# Patient Record
Sex: MALE | Race: ASIAN | NOT HISPANIC OR LATINO | Employment: OTHER | URBAN - METROPOLITAN AREA
[De-identification: names, ages, dates, MRNs, and addresses within clinical notes are randomized per-mention and may not be internally consistent; named-entity substitution may affect disease eponyms.]

---

## 2017-04-10 ENCOUNTER — OFFICE VISIT - HEALTHEAST (OUTPATIENT)
Dept: FAMILY MEDICINE | Facility: CLINIC | Age: 67
End: 2017-04-10

## 2017-04-10 ENCOUNTER — COMMUNICATION - HEALTHEAST (OUTPATIENT)
Dept: FAMILY MEDICINE | Facility: CLINIC | Age: 67
End: 2017-04-10

## 2017-04-10 DIAGNOSIS — R80.9 MICROALBUMINURIA: ICD-10-CM

## 2017-04-10 DIAGNOSIS — E11.9 TYPE 2 DIABETES MELLITUS WITHOUT COMPLICATION, WITHOUT LONG-TERM CURRENT USE OF INSULIN (H): ICD-10-CM

## 2017-04-10 LAB — HBA1C MFR BLD: 7.3 % (ref 3.5–6)

## 2017-04-10 ASSESSMENT — MIFFLIN-ST. JEOR: SCORE: 1620.61

## 2017-04-19 ENCOUNTER — COMMUNICATION - HEALTHEAST (OUTPATIENT)
Dept: FAMILY MEDICINE | Facility: CLINIC | Age: 67
End: 2017-04-19

## 2017-04-19 DIAGNOSIS — E78.00 HYPERCHOLESTEREMIA: ICD-10-CM

## 2017-08-17 ENCOUNTER — COMMUNICATION - HEALTHEAST (OUTPATIENT)
Dept: FAMILY MEDICINE | Facility: CLINIC | Age: 67
End: 2017-08-17

## 2017-08-17 DIAGNOSIS — E11.9 DIABETES MELLITUS (H): ICD-10-CM

## 2017-10-04 ENCOUNTER — COMMUNICATION - HEALTHEAST (OUTPATIENT)
Dept: FAMILY MEDICINE | Facility: CLINIC | Age: 67
End: 2017-10-04

## 2017-10-04 DIAGNOSIS — I10 HTN (HYPERTENSION): ICD-10-CM

## 2017-10-09 ENCOUNTER — AMBULATORY - HEALTHEAST (OUTPATIENT)
Dept: FAMILY MEDICINE | Facility: CLINIC | Age: 67
End: 2017-10-09

## 2017-10-09 ENCOUNTER — COMMUNICATION - HEALTHEAST (OUTPATIENT)
Dept: FAMILY MEDICINE | Facility: CLINIC | Age: 67
End: 2017-10-09

## 2017-11-10 ENCOUNTER — COMMUNICATION - HEALTHEAST (OUTPATIENT)
Dept: FAMILY MEDICINE | Facility: CLINIC | Age: 67
End: 2017-11-10

## 2017-11-10 DIAGNOSIS — E11.9 TYPE 2 DIABETES MELLITUS WITHOUT COMPLICATION (H): ICD-10-CM

## 2017-11-22 ENCOUNTER — AMBULATORY - HEALTHEAST (OUTPATIENT)
Dept: FAMILY MEDICINE | Facility: CLINIC | Age: 67
End: 2017-11-22

## 2017-12-07 ENCOUNTER — COMMUNICATION - HEALTHEAST (OUTPATIENT)
Dept: FAMILY MEDICINE | Facility: CLINIC | Age: 67
End: 2017-12-07

## 2017-12-07 DIAGNOSIS — E78.00 HYPERCHOLESTEREMIA: ICD-10-CM

## 2018-04-13 ENCOUNTER — COMMUNICATION - HEALTHEAST (OUTPATIENT)
Dept: FAMILY MEDICINE | Facility: CLINIC | Age: 68
End: 2018-04-13

## 2018-04-13 DIAGNOSIS — E11.9 TYPE 2 DIABETES MELLITUS WITHOUT COMPLICATION, WITHOUT LONG-TERM CURRENT USE OF INSULIN (H): ICD-10-CM

## 2018-04-13 DIAGNOSIS — R80.9 MICROALBUMINURIA: ICD-10-CM

## 2018-05-25 ENCOUNTER — COMMUNICATION - HEALTHEAST (OUTPATIENT)
Dept: FAMILY MEDICINE | Facility: CLINIC | Age: 68
End: 2018-05-25

## 2018-05-25 DIAGNOSIS — E11.9 DIABETES MELLITUS (H): ICD-10-CM

## 2018-06-19 ENCOUNTER — OFFICE VISIT - HEALTHEAST (OUTPATIENT)
Dept: FAMILY MEDICINE | Facility: CLINIC | Age: 68
End: 2018-06-19

## 2018-06-19 DIAGNOSIS — R07.9 CHEST PAIN: ICD-10-CM

## 2018-06-19 DIAGNOSIS — E78.00 HYPERCHOLESTEREMIA: ICD-10-CM

## 2018-06-19 DIAGNOSIS — E11.9 TYPE 2 DIABETES MELLITUS WITHOUT COMPLICATION, WITHOUT LONG-TERM CURRENT USE OF INSULIN (H): ICD-10-CM

## 2018-06-19 LAB
ANION GAP SERPL CALCULATED.3IONS-SCNC: 11 MMOL/L (ref 5–18)
BUN SERPL-MCNC: 11 MG/DL (ref 8–22)
CALCIUM SERPL-MCNC: 9.4 MG/DL (ref 8.5–10.5)
CHLORIDE BLD-SCNC: 109 MMOL/L (ref 98–107)
CO2 SERPL-SCNC: 25 MMOL/L (ref 22–31)
CREAT SERPL-MCNC: 1.2 MG/DL (ref 0.7–1.3)
ERYTHROCYTE [DISTWIDTH] IN BLOOD BY AUTOMATED COUNT: 13.3 % (ref 11–14.5)
GFR SERPL CREATININE-BSD FRML MDRD: >60 ML/MIN/1.73M2
GLUCOSE BLD-MCNC: 195 MG/DL (ref 70–125)
HBA1C MFR BLD: 9.2 % (ref 3.5–6)
HCT VFR BLD AUTO: 44.9 % (ref 40–54)
HGB BLD-MCNC: 14.9 G/DL (ref 14–18)
LDLC SERPL CALC-MCNC: 99 MG/DL
MCH RBC QN AUTO: 29.2 PG (ref 27–34)
MCHC RBC AUTO-ENTMCNC: 33.2 G/DL (ref 32–36)
MCV RBC AUTO: 88 FL (ref 80–100)
PLATELET # BLD AUTO: 135 THOU/UL (ref 140–440)
PMV BLD AUTO: 7.6 FL (ref 7–10)
POTASSIUM BLD-SCNC: 4.2 MMOL/L (ref 3.5–5)
RBC # BLD AUTO: 5.1 MILL/UL (ref 4.4–6.2)
SODIUM SERPL-SCNC: 145 MMOL/L (ref 136–145)
WBC: 7.4 THOU/UL (ref 4–11)

## 2018-06-19 ASSESSMENT — MIFFLIN-ST. JEOR: SCORE: 1661.43

## 2018-08-24 ENCOUNTER — COMMUNICATION - HEALTHEAST (OUTPATIENT)
Dept: FAMILY MEDICINE | Facility: CLINIC | Age: 68
End: 2018-08-24

## 2018-08-24 DIAGNOSIS — E11.9 DIABETES MELLITUS (H): ICD-10-CM

## 2018-09-24 ENCOUNTER — RECORDS - HEALTHEAST (OUTPATIENT)
Dept: ADMINISTRATIVE | Facility: OTHER | Age: 68
End: 2018-09-24

## 2018-11-24 ENCOUNTER — COMMUNICATION - HEALTHEAST (OUTPATIENT)
Dept: FAMILY MEDICINE | Facility: CLINIC | Age: 68
End: 2018-11-24

## 2018-12-15 ENCOUNTER — COMMUNICATION - HEALTHEAST (OUTPATIENT)
Dept: FAMILY MEDICINE | Facility: CLINIC | Age: 68
End: 2018-12-15

## 2018-12-15 DIAGNOSIS — I10 ESSENTIAL HYPERTENSION: ICD-10-CM

## 2019-01-29 ENCOUNTER — COMMUNICATION - HEALTHEAST (OUTPATIENT)
Dept: FAMILY MEDICINE | Facility: CLINIC | Age: 69
End: 2019-01-29

## 2019-01-31 ENCOUNTER — AMBULATORY - HEALTHEAST (OUTPATIENT)
Dept: LAB | Facility: CLINIC | Age: 69
End: 2019-01-31

## 2019-01-31 ENCOUNTER — AMBULATORY - HEALTHEAST (OUTPATIENT)
Dept: FAMILY MEDICINE | Facility: CLINIC | Age: 69
End: 2019-01-31

## 2019-01-31 DIAGNOSIS — E11.9 TYPE 2 DIABETES MELLITUS WITHOUT COMPLICATION, WITHOUT LONG-TERM CURRENT USE OF INSULIN (H): ICD-10-CM

## 2019-01-31 LAB — HBA1C MFR BLD: 8.6 % (ref 3.5–6)

## 2019-04-03 ENCOUNTER — COMMUNICATION - HEALTHEAST (OUTPATIENT)
Dept: FAMILY MEDICINE | Facility: CLINIC | Age: 69
End: 2019-04-03

## 2019-04-03 DIAGNOSIS — R80.9 MICROALBUMINURIA: ICD-10-CM

## 2019-04-03 DIAGNOSIS — E11.9 TYPE 2 DIABETES MELLITUS WITHOUT COMPLICATION, WITHOUT LONG-TERM CURRENT USE OF INSULIN (H): ICD-10-CM

## 2019-04-08 ENCOUNTER — COMMUNICATION - HEALTHEAST (OUTPATIENT)
Dept: FAMILY MEDICINE | Facility: CLINIC | Age: 69
End: 2019-04-08

## 2019-04-16 ENCOUNTER — COMMUNICATION - HEALTHEAST (OUTPATIENT)
Dept: FAMILY MEDICINE | Facility: CLINIC | Age: 69
End: 2019-04-16

## 2019-05-30 ENCOUNTER — OFFICE VISIT - HEALTHEAST (OUTPATIENT)
Dept: FAMILY MEDICINE | Facility: CLINIC | Age: 69
End: 2019-05-30

## 2019-05-30 DIAGNOSIS — E78.00 PURE HYPERCHOLESTEROLEMIA: ICD-10-CM

## 2019-05-30 DIAGNOSIS — E11.9 TYPE 2 DIABETES MELLITUS WITHOUT COMPLICATION, WITHOUT LONG-TERM CURRENT USE OF INSULIN (H): ICD-10-CM

## 2019-05-30 LAB
ALBUMIN SERPL-MCNC: 3.5 G/DL (ref 3.5–5)
ALP SERPL-CCNC: 75 U/L (ref 45–120)
ALT SERPL W P-5'-P-CCNC: 33 U/L (ref 0–45)
ANION GAP SERPL CALCULATED.3IONS-SCNC: 10 MMOL/L (ref 5–18)
AST SERPL W P-5'-P-CCNC: 42 U/L (ref 0–40)
BILIRUB SERPL-MCNC: 0.6 MG/DL (ref 0–1)
BUN SERPL-MCNC: 15 MG/DL (ref 8–22)
CALCIUM SERPL-MCNC: 9.4 MG/DL (ref 8.5–10.5)
CHLORIDE BLD-SCNC: 107 MMOL/L (ref 98–107)
CO2 SERPL-SCNC: 25 MMOL/L (ref 22–31)
CREAT SERPL-MCNC: 1.31 MG/DL (ref 0.7–1.3)
CREAT UR-MCNC: 86.6 MG/DL
GFR SERPL CREATININE-BSD FRML MDRD: 55 ML/MIN/1.73M2
GLUCOSE BLD-MCNC: 183 MG/DL (ref 70–125)
HBA1C MFR BLD: 7.6 % (ref 3.5–6)
LDLC SERPL CALC-MCNC: 85 MG/DL
MICROALBUMIN UR-MCNC: 6.08 MG/DL (ref 0–1.99)
MICROALBUMIN/CREAT UR: 70.2 MG/G
POTASSIUM BLD-SCNC: 4.4 MMOL/L (ref 3.5–5)
PROT SERPL-MCNC: 7.2 G/DL (ref 6–8)
SODIUM SERPL-SCNC: 142 MMOL/L (ref 136–145)

## 2019-05-30 ASSESSMENT — MIFFLIN-ST. JEOR: SCORE: 1693.19

## 2019-06-03 ENCOUNTER — COMMUNICATION - HEALTHEAST (OUTPATIENT)
Dept: FAMILY MEDICINE | Facility: CLINIC | Age: 69
End: 2019-06-03

## 2019-06-06 ENCOUNTER — COMMUNICATION - HEALTHEAST (OUTPATIENT)
Dept: FAMILY MEDICINE | Facility: CLINIC | Age: 69
End: 2019-06-06

## 2019-06-17 ENCOUNTER — COMMUNICATION - HEALTHEAST (OUTPATIENT)
Dept: ADMINISTRATIVE | Facility: CLINIC | Age: 69
End: 2019-06-17

## 2019-07-08 ENCOUNTER — COMMUNICATION - HEALTHEAST (OUTPATIENT)
Dept: FAMILY MEDICINE | Facility: CLINIC | Age: 69
End: 2019-07-08

## 2019-07-08 DIAGNOSIS — E78.00 HYPERCHOLESTEREMIA: ICD-10-CM

## 2019-07-09 ENCOUNTER — COMMUNICATION - HEALTHEAST (OUTPATIENT)
Dept: FAMILY MEDICINE | Facility: CLINIC | Age: 69
End: 2019-07-09

## 2019-07-09 DIAGNOSIS — E11.9 TYPE 2 DIABETES MELLITUS WITHOUT COMPLICATION, WITHOUT LONG-TERM CURRENT USE OF INSULIN (H): ICD-10-CM

## 2019-07-09 DIAGNOSIS — R80.9 MICROALBUMINURIA: ICD-10-CM

## 2019-08-16 ENCOUNTER — COMMUNICATION - HEALTHEAST (OUTPATIENT)
Dept: FAMILY MEDICINE | Facility: CLINIC | Age: 69
End: 2019-08-16

## 2019-08-16 DIAGNOSIS — E11.9 DIABETES MELLITUS (H): ICD-10-CM

## 2019-09-12 ENCOUNTER — COMMUNICATION - HEALTHEAST (OUTPATIENT)
Dept: FAMILY MEDICINE | Facility: CLINIC | Age: 69
End: 2019-09-12

## 2019-09-18 ENCOUNTER — AMBULATORY - HEALTHEAST (OUTPATIENT)
Dept: FAMILY MEDICINE | Facility: CLINIC | Age: 69
End: 2019-09-18

## 2019-09-18 ENCOUNTER — OFFICE VISIT - HEALTHEAST (OUTPATIENT)
Dept: FAMILY MEDICINE | Facility: CLINIC | Age: 69
End: 2019-09-18

## 2019-09-18 DIAGNOSIS — Z00.00 ROUTINE HEALTH MAINTENANCE: ICD-10-CM

## 2019-09-18 DIAGNOSIS — Z12.11 SCREEN FOR COLON CANCER: ICD-10-CM

## 2019-09-18 DIAGNOSIS — Z23 NEED FOR IMMUNIZATION AGAINST INFLUENZA: ICD-10-CM

## 2019-09-18 DIAGNOSIS — E11.9 TYPE 2 DIABETES MELLITUS WITHOUT COMPLICATION, WITHOUT LONG-TERM CURRENT USE OF INSULIN (H): ICD-10-CM

## 2019-09-18 DIAGNOSIS — G51.0 BELL'S PALSY: ICD-10-CM

## 2019-09-18 LAB — HBA1C MFR BLD: 7.3 % (ref 3.5–6)

## 2019-09-18 ASSESSMENT — MIFFLIN-ST. JEOR: SCORE: 1675.13

## 2019-09-19 ENCOUNTER — COMMUNICATION - HEALTHEAST (OUTPATIENT)
Dept: FAMILY MEDICINE | Facility: CLINIC | Age: 69
End: 2019-09-19

## 2019-09-19 LAB — HCV AB SERPL QL IA: NEGATIVE

## 2019-10-01 ENCOUNTER — RECORDS - HEALTHEAST (OUTPATIENT)
Dept: ADMINISTRATIVE | Facility: OTHER | Age: 69
End: 2019-10-01

## 2019-10-23 ENCOUNTER — COMMUNICATION - HEALTHEAST (OUTPATIENT)
Dept: FAMILY MEDICINE | Facility: CLINIC | Age: 69
End: 2019-10-23

## 2019-11-07 ENCOUNTER — COMMUNICATION - HEALTHEAST (OUTPATIENT)
Dept: FAMILY MEDICINE | Facility: CLINIC | Age: 69
End: 2019-11-07

## 2019-11-13 ENCOUNTER — OFFICE VISIT - HEALTHEAST (OUTPATIENT)
Dept: PHARMACY | Facility: CLINIC | Age: 69
End: 2019-11-13

## 2019-11-13 DIAGNOSIS — E11.9 TYPE 2 DIABETES MELLITUS WITHOUT COMPLICATION, WITHOUT LONG-TERM CURRENT USE OF INSULIN (H): ICD-10-CM

## 2019-11-26 ENCOUNTER — COMMUNICATION - HEALTHEAST (OUTPATIENT)
Dept: FAMILY MEDICINE | Facility: CLINIC | Age: 69
End: 2019-11-26

## 2019-11-26 DIAGNOSIS — E11.9 TYPE 2 DIABETES MELLITUS WITHOUT COMPLICATION, WITHOUT LONG-TERM CURRENT USE OF INSULIN (H): ICD-10-CM

## 2019-12-02 ENCOUNTER — RECORDS - HEALTHEAST (OUTPATIENT)
Dept: ADMINISTRATIVE | Facility: OTHER | Age: 69
End: 2019-12-02

## 2019-12-23 ENCOUNTER — OFFICE VISIT - HEALTHEAST (OUTPATIENT)
Dept: FAMILY MEDICINE | Facility: CLINIC | Age: 69
End: 2019-12-23

## 2019-12-23 DIAGNOSIS — E11.9 DIABETES MELLITUS, TYPE 2 (H): ICD-10-CM

## 2019-12-23 LAB
ANION GAP SERPL CALCULATED.3IONS-SCNC: 10 MMOL/L (ref 5–18)
BUN SERPL-MCNC: 16 MG/DL (ref 8–22)
CALCIUM SERPL-MCNC: 9 MG/DL (ref 8.5–10.5)
CHLORIDE BLD-SCNC: 110 MMOL/L (ref 98–107)
CO2 SERPL-SCNC: 25 MMOL/L (ref 22–31)
CREAT SERPL-MCNC: 1.34 MG/DL (ref 0.7–1.3)
GFR SERPL CREATININE-BSD FRML MDRD: 53 ML/MIN/1.73M2
GLUCOSE BLD-MCNC: 98 MG/DL (ref 70–125)
HBA1C MFR BLD: 6.3 % (ref 3.5–6)
POTASSIUM BLD-SCNC: 3.7 MMOL/L (ref 3.5–5)
SODIUM SERPL-SCNC: 145 MMOL/L (ref 136–145)

## 2019-12-23 ASSESSMENT — MIFFLIN-ST. JEOR: SCORE: 1643.29

## 2019-12-24 ENCOUNTER — COMMUNICATION - HEALTHEAST (OUTPATIENT)
Dept: FAMILY MEDICINE | Facility: CLINIC | Age: 69
End: 2019-12-24

## 2020-01-02 ENCOUNTER — COMMUNICATION - HEALTHEAST (OUTPATIENT)
Dept: FAMILY MEDICINE | Facility: CLINIC | Age: 70
End: 2020-01-02

## 2020-01-10 ENCOUNTER — TRANSFERRED RECORDS (OUTPATIENT)
Dept: MULTI SPECIALTY CLINIC | Facility: CLINIC | Age: 70
End: 2020-01-10
Payer: COMMERCIAL

## 2020-01-10 LAB — COLOGUARD-ABSTRACT: NEGATIVE

## 2020-01-14 ENCOUNTER — RECORDS - HEALTHEAST (OUTPATIENT)
Dept: ADMINISTRATIVE | Facility: OTHER | Age: 70
End: 2020-01-14

## 2020-01-22 ENCOUNTER — COMMUNICATION - HEALTHEAST (OUTPATIENT)
Dept: FAMILY MEDICINE | Facility: CLINIC | Age: 70
End: 2020-01-22

## 2020-01-22 DIAGNOSIS — I10 ESSENTIAL HYPERTENSION: ICD-10-CM

## 2020-02-03 ENCOUNTER — AMBULATORY - HEALTHEAST (OUTPATIENT)
Dept: FAMILY MEDICINE | Facility: CLINIC | Age: 70
End: 2020-02-03

## 2020-02-03 DIAGNOSIS — E11.9 TYPE 2 DIABETES MELLITUS WITHOUT COMPLICATION, WITHOUT LONG-TERM CURRENT USE OF INSULIN (H): ICD-10-CM

## 2020-02-04 ENCOUNTER — COMMUNICATION - HEALTHEAST (OUTPATIENT)
Dept: FAMILY MEDICINE | Facility: CLINIC | Age: 70
End: 2020-02-04

## 2020-02-04 ENCOUNTER — AMBULATORY - HEALTHEAST (OUTPATIENT)
Dept: FAMILY MEDICINE | Facility: CLINIC | Age: 70
End: 2020-02-04

## 2020-02-04 ENCOUNTER — COMMUNICATION - HEALTHEAST (OUTPATIENT)
Dept: SCHEDULING | Facility: CLINIC | Age: 70
End: 2020-02-04

## 2020-02-04 DIAGNOSIS — E11.9 TYPE 2 DIABETES MELLITUS WITHOUT COMPLICATION, WITHOUT LONG-TERM CURRENT USE OF INSULIN (H): ICD-10-CM

## 2020-05-01 ENCOUNTER — COMMUNICATION - HEALTHEAST (OUTPATIENT)
Dept: FAMILY MEDICINE | Facility: CLINIC | Age: 70
End: 2020-05-01

## 2020-05-01 DIAGNOSIS — E11.9 DIABETES MELLITUS (H): ICD-10-CM

## 2020-06-25 ENCOUNTER — COMMUNICATION - HEALTHEAST (OUTPATIENT)
Dept: FAMILY MEDICINE | Facility: CLINIC | Age: 70
End: 2020-06-25

## 2020-06-25 DIAGNOSIS — R80.9 MICROALBUMINURIA: ICD-10-CM

## 2020-06-25 DIAGNOSIS — E11.9 TYPE 2 DIABETES MELLITUS WITHOUT COMPLICATION, WITHOUT LONG-TERM CURRENT USE OF INSULIN (H): ICD-10-CM

## 2020-08-24 ENCOUNTER — COMMUNICATION - HEALTHEAST (OUTPATIENT)
Dept: FAMILY MEDICINE | Facility: CLINIC | Age: 70
End: 2020-08-24

## 2020-08-24 DIAGNOSIS — E78.00 HYPERCHOLESTEREMIA: ICD-10-CM

## 2020-08-24 DIAGNOSIS — E11.9 DIABETES MELLITUS (H): ICD-10-CM

## 2020-08-31 ENCOUNTER — AMBULATORY - HEALTHEAST (OUTPATIENT)
Dept: FAMILY MEDICINE | Facility: CLINIC | Age: 70
End: 2020-08-31

## 2020-08-31 DIAGNOSIS — I10 ESSENTIAL HYPERTENSION: ICD-10-CM

## 2020-08-31 DIAGNOSIS — E11.9 DIABETES MELLITUS (H): ICD-10-CM

## 2020-08-31 DIAGNOSIS — R80.9 MICROALBUMINURIA: ICD-10-CM

## 2020-08-31 DIAGNOSIS — E11.9 TYPE 2 DIABETES MELLITUS WITHOUT COMPLICATION, WITHOUT LONG-TERM CURRENT USE OF INSULIN (H): ICD-10-CM

## 2020-08-31 DIAGNOSIS — E78.00 HYPERCHOLESTEREMIA: ICD-10-CM

## 2020-08-31 RX ORDER — SIMVASTATIN 20 MG
20 TABLET ORAL AT BEDTIME
Qty: 90 TABLET | Refills: 3 | Status: SHIPPED | OUTPATIENT
Start: 2020-08-31 | End: 2021-10-04

## 2020-08-31 RX ORDER — GLIPIZIDE 10 MG/1
10 TABLET, FILM COATED, EXTENDED RELEASE ORAL DAILY
Qty: 90 TABLET | Refills: 3 | Status: SHIPPED | OUTPATIENT
Start: 2020-08-31 | End: 2021-11-02

## 2020-08-31 RX ORDER — PIOGLITAZONEHYDROCHLORIDE 30 MG/1
30 TABLET ORAL DAILY
Qty: 90 TABLET | Refills: 3 | Status: SHIPPED | OUTPATIENT
Start: 2020-08-31 | End: 2021-08-02

## 2020-10-21 ENCOUNTER — OFFICE VISIT - HEALTHEAST (OUTPATIENT)
Dept: FAMILY MEDICINE | Facility: CLINIC | Age: 70
End: 2020-10-21

## 2020-10-21 DIAGNOSIS — I10 ESSENTIAL HYPERTENSION: ICD-10-CM

## 2020-10-21 DIAGNOSIS — E78.00 PURE HYPERCHOLESTEROLEMIA: ICD-10-CM

## 2020-10-21 DIAGNOSIS — E11.9 TYPE 2 DIABETES MELLITUS WITHOUT COMPLICATION, WITHOUT LONG-TERM CURRENT USE OF INSULIN (H): ICD-10-CM

## 2020-10-21 DIAGNOSIS — Z23 NEED FOR IMMUNIZATION AGAINST INFLUENZA: ICD-10-CM

## 2020-10-21 LAB
ALBUMIN SERPL-MCNC: 3.5 G/DL (ref 3.5–5)
ALP SERPL-CCNC: 86 U/L (ref 45–120)
ALT SERPL W P-5'-P-CCNC: 28 U/L (ref 0–45)
ANION GAP SERPL CALCULATED.3IONS-SCNC: 13 MMOL/L (ref 5–18)
AST SERPL W P-5'-P-CCNC: 32 U/L (ref 0–40)
BILIRUB SERPL-MCNC: 1.1 MG/DL (ref 0–1)
BUN SERPL-MCNC: 16 MG/DL (ref 8–22)
CALCIUM SERPL-MCNC: 9.1 MG/DL (ref 8.5–10.5)
CHLORIDE BLD-SCNC: 107 MMOL/L (ref 98–107)
CO2 SERPL-SCNC: 23 MMOL/L (ref 22–31)
CREAT SERPL-MCNC: 1.3 MG/DL (ref 0.7–1.3)
GFR SERPL CREATININE-BSD FRML MDRD: 55 ML/MIN/1.73M2
GLUCOSE BLD-MCNC: 161 MG/DL (ref 70–125)
HBA1C MFR BLD: 7.4 %
LDLC SERPL CALC-MCNC: 88 MG/DL
POTASSIUM BLD-SCNC: 4.5 MMOL/L (ref 3.5–5)
PROT SERPL-MCNC: 6.9 G/DL (ref 6–8)
SODIUM SERPL-SCNC: 143 MMOL/L (ref 136–145)

## 2020-10-21 ASSESSMENT — MIFFLIN-ST. JEOR: SCORE: 1773.71

## 2020-10-22 ENCOUNTER — COMMUNICATION - HEALTHEAST (OUTPATIENT)
Dept: FAMILY MEDICINE | Facility: CLINIC | Age: 70
End: 2020-10-22

## 2020-11-15 ASSESSMENT — MIFFLIN-ST. JEOR
SCORE: 1664.84
SCORE: 1664.84

## 2020-11-16 ENCOUNTER — COMMUNICATION - HEALTHEAST (OUTPATIENT)
Dept: SCHEDULING | Facility: CLINIC | Age: 70
End: 2020-11-16

## 2020-11-16 ENCOUNTER — SURGERY - HEALTHEAST (OUTPATIENT)
Dept: GASTROENTEROLOGY | Facility: HOSPITAL | Age: 70
End: 2020-11-16

## 2020-11-16 ASSESSMENT — MIFFLIN-ST. JEOR
SCORE: 1755.84
SCORE: 1755.84

## 2020-11-18 ENCOUNTER — SURGERY - HEALTHEAST (OUTPATIENT)
Dept: GASTROENTEROLOGY | Facility: HOSPITAL | Age: 70
End: 2020-11-18

## 2020-11-19 ENCOUNTER — COMMUNICATION - HEALTHEAST (OUTPATIENT)
Dept: FAMILY MEDICINE | Facility: CLINIC | Age: 70
End: 2020-11-19

## 2020-11-25 ENCOUNTER — OFFICE VISIT - HEALTHEAST (OUTPATIENT)
Dept: FAMILY MEDICINE | Facility: CLINIC | Age: 70
End: 2020-11-25

## 2020-11-25 DIAGNOSIS — K74.60 CIRRHOSIS OF LIVER WITHOUT ASCITES, UNSPECIFIED HEPATIC CIRRHOSIS TYPE (H): ICD-10-CM

## 2020-11-25 DIAGNOSIS — K29.01 GASTROINTESTINAL HEMORRHAGE ASSOCIATED WITH ACUTE GASTRITIS: ICD-10-CM

## 2020-11-25 DIAGNOSIS — E11.29 TYPE 2 DIABETES MELLITUS WITH MICROALBUMINURIA, WITHOUT LONG-TERM CURRENT USE OF INSULIN (H): ICD-10-CM

## 2020-11-25 DIAGNOSIS — R80.9 TYPE 2 DIABETES MELLITUS WITH MICROALBUMINURIA, WITHOUT LONG-TERM CURRENT USE OF INSULIN (H): ICD-10-CM

## 2020-11-25 DIAGNOSIS — I82.461 DEEP VEIN THROMBOSIS (DVT) OF CALF MUSCLE VEIN OF RIGHT LOWER EXTREMITY, UNSPECIFIED CHRONICITY (H): ICD-10-CM

## 2020-11-25 DIAGNOSIS — K92.0 GASTROINTESTINAL HEMORRHAGE WITH HEMATEMESIS: ICD-10-CM

## 2020-11-25 DIAGNOSIS — I10 ESSENTIAL HYPERTENSION: ICD-10-CM

## 2020-11-25 RX ORDER — LOSARTAN POTASSIUM 25 MG/1
25 TABLET ORAL DAILY
Qty: 90 TABLET | Refills: 3 | Status: SHIPPED | OUTPATIENT
Start: 2020-11-25 | End: 2021-11-02

## 2020-11-25 RX ORDER — METOPROLOL SUCCINATE 100 MG/1
50 TABLET, EXTENDED RELEASE ORAL DAILY
Qty: 90 TABLET | Refills: 3 | Status: SHIPPED
Start: 2020-11-25 | End: 2021-12-02

## 2020-11-25 ASSESSMENT — MIFFLIN-ST. JEOR: SCORE: 1755.56

## 2020-12-07 ENCOUNTER — HOSPITAL ENCOUNTER (OUTPATIENT)
Dept: ULTRASOUND IMAGING | Facility: HOSPITAL | Age: 70
Discharge: HOME | End: 2020-12-07
Attending: FAMILY MEDICINE

## 2020-12-07 DIAGNOSIS — K29.01 GASTROINTESTINAL HEMORRHAGE ASSOCIATED WITH ACUTE GASTRITIS: ICD-10-CM

## 2020-12-07 DIAGNOSIS — I82.4Z9 DVT, LOWER EXTREMITY, DISTAL (H): ICD-10-CM

## 2021-01-12 ENCOUNTER — RECORDS - HEALTHEAST (OUTPATIENT)
Dept: ADMINISTRATIVE | Facility: OTHER | Age: 71
End: 2021-01-12

## 2021-02-18 ENCOUNTER — RECORDS - HEALTHEAST (OUTPATIENT)
Dept: ADMINISTRATIVE | Facility: OTHER | Age: 71
End: 2021-02-18

## 2021-02-27 ENCOUNTER — AMBULATORY - HEALTHEAST (OUTPATIENT)
Dept: NURSING | Facility: CLINIC | Age: 71
End: 2021-02-27

## 2021-03-15 ENCOUNTER — RECORDS - HEALTHEAST (OUTPATIENT)
Dept: ADMINISTRATIVE | Facility: OTHER | Age: 71
End: 2021-03-15

## 2021-03-16 ENCOUNTER — RECORDS - HEALTHEAST (OUTPATIENT)
Dept: ADMINISTRATIVE | Facility: OTHER | Age: 71
End: 2021-03-16

## 2021-03-20 ENCOUNTER — AMBULATORY - HEALTHEAST (OUTPATIENT)
Dept: NURSING | Facility: CLINIC | Age: 71
End: 2021-03-20

## 2021-05-17 ENCOUNTER — HOSPITAL ENCOUNTER (OUTPATIENT)
Dept: ULTRASOUND IMAGING | Facility: HOSPITAL | Age: 71
Discharge: HOME | End: 2021-05-17
Attending: INTERNAL MEDICINE

## 2021-05-17 DIAGNOSIS — I85.00 ESOPHAGEAL VARICES DETERMINED BY ENDOSCOPY (H): ICD-10-CM

## 2021-05-17 DIAGNOSIS — K74.60 LIVER CIRRHOSIS (H): ICD-10-CM

## 2021-05-17 LAB
ALT SERPL W/O P-5'-P-CCNC: 31 U/L (ref 0–78)
AST SERPL-CCNC: 33 U/L (ref 0–37)
CREAT SERPL-MCNC: 1.44 MG/DL (ref 0.7–1.3)
GFR ESTIMATE EXT - HISTORICAL: 52 ML/MIN/1.73M2
GFR ESTIMATE, IF BLACK EXT - HISTORICAL: >60 ML/MIN/1.73M2

## 2021-05-27 NOTE — TELEPHONE ENCOUNTER
Called pt to schedule apt due this month for DM f/u.  Pt unable. Scheduled for 5/1/19 at 540 pm.      Pt needs the following completed:    DM FOOT EXAM  DM F/U  A1C,   URINE MICROALBUMIN  DM EYE EXAM REFERRAL  [facility] #1     Thanks .

## 2021-05-27 NOTE — TELEPHONE ENCOUNTER
Refill Approved    Rx renewed per Medication Renewal Policy. Medication was last renewed on 4/16/18.    Adriana Quezada, [facility] Triage/Med Refill 4/4/2019     Requested Prescriptions   Pending Prescriptions Disp Refills     lisinopril (PRINIVIL,ZESTRIL) 5 MG tablet [Pharmacy Med Name: LISINOPRIL 5MG      TAB] 90 tablet 3     Sig: TAKE 1 TABLET BY MOUTH ONCE DAILY    [employer] Refill Protocol Passed - 4/3/2019  8:37 AM       Passed - PCP or prescribing provider visit in past 12 months      Last office visit with prescriber/PCP: 6/19/2018 Amrik Mejia MD OR same dept: 6/19/2018 Amrik Mejia MD OR same specialty: 6/19/2018 Amrik Mejia MD  Last physical: Visit date not found Last MTM visit: Visit date not found   Next visit within 3 mo: Visit date not found  Next physical within 3 mo: Visit date not found  Prescriber OR PCP: Amrik Mejia MD  Last diagnosis associated with med order: 1. Type 2 diabetes mellitus without complication, without long-term current use of insulin (H)  - lisinopril (PRINIVIL,ZESTRIL) 5 MG tablet [Pharmacy Med Name: LISINOPRIL 5MG      TAB]; TAKE 1 TABLET BY MOUTH ONCE DAILY  Dispense: 90 tablet; Refill: 3    2. Microalbuminuria  - lisinopril (PRINIVIL,ZESTRIL) 5 MG tablet [Pharmacy Med Name: LISINOPRIL 5MG      TAB]; TAKE 1 TABLET BY MOUTH ONCE DAILY  Dispense: 90 tablet; Refill: 3    If protocol passes may refill for 12 months if within 3 months of last provider visit (or a total of 15 months).            Passed - Serum Potassium in past 12 months    Lab Results   Component Value Date    Potassium 4.2 06/19/2018            Passed - Blood pressure filed in past 12 months    BP Readings from Last 1 Encounters:   06/19/18 138/78            Passed - Serum Creatinine in past 12 months    Creatinine   Date Value Ref Range Status   06/19/2018 1.20 0.70 - 1.30 mg/dL Final

## 2021-05-29 NOTE — TELEPHONE ENCOUNTER
Called pt for quality colon screen and AWV set up. Pt just seen  Last month for DM.  Colonoscopy due 9/25/19 and asked if pt would like to set up next apt as AWV for 11/30/19 - the next time PCP wishes to see pt.  Pt will call us to schedule.  Thanks.

## 2021-05-29 NOTE — PROGRESS NOTES
ASSESMENT AND PLAN:  Diagnoses and all orders for this visit:    Type 2 diabetes mellitus without complication, without long-term current use of insulin (H)  -     Microalbumin, Random Urine  -     Ambulatory referral to Ophthalmology  -     Glycosylated Hemoglobin A1c done today shows good interval improvement.  Continue current treatment plan.  Recheck in 6 months, sooner if problems.    Hypercholesterolemia  -     Comprehensive Metabolic Panel  -     LDL Cholesterol, Direct          SUBJECTIVE: 68-year-old male comes in for follow-up on his high cholesterol and diabetes.  Since last visit, he has been compliant in his triple drug oral therapy for his diabetes.  He is able to afford the medications currently with his current pharmacy and plan.  Previously there have been issues around medication affordability and lack of insurance coverage especially for the Januvia.  Patient reports that overall he is been feeling well.  He is doing a exercise walking program regularly.  He does not get any exertional chest pain.    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     Current Outpatient Medications   Medication Sig Dispense Refill     aspirin 81 mg chewable tablet Chew 81 mg daily.       glipiZIDE (GLUCOTROL XL) 10 MG 24 hr tablet TAKE ONE TABLET BY MOUTH ONCE DAILY 90 tablet 3     lisinopril (PRINIVIL,ZESTRIL) 5 MG tablet TAKE 1 TABLET BY MOUTH ONCE DAILY 90 tablet 0     metFORMIN (GLUCOPHAGE) 1000 MG tablet TAKE 1 TABLET BY MOUTH TWICE DAILY OVERDUE  FOR  A  VISIT  AND  LABS 180 tablet 3     metoprolol succinate (TOPROL XL) 100 MG 24 hr  "tablet Take 0.5 tablets (50 mg total) by mouth daily. Take 1/2 tablet daily 45 tablet 3     multivitamin (ONE A DAY) per tablet Take 1 tablet by mouth daily.       simvastatin (ZOCOR) 20 MG tablet Take 1 tablet (20 mg total) by mouth at bedtime. 90 tablet 3     sitaGLIPtin (JANUVIA) 100 MG tablet Take 1 tablet (100 mg total) by mouth daily. With or without food 90 tablet 3     ascorbic acid (ASCORBIC ACID WITH NIR HIPS) 500 MG tablet Take 500 mg by mouth daily.       No current facility-administered medications for this visit.      Social History     Tobacco Use   Smoking Status Never Smoker   Smokeless Tobacco Never Used       OBJECTICE: /76   Pulse 70   Temp 97.4  F (36.3  C) (Oral)   Resp 16   Ht 5' 7.5\" (1.715 m)   Wt 212 lb (96.2 kg)   SpO2 96%   BMI 32.71 kg/m       Recent Results (from the past 24 hour(s))   Glycosylated Hemoglobin A1c    Collection Time: 05/30/19  8:32 AM   Result Value Ref Range    Hemoglobin A1c 7.6 (H) 3.5 - 6.0 %   Microalbumin, Random Urine    Collection Time: 05/30/19  9:28 AM   Result Value Ref Range    Microalbumin, Random Urine 6.08 (H) 0.00 - 1.99 mg/dL    Creatinine, Urine 86.6 mg/dL    Microalbumin/Creatinine Ratio Random Urine 70.2 (H) <=19.9 mg/g        GEN-alert, appropriate, in no apparent distress   CV-regular rate and rhythm   RESP-lungs clear to auscultation   Foot exam-normal.  Palpable pedal pulses bilaterally.  No ulcers.    Amrik Mejia"

## 2021-05-30 VITALS — BODY MASS INDEX: 29.7 KG/M2 | HEIGHT: 68 IN | WEIGHT: 196 LBS

## 2021-05-30 NOTE — TELEPHONE ENCOUNTER
Refill Approved    Rx renewed per Medication Renewal Policy. Medication was last renewed on 4/4/19.    Adriana Quezada, [facility] Triage/Med Refill 7/9/2019     Requested Prescriptions   Pending Prescriptions Disp Refills     lisinopril (PRINIVIL,ZESTRIL) 5 MG tablet [Pharmacy Med Name: LISINOPRIL 5MG      TAB] 90 tablet 0     Sig: TAKE 1 TABLET BY MOUTH ONCE DAILY       [employer] Inhibitors Refill Protocol Passed - 7/9/2019 10:14 AM        Passed - PCP or prescribing provider visit in past 12 months       Last office visit with prescriber/PCP: 5/30/2019 Amrik Mejia MD OR same dept: 5/30/2019 Amrik Mejia MD OR same specialty: 5/30/2019 Amrik Mejia MD  Last physical: Visit date not found Last MTM visit: Visit date not found   Next visit within 3 mo: Visit date not found  Next physical within 3 mo: Visit date not found  Prescriber OR PCP: Amrik Mejia MD  Last diagnosis associated with med order: 1. Type 2 diabetes mellitus without complication, without long-term current use of insulin (H)  - lisinopril (PRINIVIL,ZESTRIL) 5 MG tablet [Pharmacy Med Name: LISINOPRIL 5MG      TAB]; TAKE 1 TABLET BY MOUTH ONCE DAILY  Dispense: 90 tablet; Refill: 0    2. Microalbuminuria  - lisinopril (PRINIVIL,ZESTRIL) 5 MG tablet [Pharmacy Med Name: LISINOPRIL 5MG      TAB]; TAKE 1 TABLET BY MOUTH ONCE DAILY  Dispense: 90 tablet; Refill: 0    If protocol passes may refill for 12 months if within 3 months of last provider visit (or a total of 15 months).             Passed - Serum Potassium in past 12 months     Lab Results   Component Value Date    Potassium 4.4 05/30/2019             Passed - Blood pressure filed in past 12 months     BP Readings from Last 1 Encounters:   05/30/19 118/76             Passed - Serum Creatinine in past 12 months     Creatinine   Date Value Ref Range Status   05/30/2019 1.31 (H) 0.70 - 1.30 mg/dL Final

## 2021-05-31 NOTE — TELEPHONE ENCOUNTER
Refill Approved    Rx renewed per Medication Renewal Policy. Medication was last renewed on 8/27/18.    Adriana Quezada, [facility] Triage/Med Refill 8/16/2019     Requested Prescriptions   Pending Prescriptions Disp Refills     metFORMIN (GLUCOPHAGE) 1000 MG tablet [Pharmacy Med Name: METFORMIN 1000MG TAB] 180 tablet 3     Sig: TAKE 1 TABLET BY MOUTH TWICE DAILY OVER  DUE  FOR  A  VISIT  AND  LABS       Metformin Refill Protocol Passed - 8/16/2019  5:30 AM        Passed - Blood pressure in last 12 months     BP Readings from Last 1 Encounters:   05/30/19 118/76             Passed - LFT or AST or ALT in last 12 months     Albumin   Date Value Ref Range Status   05/30/2019 3.5 3.5 - 5.0 g/dL Final     Bilirubin, Total   Date Value Ref Range Status   05/30/2019 0.6 0.0 - 1.0 mg/dL Final     Bilirubin, Direct   Date Value Ref Range Status   10/07/2013 0.2 <0.6 mg/dL Final     Alkaline Phosphatase   Date Value Ref Range Status   05/30/2019 75 45 - 120 U/L Final     AST   Date Value Ref Range Status   05/30/2019 42 (H) 0 - 40 U/L Final     ALT   Date Value Ref Range Status   05/30/2019 33 0 - 45 U/L Final     Protein, Total   Date Value Ref Range Status   05/30/2019 7.2 6.0 - 8.0 g/dL Final                Passed - GFR or Serum Creatinine in last 6 months     GFR MDRD Non Af Amer   Date Value Ref Range Status   05/30/2019 55 (L) >60 mL/min/1.73m2 Final     GFR MDRD Af Amer   Date Value Ref Range Status   05/30/2019 >60 >60 mL/min/1.73m2 Final             Passed - Visit with PCP or prescribing provider visit in last 6 months or next 3 months     Last office visit with prescriber/PCP: 5/30/2019 OR same dept: 5/30/2019 Amrik Mejia MD OR same specialty: 5/30/2019 Amrik Mejia MD Last physical: Visit date not found Last MTM visit: Visit date not found         Next appt within 3 mo: Visit date not found  Next physical within 3 mo: Visit date not found  Prescriber OR PCP: Amrik Mejia MD  Last diagnosis associated  with med order: 1. Diabetes mellitus (H)  - metFORMIN (GLUCOPHAGE) 1000 MG tablet [Pharmacy Med Name: METFORMIN 1000MG TAB]; TAKE 1 TABLET BY MOUTH TWICE DAILY OVER  DUE  FOR  A  VISIT  AND  LABS  Dispense: 180 tablet; Refill: 3     If protocol passes may refill for 12 months if within 3 months of last provider visit (or a total of 15 months).           Passed - A1C in last 6 months     Hemoglobin A1c   Date Value Ref Range Status   05/30/2019 7.6 (H) 3.5 - 6.0 % Final               Passed - Microalbumin in last year      Microalbumin, Random Urine   Date Value Ref Range Status   05/30/2019 6.08 (H) 0.00 - 1.99 mg/dL Final

## 2021-06-01 VITALS — BODY MASS INDEX: 31.07 KG/M2 | HEIGHT: 68 IN | WEIGHT: 205 LBS

## 2021-06-01 NOTE — TELEPHONE ENCOUNTER
Per 6/6/19 telephone encounter, patient wishes to schedule AWV for 11/30/19.     No future appt with PCP noted and no diabetic eye exam reports in chart.  Referral placed 05/30/19.    Calling patient to offer assistance with both.    Patient declined to schedule appt now and has not had eye exam yet.

## 2021-06-01 NOTE — TELEPHONE ENCOUNTER
Patient notified per provider result note below. Letter sent to patient with reminder to see provider for DM follow up 12/23/19.

## 2021-06-01 NOTE — PROGRESS NOTES
ASSESMENT AND PLAN:  1. Bell's palsy  - 3-4 days of Right sided facial droop, eye droop, weak right sided eyebrow, inability to puff out cheeks. Denies UE or LE weakness, severe headaches, CP, rashes.  Sensation intact bilaterally.    -  Most consistent with Bell's Palsy. Pt reports he had these sxs 3-4 years ago, was given meds with complete resolution.   -  Discussed risks and benefits of steroids, especially bc Pt has DM.  Went over importance of keep eyes moist to prevent scaring and eye damage. Gauze and tape also given to take eye if having difficulty closing eyes at night.   -  Discussed indications for emergent f/u.   Orders:   - predniSONE (DELTASONE) 20 MG tablet; Take 60mg total for 5 days. Then take 40mg for 2 days and 20mgs for 3 days..  Dispense: 22 tablet; Refill: 0   - artificial tears with lanolin (AKWA TEARS) Oint; Instill 3-4 drops in affected eye 4-5 times a day as needed.  Dispense: 3.5 g; Refill: 3   - white petrolatum-mineral oil (TEARS AGAIN) 80-20 % ophthalmic ointment; Instill 3-4 drops into affected eye daily as needed.  Dispense: 5 g; Refill: 3   - acyclovir (ZOVIRAX) 200 MG capsule; Take 2 capsules (400 mg total) by mouth 5 x daily PRN.  Dispense: 100 capsule; Refill: 0    2. Screen for colon cancer  - Pt declines Colonoscopy and would prefer Cologuard.   Orders:   - Cologuard    3. Routine health maintenance  -  Pt agrees to have Hep C screening.   Orders:   - Hepatitis C Antibody (Anti-HCV)    4. Type 2 diabetes mellitus without complication, without long-term current use of insulin (H)  -  Pt reports he will get Eye exam done soon and will bring in results.   -  F/u in 1 month for DM f/u.    Orders:   - Glycosylated Hemoglobin A1c     Pt is accompanied by wife.   Reviewed Medical/Social history and Medications.  See new changes above.   Discussed indications for emergent medical attention and routine F/u.  Patient/Parent/Guardian engaged in decision making process and verbalized  "understanding of the options discussed and agreed with the final treatment plan.     SUBJECTIVE:  Vito Sy is a 68 y.o. male who presents  for evaluation of his Facial Droop x 3-4 days.     Pt also has problems closing right eye tightly, moving eyebrow up and down, puffing out cheeks.  He reports he had these sxs 2-3 years ago, \"They gave me some medicine and it went away.\"  He also reports he had MRI done and everything was normal.     Denies fever/chills,  CP, n/v, UE and/or LE weakness, severe headaches.     ROS:  Comprehensive Review of Systems Negative except stated in HPI.     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     Current Outpatient Medications   Medication Sig Dispense Refill     ascorbic acid (ASCORBIC ACID WITH NIR HIPS) 500 MG tablet Take 500 mg by mouth daily.       aspirin 81 mg chewable tablet Chew 81 mg daily.       glipiZIDE (GLUCOTROL XL) 10 MG 24 hr tablet TAKE ONE TABLET BY MOUTH ONCE DAILY 90 tablet 3     lisinopril (PRINIVIL,ZESTRIL) 5 MG tablet TAKE 1 TABLET BY MOUTH ONCE DAILY 90 tablet 3     metFORMIN (GLUCOPHAGE) 1000 MG tablet TAKE 1 TABLET BY MOUTH TWICE DAILY OVER  DUE  FOR  A  VISIT  AND  LABS 180 tablet 2     metoprolol succinate (TOPROL XL) 100 MG 24 hr tablet Take 0.5 tablets (50 mg total) by mouth daily. Take 1/2 tablet daily 45 tablet 3     multivitamin (ONE A DAY) per tablet Take 1 tablet by mouth daily.       simvastatin (ZOCOR) 20 MG tablet TAKE 1 TABLET BY MOUTH ONCE DAILY AT BEDTIME 90 tablet 3     sitaGLIPtin (JANUVIA) 100 MG tablet Take 1 tablet (100 mg total) by mouth daily. With " "or without food 90 tablet 3     acyclovir (ZOVIRAX) 200 MG capsule Take 2 capsules (400 mg total) by mouth 5 x daily PRN. 100 capsule 0     artificial tears with lanolin (AKWA TEARS) Oint Instill 3-4 drops in affected eye 4-5 times a day as needed. 3.5 g 3     predniSONE (DELTASONE) 20 MG tablet Take 60mg total for 5 days. Then take 40mg for 2 days and 20mgs for 3 days.. 22 tablet 0     white petrolatum-mineral oil (TEARS AGAIN) 80-20 % ophthalmic ointment Instill 3-4 drops into affected eye daily as needed. 5 g 3     No current facility-administered medications for this visit.      Social History     Tobacco Use   Smoking Status Never Smoker   Smokeless Tobacco Never Used     OBJECTIVE: /80   Pulse 68   Temp 98.4  F (36.9  C) (Oral)   Resp 14   Ht 5' 7.68\" (1.719 m)   Wt 207 lb 6.4 oz (94.1 kg)   SpO2 97%   BMI 31.84 kg/m     Recent Results (from the past 24 hour(s))   Glycosylated Hemoglobin A1c    Collection Time: 09/18/19  8:44 AM   Result Value Ref Range    Hemoglobin A1c 7.3 (H) 3.5 - 6.0 %       PHYSICAL:  General Alert, awake, not in acute distress.   HEENT:             -Head Atraumatic, normocephalic.            -Eyes PERRL, no erythema, discharge, conjunctiva clear.             -Ears TMs intact, no drainage, no erythema or edema.            -Nose    Nostrils patent,  no edema.            -Throat Oropharynx without edema, erythema.  Uvula midline, no deviation.             -Neck Neck FROM, no adenopathy.  Thyroid not visibly enlarged.   CV Normal S1 & S2. No murmurs.   RESP Non-labored, RRR, CTAB. No wheezes or crackles.    EXTREMITY No edema, erythema, deformity. FROM.  Pulses present. Normal capillary refill.    PSYCH Normal and appropriate for age.    SKIN No vesicals or rashes.    NEURO Right sided facial weakness. Sensation on face intact. Oriented x 3.  Gait normal.  Sensation and strength intact on UE and LE.        Hadley Ugarte PA-C         "

## 2021-06-02 NOTE — TELEPHONE ENCOUNTER
Medication Question or Clarification  Who is calling: Pharmacy: [facility]  What medication are you calling about? (include dose and sig)    Disp Refills Start End    sitaGLIPtin (JANUVIA) 100 MG tablet 90 tablet 3 1/31/2019     Sig - Route: Take 1 tablet (100 mg total) by mouth daily. With or without food - Oral    Sent to pharmacy as: sitaGLIPtin (JANUVIA) 100 MG tablet    E-Prescribing Status: Receipt confirmed by pharmacy (1/31/2019  5:13 PM CST)      Who prescribed the medication?: Amrik Mejia MD   What is your question/concern?: Pharmacy message: Patient wanted the pharmacy to ask Amrik Mejia MD if there are any alternatives to Januvia that the patient can try. Patient stated that he is in [facility] and his copay for the 3 months is coming to $362.  Pharmacy: [facility] #[id number]  Okay to leave a detailed message?: Yes, [phone]  Site CMT - Please call the pharmacy to obtain any additional needed information.

## 2021-06-02 NOTE — TELEPHONE ENCOUNTER
What is your question/concern?: Pharmacy message: Patient wanted the pharmacy to ask Amrik Mejia MD if there are any alternatives to Januvia that the patient can try. [facility] coming to $362.    Does MD have alternative in mind or prefer that CMT call pharmacy.

## 2021-06-02 NOTE — TELEPHONE ENCOUNTER
Ruben is out until Monday.  This would be a great opportunity for Pharm.D. to sit down with patient to discuss alternatives, as he may qualify depending on his income for patient assistance to obtain Januvia for free from the , otherwise there are several other medication classes which may be more cost effective for him.

## 2021-06-03 VITALS — BODY MASS INDEX: 32.13 KG/M2 | WEIGHT: 212 LBS | HEIGHT: 68 IN

## 2021-06-03 VITALS
DIASTOLIC BLOOD PRESSURE: 80 MMHG | RESPIRATION RATE: 14 BRPM | SYSTOLIC BLOOD PRESSURE: 122 MMHG | WEIGHT: 207.4 LBS | HEIGHT: 68 IN | BODY MASS INDEX: 31.43 KG/M2 | TEMPERATURE: 98.4 F | OXYGEN SATURATION: 97 % | HEART RATE: 68 BPM

## 2021-06-03 NOTE — TELEPHONE ENCOUNTER
Refill Approved    Rx renewed per Medication Renewal Policy. Medication was last renewed on 11/27/2018.    Jerrod Vaughn, [facility] Triage/Med Refill 11/28/2019     Requested Prescriptions   Pending Prescriptions Disp Refills     glipiZIDE (GLUCOTROL XL) 10 MG 24 hr tablet [Pharmacy Med Name: GLIPIZIDE ER 10MG   TAB] 90 tablet 3     Sig: TAKE 1 TABLET BY MOUTH ONCE DAILY       Oral Hypoglycemics Refill Protocol Passed - 11/26/2019  5:30 AM        Passed - Visit with PCP or prescribing provider visit in last 6 months       Last office visit with prescriber/PCP: 5/30/2019 OR same dept: 9/18/2019 Hadley Ugarte PA-C OR same specialty: 9/18/2019 Hadley Ugarte PA-C Last physical: Visit date not found Last MTM visit: Visit date not found         Next appt within 3 mo: Visit date not found  Next physical within 3 mo: Visit date not found  Prescriber OR PCP: Amrik Mejia MD  Last diagnosis associated with med order: There are no diagnoses linked to this encounter.   If protocol passes may refill for 12 months if within 3 months of last provider visit (or a total of 15 months).           Passed - A1C in last 6 months     Hemoglobin A1c   Date Value Ref Range Status   09/18/2019 7.3 (H) 3.5 - 6.0 % Final               Passed - Microalbumin in last year      Microalbumin, Random Urine   Date Value Ref Range Status   05/30/2019 6.08 (H) 0.00 - 1.99 mg/dL Final                  Passed - Blood pressure in last year     BP Readings from Last 1 Encounters:   09/18/19 122/80             Passed - Serum creatinine in last year     Creatinine   Date Value Ref Range Status   05/30/2019 1.31 (H) 0.70 - 1.30 mg/dL Final

## 2021-06-03 NOTE — PROGRESS NOTES
MTM Consult Encounter    Vito Sy is a 69 y.o. male referred for a clinical pharmacist consult from Patient for Medication Cost. Per patients insurance, unable to complete a full MTM visit today.    Discussion: Getting medication from [employer]. States that his januvia was almost $400 for 90 days. Has about 2 months of januvia left at this time. Patient does not qualify for any coupon cards due to Medicare coverage. Contacted [employer] who ran test claim for pioglitazone - copay was $5.66 for 90 day supply, patient seems enthusiastic about this copay.   Thinks his BG has been good, because he is not feeling bad at all. Patient has [employer] and Medicare.   Lab Results   Component Value Date    HGBA1C 7.3 (H) 09/18/2019    HGBA1C 7.6 (H) 05/30/2019    HGBA1C 8.6 (H) 01/31/2019     Lab Results   Component Value Date    MICROALBUR 6.08 (H) 05/30/2019    LDLCALC 90 04/05/2012    CREATININE 1.31 (H) 05/30/2019     Plan:  1. Patient to call insurance company to identify preferred medications in the DPP-4, GLP-1, and SGLT2 class  2. Consider alternative diabetes medication such as pioglitazone daily - affordable with patients current insurance per test claim at [facility]    Follow up:   12/23/19 with PCP    Shanika Pierce, PharmD  Medication Therapy Management Pharmacist  [facility]    Current Outpatient Medications   Medication Sig Dispense Refill     artificial tears with lanolin (AKWA TEARS) Oint Instill 3-4 drops in affected eye 4-5 times a day as needed. 3.5 g 3     ascorbic acid (ASCORBIC ACID WITH NIR HIPS) 500 MG tablet Take 500 mg by mouth daily.       aspirin 81 mg chewable tablet Chew 81 mg daily.       glipiZIDE (GLUCOTROL XL) 10 MG 24 hr tablet TAKE ONE TABLET BY MOUTH ONCE DAILY 90 tablet 3     lisinopril (PRINIVIL,ZESTRIL) 5 MG tablet TAKE 1 TABLET BY MOUTH ONCE DAILY 90 tablet 3     metFORMIN (GLUCOPHAGE) 1000 MG tablet TAKE 1 TABLET BY MOUTH TWICE DAILY OVER  DUE  FOR   A  VISIT  AND  LABS 180 tablet 2     metoprolol succinate (TOPROL XL) 100 MG 24 hr tablet Take 0.5 tablets (50 mg total) by mouth daily. Take 1/2 tablet daily 45 tablet 3     multivitamin (ONE A DAY) per tablet Take 1 tablet by mouth daily.       predniSONE (DELTASONE) 20 MG tablet Take 60mg total for 5 days. Then take 40mg for 2 days and 20mgs for 3 days.. 22 tablet 0     simvastatin (ZOCOR) 20 MG tablet TAKE 1 TABLET BY MOUTH ONCE DAILY AT BEDTIME 90 tablet 3     sitaGLIPtin (JANUVIA) 100 MG tablet Take 1 tablet (100 mg total) by mouth daily. With or without food 90 tablet 3     white petrolatum-mineral oil (TEARS AGAIN) 80-20 % ophthalmic ointment Instill 3-4 drops into affected eye daily as needed. 5 g 3     No current facility-administered medications for this visit.

## 2021-06-03 NOTE — TELEPHONE ENCOUNTER
Pt did call and schedule an apt with PCP for 12/23/19.  Added colon screening to apt notes. Thanks.

## 2021-06-04 VITALS
BODY MASS INDEX: 30.46 KG/M2 | SYSTOLIC BLOOD PRESSURE: 100 MMHG | OXYGEN SATURATION: 99 % | HEART RATE: 63 BPM | RESPIRATION RATE: 20 BRPM | WEIGHT: 201 LBS | TEMPERATURE: 98.9 F | DIASTOLIC BLOOD PRESSURE: 60 MMHG | HEIGHT: 68 IN

## 2021-06-04 NOTE — TELEPHONE ENCOUNTER
dennys not able to be processed.;  [facility] unable to reach pt.  They stated pt needs to call them to obtain a new kit.      Called pt with this info but pt unable to call [employer] back.  Pt has apt on 12/23 at 10 am with PCP, however, pt is requesting PCP to call him before apt as he would like to discuss something.  Pt would not disclose to CMT. Thanks.

## 2021-06-04 NOTE — PROGRESS NOTES
ASSESMENT AND PLAN:  Diagnoses and all orders for this visit:    Diabetes mellitus, type 2 (H)  -     Glycosylated Hemoglobin A1c done today shows excellent improvement to 6.3.  Discussed options with the patient, will not automatically substitute pioglitazone for the Januvia when it runs out as we had previously planned given the excellent A1c.  Instead, he is going to reduce his Januvia to half tablet per day and see if he can get refills of this at this level that would be affordable for him.  Continue glipizide and metformin and continue his current diet and exercise plan and recheck again in 6 months.  -     [facility]  -     Basic Metabolic Panel          SUBJECTIVE: 69-year-old male here for diabetic follow-up visit.  He has been eating healthy, exercising regularly, and taking all of his medications as prescribed.  He has not noticed any side effects or problems with any of the medications.  No chest pain, no shortness of breath.  Overall, he is been feeling well.  He does have a skin spot on the right face above the lip that has been bothering him because he cuts it over the raised area frequently when shaving.    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     Current Outpatient Medications   Medication Sig Dispense Refill     artificial tears with lanolin (AKWA TEARS) Oint Instill 3-4 drops in affected eye 4-5 times a day as needed. 3.5 g 3     ascorbic acid (ASCORBIC ACID WITH NIR HIPS) 500 MG tablet Take 500 mg by mouth daily.       aspirin 81 mg chewable tablet Chew 81 mg daily.       glipiZIDE (GLUCOTROL XL) 10 MG 24 hr tablet  "TAKE 1 TABLET BY MOUTH ONCE DAILY 90 tablet 1     lisinopril (PRINIVIL,ZESTRIL) 5 MG tablet TAKE 1 TABLET BY MOUTH ONCE DAILY 90 tablet 3     metFORMIN (GLUCOPHAGE) 1000 MG tablet TAKE 1 TABLET BY MOUTH TWICE DAILY OVER  DUE  FOR  A  VISIT  AND  LABS 180 tablet 2     metoprolol succinate (TOPROL XL) 100 MG 24 hr tablet Take 0.5 tablets (50 mg total) by mouth daily. Take 1/2 tablet daily 45 tablet 3     multivitamin (ONE A DAY) per tablet Take 1 tablet by mouth daily.       simvastatin (ZOCOR) 20 MG tablet TAKE 1 TABLET BY MOUTH ONCE DAILY AT BEDTIME 90 tablet 3     sitaGLIPtin (JANUVIA) 100 MG tablet Take 1 tablet (100 mg total) by mouth daily. With or without food 90 tablet 3     predniSONE (DELTASONE) 20 MG tablet Take 60mg total for 5 days. Then take 40mg for 2 days and 20mgs for 3 days.. 22 tablet 0     white petrolatum-mineral oil (TEARS AGAIN) 80-20 % ophthalmic ointment Instill 3-4 drops into affected eye daily as needed. 5 g 3     No current facility-administered medications for this visit.      Social History     Tobacco Use   Smoking Status Never Smoker   Smokeless Tobacco Never Used       OBJECTICE: /60 (Patient Site: Left Arm, Patient Position: Sitting)   Pulse 63   Temp 98.9  F (37.2  C) (Oral)   Resp 20   Ht 5' 7.5\" (1.715 m)   Wt 201 lb (91.2 kg)   SpO2 99%   BMI 31.02 kg/m       Recent Results (from the past 24 hour(s))   Glycosylated Hemoglobin A1c    Collection Time: 12/23/19  9:54 AM   Result Value Ref Range    Hemoglobin A1c 6.3 (H) 3.5 - 6.0 %        GEN-alert, appropriate, in no apparent distress   CV-regular rate and rhythm with no murmur   RESP-lungs clear to auscultation   Foot exam-normal.  Palpable pedal pulses bilaterally, no ulcers.   Skin- small 1 mm diameter right cheek raised verrucous skin lesion and small nodular raised skin lesion above the right upper lip.  Both treated today with cryotherapy without complication and we discussed indications for follow-up.    Amrik CARREON" [place]

## 2021-06-04 NOTE — TELEPHONE ENCOUNTER
Discussed with patient, he has been in contact with [facility] and they are sending him a new kit to resubmit sample.

## 2021-06-05 ENCOUNTER — RECORDS - HEALTHEAST (OUTPATIENT)
Dept: UROLOGY | Facility: CLINIC | Age: 71
End: 2021-06-05

## 2021-06-05 VITALS
BODY MASS INDEX: 33.95 KG/M2 | TEMPERATURE: 99.4 F | HEART RATE: 70 BPM | RESPIRATION RATE: 16 BRPM | SYSTOLIC BLOOD PRESSURE: 100 MMHG | OXYGEN SATURATION: 96 % | HEIGHT: 68 IN | DIASTOLIC BLOOD PRESSURE: 60 MMHG | WEIGHT: 224 LBS

## 2021-06-05 VITALS
BODY MASS INDEX: 33.87 KG/M2 | HEIGHT: 68 IN | WEIGHT: 223.5 LBS | WEIGHT: 223.5 LBS | HEIGHT: 68 IN | BODY MASS INDEX: 33.87 KG/M2

## 2021-06-05 VITALS
SYSTOLIC BLOOD PRESSURE: 102 MMHG | DIASTOLIC BLOOD PRESSURE: 62 MMHG | BODY MASS INDEX: 33.25 KG/M2 | WEIGHT: 224.5 LBS | HEART RATE: 65 BPM | TEMPERATURE: 98.7 F | RESPIRATION RATE: 20 BRPM | HEIGHT: 69 IN

## 2021-06-05 DIAGNOSIS — R31.9 HEMATURIA: ICD-10-CM

## 2021-06-05 DIAGNOSIS — Z87.442 PERSONAL HISTORY OF URINARY CALCULI: ICD-10-CM

## 2021-06-05 NOTE — TELEPHONE ENCOUNTER
Refill Approved    Rx renewed per Medication Renewal Policy. Medication was last renewed on 6/19/18.    Adriana Quezada, [facility] Triage/Med Refill 1/23/2020     Requested Prescriptions   Pending Prescriptions Disp Refills     metoprolol succinate (TOPROL-XL) 100 MG 24 hr tablet [Pharmacy Med Name: Metoprolol Succinate  MG Oral Tablet Extended Release 24 Hour] 60 tablet 0     Sig: TAKE 1 TABLET BY MOUTH ONCE DAILY       Beta-Blockers Refill Protocol Passed - 1/22/2020 10:10 AM        Passed - PCP or prescribing provider visit in past 12 months or next 3 months     Last office visit with prescriber/PCP: 12/23/2019 Amrik Mejia MD OR same dept: 12/23/2019 Amrik Mejia MD OR same specialty: 12/23/2019 Amrik Mejia MD  Last physical: Visit date not found Last MTM visit: Visit date not found   Next visit within 3 mo: Visit date not found  Next physical within 3 mo: Visit date not found  Prescriber OR PCP: Amrik Mejia MD  Last diagnosis associated with med order: There are no diagnoses linked to this encounter.  If protocol passes may refill for 12 months if within 3 months of last provider visit (or a total of 15 months).             Passed - Blood pressure filed in past 12 months     BP Readings from Last 1 Encounters:   12/23/19 100/60

## 2021-06-05 NOTE — PROGRESS NOTES
Received a message from the patient that he would like to start the medication we had talked about as a replacement for Januvia.  Januvia is no longer affordable.  We had previously discussed starting pioglitazone.  I sent a prescription for 30 mg once daily, he is going to start with just 1/2 tablet - 15 mg-once daily.  We reviewed the risks and benefits of the medication and indications for follow-up.

## 2021-06-05 NOTE — TELEPHONE ENCOUNTER
Please see separate encounter note, I discussed with the patient and sent the prescription for pioglitazone to the pharmacy.

## 2021-06-05 NOTE — TELEPHONE ENCOUNTER
RN Triage:    Sonal from [facility] in [facility] is calling today with medication problem.  Januvia is too expensive for [facility] (over $300 per refill).  [facility] is requesting a different medication that can take the place of Januvia.  He is also taking glipizide.    Request:  Please send RX for alternative to Januvia to [facility]. in [place].    Maribel Breen RN  [facility]

## 2021-06-07 NOTE — TELEPHONE ENCOUNTER
Refill Approved    Rx renewed per Medication Renewal Policy. Medication was last renewed on 8/16/19.    Adriana Quezada, [facility] Triage/Med Refill 5/4/2020     Requested Prescriptions   Pending Prescriptions Disp Refills     metFORMIN (GLUCOPHAGE) 1000 MG tablet [Pharmacy Med Name: metFORMIN HCl 1000 MG Oral Tablet] 180 tablet 0     Sig: TAKE 1 TABLET BY MOUTH TWICE DAILY OVER  DUE  FOR  A  VISIT  AND  LABS       Metformin Refill Protocol Passed - 5/1/2020  5:31 AM        Passed - Blood pressure in last 12 months     BP Readings from Last 1 Encounters:   12/23/19 100/60             Passed - LFT or AST or ALT in last 12 months     Albumin   Date Value Ref Range Status   05/30/2019 3.5 3.5 - 5.0 g/dL Final     Bilirubin, Total   Date Value Ref Range Status   05/30/2019 0.6 0.0 - 1.0 mg/dL Final     Bilirubin, Direct   Date Value Ref Range Status   10/07/2013 0.2 <0.6 mg/dL Final     Alkaline Phosphatase   Date Value Ref Range Status   05/30/2019 75 45 - 120 U/L Final     AST   Date Value Ref Range Status   05/30/2019 42 (H) 0 - 40 U/L Final     ALT   Date Value Ref Range Status   05/30/2019 33 0 - 45 U/L Final     Protein, Total   Date Value Ref Range Status   05/30/2019 7.2 6.0 - 8.0 g/dL Final                Passed - GFR or Serum Creatinine in last 6 months     GFR MDRD Non Af Amer   Date Value Ref Range Status   12/23/2019 53 (L) >60 mL/min/1.73m2 Final     GFR MDRD Af Amer   Date Value Ref Range Status   12/23/2019 >60 >60 mL/min/1.73m2 Final             Passed - Visit with PCP or prescribing provider visit in last 6 months or next 3 months     Last office visit with prescriber/PCP: 12/23/2019 OR same dept: 12/23/2019 Amrik Mejia MD OR same specialty: 12/23/2019 Amrik Mejia MD Last physical: Visit date not found Last MTM visit: Visit date not found         Next appt within 3 mo: Visit date not found  Next physical within 3 mo: Visit date not found  Prescriber OR PCP: Amrik Mejia MD  Last diagnosis  associated with med order: 1. Diabetes mellitus (H)  - metFORMIN (GLUCOPHAGE) 1000 MG tablet [Pharmacy Med Name: metFORMIN HCl 1000 MG Oral Tablet]; TAKE 1 TABLET BY MOUTH TWICE DAILY OVER  DUE  FOR  A  VISIT  AND  LABS  Dispense: 180 tablet; Refill: 0     If protocol passes may refill for 12 months if within 3 months of last provider visit (or a total of 15 months).           Passed - A1C in last 6 months     Hemoglobin A1c   Date Value Ref Range Status   12/23/2019 6.3 (H) 3.5 - 6.0 % Final               Passed - Microalbumin in last year      Microalbumin, Random Urine   Date Value Ref Range Status   05/30/2019 6.08 (H) 0.00 - 1.99 mg/dL Final

## 2021-06-09 ENCOUNTER — RECORDS - HEALTHEAST (OUTPATIENT)
Dept: HEALTH INFORMATION MANAGEMENT | Facility: CLINIC | Age: 71
End: 2021-06-09

## 2021-06-09 ENCOUNTER — RECORDS - HEALTHEAST (OUTPATIENT)
Dept: ADMINISTRATIVE | Facility: OTHER | Age: 71
End: 2021-06-09

## 2021-06-09 NOTE — TELEPHONE ENCOUNTER
Refill Approved    Rx renewed per Medication Renewal Policy. Medication was last renewed on 7/9/19.    Adriana Quezada, [facility] Triage/Med Refill 6/25/2020     Requested Prescriptions   Pending Prescriptions Disp Refills     lisinopriL (PRINIVIL,ZESTRIL) 5 MG tablet [Pharmacy Med Name: Lisinopril 5 MG Oral Tablet] 90 tablet 0     Sig: Take 1 tablet by mouth once daily       Ace Inhibitors Refill Protocol Passed - 6/25/2020 11:06 AM        Passed - PCP or prescribing provider visit in past 12 months       Last office visit with prescriber/PCP: 12/23/2019 Amrik Mejia MD OR same dept: 12/23/2019 Amrik Mejia MD OR same specialty: 12/23/2019 Amrik Mejia MD  Last physical: Visit date not found Last MTM visit: Visit date not found   Next visit within 3 mo: Visit date not found  Next physical within 3 mo: Visit date not found  Prescriber OR PCP: Amrik Mejia MD  Last diagnosis associated with med order: 1. Type 2 diabetes mellitus without complication, without long-term current use of insulin (H)  - lisinopriL (PRINIVIL,ZESTRIL) 5 MG tablet [Pharmacy Med Name: Lisinopril 5 MG Oral Tablet]; TAKE 1 TABLET BY MOUTH ONCE DAILY  Dispense: 90 tablet; Refill: 0    2. Microalbuminuria  - lisinopriL (PRINIVIL,ZESTRIL) 5 MG tablet [Pharmacy Med Name: Lisinopril 5 MG Oral Tablet]; TAKE 1 TABLET BY MOUTH ONCE DAILY  Dispense: 90 tablet; Refill: 0    If protocol passes may refill for 12 months if within 3 months of last provider visit (or a total of 15 months).             Passed - Serum Potassium in past 12 months     Lab Results   Component Value Date    Potassium 3.7 12/23/2019             Passed - Blood pressure filed in past 12 months     BP Readings from Last 1 Encounters:   12/23/19 100/60             Passed - Serum Creatinine in past 12 months     Creatinine   Date Value Ref Range Status   12/23/2019 1.34 (H) 0.70 - 1.30 mg/dL Final

## 2021-06-10 NOTE — PROGRESS NOTES
ASSESMENT AND PLAN:  Diagnoses and all orders for this visit:    Type 2 diabetes mellitus without complication, without long-term current use of insulin  -     lisinopril (PRINIVIL,ZESTRIL) 5 MG tablet; Take 1 tablet (5 mg total) by mouth daily.  Dispense: 30 tablet; Refill: 11  -     Glycosylated Hemoglobin A1c done today shows he now has excellent control.  Continue treatment plan recheck again in 4-6 months.  -     Comprehensive Metabolic Panel    Microalbuminuria  -     lisinopril (PRINIVIL,ZESTRIL) 5 MG tablet; Take 1 tablet (5 mg total) by mouth daily.  Dispense: 30 tablet; Refill: 11  Reviewed results from previous microalbumin urine test.  Discussed risks and benefits of lisinopril as prescribed above.          SUBJECTIVE: 66-year-old male here today for follow-up on diabetes.  He has been feeling well.  He does not have any concerns or problems.  He is taking his medications regularly.  He is wondering about switching from Januvia to something less expensive but after discussion of the options and the risks and benefits of the various options, he is going to stick with Januvia for now.  No exertional chest pain.  No foot ulcers.    Past Medical History:   Diagnosis Date     Diabetes mellitus      Gout      Hypertension      Kidney stone      Patient Active Problem List   Diagnosis     Anemia     Hematuria     Hypercholesterolemia     Hypertension     Type 2 diabetes mellitus without complication     Nephrolithiasis     Benign Adenomatous Polyp Of The Large Intestine     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     Current Outpatient Prescriptions   Medication Sig Dispense Refill     ascorbic acid (ASCORBIC ACID WITH NIR HIPS) 500 MG tablet Take 500 mg by mouth daily.       aspirin 81 mg chewable tablet Chew 81 mg daily.       metFORMIN (GLUCOPHAGE) 1000 MG tablet TAKE 1 TABLET TWICE DAILY 180 tablet 2     multivitamin (ONE A DAY) per tablet Take 1 tablet by mouth daily.    "    simvastatin (ZOCOR) 20 MG tablet TAKE ONE TABLET BY MOUTH AT BEDTIME (20 MG TOTAL) 90 tablet 3     sitaGLIPtin (JANUVIA) 100 MG tablet Take 1 tablet (100 mg total) by mouth daily. 90 tablet 3     atenolol (TENORMIN) 50 MG tablet TAKE ONE TABLET BY MOUTH ONCE DAILY (Patient taking differently: TAKE ONE HALF TABLET BY MOUTH ONCE DAILY) 90 tablet 3     lisinopril (PRINIVIL,ZESTRIL) 5 MG tablet Take 1 tablet (5 mg total) by mouth daily. 30 tablet 11     No current facility-administered medications for this visit.      History   Smoking Status     Never Smoker   Smokeless Tobacco     Never Used       OBJECTICE: /80 (Patient Site: Left Arm, Patient Position: Sitting, Cuff Size: Adult Large)  Pulse 76  Temp 98.6  F (37  C) (Oral)   Resp 20  Ht 5' 7.5\" (1.715 m)  Wt 196 lb (88.9 kg)  BMI 30.24 kg/m2     Recent Results (from the past 24 hour(s))   Glycosylated Hemoglobin A1c    Collection Time: 04/10/17  9:36 AM   Result Value Ref Range    Hemoglobin A1c 7.3 (H) 3.5 - 6.0 %   Comprehensive Metabolic Panel    Collection Time: 04/10/17  9:36 AM   Result Value Ref Range    Sodium 140 136 - 145 mmol/L    Potassium 4.0 3.5 - 5.0 mmol/L    Chloride 102 98 - 107 mmol/L    CO2 28 22 - 31 mmol/L    Anion Gap, Calculation 10 5 - 18 mmol/L    Glucose 156 (H) 70 - 125 mg/dL    BUN 13 8 - 22 mg/dL    Creatinine 1.22 0.70 - 1.30 mg/dL    GFR MDRD Af Amer >60 >60 mL/min/1.73m2    GFR MDRD Non Af Amer 60 (L) >60 mL/min/1.73m2    Bilirubin, Total 0.9 0.0 - 1.0 mg/dL    Calcium 9.4 8.5 - 10.5 mg/dL    Protein, Total 7.9 6.0 - 8.0 g/dL    Albumin 3.4 (L) 3.5 - 5.0 g/dL    Alkaline Phosphatase 73 45 - 120 U/L    AST 33 0 - 40 U/L    ALT 31 0 - 45 U/L        GEN-alert, appropriate, in no apparent distress   CV-regular rate and rhythm with no murmur   RESP-lungs clear to auscultation   Foot exam-no ulcers, no deformities, palpable pedal pulses bilaterally, normal foot exam.    Amrik Mejia          "

## 2021-06-10 NOTE — TELEPHONE ENCOUNTER
RN cannot approve Refill Request    RN can NOT refill this medication Protocol failed and NO refill given. Last office visit: 12/23/2019 Amrik Mejia MD Last Physical: Visit date not found Last MTM visit: Visit date not found Last visit same specialty: 12/23/2019 Amrik Mejia MD.  Next visit within 3 mo: Visit date not found  Next physical within 3 mo: Visit date not found      Adriana Quezada, [facility] Triage/Med Refill 8/26/2020    Requested Prescriptions   Pending Prescriptions Disp Refills     metFORMIN (GLUCOPHAGE) 1000 MG tablet [Pharmacy Med Name: metFORMIN HCl 1000 MG Oral Tablet] 180 tablet 0     Sig: TAKE 1 TABLET BY MOUTH TWICE DAILY OVER  DUE  FOR  A  VISIT  AND  LABS       Metformin Refill Protocol Failed - 8/24/2020  1:48 PM        Failed - LFT or AST or ALT in last 12 months     Albumin   Date Value Ref Range Status   05/30/2019 3.5 3.5 - 5.0 g/dL Final     Bilirubin, Total   Date Value Ref Range Status   05/30/2019 0.6 0.0 - 1.0 mg/dL Final     Bilirubin, Direct   Date Value Ref Range Status   10/07/2013 0.2 <0.6 mg/dL Final     Alkaline Phosphatase   Date Value Ref Range Status   05/30/2019 75 45 - 120 U/L Final     AST   Date Value Ref Range Status   05/30/2019 42 (H) 0 - 40 U/L Final     ALT   Date Value Ref Range Status   05/30/2019 33 0 - 45 U/L Final     Protein, Total   Date Value Ref Range Status   05/30/2019 7.2 6.0 - 8.0 g/dL Final                Failed - Visit with PCP or prescribing provider visit in last 6 months or next 3 months     Last office visit with prescriber/PCP: Visit date not found OR same dept: 12/23/2019 Amrik Mejia MD OR same specialty: 12/23/2019 Amrik Mejia MD Last physical: Visit date not found Last MTM visit: Visit date not found         Next appt within 3 mo: Visit date not found  Next physical within 3 mo: Visit date not found  Prescriber OR PCP: Amrik Mejia MD  Last diagnosis associated with med order: 1. Diabetes mellitus (H)  - metFORMIN  (GLUCOPHAGE) 1000 MG tablet [Pharmacy Med Name: metFORMIN HCl 1000 MG Oral Tablet]; TAKE 1 TABLET BY MOUTH TWICE DAILY OVER  DUE  FOR  A  VISIT  AND  LABS  Dispense: 180 tablet; Refill: 0    2. Hypercholesteremia  - simvastatin (ZOCOR) 20 MG tablet [Pharmacy Med Name: Simvastatin 20 MG Oral Tablet]; TAKE 1 TABLET BY MOUTH ONCE DAILY AT BEDTIME  Dispense: 90 tablet; Refill: 0     If protocol passes may refill for 12 months if within 3 months of last provider visit (or a total of 15 months).           Failed - A1C in last 6 months     Hemoglobin A1c   Date Value Ref Range Status   12/23/2019 6.3 (H) 3.5 - 6.0 % Final               Failed - Microalbumin in last year      Microalbumin, Random Urine   Date Value Ref Range Status   05/30/2019 6.08 (H) 0.00 - 1.99 mg/dL Final                  Passed - Blood pressure in last 12 months     BP Readings from Last 1 Encounters:   12/23/19 100/60             Passed - GFR or Serum Creatinine in last 6 months     GFR MDRD Non Af Amer   Date Value Ref Range Status   12/23/2019 53 (L) >60 mL/min/1.73m2 Final     GFR MDRD Af Amer   Date Value Ref Range Status   12/23/2019 >60 >60 mL/min/1.73m2 Final                simvastatin (ZOCOR) 20 MG tablet [Pharmacy Med Name: Simvastatin 20 MG Oral Tablet] 90 tablet 0     Sig: TAKE 1 TABLET BY MOUTH ONCE DAILY AT BEDTIME       Statins Refill Protocol (Hmg CoA Reductase Inhibitors) Passed - 8/24/2020  1:48 PM        Passed - PCP or prescribing provider visit in past 12 months      Last office visit with prescriber/PCP: 12/23/2019 Amrik Mejia MD OR same dept: 12/23/2019 Amrik Mejia MD OR same specialty: 12/23/2019 Amrik Mejia MD  Last physical: Visit date not found Last MTM visit: Visit date not found   Next visit within 3 mo: Visit date not found  Next physical within 3 mo: Visit date not found  Prescriber OR PCP: Amrik Mejia MD  Last diagnosis associated with med order: 1. Diabetes mellitus (H)  - metFORMIN (GLUCOPHAGE) 1000 MG  tablet [Pharmacy Med Name: metFORMIN HCl 1000 MG Oral Tablet]; TAKE 1 TABLET BY MOUTH TWICE DAILY OVER  DUE  FOR  A  VISIT  AND  LABS  Dispense: 180 tablet; Refill: 0    2. Hypercholesteremia  - simvastatin (ZOCOR) 20 MG tablet [Pharmacy Med Name: Simvastatin 20 MG Oral Tablet]; TAKE 1 TABLET BY MOUTH ONCE DAILY AT BEDTIME  Dispense: 90 tablet; Refill: 0    If protocol passes may refill for 12 months if within 3 months of last provider visit (or a total of 15 months).

## 2021-06-12 NOTE — PROGRESS NOTES
ASSESMENT AND PLAN:  Diagnoses and all orders for this visit:    Type 2 diabetes mellitus without complication, without long-term current use of insulin (H)  -     Glycosylated Hemoglobin A1c  -     Comprehensive Metabolic Panel    Hypercholesterolemia  -     LDL Cholesterol, Direct  -     Comprehensive Metabolic Panel    Need for immunization against influenza  -     Influenza,Quad,High Dose,PF 65 YR+        Reviewed the risks and benefits of the treatment plan with the patient and/or caregiver and we discussed indications for routine and emergent follow-up.        SUBJECTIVE: 61-year-old male here for follow-up on diabetes and high cholesterol.  He is currently taking half tablet of metoprolol and half tablet of pioglitazone.  Tolerating his medications well.  No new issues or concerns.  On review of systems he has not been having any chest pain or shortness of breath.  No leg pain.  He has noticed some color variation in the skin of his lower legs and feet.    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     Current Outpatient Medications   Medication Sig Dispense Refill     ascorbic acid (ASCORBIC ACID WITH NIR HIPS) 500 MG tablet Take 500 mg by mouth daily.       aspirin 81 mg chewable tablet Chew 81 mg daily.       glipiZIDE (GLUCOTROL XL) 10 MG 24 hr tablet Take 1 tablet (10 mg total) by mouth daily. 90 tablet 3     lisinopriL (PRINIVIL,ZESTRIL) 5 MG tablet Take 1 tablet (5 mg total) by mouth daily. 90 tablet 3     metFORMIN (GLUCOPHAGE) 1000 MG tablet TAKE 1 TABLET BY MOUTH TWICE DAILY OVER  DUE  FOR  A  VISIT  AND  LABS 180 tablet  "3     metoprolol succinate (TOPROL-XL) 100 MG 24 hr tablet Take 1 tablet (100 mg total) by mouth daily. 90 tablet 3     multivitamin (ONE A DAY) per tablet Take 1 tablet by mouth daily.       pioglitazone (ACTOS) 30 MG tablet Take 1 tablet (30 mg total) by mouth daily. 90 tablet 3     simvastatin (ZOCOR) 20 MG tablet Take 1 tablet (20 mg total) by mouth at bedtime. 90 tablet 3     No current facility-administered medications for this visit.      Social History     Tobacco Use   Smoking Status Never Smoker   Smokeless Tobacco Never Used       OBJECTICE: /62   Pulse 65   Temp 98.7  F (37.1  C) (Oral)   Resp 20   Ht 5' 9\" (1.753 m)   Wt (!) 224 lb 8 oz (101.8 kg)   BMI 33.15 kg/m       No results found for this or any previous visit (from the past 24 hour(s)).     CV-regular rate and rhythm with no murmur   RESP-lungs clear to auscultation   Foot exam-palpable pedal pulses bilaterally, no ulcers.  Monofilament testing sensation intact on both feet.   SKIN-some mild stasis changes of the lower extremities bilaterally.      Amrik Mejia          "

## 2021-06-13 NOTE — TELEPHONE ENCOUNTER
Called pt to relay apt is now scheduled for 11/25 at 1220 pm in clinic.  Pt understands.  INF phone visit on 11/30 canceled. Thanks.

## 2021-06-13 NOTE — TELEPHONE ENCOUNTER
New Appointment Needed  What is the reason for the visit:    Inpatient/ED Follow Up Appt Request  At what hospital or facility were you seen?: [facility]  What is the reason you were seen?: GI bleed  What date were you admitted?: date: 11/15/2020  What date were you discharged?: date: 11/19/2020  What was the recommended timeframe for your follow up appointment?: 1 week, patient currently scheduled for 11/30 but needs sooner  Provider Preference: PCP only  How soon do you need to be seen?: next week  Waitlist offered?: No  Okay to leave a detailed message:  Yes, please call patient

## 2021-06-13 NOTE — PROGRESS NOTES
ASSESMENT AND PLAN:  Diagnoses and all orders for this visit:    Gastrointestinal hemorrhage with hematemesis  Post Discharge Medication Reconciliation Status: discharge medications reconciled and changed, per note/orders   Reviewed hospital discharge summary and data with the patient in detail.  Medication reconciliation and review and counseling done.  Given the concern about cough, lisinopril is being discontinued and will be replaced with losartan.  He was counseled on indications for emergent follow-up and the importance of following up with GI for his repeat upper endoscopy.  -     omeprazole (PRILOSEC) 20 MG capsule; Take 1 capsule (20 mg total) by mouth 2 (two) times a day before meals.  Dispense: 180 capsule; Refill: 3    Cirrhosis of liver without ascites, unspecified hepatic cirrhosis type (H)  Unclear etiology.  He has had negative hepatitis B and hepatitis C testing in the past.  Possibly from remote history of hepatitis A infection versus fatty liver versus other.  He does not have any history of heavy alcohol use.  Patient will be following up with the hepatology clinic.    Type 2 diabetes mellitus with microalbuminuria, without long-term current use of insulin (H)  Discontinue lisinopril.  Replace with losartan.  Counseled the patient to reduce to half tablet if he gets lightheadedness.  -     losartan (COZAAR) 25 MG tablet; Take 1 tablet (25 mg total) by mouth daily.  Dispense: 90 tablet; Refill: 3      Hypertension  Stable, well controlled, he is currently taking 1/2 tablet of his metoprolol and will continue to take 1/2 tablet daily.  -     metoprolol succinate (TOPROL-XL) 100 MG 24 hr tablet; Take 0.5 tablets (50 mg total) by mouth daily.  Dispense: 90 tablet; Refill: 3    Deep vein thrombosis (DVT) of calf muscle vein of right lower extremity, unspecified chronicity (H)  Reviewed discharge summary and recommendations.  He has a follow-up right leg ultrasound ordered in the chart from the  discharging physician and I counseled the patient to await a call from scheduling.  If he has not heard anything  at the 2-week adrienne he will give us a call so we can help facilitate the follow-up ultrasound.  Per the discharge summary the plan would be to initiate anticoagulation if the ultrasound shows extension of the small DVT  And his GI bleed situation allows.    Reviewed the risks and benefits of the treatment plan with the patient and/or caregiver and we discussed indications for routine and emergent follow-up.        SUBJECTIVE: 70-year-old male here for follow-up on his recent hospitalization.  Since discharge he has had a normal bowel movement.  There was no blood or black tarry component.  Since discharge she has not had any vomiting.  He feels his strength is slowly improving but still feels fatigued.  He has been taking his medications as prescribed but has noticed that when he takes lisinopril he is getting a mild irritating cough.  It improved when he was not taking lisinopril.  He would like to change for the long-term.  His CT scan in the hospital showed liver findings consistent with cirrhosis.  He does not have any history of heavy alcohol use.  He does not have any known history of viral hepatitis and has had negative hep B and hep C testing in the past.  He did live in [facility] for many years and does not recall an episode of acute hepatitis consistent with hepatitis a but did have unsafe drinking water sources for several years.  During the hospitalization he was also diagnosed with a small nonocclusive right leg DVT.  He was not anticoagulated because of the GI bleed.  He has not noticed any calf pain or new swelling.    Past Medical History:   Diagnosis Date     Diabetes mellitus (H)      Gout      Hypertension      Kidney stone      Patient Active Problem List   Diagnosis     Anemia     Hematuria     Hypercholesterolemia     Hypertension     Type 2 diabetes mellitus with microalbuminuria,  without long-term current use of insulin (H)     Nephrolithiasis     Benign Adenomatous Polyp Of The Large Intestine     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     GI bleeding     Anemia due to blood loss, acute     Hypotension due to blood loss     Hyperkalemia     Gastrointestinal hemorrhage associated with acute gastritis     Deep vein thrombosis (DVT) of calf muscle vein of right lower extremity, unspecified chronicity (H)     Popliteal cyst, left     Popliteal cyst, right     Cirrhosis of liver without ascites, unspecified hepatic cirrhosis type (H)     Current Outpatient Medications   Medication Sig Dispense Refill     acetaminophen (TYLENOL) 500 MG tablet Take 1-2 tablets (500-1,000 mg total) by mouth every 4 (four) hours as needed.  0     ascorbic acid (ASCORBIC ACID WITH NIR HIPS) 500 MG tablet Take 500 mg by mouth daily.       glipiZIDE (GLUCOTROL XL) 10 MG 24 hr tablet Take 1 tablet (10 mg total) by mouth daily. 90 tablet 3     metFORMIN (GLUCOPHAGE) 1000 MG tablet TAKE 1 TABLET BY MOUTH TWICE DAILY OVER  DUE  FOR  A  VISIT  AND  LABS 180 tablet 3     metoprolol succinate (TOPROL-XL) 100 MG 24 hr tablet Take 0.5 tablets (50 mg total) by mouth daily. 90 tablet 3     multivitamin (ONE A DAY) per tablet Take 1 tablet by mouth daily.       omeprazole (PRILOSEC) 20 MG capsule Take 1 capsule (20 mg total) by mouth 2 (two) times a day before meals. 180 capsule 3     pioglitazone (ACTOS) 30 MG tablet Take 1 tablet (30 mg total) by mouth daily. 90 tablet 3     simvastatin (ZOCOR) 20 MG tablet Take 1 tablet (20 mg total) by mouth at bedtime. 90 tablet 3     losartan (COZAAR) 25 MG tablet Take 1 tablet (25 mg total) by mouth daily. 90 tablet 3     No current facility-administered medications for this visit.      Social History     Tobacco Use   Smoking Status Never Smoker   Smokeless Tobacco Never Used       OBJECTICE: /60   Pulse 70   Temp 99.4  F (37.4  C) (Oral)    "Resp 16   Ht 5' 8\" (1.727 m)   Wt (!) 224 lb (101.6 kg)   SpO2 96%   BMI 34.06 kg/m       No results found for this or any previous visit (from the past 24 hour(s)).     GEN-alert, appropriate, in no apparent distress    HEENT-mucous membranes are moist, sclera are without jaundice   CV-regular rate and rhythm with no murmur   RESP-lungs clear to auscultation   ABDOMINAL-soft, nontender, no palpable hepatomegaly or mass   EXTREM- no calf tenderness, trace bilateral pitting edema of the ankles   SKIN-chronic stasis changes of the skin of the lower extremities right side worse than left      Amrik Mejia          "

## 2021-06-16 PROBLEM — I95.89 HYPOTENSION DUE TO BLOOD LOSS: Status: ACTIVE | Noted: 2020-11-16

## 2021-06-16 PROBLEM — M71.22 POPLITEAL CYST, LEFT: Status: ACTIVE | Noted: 2020-11-17

## 2021-06-16 PROBLEM — K29.01 GASTROINTESTINAL HEMORRHAGE ASSOCIATED WITH ACUTE GASTRITIS: Status: ACTIVE | Noted: 2020-11-15

## 2021-06-16 PROBLEM — M71.21 POPLITEAL CYST, RIGHT: Status: ACTIVE | Noted: 2020-11-17

## 2021-06-16 PROBLEM — K74.60 CIRRHOSIS OF LIVER WITHOUT ASCITES, UNSPECIFIED HEPATIC CIRRHOSIS TYPE (H): Status: ACTIVE | Noted: 2020-11-25

## 2021-06-16 PROBLEM — K92.2 GI BLEEDING: Status: ACTIVE | Noted: 2020-11-16

## 2021-06-16 PROBLEM — I82.461: Status: ACTIVE | Noted: 2020-11-17

## 2021-06-16 PROBLEM — D62 ANEMIA DUE TO BLOOD LOSS, ACUTE: Status: ACTIVE | Noted: 2020-11-16

## 2021-06-16 PROBLEM — R58 HYPOTENSION DUE TO BLOOD LOSS: Status: ACTIVE | Noted: 2020-11-16

## 2021-06-16 PROBLEM — E87.5 HYPERKALEMIA: Status: ACTIVE | Noted: 2020-11-16

## 2021-06-18 NOTE — PROGRESS NOTES
ASSESMENT AND PLAN:  Diagnoses and all orders for this visit:    Type 2 diabetes mellitus without complication, without long-term current use of insulin (H)  -     Glycosylated Hemoglobin A1c  -     Basic Metabolic Panel    Hypercholesteremia  -     simvastatin (ZOCOR) 20 MG tablet; Take 1 tablet (20 mg total) by mouth at bedtime.  Dispense: 90 tablet; Refill: 3  -     LDL Cholesterol, Direct    Chest pain  Likely noncardiac.  Counseled patient on differential diagnosis and we discussed the possibility of doing a stress test.  At this time he elects for observation we discussed indications for routine and urgent follow-up.  -     HM2(CBC w/o Differential)        SUBJECTIVE: 67-year-old male here for follow-up on his diabetes and hypertension.  He has been taking his medications regularly and doing well.  He does mention that he occasionally gets a mild chest pain across the anterior chest which lasts 5-10 minutes.  It usually occurs at rest, often after eating certain foods.  He does exercise every other day and has not been getting any exertional chest pain.  With the chest pain he does not get any associated shortness of breath or near syncope or palpitations.    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     Current Outpatient Prescriptions   Medication Sig Dispense Refill     ascorbic acid (ASCORBIC ACID WITH NIR HIPS) 500 MG tablet Take 500 mg by mouth daily.       aspirin 81 mg chewable tablet Chew 81 mg daily.       glipiZIDE (GLUCOTROL XL) 10 MG 24 hr tablet Take 1 tablet (10 mg total) by mouth daily. 90 tablet 3     lisinopril  "(PRINIVIL,ZESTRIL) 5 MG tablet Take 1 tablet (5 mg total) by mouth daily. 90 tablet 3     metFORMIN (GLUCOPHAGE) 1000 MG tablet Take 1 tablet (1,000 mg total) by mouth 2 (two) times a day. You are overdue for a visit and labs; call 24/7 to schedule 180 tablet 0     metoprolol succinate (TOPROL XL) 100 MG 24 hr tablet Take 0.5 tablets (50 mg total) by mouth daily. Take 1/2 tablet daily 45 tablet 3     multivitamin (ONE A DAY) per tablet Take 1 tablet by mouth daily.       simvastatin (ZOCOR) 20 MG tablet Take 1 tablet (20 mg total) by mouth at bedtime. 90 tablet 3     No current facility-administered medications for this visit.      History   Smoking Status     Never Smoker   Smokeless Tobacco     Never Used       OBJECTICE: /78 (Patient Site: Left Arm, Patient Position: Sitting, Cuff Size: Adult Regular)  Pulse 65  Temp 98.9  F (37.2  C) (Oral)   Resp 16  Ht 5' 7.5\" (1.715 m)  Wt 205 lb (93 kg)  SpO2 98%  BMI 31.63 kg/m2      GEN-alert, appropriate, in no apparent distress   CV-regular rate and rhythm with no murmur   RESP-lungs clear to auscultation   Foot exam/SKIN-no foot ulcers, pedal pulses are strong bilaterally, he has decreased sensation to monofilament testing on one toe on each foot but intact at other points.      Amrik Mejia          "

## 2021-06-19 NOTE — LETTER
Letter by Amrik Mejia MD at      Author: Amrik Mejia MD Service: -- Author Type: --    Filed:  Encounter Date: 6/17/2019 Status: (Other)        [facility] PATIENT ACCESS  [address]  [place] [facility] [address]  [phone]         Vito Sy  [address] [place] [address]        06/17/19    Dear Vito Sy,     At [facility] we care about your health and well-being. Your primary care provider is committed to ensuring you receive high quality care and has chosen a network of specialists to assist in providing that care. Recently Dr. Mejia referred you to [facility] [place] for specialty care.        It is important to your overall health to follow through with the recommendation from your provider. Please call [phone] at your earliest convenience for assistance in scheduling an appointment.  If you have already scheduled this appointment, please disregard this notice.  Thank you for choosing [facility] for your healthcare needs.       Sincerely,       [facility] Specialty Scheduling

## 2021-06-19 NOTE — LETTER
Letter by Amrik Mejia MD at      Author: Amrik Mejia MD Service: -- Author Type: --    Filed:  Encounter Date: 6/3/2019 Status: (Other)               [address]  [place] [address]   PHONE [phone]  [phone]     Corine 3, 2019  Vito CABRERA Yossi  [address] [place] [address]    Dear Vito:    Below are the results from your recent visit.  Your results are show improvement in the urine microalbumin.  You should continue to take the lisinopril every day which helps protect the kidneys from damage from diabetes.  As we discussed in the clinic, the diabetes is under better control.  Your cholesterol is under excellent control.  The liver tests look good.  The kidney test is mildly elevated and you should take lisinopril as detailed above and we should plan on rechecking the kidney tests again in 6 months.    Resulted Orders   Microalbumin, Random Urine   Result Value Ref Range    Microalbumin, Random Urine 6.08 (H) 0.00 - 1.99 mg/dL    Creatinine, Urine 86.6 mg/dL    Microalbumin/Creatinine Ratio Random Urine 70.2 (H) <=19.9 mg/g    Narrative    Microalbumin, Random Urine  <2.0 mg/dL . . . . . . . . Normal  3.0-30.0 mg/dL . . . . . . Microalbuminuria  >30.0 mg/dL . . . . . .  . Clinical Proteinuria  Microalbumin/Creatinine Ratio, Random Urine  <20 mg/g . . . . .. . . . Normal   mg/g . . . . . . . Microalbuminuria  >300 mg/g . . . . . . . . Clinical Proteinuria   Glycosylated Hemoglobin A1c   Result Value Ref Range    Hemoglobin A1c 7.6 (H) 3.5 - 6.0 %   Comprehensive Metabolic Panel   Result Value Ref Range    Sodium 142 136 - 145 mmol/L    Potassium 4.4 3.5 - 5.0 mmol/L    Chloride 107 98 - 107 mmol/L    CO2 25 22 - 31 mmol/L    Anion Gap, Calculation 10 5 - 18 mmol/L    Glucose 183 (H) 70 - 125 mg/dL    BUN 15 8 - 22 mg/dL    Creatinine 1.31 (H) 0.70 - 1.30 mg/dL    GFR MDRD Af Amer >60 >60 mL/min/1.73m2    GFR MDRD Non Af Amer 55 (L) >60  mL/min/1.73m2    Bilirubin, Total 0.6 0.0 - 1.0 mg/dL    Calcium 9.4 8.5 - 10.5 mg/dL    Protein, Total 7.2 6.0 - 8.0 g/dL    Albumin 3.5 3.5 - 5.0 g/dL    Alkaline Phosphatase 75 45 - 120 U/L    AST 42 (H) 0 - 40 U/L    ALT 33 0 - 45 U/L    Narrative    Fasting Glucose reference range is 70-99 mg/dL per  American Diabetes Association (ADA) guidelines.   LDL Cholesterol, Direct   Result Value Ref Range    Direct LDL 85 <=129 mg/dl         If you have any questions or concerns, please do not hesitate to call.    Sincerely,      Amrik Mejia MD  Corine 3, 2019

## 2021-06-19 NOTE — LETTER
Letter by Amrik Mejia MD at      Author: Amrik Mejia MD Service: -- Author Type: --    Filed:  Encounter Date: 9/19/2019 Status: Signed         [address] [place] [address]             September 23, 2019         Dear Mr. Sy,    Below are the results from your recent visit:    Resulted Orders   Hepatitis C Antibody (Anti-HCV)   Result Value Ref Range    Hepatitis C Ab Negative Negative   Glycosylated Hemoglobin A1c   Result Value Ref Range    Hemoglobin A1c 7.3 (H) 3.5 - 6.0 %       Your Hepatitis C is negative/normal. Your hemoglobin A1c test is improving.     You are scheduled for a diabetic follow up appointment 12/23/19 at 09:45 AM.     Please call with questions or contact us using [email or web address].    Sincerely,        Electronically signed by Amrik Mejia MD

## 2021-06-20 NOTE — LETTER
Letter by Amrik Mejia MD at      Author: Amrik Mejia MD Service: -- Author Type: --    Filed:  Encounter Date: 12/24/2019 Status: Signed               [address]  [place] [address]   PHONE [phone]  [phone]     December 24, 2019  Vito CABRERA Yossi  [address] [place] [address]    Dear Vito:    Below are the results from your recent visit.  Your kidney test is stable, slightly elevated, should be rechecked again in 6 months.  As we discussed in the clinic, the diabetes is under excellent control.    Resulted Orders   Glycosylated Hemoglobin A1c   Result Value Ref Range    Hemoglobin A1c 6.3 (H) 3.5 - 6.0 %   Basic Metabolic Panel   Result Value Ref Range    Sodium 145 136 - 145 mmol/L    Potassium 3.7 3.5 - 5.0 mmol/L    Chloride 110 (H) 98 - 107 mmol/L    CO2 25 22 - 31 mmol/L    Anion Gap, Calculation 10 5 - 18 mmol/L    Glucose 98 70 - 125 mg/dL    Calcium 9.0 8.5 - 10.5 mg/dL    BUN 16 8 - 22 mg/dL    Creatinine 1.34 (H) 0.70 - 1.30 mg/dL    GFR MDRD Af Amer >60 >60 mL/min/1.73m2    GFR MDRD Non Af Amer 53 (L) >60 mL/min/1.73m2    Narrative    Fasting Glucose reference range is 70-99 mg/dL per  American Diabetes Association (ADA) guidelines.     If you have any questions or concerns, please do not hesitate to call.    Sincerely,    Amrik Mejia MD  December 24, 2019

## 2021-06-21 NOTE — LETTER
Letter by Amrik Mejia MD at      Author: Amrik Mejia MD Service: -- Author Type: --    Filed:  Encounter Date: 10/22/2020 Status: (Other)               [facility]  [address]  [place] [address]   PHONE [phone]  [phone]     October 22, 2020  Vito Butlerg  [address] [place] [address]    Dear Vito:    Below are the results from your recent visit.  Your results look good.  Diabetes control has worsened some compared to last time but is still in the range of good control.  The kidney test has improved compared to last time.  Cholesterol is under excellent control.  I would recommend continuing with the current medications and increasing exercise as we discussed in the clinic, and plan to recheck the blood tests again in 6 months.    Resulted Orders   Glycosylated Hemoglobin A1c   Result Value Ref Range    Hemoglobin A1c 7.4 (H) <=5.6 %      Comment:      Normal <5.7% Prediabete 5.7-6.4% Diabletes 6.5% or higher - adopted from ADA consensus guidelines   LDL Cholesterol, Direct   Result Value Ref Range    Direct LDL 88 <=129 mg/dl   Comprehensive Metabolic Panel   Result Value Ref Range    Sodium 143 136 - 145 mmol/L    Potassium 4.5 3.5 - 5.0 mmol/L    Chloride 107 98 - 107 mmol/L    CO2 23 22 - 31 mmol/L    Anion Gap, Calculation 13 5 - 18 mmol/L    Glucose 161 (H) 70 - 125 mg/dL    BUN 16 8 - 22 mg/dL    Creatinine 1.30 0.70 - 1.30 mg/dL    GFR MDRD Af Amer >60 >60 mL/min/1.73m2    GFR MDRD Non Af Amer 55 (L) >60 mL/min/1.73m2    Bilirubin, Total 1.1 (H) 0.0 - 1.0 mg/dL    Calcium 9.1 8.5 - 10.5 mg/dL    Protein, Total 6.9 6.0 - 8.0 g/dL    Albumin 3.5 3.5 - 5.0 g/dL    Alkaline Phosphatase 86 45 - 120 U/L    AST 32 0 - 40 U/L    ALT 28 0 - 45 U/L    Narrative    Fasting Glucose reference range is 70-99 mg/dL per  American Diabetes Association (ADA) guidelines.         If you have any questions or concerns, please do not hesitate to  call.    Sincerely,      Amrik Mejia MD  October 22, 2020

## 2021-06-22 ENCOUNTER — RECORDS - HEALTHEAST (OUTPATIENT)
Dept: FAMILY MEDICINE | Facility: CLINIC | Age: 71
End: 2021-06-22

## 2021-06-23 NOTE — TELEPHONE ENCOUNTER
Lm for pt to call back. If pt calls back, please help him set up an appt to f/u on DM with Dr. Mejia or OSKAR Randall.

## 2021-06-26 NOTE — TELEPHONE ENCOUNTER
Who is calling:  Vito  Reason for Call:  Patient called back to schedule. First available for AWV w/Dr Mejia is 8/23 & patient would like a sooner appointment.   Date of last appointment with primary care: 11/25/2020  Okay to leave a detailed message: Yes

## 2021-08-02 ENCOUNTER — OFFICE VISIT (OUTPATIENT)
Dept: FAMILY MEDICINE | Facility: CLINIC | Age: 71
End: 2021-08-02
Payer: COMMERCIAL

## 2021-08-02 VITALS
DIASTOLIC BLOOD PRESSURE: 60 MMHG | WEIGHT: 207 LBS | TEMPERATURE: 99.2 F | OXYGEN SATURATION: 99 % | BODY MASS INDEX: 31.37 KG/M2 | RESPIRATION RATE: 20 BRPM | HEIGHT: 68 IN | SYSTOLIC BLOOD PRESSURE: 112 MMHG | HEART RATE: 65 BPM

## 2021-08-02 DIAGNOSIS — E11.9 TYPE 2 DIABETES MELLITUS WITHOUT COMPLICATION, WITHOUT LONG-TERM CURRENT USE OF INSULIN (H): ICD-10-CM

## 2021-08-02 DIAGNOSIS — K92.2 GASTROINTESTINAL HEMORRHAGE, UNSPECIFIED GASTROINTESTINAL HEMORRHAGE TYPE: ICD-10-CM

## 2021-08-02 DIAGNOSIS — R80.9 TYPE 2 DIABETES MELLITUS WITH MICROALBUMINURIA, WITHOUT LONG-TERM CURRENT USE OF INSULIN (H): Primary | ICD-10-CM

## 2021-08-02 DIAGNOSIS — E11.29 TYPE 2 DIABETES MELLITUS WITH MICROALBUMINURIA, WITHOUT LONG-TERM CURRENT USE OF INSULIN (H): Primary | ICD-10-CM

## 2021-08-02 PROBLEM — I82.461: Status: RESOLVED | Noted: 2020-11-17 | Resolved: 2021-08-02

## 2021-08-02 LAB
HBA1C MFR BLD: 6 % (ref 0–5.6)
HOLD SPECIMEN: NORMAL

## 2021-08-02 PROCEDURE — 36415 COLL VENOUS BLD VENIPUNCTURE: CPT | Performed by: FAMILY MEDICINE

## 2021-08-02 PROCEDURE — 83036 HEMOGLOBIN GLYCOSYLATED A1C: CPT | Performed by: FAMILY MEDICINE

## 2021-08-02 PROCEDURE — 99213 OFFICE O/P EST LOW 20 MIN: CPT | Performed by: FAMILY MEDICINE

## 2021-08-02 RX ORDER — PIOGLITAZONEHYDROCHLORIDE 30 MG/1
15 TABLET ORAL DAILY
Qty: 90 TABLET | Refills: 3
Start: 2021-08-02 | End: 2022-01-26

## 2021-08-02 ASSESSMENT — MIFFLIN-ST. JEOR: SCORE: 1678.45

## 2021-08-04 NOTE — PROGRESS NOTES
ASSESMENT AND PLAN:  Diagnoses and all orders for this visit:  Type 2 diabetes mellitus with microalbuminuria, without long-term current use of insulin (H)  -     Comprehensive metabolic panel (BMP + Alb, Alk Phos, ALT, AST, Total. Bili, TP); Future  -     pioglitazone (ACTOS) 30 MG tablet; Take 0.5 tablets (15 mg) by mouth daily    History of gastrointestinal hemorrhage, unspecified gastrointestinal hemorrhage type  Reviewed the medical record and confirmed that he did have his follow-up GI evaluation and upper endoscopy.  Continue omeprazole.    Possible soft systolic murmur   Exam is ambiguous as detailed below.  We will plan to recheck this at his next visit and if the murmur is confirmed would plan for a cardiac echo.    other orders  -     Hemoglobin A1c; Future  -     Extra Tube; Future      Reviewed the risks and benefits of the treatment plan with the patient and/or caregiver and we discussed indications for routine and emergent follow-up.        SUBJECTIVE: 70-year-old male here for follow-up on his hypertension and diabetes.  He has been doing very well with his plan and does not have any other concerns or new concerns.  He has a past history of GI bleed but has not been having any recent problems with abdominal pain or blood in the stool or black tarry stools.    Past Medical History:   Diagnosis Date     Diabetes mellitus (H)      Gout      Hypertension      Kidney stone      Patient Active Problem List   Diagnosis     Anemia     Hematuria     Hypercholesterolemia     Hypertension     Type 2 diabetes mellitus with microalbuminuria, without long-term current use of insulin (H)     Nephrolithiasis     Benign Adenomatous Polyp Of The Large Intestine     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     GI bleeding     Anemia due to blood loss, acute     Hypotension due to blood loss     Hyperkalemia     Gastrointestinal hemorrhage associated with acute gastritis     Popliteal  "cyst, left     Popliteal cyst, right     Cirrhosis of liver without ascites, unspecified hepatic cirrhosis type (H)     Current Outpatient Medications   Medication Sig Dispense Refill     glipiZIDE (GLUCOTROL XL) 10 MG 24 hr tablet [GLIPIZIDE (GLUCOTROL XL) 10 MG 24 HR TABLET] Take 1 tablet (10 mg total) by mouth daily. 90 tablet 3     losartan (COZAAR) 25 MG tablet [LOSARTAN (COZAAR) 25 MG TABLET] Take 1 tablet (25 mg total) by mouth daily. 90 tablet 3     metFORMIN (GLUCOPHAGE) 1000 MG tablet [METFORMIN (GLUCOPHAGE) 1000 MG TABLET] TAKE 1 TABLET BY MOUTH TWICE DAILY OVER  DUE  FOR  A  VISIT  AND  LABS 180 tablet 3     metoprolol succinate (TOPROL-XL) 100 MG 24 hr tablet [METOPROLOL SUCCINATE (TOPROL-XL) 100 MG 24 HR TABLET] Take 0.5 tablets (50 mg total) by mouth daily. 90 tablet 3     multivitamin (ONE A DAY) per tablet [MULTIVITAMIN (ONE A DAY) PER TABLET] Take 1 tablet by mouth daily.       omeprazole (PRILOSEC) 20 MG DR capsule Take 1 capsule by mouth 2 times daily       pioglitazone (ACTOS) 30 MG tablet Take 0.5 tablets (15 mg) by mouth daily 90 tablet 3     simvastatin (ZOCOR) 20 MG tablet [SIMVASTATIN (ZOCOR) 20 MG TABLET] Take 1 tablet (20 mg total) by mouth at bedtime. 90 tablet 3     acetaminophen (TYLENOL) 500 MG tablet [ACETAMINOPHEN (TYLENOL) 500 MG TABLET] Take 1-2 tablets (500-1,000 mg total) by mouth every 4 (four) hours as needed.  0     ascorbic acid (ASCORBIC ACID WITH NIR HIPS) 500 MG tablet [ASCORBIC ACID (ASCORBIC ACID WITH NIR HIPS) 500 MG TABLET] Take 500 mg by mouth daily.       History   Smoking Status     Never Smoker   Smokeless Tobacco     Never Used       OBJECTICE: /60 (BP Location: Left arm, Patient Position: Sitting)   Pulse 65   Temp 99.2  F (37.3  C) (Oral)   Resp 20   Ht 1.727 m (5' 8\")   Wt 93.9 kg (207 lb)   SpO2 99%   BMI 31.47 kg/m       No results found for this or any previous visit (from the past 24 hour(s)).     CV-regular rate and rhythm, initially I " thought I heard a soft systolic murmur but on further listening I think the cardiac exam is normal.   RESP-lungs clear to auscultation   Foot exam-normal.  Palpable pedal pulses bilaterally, no ulcers.    Amrik Mejia MD

## 2021-08-10 ENCOUNTER — TELEPHONE (OUTPATIENT)
Dept: FAMILY MEDICINE | Facility: CLINIC | Age: 71
End: 2021-08-10

## 2021-08-10 NOTE — LETTER
[address]  [place] [address]  [phone]        August 13, 2021    Vito Sy  [address]  [place] [address]              Dear Vito Sy    This is to remind you that your Basic profile is due.    The sample that we collected from you during your last visit was not usable for the test required and this requires that we re-draw your blood.     You may call our office at [phone] to schedule an appointment.    Please disregard this notice if you have already had your labs drawn or made an appointment.        Sincerely,        Amrik Mejia MD

## 2021-08-10 NOTE — TELEPHONE ENCOUNTER
Reason for Call:  Other appointment    Detailed comments: [facility] lab sent message to CMT team that patient should return to clinic to have a metabolic panel re-drawn due to specimen that was taken not usable. Thanks.     Phone Number Patient can be reached at: Cell number on file:    Telephone Information:   Mobile [phone]       Best Time: ASAP     Can we leave a detailed message on this number? NO    Call taken on 8/10/2021 at 2:37 PM by Ralph Harding

## 2021-08-11 NOTE — TELEPHONE ENCOUNTER
CMT left message x 2. Please review message thread below and advise the patient as indicated. Please schedule if necessary or indicated in message thread.

## 2021-11-01 DIAGNOSIS — R80.9 TYPE 2 DIABETES MELLITUS WITH MICROALBUMINURIA, WITHOUT LONG-TERM CURRENT USE OF INSULIN (H): ICD-10-CM

## 2021-11-01 DIAGNOSIS — E11.9 TYPE 2 DIABETES MELLITUS WITHOUT COMPLICATION, WITHOUT LONG-TERM CURRENT USE OF INSULIN (H): ICD-10-CM

## 2021-11-01 DIAGNOSIS — E11.29 TYPE 2 DIABETES MELLITUS WITH MICROALBUMINURIA, WITHOUT LONG-TERM CURRENT USE OF INSULIN (H): ICD-10-CM

## 2021-11-02 RX ORDER — LOSARTAN POTASSIUM 25 MG/1
TABLET ORAL
Qty: 90 TABLET | Refills: 3 | Status: SHIPPED | OUTPATIENT
Start: 2021-11-02 | End: 2021-12-02

## 2021-11-02 RX ORDER — GLIPIZIDE 10 MG/1
TABLET, FILM COATED, EXTENDED RELEASE ORAL
Qty: 90 TABLET | Refills: 3 | Status: SHIPPED | OUTPATIENT
Start: 2021-11-02 | End: 2021-12-02

## 2021-11-16 ENCOUNTER — OFFICE VISIT (OUTPATIENT)
Dept: FAMILY MEDICINE | Facility: CLINIC | Age: 71
End: 2021-11-16
Payer: COMMERCIAL

## 2021-11-16 VITALS
DIASTOLIC BLOOD PRESSURE: 70 MMHG | OXYGEN SATURATION: 98 % | HEART RATE: 89 BPM | HEIGHT: 68 IN | TEMPERATURE: 98.1 F | RESPIRATION RATE: 16 BRPM | WEIGHT: 201 LBS | SYSTOLIC BLOOD PRESSURE: 110 MMHG | BODY MASS INDEX: 30.46 KG/M2

## 2021-11-16 DIAGNOSIS — E11.29 TYPE 2 DIABETES MELLITUS WITH MICROALBUMINURIA, WITHOUT LONG-TERM CURRENT USE OF INSULIN (H): ICD-10-CM

## 2021-11-16 DIAGNOSIS — N20.0 NEPHROLITHIASIS: Primary | ICD-10-CM

## 2021-11-16 DIAGNOSIS — R15.2 FECAL URGENCY: ICD-10-CM

## 2021-11-16 DIAGNOSIS — I10 ESSENTIAL HYPERTENSION: ICD-10-CM

## 2021-11-16 DIAGNOSIS — Z12.11 SCREENING FOR COLON CANCER: ICD-10-CM

## 2021-11-16 DIAGNOSIS — R80.9 TYPE 2 DIABETES MELLITUS WITH MICROALBUMINURIA, WITHOUT LONG-TERM CURRENT USE OF INSULIN (H): ICD-10-CM

## 2021-11-16 DIAGNOSIS — K74.60 CIRRHOSIS OF LIVER WITHOUT ASCITES, UNSPECIFIED HEPATIC CIRRHOSIS TYPE (H): ICD-10-CM

## 2021-11-16 PROCEDURE — 99214 OFFICE O/P EST MOD 30 MIN: CPT | Performed by: FAMILY MEDICINE

## 2021-11-16 ASSESSMENT — MIFFLIN-ST. JEOR: SCORE: 1646.23

## 2021-11-16 NOTE — PROGRESS NOTES
ASSESMENT AND PLAN:  Diagnoses and all orders for this visit:  Nephrolithiasis  Reviewed available hospital information with the patient in care everywhere.  [facility] to address the ureteral stones and kidney stones as well as his ureteral stent that is in place.  This appointment is already scheduled for November 18 and he was given a reminder about the appointment time and location.  Continue to follow-up with urology as directed.    Type 2 diabetes mellitus with microalbuminuria, without long-term current use of insulin (H)  Given the hypoglycemia that the patient experienced as detailed in the hospital records, we are not going to restart his diabetes medications until he can get on a program of glucose monitoring.  At that point, we will then likely gradually restart his oral medications.  Patient is interested in the glucose monitoring that can be done without needlesticks, I am not sure how well his insurance would cover this given his well-controlled diabetes and that he is not on insulin.  We will put in a referral for diabetes education to pursue this further and get him on the best glucose monitoring that is also covered by insurance/affordable.    Hypertension with episodes of hypotension during his recent hospitalization  Losartan was held at discharge and given his normal blood pressure today we are not restarting it yet.    Cirrhosis of liver without ascites, unspecified hepatic cirrhosis type (H)  Patient will continue to follow-up with his liver specialist, he was given printed patient information on cirrhosis today.    Fecal urgency  Possibly multifactorial.  Certainly there is a dietary component as detailed below and he was encouraged to avoid the foods that make the problem worse.  Patient is due for screening colonoscopy and will get that done as ordered below.  Given the antibiotics he received during his hospitalization, I recommended that he take  over-the-counter probiotics.    Screening for colon cancer  -     Adult Gastro Ref - Procedure Only; Future      Reviewed the risks and benefits of the treatment plan with the patient and/or caregiver and we discussed indications for routine and emergent follow-up.        SUBJECTIVE: 71-year-old male here for follow-up on his recent hospitalization at [facility].  We were able to get those records in care everywhere.  He was hospitalized with obstructive kidney stones on the right side with associated pyelonephritis.  On presentation and during his hospitalization he had issues with hypoglycemia and hypotension.  Therefore all of his oral diabetes medications were discontinued and his losartan was discontinued.  Currently the patient is just taking omeprazole and simvastatin.  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  Patient would like some printed information that give some additional background on cirrhosis.  Since leaving the hospital, the patient has been feeling much better.  His strength is improving, appetite is improving.  Since discharge, he has not had any fever or vomiting.  He is having loose stools and he has had some fecal urgency.  Patient reports that in retrospect he has found that he has fecal urgency that has slowly progressed over the past year but has been much more noticeable since he is been home from the hospital.  No blood in the stool.    Past Medical History:   Diagnosis Date     Diabetes mellitus (H)      Gout      Hypertension      Kidney stone      Patient Active Problem List   Diagnosis     Anemia     Hematuria     Hypercholesterolemia     Hypertension     Type 2 diabetes mellitus with microalbuminuria, without long-term current use of insulin (H)     Nephrolithiasis     Benign Adenomatous Polyp Of The Large Intestine     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     GI bleeding     Anemia due to blood loss,  acute     Hypotension due to blood loss     Hyperkalemia     Gastrointestinal hemorrhage associated with acute gastritis     Popliteal cyst, left     Popliteal cyst, right     Cirrhosis of liver without ascites, unspecified hepatic cirrhosis type (H)     Current Outpatient Medications   Medication Sig Dispense Refill     omeprazole (PRILOSEC) 20 MG DR capsule Take 1 capsule by mouth 2 times daily       simvastatin (ZOCOR) 20 MG tablet TAKE 1 TABLET BY MOUTH AT BEDTIME 90 tablet 3     acetaminophen (TYLENOL) 500 MG tablet [ACETAMINOPHEN (TYLENOL) 500 MG TABLET] Take 1-2 tablets (500-1,000 mg total) by mouth every 4 (four) hours as needed. (Patient not taking: Reported on 11/16/2021)  0     ascorbic acid (ASCORBIC ACID WITH NIR HIPS) 500 MG tablet [ASCORBIC ACID (ASCORBIC ACID WITH NIR HIPS) 500 MG TABLET] Take 500 mg by mouth daily. (Patient not taking: Reported on 11/16/2021)       glipiZIDE (GLUCOTROL XL) 10 MG 24 hr tablet Take 1 tablet by mouth once daily (Patient not taking: Reported on 11/16/2021) 90 tablet 3     losartan (COZAAR) 25 MG tablet Take 1 tablet by mouth once daily (Patient not taking: Reported on 11/16/2021) 90 tablet 3     metFORMIN (GLUCOPHAGE) 1000 MG tablet [METFORMIN (GLUCOPHAGE) 1000 MG TABLET] TAKE 1 TABLET BY MOUTH TWICE DAILY OVER  DUE  FOR  A  VISIT  AND  LABS (Patient not taking: Reported on 11/16/2021) 180 tablet 3     metoprolol succinate (TOPROL-XL) 100 MG 24 hr tablet [METOPROLOL SUCCINATE (TOPROL-XL) 100 MG 24 HR TABLET] Take 0.5 tablets (50 mg total) by mouth daily. (Patient not taking: Reported on 11/16/2021) 90 tablet 3     multivitamin (ONE A DAY) per tablet [MULTIVITAMIN (ONE A DAY) PER TABLET] Take 1 tablet by mouth daily. (Patient not taking: Reported on 11/16/2021)       pioglitazone (ACTOS) 30 MG tablet Take 0.5 tablets (15 mg) by mouth daily (Patient not taking: Reported on 11/16/2021) 90 tablet 3     History   Smoking Status     Never Smoker   Smokeless Tobacco      "Never Used       OBJECTICE: /70 (BP Location: Right arm, Patient Position: Sitting)   Pulse 89   Temp 98.1  F (36.7  C) (Temporal)   Resp 16   Ht 1.727 m (5' 8\")   Wt 91.2 kg (201 lb)   SpO2 98%   BMI 30.56 kg/m       No results found for this or any previous visit (from the past 24 hour(s)).     GEN-alert, appropriate, in no apparent distress   CV-regular rate and rhythm with no murmur   RESP-lungs clear to auscultation   ABDOMINAL-soft, no palpable mass, he does have some mild right flank pain with palpation.       Amrik Mejia MD   "

## 2021-12-02 ENCOUNTER — OFFICE VISIT (OUTPATIENT)
Dept: FAMILY MEDICINE | Facility: CLINIC | Age: 71
End: 2021-12-02
Payer: COMMERCIAL

## 2021-12-02 VITALS
HEART RATE: 85 BPM | OXYGEN SATURATION: 99 % | TEMPERATURE: 98 F | WEIGHT: 199.06 LBS | RESPIRATION RATE: 16 BRPM | DIASTOLIC BLOOD PRESSURE: 66 MMHG | SYSTOLIC BLOOD PRESSURE: 112 MMHG | HEIGHT: 68 IN | BODY MASS INDEX: 30.17 KG/M2

## 2021-12-02 DIAGNOSIS — K74.60 CIRRHOSIS OF LIVER WITHOUT ASCITES, UNSPECIFIED HEPATIC CIRRHOSIS TYPE (H): ICD-10-CM

## 2021-12-02 DIAGNOSIS — N20.0 CALCULUS OF KIDNEY: ICD-10-CM

## 2021-12-02 DIAGNOSIS — K29.01 GASTROINTESTINAL HEMORRHAGE ASSOCIATED WITH ACUTE GASTRITIS: ICD-10-CM

## 2021-12-02 DIAGNOSIS — R80.9 TYPE 2 DIABETES MELLITUS WITH MICROALBUMINURIA, WITHOUT LONG-TERM CURRENT USE OF INSULIN (H): ICD-10-CM

## 2021-12-02 DIAGNOSIS — Z01.818 PREOPERATIVE EXAMINATION: Primary | ICD-10-CM

## 2021-12-02 DIAGNOSIS — I10 ESSENTIAL HYPERTENSION: ICD-10-CM

## 2021-12-02 DIAGNOSIS — E11.29 TYPE 2 DIABETES MELLITUS WITH MICROALBUMINURIA, WITHOUT LONG-TERM CURRENT USE OF INSULIN (H): ICD-10-CM

## 2021-12-02 LAB
ALBUMIN SERPL-MCNC: 2.9 G/DL (ref 3.5–5)
ALP SERPL-CCNC: 122 U/L (ref 45–120)
ALT SERPL W P-5'-P-CCNC: 25 U/L (ref 0–45)
ANION GAP SERPL CALCULATED.3IONS-SCNC: 11 MMOL/L (ref 5–18)
AST SERPL W P-5'-P-CCNC: 49 U/L (ref 0–40)
BILIRUB SERPL-MCNC: 1.4 MG/DL (ref 0–1)
BUN SERPL-MCNC: 12 MG/DL (ref 8–28)
CALCIUM SERPL-MCNC: 8.8 MG/DL (ref 8.5–10.5)
CHLORIDE BLD-SCNC: 110 MMOL/L (ref 98–107)
CO2 SERPL-SCNC: 21 MMOL/L (ref 22–31)
CREAT SERPL-MCNC: 1.3 MG/DL (ref 0.7–1.3)
ERYTHROCYTE [DISTWIDTH] IN BLOOD BY AUTOMATED COUNT: 17.6 % (ref 10–15)
GFR SERPL CREATININE-BSD FRML MDRD: 55 ML/MIN/1.73M2
GLUCOSE BLD-MCNC: 157 MG/DL (ref 70–125)
HCT VFR BLD AUTO: 31.8 % (ref 40–53)
HGB BLD-MCNC: 10 G/DL (ref 13.3–17.7)
INR PPP: 1.2 (ref 0.85–1.15)
MCH RBC QN AUTO: 25.3 PG (ref 26.5–33)
MCHC RBC AUTO-ENTMCNC: 31.4 G/DL (ref 31.5–36.5)
MCV RBC AUTO: 81 FL (ref 78–100)
PLATELET # BLD AUTO: 156 10E3/UL (ref 150–450)
POTASSIUM BLD-SCNC: 3.6 MMOL/L (ref 3.5–5)
PROT SERPL-MCNC: 6.9 G/DL (ref 6–8)
RBC # BLD AUTO: 3.95 10E6/UL (ref 4.4–5.9)
SODIUM SERPL-SCNC: 142 MMOL/L (ref 136–145)
WBC # BLD AUTO: 6.1 10E3/UL (ref 4–11)

## 2021-12-02 PROCEDURE — 85610 PROTHROMBIN TIME: CPT | Performed by: FAMILY MEDICINE

## 2021-12-02 PROCEDURE — 99214 OFFICE O/P EST MOD 30 MIN: CPT | Performed by: FAMILY MEDICINE

## 2021-12-02 PROCEDURE — 87086 URINE CULTURE/COLONY COUNT: CPT | Performed by: FAMILY MEDICINE

## 2021-12-02 PROCEDURE — 80053 COMPREHEN METABOLIC PANEL: CPT | Performed by: FAMILY MEDICINE

## 2021-12-02 PROCEDURE — 36415 COLL VENOUS BLD VENIPUNCTURE: CPT | Performed by: FAMILY MEDICINE

## 2021-12-02 PROCEDURE — 85027 COMPLETE CBC AUTOMATED: CPT | Performed by: FAMILY MEDICINE

## 2021-12-02 ASSESSMENT — MIFFLIN-ST. JEOR: SCORE: 1637.44

## 2021-12-02 NOTE — PROGRESS NOTES
[address]  [place] [address]  Phone: [phone]  Fax: [phone]  Primary Provider: Amrik Mejia        PREOPERATIVE EVALUATION:  Today's date: 12/2/2021    Vito Sy is a 71 year old male who presents for a preoperative evaluation.    Surgical Information:  Surgery/Procedure: Kidney Stone Removal  Surgery Location: [facility]  Surgeon: Dr. Martinez  Surgery Date: 12/20/2021  Time of Surgery: N/A  Where patient plans to recover: At home with family  Fax number for surgical facility: [phone]    Type of Anesthesia Anticipated: Choice    Assessment & Plan       Preoperative examination  Cleared to proceed with surgery.  - Comprehensive metabolic panel (BMP + Alb, Alk Phos, ALT, AST, Total. Bili, TP)  - CBC with platelets  - INR  - Urine Culture Aerobic Bacterial - lab collect      Nephrolithiasis with right-sided obstructive nephrolithiasis  Plan for surgical intervention as detailed above.  - Comprehensive metabolic panel (BMP + Alb, Alk Phos, ALT, AST, Total. Bili, TP)  - CBC with platelets  - INR  - Urine Culture Aerobic Bacterial - lab collect    Type 2 diabetes mellitus with microalbuminuria, without long-term current use of insulin (H)  Stable, well controlled, last A1c done in November was 5.5.  He was taken off of multiple oral diabetes medications during his recent hospitalization because of hypoglycemia.  He is restarting 1/2 tablet of Actos per day but will continue to be off of the other medications until his next follow-up.    Hypertension  Well-controlled off medications.  Medications were eliminated during his recent hospitalization because of hypotension.  Given his normal blood pressure today, actually at the lower end of the normal range, we will not restart additional medication at this time.    Cirrhosis of liver without ascites, unspecified hepatic cirrhosis type (H)  - Comprehensive metabolic panel (BMP + Alb, Alk  Phos, ALT, AST, Total. Bili, TP)  - INR      History of gastrointestinal hemorrhage associated with acute gastritis  - CBC with platelets          Medication Instructions:  Patient is to take all scheduled medications on the day of surgery    RECOMMENDATION:  APPROVAL GIVEN to proceed with proposed procedure, without further diagnostic evaluation.    Subjective     HPI related to upcoming procedure: Patient was hospitalized with right sided obstructive kidney stones.  He had a stent placed and has upcoming urology surgery for definitive treatment.  He has been doing well.  He gets some mild to moderate achy pain in the right flank, especially with urination.  He gets some intermittent blood in the urine.  No fevers or chills.  No dysuria.  No vomiting.    Preop Questions 12/2/2021   1. Have you ever had a heart attack or stroke? No   2. Have you ever had surgery on your heart or blood vessels, such as a stent placement, a coronary artery bypass, or surgery on an artery in your head, neck, heart, or legs? No   3. Do you have chest pain with activity? No   4. Do you have a history of  heart failure? No   5. Do you currently have a cold, bronchitis or symptoms of other infection? No   6. Do you have a cough, shortness of breath, or wheezing? YES - mild occasional cough.   7. Do you or anyone in your family have previous history of blood clots? No   8. Do you or does anyone in your family have a serious bleeding problem such as prolonged bleeding following surgeries or cuts? No   9. Have you ever had problems with anemia or been told to take iron pills? No   10. Have you had any abnormal blood loss such as black, tarry or bloody stools? YES - blood in urine.   11. Have you ever had a blood transfusion? YES - 2020.   11a. Have you ever had a transfusion reaction? No   12. Are you willing to have a blood transfusion if it is medically needed before, during, or after your surgery? Yes   13. Have you or any of your  relatives ever had problems with anesthesia? No   14. Do you have sleep apnea, excessive snoring or daytime drowsiness? No   15. Do you have any artifical heart valves or other implanted medical devices like a pacemaker, defibrillator, or continuous glucose monitor? No   16. Do you have artificial joints? No   17. Are you allergic to latex? No       Health Care Directive:  Patient does not have a Health Care Directive or Living Will: Discussed advance care planning with patient; information given to patient to review.      Review of Systems  No exertional chest pain.  No out of the ordinary shortness of breath.  He does continue to get some intermittent blood in the urine.  No blood in the stool.  No fevers.  No dysuria.  He does get some achy pain in the right flank with urination.  Remainder of review of systems is as above or negative.    Patient Active Problem List    Diagnosis Date Noted     Cirrhosis of liver without ascites, unspecified hepatic cirrhosis type (H) 11/25/2020     Priority: Medium     Type 2 diabetes mellitus with microalbuminuria, without long-term current use of insulin (H)      Priority: Medium     Created by Conversion         Popliteal cyst, left 11/17/2020     Priority: Medium     Popliteal cyst, right 11/17/2020     Priority: Medium     GI bleeding 11/16/2020     Priority: Medium     Anemia due to blood loss, acute 11/16/2020     Priority: Medium     Hypotension due to blood loss 11/16/2020     Priority: Medium     Hyperkalemia 11/16/2020     Priority: Medium     Gastrointestinal hemorrhage associated with acute gastritis 11/15/2020     Priority: Medium     Added automatically from request for surgery [id number]         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 11/14/2016     Priority: Medium     Hypertension      Priority: Medium     Created by Conversion  Replacement Utility updated for latest IMO load         Hematuria      Priority: Medium     Created by  "Conversion         Nephrolithiasis      Priority: Medium     Created by Conversion         Anemia      Priority: Medium     Created by Conversion         Hypercholesterolemia      Priority: Medium     Created by Conversion         Benign Adenomatous Polyp Of The Large Intestine      Priority: Medium     Created by Conversion          Past Medical History:   Diagnosis Date     Diabetes mellitus (H)      Gout      Hypertension      Kidney stone      Past Surgical History:   Procedure Laterality Date     COLONOSCOPY       TX ESOPHAGOGASTRODUODENOSCOPY TRANSORAL DIAGNOSTIC N/A 11/16/2020    Procedure: ESOPHAGOGASTRODUODENOSCOPY (EGD);  Surgeon: Juan Garnett MD;  Location: [facility];  Service: Gastroenterology     TX ESOPHAGOGASTRODUODENOSCOPY TRANSORAL DIAGNOSTIC N/A 11/18/2020    Procedure: ESOPHAGOGASTRODUODENOSCOPY (EGD) WITH BANDING;  Surgeon: Juan Garnett MD;  Location: [facility];  Service: Gastroenterology     URETEROSCOPY           Current Outpatient Medications:      omeprazole (PRILOSEC) 20 MG DR capsule, Take 1 capsule by mouth 2 times daily, Disp: , Rfl:      simvastatin (ZOCOR) 20 MG tablet, TAKE 1 TABLET BY MOUTH AT BEDTIME, Disp: 90 tablet, Rfl: 3     UNABLE TO FIND, PROBIOTIC OTC only 1 a day, Disp: , Rfl:      pioglitazone (ACTOS) 30 MG tablet, Take 0.5 tablets (15 mg) by mouth daily (Patient not taking: Reported on 11/16/2021), Disp: 90 tablet, Rfl: 3       Allergies   Allergen Reactions     Penicillins Unknown     Annotation: discussed with patient, he reports he had \"itching\" with penicillin many years ago in [facility] but no hives or throat or respiratory symptoms.  he also reports having tolerated amoxicillin since coming to [place].          Social History     Tobacco Use     Smoking status: Never Smoker     Smokeless tobacco: Never Used   Substance Use Topics     Alcohol use: Yes     Comment: Alcoholic Drinks/day: once a week or every two weeks       History   Drug Use No         Objective " "  /66   Pulse 85   Temp 98  F (36.7  C) (Oral)   Resp 16   Ht 1.727 m (5' 8\")   Wt 90.3 kg (199 lb 1 oz)   SpO2 99%   BMI 30.27 kg/m      Physical Exam  Gen - alert, orientated, NAD  Eyes - fundascopic exam limited by the undialated pupil but looks symmetric  ENT - oropharynx clear, TMs clear  Neck - supple, no palpable mass or lymphadenopathy  CV - RRR, no murmur  Resp - lungs CTA  Ab - soft, nontender, no palpable mass or organomegaly  Extrem - warm, no edema  Neuro - CN II-XII intact, strength, sensation, reflexes intact and symmetric  Skin - no rash, no atypical appearing lesions seen.     Diagnostics:  Recent Results (from the past 240 hour(s))   Comprehensive metabolic panel (BMP + Alb, Alk Phos, ALT, AST, Total. Bili, TP)    Collection Time: 12/02/21  9:11 AM   Result Value Ref Range    Sodium 142 136 - 145 mmol/L    Potassium 3.6 3.5 - 5.0 mmol/L    Chloride 110 (H) 98 - 107 mmol/L    Carbon Dioxide (CO2) 21 (L) 22 - 31 mmol/L    Anion Gap 11 5 - 18 mmol/L    Urea Nitrogen 12 8 - 28 mg/dL    Creatinine 1.30 0.70 - 1.30 mg/dL    Calcium 8.8 8.5 - 10.5 mg/dL    Glucose 157 (H) 70 - 125 mg/dL    Alkaline Phosphatase 122 (H) 45 - 120 U/L    AST 49 (H) 0 - 40 U/L    ALT 25 0 - 45 U/L    Protein Total 6.9 6.0 - 8.0 g/dL    Albumin 2.9 (L) 3.5 - 5.0 g/dL    Bilirubin Total 1.4 (H) 0.0 - 1.0 mg/dL    GFR Estimate 55 (L) >60 mL/min/1.73m2   CBC with platelets    Collection Time: 12/02/21  9:11 AM   Result Value Ref Range    WBC Count 6.1 4.0 - 11.0 10e3/uL    RBC Count 3.95 (L) 4.40 - 5.90 10e6/uL    Hemoglobin 10.0 (L) 13.3 - 17.7 g/dL    Hematocrit 31.8 (L) 40.0 - 53.0 %    MCV 81 78 - 100 fL    MCH 25.3 (L) 26.5 - 33.0 pg    MCHC 31.4 (L) 31.5 - 36.5 g/dL    RDW 17.6 (H) 10.0 - 15.0 %    Platelet Count 156 150 - 450 10e3/uL   INR    Collection Time: 12/02/21  9:11 AM   Result Value Ref Range    INR 1.20 (H) 0.85 - 1.15   Urine Culture Aerobic Bacterial - lab collect    Collection Time: 12/02/21  9:19 " AM    Specimen: Urine, Midstream   Result Value Ref Range    Culture No Growth             ECG 12-Lead STAT (EKG) (11/05/2021 10:50 PM CDT)  Component Value Ref Range Performed At Pathologist Signature  Ventricular Rate 72 BPM [facility]    Atrial Rate 72 BPM [facility]    P-R Interval 142 ms [facility]    QRS Duration 92 ms [facility]     ms [facility]    QTc 470 ms [facility]    P [place] 23 degrees [facility]    R [place] 41 degrees [facility]    T [place] 35 degrees [facility]      ECG 12-Lead STAT (EKG) (11/05/2021 10:50 PM CDT)  Specimen       ECG 12-Lead STAT (EKG) (11/05/2021 10:50 PM CDT)  Narrative  [facility] - 11/07/2021 8:03 AM CST    Sinus rhythm  Nonspecific ST abnormality  Abnormal ECG  No previous ECGs available  Confirmed by MD VIC, YANELY_ARNOLDO ([id number]) on 11/7/2021 8:03:37 AM          Signed Electronically by: Amrik Mejia MD  Copy of this evaluation report is provided to requesting physician.

## 2021-12-03 LAB — BACTERIA UR CULT: NO GROWTH

## 2021-12-06 ENCOUNTER — TRANSFERRED RECORDS (OUTPATIENT)
Dept: HEALTH INFORMATION MANAGEMENT | Facility: CLINIC | Age: 71
End: 2021-12-06
Payer: COMMERCIAL

## 2021-12-06 LAB
ALT SERPL-CCNC: 27 IU/L (ref 0–44)
AST SERPL-CCNC: 47 IU/L (ref 0–40)
CREATININE (EXTERNAL): 1.37 MG/DL (ref 0.76–1.27)
GFR ESTIMATED (EXTERNAL): 52 ML/MIN/1.73M2
GFR ESTIMATED (IF AFRICAN AMERICAN) (EXTERNAL): 60 ML/MIN/1.73M2
GLUCOSE (EXTERNAL): 144 MG/DL (ref 65–99)
POTASSIUM (EXTERNAL): 3.6 MMOL/L (ref 3.5–5.2)

## 2021-12-07 ENCOUNTER — TELEPHONE (OUTPATIENT)
Dept: FAMILY MEDICINE | Facility: CLINIC | Age: 71
End: 2021-12-07
Payer: COMMERCIAL

## 2021-12-18 ENCOUNTER — LAB (OUTPATIENT)
Dept: URGENT CARE | Facility: URGENT CARE | Age: 71
End: 2021-12-18
Attending: FAMILY MEDICINE
Payer: COMMERCIAL

## 2021-12-18 DIAGNOSIS — Z11.52 ENCOUNTER FOR SCREENING FOR COVID-19: Primary | ICD-10-CM

## 2021-12-18 PROCEDURE — U0003 INFECTIOUS AGENT DETECTION BY NUCLEIC ACID (DNA OR RNA); SEVERE ACUTE RESPIRATORY SYNDROME CORONAVIRUS 2 (SARS-COV-2) (CORONAVIRUS DISEASE [COVID-19]), AMPLIFIED PROBE TECHNIQUE, MAKING USE OF HIGH THROUGHPUT TECHNOLOGIES AS DESCRIBED BY CMS-2020-01-R: HCPCS | Performed by: FAMILY MEDICINE

## 2021-12-18 PROCEDURE — U0005 INFEC AGEN DETEC AMPLI PROBE: HCPCS | Performed by: FAMILY MEDICINE

## 2021-12-19 LAB — SARS-COV-2 RNA RESP QL NAA+PROBE: NEGATIVE

## 2022-01-07 ENCOUNTER — OFFICE VISIT (OUTPATIENT)
Dept: FAMILY MEDICINE | Facility: CLINIC | Age: 72
End: 2022-01-07
Payer: COMMERCIAL

## 2022-01-07 VITALS
WEIGHT: 199.4 LBS | BODY MASS INDEX: 30.32 KG/M2 | RESPIRATION RATE: 16 BRPM | HEART RATE: 88 BPM | DIASTOLIC BLOOD PRESSURE: 74 MMHG | SYSTOLIC BLOOD PRESSURE: 146 MMHG | OXYGEN SATURATION: 100 % | TEMPERATURE: 98.2 F

## 2022-01-07 DIAGNOSIS — Z20.822 EXPOSURE TO 2019 NOVEL CORONAVIRUS: Primary | ICD-10-CM

## 2022-01-07 PROCEDURE — U0005 INFEC AGEN DETEC AMPLI PROBE: HCPCS | Performed by: FAMILY MEDICINE

## 2022-01-07 PROCEDURE — U0003 INFECTIOUS AGENT DETECTION BY NUCLEIC ACID (DNA OR RNA); SEVERE ACUTE RESPIRATORY SYNDROME CORONAVIRUS 2 (SARS-COV-2) (CORONAVIRUS DISEASE [COVID-19]), AMPLIFIED PROBE TECHNIQUE, MAKING USE OF HIGH THROUGHPUT TECHNOLOGIES AS DESCRIBED BY CMS-2020-01-R: HCPCS | Performed by: FAMILY MEDICINE

## 2022-01-07 PROCEDURE — 99213 OFFICE O/P EST LOW 20 MIN: CPT | Performed by: FAMILY MEDICINE

## 2022-01-07 NOTE — PROGRESS NOTES
OUTPATIENT VISIT NOTE                                                   Date of Visit: 1/7/2022     Chief Complaint   Patient presents with:  Covid 19 Testing: exposure to covid, slight cough.             History of Present Illness   Vito Sy is a 71 year old male  IN FOR covid test due to exposure.  Has had slight cough for several weeks unchanged.    Son, wife, granddaughters all tested positive    He has no symptoms         MEDICATIONS   Current Outpatient Medications   Medication     omeprazole (PRILOSEC) 20 MG DR capsule     simvastatin (ZOCOR) 20 MG tablet     UNABLE TO FIND     pioglitazone (ACTOS) 30 MG tablet     No current facility-administered medications for this visit.         SOCIAL HISTORY   Social History     Tobacco Use     Smoking status: Never Smoker     Smokeless tobacco: Never Used   Substance Use Topics     Alcohol use: Yes     Comment: Alcoholic Drinks/day: once a week or every two weeks           Physical Exam   Vitals:    01/07/22 1238   BP: (!) 146/74   BP Location: Right arm   Patient Position: Sitting   Cuff Size: Adult Regular   Pulse: 88   Resp: 16   Temp: 98.2  F (36.8  C)   TempSrc: Oral   SpO2: 100%   Weight: 90.4 kg (199 lb 6.4 oz)        GENERAL:   Alert. Oriented.  EYES: Clear  HENT:  Ears: R TM pearly gray. Normal landmarks. L TM pearly gray.  Normal landmarks  Nose: Clear.  Sinuses: Nontender.  Oropharynx:  No erythema. No exudate.  NECK: Supple. No adenopathy.  LUNGS: Clear to ascultation.  No crackles.  No wheezing  HEART: RRR  SKIN:  No rash.          Assessment and Plan     Exposure to 2019 novel coronavirus  Will test for covid.  Discussed isolation and quarantine  - Asymptomatic COVID-19 Virus (Coronavirus) by PCR                   Discussed signs / symptoms that warrant urgent / emergent medical attention.     Recheck if worsening or not improving.       Andre Sue MD          Pertinent History     The following portions of the patient's history were reviewed  and updated as appropriate: allergies, current medications, past family history, past medical history, past social history, past surgical history and problem list.

## 2022-01-08 LAB — SARS-COV-2 RNA RESP QL NAA+PROBE: NEGATIVE

## 2022-01-25 ENCOUNTER — TRANSFERRED RECORDS (OUTPATIENT)
Dept: HEALTH INFORMATION MANAGEMENT | Facility: CLINIC | Age: 72
End: 2022-01-25
Payer: COMMERCIAL

## 2022-01-26 ENCOUNTER — OFFICE VISIT (OUTPATIENT)
Dept: FAMILY MEDICINE | Facility: CLINIC | Age: 72
End: 2022-01-26
Payer: COMMERCIAL

## 2022-01-26 VITALS
HEART RATE: 95 BPM | SYSTOLIC BLOOD PRESSURE: 110 MMHG | OXYGEN SATURATION: 99 % | WEIGHT: 211 LBS | HEIGHT: 68 IN | BODY MASS INDEX: 31.98 KG/M2 | RESPIRATION RATE: 20 BRPM | TEMPERATURE: 98.8 F | DIASTOLIC BLOOD PRESSURE: 60 MMHG

## 2022-01-26 DIAGNOSIS — Z12.5 ENCOUNTER FOR SCREENING FOR MALIGNANT NEOPLASM OF PROSTATE: ICD-10-CM

## 2022-01-26 DIAGNOSIS — R15.2 INCONTINENCE OF FECES WITH FECAL URGENCY: ICD-10-CM

## 2022-01-26 DIAGNOSIS — R01.1 SYSTOLIC MURMUR: ICD-10-CM

## 2022-01-26 DIAGNOSIS — Z00.00 ROUTINE GENERAL MEDICAL EXAMINATION AT HEALTH CARE FACILITY: Primary | ICD-10-CM

## 2022-01-26 DIAGNOSIS — R15.9 INCONTINENCE OF FECES WITH FECAL URGENCY: ICD-10-CM

## 2022-01-26 DIAGNOSIS — E11.29 TYPE 2 DIABETES MELLITUS WITH MICROALBUMINURIA, WITHOUT LONG-TERM CURRENT USE OF INSULIN (H): ICD-10-CM

## 2022-01-26 DIAGNOSIS — Z00.00 ENCOUNTER FOR MEDICARE ANNUAL WELLNESS EXAM: ICD-10-CM

## 2022-01-26 DIAGNOSIS — Z76.0 ENCOUNTER FOR MEDICATION REFILL: Primary | ICD-10-CM

## 2022-01-26 DIAGNOSIS — R80.9 TYPE 2 DIABETES MELLITUS WITH MICROALBUMINURIA, WITHOUT LONG-TERM CURRENT USE OF INSULIN (H): ICD-10-CM

## 2022-01-26 DIAGNOSIS — K74.60 CIRRHOSIS OF LIVER WITHOUT ASCITES, UNSPECIFIED HEPATIC CIRRHOSIS TYPE (H): ICD-10-CM

## 2022-01-26 DIAGNOSIS — N20.0 CALCULUS OF KIDNEY: ICD-10-CM

## 2022-01-26 PROCEDURE — 99213 OFFICE O/P EST LOW 20 MIN: CPT | Mod: 25 | Performed by: FAMILY MEDICINE

## 2022-01-26 PROCEDURE — 99397 PER PM REEVAL EST PAT 65+ YR: CPT | Performed by: FAMILY MEDICINE

## 2022-01-26 RX ORDER — PIOGLITAZONEHYDROCHLORIDE 30 MG/1
TABLET ORAL
Qty: 90 TABLET | Refills: 3 | Status: ON HOLD | OUTPATIENT
Start: 2022-01-26 | End: 2022-12-22

## 2022-01-26 ASSESSMENT — MIFFLIN-ST. JEOR: SCORE: 1691.59

## 2022-01-26 ASSESSMENT — ACTIVITIES OF DAILY LIVING (ADL): CURRENT_FUNCTION: NO ASSISTANCE NEEDED

## 2022-01-26 NOTE — PROGRESS NOTES
SUBJECTIVE:   Vito Sy is a 71 year old male who presents for Preventive Visit.      Patient has been advised of split billing requirements and indicates understanding: No  Are you in the first 12 months of your Medicare coverage?  No    HPI  Do you feel safe in your environment? Yes    Have you ever done Advance Care Planning? (For example, a Health Directive, POLST, or a discussion with a medical provider or your loved ones about your wishes): No, advance care planning information given to patient to review.  Patient plans to discuss their wishes with loved ones or provider.       Since I last saw the patient he did complete the stone removal procedure and reports that he followed up with urology and had the ureteral stent removed.  There is remaining significant stone burden and a percutaneous stone removal procedure had been recommended.  However, the patient is wanting to switch his  kidney stone management over to his previous physician-Dr. Martinez at the [facility].    Patient recently saw gastroenterology and was found to have a significant recurrence of ascites.  Over the last few months he is also been noticing increased urgency or stool with times where he is unable to get to the bathroom in time.       Fall risk  Fallen 2 or more times in the past year?: No  Any fall with injury in the past year?: No  click delete button to remove this line now  Cognitive Screening   1) Repeat 3 items (Leader, Season, Table)    2) Clock draw: NORMAL  3) 3 item recall: Recalls 3 objects  Results: NORMAL clock, 1-2 items recalled: COGNITIVE IMPAIRMENT LESS LIKELY    Mini-CogTM Copyright MILAD Cruz. Licensed by the author for use in [facility]; reprinted with permission (anuja@.[facility]). All rights reserved.      Do you have sleep apnea, excessive snoring or daytime drowsiness?: yes    Reviewed and updated as needed this visit by clinical staff   Allergies  Meds             Reviewed and updated  "as needed this visit by Provider               Social History     Tobacco Use     Smoking status: Never Smoker     Smokeless tobacco: Never Used   Substance Use Topics     Alcohol use: Yes     Comment: Alcoholic Drinks/day: once a week or every two weeks     If you drink alcohol do you typically have >3 drinks per day or >7 drinks per week? No    Patient has not used any alcohol in over 1 year.    Alcohol Use 1/26/2022   Prescreen: >3 drinks/day or >7 drinks/week? No   No flowsheet data found.            Current providers sharing in care for this patient include:   Patient Care Team:  Amrik Mejia MD as PCP - General (Family Practice)  Shanika Tripp PharmD as Pharmacist (Pharmacist)  Amrik Mejia MD as Assigned PCP    The following health maintenance items are reviewed in Epic and correct as of today:  Health Maintenance Due   Topic Date Due     DIABETIC FOOT EXAM  Never done     ANNUAL REVIEW OF HM ORDERS  Never done     ADVANCE CARE PLANNING  Never done     EYE EXAM  Never done     LIPID  04/05/2013     MEDICARE ANNUAL WELLNESS VISIT  Never done     Pneumococcal Vaccine: 65+ Years (2 of 2 - PPSV23) 03/17/2019     MICROALBUMIN  05/30/2020     FALL RISK ASSESSMENT  10/21/2021     ZOSTER IMMUNIZATION (2 of 2) 10/18/2021     A1C  02/02/2022               Review of Systems      OBJECTIVE:   Ht 1.727 m (5' 8\")   Wt 95.7 kg (211 lb)   BMI 32.08 kg/m   Estimated body mass index is 32.08 kg/m  as calculated from the following:    Height as of this encounter: 1.727 m (5' 8\").    Weight as of this encounter: 95.7 kg (211 lb).  Physical Exam  Gen - alert, orientated, NAD  Eyes - fundascopic exam limited by the undialated pupil but looks symmetric  ENT -poor dentition, otherwise oropharynx clear, TMs clear  Neck - supple, no palpable mass or lymphadenopathy  CV - RRR, grade 2 out of 6 to 3 out of 6 systolic murmur  Resp - lungs CTA  Ab - soft, nontender, no palpable mass or organomegaly   - normal appearance to " the external genetalia, normal testicular exam bilaterally, no hernia  Extrem - warm, no edema  Neuro - CN II-XII intact, strength, sensation, reflexes intact and symmetric  Skin - no rash, no atypical appearing lesions seen.         ASSESSMENT / PLAN:   Vito was seen today for wellness visit.    Diagnoses and all orders for this visit:    Routine general medical examination at health care facility - Encounter for Medicare annual wellness exam  Age-appropriate counseling and anticipatory guidance done with the patient.  He is going to check with his pharmacist about getting dose 2 of the Shingrix vaccine.  Otherwise up-to-date on his vaccines.  He would like to add a PSA to his blood testing coming up at his diabetes follow-up here in the clinic in 3 months.  Recommended dental follow-up.    Because of the systolic murmur, I would like him to get a cardiac echo.  Echocardiogram ordered.    Incontinence of feces with fecal urgency  Counseled the patient that I am hopeful that he will get some improvement on this after paracentesis.  Follow-up with gastroenterology.  Written prescription given today for adult diapers BID.    Cirrhosis of liver without ascites, unspecified hepatic cirrhosis type (H)  Reviewed the most recent gastroenterology note, GI clinic is supposed to be calling the patient to schedule follow-up with his liver specialist.  Paracentesis also pending.    Nephrolithiasis  Reviewed the operative read note and follow-up clinic note from his [facility] urologist.  Patient prefers to switch over to Dr. Martinez for ongoing care.  percutaneous approach for removal of remaining stone burden had been recommended.  Please see the most recent [facility] urology consultation note for details      -adult Urology Referral; Future                COUNSELING:  Reviewed preventive health counseling, as reflected in patient instructions    Estimated body mass index is 32.08 kg/m  as calculated from the  "following:    Height as of this encounter: 1.727 m (5' 8\").    Weight as of this encounter: 95.7 kg (211 lb).    Weight management plan: Discussed healthy diet and exercise guidelines    He reports that he has never smoked. He has never used smokeless tobacco.      Appropriate preventive services were discussed with this patient, including applicable screening as appropriate for cardiovascular disease, diabetes, osteopenia/osteoporosis, and glaucoma.  As appropriate for age/gender, discussed screening for colorectal cancer, prostate cancer, breast cancer, and cervical cancer. Checklist reviewing preventive services available has been given to the patient.    Reviewed patients plan of care and provided an AVS. The Basic Care Plan (routine screening as documented in Health Maintenance) for Vito meets the Care Plan requirement. This Care Plan has been established and reviewed with the Patient.      Amrik Mejia MD  [facility]      "

## 2022-01-26 NOTE — PATIENT INSTRUCTIONS
Patient Education   Personalized Prevention Plan  You are due for the preventive services outlined below.  Your care team is available to assist you in scheduling these services.  If you have already completed any of these items, please share that information with your care team to update in your medical record.  Health Maintenance Due   Topic Date Due     Diabetic Foot Exam  Never done     ANNUAL REVIEW OF HM ORDERS  Never done     Eye Exam  Never done     Cholesterol Lab  04/05/2013     Pneumococcal Vaccine (2 of 2 - PPSV23) 03/17/2019     Kidney Microalbumin Urine Test  05/30/2020     FALL RISK ASSESSMENT  10/21/2021     Zoster (Shingles) Vaccine (2 of 2) 10/18/2021     A1C Lab  02/02/2022

## 2022-01-27 ENCOUNTER — TELEPHONE (OUTPATIENT)
Dept: UROLOGY | Facility: CLINIC | Age: 72
End: 2022-01-27
Payer: COMMERCIAL

## 2022-01-27 NOTE — CONFIDENTIAL NOTE
Message left for patient to call clinic to set up an appointment for kidney stone follow-up for today or tomorrow.  Karina Toledo RN

## 2022-01-28 ENCOUNTER — VIRTUAL VISIT (OUTPATIENT)
Dept: UROLOGY | Facility: CLINIC | Age: 72
End: 2022-01-28
Payer: COMMERCIAL

## 2022-01-28 ENCOUNTER — TELEPHONE (OUTPATIENT)
Dept: UROLOGY | Facility: CLINIC | Age: 72
End: 2022-01-28

## 2022-01-28 DIAGNOSIS — E21.2 OTHER HYPERPARATHYROIDISM (H): ICD-10-CM

## 2022-01-28 DIAGNOSIS — N20.0 CALCULUS OF KIDNEY: Primary | ICD-10-CM

## 2022-01-28 PROCEDURE — 99203 OFFICE O/P NEW LOW 30 MIN: CPT | Mod: 95 | Performed by: PHYSICIAN ASSISTANT

## 2022-01-28 ASSESSMENT — PAIN SCALES - GENERAL: PAINLEVEL: NO PAIN (0)

## 2022-01-28 NOTE — PROGRESS NOTES
Patient is roomed via telephone for a virtual visit.  Patient confirmed he is in the [facility] at the time of this appointment.  Patient understands that this virtual visit is billable and agree to proceed with appointment.

## 2022-01-28 NOTE — PROGRESS NOTES
Assessment/Plan:    Assessment & Plan   Vito was seen today for new patient.    Diagnoses and all orders for this visit:    Calculus of kidney  -     Parathyroid Hormone Intact; Future  -     Ionized Calcium; Future  -     Vitamin D  25-Hydroxy ([facility]); Future    Other hyperparathyroidism (H)   -     Vitamin D  25-Hydroxy ([facility]); Future    Stone Management Plan  Stone Management 1/28/2022   Urinary Tract Infection No suspicion of infection   Renal Colic Asymptomatic at this time   Renal Failure No suspicion of renal failure   Current CT date 11/7/2021   Right sided stones? Yes   R Number of ureteral stones 2   R GSD of ureteral stones 10   R Location of ureteral stone Proximal   R Number of kidney stones  > 10   R GSD of kidney stones 4 - 10   R Hydronephrosis Moderate   R Stone Event No current event   R Post-op status No imaging   R Current Plan Observe   Observe rationale Significant stone burden amenable to emergency management   Left sided stones? Yes   L Number of ureteral stones No ureteral stones   L GSD of kidney stones 10 - 15   L Hydronephrosis None   L Stone Event No current event   L Current Plan Observe   Observe rationale Significant stone burden amenable to emergency management           PLAN    70 yo diabetic M with history of EtOH cirrhosis, recurrent calcium based kidney stones, previous surgical stone interventions. Underwent cystolitholapaxy and right ureteroscopic clearance in December for bladder, right ureteral and renal stones through outside facility. Known, untreated, residual large volume stone burden.    Given he is asymptomatic and has no signs of recurrent infection, recommend holding off on surgery at this time and pursue comprehensive stone risk evaluation. Two 24 hour urine collections and dietary journal will be obtained at earliest covenience. Patient verbalized understanding. Patient agrees with plan as discussed. He will return to clinic when labs are available with  updated CT scan to assess stone burden. Can revisit surgical clearance with either second look ureteroscopy versus PCNL.      Video call duration: 14 minutes  21 minutes spent on the date of the encounter doing chart review, history and exam, documentation and further activities per the note    Lindy Chavis PA-C  [facility]    Subjective:     HPI  Mr. Vito Sy is a 71 year old  male who is being evaluated via a billable video visit by [facility] referred by PCP for     He is a recurrent calcium oxalate and calcium phosphate (apatite) stone former who has required stone clearance procedures. He has not previously participated in stone risk evaluation. He has no identified modifiable stone risk factors. He has no identified non-modifiable stone risk factors.    He was hospitalized in November for viral gastroenteritis, dehydration, acute encephalopathy and ascites. Imaging also noted obstructive right urolithiasis, dominant bladder stone and bilateral renal stones symptomatic urolithiasis, prompting placement of temporizing stent. He underwent right URS/laser and cystolitholapaxy in December through [facility]. There were remaining untreated renal stones noted. He had follow up with stent removal 1/5/22 and was recommended to pursue PCNL for clearance of residual stone burden.     He had follow up with his PCP and asked to re-establish care with our office as he had previously been under our care and had stone surgery with Dr. Martinez in 2012.     He is doing well at this time with no pain. He denies symptoms of fever, chills, flank pain, nausea, vomiting, urinary frequency and dysuria.     CT scan from 11/07/21 is personally reviewed and demonstrates a large bladder stone. Obstructing right UPJ stone (s) and several bilateral renal stones. No postoperative imaging on file.    ROS   A 12 point comprehensive review of systems is negative  "except for HPI    Past Medical History:   Diagnosis Date     Diabetes mellitus (H)      Gout      Hypertension      Kidney stone      Past Surgical History:   Procedure Laterality Date     COLONOSCOPY       MI ESOPHAGOGASTRODUODENOSCOPY TRANSORAL DIAGNOSTIC N/A 11/16/2020    Procedure: ESOPHAGOGASTRODUODENOSCOPY (EGD);  Surgeon: Juan Garnett MD;  Location: [facility];  Service: Gastroenterology     MI ESOPHAGOGASTRODUODENOSCOPY TRANSORAL DIAGNOSTIC N/A 11/18/2020    Procedure: ESOPHAGOGASTRODUODENOSCOPY (EGD) WITH BANDING;  Surgeon: Juan Garnett MD;  Location: [facility];  Service: Gastroenterology     URETEROSCOPY         Current Outpatient Medications   Medication Sig Dispense Refill     omeprazole (PRILOSEC) 20 MG DR capsule TAKE 1 CAPSULE BY MOUTH TWICE DAILY BEFORE MEALS 180 capsule 3     pioglitazone (ACTOS) 30 MG tablet Take 1 tablet by mouth once daily 90 tablet 3     simvastatin (ZOCOR) 20 MG tablet TAKE 1 TABLET BY MOUTH AT BEDTIME 90 tablet 3       Allergies   Allergen Reactions     Penicillins Unknown     Annotation: discussed with patient, he reports he had \"itching\" with penicillin many years ago in [facility] but no hives or throat or respiratory symptoms.  he also reports having tolerated amoxicillin since coming to [place].       [facility]        Social History     Socioeconomic History     Marital status:      Spouse name: Not on file     Number of children: Not on file     Years of education: Not on file     Highest education level: Not on file   Occupational History     Not on file   Tobacco Use     Smoking status: Never Smoker     Smokeless tobacco: Never Used   Substance and Sexual Activity     Alcohol use: Yes     Comment: Alcoholic Drinks/day: once a week or every two weeks     Drug use: No     Sexual activity: Not on file   Other Topics Concern     Not on file   Social History Narrative    Lives with wife - has worked as  in the past     Social Determinants of Health "     Financial Resource Strain: Not on file   Food Insecurity: Not on file   Transportation Needs: Not on file   Physical Activity: Not on file   Stress: Not on file   Social Connections: Not on file   Intimate Partner Violence: Not on file   Housing Stability: Not on file     Family History   Problem Relation Age of Onset     Heart Disease Brother      Hypertension Mother      Hypertension Father        Objective:     No vitals or physical exam obtained due to virtual visit

## 2022-01-28 NOTE — PATIENT INSTRUCTIONS
Steps for collecting a 24 hour urine specimen    Please follow the directions carefully. All urine voided for a 24-hour period needs to be collected into the jug.   Pick the most convenient day with your schedule, perhaps on a weekend or a day off.    Restrict dietary intake of sodium to 1800mg per day or less for 4 consecutive days.    Start the 24 hour urine collection on day 4 of the sodium restrictive diet.    Start your Diet Log the day before collection and continue on the day of urine collection.  You MUST bring Diet Log with you on follow up visit to discuss results.    One 24hr urine collection while on dietary sodium restriction challenge    STEP 1  Empty your bladder completely into the toilet. This will be your start time. Write your full legal name, start date and time on the jug label.  Collection start and stop times need to match exactly!  For example:  6 am to 6 am.    STEP 2  The next time you urinate, empty your bladder directly into the jug or collection hat and pour urine into the jug.  Screw the lid back onto the jug.  Do not spill!    STEP 3  Place the jug in the refrigerator or a cooler with ice during the collection period.  Failure to keep it cool could cause inaccurate test results. DO NOT Freeze.     STEP 4  Continue collecting all urine into the jug for the rest of the day, for the full 24 hours.  DO NOT stop early or go over 24 hours!    STEP 5  Exactly 24 hours from start of collection, write your full legal name, stop date and time on the jug label.   Collection start and stop times need to match exactly!  For example:  6 am to 6 am.  Failure to label correctly will result in recollection of urine specimen.    STEP 6  Return each jug within 24 hours after final urination.    STEP 7  Drop off jug locations:   Mail back as instructed  STEP 8  Please call [facility] after return of your final jug to schedule your follow-up visit. [phone]

## 2022-02-16 ENCOUNTER — HOSPITAL ENCOUNTER (OUTPATIENT)
Dept: ULTRASOUND IMAGING | Facility: HOSPITAL | Age: 72
Discharge: HOME | End: 2022-02-16
Attending: INTERNAL MEDICINE | Admitting: INTERNAL MEDICINE
Payer: COMMERCIAL

## 2022-02-16 DIAGNOSIS — I99.8 ANGIECTASIA: ICD-10-CM

## 2022-02-16 DIAGNOSIS — K70.31 ASCITES DUE TO ALCOHOLIC CIRRHOSIS (H): ICD-10-CM

## 2022-02-16 LAB
ALBUMIN FLD-MCNC: 0.9 G/DL
APPEARANCE FLD: ABNORMAL
COLOR FLD: YELLOW
LYMPHOCYTES NFR FLD MANUAL: 63 %
MONOS+MACROS NFR FLD MANUAL: 33 %
NEUTS BAND NFR FLD MANUAL: 4 %
RBC # FLD: 1000 /UL
WBC # FLD AUTO: 540 /UL

## 2022-02-16 PROCEDURE — 82042 OTHER SOURCE ALBUMIN QUAN EA: CPT | Performed by: INTERNAL MEDICINE

## 2022-02-16 PROCEDURE — 272N000706 US PARACENTESIS

## 2022-02-16 PROCEDURE — 87070 CULTURE OTHR SPECIMN AEROBIC: CPT | Performed by: INTERNAL MEDICINE

## 2022-02-16 PROCEDURE — 89051 BODY FLUID CELL COUNT: CPT | Performed by: INTERNAL MEDICINE

## 2022-02-16 PROCEDURE — 87205 SMEAR GRAM STAIN: CPT | Performed by: INTERNAL MEDICINE

## 2022-02-17 ENCOUNTER — TRANSFERRED RECORDS (OUTPATIENT)
Dept: HEALTH INFORMATION MANAGEMENT | Facility: CLINIC | Age: 72
End: 2022-02-17
Payer: COMMERCIAL

## 2022-02-17 LAB
ALT SERPL-CCNC: 16 LU/L (ref 0–44)
AST SERPL-CCNC: 34 LU/L (ref 0–40)
CREATININE (EXTERNAL): 1.52 MG/DL (ref 0.76–1.27)
GFR ESTIMATED (EXTERNAL): 46 ML/MIN/1.73
GFR ESTIMATED (IF AFRICAN AMERICAN) (EXTERNAL): 53 ML/MIN/1.73
GLUCOSE (EXTERNAL): 196 MG/DL (ref 65–99)
INR (EXTERNAL): 1.1 (ref 0.9–1.2)
POTASSIUM (EXTERNAL): 3.9 MMOL/L (ref 3.5–5.2)

## 2022-02-18 ENCOUNTER — TRANSFERRED RECORDS (OUTPATIENT)
Dept: HEALTH INFORMATION MANAGEMENT | Facility: CLINIC | Age: 72
End: 2022-02-18
Payer: COMMERCIAL

## 2022-02-21 ENCOUNTER — TRANSFERRED RECORDS (OUTPATIENT)
Dept: HEALTH INFORMATION MANAGEMENT | Facility: CLINIC | Age: 72
End: 2022-02-21
Payer: COMMERCIAL

## 2022-02-21 LAB
BACTERIA PRT CULT: NO GROWTH
GRAM STAIN RESULT: NORMAL
GRAM STAIN RESULT: NORMAL

## 2022-02-25 ENCOUNTER — TRANSFERRED RECORDS (OUTPATIENT)
Dept: HEALTH INFORMATION MANAGEMENT | Facility: CLINIC | Age: 72
End: 2022-02-25
Payer: COMMERCIAL

## 2022-02-25 LAB
CREATININE (EXTERNAL): 1.22 MG/DL (ref 0.76–1.27)
GFR ESTIMATED (EXTERNAL): 60 ML/MIN/1.73M2
GFR ESTIMATED (IF AFRICAN AMERICAN) (EXTERNAL): 69 ML/MIN/1.73M2
GLUCOSE (EXTERNAL): 245 MG/DL (ref 65–99)
POTASSIUM (EXTERNAL): 4.4 MMOL/L (ref 3.5–5.2)

## 2022-02-27 ENCOUNTER — HOSPITAL ENCOUNTER (OUTPATIENT)
Dept: ULTRASOUND IMAGING | Facility: CLINIC | Age: 72
Discharge: HOME | End: 2022-02-27
Attending: INTERNAL MEDICINE | Admitting: INTERNAL MEDICINE
Payer: COMMERCIAL

## 2022-02-27 DIAGNOSIS — K70.31 ALCOHOLIC CIRRHOSIS OF LIVER WITH ASCITES (H): ICD-10-CM

## 2022-02-27 DIAGNOSIS — Z71.3 DIETARY COUNSELING AND SURVEILLANCE: ICD-10-CM

## 2022-02-27 PROCEDURE — 76705 ECHO EXAM OF ABDOMEN: CPT

## 2022-03-15 ENCOUNTER — HOSPITAL ENCOUNTER (OUTPATIENT)
Dept: ULTRASOUND IMAGING | Facility: HOSPITAL | Age: 72
Discharge: HOME | End: 2022-03-15
Attending: INTERNAL MEDICINE | Admitting: RADIOLOGY
Payer: COMMERCIAL

## 2022-03-15 VITALS
TEMPERATURE: 98 F | OXYGEN SATURATION: 100 % | HEART RATE: 75 BPM | RESPIRATION RATE: 20 BRPM | SYSTOLIC BLOOD PRESSURE: 138 MMHG | DIASTOLIC BLOOD PRESSURE: 71 MMHG

## 2022-03-15 DIAGNOSIS — K70.31 ALCOHOLIC CIRRHOSIS OF LIVER WITH ASCITES (H): ICD-10-CM

## 2022-03-15 DIAGNOSIS — Z71.3 DIETARY COUNSELING AND SURVEILLANCE: ICD-10-CM

## 2022-03-15 PROCEDURE — 272N000021 US PARACENTESIS WITH ALBUMIN

## 2022-03-15 PROCEDURE — P9047 ALBUMIN (HUMAN), 25%, 50ML: HCPCS | Performed by: INTERNAL MEDICINE

## 2022-03-15 PROCEDURE — 250N000011 HC RX IP 250 OP 636: Performed by: INTERNAL MEDICINE

## 2022-03-15 RX ORDER — ALBUMIN (HUMAN) 12.5 G/50ML
50 SOLUTION INTRAVENOUS ONCE
Status: COMPLETED | OUTPATIENT
Start: 2022-03-15 | End: 2022-03-15

## 2022-03-15 RX ADMIN — ALBUMIN HUMAN 50 G: 0.25 SOLUTION INTRAVENOUS at 14:16

## 2022-03-17 ENCOUNTER — TRANSFERRED RECORDS (OUTPATIENT)
Dept: HEALTH INFORMATION MANAGEMENT | Facility: CLINIC | Age: 72
End: 2022-03-17
Payer: COMMERCIAL

## 2022-03-25 ENCOUNTER — TRANSFERRED RECORDS (OUTPATIENT)
Dept: HEALTH INFORMATION MANAGEMENT | Facility: CLINIC | Age: 72
End: 2022-03-25
Payer: COMMERCIAL

## 2022-03-30 ENCOUNTER — HOSPITAL ENCOUNTER (OUTPATIENT)
Dept: ULTRASOUND IMAGING | Facility: HOSPITAL | Age: 72
Discharge: HOME | End: 2022-03-30
Attending: INTERNAL MEDICINE | Admitting: RADIOLOGY
Payer: COMMERCIAL

## 2022-03-30 DIAGNOSIS — Z71.3 DIETARY COUNSELING AND SURVEILLANCE: ICD-10-CM

## 2022-03-30 DIAGNOSIS — K70.31 ALCOHOLIC CIRRHOSIS OF LIVER WITH ASCITES (H): ICD-10-CM

## 2022-03-30 PROCEDURE — P9047 ALBUMIN (HUMAN), 25%, 50ML: HCPCS | Performed by: INTERNAL MEDICINE

## 2022-03-30 PROCEDURE — 250N000011 HC RX IP 250 OP 636: Performed by: INTERNAL MEDICINE

## 2022-03-30 PROCEDURE — 272N000021 US PARACENTESIS WITH ALBUMIN

## 2022-03-30 RX ORDER — ALBUMIN (HUMAN) 12.5 G/50ML
50 SOLUTION INTRAVENOUS ONCE
Status: DISCONTINUED | OUTPATIENT
Start: 2022-03-30 | End: 2022-03-30

## 2022-03-30 RX ORDER — ALBUMIN (HUMAN) 12.5 G/50ML
50 SOLUTION INTRAVENOUS ONCE
Status: COMPLETED | OUTPATIENT
Start: 2022-03-30 | End: 2022-03-30

## 2022-03-30 RX ADMIN — ALBUMIN HUMAN 50 G: 0.25 SOLUTION INTRAVENOUS at 14:12

## 2022-04-04 ENCOUNTER — TRANSFERRED RECORDS (OUTPATIENT)
Dept: HEALTH INFORMATION MANAGEMENT | Facility: CLINIC | Age: 72
End: 2022-04-04
Payer: COMMERCIAL

## 2022-04-11 ENCOUNTER — TRANSFERRED RECORDS (OUTPATIENT)
Dept: HEALTH INFORMATION MANAGEMENT | Facility: CLINIC | Age: 72
End: 2022-04-11
Payer: COMMERCIAL

## 2022-04-11 LAB
ALT SERPL-CCNC: 18 IU/L (ref 0–44)
AST SERPL-CCNC: 39 IU/L (ref 0–40)
CREATININE (EXTERNAL): 1.13 MG/DL (ref 0.76–1.27)
GFR ESTIMATED (EXTERNAL): 70 ML/MIN/1.73
GLUCOSE (EXTERNAL): 213 MG/DL (ref 65–99)
INR (EXTERNAL): 1.1 (ref 0.9–1.2)
POTASSIUM (EXTERNAL): 4.4 MMOL/L (ref 3.5–5.2)

## 2022-04-27 ENCOUNTER — OFFICE VISIT (OUTPATIENT)
Dept: FAMILY MEDICINE | Facility: CLINIC | Age: 72
End: 2022-04-27
Payer: COMMERCIAL

## 2022-04-27 VITALS
WEIGHT: 213 LBS | RESPIRATION RATE: 20 BRPM | SYSTOLIC BLOOD PRESSURE: 122 MMHG | BODY MASS INDEX: 32.28 KG/M2 | HEIGHT: 68 IN | TEMPERATURE: 99 F | DIASTOLIC BLOOD PRESSURE: 64 MMHG | HEART RATE: 78 BPM | OXYGEN SATURATION: 99 %

## 2022-04-27 DIAGNOSIS — N20.0 CALCULUS OF KIDNEY: ICD-10-CM

## 2022-04-27 DIAGNOSIS — R80.9 TYPE 2 DIABETES MELLITUS WITH MICROALBUMINURIA, WITHOUT LONG-TERM CURRENT USE OF INSULIN (H): Primary | ICD-10-CM

## 2022-04-27 DIAGNOSIS — Z00.00 ROUTINE GENERAL MEDICAL EXAMINATION AT HEALTH CARE FACILITY: ICD-10-CM

## 2022-04-27 DIAGNOSIS — Z12.5 ENCOUNTER FOR SCREENING FOR MALIGNANT NEOPLASM OF PROSTATE: ICD-10-CM

## 2022-04-27 DIAGNOSIS — E11.29 TYPE 2 DIABETES MELLITUS WITH MICROALBUMINURIA, WITHOUT LONG-TERM CURRENT USE OF INSULIN (H): Primary | ICD-10-CM

## 2022-04-27 DIAGNOSIS — K74.69 OTHER CIRRHOSIS OF LIVER (H): ICD-10-CM

## 2022-04-27 PROBLEM — K74.60 CIRRHOSIS OF LIVER WITHOUT ASCITES, UNSPECIFIED HEPATIC CIRRHOSIS TYPE (H): Status: RESOLVED | Noted: 2020-11-25 | Resolved: 2022-04-27

## 2022-04-27 LAB
ALBUMIN SERPL-MCNC: 2.6 G/DL (ref 3.5–5)
ALP SERPL-CCNC: 109 U/L (ref 45–120)
ALT SERPL W P-5'-P-CCNC: 17 U/L (ref 0–45)
ANION GAP SERPL CALCULATED.3IONS-SCNC: 9 MMOL/L (ref 5–18)
AST SERPL W P-5'-P-CCNC: 34 U/L (ref 0–40)
BILIRUB SERPL-MCNC: 1.1 MG/DL (ref 0–1)
BUN SERPL-MCNC: 15 MG/DL (ref 8–28)
CALCIUM SERPL-MCNC: 8.2 MG/DL (ref 8.5–10.5)
CALCIUM, IONIZED MEASURED: 1.14 MMOL/L (ref 1.11–1.3)
CHLORIDE BLD-SCNC: 111 MMOL/L (ref 98–107)
CHOLEST SERPL-MCNC: 161 MG/DL
CO2 SERPL-SCNC: 22 MMOL/L (ref 22–31)
CREAT SERPL-MCNC: 1.32 MG/DL (ref 0.7–1.3)
CREAT UR-MCNC: 195 MG/DL
FASTING STATUS PATIENT QL REPORTED: YES
GFR SERPL CREATININE-BSD FRML MDRD: 58 ML/MIN/1.73M2
GLUCOSE BLD-MCNC: 111 MG/DL (ref 70–125)
HBA1C MFR BLD: 6.2 % (ref 0–5.6)
HDLC SERPL-MCNC: 47 MG/DL
ION CA PH 7.4: 1.1 MMOL/L (ref 1.11–1.3)
LDLC SERPL CALC-MCNC: 101 MG/DL
MICROALBUMIN UR-MCNC: 8.8 MG/DL (ref 0–1.99)
MICROALBUMIN/CREAT UR: 45.1 MG/G CR
PH: 7.34 (ref 7.35–7.45)
POTASSIUM BLD-SCNC: 3.9 MMOL/L (ref 3.5–5)
PROT SERPL-MCNC: 6.7 G/DL (ref 6–8)
PSA SERPL-MCNC: 0.37 UG/L (ref 0–6.5)
PTH-INTACT SERPL-MCNC: 43 PG/ML (ref 10–86)
SODIUM SERPL-SCNC: 142 MMOL/L (ref 136–145)
TRIGL SERPL-MCNC: 63 MG/DL

## 2022-04-27 PROCEDURE — 36415 COLL VENOUS BLD VENIPUNCTURE: CPT | Performed by: FAMILY MEDICINE

## 2022-04-27 PROCEDURE — 80061 LIPID PANEL: CPT | Performed by: FAMILY MEDICINE

## 2022-04-27 PROCEDURE — 80053 COMPREHEN METABOLIC PANEL: CPT | Performed by: FAMILY MEDICINE

## 2022-04-27 PROCEDURE — 82330 ASSAY OF CALCIUM: CPT | Performed by: FAMILY MEDICINE

## 2022-04-27 PROCEDURE — 83036 HEMOGLOBIN GLYCOSYLATED A1C: CPT | Performed by: FAMILY MEDICINE

## 2022-04-27 PROCEDURE — G0103 PSA SCREENING: HCPCS | Performed by: FAMILY MEDICINE

## 2022-04-27 PROCEDURE — 99213 OFFICE O/P EST LOW 20 MIN: CPT | Performed by: FAMILY MEDICINE

## 2022-04-27 PROCEDURE — 83970 ASSAY OF PARATHORMONE: CPT | Performed by: FAMILY MEDICINE

## 2022-04-27 PROCEDURE — 82043 UR ALBUMIN QUANTITATIVE: CPT | Performed by: FAMILY MEDICINE

## 2022-04-27 RX ORDER — ACETAMINOPHEN 500 MG
1 TABLET ORAL DAILY
Status: ON HOLD | COMMUNITY
End: 2022-12-22

## 2022-04-27 RX ORDER — SPIRONOLACTONE 25 MG/1
25 TABLET ORAL DAILY
Status: ON HOLD | COMMUNITY
Start: 2022-04-11 | End: 2022-12-22

## 2022-04-27 NOTE — PROGRESS NOTES
ASSESMENT AND PLAN:  Diagnoses and all orders for this visit:  Type 2 diabetes mellitus with microalbuminuria, without long-term current use of insulin (H)  -     Lipid panel reflex to direct LDL Fasting; Future  -     Albumin Random Urine Quantitative with Creat Ratio; Future  -     HEMOGLOBIN A1C, pending results we will decide whether he needs to reinitiate additional diabetes medication.  Systolic murmur   Please see previous note for details, echo was never completed, patient reports he was never contacted.  He is meeting with our specialty  today to get that set up  Along with his follow-up with the kidney stone clinic.  Other cirrhosis of liver (H) with ascites  I did  the patient today on his most recent consultation recommendations from gastroenterology.  I did use a diagram and images to explain the TIPS procedure to him.  He will continue with his current plan with diuretics, periodic paracentesis, and follow-up with gastroenterology as directed.    Reviewed the risks and benefits of the treatment plan with the patient and/or caregiver and we discussed indications for routine and emergent follow-up.        SUBJECTIVE: 71-year-old male has noticed improvement in his fecal urgency since he has been getting regular paracentesis.  He continues to follow-up with gastroenterology.  Since last visit with me he did not get his kidney stone clinic appointment or his cardiac echo scheduled.  He is not getting any exertional chest pain.    Past Medical History:   Diagnosis Date     Diabetes mellitus (H)      Gout      Hypertension      Kidney stone      Patient Active Problem List   Diagnosis     Anemia     Hematuria     Hypercholesterolemia     Hypertension     Type 2 diabetes mellitus with microalbuminuria, without long-term current use of insulin (H)     Nephrolithiasis     Benign Adenomatous Polyp Of The Large Intestine     Latent tuberculosis - completed treatment with 9 months isoniazid in  "2008.     GI bleeding     Anemia due to blood loss, acute     Hypotension due to blood loss     Hyperkalemia     Gastrointestinal hemorrhage associated with acute gastritis     Popliteal cyst, left     Popliteal cyst, right     Cirrhosis of liver without ascites, unspecified hepatic cirrhosis type (H)     Incontinence of feces with fecal urgency     Current Outpatient Medications   Medication Sig Dispense Refill     omeprazole (PRILOSEC) 20 MG DR capsule TAKE 1 CAPSULE BY MOUTH TWICE DAILY BEFORE MEALS 180 capsule 3     pioglitazone (ACTOS) 30 MG tablet Take 1 tablet by mouth once daily 90 tablet 3     Probiotic, Lactobacillus, CAPS Take 1 capsule by mouth daily       simvastatin (ZOCOR) 20 MG tablet TAKE 1 TABLET BY MOUTH AT BEDTIME 90 tablet 3     spironolactone (ALDACTONE) 25 MG tablet Take 25 mg by mouth daily       History   Smoking Status     Never Smoker   Smokeless Tobacco     Never Used       OBJECTICE: /64 (BP Location: Right arm, Patient Position: Sitting)   Pulse 78   Temp 99  F (37.2  C) (Oral)   Resp 20   Ht 1.727 m (5' 8\")   Wt 96.6 kg (213 lb)   SpO2 99%   BMI 32.39 kg/m       No results found for this or any previous visit (from the past 24 hour(s)).     CV-regular rate and rhythm with a grade 2 out of 6 systolic murmur   RESP-clear to auscultation   ABDOMINAL-some distention, ascites present       Amrik Mejia MD   "

## 2022-04-28 ENCOUNTER — HOSPITAL ENCOUNTER (OUTPATIENT)
Dept: ULTRASOUND IMAGING | Facility: HOSPITAL | Age: 72
Discharge: HOME | End: 2022-04-28
Attending: INTERNAL MEDICINE | Admitting: RADIOLOGY
Payer: COMMERCIAL

## 2022-04-28 VITALS
HEART RATE: 80 BPM | OXYGEN SATURATION: 100 % | SYSTOLIC BLOOD PRESSURE: 121 MMHG | DIASTOLIC BLOOD PRESSURE: 61 MMHG | RESPIRATION RATE: 20 BRPM

## 2022-04-28 DIAGNOSIS — Z71.3 DIETARY COUNSELING AND SURVEILLANCE: ICD-10-CM

## 2022-04-28 DIAGNOSIS — K70.31 ALCOHOLIC CIRRHOSIS OF LIVER WITH ASCITES (H): ICD-10-CM

## 2022-04-28 PROCEDURE — 250N000011 HC RX IP 250 OP 636: Performed by: INTERNAL MEDICINE

## 2022-04-28 PROCEDURE — 272N000706 US PARACENTESIS WITH ALBUMIN

## 2022-04-28 PROCEDURE — P9047 ALBUMIN (HUMAN), 25%, 50ML: HCPCS | Performed by: INTERNAL MEDICINE

## 2022-04-28 RX ORDER — ALBUMIN (HUMAN) 12.5 G/50ML
50 SOLUTION INTRAVENOUS ONCE
Status: COMPLETED | OUTPATIENT
Start: 2022-04-28 | End: 2022-04-28

## 2022-04-28 RX ADMIN — ALBUMIN HUMAN 50 G: 0.25 SOLUTION INTRAVENOUS at 14:03

## 2022-05-02 ENCOUNTER — TRANSFERRED RECORDS (OUTPATIENT)
Dept: HEALTH INFORMATION MANAGEMENT | Facility: CLINIC | Age: 72
End: 2022-05-02
Payer: COMMERCIAL

## 2022-05-20 ENCOUNTER — HOSPITAL ENCOUNTER (OUTPATIENT)
Dept: CARDIOLOGY | Facility: HOSPITAL | Age: 72
Discharge: HOME | End: 2022-05-20
Attending: FAMILY MEDICINE | Admitting: FAMILY MEDICINE
Payer: COMMERCIAL

## 2022-05-20 DIAGNOSIS — R01.1 SYSTOLIC MURMUR: ICD-10-CM

## 2022-05-20 LAB — LVEF ECHO: NORMAL

## 2022-05-20 PROCEDURE — 93306 TTE W/DOPPLER COMPLETE: CPT

## 2022-05-20 PROCEDURE — 93306 TTE W/DOPPLER COMPLETE: CPT | Mod: 26 | Performed by: INTERNAL MEDICINE

## 2022-05-25 ENCOUNTER — HOSPITAL ENCOUNTER (OUTPATIENT)
Dept: ULTRASOUND IMAGING | Facility: HOSPITAL | Age: 72
Discharge: HOME | End: 2022-05-25
Attending: INTERNAL MEDICINE | Admitting: INTERNAL MEDICINE
Payer: COMMERCIAL

## 2022-05-25 VITALS
OXYGEN SATURATION: 100 % | SYSTOLIC BLOOD PRESSURE: 144 MMHG | DIASTOLIC BLOOD PRESSURE: 70 MMHG | HEART RATE: 77 BPM | RESPIRATION RATE: 18 BRPM

## 2022-05-25 DIAGNOSIS — K70.31 ALCOHOLIC CIRRHOSIS OF LIVER WITH ASCITES (H): ICD-10-CM

## 2022-05-25 DIAGNOSIS — Z71.3 DIETARY COUNSELING AND SURVEILLANCE: ICD-10-CM

## 2022-05-25 PROCEDURE — P9047 ALBUMIN (HUMAN), 25%, 50ML: HCPCS | Performed by: RADIOLOGY

## 2022-05-25 PROCEDURE — 272N000706 US PARACENTESIS WITH ALBUMIN

## 2022-05-25 PROCEDURE — 250N000011 HC RX IP 250 OP 636: Performed by: RADIOLOGY

## 2022-05-25 RX ORDER — ALBUMIN (HUMAN) 12.5 G/50ML
50 SOLUTION INTRAVENOUS ONCE
Status: COMPLETED | OUTPATIENT
Start: 2022-05-25 | End: 2022-05-25

## 2022-05-25 RX ADMIN — ALBUMIN HUMAN 50 G: 0.25 SOLUTION INTRAVENOUS at 14:58

## 2022-06-07 ENCOUNTER — TRANSFERRED RECORDS (OUTPATIENT)
Dept: HEALTH INFORMATION MANAGEMENT | Facility: CLINIC | Age: 72
End: 2022-06-07
Payer: COMMERCIAL

## 2022-06-07 LAB
CREATININE (EXTERNAL): 1.35 MG/DL (ref 0.76–1.27)
GFR ESTIMATED (EXTERNAL): 56 ML/MIN/1.73M2
GLUCOSE (EXTERNAL): 237 MG/DL (ref 65–99)
POTASSIUM (EXTERNAL): 3.7 MMOL/L (ref 3.5–5.2)

## 2022-06-08 ENCOUNTER — HOSPITAL ENCOUNTER (OUTPATIENT)
Dept: ULTRASOUND IMAGING | Facility: CLINIC | Age: 72
Discharge: HOME | End: 2022-06-08
Attending: INTERNAL MEDICINE | Admitting: RADIOLOGY
Payer: COMMERCIAL

## 2022-06-08 DIAGNOSIS — K70.31 ALCOHOLIC CIRRHOSIS OF LIVER WITH ASCITES (H): ICD-10-CM

## 2022-06-08 DIAGNOSIS — Z71.3 DIETARY COUNSELING AND SURVEILLANCE: ICD-10-CM

## 2022-06-08 PROCEDURE — 272N000021 US PARACENTESIS WITH ALBUMIN

## 2022-06-08 RX ORDER — ALBUMIN (HUMAN) 12.5 G/50ML
25 SOLUTION INTRAVENOUS ONCE
Status: COMPLETED | OUTPATIENT
Start: 2022-06-08 | End: 2022-06-08

## 2022-06-08 RX ADMIN — ALBUMIN (HUMAN) 25 G: 12.5 SOLUTION INTRAVENOUS at 13:25

## 2022-06-08 NOTE — PROGRESS NOTES
Patient tolerated paracentesis with albumin with no concerns expressed or observed.    4 L removed with 50 grams albumin given per order.

## 2022-08-18 ENCOUNTER — HOSPITAL ENCOUNTER (OUTPATIENT)
Dept: ULTRASOUND IMAGING | Facility: HOSPITAL | Age: 72
Discharge: HOME | End: 2022-08-18
Attending: INTERNAL MEDICINE | Admitting: INTERNAL MEDICINE
Payer: COMMERCIAL

## 2022-08-18 VITALS
DIASTOLIC BLOOD PRESSURE: 67 MMHG | SYSTOLIC BLOOD PRESSURE: 137 MMHG | OXYGEN SATURATION: 100 % | TEMPERATURE: 97.9 F | RESPIRATION RATE: 22 BRPM | HEART RATE: 74 BPM

## 2022-08-18 DIAGNOSIS — R18.8 OTHER ASCITES: ICD-10-CM

## 2022-08-18 DIAGNOSIS — K74.60 CIRRHOSIS OF LIVER WITHOUT ASCITES, UNSPECIFIED HEPATIC CIRRHOSIS TYPE (H): ICD-10-CM

## 2022-08-18 DIAGNOSIS — K74.69 OTHER CIRRHOSIS OF LIVER (H): ICD-10-CM

## 2022-08-18 DIAGNOSIS — Z71.3 DIETARY COUNSELING AND SURVEILLANCE: ICD-10-CM

## 2022-08-18 PROCEDURE — 250N000011 HC RX IP 250 OP 636: Performed by: INTERNAL MEDICINE

## 2022-08-18 PROCEDURE — 272N000710 US PARACENTESIS WITH ALBUMIN

## 2022-08-18 PROCEDURE — P9047 ALBUMIN (HUMAN), 25%, 50ML: HCPCS | Performed by: INTERNAL MEDICINE

## 2022-08-18 RX ORDER — ALBUMIN (HUMAN) 12.5 G/50ML
50 SOLUTION INTRAVENOUS ONCE
Status: COMPLETED | OUTPATIENT
Start: 2022-08-18 | End: 2022-08-18

## 2022-08-18 RX ADMIN — ALBUMIN HUMAN 50 G: 0.25 SOLUTION INTRAVENOUS at 14:31

## 2022-10-06 ENCOUNTER — TRANSFERRED RECORDS (OUTPATIENT)
Dept: HEALTH INFORMATION MANAGEMENT | Facility: CLINIC | Age: 72
End: 2022-10-06

## 2022-10-06 LAB
ALT SERPL-CCNC: 15 IU/L (ref 0–44)
AST SERPL-CCNC: 32 IU/L (ref 0–40)
CREATININE (EXTERNAL): 1.38 MG/DL (ref 0.76–1.27)
GFR ESTIMATED (EXTERNAL): 55 ML/MIN/1.73
GLUCOSE (EXTERNAL): 214 MG/DL (ref 70–99)
POTASSIUM (EXTERNAL): 4.4 MMOL/L (ref 3.5–5.2)

## 2022-10-18 DIAGNOSIS — E78.00 HYPERCHOLESTEREMIA: ICD-10-CM

## 2022-10-19 RX ORDER — SIMVASTATIN 20 MG
20 TABLET ORAL AT BEDTIME
Qty: 90 TABLET | Refills: 2 | Status: SHIPPED | OUTPATIENT
Start: 2022-10-19 | End: 2023-07-24

## 2022-10-19 NOTE — TELEPHONE ENCOUNTER
"Last Written Prescription Date:  10/4/21  Last Fill Quantity: 90,  # refills: 3   Last office visit provider:  4/27/22     Requested Prescriptions   Pending Prescriptions Disp Refills     simvastatin (ZOCOR) 20 MG tablet [Pharmacy Med Name: Simvastatin 20 MG Oral Tablet] 90 tablet 0     Sig: TAKE 1 TABLET BY MOUTH AT BEDTIME       Statins Protocol Passed - 10/19/2022  7:22 AM        Passed - LDL on file in past 12 months     Recent Labs   Lab Test 04/27/22  1121                Passed - No abnormal creatine kinase in past 12 months     No lab results found.             Passed - Recent (12 mo) or future (30 days) visit within the authorizing provider's specialty     Patient has had an office visit with the authorizing provider or a provider within the authorizing providers department within the previous 12 mos or has a future within next 30 days. See \"Patient Info\" tab in inbasket, or \"Choose Columns\" in Meds & Orders section of the refill encounter.              Passed - Medication is active on med list        Passed - Patient is age 18 or older             Juan Hernandez RN 10/19/22 7:23 AM  "

## 2022-10-24 ENCOUNTER — HOSPITAL ENCOUNTER (OUTPATIENT)
Dept: ULTRASOUND IMAGING | Facility: CLINIC | Age: 72
Discharge: HOME | End: 2022-10-24
Attending: INTERNAL MEDICINE | Admitting: PHYSICIAN ASSISTANT
Payer: COMMERCIAL

## 2022-10-24 DIAGNOSIS — K70.31 ALCOHOLIC CIRRHOSIS OF LIVER WITH ASCITES (H): ICD-10-CM

## 2022-10-24 DIAGNOSIS — Z71.3 DIETARY COUNSELING AND SURVEILLANCE: ICD-10-CM

## 2022-10-24 PROCEDURE — 272N000710 US PARACENTESIS WITH ALBUMIN

## 2022-10-24 PROCEDURE — P9047 ALBUMIN (HUMAN), 25%, 50ML: HCPCS | Performed by: PHYSICIAN ASSISTANT

## 2022-10-24 PROCEDURE — 250N000011 HC RX IP 250 OP 636: Performed by: PHYSICIAN ASSISTANT

## 2022-10-24 RX ORDER — ALBUMIN (HUMAN) 12.5 G/50ML
25-75 SOLUTION INTRAVENOUS ONCE
Status: COMPLETED | OUTPATIENT
Start: 2022-10-24 | End: 2022-10-24

## 2022-10-24 RX ADMIN — ALBUMIN HUMAN 50 G: 0.25 SOLUTION INTRAVENOUS at 09:47

## 2022-10-24 NOTE — PROGRESS NOTES
Patient tolerated paracentesis with albumin with no concerns expressed or observed.    7.0 L removed with 50 grams albumin given per order.

## 2022-10-25 DIAGNOSIS — K70.31 ALCOHOLIC CIRRHOSIS OF LIVER WITH ASCITES (H): Primary | ICD-10-CM

## 2022-11-01 ENCOUNTER — TRANSFERRED RECORDS (OUTPATIENT)
Dept: HEALTH INFORMATION MANAGEMENT | Facility: CLINIC | Age: 72
End: 2022-11-01

## 2022-11-04 ENCOUNTER — TRANSFERRED RECORDS (OUTPATIENT)
Dept: HEALTH INFORMATION MANAGEMENT | Facility: CLINIC | Age: 72
End: 2022-11-04

## 2022-11-04 DIAGNOSIS — K74.60 CIRRHOSIS OF LIVER (H): Primary | ICD-10-CM

## 2022-11-08 ENCOUNTER — MYC MEDICAL ADVICE (OUTPATIENT)
Dept: INTERVENTIONAL RADIOLOGY/VASCULAR | Facility: CLINIC | Age: 72
End: 2022-11-08

## 2022-11-09 ENCOUNTER — OFFICE VISIT (OUTPATIENT)
Dept: FAMILY MEDICINE | Facility: CLINIC | Age: 72
End: 2022-11-09
Payer: COMMERCIAL

## 2022-11-09 VITALS
TEMPERATURE: 97.6 F | BODY MASS INDEX: 29.89 KG/M2 | RESPIRATION RATE: 24 BRPM | OXYGEN SATURATION: 97 % | HEIGHT: 66 IN | DIASTOLIC BLOOD PRESSURE: 67 MMHG | SYSTOLIC BLOOD PRESSURE: 111 MMHG | HEART RATE: 80 BPM | WEIGHT: 186 LBS

## 2022-11-09 DIAGNOSIS — N18.31 CHRONIC KIDNEY DISEASE, STAGE 3A (H): ICD-10-CM

## 2022-11-09 DIAGNOSIS — E11.29 TYPE 2 DIABETES MELLITUS WITH MICROALBUMINURIA, WITHOUT LONG-TERM CURRENT USE OF INSULIN (H): ICD-10-CM

## 2022-11-09 DIAGNOSIS — R80.9 TYPE 2 DIABETES MELLITUS WITH MICROALBUMINURIA, WITHOUT LONG-TERM CURRENT USE OF INSULIN (H): ICD-10-CM

## 2022-11-09 DIAGNOSIS — K74.69 OTHER CIRRHOSIS OF LIVER (H): ICD-10-CM

## 2022-11-09 DIAGNOSIS — Z01.818 PRE-OP EXAM: Primary | ICD-10-CM

## 2022-11-09 DIAGNOSIS — K76.6 PORTAL HYPERTENSION (H): ICD-10-CM

## 2022-11-09 LAB
ALBUMIN SERPL BCG-MCNC: 3.1 G/DL (ref 3.5–5.2)
ALP SERPL-CCNC: 101 U/L (ref 40–129)
ALT SERPL W P-5'-P-CCNC: 15 U/L (ref 10–50)
ANION GAP SERPL CALCULATED.3IONS-SCNC: 10 MMOL/L (ref 7–15)
AST SERPL W P-5'-P-CCNC: 40 U/L (ref 10–50)
ATRIAL RATE - MUSE: 73 BPM
BILIRUB SERPL-MCNC: 0.8 MG/DL
BUN SERPL-MCNC: 19.5 MG/DL (ref 8–23)
CALCIUM SERPL-MCNC: 8.5 MG/DL (ref 8.8–10.2)
CHLORIDE SERPL-SCNC: 108 MMOL/L (ref 98–107)
CREAT SERPL-MCNC: 1.36 MG/DL (ref 0.67–1.17)
DEPRECATED HCO3 PLAS-SCNC: 20 MMOL/L (ref 22–29)
DIASTOLIC BLOOD PRESSURE - MUSE: NORMAL MMHG
GFR SERPL CREATININE-BSD FRML MDRD: 56 ML/MIN/1.73M2
GLUCOSE SERPL-MCNC: 124 MG/DL (ref 70–99)
HBA1C MFR BLD: 6.2 % (ref 0–5.6)
HGB BLD-MCNC: 9.1 G/DL (ref 13.3–17.7)
INTERPRETATION ECG - MUSE: NORMAL
P AXIS - MUSE: 9 DEGREES
POTASSIUM SERPL-SCNC: 3.9 MMOL/L (ref 3.4–5.3)
PR INTERVAL - MUSE: 138 MS
PROT SERPL-MCNC: 6.7 G/DL (ref 6.4–8.3)
QRS DURATION - MUSE: 88 MS
QT - MUSE: 434 MS
QTC - MUSE: 478 MS
R AXIS - MUSE: 28 DEGREES
SODIUM SERPL-SCNC: 138 MMOL/L (ref 136–145)
SYSTOLIC BLOOD PRESSURE - MUSE: NORMAL MMHG
T AXIS - MUSE: 38 DEGREES
VENTRICULAR RATE- MUSE: 73 BPM

## 2022-11-09 PROCEDURE — 36415 COLL VENOUS BLD VENIPUNCTURE: CPT | Performed by: FAMILY MEDICINE

## 2022-11-09 PROCEDURE — 93005 ELECTROCARDIOGRAM TRACING: CPT | Performed by: FAMILY MEDICINE

## 2022-11-09 PROCEDURE — 80053 COMPREHEN METABOLIC PANEL: CPT | Performed by: FAMILY MEDICINE

## 2022-11-09 PROCEDURE — 85018 HEMOGLOBIN: CPT | Performed by: FAMILY MEDICINE

## 2022-11-09 PROCEDURE — 83036 HEMOGLOBIN GLYCOSYLATED A1C: CPT | Performed by: FAMILY MEDICINE

## 2022-11-09 PROCEDURE — 93010 ELECTROCARDIOGRAM REPORT: CPT | Performed by: GENERAL ACUTE CARE HOSPITAL

## 2022-11-09 PROCEDURE — 99214 OFFICE O/P EST MOD 30 MIN: CPT | Performed by: FAMILY MEDICINE

## 2022-11-09 RX ORDER — SIMVASTATIN 20 MG
1 TABLET ORAL DAILY
COMMUNITY
Start: 2021-10-04 | End: 2022-11-09

## 2022-11-09 RX ORDER — SPIRONOLACTONE 50 MG/1
50 TABLET, FILM COATED ORAL DAILY
COMMUNITY
Start: 2022-01-25 | End: 2022-11-09

## 2022-11-09 NOTE — PROGRESS NOTES
[facility] [address] [place] [address]  Phone: [phone]  Fax: [phone]  Primary Provider: Amrik Mejia  Pre-op Performing Provider: PATIENCE DOYLE      PREOPERATIVE EVALUATION:  Today's date: 11/9/2022    Vito Sy is a 72 year old male who presents for a preoperative evaluation.    Surgical Information:  Surgery/Procedure: Stent in Liver  Surgery Location: [facility]  Surgeon: Dr. Cruzito Verma  Surgery Date: 11/16/2022  Time of Surgery: 1:00PM  Where patient plans to recover: At home with family  Fax number for surgical facility: Note does not need to be faxed, will be available electronically in Epic.    Type of Anesthesia Anticipated: General    Assessment & Plan     The proposed surgical procedure is considered INTERMEDIATE risk.    Problem List Items Addressed This Visit        Digestive    Other cirrhosis of liver (H) with ascites    Relevant Orders    Comprehensive metabolic panel (BMP + Alb, Alk Phos, ALT, AST, Total. Bili, TP) (Completed)       Endocrine    Type 2 diabetes mellitus with microalbuminuria, without long-term current use of insulin (H)    Relevant Orders    HEMOGLOBIN A1C (Completed)       Circulatory    Portal hypertension (H)       Urinary    Chronic kidney disease, stage 3a (H)    Relevant Orders    Hemoglobin (Completed)   Other Visit Diagnoses     Pre-op exam    -  Primary    Relevant Orders    EKG 12-lead, tracing only (Completed)      this is a 73 yo male with chronic liver disease - scheduled for a hepatic stent - labs, EKG ordered and reviewed. OK for procedure.     Has COVID testing scheduled for next week (11/14) at [facility].  Will be staying overnight in hospital.         Risks and Recommendations:  The patient has the following additional risks and recommendations for perioperative complications:   - No identified additional risk factors other than previously addressed    Medication Instructions:  OK to hold  "medications on morning of surgery.     RECOMMENDATION:  APPROVAL GIVEN to proceed with proposed procedure, without further diagnostic evaluation.            Subjective     HPI related to upcoming procedure: has \"scar\" in liver - has ascites - has paracentesis every 2 weeks - now will have stent in liver - hoping to reduce the fluid buildup -       Preop Questions 11/9/2022   1. Have you ever had a heart attack or stroke? No   2. Have you ever had surgery on your heart or blood vessels, such as a stent placement, a coronary artery bypass, or surgery on an artery in your head, neck, heart, or legs? No   3. Do you have chest pain with activity? No   4. Do you have a history of  heart failure? No   5. Do you currently have a cold, bronchitis or symptoms of other infection? No   6. Do you have a cough, shortness of breath, or wheezing? No   7. Do you or anyone in your family have previous history of blood clots? No   8. Do you or does anyone in your family have a serious bleeding problem such as prolonged bleeding following surgeries or cuts? No   9. Have you ever had problems with anemia or been told to take iron pills? No   10. Have you had any abnormal blood loss such as black, tarry or bloody stools? No   11. Have you ever had a blood transfusion? YES - due to GI bleed - 1-2 years ago   11a. Have you ever had a transfusion reaction? No   12. Are you willing to have a blood transfusion if it is medically needed before, during, or after your surgery? Yes   13. Have you or any of your relatives ever had problems with anesthesia? No   14. Do you have sleep apnea, excessive snoring or daytime drowsiness? No   15. Do you have any artifical heart valves or other implanted medical devices like a pacemaker, defibrillator, or continuous glucose monitor? No   16. Do you have artificial joints? No   17. Are you allergic to latex? No       Health Care Directive:  Patient does not have a Health Care Directive or Living Will: " Discussed advance care planning with patient; however, patient declined at this time.    Preoperative Review of :   reviewed - no record of controlled substances prescribed.  (last in December 2021)      Status of Chronic Conditions:  See problem list for active medical problems.  Problems all longstanding and stable, except as noted/documented.  See ROS for pertinent symptoms related to these conditions.  Patient denies blood clot although DVT is on his problem list - reviewed with patient.  Denies any anesthesia problems     Has had 5 COVID vaccines - has had seasonal flu vaccine       Review of Systems   Constitutional: Negative for chills and fever.   HENT: Negative.    Eyes: Negative for visual disturbance.   Respiratory: Negative.  Negative for cough and shortness of breath.    Gastrointestinal: Positive for abdominal distention.        Ascites     Endocrine: Negative for polydipsia and polyuria.   Genitourinary: Negative.    Musculoskeletal: Positive for arthralgias.   Skin: Negative for color change.   Allergic/Immunologic: Negative.    Neurological: Negative.    Hematological: Does not bruise/bleed easily.   Psychiatric/Behavioral: Negative.    All other systems reviewed and are negative.        Patient Active Problem List    Diagnosis Date Noted     Chronic kidney disease, stage 3a (H) 11/09/2022     Priority: Medium     Portal hypertension (H) 11/09/2022     Priority: Medium     Other cirrhosis of liver (H) with ascites 04/27/2022     Priority: Medium     Incontinence of feces with fecal urgency 01/26/2022     Priority: Medium     Type 2 diabetes mellitus with microalbuminuria, without long-term current use of insulin (H)      Priority: Medium     Created by Conversion         Popliteal cyst, left 11/17/2020     Priority: Medium     Popliteal cyst, right 11/17/2020     Priority: Medium     GI bleeding 11/16/2020     Priority: Medium     Anemia due to blood loss, acute 11/16/2020     Priority: Medium      Hypotension due to blood loss 11/16/2020     Priority: Medium     Hyperkalemia 11/16/2020     Priority: Medium     Gastrointestinal hemorrhage associated with acute gastritis 11/15/2020     Priority: Medium     Added automatically from request for surgery [id number]         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 11/14/2016     Priority: Medium     Hypertension      Priority: Medium     Created by Conversion  Replacement Utility updated for latest IMO load         Hematuria      Priority: Medium     Created by Conversion         Nephrolithiasis      Priority: Medium     Created by Conversion         Anemia      Priority: Medium     Created by Conversion         Hypercholesterolemia      Priority: Medium     Created by Conversion         Benign Adenomatous Polyp Of The Large Intestine      Priority: Medium     Created by Conversion          Past Medical History:   Diagnosis Date     Diabetes mellitus (H)      Gout      Hypertension      Kidney stone      Past Surgical History:   Procedure Laterality Date     COLONOSCOPY       ND ESOPHAGOGASTRODUODENOSCOPY TRANSORAL DIAGNOSTIC N/A 11/16/2020    Procedure: ESOPHAGOGASTRODUODENOSCOPY (EGD);  Surgeon: Juan Garnett MD;  Location: [facility];  Service: Gastroenterology     ND ESOPHAGOGASTRODUODENOSCOPY TRANSORAL DIAGNOSTIC N/A 11/18/2020    Procedure: ESOPHAGOGASTRODUODENOSCOPY (EGD) WITH BANDING;  Surgeon: Juan Garnett MD;  Location: [facility];  Service: Gastroenterology     URETEROSCOPY       Current Outpatient Medications   Medication Sig Dispense Refill     omeprazole (PRILOSEC) 20 MG DR capsule TAKE 1 CAPSULE BY MOUTH TWICE DAILY BEFORE MEALS 180 capsule 3     pioglitazone (ACTOS) 30 MG tablet Take 1 tablet by mouth once daily 90 tablet 3     Probiotic, Lactobacillus, CAPS Take 1 capsule by mouth daily       simvastatin (ZOCOR) 20 MG tablet Take 1 tablet (20 mg) by mouth At Bedtime 90 tablet 2     spironolactone  "(ALDACTONE) 25 MG tablet Take 25 mg by mouth daily         Allergies   Allergen Reactions     Penicillins Unknown     Annotation: discussed with patient, he reports he had \"itching\" with penicillin many years ago in [facility] but no hives or throat or respiratory symptoms.  he also reports having tolerated amoxicillin since coming to [place].       Sulfa Drugs         Social History     Tobacco Use     Smoking status: Never     Smokeless tobacco: Never   Substance Use Topics     Alcohol use: Yes     Comment: Alcoholic Drinks/day: once a week or every two weeks     Family History   Problem Relation Age of Onset     Heart Disease Brother      Hypertension Mother      Hypertension Father      History   Drug Use No         Objective     /67 (BP Location: Left arm, Patient Position: Sitting, Cuff Size: Adult Large)   Pulse 80   Temp 97.6  F (36.4  C) (Temporal)   Resp 24   Ht 1.676 m (5' 6\")   Wt 84.4 kg (186 lb)   SpO2 97%   BMI 30.02 kg/m      Physical Exam  Vitals reviewed.   Constitutional:       General: He is not in acute distress.     Appearance: Normal appearance.   HENT:      Head: Normocephalic.      Right Ear: Tympanic membrane, ear canal and external ear normal.      Left Ear: Tympanic membrane, ear canal and external ear normal.      Nose: Nose normal.      Mouth/Throat:      Mouth: Mucous membranes are moist.      Pharynx: No posterior oropharyngeal erythema.   Eyes:      Extraocular Movements: Extraocular movements intact.      Conjunctiva/sclera: Conjunctivae normal.      Pupils: Pupils are equal, round, and reactive to light.   Cardiovascular:      Rate and Rhythm: Normal rate and regular rhythm.      Pulses: Normal pulses.      Heart sounds: Normal heart sounds. No murmur heard.  Pulmonary:      Effort: Pulmonary effort is normal.      Breath sounds: Normal breath sounds.   Abdominal:      General: There is distension.      Palpations: Abdomen is soft. There is no mass.      Tenderness: There is no " abdominal tenderness. There is no guarding or rebound.   Musculoskeletal:         General: No deformity. Normal range of motion.      Cervical back: Normal range of motion and neck supple.   Lymphadenopathy:      Cervical: No cervical adenopathy.   Skin:     General: Skin is warm and dry.   Neurological:      General: No focal deficit present.      Mental Status: He is alert and oriented to person, place, and time.   Psychiatric:         Mood and Affect: Mood normal.           Recent Labs   Lab Test 04/27/22  1121 04/11/22  1125 02/17/22  1138 12/02/21  0911 08/02/21  1011 05/17/21  0000 11/19/20  0607   HGB  --   --   --  10.0*  --   --  9.2*   PLT  --   --   --  156  --   --  102*   INR  --  1.1 1.1 1.20*  --   --  1.23*     --   --  142  --   --  140   POTASSIUM 3.9  --   --  3.6  --   --  3.9   CR 1.32*  --   --  1.30  --    < > 1.18   A1C 6.2*  --   --   --  6.0*  --   --     < > = values in this interval not displayed.        Diagnostics:  Recent Results (from the past 168 hour(s))   EKG 12-lead, tracing only    Collection Time: 11/09/22  8:44 AM   Result Value Ref Range    Systolic Blood Pressure  mmHg    Diastolic Blood Pressure  mmHg    Ventricular Rate 73 BPM    Atrial Rate 73 BPM    NM Interval 138 ms    QRS Duration 88 ms     ms    QTc 478 ms    P Axis 9 degrees    R AXIS 28 degrees    T Axis 38 degrees    Interpretation ECG       Sinus rhythm  Normal ECG  When compared with ECG of 07-OCT-2013 21:36,  Premature ventricular complexes are no longer Present  Confirmed by NATALIA FAY MD LOC:JN ([id number]) on 11/9/2022 12:06:55 PM     HEMOGLOBIN A1C    Collection Time: 11/09/22  9:28 AM   Result Value Ref Range    Hemoglobin A1C 6.2 (H) 0.0 - 5.6 %   Comprehensive metabolic panel (BMP + Alb, Alk Phos, ALT, AST, Total. Bili, TP)    Collection Time: 11/09/22  9:28 AM   Result Value Ref Range    Sodium 138 136 - 145 mmol/L    Potassium 3.9 3.4 - 5.3 mmol/L    Chloride 108 (H) 98 - 107 mmol/L    Carbon  Dioxide (CO2) 20 (L) 22 - 29 mmol/L    Anion Gap 10 7 - 15 mmol/L    Urea Nitrogen 19.5 8.0 - 23.0 mg/dL    Creatinine 1.36 (H) 0.67 - 1.17 mg/dL    Calcium 8.5 (L) 8.8 - 10.2 mg/dL    Glucose 124 (H) 70 - 99 mg/dL    Alkaline Phosphatase 101 40 - 129 U/L    AST 40 10 - 50 U/L    ALT 15 10 - 50 U/L    Protein Total 6.7 6.4 - 8.3 g/dL    Albumin 3.1 (L) 3.5 - 5.2 g/dL    Bilirubin Total 0.8 <=1.2 mg/dL    GFR Estimate 56 (L) >60 mL/min/1.73m2   Hemoglobin    Collection Time: 11/09/22  9:28 AM   Result Value Ref Range    Hemoglobin 9.1 (L) 13.3 - 17.7 g/dL          Revised Cardiac Risk Index (RCRI):  The patient has the following serious cardiovascular risks for perioperative complications:   - No serious cardiac risks = 0 points     RCRI Interpretation: 1 point: Class II (low risk - 0.9% complication rate)           Signed Electronically by: PATIENCE DOYLE MD  Copy of this evaluation report is provided to requesting physician.

## 2022-11-13 ASSESSMENT — ENCOUNTER SYMPTOMS
RESPIRATORY NEGATIVE: 1
PSYCHIATRIC NEGATIVE: 1
BRUISES/BLEEDS EASILY: 0
COLOR CHANGE: 0
SHORTNESS OF BREATH: 0
FEVER: 0
ROS GI COMMENTS: ASCITES
ARTHRALGIAS: 1
COUGH: 0
ABDOMINAL DISTENTION: 1
ALLERGIC/IMMUNOLOGIC NEGATIVE: 1
NEUROLOGICAL NEGATIVE: 1
POLYDIPSIA: 0
CHILLS: 0

## 2022-11-14 ENCOUNTER — LAB (OUTPATIENT)
Dept: LAB | Facility: CLINIC | Age: 72
End: 2022-11-14
Payer: COMMERCIAL

## 2022-11-14 DIAGNOSIS — Z20.822 ENCOUNTER FOR LABORATORY TESTING FOR COVID-19 VIRUS: ICD-10-CM

## 2022-11-14 LAB — SARS-COV-2 RNA RESP QL NAA+PROBE: NEGATIVE

## 2022-11-14 PROCEDURE — U0005 INFEC AGEN DETEC AMPLI PROBE: HCPCS

## 2022-11-14 PROCEDURE — U0003 INFECTIOUS AGENT DETECTION BY NUCLEIC ACID (DNA OR RNA); SEVERE ACUTE RESPIRATORY SYNDROME CORONAVIRUS 2 (SARS-COV-2) (CORONAVIRUS DISEASE [COVID-19]), AMPLIFIED PROBE TECHNIQUE, MAKING USE OF HIGH THROUGHPUT TECHNOLOGIES AS DESCRIBED BY CMS-2020-01-R: HCPCS

## 2022-11-16 ENCOUNTER — HOSPITAL ENCOUNTER (OUTPATIENT)
Facility: HOSPITAL | Age: 72
Setting detail: OBSERVATION
Discharge: HOME | End: 2022-11-17
Attending: INTERNAL MEDICINE | Admitting: INTERNAL MEDICINE
Payer: COMMERCIAL

## 2022-11-16 ENCOUNTER — ANESTHESIA EVENT (OUTPATIENT)
Dept: INTERVENTIONAL RADIOLOGY/VASCULAR | Facility: HOSPITAL | Age: 72
End: 2022-11-16
Payer: COMMERCIAL

## 2022-11-16 ENCOUNTER — HOSPITAL ENCOUNTER (OUTPATIENT)
Facility: HOSPITAL | Age: 72
End: 2022-11-16
Attending: INTERNAL MEDICINE | Admitting: INTERNAL MEDICINE
Payer: COMMERCIAL

## 2022-11-16 ENCOUNTER — HOSPITAL ENCOUNTER (OUTPATIENT)
Dept: INTERVENTIONAL RADIOLOGY/VASCULAR | Facility: HOSPITAL | Age: 72
Discharge: HOME | End: 2022-11-16
Attending: INTERNAL MEDICINE | Admitting: INTERNAL MEDICINE
Payer: COMMERCIAL

## 2022-11-16 VITALS
HEART RATE: 80 BPM | OXYGEN SATURATION: 100 % | TEMPERATURE: 97.2 F | RESPIRATION RATE: 16 BRPM | DIASTOLIC BLOOD PRESSURE: 64 MMHG | WEIGHT: 186.7 LBS | SYSTOLIC BLOOD PRESSURE: 118 MMHG | BODY MASS INDEX: 30.13 KG/M2

## 2022-11-16 DIAGNOSIS — K70.31 ALCOHOLIC CIRRHOSIS OF LIVER WITH ASCITES (H): ICD-10-CM

## 2022-11-16 DIAGNOSIS — K74.60 CIRRHOSIS OF LIVER (H): ICD-10-CM

## 2022-11-16 LAB
ABO/RH(D): NORMAL
ALBUMIN SERPL BCG-MCNC: 3.1 G/DL (ref 3.5–5.2)
ALP SERPL-CCNC: 106 U/L (ref 40–129)
ALT SERPL W P-5'-P-CCNC: 20 U/L (ref 10–50)
ANION GAP SERPL CALCULATED.3IONS-SCNC: 8 MMOL/L (ref 7–15)
ANTIBODY SCREEN: NEGATIVE
AST SERPL W P-5'-P-CCNC: 33 U/L (ref 10–50)
BILIRUB DIRECT SERPL-MCNC: 0.37 MG/DL (ref 0–0.3)
BILIRUB SERPL-MCNC: 1 MG/DL
BUN SERPL-MCNC: 17 MG/DL (ref 8–23)
CALCIUM SERPL-MCNC: 8.4 MG/DL (ref 8.8–10.2)
CHLORIDE SERPL-SCNC: 105 MMOL/L (ref 98–107)
CREAT SERPL-MCNC: 1.37 MG/DL (ref 0.67–1.17)
DEPRECATED HCO3 PLAS-SCNC: 23 MMOL/L (ref 22–29)
ERYTHROCYTE [DISTWIDTH] IN BLOOD BY AUTOMATED COUNT: 16.3 % (ref 10–15)
GFR SERPL CREATININE-BSD FRML MDRD: 55 ML/MIN/1.73M2
GLUCOSE BLDC GLUCOMTR-MCNC: 100 MG/DL (ref 70–99)
GLUCOSE BLDC GLUCOMTR-MCNC: 117 MG/DL (ref 70–99)
GLUCOSE BLDC GLUCOMTR-MCNC: 161 MG/DL (ref 70–99)
GLUCOSE BLDC GLUCOMTR-MCNC: 239 MG/DL (ref 70–99)
GLUCOSE SERPL-MCNC: 116 MG/DL (ref 70–99)
HCT VFR BLD AUTO: 29.9 % (ref 40–53)
HGB BLD-MCNC: 9.2 G/DL (ref 13.3–17.7)
HGB BLD-MCNC: 9.4 G/DL (ref 13.3–17.7)
INR PPP: 1.33 (ref 0.85–1.15)
MCH RBC QN AUTO: 25.5 PG (ref 26.5–33)
MCHC RBC AUTO-ENTMCNC: 30.8 G/DL (ref 31.5–36.5)
MCV RBC AUTO: 83 FL (ref 78–100)
PLATELET # BLD AUTO: 130 10E3/UL (ref 150–450)
POTASSIUM SERPL-SCNC: 4.5 MMOL/L (ref 3.4–5.3)
PROT SERPL-MCNC: 6.5 G/DL (ref 6.4–8.3)
RBC # BLD AUTO: 3.61 10E6/UL (ref 4.4–5.9)
SODIUM SERPL-SCNC: 136 MMOL/L (ref 136–145)
SPECIMEN EXPIRATION DATE: NORMAL
WBC # BLD AUTO: 4.5 10E3/UL (ref 4–11)

## 2022-11-16 PROCEDURE — 36415 COLL VENOUS BLD VENIPUNCTURE: CPT | Performed by: RADIOLOGY

## 2022-11-16 PROCEDURE — 250N000013 HC RX MED GY IP 250 OP 250 PS 637: Performed by: INTERNAL MEDICINE

## 2022-11-16 PROCEDURE — P9045 ALBUMIN (HUMAN), 5%, 250 ML: HCPCS

## 2022-11-16 PROCEDURE — 36011 PLACE CATHETER IN VEIN: CPT

## 2022-11-16 PROCEDURE — 250N000009 HC RX 250

## 2022-11-16 PROCEDURE — 85610 PROTHROMBIN TIME: CPT | Performed by: RADIOLOGY

## 2022-11-16 PROCEDURE — 86901 BLOOD TYPING SEROLOGIC RH(D): CPT

## 2022-11-16 PROCEDURE — 82248 BILIRUBIN DIRECT: CPT | Performed by: RADIOLOGY

## 2022-11-16 PROCEDURE — 99220 PR INITIAL OBSERVATION CARE,LEVEL III: CPT | Performed by: INTERNAL MEDICINE

## 2022-11-16 PROCEDURE — C1769 GUIDE WIRE: HCPCS

## 2022-11-16 PROCEDURE — 272N000199 HC ACCESSORY CR8

## 2022-11-16 PROCEDURE — 37182 INSERT HEPATIC SHUNT (TIPS): CPT

## 2022-11-16 PROCEDURE — G0378 HOSPITAL OBSERVATION PER HR: HCPCS

## 2022-11-16 PROCEDURE — 255N000002 HC RX 255 OP 636: Performed by: RADIOLOGY

## 2022-11-16 PROCEDURE — 85018 HEMOGLOBIN: CPT | Performed by: INTERNAL MEDICINE

## 2022-11-16 PROCEDURE — 76937 US GUIDE VASCULAR ACCESS: CPT

## 2022-11-16 PROCEDURE — 272N000566 HC SHEATH CR3

## 2022-11-16 PROCEDURE — 36415 COLL VENOUS BLD VENIPUNCTURE: CPT | Performed by: INTERNAL MEDICINE

## 2022-11-16 PROCEDURE — 250N000011 HC RX IP 250 OP 636: Performed by: NURSE ANESTHETIST, CERTIFIED REGISTERED

## 2022-11-16 PROCEDURE — 272N000500 HC NEEDLE CR2

## 2022-11-16 PROCEDURE — 82962 GLUCOSE BLOOD TEST: CPT

## 2022-11-16 PROCEDURE — 250N000011 HC RX IP 250 OP 636

## 2022-11-16 PROCEDURE — 85027 COMPLETE CBC AUTOMATED: CPT | Performed by: RADIOLOGY

## 2022-11-16 PROCEDURE — 82962 GLUCOSE BLOOD TEST: CPT | Mod: 91

## 2022-11-16 PROCEDURE — 49083 ABD PARACENTESIS W/IMAGING: CPT

## 2022-11-16 PROCEDURE — 258N000003 HC RX IP 258 OP 636

## 2022-11-16 PROCEDURE — 250N000009 HC RX 250: Performed by: NURSE ANESTHETIST, CERTIFIED REGISTERED

## 2022-11-16 PROCEDURE — 86850 RBC ANTIBODY SCREEN: CPT

## 2022-11-16 RX ORDER — MULTIVITAMIN,THERAPEUTIC
1 TABLET ORAL DAILY
Status: ON HOLD | COMMUNITY
End: 2022-12-22

## 2022-11-16 RX ORDER — PROPOFOL 10 MG/ML
INJECTION, EMULSION INTRAVENOUS PRN
Status: DISCONTINUED | OUTPATIENT
Start: 2022-11-16 | End: 2022-11-16

## 2022-11-16 RX ORDER — ONDANSETRON 2 MG/ML
INJECTION INTRAMUSCULAR; INTRAVENOUS PRN
Status: DISCONTINUED | OUTPATIENT
Start: 2022-11-16 | End: 2022-11-16

## 2022-11-16 RX ORDER — CALCIUM CHLORIDE 100 MG/ML
INJECTION INTRAVENOUS; INTRAVENTRICULAR PRN
Status: DISCONTINUED | OUTPATIENT
Start: 2022-11-16 | End: 2022-11-16

## 2022-11-16 RX ORDER — DEXTROSE MONOHYDRATE 25 G/50ML
25-50 INJECTION, SOLUTION INTRAVENOUS
Status: DISCONTINUED | OUTPATIENT
Start: 2022-11-16 | End: 2022-11-17 | Stop reason: HOSPADM

## 2022-11-16 RX ORDER — AMOXICILLIN 250 MG
2 CAPSULE ORAL 2 TIMES DAILY PRN
Status: DISCONTINUED | OUTPATIENT
Start: 2022-11-16 | End: 2022-11-17 | Stop reason: HOSPADM

## 2022-11-16 RX ORDER — ACETAMINOPHEN 325 MG/1
650 TABLET ORAL EVERY 6 HOURS PRN
Status: DISCONTINUED | OUTPATIENT
Start: 2022-11-16 | End: 2022-11-17 | Stop reason: HOSPADM

## 2022-11-16 RX ORDER — EPHEDRINE SULFATE 50 MG/ML
INJECTION, SOLUTION INTRAMUSCULAR; INTRAVENOUS; SUBCUTANEOUS PRN
Status: DISCONTINUED | OUTPATIENT
Start: 2022-11-16 | End: 2022-11-16

## 2022-11-16 RX ORDER — ONDANSETRON 4 MG/1
4 TABLET, ORALLY DISINTEGRATING ORAL EVERY 6 HOURS PRN
Status: DISCONTINUED | OUTPATIENT
Start: 2022-11-16 | End: 2022-11-17 | Stop reason: HOSPADM

## 2022-11-16 RX ORDER — ONDANSETRON 2 MG/ML
4 INJECTION INTRAMUSCULAR; INTRAVENOUS EVERY 6 HOURS PRN
Status: DISCONTINUED | OUTPATIENT
Start: 2022-11-16 | End: 2022-11-16

## 2022-11-16 RX ORDER — NALOXONE HYDROCHLORIDE 0.4 MG/ML
0.4 INJECTION, SOLUTION INTRAMUSCULAR; INTRAVENOUS; SUBCUTANEOUS
Status: DISCONTINUED | OUTPATIENT
Start: 2022-11-16 | End: 2022-11-17 | Stop reason: HOSPADM

## 2022-11-16 RX ORDER — PIOGLITAZONEHYDROCHLORIDE 15 MG/1
15 TABLET ORAL DAILY
Status: DISCONTINUED | OUTPATIENT
Start: 2022-11-17 | End: 2022-11-17 | Stop reason: HOSPADM

## 2022-11-16 RX ORDER — AMOXICILLIN 250 MG
1 CAPSULE ORAL 2 TIMES DAILY PRN
Status: DISCONTINUED | OUTPATIENT
Start: 2022-11-16 | End: 2022-11-17 | Stop reason: HOSPADM

## 2022-11-16 RX ORDER — LIDOCAINE 40 MG/G
CREAM TOPICAL
Status: DISCONTINUED | OUTPATIENT
Start: 2022-11-16 | End: 2022-11-17 | Stop reason: HOSPADM

## 2022-11-16 RX ORDER — CLINDAMYCIN PHOSPHATE 900 MG/50ML
900 INJECTION, SOLUTION INTRAVENOUS
Status: COMPLETED | OUTPATIENT
Start: 2022-11-16 | End: 2022-11-16

## 2022-11-16 RX ORDER — DEXAMETHASONE SODIUM PHOSPHATE 10 MG/ML
INJECTION, SOLUTION INTRAMUSCULAR; INTRAVENOUS PRN
Status: DISCONTINUED | OUTPATIENT
Start: 2022-11-16 | End: 2022-11-16

## 2022-11-16 RX ORDER — PIOGLITAZONEHYDROCHLORIDE 15 MG/1
30 TABLET ORAL DAILY
Status: DISCONTINUED | OUTPATIENT
Start: 2022-11-16 | End: 2022-11-16

## 2022-11-16 RX ORDER — PANTOPRAZOLE SODIUM 40 MG/1
40 TABLET, DELAYED RELEASE ORAL
Status: DISCONTINUED | OUTPATIENT
Start: 2022-11-17 | End: 2022-11-17 | Stop reason: HOSPADM

## 2022-11-16 RX ORDER — NALOXONE HYDROCHLORIDE 0.4 MG/ML
0.2 INJECTION, SOLUTION INTRAMUSCULAR; INTRAVENOUS; SUBCUTANEOUS
Status: DISCONTINUED | OUTPATIENT
Start: 2022-11-16 | End: 2022-11-17 | Stop reason: HOSPADM

## 2022-11-16 RX ORDER — ONDANSETRON 4 MG/1
4 TABLET, ORALLY DISINTEGRATING ORAL EVERY 6 HOURS PRN
Status: DISCONTINUED | OUTPATIENT
Start: 2022-11-16 | End: 2022-11-16

## 2022-11-16 RX ORDER — SIMVASTATIN 10 MG
20 TABLET ORAL AT BEDTIME
Status: DISCONTINUED | OUTPATIENT
Start: 2022-11-16 | End: 2022-11-17 | Stop reason: HOSPADM

## 2022-11-16 RX ORDER — ACETAMINOPHEN 650 MG/1
650 SUPPOSITORY RECTAL EVERY 6 HOURS PRN
Status: DISCONTINUED | OUTPATIENT
Start: 2022-11-16 | End: 2022-11-17 | Stop reason: HOSPADM

## 2022-11-16 RX ORDER — LIDOCAINE HYDROCHLORIDE 10 MG/ML
INJECTION, SOLUTION INFILTRATION; PERINEURAL PRN
Status: DISCONTINUED | OUTPATIENT
Start: 2022-11-16 | End: 2022-11-16

## 2022-11-16 RX ORDER — NICOTINE POLACRILEX 4 MG
15-30 LOZENGE BUCCAL
Status: DISCONTINUED | OUTPATIENT
Start: 2022-11-16 | End: 2022-11-17 | Stop reason: HOSPADM

## 2022-11-16 RX ORDER — FENTANYL CITRATE 50 UG/ML
INJECTION, SOLUTION INTRAMUSCULAR; INTRAVENOUS PRN
Status: DISCONTINUED | OUTPATIENT
Start: 2022-11-16 | End: 2022-11-16

## 2022-11-16 RX ORDER — SPIRONOLACTONE 25 MG/1
25 TABLET ORAL DAILY
Status: DISCONTINUED | OUTPATIENT
Start: 2022-11-17 | End: 2022-11-17 | Stop reason: HOSPADM

## 2022-11-16 RX ORDER — ONDANSETRON 2 MG/ML
4 INJECTION INTRAMUSCULAR; INTRAVENOUS EVERY 6 HOURS PRN
Status: DISCONTINUED | OUTPATIENT
Start: 2022-11-16 | End: 2022-11-17 | Stop reason: HOSPADM

## 2022-11-16 RX ADMIN — FENTANYL CITRATE 100 MCG: 50 INJECTION, SOLUTION INTRAMUSCULAR; INTRAVENOUS at 13:23

## 2022-11-16 RX ADMIN — PHENYLEPHRINE HYDROCHLORIDE 100 MCG: 10 INJECTION INTRAVENOUS at 14:13

## 2022-11-16 RX ADMIN — PHENYLEPHRINE HYDROCHLORIDE 200 MCG: 10 INJECTION INTRAVENOUS at 13:26

## 2022-11-16 RX ADMIN — PHENYLEPHRINE HYDROCHLORIDE 200 MCG: 10 INJECTION INTRAVENOUS at 13:45

## 2022-11-16 RX ADMIN — ONDANSETRON 4 MG: 2 INJECTION INTRAMUSCULAR; INTRAVENOUS at 15:00

## 2022-11-16 RX ADMIN — PHENYLEPHRINE HYDROCHLORIDE 200 MCG: 10 INJECTION INTRAVENOUS at 13:31

## 2022-11-16 RX ADMIN — PROPOFOL 150 MG: 10 INJECTION, EMULSION INTRAVENOUS at 13:23

## 2022-11-16 RX ADMIN — Medication 5 MG: at 14:25

## 2022-11-16 RX ADMIN — PHENYLEPHRINE HYDROCHLORIDE 200 MCG: 10 INJECTION INTRAVENOUS at 13:49

## 2022-11-16 RX ADMIN — PHENYLEPHRINE HYDROCHLORIDE 100 MCG: 10 INJECTION INTRAVENOUS at 14:25

## 2022-11-16 RX ADMIN — CALCIUM CHLORIDE 300 MG: 100 INJECTION, SOLUTION INTRAVENOUS at 13:55

## 2022-11-16 RX ADMIN — Medication 5 MG: at 14:39

## 2022-11-16 RX ADMIN — PHENYLEPHRINE HYDROCHLORIDE 100 MCG: 10 INJECTION INTRAVENOUS at 14:31

## 2022-11-16 RX ADMIN — LIDOCAINE HYDROCHLORIDE 5 ML: 10 INJECTION, SOLUTION INFILTRATION; PERINEURAL at 13:23

## 2022-11-16 RX ADMIN — ROCURONIUM BROMIDE 40 MG: 50 INJECTION, SOLUTION INTRAVENOUS at 13:24

## 2022-11-16 RX ADMIN — IOHEXOL 50 ML: 350 INJECTION, SOLUTION INTRAVENOUS at 15:11

## 2022-11-16 RX ADMIN — CLINDAMYCIN PHOSPHATE 900 MG: 900 INJECTION, SOLUTION INTRAVENOUS at 13:17

## 2022-11-16 RX ADMIN — PHENYLEPHRINE HYDROCHLORIDE 200 MCG: 10 INJECTION INTRAVENOUS at 13:38

## 2022-11-16 RX ADMIN — Medication 5 MG: at 14:07

## 2022-11-16 RX ADMIN — PHENYLEPHRINE HYDROCHLORIDE 200 MCG: 10 INJECTION INTRAVENOUS at 13:55

## 2022-11-16 RX ADMIN — ALBUMIN HUMAN: 0.05 INJECTION, SOLUTION INTRAVENOUS at 14:00

## 2022-11-16 RX ADMIN — ALBUMIN HUMAN: 0.05 INJECTION, SOLUTION INTRAVENOUS at 13:47

## 2022-11-16 RX ADMIN — PHENYLEPHRINE HYDROCHLORIDE 200 MCG: 10 INJECTION INTRAVENOUS at 14:06

## 2022-11-16 RX ADMIN — CALCIUM CHLORIDE 300 MG: 100 INJECTION, SOLUTION INTRAVENOUS at 14:34

## 2022-11-16 RX ADMIN — PHENYLEPHRINE HYDROCHLORIDE 100 MCG: 10 INJECTION INTRAVENOUS at 14:39

## 2022-11-16 RX ADMIN — DEXAMETHASONE SODIUM PHOSPHATE 5 MG: 10 INJECTION, SOLUTION INTRAMUSCULAR; INTRAVENOUS at 14:06

## 2022-11-16 RX ADMIN — SUGAMMADEX 200 MG: 100 INJECTION, SOLUTION INTRAVENOUS at 15:12

## 2022-11-16 RX ADMIN — PHENYLEPHRINE HYDROCHLORIDE 0.4 MCG/KG/MIN: 10 INJECTION INTRAVENOUS at 14:50

## 2022-11-16 RX ADMIN — CALCIUM CHLORIDE 200 MG: 100 INJECTION, SOLUTION INTRAVENOUS at 13:52

## 2022-11-16 RX ADMIN — Medication 5 MG: at 14:13

## 2022-11-16 RX ADMIN — ROCURONIUM BROMIDE 20 MG: 50 INJECTION, SOLUTION INTRAVENOUS at 14:32

## 2022-11-16 RX ADMIN — Medication 5 MG: at 14:31

## 2022-11-16 RX ADMIN — CALCIUM CHLORIDE 200 MG: 100 INJECTION, SOLUTION INTRAVENOUS at 14:20

## 2022-11-16 RX ADMIN — SIMVASTATIN 20 MG: 10 TABLET, FILM COATED ORAL at 20:46

## 2022-11-16 ASSESSMENT — ACTIVITIES OF DAILY LIVING (ADL)
ADLS_ACUITY_SCORE: 31

## 2022-11-16 ASSESSMENT — COLUMBIA-SUICIDE SEVERITY RATING SCALE - C-SSRS
1. IN THE PAST MONTH, HAVE YOU WISHED YOU WERE DEAD OR WISHED YOU COULD GO TO SLEEP AND NOT WAKE UP?: NO
4. HAVE YOU HAD THESE THOUGHTS AND HAD SOME INTENTION OF ACTING ON THEM?: NO
2. HAVE YOU ACTUALLY HAD ANY THOUGHTS OF KILLING YOURSELF IN THE PAST MONTH?: NO
5. HAVE YOU STARTED TO WORK OUT OR WORKED OUT THE DETAILS OF HOW TO KILL YOURSELF? DO YOU INTEND TO CARRY OUT THIS PLAN?: NO
6. HAVE YOU EVER DONE ANYTHING, STARTED TO DO ANYTHING, OR PREPARED TO DO ANYTHING TO END YOUR LIFE?: NO
3. HAVE YOU BEEN THINKING ABOUT HOW YOU MIGHT KILL YOURSELF?: NO

## 2022-11-16 NOTE — ANESTHESIA PREPROCEDURE EVALUATION
"Anesthesia Pre-Procedure Evaluation    Patient: Vito Sy   MRN: [id number] : 1950        Procedure : * No procedures listed *  IR TRANSVEN INTRAHEPATIC PORTOSYST SHUNT       Past Medical History:   Diagnosis Date     Diabetes mellitus (H)      Gout      Hypertension      Kidney stone       Past Surgical History:   Procedure Laterality Date     COLONOSCOPY       HI ESOPHAGOGASTRODUODENOSCOPY TRANSORAL DIAGNOSTIC N/A 2020    Procedure: ESOPHAGOGASTRODUODENOSCOPY (EGD);  Surgeon: Juan Garnett MD;  Location: [facility];  Service: Gastroenterology     HI ESOPHAGOGASTRODUODENOSCOPY TRANSORAL DIAGNOSTIC N/A 2020    Procedure: ESOPHAGOGASTRODUODENOSCOPY (EGD) WITH BANDING;  Surgeon: Juan Garnett MD;  Location: [facility];  Service: Gastroenterology     URETEROSCOPY        Allergies   Allergen Reactions     Penicillins Unknown     Annotation: discussed with patient, he reports he had \"itching\" with penicillin many years ago in [facility] but no hives or throat or respiratory symptoms.  he also reports having tolerated amoxicillin since coming to [place].       Sulfa Drugs       Social History     Tobacco Use     Smoking status: Never     Smokeless tobacco: Never   Substance Use Topics     Alcohol use: Yes     Comment: Alcoholic Drinks/day: once a week or every two weeks      Wt Readings from Last 1 Encounters:   22 84.7 kg (186 lb 11.2 oz)        Anesthesia Evaluation   Pt has had prior anesthetic.         ROS/MED HX  ENT/Pulmonary:  - neg pulmonary ROS     Neurologic:  - neg neurologic ROS     Cardiovascular:     (+) Dyslipidemia hypertension-----    METS/Exercise Tolerance:     Hematologic:  - neg hematologic  ROS     Musculoskeletal:  - neg musculoskeletal ROS     GI/Hepatic:     (+) liver disease,     Renal/Genitourinary:     (+) renal disease, type: CRI,     Endo:     (+) type II DM,     Psychiatric/Substance Use:  - neg psychiatric ROS     Infectious Disease:  - neg infectious disease " ROS     Malignancy:  - neg malignancy ROS     Other:            Physical Exam    Airway  airway exam normal      Mallampati: I   TM distance: > 3 FB   Neck ROM: full   Mouth opening: > 3 cm    Respiratory Devices and Support         Dental  no notable dental history         Cardiovascular   cardiovascular exam normal       Rhythm and rate: regular and normal     Pulmonary   pulmonary exam normal        breath sounds clear to auscultation           OUTSIDE LABS:  CBC:   Lab Results   Component Value Date    WBC 4.5 11/16/2022    WBC 6.1 12/02/2021    HGB 9.2 (L) 11/16/2022    HGB 9.1 (L) 11/09/2022    HCT 29.9 (L) 11/16/2022    HCT 31.8 (L) 12/02/2021     (L) 11/16/2022     12/02/2021     BMP:   Lab Results   Component Value Date     11/16/2022     11/09/2022    POTASSIUM 4.5 11/16/2022    POTASSIUM 3.9 11/09/2022    CHLORIDE 105 11/16/2022    CHLORIDE 108 (H) 11/09/2022    CO2 23 11/16/2022    CO2 20 (L) 11/09/2022    BUN 17.0 11/16/2022    BUN 19.5 11/09/2022    CR 1.37 (H) 11/16/2022    CR 1.36 (H) 11/09/2022     (H) 11/16/2022     (H) 11/16/2022     COAGS:   Lab Results   Component Value Date    INR 1.33 (H) 11/16/2022     POC: No results found for: BGM, HCG, HCGS  HEPATIC:   Lab Results   Component Value Date    ALBUMIN 3.1 (L) 11/16/2022    PROTTOTAL 6.5 11/16/2022    ALT 20 11/16/2022    AST 33 11/16/2022    ALKPHOS 106 11/16/2022    BILITOTAL 1.0 11/16/2022     OTHER:   Lab Results   Component Value Date    PH 7.34 (L) 04/27/2022    A1C 6.2 (H) 11/09/2022    SILVIA 8.4 (L) 11/16/2022    MAG 1.8 11/19/2020       Anesthesia Plan    ASA Status:  3   NPO Status:  NPO Appropriate    Anesthesia Type: General.     - Airway: ETT   Induction: Intravenous.   Maintenance: Inhalation.        Consents    Anesthesia Plan(s) and associated risks, benefits, and realistic alternatives discussed. Questions answered and patient/representative(s) expressed understanding.    - Discussed:     -  Discussed with:  Patient      - Extended Intubation/Ventilatory Support Discussed: No.      - Patient is DNR/DNI Status: No    Use of blood products discussed: No .     Postoperative Care       PONV prophylaxis: Ondansetron (or other 5HT-3), Dexamethasone or Solumedrol     Comments:                Juan Weiss MD

## 2022-11-16 NOTE — CONSULTS
[facility] - INTERVENT[facility]AL [facility]OLOG[facility]IN[facility]  NEW CONSULTATION:  30 MINUTES  10/25/2022 8:42 AM    REASON FOR CONSULTATION: Consideration of TIPS in the setting of decompensated anechoic cirrhosis.    REFERRING CLINICIAN: Barber Montenegro MD    HISTORY OF PRESENT ILLNESS:  Mr. Sy is a very pleasant 71-year-old gentleman being seen today in consultation for potential TIPS.    The patient has a long-standing history of progressive liver disease secondary to alcohol dependence in the past as well as probable hepatic steatosis. He recently underwent upper endoscopy demonstrating small esophageal varices that did not require intervention. He has no significant history of bleeding esophageal varices. He does have significant intra-abdominal ascites which causes him much distress. He is undergoing large volume paracentesis every 3-8 weeks. These have been becoming more frequent. His most recent paracentesis was on 10/24/2022 with removal of 7 L of fluid. In addition to the abdominal discomfort as the fluid accumulates he has loss of appetite as well as significant lower extremity swelling. No history of hepatic encephalopathy.    PERTINENT PAST MEDICAL HISTORY:  Diabetes mellitus  Gout  Hypertension  Nephrolithiasis  Latent tuberculosis-completed treatment with 9 months of Isoniazid in 2008      PERTINENT SOCIAL HISTORY:  The patient no longer drinks alcohol.    PERTINENT MEDICATIONS:  Prilosec 20 mg daily  Zocor 20 mg daily  Aldactone 25 mg daily      All current medications, including prescriptions, over-the-counters, herbals and nutritional supplements were reviewed/verified with the patient.  Current medications pertinent to the reason for consultation as above.    PERTINENT LABORATORY VALUES:  None recent.    PERTINENT MEDICAL IMAGING:  The contrast-enhanced CT from 11/18/2020 is personally reviewed.    This demonstrates atrophic and nodular contour of the liver in keeping with cirrhosis. The portal vein is patent  as are the hepatic veins. There is a fairly acute angulation involving the expected transhepatic pathway between the right hepatic and right portal vein. Mild varicosities are noted.      PHYSICAL EXAMINATION:  General: No acute distress.  Alert and oriented.  Vital Signs: Pulse 71 bpm, /56 mmHg, respirations 12, oxygen saturation 100%.     ASSESSMENT/PLAN:  71-year-old gentleman with decompensated cirrhosis.    I had an excellent discussion with  today. His intra-abdominal ascites has become more bothersome recently and he is requiring more frequent paracenteses. This is now approximately every 2 weeks with removal of 5-7 L of fluid. As the fluid accumulates it becomes quite bothersome and severely limits the patient's lifestyle. He notes that in addition to the abdominal discomfort he developed bilateral lower extremity edema. No history of hepatic encephalopathy.    In reviewing the patient's imaging it does appear that a TIPS would be possible. This likely would significantly improve his symptoms.    The TIPS procedure including risks, benefits and alternatives were discussed in detail. Potential complications were discussed including the most likely complications hepatic encephalopathy. I believe that this to be unlikely to develop in his particular case.    At this time the patient would like to proceed with TIPS placement. He would like this performed at [facility] if possible. We would expect to perform the procedure with general anesthesia assistance. A paracentesis will also be performed at the time of the procedure to minimize potential risk of extracapsular hepatic bleeding. On the day of the procedure I will also obtain new laboratory values to establish a MELD score. These will include creatinine, total bilirubin, INR and sodium. The patient is not on dialysis. Based on remote labs his MELD score should be favorable. We would anticipate a standard post TIPS overnight hospital  observation admission.    Total time spent on the date of encounter is 30 minutes, including time spent counseling the patient, performing a medically appropriate evaluation, reviewing prior medical history, ordering medications and tests, documenting clinical information in the medical record, and communication of results.    Thank you for allowing us to participate in the care of your patient.  Please do not hesitate to contact us with any additional questions.        Cruzito Verma MD  Vascular and Interventional Radiology Attending  [facility]  [phone]

## 2022-11-16 NOTE — ANESTHESIA POSTPROCEDURE EVALUATION
Patient: Vito Sy    Procedure: * No procedures listed *  IR TRANSVEN INTRAHEPATIC PORTOSYST SHUNT    Anesthesia Type:  General    Note:  Disposition: Inpatient   Postop Pain Control: Uneventful            Sign Out: Well controlled pain   PONV: No   Neuro/Psych: Uneventful            Sign Out: Acceptable/Baseline neuro status   Airway/Respiratory: Uneventful            Sign Out: Acceptable/Baseline resp. status   CV/Hemodynamics: Uneventful            Sign Out: Acceptable CV status; No obvious hypovolemia; No obvious fluid overload   Other NRE: NONE   DID A NON-ROUTINE EVENT OCCUR? No           Last vitals:  Vitals:    11/16/22 1545 11/16/22 1600 11/16/22 1615   BP: 112/61 111/58 118/64   Pulse: 75 75 80   Resp: 16 16 16   Temp:      SpO2: 99% 98% 100%       Electronically Signed By: Juan Weiss MD  November 16, 2022  4:43 PM

## 2022-11-16 NOTE — ANESTHESIA CARE TRANSFER NOTE
Patient: Vito Sy    Procedure: * No procedures listed *  IR TRANSVEN INTRAHEPATIC PORTOSYST SHUNT    Diagnosis: * No pre-op diagnosis entered *  Diagnosis Additional Information: No value filed.    Anesthesia Type:   General     Note:    Oropharynx: oropharynx clear of all foreign objects and spontaneously breathing  Level of Consciousness: awake  Oxygen Supplementation: face mask  Level of Supplemental Oxygen (L/min / FiO2): 8  Independent Airway: airway patency satisfactory and stable  Dentition: dentition unchanged  Vital Signs Stable: post-procedure vital signs reviewed and stable  Report to RN Given: handoff report given  Patient transferred to: PACU    Handoff Report: Identifed the Patient, Identified the Reponsible Provider, Reviewed the pertinent medical history, Discussed the surgical course, Reviewed Intra-OP anesthesia mangement and issues during anesthesia, Set expectations for post-procedure period and Allowed opportunity for questions and acknowledgement of understanding      Vitals:  Vitals Value Taken Time   /64 11/16/22 1533   Temp 36.2  C (97.2  F) 11/16/22 1533   Pulse 75 11/16/22 1541   Resp 17 11/16/22 1541   SpO2 99 % 11/16/22 1541   Vitals shown include unvalidated device data.    Electronically Signed By: PEDRO León CRNA  November 16, 2022  3:43 PM

## 2022-11-16 NOTE — PROGRESS NOTES
Interventional Radiology - Pre-Procedure Note:  11/15/2022    Procedure Requested: TIPS Procedure  Requested by: Cruzito Verma MD    History and Physical Reviewed: H&P documented within 30 days (by Regi Ambrose MD on 11/09/2022). I have personally reviewed the patient's medical history and have updated the medical record as necessary.    Brief HPI: Vito Sy is a 72 year old male with a PMH of DM II, gout, HTN, kidney stone, CKD III, ETOH, decompensated anechoic cirrhosis, and portal HTN who presents for a TIPS procedure. He recently underwent upper endoscopy which demonstrated small esophageal varices that did not require intervention. Has no significant history of bleeding esophageal varices. He does have significant ascites, and is undergoing large volume paracentesis every 3-8 weeks. He has been following with Dr. Montenegro with [facility] and has been taking diuretics in addition to his paracenteses for ascites, but this has not been sufficient to manage his fluid status. Most recent paracentesis 10/24/2022, see imaging below. He has also had a loss of appetite and significant LE swelling. He no longer drinks alcohol. Referred to IR for TIPS procedure.     IMAGING:  US Abdomen Limited 02/27/2022  EXAM: ULTRASOUND ABDOMEN LIMITED  LOCATION: [facility]  DATE/TIME: 02/27/2022, 12:05 PM     INDICATION: Alcoholic cirrhosis.  COMPARISON: 11/07/2021.  TECHNIQUE: Limited abdominal ultrasound.     FINDINGS:     GALLBLADDER: Multiple gallstones in the gallbladder.     BILE DUCTS: No biliary dilatation. The common duct measures 7 mm.     LIVER: Coarsened echotexture, suggesting underlying fibrosis. Nodular contour. No focal mass.     RIGHT KIDNEY: No hydronephrosis.     PANCREAS: The visualized portions are normal.     Moderate ascites.                                                                      IMPRESSION:  1.  Cirrhotic appearance of the liver.  2.  Moderate ascites.  3.  " No hepatic mass.     US LI-RADS Category for high risk liver surveillance: US-1 Negative or definitely benign observation.    NPO: Midnight  ANTICOAGULANTS: None  ANTIBIOTICS: Clindamycin 900 mg    MELD-Na Score: 15, <2% 90 day mortality estimated    ALLERGIES  Allergies   Allergen Reactions     Penicillins Unknown     Annotation: discussed with patient, he reports he had \"itching\" with penicillin many years ago in [facility] but no hives or throat or respiratory symptoms.  he also reports having tolerated amoxicillin since coming to [place].       Sulfa Drugs      LABS:  INR   Date Value Ref Range Status   11/16/2022 1.33 (H) 0.85 - 1.15 Final     INR (External)   Date Value Ref Range Status   04/11/2022 1.1 0.9 - 1.2 Final      Hemoglobin   Date Value Ref Range Status   11/16/2022 9.2 (L) 13.3 - 17.7 g/dL Final     Platelet Count   Date Value Ref Range Status   11/16/2022 130 (L) 150 - 450 10e3/uL Final     Last Comprehensive Metabolic Panel:  Sodium   Date Value Ref Range Status   11/16/2022 136 136 - 145 mmol/L Final     Potassium   Date Value Ref Range Status   11/16/2022 4.5 3.4 - 5.3 mmol/L Final   04/27/2022 3.9 3.5 - 5.0 mmol/L Final     Chloride   Date Value Ref Range Status   11/16/2022 105 98 - 107 mmol/L Final   04/27/2022 111 (H) 98 - 107 mmol/L Final     Carbon Dioxide (CO2)   Date Value Ref Range Status   11/16/2022 23 22 - 29 mmol/L Final   04/27/2022 22 22 - 31 mmol/L Final     Anion Gap   Date Value Ref Range Status   11/16/2022 8 7 - 15 mmol/L Final   04/27/2022 9 5 - 18 mmol/L Final     Glucose   Date Value Ref Range Status   11/16/2022 116 (H) 70 - 99 mg/dL Final   04/27/2022 111 70 - 125 mg/dL Final     GLUCOSE BY METER POCT   Date Value Ref Range Status   11/16/2022 100 (H) 70 - 99 mg/dL Final     Urea Nitrogen   Date Value Ref Range Status   11/16/2022 17.0 8.0 - 23.0 mg/dL Final   04/27/2022 15 8 - 28 mg/dL Final     Creatinine   Date Value Ref Range Status   11/16/2022 1.37 (H) 0.67 - 1.17 mg/dL " Final     GFR Estimate   Date Value Ref Range Status   11/16/2022 55 (L) >60 mL/min/1.73m2 Final     Comment:     Effective December 21, 2021 eGFRcr in adults is calculated using the 2021 CKD-EPI creatinine equation which includes age and gender (Rodrigo et al., NEJ, DOI: 10.1056/HWRNjh4671499)   05/17/2021 52 >60 ml/min/1.73m2 Final   11/19/2020 >60 >60 mL/min/1.73m2 Final     Calcium   Date Value Ref Range Status   11/16/2022 8.4 (L) 8.8 - 10.2 mg/dL Final     Liver Function Studies - Recent Labs   Lab Test 11/16/22  1154   PROTTOTAL 6.5   ALBUMIN 3.1*   BILITOTAL 1.0   ALKPHOS 106   AST 33   ALT 20     EXAM:  /57 (BP Location: Left arm)   Pulse 76   Temp 98  F (36.7  C) (Oral)   Resp 16   Wt 84.7 kg (186 lb 11.2 oz)   SpO2 100%   BMI 30.13 kg/m    General:  Stable.  In no acute distress.    Neuro:  A&O x 3. Moves all extremities equally.  Resp:  Lungs clear to auscultation bilaterally.  Cardio:  S1S2 and reg, without clicks or rubs, murmur noted.  Skin:  Without excoriations, ecchymosis, erythema, lesions or open sores.    Code Status: FULL CODE    ASSESSMENT/PLAN:   TIPS procedure with general anesthesia. Paracentesis also to be performed at this time. Patient to be admitted overnight with hospitalist consult following procedure.    Procedure, risks/benefits, details, alternatives, and sedation reviewed with patient/family and both verbalized understanding. All questions answered. OK to proceed with above radiology procedure.     YOSELIN MCKEON APRN CNP  Interventional Radiology

## 2022-11-16 NOTE — ANESTHESIA PROCEDURE NOTES
Airway       Patient location: IR.       Procedure Start/Stop Times: 11/16/2022 1:27 PM  Staff -        CRNA: Raul Bello APRN CRNA       Performed By: CRNA  Consent for Airway        Urgency: elective  Indications and Patient Condition       Indications for airway management: mervat-procedural       Induction type:intravenous       Mask difficulty assessment: 1 - vent by mask    Final Airway Details       Final airway type: endotracheal airway       Successful airway: ETT - single and Oral  Endotracheal Airway Details        ETT size (mm): 8.0       Cuffed: yes       Successful intubation technique: video laryngoscopy       VL Blade Size: Glidescope 4       Grade View of Cords: 1       Adjucts: stylet       Position: Left       Measured from: lips       Secured at (cm): 23       Bite block used: None    Post intubation assessment        Placement verified by: capnometry, equal breath sounds and chest rise        Number of attempts at approach: 1       Number of other approaches attempted: 0       Secured with: silk tape       Ease of procedure: easy       Dentition: Intact and Unchanged    Medication(s) Administered   Medication Administration Time: 11/16/2022 1:27 PM

## 2022-11-17 ENCOUNTER — APPOINTMENT (OUTPATIENT)
Dept: ULTRASOUND IMAGING | Facility: HOSPITAL | Age: 72
End: 2022-11-17
Attending: INTERNAL MEDICINE
Payer: COMMERCIAL

## 2022-11-17 VITALS
SYSTOLIC BLOOD PRESSURE: 99 MMHG | DIASTOLIC BLOOD PRESSURE: 57 MMHG | RESPIRATION RATE: 18 BRPM | OXYGEN SATURATION: 100 % | TEMPERATURE: 97.8 F | HEART RATE: 65 BPM

## 2022-11-17 DIAGNOSIS — K74.60 CIRRHOSIS OF LIVER (H): Primary | ICD-10-CM

## 2022-11-17 LAB
ANION GAP SERPL CALCULATED.3IONS-SCNC: 9 MMOL/L (ref 7–15)
BUN SERPL-MCNC: 17.6 MG/DL (ref 8–23)
CALCIUM SERPL-MCNC: 8.8 MG/DL (ref 8.8–10.2)
CHLORIDE SERPL-SCNC: 106 MMOL/L (ref 98–107)
CREAT SERPL-MCNC: 1.35 MG/DL (ref 0.67–1.17)
DEPRECATED HCO3 PLAS-SCNC: 21 MMOL/L (ref 22–29)
ERYTHROCYTE [DISTWIDTH] IN BLOOD BY AUTOMATED COUNT: 16 % (ref 10–15)
GFR SERPL CREATININE-BSD FRML MDRD: 56 ML/MIN/1.73M2
GLUCOSE BLDC GLUCOMTR-MCNC: 132 MG/DL (ref 70–99)
GLUCOSE BLDC GLUCOMTR-MCNC: 186 MG/DL (ref 70–99)
GLUCOSE SERPL-MCNC: 151 MG/DL (ref 70–99)
HCT VFR BLD AUTO: 27.4 % (ref 40–53)
HGB BLD-MCNC: 8.6 G/DL (ref 13.3–17.7)
HGB BLD-MCNC: 8.8 G/DL (ref 13.3–17.7)
MCH RBC QN AUTO: 25.9 PG (ref 26.5–33)
MCHC RBC AUTO-ENTMCNC: 31.4 G/DL (ref 31.5–36.5)
MCV RBC AUTO: 83 FL (ref 78–100)
PLATELET # BLD AUTO: 119 10E3/UL (ref 150–450)
POTASSIUM SERPL-SCNC: 4.9 MMOL/L (ref 3.4–5.3)
RBC # BLD AUTO: 3.32 10E6/UL (ref 4.4–5.9)
SODIUM SERPL-SCNC: 136 MMOL/L (ref 136–145)
WBC # BLD AUTO: 5.1 10E3/UL (ref 4–11)

## 2022-11-17 PROCEDURE — 80048 BASIC METABOLIC PNL TOTAL CA: CPT | Performed by: INTERNAL MEDICINE

## 2022-11-17 PROCEDURE — 82962 GLUCOSE BLOOD TEST: CPT

## 2022-11-17 PROCEDURE — 85018 HEMOGLOBIN: CPT | Performed by: INTERNAL MEDICINE

## 2022-11-17 PROCEDURE — 36415 COLL VENOUS BLD VENIPUNCTURE: CPT | Performed by: INTERNAL MEDICINE

## 2022-11-17 PROCEDURE — 250N000013 HC RX MED GY IP 250 OP 250 PS 637: Performed by: INTERNAL MEDICINE

## 2022-11-17 PROCEDURE — G0378 HOSPITAL OBSERVATION PER HR: HCPCS

## 2022-11-17 PROCEDURE — 76705 ECHO EXAM OF ABDOMEN: CPT

## 2022-11-17 RX ADMIN — PANTOPRAZOLE SODIUM 40 MG: 40 TABLET, DELAYED RELEASE ORAL at 06:37

## 2022-11-17 RX ADMIN — PIOGLITAZONE HYDROCHLORIDE 15 MG: 15 TABLET ORAL at 09:15

## 2022-11-17 RX ADMIN — SPIRONOLACTONE 25 MG: 25 TABLET, FILM COATED ORAL at 09:15

## 2022-11-17 ASSESSMENT — ACTIVITIES OF DAILY LIVING (ADL)
ADLS_ACUITY_SCORE: 31
ADLS_ACUITY_SCORE: 33
ADLS_ACUITY_SCORE: 31
DEPENDENT_IADLS:: INDEPENDENT
ADLS_ACUITY_SCORE: 33

## 2022-11-17 NOTE — PLAN OF CARE
"PRIMARY DIAGNOSIS: \"GENERIC\" NURSING  OUTPATIENT/OBSERVATION GOALS TO BE MET BEFORE DISCHARGE:  ADLs back to baseline: Yes    Activity and level of assistance: Ambulating independently.    Pain status: Pain free.    Return to near baseline physical activity: Yes     Discharge Planner Nurse   Safe discharge environment identified: Yes  Barriers to discharge: No       Entered by: Torri Hill RN 11/17/2022 12:19 PM     Please review provider order for any additional goals.   Nurse to notify provider when observation goals have been met and patient is ready for discharge.Goal Outcome Evaluation:                        "

## 2022-11-17 NOTE — PLAN OF CARE
Goal Outcome Evaluation:              PRIMARY DIAGNOSIS: ALCOHOLIC CIRRHOSIS WITH ASCITES POST FAILED TIPS PROCEDURE.      OUTPATIENT/OBSERVATION GOALS TO BE MET BEFORE DISCHARGE  1. Pain Status: Pain free.     2. Tolerating adequate PO diet: No . PT NPO      3. Surgical Intervention planned: no, pt NPO for IR to see.    4. Cleared by consultants (if involved): No     5. Return to near baseline physical activity: Yes     Discharge Planner Nurse   Safe discharge environment identified: Yes  Barriers to discharge: Yes         Abdominal US complete. hgb now 8.6 will monitor.

## 2022-11-17 NOTE — SIGNIFICANT EVENT
"Significant Event Note    Time of event: 6:48 PM November 16, 2022    Description of event:  I received message through [employer] by nurseShraddha stating \"can you please put in orders for patient's home medications, blood sugar checks, etc\".   I  messaged her stating are you sure I have 425, as I knew I didn't have any patient on that room.  Meantime I logged into computer just to find who is primary hospitalist but I didn't see any patient on room 425.    I received message again from nurse stating \" he supposed to discharge after a procedure today but was admitted this evening. I only have your name attached as an attending MD\".     I then called nurseShraddha and told her there is no patient in room 425, she reported patient just came from procedure. Nurse was not sure what kind of procedure and who did the procedure.    I requested nurse to call [facility] hospitalist and I also personally discussed with Dr. Dubose for patient's safety and to provide proper care even though, I didn't do procedure and never involved in his care before.    I also removed my name as Admitting and Attending MD from patient as someone inadvertently put my name.   I will talk to my Medical Director tomorrow on 11/17 about the event.    Glenn LORENZO MD    "

## 2022-11-17 NOTE — PHARMACY-ADMISSION MEDICATION HISTORY
Pharmacy Note - Admission Medication History    Pertinent Provider Information:      ______________________________________________________________________    Prior To Admission (PTA) med list completed and updated in EMR.       PTA Med List   Medication Sig Last Dose     multivitamin, therapeutic (THERA-VIT) TABS tablet Take 1 tablet by mouth daily Unknown     omeprazole (PRILOSEC) 20 MG DR capsule TAKE 1 CAPSULE BY MOUTH TWICE DAILY BEFORE MEALS 11/16/2022 at x1     pioglitazone (ACTOS) 30 MG tablet Take 1 tablet by mouth once daily (Patient taking differently: Take 15 mg by mouth daily) 11/16/2022     Probiotic, Lactobacillus, CAPS Take 1 capsule by mouth daily 11/16/2022     simvastatin (ZOCOR) 20 MG tablet Take 1 tablet (20 mg) by mouth At Bedtime 11/15/2022     spironolactone (ALDACTONE) 25 MG tablet Take 25 mg by mouth daily 11/16/2022       Information source(s): [facility] CareEverywhere[facility]  Method of interview communication: phone    Summary of Changes to PTA Med List  New: multivitamin  Discontinued: n/a  Changed: pioglitazone    Patient was asked about OTC/herbal products specifically.  PTA med list reflects this.    Allergies were reviewed, assessed, and updated with the patient.      Patient does not use any multi-dose medications prior to admission.    The information provided in this note is only as accurate as the sources available at the time of the update(s).    Thank you for the opportunity to participate in the care of this patient.    Kirstie Nath [facility]  11/16/2022 8:19 PM

## 2022-11-17 NOTE — PLAN OF CARE
Problem: Hyperglycemia  Goal: Blood Glucose Level Within Targeted Range  Outcome: Progressing     Problem: Liver Failure  Goal: Optimal Pain Control  Outcome: Progressing     Problem: Liver Failure  Goal: Optimal Gastrointestinal Function  Outcome: Progressing     Goal Outcome Evaluation:       Pt presented to hospital today for TIPS procedure in IR. Procedure was unsuccessful and patient was admitted to hospital for monitoring. Bedrest x6 hours with bathroom privileges following procedure. Serial hgb levels q 6 hours ordered 2/2 multiple unsuccessful passes being made into the liver. Pt will be NPO after midnight for potential procedure in AM. Liver US has been ordered. Dx of alcoholic cirrhosis. Hgb is currently 9.4. Pt is tolerating a clear liquid diet. Appetite is adequate. Blood sugar was 161 before meal and 239 at hs. On s/s insulin coverage. Abdomen is distended. Denies pain/discomfort.  Dressing intact to right abdomen and band-aid over right jugular. He is A&O x4. SBA with ambulation in the room. Maintaining O2 sats >92% on RA.

## 2022-11-17 NOTE — DISCHARGE SUMMARY
Interventional Radiology - Hospital Discharge Summary:    Patient Name: Vito Sy  YOB: 1950  Age: 72 year old  Medical Record Number: [id number]  Primary Physician: Amrik Mejia  Phone: [phone]  Admission Date: 11/16/2022  Discharge Date: 11/17/2022    Vito Sy will be discharged from [facility] to home.    PRINCIPAL DISCHARGE DIAGNOSIS:  Cirrhosis with ascites    HISTORY OF PRESENTING ILLNESS:  Vito Sy is a 72 year old male with a PMH of DM II, gout, HTN, kidney stone, CKD III, ETOH, decompensated anechoic cirrhosis, and portal HTN who presents for a TIPS procedure. He recently underwent upper endoscopy which demonstrated small esophageal varices that did not require intervention. Has no significant history of bleeding esophageal varices. He does have significant ascites, and is undergoing large volume paracentesis every 3-8 weeks. He has been following with Dr. Montenegro with [facility] and has been taking diuretics in addition to his paracenteses for ascites, but this has not been sufficient to manage his fluid status. Most recent paracentesis 10/24/2022, see imaging below. He has also had a loss of appetite and significant LE swelling. He no longer drinks alcohol. Referred to IR for TIPS procedure..    BRIEF HOSPITAL COURSE: Patient admitted for observation following attempted TIPS procedure, unsuccessful.    PROCEDURES PERFORMED DURING HOSPITALIZATION: Attempted TIPS, unsuccessful.    COMPLICATIONS: without complications.    PERTINENT FINDINGS/RESULTS AT DISCHARGE:     LABS:  INR   Date Value Ref Range Status   11/16/2022 1.33 (H) 0.85 - 1.15 Final     INR (External)   Date Value Ref Range Status   04/11/2022 1.1 0.9 - 1.2 Final      Hemoglobin   Date Value Ref Range Status   11/17/2022 8.6 (L) 13.3 - 17.7 g/dL Final     Platelet Count   Date Value Ref Range Status   11/17/2022 119 (L) 150 - 450 10e3/uL Final     Creatinine   Date Value Ref Range Status   11/17/2022  1.35 (H) 0.67 - 1.17 mg/dL Final     Potassium   Date Value Ref Range Status   11/17/2022 4.9 3.4 - 5.3 mmol/L Final   04/27/2022 3.9 3.5 - 5.0 mmol/L Final       EXAM:  BP 99/57 (BP Location: Right arm)   Pulse 65   Temp 97.8  F (36.6  C) (Oral)   Resp 18   SpO2 100%   General:  Stable.  In no acute distress.    Neuro:  A&O x 3. Moves all extremities equally.  Resp:  Lungs clear to auscultation bilaterally.  Cardio:  S1S2 and reg, without clicks or rubs, murmur noted.  Abdomen:  Distended, moderate amount of ascites noted.  Skin:  Without excoriations, ecchymosis, erythema, lesions or open sores. Right neck and abdominal puncture sites CDI.    CONDITION AT DISCHARGE: Stable    DISCHARGE INSTRUCTIONS/ORDERS:  [facility] to contact patient's son to schedule second attempt at TIPS with Dr. Desai.    HOMEGOING MEDICATIONS:  Current Discharge Medication List      CONTINUE these medications which have NOT CHANGED    Details   multivitamin, therapeutic (THERA-VIT) TABS tablet Take 1 tablet by mouth daily      omeprazole (PRILOSEC) 20 MG DR capsule TAKE 1 CAPSULE BY MOUTH TWICE DAILY BEFORE MEALS  Qty: 180 capsule, Refills: 3    Associated Diagnoses: Encounter for medication refill      pioglitazone (ACTOS) 30 MG tablet Take 1 tablet by mouth once daily  Qty: 90 tablet, Refills: 3    Associated Diagnoses: Type 2 diabetes mellitus with microalbuminuria, without long-term current use of insulin (H); Encounter for medication refill      Probiotic, Lactobacillus, CAPS Take 1 capsule by mouth daily      simvastatin (ZOCOR) 20 MG tablet Take 1 tablet (20 mg) by mouth At Bedtime  Qty: 90 tablet, Refills: 2    Associated Diagnoses: Hypercholesteremia      spironolactone (ALDACTONE) 25 MG tablet Take 25 mg by mouth daily           Total time spent on the date of the encounter is 35 minutes, including time spent counseling the patient, performing a medically appropriate evaluation, reviewing prior medical history,  ordering medications and tests, documenting clinical information in the medical record, and communication of results.      YOSELIN MCKEON APRN [facility]  Interventional Radiology  [phone]      E/M codes for reference only:  57472

## 2022-11-17 NOTE — PROGRESS NOTES
Interventional Radiology - Progress Note  Inpatient - [facility]: Interventional Radiology   [phone]  11/17/2022     S:  POD 1 attempted TIPS procedure, unsuccessful. US this AM demonstrated no evidence of hemorrhage, see imaging below. VSS, room air. Hgb 8.6, down from 9.4. Plt stable at 119. Creatinine stable.     O:  BP 99/57 (BP Location: Right arm)   Pulse 65   Temp 97.8  F (36.6  C) (Oral)   Resp 18   SpO2 100%   General:  Stable.  In no acute distress.    Neuro:  A&O x 3. Moves all extremities equally.  Resp:  Lungs clear to auscultation bilaterally.  Cardio:  S1S2 and reg, without clicks or rubs, murmur noted.  Abdomen:  Distended, moderate amount of ascites noted.  Skin:  Without excoriations, ecchymosis, erythema, lesions or open sores. Right neck and abdominal puncture sites CDI.    IMAGING:  US Abdomen Limited 11/17/2022  EXAM: US ABDOMEN LIMITED  LOCATION: [facility]  DATE/TIME: 11/17/2022 6:35 AM     INDICATION: s p TIPS procedure 11 16, multiple passes with needle, unsucessful due to fibrotic tissue, evaluate for hepatic bleeding  COMPARISON: None.  TECHNIQUE: Limited abdominal ultrasound.     FINDINGS:     GALLBLADDER: Layering sludge and stones seen within the gallbladder. There is thickening of the gallbladder wall measuring 6 mm. Perihepatic ascites is present. No sonographic Mcdaniel's sign was appreciated.     BILE DUCTS: No biliary dilatation. The common duct measures 6 mm.     LIVER: The liver is nodular and cirrhotic in appearance and shrunken. No intrahepatic evidence of hemorrhage is identified. No focal mass.     RIGHT KIDNEY: No hydronephrosis.     PANCREAS: The pancreas is largely obscured by overlying gas.     Moderate volume ascites.                                                                      IMPRESSION:  1.  Nodular cirrhotic liver, there is no evidence of intrahepatic mass or hemorrhage.  2.  Layering sludge and stones seen  within the gallbladder with thickening of the gallbladder wall measuring 6 mm, without a sonographic Mcdaniel's sign present, reflect changes secondary to patient's underlying cirrhosis, early changes of acute   cholecystitis cannot be excluded in the appropriate clinical setting.    IR Transven Intrahepatic Portosyst Shunt 11/16/2022  [facility]  LOCATION: [facility]  DATE: 11/16/2022     PROCEDURE: UNSUCCESSFUL TRANSJUGULAR INTRAHEPATIC PORTOSYSTEMIC SHUNT (TIPS) PLACEMENT     INTERVENTIONAL RADIOLOGIST: Cruzito Verma MD.     INDICATION: 72-year-old male with alcoholic cirrhosis and recurrent symptomatic ascites.     CONSENT: The risks, benefits and alternatives of the stated procedure were discussed with the patient  in detail. All questions were answered. Informed consent was given to proceed with the procedure.     MODERATE SEDATION: General endotracheal anesthesia.     CONTRAST: 50 mL Omnipaque 350.  ANTIBIOTICS: None.  ADDITIONAL MEDICATIONS: None.     FLUOROSCOPIC TIME: 19.8 minutes.  RADIATION DOSE: Air Kerma: 760 mGy.     COMPLICATIONS: No immediate complications.     STERILE BARRIER TECHNIQUE: Maximum sterile barrier technique was used. Cutaneous antisepsis was performed at the operative site with application of 2% chlorhexidine and large sterile drape. Prior to the procedure, the  and assistant performed   hand hygiene and wore hat, mask, sterile gown, and sterile gloves during the entire procedure.     PROCEDURE:    Using real-time ultrasound guidance, a 5 [place] catheter was advanced into the right abdominal ascitic fluid. A paracentesis was performed with the Vacutainer bottle left connected during the remainder of the case to evaluate for extrahepatic   hemorrhage.     Using real-time ultrasound guidance, access was achieved into the right internal jugular vein and conversion was made for an 8 Maldivian vascular sheath. A multipurpose catheter was manipulated  into the right hepatic vein and digital subtraction right   hepatic venography was obtained. Exchange was made for a balloon occlusion catheter which was positioned in the right hepatic vein. Digital subtraction CO2 portal venography was obtained.     Over wire exchange was made for a [facility] TIPS set with the sheath advanced into the mid right hepatic vein. The TIPS cannula was directed anteriorly and approximately 10 transhepatic passes attempted with 2 separate needles. Given the overall   fibrosis/stiffness of the liver only 2 of the needle passes successfully advanced through the hepatic parenchyma. The additional needle passes buckled the system. Ultimately after 10 passes, both needles had been bent and dulled to the point that they   were no longer usable.     Exchange for made for a [place] TIPS set. An additional 8-10 attempts were made at securing access into the right portal vein. These too were unsuccessful.     It was elected to terminate the procedure given increase in risk of hepatic injury and hemorrhage.     The catheters and sheath were removed with manual pressure applied until hemostasis.     FINDINGS:  Ultrasound demonstrates a moderate amount of intra-abdominal ascites. A permanent image was stored. Following the paracentesis 2000 mL of clear yellow fluid had been aspirated.     Ultrasound demonstrates a patent and fully compressible right internal jugular vein. A permanent image was stored.     The digital subtraction right hepatic venography shows a patent right hepatic vein where the liver is markedly cirrhotic.     The digital subtraction CO2 portal venography shows that the main, right and left portal veins are patent.      Images obtained during the attempted TIPS show inability to secure access into the portal vein.                                                                      IMPRESSION:    1.  Unsuccessful TIPS procedure due to inability to secure transhepatic access  into the portal vein.  2.  Paracentesis with aspiration of 2000 mL of clear yellow fluid.    LABS:  Recent Labs   Lab 11/17/22  0522 11/17/22  0013 11/16/22 2024 11/16/22  1154   WBC 5.1  --   --  4.5   HGB 8.6* 8.8* 9.4* 9.2*   *  --   --  130*   INR  --   --   --  1.33*   CR 1.35*  --   --  1.37*     A:  72 year old yo male with a PMH of DM II, gout, HTN, kidney stone, CKD III, ETOH, decompensated anechoic cirrhosis, and portal HTN who was admitted 11/16/2022 post unsuccessful TIPS procedure. Hgb slightly decreased with no evidence of bleeding on US this AM. VSS.    P:    Ok from IR standpoint to discharge patient today. We will contact his son, Ramírez, after discharge to schedule a re-attempt at a TIPS procedure with Dr. Desai at [facility] with anesthesia as soon as the schedule accomodates. Ramírez's phone number is [phone]. Spoke with Dr. Oropeza, hospitalist, who is in agreement with plan.    Total time spent on the date of the encounter is 35 minutes, including time spent counseling the patient, performing a medically appropriate evaluation, reviewing prior medical history, ordering medications and tests, documenting clinical information in the medical record, and communication of results.    PEDRO DELGADO CNP  Interventional Radiology  [phone]    E/M codes for reference only:  04667

## 2022-11-17 NOTE — PLAN OF CARE
Problem: Plan of Care - These are the overarching goals to be used throughout the patient stay.    Goal: Readiness for Transition of Care  Outcome: Adequate for Care Transition   Goal Outcome Evaluation:  Patient discharging home. No services needed. Paperwork reviewed with patient and son. Questions encouraged and answered.

## 2022-11-17 NOTE — PLAN OF CARE
PRIMARY DIAGNOSIS: ALCOHOLIC CIRRHOSIS WITH ASCITES POST FAILED TIPS PROCEDURE.     OUTPATIENT/OBSERVATION GOALS TO BE MET BEFORE DISCHARGE  1. Pain Status: Pain free.    2. Tolerating adequate PO diet: No . PT NPO     3. Surgical Intervention planned: Yes, ABDOMINAL US ORDERED AND POSSIBLE FURTHER PROCEDURE.     4. Cleared by consultants (if involved): No    5. Return to near baseline physical activity: Yes    Discharge Planner Nurse   Safe discharge environment identified: Yes  Barriers to discharge: Yes       Entered by: Cecy Saez RN 11/17/2022 4:10 AM     Please review provider order for any additional goals.   Nurse to notify provider when observation goals have been met and patient is ready for discharge.Goal Outcome Evaluation:       HGB MONITORING EVERY 6 HOURS, LAST 8.8

## 2022-11-17 NOTE — CONSULTS
Care Management Initial Consult    General Information  Assessment completed with: Patient, Zaire  Type of CM/SW Visit: Initial Assessment    Primary Care Provider verified and updated as needed:     Readmission within the last 30 days:           Advance Care Planning:            Communication Assessment  Patient's communication style: spoken language (English or Bilingual)    Hearing Difficulty or Deaf: no        Cognitive  Cognitive/Neuro/Behavioral: WDL                      Living Environment:   People in home: spouse, child(manav), dependent     Current living Arrangements: house      Able to return to prior arrangements: yes       Family/Social Support:  Care provided by: self  Provides care for: spouse, child(manav)  Marital Status:   Wife, Other (specify) (Family)          Description of Support System: Supportive, Involved         Current Resources:   Patient receiving home care services:       Community Resources:    Equipment currently used at home:    Supplies currently used at home:      Employment/Financial:  Employment Status: retired        Financial Concerns:             Lifestyle & Psychosocial Needs:  Social Determinants of Health     Tobacco Use: Low Risk      Smoking Tobacco Use: Never     Smokeless Tobacco Use: Never     Passive Exposure: Not on file   Alcohol Use: Not on file   Financial Resource Strain: Not on file   Food Insecurity: Not on file   Transportation Needs: Not on file   Physical Activity: Not on file   Stress: Not on file   Social Connections: Not on file   Intimate Partner Violence: Not on file   Depression: Not at risk     PHQ-2 Score: 0   Housing Stability: Not on file       Functional Status:  Prior to admission patient needed assistance:   Dependent ADLs:: Independent, Ambulation-no assistive device  Dependent IADLs:: Independent       Mental Health Status:          Chemical Dependency Status:                Values/Beliefs:  Spiritual, Cultural Beliefs, Sabianism Practices,  Values that affect care:                 Additional Information:  RNCM met with patient at bedside.     Discussed and reviewed MELENDREZ. Patient signed 11/17/22 @0953. Master placed in paper chart and copy provided to patient.     Initial assessment completed, Patient lives at home with wife and small child, 4. Independent with all needs. No concerns identified. Plan to discharge home family to transport.     Yessenia Clemons RN

## 2022-11-17 NOTE — H&P
[facility]    History and Physical - Hospitalist Service       Date of Admission:  11/16/2022    Assessment & Plan   Chief Complaint   Recurrent ascites    History is obtained from the patient, electronic health record and emergency department physician    History of Present Illness   72 year old male with hypertension, dyslipidemia, non-insulin-dependent diabetes type 2, CKD stage IIIa, history of latent tuberculosis, long-standing history of progressive liver disease diagnosed as alcoholic cirrhosis as well as probable hepatic steatosis.  Patient has been having increasingly frequent need for large-volume paracentesis of his cirrhotic ascites.  [facility] referred him to interventional radiology for assessment of TIPS procedure and he was admitted today for this procedure.    Unfortunately the interventionalists found the patient's liver to be exceptionally stiff and was unable to guide the needle through the renal liver parenchyma to the opposite side despite multiple tries.  Patient is admitted to monitor his vitals and to exclude evidence of bleeding after procedure.    We will keep the patient on clear liquid diet till midnight and then n.p.o. for liver ultrasound in the a.m. to assess for any significant liver hemorrhage.  Monitor hemoglobin frequently overnight as well as vital signs.  Okay for patient to eat after ultrasound in the a.m.    Anticipate interventional radiology will come by and see the patient in the morning to review the outcome of the procedure, other possible treatment options for recurrent ascites, and assess the patient for stability and discharge.    Principal Problem:    Alcoholic cirrhosis of liver with ascites (H)  Active Problems:    Hypercholesterolemia    Hypertension    Type 2 diabetes mellitus with microalbuminuria, without long-term current use of insulin (H)    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    GI bleeding     Chronic kidney disease, stage 3a (H)    Portal hypertension (H)    Review of Systems    The 10 point Review of Systems is negative other than noted in the HPI or here.    Code Status:  Full Code      Diet: Orders Placed This Encounter      Clear Liquid Diet      NPO for Medical/Clinical Reasons Except for: Meds, Ice Chips      DVT prophylaxis:    Medical:  early ambulation    Mechanical:  PCD's    Cronin Catheter: Not present  Central Lines/Port-a-cath: Not present  Drains: Not present    Disposition Plan   Expected discharge:    Expected Discharge Date: 11/17/2022           recommended to Home once adequate pain management/ tolerating PO medications and hemoglobin stable.    The patient's care was discussed with the Bedside Nurse, Patient and Patient's Family.    Jakub Dubose MD  [facility]  Securely message with the Vocera Web Console (learn more here)  Text page via Briefcase Paging/Directory         Clinically Significant Risk Factors Present on Admission        _____________________________________________________________________    Medical History  I have reviewed this patient's medical history and updated it with pertinent information if needed.  Past Medical History:   Diagnosis Date     Diabetes mellitus (H)      Gout      Hypertension      Kidney stone        Surgical History   I have reviewed this patient's surgical history and updated it with pertinent information if needed.  Past Surgical History:   Procedure Laterality Date     COLONOSCOPY       IR TRANSVEN INTRAHEPATIC PORTOSYST SHUNT  11/16/2022     SC ESOPHAGOGASTRODUODENOSCOPY TRANSORAL DIAGNOSTIC N/A 11/16/2020    Procedure: ESOPHAGOGASTRODUODENOSCOPY (EGD);  Surgeon: Juan Garnett MD;  Location: [facility];  Service: Gastroenterology     SC ESOPHAGOGASTRODUODENOSCOPY TRANSORAL DIAGNOSTIC N/A 11/18/2020    Procedure: ESOPHAGOGASTRODUODENOSCOPY (EGD) WITH BANDING;  Surgeon: Juan Garnett MD;  Location: [facility];   Service: Gastroenterology     URETEROSCOPY         Social History   I have reviewed this patient's social history and updated it with pertinent information if needed.  Social History     Tobacco Use     Smoking status: Never     Smokeless tobacco: Never   Substance Use Topics     Alcohol use: Yes     Comment: Alcoholic Drinks/day: once a week or every two weeks     Drug use: No       Family History   I have reviewed this patient's family history and updated it with pertinent information if needed.  Family History   Problem Relation Age of Onset     Heart Disease Brother      Hypertension Mother      Hypertension Father        Prior to Admission Medications   acetaminophen (TYLENOL) tablet 650 mg   Or  acetaminophen (TYLENOL) Suppository 650 mg  [COMPLETED] clindamycin (CLEOCIN) infusion 900 mg  [COMPLETED] iohexol (OMNIPAQUE) 350 MG/ML injectable solution 100 mL  lidocaine (LMX4) cream  lidocaine (LMX4) cream  lidocaine 1 % 0.1-1 mL  lidocaine 1 % 0.1-1 mL  naloxone (NARCAN) injection 0.2 mg   Or  naloxone (NARCAN) injection 0.4 mg   Or  naloxone (NARCAN) injection 0.2 mg   Or  naloxone (NARCAN) injection 0.4 mg  oxyCODONE IR (ROXICODONE) half-tab 2.5 mg  senna-docusate (SENOKOT-S/PERICOLACE) 8.6-50 MG per tablet 1 tablet   Or  senna-docusate (SENOKOT-S/PERICOLACE) 8.6-50 MG per tablet 2 tablet  sodium chloride (PF) 0.9% PF flush 3 mL  sodium chloride (PF) 0.9% PF flush 3 mL  sodium chloride (PF) 0.9% PF flush 3 mL  sodium chloride (PF) 0.9% PF flush 3 mL  sodium chloride 0.9% 1000 mL TABLE SOLN    multivitamin, therapeutic (THERA-VIT) TABS tablet, Take 1 tablet by mouth daily  omeprazole (PRILOSEC) 20 MG DR capsule, TAKE 1 CAPSULE BY MOUTH TWICE DAILY BEFORE MEALS  pioglitazone (ACTOS) 30 MG tablet, Take 1 tablet by mouth once daily (Patient taking differently: Take 15 mg by mouth daily)  Probiotic, Lactobacillus, CAPS, Take 1 capsule by mouth daily  simvastatin (ZOCOR) 20 MG tablet, Take 1 tablet (20 mg) by mouth  "At Bedtime  spironolactone (ALDACTONE) 25 MG tablet, Take 25 mg by mouth daily        Allergies      Allergies   Allergen Reactions     Penicillins Unknown     Annotation: discussed with patient, he reports he had \"itching\" with penicillin many years ago in [facility] but no hives or throat or respiratory symptoms.  he also reports having tolerated amoxicillin since coming to [place].       Sulfa Drugs        Physical Exam   Vital signs:  Temp: 97.6  F (36.4  C) Temp src: Oral BP: 130/65 Pulse: 78   Resp: 20 SpO2: 100 % O2 Device: None (Room air)        Estimated body mass index is 30.13 kg/m  as calculated from the following:    Height as of 11/9/22: 1.676 m (5' 6\").    Weight as of an earlier encounter on 11/16/22: 84.7 kg (186 lb 11.2 oz).    General: in no apparent distress, well developed and well nourished and non-toxic male lying in hospital bed oriented x3  HEENT: Head normocephalic atraumatic, oral mucosa moist. Sclerae anicteric  CV: Regular rhythm, normal rate, no murmurs  Resp: No wheezes, no rales or rhonchi, no focal consolidations  GI: Belly soft, nondistended, nontender, bowel sounds present  Skin: No rashes or lesions  Extremities: 1+ pitting edema bilateral ankles  Psych: Normal affect, mood euthymic  Neuro: Grossly normal    Data   Data reviewed today: I reviewed all medications, new labs and imaging results over the last 24 hours.  Recent Labs   Lab 11/16/22 2024 11/16/22 2023 11/16/22  1750 11/16/22  1544 11/16/22  1213 11/16/22  1154   WBC  --   --   --   --   --  4.5   HGB 9.4*  --   --   --   --  9.2*   MCV  --   --   --   --   --  83   PLT  --   --   --   --   --  130*   INR  --   --   --   --   --  1.33*   NA  --   --   --   --   --  136   POTASSIUM  --   --   --   --   --  4.5   CHLORIDE  --   --   --   --   --  105   CO2  --   --   --   --   --  23   BUN  --   --   --   --   --  17.0   CR  --   --   --   --   --  1.37*   ANIONGAP  --   --   --   --   --  8   SILVIA  --   --   --   --   --  8.4* "   GLC  --  239* 161* 117*   < > 116*   ALBUMIN  --   --   --   --   --  3.1*   PROTTOTAL  --   --   --   --   --  6.5   BILITOTAL  --   --   --   --   --  1.0   ALKPHOS  --   --   --   --   --  106   ALT  --   --   --   --   --  20   AST  --   --   --   --   --  33    < > = values in this interval not displayed.     Recent Results (from the past 24 hour(s))   IR Transven Intrahepatic Portosyst Shunt    [facility] RADIOLOGY  LOCATION: [facility]  DATE: 11/16/2022    PROCEDURE: UNSUCCESSFUL TRANSJUGULAR INTRAHEPATIC PORTOSYSTEMIC SHUNT (TIPS) PLACEMENT    INTERVENTIONAL RADIOLOGIST: Cruzito Verma MD.    INDICATION: 72-year-old male with alcoholic cirrhosis and recurrent symptomatic ascites.    CONSENT: The risks, benefits and alternatives of the stated procedure were discussed with the patient  in detail. All questions were answered. Informed consent was given to proceed with the procedure.    MODERATE SEDATION: General endotracheal anesthesia.    CONTRAST: 50 mL Omnipaque 350.  ANTIBIOTICS: None.  ADDITIONAL MEDICATIONS: None.    FLUOROSCOPIC TIME: 19.8 minutes.  RADIATION DOSE: Air Kerma: 760 mGy.    COMPLICATIONS: No immediate complications.    STERILE BARRIER TECHNIQUE: Maximum sterile barrier technique was used. Cutaneous antisepsis was performed at the operative site with application of 2% chlorhexidine and large sterile drape. Prior to the procedure, the  and assistant performed   hand hygiene and wore hat, mask, sterile gown, and sterile gloves during the entire procedure.    PROCEDURE:    Using real-time ultrasound guidance, a 5 [place] catheter was advanced into the right abdominal ascitic fluid. A paracentesis was performed with the Vacutainer bottle left connected during the remainder of the case to evaluate for extrahepatic   hemorrhage.    Using real-time ultrasound guidance, access was achieved into the right internal jugular vein and conversion was made for  an 8 Welsh vascular sheath. A multipurpose catheter was manipulated into the right hepatic vein and digital subtraction right   hepatic venography was obtained. Exchange was made for a balloon occlusion catheter which was positioned in the right hepatic vein. Digital subtraction CO2 portal venography was obtained.    Over wire exchange was made for a Rosch-Uchida TIPS set with the sheath advanced into the mid right hepatic vein. The TIPS cannula was directed anteriorly and approximately 10 transhepatic passes attempted with 2 separate needles. Given the overall   fibrosis/stiffness of the liver only 2 of the needle passes successfully advanced through the hepatic parenchyma. The additional needle passes buckled the system. Ultimately after 10 passes, both needles had been bent and dulled to the point that they   were no longer usable.    Exchange for made for a [place] TIPS set. An additional 8-10 attempts were made at securing access into the right portal vein. These too were unsuccessful.    It was elected to terminate the procedure given increase in risk of hepatic injury and hemorrhage.    The catheters and sheath were removed with manual pressure applied until hemostasis.    FINDINGS:  Ultrasound demonstrates a moderate amount of intra-abdominal ascites. A permanent image was stored. Following the paracentesis 2000 mL of clear yellow fluid had been aspirated.    Ultrasound demonstrates a patent and fully compressible right internal jugular vein. A permanent image was stored.    The digital subtraction right hepatic venography shows a patent right hepatic vein where the liver is markedly cirrhotic.    The digital subtraction CO2 portal venography shows that the main, right and left portal veins are patent.     Images obtained during the attempted TIPS show inability to secure access into the portal vein.      Impression    IMPRESSION:    1.  Unsuccessful TIPS procedure due to inability to secure transhepatic  access into the portal vein.  2.  Paracentesis with aspiration of 2000 mL of clear yellow fluid.

## 2022-11-17 NOTE — PROGRESS NOTES
Care Management Discharge Note    Discharge Date: 11/17/2022       Discharge Disposition: Home    Discharge Services:      Discharge DME:      Discharge Transportation:      Private pay costs discussed: Not applicable    PAS Confirmation Code:    Patient/family educated on Medicare website which has current facility and service quality ratings:      Education Provided on the Discharge Plan:    Persons Notified of Discharge Plans: Patient  Patient/Family in Agreement with the Plan: yes    Handoff Referral Completed: Yes    Additional Information:  Patient to discharge home, no CM needs identified, family to transport.     Yessenia Clemons RN

## 2022-12-06 ENCOUNTER — TELEPHONE (OUTPATIENT)
Dept: INTERVENTIONAL RADIOLOGY/VASCULAR | Facility: HOSPITAL | Age: 72
End: 2022-12-06

## 2022-12-08 RX ORDER — CEFTRIAXONE 1 G/1
1 INJECTION, POWDER, FOR SOLUTION INTRAMUSCULAR; INTRAVENOUS
Status: CANCELLED | OUTPATIENT
Start: 2022-12-08

## 2022-12-08 NOTE — H&P
Interventional Radiology - History and Physical  12/9/2022    Procedure Requested: TIPS  Requesting Provider: PEDRO DELGADO CNP    History and Physical Reviewed: H&P documented within 30 days (by Regi Ambrose MD on 11/09/2022). I have personally reviewed the patient's medical history and have updated the medical record as necessary.    HPI: Vito Sy is a 72 year old male with a PMH of DM II, gout, HTN, kidney stone, CKD III, ETOH, decompensated anechoic cirrhosis, and portal HTN who presents for a TIPS procedure. He recently underwent upper endoscopy which demonstrated small esophageal varices that did not require intervention. Has no significant history of bleeding esophageal varices. He does have significant ascites, and is undergoing large volume paracentesis every 3-8 weeks. He has been following with Dr. Montenegro with [facility] and has been taking diuretics in addition to his paracenteses for ascites, but this has not been sufficient to manage his fluid status. Most recent paracentesis 11/16/2022 with 2L of fluid aspirated. He has also had a loss of appetite and significant LE swelling. He no longer drinks alcohol. Referred to IR for TIPS procedure. TIPS initially attempted 11/17/2022, unsuccessful, see imaging below. Presents today for TIPS with planned admission post procedure.    IMAGING:  IR Transven Intrahepatic Portosyst Shunt 11/16/2022  [facility]  LOCATION: [facility]  DATE: 11/16/2022     PROCEDURE: UNSUCCESSFUL TRANSJUGULAR INTRAHEPATIC PORTOSYSTEMIC SHUNT (TIPS) PLACEMENT     INTERVENTIONAL RADIOLOGIST: Cruzito Verma MD.     INDICATION: 72-year-old male with alcoholic cirrhosis and recurrent symptomatic ascites.     CONSENT: The risks, benefits and alternatives of the stated procedure were discussed with the patient  in detail. All questions were answered. Informed consent was given to proceed with the procedure.     MODERATE SEDATION: General  endotracheal anesthesia.     CONTRAST: 50 mL Omnipaque 350.  ANTIBIOTICS: None.  ADDITIONAL MEDICATIONS: None.     FLUOROSCOPIC TIME: 19.8 minutes.  RADIATION DOSE: Air Kerma: 760 mGy.     COMPLICATIONS: No immediate complications.     STERILE BARRIER TECHNIQUE: Maximum sterile barrier technique was used. Cutaneous antisepsis was performed at the operative site with application of 2% chlorhexidine and large sterile drape. Prior to the procedure, the  and assistant performed   hand hygiene and wore hat, mask, sterile gown, and sterile gloves during the entire procedure.     PROCEDURE:    Using real-time ultrasound guidance, a 5 [place]ian [employer] catheter was advanced into the right abdominal ascitic fluid. A paracentesis was performed with the Vacutainer bottle left connected during the remainder of the case to evaluate for extrahepatic   hemorrhage.     Using real-time ultrasound guidance, access was achieved into the right internal jugular vein and conversion was made for an 8 [place] vascular sheath. A multipurpose catheter was manipulated into the right hepatic vein and digital subtraction right   hepatic venography was obtained. Exchange was made for a balloon occlusion catheter which was positioned in the right hepatic vein. Digital subtraction CO2 portal venography was obtained.     Over wire exchange was made for a Rosch-Uchida TIPS set with the sheath advanced into the mid right hepatic vein. The TIPS cannula was directed anteriorly and approximately 10 transhepatic passes attempted with 2 separate needles. Given the overall   fibrosis/stiffness of the liver only 2 of the needle passes successfully advanced through the hepatic parenchyma. The additional needle passes buckled the system. Ultimately after 10 passes, both needles had been bent and dulled to the point that they   were no longer usable.     Exchange for made for a [place] TIPS set. An additional 8-10 attempts were made at securing access into  the right portal vein. These too were unsuccessful.     It was elected to terminate the procedure given increase in risk of hepatic injury and hemorrhage.     The catheters and sheath were removed with manual pressure applied until hemostasis.     FINDINGS:  Ultrasound demonstrates a moderate amount of intra-abdominal ascites. A permanent image was stored. Following the paracentesis 2000 mL of clear yellow fluid had been aspirated.     Ultrasound demonstrates a patent and fully compressible right internal jugular vein. A permanent image was stored.     The digital subtraction right hepatic venography shows a patent right hepatic vein where the liver is markedly cirrhotic.     The digital subtraction CO2 portal venography shows that the main, right and left portal veins are patent.      Images obtained during the attempted TIPS show inability to secure access into the portal vein.                                                                      IMPRESSION:    1.  Unsuccessful TIPS procedure due to inability to secure transhepatic access into the portal vein.  2.  Paracentesis with aspiration of 2000 mL of clear yellow fluid.    NPO Status: Midnight  Anticoagulation/Antiplatelets/Bleeding tendencies:  - No anticoagulants  - Cirrhosis, history of elevated INR and thrombocytopenia   Antibiotics: Rocephin 1 g    Review of Systems: A comprehensive 10-point review of systems was performed. All systems were reviewed and negative with exception to those reported in the HPI.    PMH:  Past Medical History:   Diagnosis Date     Diabetes mellitus (H)      Gout      Hypertension      Kidney stone        PSH:  Past Surgical History:   Procedure Laterality Date     COLONOSCOPY       IR TRANSVEN INTRAHEPATIC PORTOSYST SHUNT  11/16/2022     [place] ESOPHAGOGASTRODUODENOSCOPY TRANSORAL DIAGNOSTIC N/A 11/16/2020    Procedure: ESOPHAGOGASTRODUODENOSCOPY (EGD);  Surgeon: Juan Garnett MD;  Location: [facility];  Service:  "Gastroenterology     [place] ESOPHAGOGASTRODUODENOSCOPY TRANSORAL DIAGNOSTIC N/A 11/18/2020    Procedure: ESOPHAGOGASTRODUODENOSCOPY (EGD) WITH BANDING;  Surgeon: Juan Garnett MD;  Location: [facility];  Service: Gastroenterology     URETEROSCOPY         ALLERGIES:  Allergies   Allergen Reactions     Penicillins Unknown     Annotation: discussed with patient, he reports he had \"itching\" with penicillin many years ago in [facility] but no hives or throat or respiratory symptoms.  he also reports having tolerated amoxicillin since coming to [place].       Sulfa Drugs        MEDICATIONS:  Current Outpatient Medications   Medication     multivitamin, therapeutic (THERA-VIT) TABS tablet     omeprazole (PRILOSEC) 20 MG DR capsule     pioglitazone (ACTOS) 30 MG tablet     Probiotic, Lactobacillus, CAPS     simvastatin (ZOCOR) 20 MG tablet     spironolactone (ALDACTONE) 25 MG tablet     Current Facility-Administered Medications   Medication     cefTRIAXone (ROCEPHIN) 1 g vial to attach to  mL bag for ADULTS or NS 50 mL bag for PEDS     lactated ringers infusion     lactated ringers infusion     lidocaine (LMX4) cream     lidocaine (LMX4) cream     lidocaine 1 % 0.1-1 mL     lidocaine 1 % 0.1-1 mL     meperidine (DEMEROL) injection 12.5 mg     ondansetron (ZOFRAN ODT) ODT tab 4 mg    Or     ondansetron (ZOFRAN) injection 4 mg     prochlorperazine (COMPAZINE) injection 5 mg     sodium chloride (PF) 0.9% PF flush 3 mL     sodium chloride (PF) 0.9% PF flush 3 mL     sodium chloride (PF) 0.9% PF flush 3 mL     sodium chloride (PF) 0.9% PF flush 3 mL     sodium chloride 0.9% 1000 mL TABLE SOLN       LABS:  INR   Date Value Ref Range Status   12/09/2022 1.37 (H) 0.85 - 1.15 Final     INR (External)   Date Value Ref Range Status   04/11/2022 1.1 0.9 - 1.2 Final      Hemoglobin   Date Value Ref Range Status   12/09/2022 8.2 (L) 13.3 - 17.7 g/dL Final     Platelet Count   Date Value Ref Range Status   12/09/2022 116 (L) 150 - 450 " "10e3/uL Final     Creatinine   Date Value Ref Range Status   12/09/2022 1.27 (H) 0.67 - 1.17 mg/dL Final     Potassium   Date Value Ref Range Status   11/17/2022 4.9 3.4 - 5.3 mmol/L Final   04/27/2022 3.9 3.5 - 5.0 mmol/L Final     Sodium   Date Value Ref Range Status   12/09/2022 145 136 - 145 mmol/L Final     Liver Function Studies - Recent Labs   Lab Test 12/09/22  1254   PROTTOTAL 5.9*   ALBUMIN 3.1*   BILITOTAL 0.9   ALKPHOS 111   AST 34   ALT 23     MELD Na: 12: <2% estimated 90-day mortality    EXAM:  /59 (BP Location: Right arm)   Pulse 79   Temp 98.7  F (37.1  C) (Oral)   Resp 16   Ht 1.676 m (5' 6\")   Wt 87.5 kg (193 lb)   SpO2 100%   BMI 31.15 kg/m    General:  Stable.  In no acute distress.    Neuro:  A&O x 3. Moves all extremities equally.  Resp:  Lungs clear to auscultation bilaterally.  Cardio:  S1S2 and reg, without clicks or rubs. Murmur noted.  Skin:  Without excoriations, ecchymosis, erythema, lesions or open sores.    Code Status: FULL CODE      ASSESSMENT:  71 yo male    - Cirrhosis, ascites, varices  - Previously attempted TIPS 11/16/2022  - Labs reviewed, acceptable to proceed with IR procedure  - MELD-Na: 12    Presents for TIPS procedure    PLAN:    TIPS procedure with general anesthesia. Paracentesis may also be performed at this time. Patient to be admitted overnight to hospitalist service following procedure. Spoke with Dr. Ridley, hospitalist, who accepted the patient. Post-procedure orders to be placed by Dr. Desai. Anticipate follow up with Dr. Desai in 1 month with a post TIPS ultrasound.    Procedure, risks/benefits, details, alternatives, and sedation reviewed with patient/family and both verbalized understanding. All questions answered. OK to proceed with above radiology procedure.     PE and consent: YOSELIN MCKEON, APRN CNP  Note compiled: ROBERT MO NP  Interventional Radiology  [phone]    "

## 2022-12-09 ENCOUNTER — ANESTHESIA (OUTPATIENT)
Dept: INTERVENTIONAL RADIOLOGY/VASCULAR | Facility: HOSPITAL | Age: 72
DRG: 406 | End: 2022-12-09
Payer: COMMERCIAL

## 2022-12-09 ENCOUNTER — HOSPITAL ENCOUNTER (INPATIENT)
Dept: INTERVENTIONAL RADIOLOGY/VASCULAR | Facility: HOSPITAL | Age: 72
LOS: 1 days | Discharge: HOME | DRG: 406 | End: 2022-12-10
Admitting: RADIOLOGY
Payer: COMMERCIAL

## 2022-12-09 DIAGNOSIS — K70.31 ALCOHOLIC CIRRHOSIS OF LIVER WITH ASCITES (H): Primary | ICD-10-CM

## 2022-12-09 DIAGNOSIS — K74.60 CIRRHOSIS OF LIVER (H): ICD-10-CM

## 2022-12-09 LAB
ABO/RH(D): NORMAL
ALBUMIN SERPL BCG-MCNC: 3.1 G/DL (ref 3.5–5.2)
ALP SERPL-CCNC: 111 U/L (ref 40–129)
ALT SERPL W P-5'-P-CCNC: 23 U/L (ref 10–50)
ANION GAP SERPL CALCULATED.3IONS-SCNC: 8 MMOL/L (ref 7–15)
ANTIBODY SCREEN: NEGATIVE
AST SERPL W P-5'-P-CCNC: 34 U/L (ref 10–50)
BILIRUB DIRECT SERPL-MCNC: 0.32 MG/DL (ref 0–0.3)
BILIRUB SERPL-MCNC: 0.9 MG/DL
BUN SERPL-MCNC: 16.2 MG/DL (ref 8–23)
CALCIUM SERPL-MCNC: 7.9 MG/DL (ref 8.8–10.2)
CHLORIDE SERPL-SCNC: 114 MMOL/L (ref 98–107)
CREAT SERPL-MCNC: 1.27 MG/DL (ref 0.67–1.17)
CREAT SERPL-MCNC: 1.28 MG/DL (ref 0.67–1.17)
DEPRECATED HCO3 PLAS-SCNC: 21 MMOL/L (ref 22–29)
GFR SERPL CREATININE-BSD FRML MDRD: 60 ML/MIN/1.73M2
GFR SERPL CREATININE-BSD FRML MDRD: 60 ML/MIN/1.73M2
GLUCOSE BLDC GLUCOMTR-MCNC: 117 MG/DL (ref 70–99)
GLUCOSE BLDC GLUCOMTR-MCNC: 133 MG/DL (ref 70–99)
GLUCOSE BLDC GLUCOMTR-MCNC: 133 MG/DL (ref 70–99)
GLUCOSE BLDC GLUCOMTR-MCNC: 142 MG/DL (ref 70–99)
GLUCOSE SERPL-MCNC: 114 MG/DL (ref 70–99)
HGB BLD-MCNC: 8.2 G/DL (ref 13.3–17.7)
INR PPP: 1.37 (ref 0.85–1.15)
LACTATE SERPL-SCNC: 1.6 MMOL/L (ref 0.7–2)
PLATELET # BLD AUTO: 116 10E3/UL (ref 150–450)
POTASSIUM SERPL-SCNC: 3.4 MMOL/L (ref 3.4–5.3)
PROT SERPL-MCNC: 5.9 G/DL (ref 6.4–8.3)
SODIUM SERPL-SCNC: 143 MMOL/L (ref 136–145)
SODIUM SERPL-SCNC: 145 MMOL/L (ref 136–145)
SPECIMEN EXPIRATION DATE: NORMAL

## 2022-12-09 PROCEDURE — 82962 GLUCOSE BLOOD TEST: CPT

## 2022-12-09 PROCEDURE — 210N000001 HC R&B IMCU HEART CARE

## 2022-12-09 PROCEDURE — 99220 PR INITIAL OBSERVATION CARE,LEVEL III: CPT | Performed by: INTERNAL MEDICINE

## 2022-12-09 PROCEDURE — 250N000011 HC RX IP 250 OP 636

## 2022-12-09 PROCEDURE — C1769 GUIDE WIRE: HCPCS

## 2022-12-09 PROCEDURE — 86901 BLOOD TYPING SEROLOGIC RH(D): CPT

## 2022-12-09 PROCEDURE — 250N000009 HC RX 250: Performed by: NURSE ANESTHETIST, CERTIFIED REGISTERED

## 2022-12-09 PROCEDURE — 36415 COLL VENOUS BLD VENIPUNCTURE: CPT

## 2022-12-09 PROCEDURE — 272N000302 HC DEVICE INFLATION CR5

## 2022-12-09 PROCEDURE — 258N000003 HC RX IP 258 OP 636: Performed by: ANESTHESIOLOGY

## 2022-12-09 PROCEDURE — 255N000002 HC RX 255 OP 636: Performed by: ANESTHESIOLOGY

## 2022-12-09 PROCEDURE — 258N000003 HC RX IP 258 OP 636: Performed by: NURSE ANESTHETIST, CERTIFIED REGISTERED

## 2022-12-09 PROCEDURE — 272N000121 HC CATH CR6

## 2022-12-09 PROCEDURE — C1874 STENT, COATED/COV W/DEL SYS: HCPCS

## 2022-12-09 PROCEDURE — 250N000011 HC RX IP 250 OP 636: Performed by: NURSE ANESTHETIST, CERTIFIED REGISTERED

## 2022-12-09 PROCEDURE — 85018 HEMOGLOBIN: CPT

## 2022-12-09 PROCEDURE — 250N000009 HC RX 250

## 2022-12-09 PROCEDURE — 83605 ASSAY OF LACTIC ACID: CPT | Performed by: INTERNAL MEDICINE

## 2022-12-09 PROCEDURE — 36415 COLL VENOUS BLD VENIPUNCTURE: CPT | Performed by: INTERNAL MEDICINE

## 2022-12-09 PROCEDURE — C1887 CATHETER, GUIDING: HCPCS

## 2022-12-09 PROCEDURE — 272N000497 HC NEEDLE CR16

## 2022-12-09 PROCEDURE — 272N000500 HC NEEDLE CR2

## 2022-12-09 PROCEDURE — B51T1ZZ FLUOROSCOPY OF PORTAL AND SPLANCHNIC VEINS USING LOW OSMOLAR CONTRAST: ICD-10-PCS | Performed by: RADIOLOGY

## 2022-12-09 PROCEDURE — C1725 CATH, TRANSLUMIN NON-LASER: HCPCS

## 2022-12-09 PROCEDURE — 84295 ASSAY OF SERUM SODIUM: CPT | Performed by: ANESTHESIOLOGY

## 2022-12-09 PROCEDURE — P9047 ALBUMIN (HUMAN), 25%, 50ML: HCPCS | Performed by: NURSE ANESTHETIST, CERTIFIED REGISTERED

## 2022-12-09 PROCEDURE — 85049 AUTOMATED PLATELET COUNT: CPT

## 2022-12-09 PROCEDURE — 4A143B2 MONITORING OF VENOUS PRESSURE, PORTAL, PERCUTANEOUS APPROACH: ICD-10-PCS | Performed by: RADIOLOGY

## 2022-12-09 PROCEDURE — 82248 BILIRUBIN DIRECT: CPT

## 2022-12-09 PROCEDURE — 82565 ASSAY OF CREATININE: CPT

## 2022-12-09 PROCEDURE — 86850 RBC ANTIBODY SCREEN: CPT

## 2022-12-09 PROCEDURE — 82962 GLUCOSE BLOOD TEST: CPT | Mod: 91

## 2022-12-09 PROCEDURE — 85610 PROTHROMBIN TIME: CPT

## 2022-12-09 PROCEDURE — 06783DZ DILATION OF PORTAL VEIN WITH INTRALUMINAL DEVICE, PERCUTANEOUS APPROACH: ICD-10-PCS | Performed by: RADIOLOGY

## 2022-12-09 PROCEDURE — 272N000199 HC ACCESSORY CR8

## 2022-12-09 PROCEDURE — 272N000123 HC CATH CR9

## 2022-12-09 PROCEDURE — 37182 INSERT HEPATIC SHUNT (TIPS): CPT

## 2022-12-09 PROCEDURE — 84295 ASSAY OF SERUM SODIUM: CPT

## 2022-12-09 PROCEDURE — 4A043B2 MEASUREMENT OF VENOUS PRESSURE, PORTAL, PERCUTANEOUS APPROACH: ICD-10-PCS | Performed by: RADIOLOGY

## 2022-12-09 PROCEDURE — 06183J4 BYPASS PORTAL VEIN TO HEPATIC VEIN WITH SYNTHETIC SUBSTITUTE, PERCUTANEOUS APPROACH: ICD-10-PCS | Performed by: RADIOLOGY

## 2022-12-09 RX ORDER — LIDOCAINE 40 MG/G
CREAM TOPICAL
Status: DISCONTINUED | OUTPATIENT
Start: 2022-12-09 | End: 2022-12-10

## 2022-12-09 RX ORDER — CEFTRIAXONE 1 G/1
1 INJECTION, POWDER, FOR SOLUTION INTRAMUSCULAR; INTRAVENOUS ONCE
Status: COMPLETED | OUTPATIENT
Start: 2022-12-09 | End: 2022-12-09

## 2022-12-09 RX ORDER — ACETAMINOPHEN 325 MG/1
650 TABLET ORAL EVERY 6 HOURS PRN
Status: DISCONTINUED | OUTPATIENT
Start: 2022-12-09 | End: 2022-12-10 | Stop reason: HOSPADM

## 2022-12-09 RX ORDER — HYDROMORPHONE HYDROCHLORIDE 1 MG/ML
0.2 INJECTION, SOLUTION INTRAMUSCULAR; INTRAVENOUS; SUBCUTANEOUS EVERY 5 MIN PRN
Status: DISCONTINUED | OUTPATIENT
Start: 2022-12-09 | End: 2022-12-10 | Stop reason: HOSPADM

## 2022-12-09 RX ORDER — FENTANYL CITRATE 50 UG/ML
25 INJECTION, SOLUTION INTRAMUSCULAR; INTRAVENOUS EVERY 5 MIN PRN
Status: DISCONTINUED | OUTPATIENT
Start: 2022-12-09 | End: 2022-12-10

## 2022-12-09 RX ORDER — ONDANSETRON 2 MG/ML
4 INJECTION INTRAMUSCULAR; INTRAVENOUS EVERY 6 HOURS PRN
Status: DISCONTINUED | OUTPATIENT
Start: 2022-12-09 | End: 2022-12-10 | Stop reason: HOSPADM

## 2022-12-09 RX ORDER — NICOTINE POLACRILEX 4 MG
15-30 LOZENGE BUCCAL
Status: DISCONTINUED | OUTPATIENT
Start: 2022-12-09 | End: 2022-12-10 | Stop reason: HOSPADM

## 2022-12-09 RX ORDER — ACETAMINOPHEN 650 MG/1
650 SUPPOSITORY RECTAL EVERY 6 HOURS PRN
Status: DISCONTINUED | OUTPATIENT
Start: 2022-12-09 | End: 2022-12-10 | Stop reason: HOSPADM

## 2022-12-09 RX ORDER — ONDANSETRON 4 MG/1
4 TABLET, ORALLY DISINTEGRATING ORAL EVERY 6 HOURS PRN
Status: DISCONTINUED | OUTPATIENT
Start: 2022-12-09 | End: 2022-12-10 | Stop reason: HOSPADM

## 2022-12-09 RX ORDER — ONDANSETRON 2 MG/ML
INJECTION INTRAMUSCULAR; INTRAVENOUS PRN
Status: DISCONTINUED | OUTPATIENT
Start: 2022-12-09 | End: 2022-12-09

## 2022-12-09 RX ORDER — VASOPRESSIN IN 0.9 % NACL 2 UNIT/2ML
SYRINGE (ML) INTRAVENOUS PRN
Status: DISCONTINUED | OUTPATIENT
Start: 2022-12-09 | End: 2022-12-09

## 2022-12-09 RX ORDER — CALCIUM CHLORIDE 100 MG/ML
INJECTION INTRAVENOUS; INTRAVENTRICULAR PRN
Status: DISCONTINUED | OUTPATIENT
Start: 2022-12-09 | End: 2022-12-09

## 2022-12-09 RX ORDER — ONDANSETRON 4 MG/1
4 TABLET, ORALLY DISINTEGRATING ORAL EVERY 30 MIN PRN
Status: DISCONTINUED | OUTPATIENT
Start: 2022-12-09 | End: 2022-12-10

## 2022-12-09 RX ORDER — DEXAMETHASONE SODIUM PHOSPHATE 10 MG/ML
INJECTION, SOLUTION INTRAMUSCULAR; INTRAVENOUS PRN
Status: DISCONTINUED | OUTPATIENT
Start: 2022-12-09 | End: 2022-12-09

## 2022-12-09 RX ORDER — ONDANSETRON 2 MG/ML
4 INJECTION INTRAMUSCULAR; INTRAVENOUS EVERY 30 MIN PRN
Status: DISCONTINUED | OUTPATIENT
Start: 2022-12-09 | End: 2022-12-10

## 2022-12-09 RX ORDER — CLINDAMYCIN PHOSPHATE 900 MG/50ML
900 INJECTION, SOLUTION INTRAVENOUS ONCE
Status: DISCONTINUED | OUTPATIENT
Start: 2022-12-09 | End: 2022-12-09

## 2022-12-09 RX ORDER — ALBUMIN (HUMAN) 12.5 G/50ML
SOLUTION INTRAVENOUS CONTINUOUS PRN
Status: DISCONTINUED | OUTPATIENT
Start: 2022-12-09 | End: 2022-12-09

## 2022-12-09 RX ORDER — MEPERIDINE HYDROCHLORIDE 25 MG/ML
12.5 INJECTION INTRAMUSCULAR; INTRAVENOUS; SUBCUTANEOUS
Status: DISCONTINUED | OUTPATIENT
Start: 2022-12-09 | End: 2022-12-10

## 2022-12-09 RX ORDER — FENTANYL CITRATE 50 UG/ML
50 INJECTION, SOLUTION INTRAMUSCULAR; INTRAVENOUS EVERY 5 MIN PRN
Status: DISCONTINUED | OUTPATIENT
Start: 2022-12-09 | End: 2022-12-10

## 2022-12-09 RX ORDER — SODIUM CHLORIDE, SODIUM LACTATE, POTASSIUM CHLORIDE, CALCIUM CHLORIDE 600; 310; 30; 20 MG/100ML; MG/100ML; MG/100ML; MG/100ML
INJECTION, SOLUTION INTRAVENOUS CONTINUOUS
Status: DISCONTINUED | OUTPATIENT
Start: 2022-12-09 | End: 2022-12-10

## 2022-12-09 RX ORDER — KETAMINE HYDROCHLORIDE 10 MG/ML
INJECTION INTRAMUSCULAR; INTRAVENOUS PRN
Status: DISCONTINUED | OUTPATIENT
Start: 2022-12-09 | End: 2022-12-09

## 2022-12-09 RX ORDER — FENTANYL CITRATE 50 UG/ML
25 INJECTION, SOLUTION INTRAMUSCULAR; INTRAVENOUS
Status: DISCONTINUED | OUTPATIENT
Start: 2022-12-09 | End: 2022-12-10

## 2022-12-09 RX ORDER — HYDROMORPHONE HYDROCHLORIDE 1 MG/ML
0.4 INJECTION, SOLUTION INTRAMUSCULAR; INTRAVENOUS; SUBCUTANEOUS EVERY 5 MIN PRN
Status: DISCONTINUED | OUTPATIENT
Start: 2022-12-09 | End: 2022-12-10 | Stop reason: HOSPADM

## 2022-12-09 RX ORDER — DEXTROSE MONOHYDRATE 25 G/50ML
25-50 INJECTION, SOLUTION INTRAVENOUS
Status: DISCONTINUED | OUTPATIENT
Start: 2022-12-09 | End: 2022-12-10 | Stop reason: HOSPADM

## 2022-12-09 RX ORDER — LIDOCAINE 40 MG/G
CREAM TOPICAL
Status: DISCONTINUED | OUTPATIENT
Start: 2022-12-09 | End: 2022-12-10 | Stop reason: HOSPADM

## 2022-12-09 RX ORDER — PROPOFOL 10 MG/ML
INJECTION, EMULSION INTRAVENOUS PRN
Status: DISCONTINUED | OUTPATIENT
Start: 2022-12-09 | End: 2022-12-09

## 2022-12-09 RX ADMIN — KETAMINE HYDROCHLORIDE 50 MG: 10 INJECTION, SOLUTION INTRAMUSCULAR; INTRAVENOUS at 14:13

## 2022-12-09 RX ADMIN — DEXAMETHASONE SODIUM PHOSPHATE 5 MG: 10 INJECTION, SOLUTION INTRAMUSCULAR; INTRAVENOUS at 14:13

## 2022-12-09 RX ADMIN — SUGAMMADEX 200 MG: 100 INJECTION, SOLUTION INTRAVENOUS at 16:27

## 2022-12-09 RX ADMIN — PHENYLEPHRINE HYDROCHLORIDE 100 MCG: 10 INJECTION INTRAVENOUS at 14:30

## 2022-12-09 RX ADMIN — ROCURONIUM BROMIDE 20 MG: 50 INJECTION, SOLUTION INTRAVENOUS at 15:35

## 2022-12-09 RX ADMIN — ROCURONIUM BROMIDE 50 MG: 50 INJECTION, SOLUTION INTRAVENOUS at 14:13

## 2022-12-09 RX ADMIN — ALBUMIN HUMAN: 0.25 SOLUTION INTRAVENOUS at 14:35

## 2022-12-09 RX ADMIN — IOHEXOL 170 ML: 350 INJECTION, SOLUTION INTRAVENOUS at 16:32

## 2022-12-09 RX ADMIN — CEFTRIAXONE 1 G: 1 INJECTION, POWDER, FOR SOLUTION INTRAMUSCULAR; INTRAVENOUS at 14:21

## 2022-12-09 RX ADMIN — Medication 1 UNITS: at 14:58

## 2022-12-09 RX ADMIN — SODIUM CHLORIDE, POTASSIUM CHLORIDE, SODIUM LACTATE AND CALCIUM CHLORIDE: 600; 310; 30; 20 INJECTION, SOLUTION INTRAVENOUS at 14:47

## 2022-12-09 RX ADMIN — CALCIUM CHLORIDE 400 MG: 100 INJECTION, SOLUTION INTRAVENOUS at 15:45

## 2022-12-09 RX ADMIN — SODIUM CHLORIDE, POTASSIUM CHLORIDE, SODIUM LACTATE AND CALCIUM CHLORIDE: 600; 310; 30; 20 INJECTION, SOLUTION INTRAVENOUS at 13:51

## 2022-12-09 RX ADMIN — ALBUMIN HUMAN: 0.25 SOLUTION INTRAVENOUS at 15:07

## 2022-12-09 RX ADMIN — MIDAZOLAM 2 MG: 1 INJECTION INTRAMUSCULAR; INTRAVENOUS at 14:40

## 2022-12-09 RX ADMIN — CALCIUM CHLORIDE 300 MG: 100 INJECTION, SOLUTION INTRAVENOUS at 15:30

## 2022-12-09 RX ADMIN — CALCIUM CHLORIDE 300 MG: 100 INJECTION, SOLUTION INTRAVENOUS at 15:07

## 2022-12-09 RX ADMIN — PHENYLEPHRINE HYDROCHLORIDE 0.3 MCG/KG/MIN: 10 INJECTION INTRAVENOUS at 14:29

## 2022-12-09 RX ADMIN — LIDOCAINE HYDROCHLORIDE 3 ML: 10 INJECTION, SOLUTION EPIDURAL; INFILTRATION; INTRACAUDAL; PERINEURAL at 14:13

## 2022-12-09 RX ADMIN — PROPOFOL 100 MG: 10 INJECTION, EMULSION INTRAVENOUS at 14:13

## 2022-12-09 RX ADMIN — ONDANSETRON 4 MG: 2 INJECTION INTRAMUSCULAR; INTRAVENOUS at 16:40

## 2022-12-09 RX ADMIN — ROCURONIUM BROMIDE 30 MG: 50 INJECTION, SOLUTION INTRAVENOUS at 14:50

## 2022-12-09 RX ADMIN — SODIUM BICARBONATE 25 MEQ: 84 INJECTION, SOLUTION INTRAVENOUS at 14:13

## 2022-12-09 ASSESSMENT — ACTIVITIES OF DAILY LIVING (ADL): ADLS_ACUITY_SCORE: 37

## 2022-12-09 NOTE — ANESTHESIA PREPROCEDURE EVALUATION
"Anesthesia Pre-Procedure Evaluation    Patient: Vito Sy   MRN: [id number] : 1950        Procedure : * No procedures listed *  IR TRANSVEN INTRAHEPATIC PORTOSYST SHUNT       Past Medical History:   Diagnosis Date     Diabetes mellitus (H)      Gout      Hypertension      Kidney stone       Past Surgical History:   Procedure Laterality Date     COLONOSCOPY       IR TRANSVEN INTRAHEPATIC PORTOSYST SHUNT  2022     [place] ESOPHAGOGASTRODUODENOSCOPY TRANSORAL DIAGNOSTIC N/A 2020    Procedure: ESOPHAGOGASTRODUODENOSCOPY (EGD);  Surgeon: Juan Garnett MD;  Location: [facility];  Service: Gastroenterology     [place] ESOPHAGOGASTRODUODENOSCOPY TRANSORAL DIAGNOSTIC N/A 2020    Procedure: ESOPHAGOGASTRODUODENOSCOPY (EGD) WITH BANDING;  Surgeon: Juan Garnett MD;  Location: [facility];  Service: Gastroenterology     URETEROSCOPY        Allergies   Allergen Reactions     Penicillins Unknown     Annotation: discussed with patient, he reports he had \"itching\" with penicillin many years ago in [facility] but no hives or throat or respiratory symptoms.  he also reports having tolerated amoxicillin since coming to [place].       Sulfa Drugs       Social History     Tobacco Use     Smoking status: Never     Smokeless tobacco: Never   Substance Use Topics     Alcohol use: Yes     Comment: none for the past 2 years      Wt Readings from Last 1 Encounters:   22 87.5 kg (193 lb)        Anesthesia Evaluation   Pt has had prior anesthetic.     No history of anesthetic complications       ROS/MED HX  ENT/Pulmonary:       Neurologic:       Cardiovascular:     (+) hypertension-----    METS/Exercise Tolerance:     Hematologic:       Musculoskeletal:       GI/Hepatic:     (+) liver disease,     Renal/Genitourinary:     (+) renal disease,     Endo:     (+) type I DM,     Psychiatric/Substance Use:       Infectious Disease:       Malignancy:       Other:            Physical Exam    Airway        Mallampati: II  "   Neck ROM: full     Respiratory Devices and Support         Dental       (+) chipped, missing and loose      Cardiovascular   cardiovascular exam normal          Pulmonary   pulmonary exam normal                OUTSIDE LABS:  CBC:   Lab Results   Component Value Date    WBC 5.1 11/17/2022    WBC 4.5 11/16/2022    HGB 8.6 (L) 11/17/2022    HGB 8.8 (L) 11/17/2022    HCT 27.4 (L) 11/17/2022    HCT 29.9 (L) 11/16/2022     (L) 11/17/2022     (L) 11/16/2022     BMP:   Lab Results   Component Value Date     11/17/2022     11/16/2022    POTASSIUM 4.9 11/17/2022    POTASSIUM 4.5 11/16/2022    CHLORIDE 106 11/17/2022    CHLORIDE 105 11/16/2022    CO2 21 (L) 11/17/2022    CO2 23 11/16/2022    BUN 17.6 11/17/2022    BUN 17.0 11/16/2022    CR 1.35 (H) 11/17/2022    CR 1.37 (H) 11/16/2022     (H) 11/17/2022     (H) 11/17/2022     COAGS:   Lab Results   Component Value Date    INR 1.33 (H) 11/16/2022     POC: No results found for: BGM, HCG, HCGS  HEPATIC:   Lab Results   Component Value Date    ALBUMIN 3.1 (L) 11/16/2022    PROTTOTAL 6.5 11/16/2022    ALT 20 11/16/2022    AST 33 11/16/2022    ALKPHOS 106 11/16/2022    BILITOTAL 1.0 11/16/2022     OTHER:   Lab Results   Component Value Date    PH 7.34 (L) 04/27/2022    A1C 6.2 (H) 11/09/2022    SILVIA 8.8 11/17/2022    MAG 1.8 11/19/2020       Anesthesia Plan    ASA Status:  3      Anesthesia Type: General.     - Airway: ETT              Consents    Anesthesia Plan(s) and associated risks, benefits, and realistic alternatives discussed. Questions answered and patient/representative(s) expressed understanding.     - Discussed: Risks, Benefits and Alternatives for the PROCEDURE were discussed     - Discussed with:  Patient      - Extended Intubation/Ventilatory Support Discussed: No.      - Patient is DNR/DNI Status: No    Use of blood products discussed: No .     Postoperative Care    Pain management: Multi-modal analgesia.   PONV prophylaxis:  Ondansetron (or other 5HT-3), Dexamethasone or Solumedrol     Comments:                Melania Joel MD

## 2022-12-09 NOTE — ANESTHESIA CARE TRANSFER NOTE
Patient: Vito Sy    Procedure: * No procedures listed *  IR TRANSVEN INTRAHEPATIC PORTOSYST SHUNT    Diagnosis: * No pre-op diagnosis entered *  Diagnosis Additional Information: No value filed.    Anesthesia Type:   General     Note:    Oropharynx: spontaneously breathing  Level of Consciousness: drowsy  Oxygen Supplementation: face mask  Level of Supplemental Oxygen (L/min / FiO2): 8  Independent Airway: airway patency satisfactory and stable  Dentition: dentition unchanged  Vital Signs Stable: post-procedure vital signs reviewed and stable  Report to RN Given: handoff report given  Patient transferred to: PACU  Comments: Pt. [place] and breathing spontaneously.  Vitals stable.  Report given to oncoming nurse.  Transfer of care occurred.   Handoff Report: Identifed the Patient, Identified the Reponsible Provider, Reviewed the pertinent medical history, Discussed the surgical course, Reviewed Intra-OP anesthesia mangement and issues during anesthesia, Set expectations for post-procedure period and Allowed opportunity for questions and acknowledgement of understanding      Vitals:  Vitals Value Taken Time   /61 12/09/22 1706   Temp 35.9  C (96.7  F) 12/09/22 1706   Pulse 67 12/09/22 1706   Resp 16 12/09/22 1706   SpO2 100 % 12/09/22 1706       Electronically Signed By: PEDRO Garner CRNA  December 9, 2022  5:17 PM

## 2022-12-09 NOTE — PROCEDURES
Interventional Radiology Post-Procedure Note    Procedure: Transjugular intrahepatic portosystemic shunt.    Attending: Austin Desai MD    Complications: No immediate complications.    Estimated Blood Loss:  Minimal.    Findings: Successful TIPS creation with normalization of portosystemic gradient. No significant varices identified.    Plan: Bedrest x 6 hours; US in 2 weeks to serve as baseline.

## 2022-12-09 NOTE — ANESTHESIA PROCEDURE NOTES
Airway       Patient location: IR.  Staff -        Performed By: CRNAIndications and Patient Condition       Indications for airway management: mervat-procedural       Induction type:intravenous       Mask difficulty assessment: 1 - vent by mask    Final Airway Details       Final airway type: endotracheal airway       Successful airway: ETT - single  Endotracheal Airway Details        ETT size (mm): 7.0       Cuffed: yes       Successful intubation technique: video laryngoscopy       VL Blade Size: Glidescope 3       Grade View of Cords: 1       Adjucts: stylet       Position: Center       Measured from: lips       Secured at (cm): 22       Bite block used: None    Post intubation assessment        Placement verified by: capnometry        Number of attempts at approach: 1       Number of other approaches attempted: 0       Secured with: silk tape       Ease of procedure: easy       Dentition: Intact       Dental guard used and removed.

## 2022-12-10 VITALS
HEIGHT: 66 IN | WEIGHT: 199.1 LBS | BODY MASS INDEX: 32 KG/M2 | RESPIRATION RATE: 20 BRPM | SYSTOLIC BLOOD PRESSURE: 122 MMHG | HEART RATE: 85 BPM | DIASTOLIC BLOOD PRESSURE: 59 MMHG | TEMPERATURE: 97.8 F | OXYGEN SATURATION: 100 %

## 2022-12-10 LAB
ALBUMIN SERPL BCG-MCNC: 2.8 G/DL (ref 3.5–5.2)
ALP SERPL-CCNC: 99 U/L (ref 40–129)
ALT SERPL W P-5'-P-CCNC: 28 U/L (ref 10–50)
ANION GAP SERPL CALCULATED.3IONS-SCNC: 11 MMOL/L (ref 7–15)
ANION GAP SERPL CALCULATED.3IONS-SCNC: 11 MMOL/L (ref 7–15)
AST SERPL W P-5'-P-CCNC: 42 U/L (ref 10–50)
BASOPHILS # BLD AUTO: 0 10E3/UL (ref 0–0.2)
BASOPHILS NFR BLD AUTO: 0 %
BILIRUB SERPL-MCNC: 1.1 MG/DL
BUN SERPL-MCNC: 19.1 MG/DL (ref 8–23)
CALCIUM SERPL-MCNC: 8.5 MG/DL (ref 8.8–10.2)
CHLORIDE SERPL-SCNC: 110 MMOL/L (ref 98–107)
CHLORIDE SERPL-SCNC: 110 MMOL/L (ref 98–107)
CREAT SERPL-MCNC: 1.39 MG/DL (ref 0.67–1.17)
DEPRECATED HCO3 PLAS-SCNC: 20 MMOL/L (ref 22–29)
DEPRECATED HCO3 PLAS-SCNC: 20 MMOL/L (ref 22–29)
EOSINOPHIL # BLD AUTO: 0 10E3/UL (ref 0–0.7)
EOSINOPHIL NFR BLD AUTO: 0 %
ERYTHROCYTE [DISTWIDTH] IN BLOOD BY AUTOMATED COUNT: 17.9 % (ref 10–15)
GFR SERPL CREATININE-BSD FRML MDRD: 54 ML/MIN/1.73M2
GLUCOSE BLDC GLUCOMTR-MCNC: 125 MG/DL (ref 70–99)
GLUCOSE BLDC GLUCOMTR-MCNC: 142 MG/DL (ref 70–99)
GLUCOSE BLDC GLUCOMTR-MCNC: 157 MG/DL (ref 70–99)
GLUCOSE SERPL-MCNC: 159 MG/DL (ref 70–99)
HCT VFR BLD AUTO: 28.9 % (ref 40–53)
HGB BLD-MCNC: 8.9 G/DL (ref 13.3–17.7)
IMM GRANULOCYTES # BLD: 0 10E3/UL
IMM GRANULOCYTES NFR BLD: 1 %
LYMPHOCYTES # BLD AUTO: 0.5 10E3/UL (ref 0.8–5.3)
LYMPHOCYTES NFR BLD AUTO: 8 %
MCH RBC QN AUTO: 25.9 PG (ref 26.5–33)
MCHC RBC AUTO-ENTMCNC: 30.8 G/DL (ref 31.5–36.5)
MCV RBC AUTO: 84 FL (ref 78–100)
MONOCYTES # BLD AUTO: 0.6 10E3/UL (ref 0–1.3)
MONOCYTES NFR BLD AUTO: 8 %
NEUTROPHILS # BLD AUTO: 5.9 10E3/UL (ref 1.6–8.3)
NEUTROPHILS NFR BLD AUTO: 83 %
NRBC # BLD AUTO: 0 10E3/UL
NRBC BLD AUTO-RTO: 0 /100
PLATELET # BLD AUTO: 127 10E3/UL (ref 150–450)
POTASSIUM SERPL-SCNC: 4.2 MMOL/L (ref 3.4–5.3)
POTASSIUM SERPL-SCNC: 4.2 MMOL/L (ref 3.4–5.3)
PROT SERPL-MCNC: 5.9 G/DL (ref 6.4–8.3)
RBC # BLD AUTO: 3.43 10E6/UL (ref 4.4–5.9)
SODIUM SERPL-SCNC: 141 MMOL/L (ref 136–145)
SODIUM SERPL-SCNC: 141 MMOL/L (ref 136–145)
WBC # BLD AUTO: 7 10E3/UL (ref 4–11)

## 2022-12-10 PROCEDURE — 85025 COMPLETE CBC W/AUTO DIFF WBC: CPT | Performed by: INTERNAL MEDICINE

## 2022-12-10 PROCEDURE — 82962 GLUCOSE BLOOD TEST: CPT | Mod: 91

## 2022-12-10 PROCEDURE — 84295 ASSAY OF SERUM SODIUM: CPT | Performed by: INTERNAL MEDICINE

## 2022-12-10 PROCEDURE — 82962 GLUCOSE BLOOD TEST: CPT

## 2022-12-10 PROCEDURE — 250N000012 HC RX MED GY IP 250 OP 636 PS 637: Performed by: INTERNAL MEDICINE

## 2022-12-10 PROCEDURE — 80053 COMPREHEN METABOLIC PANEL: CPT | Performed by: INTERNAL MEDICINE

## 2022-12-10 PROCEDURE — 250N000013 HC RX MED GY IP 250 OP 250 PS 637: Performed by: INTERNAL MEDICINE

## 2022-12-10 PROCEDURE — 258N000003 HC RX IP 258 OP 636: Performed by: ANESTHESIOLOGY

## 2022-12-10 PROCEDURE — 36415 COLL VENOUS BLD VENIPUNCTURE: CPT | Performed by: INTERNAL MEDICINE

## 2022-12-10 PROCEDURE — 96372 THER/PROPH/DIAG INJ SC/IM: CPT | Performed by: INTERNAL MEDICINE

## 2022-12-10 PROCEDURE — 250N000011 HC RX IP 250 OP 636: Performed by: HOSPITALIST

## 2022-12-10 PROCEDURE — 82374 ASSAY BLOOD CARBON DIOXIDE: CPT | Performed by: INTERNAL MEDICINE

## 2022-12-10 RX ORDER — BISMUTH SUBSALICYLATE 262 MG/1
1 TABLET, CHEWABLE ORAL EVERY 6 HOURS PRN
COMMUNITY
End: 2024-01-01

## 2022-12-10 RX ORDER — NALOXONE HYDROCHLORIDE 0.4 MG/ML
0.4 INJECTION, SOLUTION INTRAMUSCULAR; INTRAVENOUS; SUBCUTANEOUS
Status: DISCONTINUED | OUTPATIENT
Start: 2022-12-10 | End: 2022-12-10 | Stop reason: HOSPADM

## 2022-12-10 RX ORDER — GUAIFENESIN, DEXTROMETHORPHAN HBR 600; 30 MG/1; MG/1
1 TABLET ORAL PRN
COMMUNITY
End: 2023-01-01

## 2022-12-10 RX ORDER — CALCIUM CARBONATE 500 MG/1
1-2 TABLET, CHEWABLE ORAL 3 TIMES DAILY PRN
Status: ON HOLD | COMMUNITY
End: 2023-01-01

## 2022-12-10 RX ORDER — FUROSEMIDE 10 MG/ML
40 INJECTION INTRAMUSCULAR; INTRAVENOUS ONCE
Status: COMPLETED | OUTPATIENT
Start: 2022-12-10 | End: 2022-12-10

## 2022-12-10 RX ORDER — NALOXONE HYDROCHLORIDE 0.4 MG/ML
0.2 INJECTION, SOLUTION INTRAMUSCULAR; INTRAVENOUS; SUBCUTANEOUS
Status: DISCONTINUED | OUTPATIENT
Start: 2022-12-10 | End: 2022-12-10 | Stop reason: HOSPADM

## 2022-12-10 RX ORDER — ACETAMINOPHEN 500 MG
500-1000 TABLET ORAL EVERY 6 HOURS PRN
COMMUNITY
End: 2023-01-01

## 2022-12-10 RX ADMIN — ACETAMINOPHEN 650 MG: 325 TABLET ORAL at 09:32

## 2022-12-10 RX ADMIN — SODIUM CHLORIDE, POTASSIUM CHLORIDE, SODIUM LACTATE AND CALCIUM CHLORIDE: 600; 310; 30; 20 INJECTION, SOLUTION INTRAVENOUS at 02:28

## 2022-12-10 RX ADMIN — FUROSEMIDE 40 MG: 10 INJECTION, SOLUTION INTRAMUSCULAR; INTRAVENOUS at 15:18

## 2022-12-10 RX ADMIN — INSULIN ASPART 1 UNITS: 100 INJECTION, SOLUTION INTRAVENOUS; SUBCUTANEOUS at 12:10

## 2022-12-10 RX ADMIN — INSULIN ASPART 1 UNITS: 100 INJECTION, SOLUTION INTRAVENOUS; SUBCUTANEOUS at 05:25

## 2022-12-10 ASSESSMENT — ACTIVITIES OF DAILY LIVING (ADL)
ADLS_ACUITY_SCORE: 37

## 2022-12-10 NOTE — PROGRESS NOTES
Patient is alert,oriented and independent with ADLs. He lives wife wife and extended family. He expects to discharge to home. Son will transport. Patient plans on doing OP follow up.

## 2022-12-10 NOTE — DISCHARGE SUMMARY
[facility] MEDICINE  DISCHARGE SUMMARY     Primary Care Physician: Amrik Mejia  Admission Date: 12/9/2022   Discharge Provider: Juma Holguin MD Discharge Date: 12/10/2022   Diet:   Active Diet and Nourishment Order   Procedures     2 Gram Sodium Diet     Diet       Code Status: Full Code   Activity: DCACTIVITY: Activity as tolerated        Condition at Discharge: Stable       PRINCIPAL & ACTIVE DISCHARGE DIAGNOSES     Active Problems:    * No active hospital problems. *      PENDING LABS     Unresulted Labs Ordered in the Past 30 Days of this Admission     No orders found for last 31 day(s).            PROCEDURES ( this hospitalization only)      * No procedures listed *    RECOMMENDATIONS TO OUTPATIENT PROVIDER FOR F/U VISIT     Follow-up Appointments     Follow-up and recommended labs and tests       Follow up with primary care provider, Amrik Mejia, within 7 days to   evaluate medication change, to evaluate treatment change, to evaluate   after surgery, for hospital follow- up, and GI follow-up.  No follow up   labs or test are needed.             Follow-up with primary care physician within 7 days to evaluate for hospital follow-up s/p TIPS.    DISPOSITION     Home    SUMMARY OF HOSPITAL COURSE:      Initial presentation (Copied from [facility])       Vito Sy is a 72 year old male admitted on 12/9/2022. H/o decompensated alcoholic cirrhosis and portal HTN. He has been admitted and underwent elective TIPS procedure.   He recently underwent upper endoscopy which demonstrated small esophageal varices that did not require intervention. Has no significant history of bleeding esophageal varices.   He does have significant ascites, and is undergoing large volume paracentesis every 3-8 weeks. He has been following with Dr. Montenegro with [facility] and has been taking diuretics in addition to his paracenteses for ascites, but this has not been sufficient to manage his fluid status. Most  recent paracentesis 11/16/2022 with 2L of fluid aspirated. He has also had a loss of appetite and significant LE swelling. He no longer drinks alcohol.        Problems addressed during hospital stay      Vito Sy is a 72-year-old male with history of liver cirrhosis secondary to alcohol/JENNINGS with refractory ascites and portal hypertension.  He underwent successful TIPS procedure on December 9 without complications.  He is presently stable.  He has been seen and followed by interventional radiology and by gastroenterology and has been cleared for discharge.  Recommendations include a low-sodium diet, continue spironolactone 25 mg daily follow-up at the liver clinic which will be coordinated by GI.    Discharge Medications with Med changes:     Current Discharge Medication List      CONTINUE these medications which have NOT CHANGED    Details   acetaminophen (TYLENOL) 500 MG tablet Take 500-1,000 mg by mouth every 6 hours as needed for mild pain      Aromatic Inhalants (RA MENTHOL NASAL INHALER) INHA Spray 1 Inhalation in nostril as needed      bismuth subsalicylate (PEPTO BISMOL) 262 MG chewable tablet Take 1 tablet by mouth every 6 hours as needed for diarrhea      calcium carbonate (TUMS) 500 MG chewable tablet Take 1-2 chew tab by mouth 3 times daily as needed for heartburn      hypromellose (ARTIFICIAL TEARS) 0.5 % SOLN ophthalmic solution Place 1 drop into both eyes 4 times daily as needed for dry eyes      multivitamin, therapeutic (THERA-VIT) TABS tablet Take 1 tablet by mouth daily      omeprazole (PRILOSEC) 20 MG DR capsule TAKE 1 CAPSULE BY MOUTH TWICE DAILY BEFORE MEALS  Qty: 180 capsule, Refills: 3    Associated Diagnoses: Encounter for medication refill      pioglitazone (ACTOS) 30 MG tablet Take 1 tablet by mouth once daily  Qty: 90 tablet, Refills: 3    Associated Diagnoses: Type 2 diabetes mellitus with microalbuminuria, without long-term current use of insulin (H); Encounter for medication  refill      Probiotic, Lactobacillus, CAPS Take 1 capsule by mouth daily      simvastatin (ZOCOR) 20 MG tablet Take 1 tablet (20 mg) by mouth At Bedtime  Qty: 90 tablet, Refills: 2    Associated Diagnoses: Hypercholesteremia      spironolactone (ALDACTONE) 25 MG tablet Take 25 mg by mouth daily                   Rationale for medication changes:      No medication changes during this hospital stay for        Consults     Interventional radiology  HOSPITALIST IP CONSULT  GASTROENTEROLOGY IP CONSULT    Immunizations given this encounter     Most Recent Immunizations   Administered Date(s) Administered     COVID-19 Vaccine 12+ (Pfizer 2022) 04/03/2022     COVID-19 Vaccine 12+ (Pfizer) 10/24/2021     COVID-19 Vaccine Bivalent Booster 12+ (Pfizer) 10/27/2022     Flu 65+ Years 09/07/2022     Flu, Unspecified 09/24/2018     Influenza (High Dose) 3 valent vaccine 09/18/2019     Influenza (IIV3) PF 10/18/2014     Influenza Vaccine 65+ (Fluzone HD) 10/21/2020     Influenza Vaccine >6 months (Alfuria,Fluzone) 09/25/2015     Influenza Vaccine, 6+MO IM (QUADRIVALENT W/PRESERVATIVES) 10/18/2014     Pneumo Conj 13-V (2010&after) 11/14/2016     Pneumococcal 23 valent 03/17/2014     Td (Adult), Adsorbed 1950     Tdap (Adacel,Boostrix) 03/17/2014     Typhoid IM 11/14/2016     Varicella Immunity: Titer/MD Dx 03/30/2007     Zoster vaccine recombinant adjuvanted (SHINGRIX) 07/06/2022           Anticoagulation Information      Recent INR results:   Recent Labs   Lab 12/09/22  1254   INR 1.37*           SIGNIFICANT IMAGING FINDINGS     Results for orders placed or performed during the hospital encounter of 12/09/22   IR Transven Intrahepatic Portosyst Shunt    Impression    IMPRESSION:    1.  Successful placement of a TIPS extending from the right hepatic vein to the right portal vein, as detailed above.   2. Portosystemic gradient at the conclusion of the procedure measured 4 mmHg.         SIGNIFICANT LABORATORY FINDINGS     Most  Recent 3 CBC's:Recent Labs   Lab Test 12/10/22  0542 12/09/22  1254 11/17/22  0522 11/16/22  2024 11/16/22  1154   WBC 7.0  --  5.1  --  4.5   HGB 8.9* 8.2* 8.6*   < > 9.2*   MCV 84  --  83  --  83   * 116* 119*  --  130*    < > = values in this interval not displayed.           Discharge Orders        Reason for your hospital stay    History of liver cirrhosis and portal hypertension, for TIPS procedure     Follow-up and recommended labs and tests     Follow up with primary care provider, Amrik Mejia, within 7 days to evaluate medication change, to evaluate treatment change, to evaluate after surgery, for hospital follow- up, and GI follow-up.  No follow up labs or test are needed.     Activity    Your activity upon discharge: activity as tolerated     Diet    Follow this diet upon discharge: Low salt       Examination   Physical Exam   Temp:  [96.7  F (35.9  C)-98.5  F (36.9  C)] 98.5  F (36.9  C)  Pulse:  [67-81] 73  Resp:  [14-22] 20  BP: (101-135)/(53-71) 117/53  SpO2:  [95 %-100 %] 100 %  Wt Readings from Last 1 Encounters:   12/10/22 90.3 kg (199 lb 1.6 oz)         Physical Exam  HENT:      Head: Normocephalic.      Nose: Nose normal.      Mouth/Throat:      Mouth: Mucous membranes are moist.   Cardiovascular:      Rate and Rhythm: Normal rate.   Abdominal:      General: There is distension.      Palpations: There is fluid wave.   Neurological:      General: No focal deficit present.      Mental Status: He is alert and oriented to person, place, and time.             Please see EMR for more detailed significant labs, imaging, consultant notes etc.    I, Juma Holguin MD, personally saw the patient today and spent less than or equal to 30 minutes discharging this patient.    Juma Holguin MD  [facility]    CC:Amrik Mejia

## 2022-12-10 NOTE — PLAN OF CARE
Goal Outcome Evaluation:    Pt aox4. Tele NSR, 1 deg AVB. LE edema +2, abdomen distended. Prn tylenol given for abdominal pain. Pt advanced to low Na diet. Pt ready to go home, GI still has to see pt.     Problem: Plan of Care - These are the overarching goals to be used throughout the patient stay.    Goal: Optimal Comfort and Wellbeing  Outcome: Progressing  Intervention: Monitor Pain and Promote Comfort  Recent Flowsheet Documentation  Taken 12/10/2022 0932 by Clari Rodriguez RN  Pain Management Interventions: medication (see MAR)     Problem: Plan of Care - These are the overarching goals to be used throughout the patient stay.    Goal: Readiness for Transition of Care  Outcome: Progressing     Problem: Plan of Care - These are the overarching goals to be used throughout the patient stay.    Goal: Absence of Hospital-Acquired Illness or Injury  Outcome: Progressing  Intervention: Identify and Manage Fall Risk  Recent Flowsheet Documentation  Taken 12/10/2022 1300 by Clari Rodriguez RN  Safety Promotion/Fall Prevention:   bed alarm on   fall prevention program maintained   lighting adjusted   nonskid shoes/slippers when out of bed   room door open  Taken 12/10/2022 0900 by Clari Rodriguez RN  Safety Promotion/Fall Prevention:   bed alarm on   fall prevention program maintained   lighting adjusted   nonskid shoes/slippers when out of bed   room door open  Intervention: Prevent Skin Injury  Recent Flowsheet Documentation  Taken 12/10/2022 0932 by Clari Rodriguez, RN  Body Position: supine, head elevated

## 2022-12-10 NOTE — ANESTHESIA POSTPROCEDURE EVALUATION
Patient: Vito yS    Procedure: * No procedures listed *  IR TRANSVEN INTRAHEPATIC PORTOSYST SHUNT    Anesthesia Type:  General    Note:  Disposition: Inpatient   Postop Pain Control: Uneventful            Sign Out: Well controlled pain   PONV: No   Neuro/Psych: Uneventful            Sign Out: Acceptable/Baseline neuro status   Airway/Respiratory: Uneventful            Sign Out: Acceptable/Baseline resp. status   CV/Hemodynamics: Uneventful            Sign Out: Acceptable CV status; No obvious hypovolemia; No obvious fluid overload   Other NRE: NONE   DID A NON-ROUTINE EVENT OCCUR? No           Last vitals:  Vitals:    12/09/22 2015 12/09/22 2030 12/09/22 2045   BP: 113/60 108/58 113/64   Pulse: 79 80 80   Resp: 20 21 22   Temp: 36.8  C (98.3  F)     SpO2: 96% 97% 97%       Electronically Signed By: Joya Field MD  December 9, 2022  8:58 PM

## 2022-12-10 NOTE — CONSULTS
[facility] DIGESTIVE HEALTH CONSULTATION    Vito CABRERA Yossi   [address] [place] [address]  72 year old male    Admission Date/Time: 12/9/2022  9:17 PM    Primary Care Provider:  Amrik Mejia    Requesting Physician: Juma Holguin MD      CHIEF COMPLAINT:   Ascites    REASON FOR THE CONSULT:  Ascites, s/p TIPS    HPI:   72 year old M with hx of EtOH cirrhosis, abstinent for ~2 years, presents for TIPS 2/2 refractory ascites. Admitted for observation following the procedure.  Pt denies abdominal pain, nausea, emesis. No fever. Tolerating diet. Urinating well. Denies SOB. Denies confusion, disorientation. Anticipating discharge.      REVIEW OF SYSTEMS:   10 point ROS neg other than the symptoms noted above in the HPI.    MEDICATIONS:  Current Outpatient Medications   Medication Instructions     acetaminophen (TYLENOL) 500-1,000 mg, Oral, EVERY 6 HOURS PRN     Aromatic Inhalants (RA MENTHOL NASAL INHALER) INHA 1 Inhalation, Nasal, PRN     bismuth subsalicylate (PEPTO BISMOL) 262 MG chewable tablet 1 tablet, Oral, EVERY 6 HOURS PRN     calcium carbonate (TUMS) 500 MG chewable tablet 1-2 chew tab, Oral, 3 TIMES DAILY PRN     hypromellose (ARTIFICIAL TEARS) 0.5 % SOLN ophthalmic solution 1 drop, Both Eyes, 4 TIMES DAILY PRN     multivitamin, therapeutic (THERA-VIT) TABS tablet 1 tablet, Oral, DAILY     omeprazole (PRILOSEC) 20 MG DR capsule TAKE 1 CAPSULE BY MOUTH TWICE DAILY BEFORE MEALS     pioglitazone (ACTOS) 30 MG tablet Take 1 tablet by mouth once daily     Probiotic, Lactobacillus, CAPS 1 capsule, Oral, DAILY     simvastatin (ZOCOR) 20 mg, Oral, AT BEDTIME     spironolactone (ALDACTONE) 25 mg, Oral, DAILY       PAST MEDICAL HISTORY:  Past Medical History:   Diagnosis Date     Diabetes mellitus (H)      Gout      Hypertension      Kidney stone        PAST SURGICAL HISTORY:  Past Surgical History:   Procedure Laterality Date     COLONOSCOPY       IR TRANSVEN INTRAHEPATIC PORTOSYST SHUNT  11/16/2022     IR  "TRANSVEN INTRAHEPATIC PORTOSYST SHUNT  12/9/2022     IN ESOPHAGOGASTRODUODENOSCOPY TRANSORAL DIAGNOSTIC N/A 11/16/2020    Procedure: ESOPHAGOGASTRODUODENOSCOPY (EGD);  Surgeon: Juan Garnett MD;  Location: [facility];  Service: Gastroenterology     IN ESOPHAGOGASTRODUODENOSCOPY TRANSORAL DIAGNOSTIC N/A 11/18/2020    Procedure: ESOPHAGOGASTRODUODENOSCOPY (EGD) WITH BANDING;  Surgeon: Juan Garnett MD;  Location: [facility];  Service: Gastroenterology     URETEROSCOPY         FAMILY HISTORY:  Family History   Problem Relation Age of Onset     Heart Disease Brother      Hypertension Mother      Hypertension Father        SOCIAL HISTORY:  Social History     Tobacco Use     Smoking status: Never     Smokeless tobacco: Never   Substance Use Topics     Alcohol use: Yes     Comment: none for the past 2 years       ALLERGIES/SENSITIVITIES:  Sulfa drugs and Penicillins      PHYSICAL EXAM:  /53 (BP Location: Left arm)   Pulse 73   Temp 98.5  F (36.9  C) (Oral)   Resp 20   Ht 1.676 m (5' 6\")   Wt 90.3 kg (199 lb 1.6 oz)   SpO2 100%   BMI 32.14 kg/m    Body mass index is 32.14 kg/m .  General: A&Ox3, NAD, non-toxic appearing  Eyes: No icterus or conjunctivitis  ENT: MMM, OP clear without ulcerations  Neck/Thyroid: Supple, no masses  Pulmonary: CTA B  Cardiovascular: RR, S1, S2  Gastrointestinal: Soft, obese., NTTP, NABS, no r/g, +ascites  Skin: No jaundice  Lymph: No cervical or supraclavicular lymphadenopathy  Extrem: PPI, +peripheral edema      LABORATORY DATA:  CBC:  Recent Labs   Lab Test 12/10/22  0542   WBC 7.0   RBC 3.43*   HGB 8.9*   HCT 28.9*   MCV 84   MCH 25.9*   MCHC 30.8*   RDW 17.9*   *        BMP:  Recent Labs   Lab 12/10/22  0819 12/10/22  0542 12/10/22  0510 12/09/22  1341 12/09/22  1254   NA  --  141  141  --   --  143  145   POTASSIUM  --  4.2  4.2  --   --  3.4   CHLORIDE  --  110*  110*  --   --  114*   CO2  --  20*  20*  --   --  21*   * 159* 157*   < > 114*   CR  " --  1.39*  --   --  1.28*  1.27*   BUN  --  19.1  --   --  16.2    < > = values in this interval not displayed.       INR:  Recent Labs   Lab Test 12/09/22  1254   INR 1.37*       Liver and Pancreas panel:  Recent Labs   Lab 12/10/22  0542 12/09/22  1254   AST 42 34   ALT 28 23   ALKPHOS 99 111   BILITOTAL 1.1 0.9         IMAGING:    IR Transven Intrahepatic Portosyst Shunt    Result Date: 12/9/2022  [facility] DATE: 12/9/2022 PROCEDURE: TRANSJUGULAR INTRAHEPATIC PORTOSYSTEMIC SHUNT (TIPS) PLACEMENT. 1.  Ultrasound-guided access of the right internal jugular vein. A permanent image was stored. 2.  Digital subtraction right hepatic venography. 3.  Digital subtraction wedge CO2 portal venography. 4.  Transjugular intrahepatic portosystemic needle passage. 5.  Digital subtraction portal venography. 6.  Portosystemic pre-TIPS intravenous pressure monitoring. 7.  Placement of a TIPS (8-10 mm x 8 cm x 2 cm Viatorr, controlled expansion). 8.  Portosystemic post TIPS intravenous pressure monitoring. INTERVENTIONAL RADIOLOGIST: Austin Desai M.D. INDICATION: 72-year-old male with history of cirrhosis and the sequela portal hypertension including recurrent abdominal ascites. TIPS was attempted on 11/6/2022 although was unsuccessful. Patient presents again for transjugular intrahepatic portosystemic shunt. MODERATE SEDATION: General endotracheal anesthesia. FLUOROSCOPIC TIME: 31.7 minutes. AIR KERMA: 1763 CONTRAST: 80 mL Omnipaque. COMPLICATIONS: No immediate complications. PROCEDURE/TECHNIQUE:  The patient was brought to the Interventional Radiology suite, placed in the supine position, and a timeout was performed. The right side of the patient's neck was then sterilely prepped and draped. After giving local anesthesia, using ultrasound guidance, access was achieved into the internal jugular vein using a 21-gauge needle. A permanent image was stored for the record.  A 0.018 inch wire was advanced through the  needle. The needle was then exchanged for a 4 Algerian coaxial dilator. The inner 3 Algerian dilator and 0.018 inch wire were then exchanged for a 0.035 inch Amplatz wire. The Amplatz wire was then navigated into the IVC. The 4 Algerian dilator was removed and dilatation was performed over the Amplatz wire with a 10 Algerian dilator followed by placement of a 10 Algerian sheath.The tip of which was advanced into the right atrium. The sheath was then attached to a saline drip. Through the sheath, a 5 Algerian MPA catheter was used to obtain a pressure in the right atrium. The catheter was then advanced into the renal IVC followed by the hepatic IVC where pressure measurements were obtained. The catheter was then manipulated into a rightward hepatic vein with location confirmed fluoroscopically and by brief venogram. Free and wedged hepatic vein pressures were obtained, with wedging confirmed by brief gentle contrast injection. The pressures, in mean mmHg were: right atrium 20, renal IVC 23, hepatic IVC 24, free hepatic vein 25, wedged hepatic vein 27. Findings are likely consistent with compensated portal hypertension. Following this, with the catheter wedged, CO2 portogram was then performed in the AP projection. Portal venous anatomy was mapped out using these images. The Amplatz wire was advanced through the MPA catheter in the hepatic vein, and the catheter was removed over the wire. The dilator to the sheath was advanced over the wire and the sheath was advanced into the hepatic vein. Next the dilator was exchanged over the Amplatz wire for the [place] needle. Several passes were made into the liver with the  needle prepped anteriorly when a right portal vein branch was entered successfully. Position of the needle within a portal vein branch was confirmed by contrast injection through the needle. A 0.035 inch angled Glidewire was advanced through the needle and directed into the main portal vein. The needle and introducer  were then exchanged over the Glidewire for a 4 Cymraes Glidecath which was advanced over the wire into the main portal vein. The direct portal pressure was measured at this point to be 26 mmHg, again suggestive of compensated portal hypertension. Digital portal venography was then performed which demonstrated hepatopedal blood flow within the main portal vein. Subsequently, the Glidecath was removed over 8 0.035 inch Nitrex wire. A small amount of contrast was administered through the 10 Cymraes sheath to identify the point at which the parenchymal tract began. Next a 7 mm x 4 cm angioplasty balloon was advanced over the wire and used to dilate the hepatic parenchymal tract. The balloon was then exchanged over the wire for a 5 Cymraes marked pigtail catheter was then advanced into the main portal vein. Digital angiography was then performed, the the right anterior oblique projection, through the sheath (to define the hepatic vein/parenchymal tract interface) as well as through the pigtail catheter ( to define the parenchymal tract/portal vein interface). A measurement of the length of the parenchymal tract was also taken at this time. Next, the pigtail catheter was exchanged over the wire for the dilator for the 10 Cymraes sheath and the sheath was able to be advanced into the main portal vein. An 8-10 mm x 8 cm controlled expansion Viatorr partially covered stent (8 cm covered and 2 cm uncovered) was selected for placement, the stent was placed through the sheath and deployed from the portal vein, across the parenchymal track, into the hepatic vein, with the covered portion extending across the hepatic vein and parenchymal tract, and slightly into the portal vein. Angioplasty was performed throughout the TIPS using a and 8 mm x 8 cm balloon. The multipurpose catheter was advanced through the TIPS into the main portal vein. Pressure measurements were obtained. The pressures, in mean mmHg were: main portal vein 28,  proximal TIPS 28, mid TIPS 27, distal TIPS 25, and right atrium 24. A completion portogram was performed. The decision was made to conclude the procedure. All wires and catheters were removed. The indwelling 10 [place] sheath was removed and hemostasis was achieved with manual compression. FINDINGS: The digital subtraction right hepatic venography shows the catheter to lie within the periphery of the hepatic vein. The CO2 venography shows a normal caliber of portal vein with antegrade flow. Following angioplasty and placement of the TIPS the stent is shown to be widely patent.     IMPRESSION:  1.  Successful placement of a TIPS extending from the right hepatic vein to the right portal vein, as detailed above. 2. Portosystemic gradient at the conclusion of the procedure measured 4 mmHg.         ASSESSMENT:   1. Cirrhosis - 2/2 EtOH/JENNINGS. Refractory ascites. S/p TIPS. Clinically stable. No encephalopathy.  2. DM    PLAN:  -Low sodium diet.  -Continue spironolactone 25 mg once daily.  -OK to discharge.  -Outpt liver clinic f/u. Will ensure coordination.    Approximately 20 minutes of total time was spent providing patient care including patient evaluation, reviewing documentation/test results, and .             Jakub Horta MD  Thank you for the opportunity to participate in the care of this patient.   Please feel free to call me with any questions or concerns.  Phone number [phone].            CC: [facility], Amrik Mejia

## 2022-12-10 NOTE — PLAN OF CARE
Problem: Plan of Care - These are the overarching goals to be used throughout the patient stay.    Goal: Optimal Comfort and Wellbeing  Intervention: Monitor Pain and Promote Comfort  Recent Flowsheet Documentation  Taken 12/9/2022 2130 by Janine Desir RN  Pain Management Interventions: declines     Problem: Hyperglycemia  Goal: Blood Glucose Level Within Targeted Range  Outcome: Progressing     Goal Outcome Evaluation:  Pt slept for majority of shift. A/O but drowsy. Denies pain other than some discomfort in his abdomen at times. NSR with HR in 70s-80s. IVF running at 100/hr. Using the urinal overnight. Tolerating clear liquids. BG in 130s-150s overnight. Given insulin as scheduled. Checking BG every 4 hours. VSS. Will continue to monitor and notify Md of any changes.

## 2022-12-10 NOTE — H&P
"[facility]    History and Physical - Hospitalist Service       Date of Admission:  12/9/2022    Assessment & Plan           Vito Sy is a 72 year old male admitted on 12/9/2022. H/o decompensated alcoholic cirrhosis and portal HTN. He has been admitted and underwent elective TIPS procedure.   He recently underwent upper endoscopy which demonstrated small esophageal varices that did not require intervention. Has no significant history of bleeding esophageal varices.   He does have significant ascites, and is undergoing large volume paracentesis every 3-8 weeks. He has been following with Dr. Montenegro with [facility] and has been taking diuretics in addition to his paracenteses for ascites, but this has not been sufficient to manage his fluid status. Most recent paracentesis 11/16/2022 with 2L of fluid aspirated. He has also had a loss of appetite and significant LE swelling. He no longer drinks alcohol.       A/p :       S/p TIPS procedure by IR 12/9 : continue post procedure care, consult GI      Alcoholic cirrhosis and portal HTN : on spironolactone      DM2 : on actos - at home, sliding scale insulin.      CKD stage 3a : stable.      HLD : on statin      GERD : on PPI             Diet: Advance Diet as Tolerated: Clear Liquid Diet    DVT Prophylaxis: Pneumatic Compression Devices  Cronin Catheter: Not present  Central Lines: None  Cardiac Monitoring: None  Code Status:   full     Clinically Significant Risk Factors Present on Admission              # Hypoalbuminemia: Lowest albumin = 3.1 g/dL at 12/9/2022 12:54 PM, will monitor as appropriate  # Coagulation Defect: INR = 1.37 (Ref range: 0.85 - 1.15) and/or PTT = N/A, will monitor for bleeding  # Thrombocytopenia: Lowest platelets = 116 in last 2 days, will monitor for bleeding        # Obesity: Estimated body mass index is 31.15 kg/m  as calculated from the following:    Height as of this encounter: 1.676 m (5' 6\").    Weight as of this " encounter: 87.5 kg (193 lb).           Disposition Plan      Expected Discharge Date: 12/10/2022                The patient's care was discussed with the Bedside Nurse and Patient.    Qasim Oropeza MD  Hospitalist Service  [facility]  Securely message with the Vocera Web Console (learn more here)  Text page via [facility] Paging/Directory         ______________________________________________________________________    Chief Complaint   Elective admission for TIPS    History is obtained from the patient    History of Present Illness     Vito Sy is a 72 year old male admitted on 12/9/2022. H/o decompensated alcoholic cirrhosis and portal HTN. He has been admitted and underwent elective TIPS procedure.   He recently underwent upper endoscopy which demonstrated small esophageal varices that did not require intervention. Has no significant history of bleeding esophageal varices.   He does have significant ascites, and is undergoing large volume paracentesis every 3-8 weeks. He has been following with Dr. Montenegro with [facility] and has been taking diuretics in addition to his paracenteses for ascites, but this has not been sufficient to manage his fluid status. Most recent paracentesis 11/16/2022 with 2L of fluid aspirated. He has also had a loss of appetite and significant LE swelling. He no longer drinks alcohol.       Review of Systems      No fevers  No cp  No urinary symptoms    Past Medical History    I have reviewed this patient's medical history and updated it with pertinent information if needed.   Past Medical History:   Diagnosis Date     Diabetes mellitus (H)      Gout      Hypertension      Kidney stone        Past Surgical History   I have reviewed this patient's surgical history and updated it with pertinent information if needed.  Past Surgical History:   Procedure Laterality Date     COLONOSCOPY       IR TRANSVEN INTRAHEPATIC PORTOSYST SHUNT  11/16/2022     IR TRANSVEN INTRAHEPATIC  "PORTOSYST SHUNT  12/9/2022     OR ESOPHAGOGASTRODUODENOSCOPY TRANSORAL DIAGNOSTIC N/A 11/16/2020    Procedure: ESOPHAGOGASTRODUODENOSCOPY (EGD);  Surgeon: Juan Garnett MD;  Location: [facility];  Service: Gastroenterology     OR ESOPHAGOGASTRODUODENOSCOPY TRANSORAL DIAGNOSTIC N/A 11/18/2020    Procedure: ESOPHAGOGASTRODUODENOSCOPY (EGD) WITH BANDING;  Surgeon: Juan Garnett MD;  Location: [facility];  Service: Gastroenterology     URETEROSCOPY         Social History   I have reviewed this patient's social history and updated it with pertinent information if needed.  Social History     Tobacco Use     Smoking status: Never     Smokeless tobacco: Never   Substance Use Topics     Alcohol use: Yes     Comment: none for the past 2 years     Drug use: No       Family History   I have reviewed this patient's family history and updated it with pertinent information if needed.  Family History   Problem Relation Age of Onset     Heart Disease Brother      Hypertension Mother      Hypertension Father        Prior to Admission Medications   Cannot display prior to admission medications because the patient has not been admitted in this contact.     Allergies   Allergies   Allergen Reactions     Penicillins Unknown     Annotation: discussed with patient, he reports he had \"itching\" with penicillin many years ago in [facility] but no hives or throat or respiratory symptoms.  he also reports having tolerated amoxicillin since coming to [place].       Sulfa Drugs        Physical Exam   Vital Signs: Temp: (!) 96.7  F (35.9  C) Temp src: Temporal BP: 104/55 Pulse: 77   Resp: 14 SpO2: 96 % O2 Device: None (Room air) Oxygen Delivery: 6 LPM  Weight: 193 lbs 0 oz       GENERAL: The patient is not in any acute distressed. Awake and alert.  HEENT: Nonicteric sclerae, PERRLA, EOMI. Oropharynx clear. Moist mucous membranes. Conjunctivae appear well perfused.  HEART: Regular rate and rhythm without murmurs.  LUNGS: Clear to auscultation " bilaterally. No wheezing or crackles.  ABDOMEN: Soft, positive bowel sounds, nontender.  SKIN: No rash, no excessive bruising, petechiae, or purpura.  EXTREMITIES : no rashes, no swelling in legs.  NEUROLOGIC: conscious and oriented, follows commands, no obvious focal deficits.  ROS: All other systems negative       Data   Data reviewed today: I reviewed all medications, new labs and imaging results over the last 24 hours. I personally reviewed no images or EKG's today.    Recent Labs   Lab 12/10/22  0542 12/10/22  0510 12/09/22  2323 12/09/22  1341 12/09/22  1254   WBC 7.0  --   --   --   --    HGB 8.9*  --   --   --  8.2*   MCV 84  --   --   --   --    *  --   --   --  116*   INR  --   --   --   --  1.37*     141  --   --   --  143  145   POTASSIUM 4.2  4.2  --   --   --  3.4   CHLORIDE 110*  110*  --   --   --  114*   CO2 20*  20*  --   --   --  21*   BUN 19.1  --   --   --  16.2   CR 1.39*  --   --   --  1.28*  1.27*   ANIONGAP 11  11  --   --   --  8   SILVIA 8.5*  --   --   --  7.9*   * 157* 133*   < > 114*   ALBUMIN 2.8*  --   --   --  3.1*   PROTTOTAL 5.9*  --   --   --  5.9*   BILITOTAL 1.1  --   --   --  0.9   ALKPHOS 99  --   --   --  111   ALT 28  --   --   --  23   AST 42  --   --   --  34    < > = values in this interval not displayed.

## 2022-12-10 NOTE — PROGRESS NOTES
"  Interventional Radiology - Progress Note  Inpatient - [facility]: Interventional Radiology   [phone]  12/10/2022     S:  Pt s/p successful TIPS on 12/09. Pt is cognitively intact. Denies n/v, CP or SOB. Hospital medicine is following. Hgb is stable.     O:  /56 (BP Location: Left arm)   Pulse 73   Temp 98.1  F (36.7  C) (Oral)   Resp 20   Ht 1.676 m (5' 6\")   Wt 90.3 kg (199 lb 1.6 oz)   SpO2 99%   BMI 32.14 kg/m    General:  Stable.  In no acute distress.    Neuro:  A&O x 3. Moves all extremities equally.  Resp:  Lungs clear to auscultation bilaterally.  Cardio:  S1S2 and reg, without murmur, clicks or rubs  Skin:  Venotomy R internal jugular site is clean, non tender, w/o any bleeding.     IMAGING:  IR TIPS 12/09/22  IMPRESSION:    1.  Successful placement of a TIPS extending from the right hepatic vein to the right portal vein, as detailed above.   2. Portosystemic gradient at the conclusion of the procedure measured 4 mmHg.    LABS:  Recent Labs   Lab 12/10/22  0542 12/09/22  1254   WBC 7.0  --    HGB 8.9* 8.2*   * 116*   INR  --  1.37*   CR 1.39* 1.28*  1.27*     Lab Results   Component Value Date    AST 42 12/10/2022     Lab Results   Component Value Date    ALT 28 12/10/2022     No results found for: BILICONJ   Lab Results   Component Value Date    BILITOTAL 1.1 12/10/2022     Lab Results   Component Value Date    ALBUMIN 2.8 12/10/2022    ALBUMIN 2.6 04/27/2022     Lab Results   Component Value Date    PROTTOTAL 5.9 12/10/2022      Lab Results   Component Value Date    ALKPHOS 99 12/10/2022         A:  72 year old yo male with cirrhotic liver and recurrent ascites who underwent TIPS procedure on 12/9. Pt remain cognitively intact. He denies any episodes of hematemesis. Hospital medicine is following.     P:    MELD-Na score: 14 at 12/10/2022  5:42 AM  MELD score: 14 at 12/10/2022  5:42 AM  Calculated from:  Serum Creatinine: 1.39 mg/dL at 12/10/2022  5:42 AM  Serum " Sodium: 141 mmol/L (Using max of 137 mmol/L) at 12/10/2022  5:42 AM  Total Bilirubin: 1.1 mg/dL at 12/10/2022  5:42 AM  INR(ratio): 1.37 at 12/9/2022 12:54 PM  Age: 72 years    - Hgb stable. Continue to follow Hgb and MELD labs.   - Lactose per GI. Monitor mentation.  - Post TIPS cares discussed with patient. Questions answered.  - TIPS discharge care instructions entered into D/C navigator.  - Patient may require 1-2 paracentesis following TIPS procedure as body adjusts to new pathway.   - Recommend follow up in 1 month in IR clinic with post TIPS ultrasound in two weeks. [facility] will contact patient to arrange for this follow up. Patient expressed understanding for follow up plan.  - Pt to follow up with Dr. Montenegro at [facility] as previously planned.      Total time spent on the date of the encounter is 25 minutes, including time spent counseling the patient, performing a medically appropriate evaluation, reviewing prior medical history, ordering medications and tests, documenting clinical information in the medical record, and communication of results.    Mani Hansen PA-C  Interventional Radiology  [phone]    E/M codes for reference only:  04278

## 2022-12-10 NOTE — DISCHARGE INSTRUCTIONS
Transjugular Intrahepatic Portosystemic Shunt (TIPS) Placement:    You had Transjugular Intrahepatic Portosystemic Shunt (TIPS) procedure during your hospitalization. This is a procedure that helps reduce portal vein pressure for treatment of symptoms associated with portal hypertension and/or liver disease.    Care Instructions:    - Do not lift greater than 10 pounds for 2 days.  - May remove bandage/dressing and shower beginning the day after your procedure.    - Do not submerge neck access site underneath water in a tub, Jacuzzi or pool for 3 days or until the site is well healed.    - Do not apply any creams, ointments or lotions over the neck access site.   - Observe site for infection (redness, purulent drainage, increasing pain, fever).    Follow up:    - Our [facility] office will call you to schedule a post TIPS ultrasound and outpatient clinic follow up.This usually occurs within 1 month following the procedure. Please call sooner if any TIPS related questions or concerns ([phone]).    Please seek medical evaluation for:   - Fever (greater than 101 F (38.3C)).  - Purulent (yellow/green/ foul smelling) drainage from neck access site.  - Increasing pain/redness/swelling/drainage at neck access site.  - If you develop worsening confusion or increasing abdominal distention.  - If you have uncontrolled bleeding from the neck access site.  - If you begin coughing up or vomiting blood or if you have blood in your stool.

## 2022-12-10 NOTE — PHARMACY-ADMISSION MEDICATION HISTORY
Pharmacy Note - Admission Medication History   ______________________________________________________________________    Prior To Admission (PTA) med list completed and updated in EMR.       PTA Med List   Medication Sig Last Dose     acetaminophen (TYLENOL) 500 MG tablet Take 500-1,000 mg by mouth every 6 hours as needed for mild pain Unknown at prn     Aromatic Inhalants (RA MENTHOL NASAL INHALER) INHA Spray 1 Inhalation in nostril as needed Past Week     bismuth subsalicylate (PEPTO BISMOL) 262 MG chewable tablet Take 1 tablet by mouth every 6 hours as needed for diarrhea Past Week at 2-3 days ago     calcium carbonate (TUMS) 500 MG chewable tablet Take 1-2 chew tab by mouth 3 times daily as needed for heartburn Past Week at 12/8     hypromellose (ARTIFICIAL TEARS) 0.5 % SOLN ophthalmic solution Place 1 drop into both eyes 4 times daily as needed for dry eyes Unknown at prn     multivitamin, therapeutic (THERA-VIT) TABS tablet Take 1 tablet by mouth daily 12/8/2022     omeprazole (PRILOSEC) 20 MG DR capsule TAKE 1 CAPSULE BY MOUTH TWICE DAILY BEFORE MEALS 12/8/2022 at 1900     pioglitazone (ACTOS) 30 MG tablet Take 1 tablet by mouth once daily (Patient taking differently: Take 15 mg by mouth daily) 12/8/2022 at 0900     Probiotic, Lactobacillus, CAPS Take 1 capsule by mouth daily 12/8/2022 at 0900     simvastatin (ZOCOR) 20 MG tablet Take 1 tablet (20 mg) by mouth At Bedtime 12/8/2022 at 2200     spironolactone (ALDACTONE) 25 MG tablet Take 25 mg by mouth daily 12/8/2022 at 0900       Information source(s): Patient and CareEverywhere/SureScripts  Method of interview communication: in-person    Summary of Changes to PTA Med List  New: APAP, Tums, Pepto, eye drop, menthol inhaler  Discontinued: none  Changed: none    Patient was asked about OTC/herbal products specifically.  PTA med list reflects this.    In the past week, patient estimated taking medication this percent of the time:  greater than 90%.    Allergies  were reviewed, assessed, and updated with the patient.      Medications available for use during hospital stay: menthol inhaler.      The information provided in this note is only as accurate as the sources available at the time of the update(s).    Thank you,  Ayesha Herrera, [facility]  12/10/2022 10:00 AM

## 2022-12-18 ENCOUNTER — HOSPITAL ENCOUNTER (INPATIENT)
Facility: HOSPITAL | Age: 72
LOS: 4 days | Discharge: HOME | DRG: 689 | End: 2022-12-22
Attending: FAMILY MEDICINE | Admitting: FAMILY MEDICINE
Payer: COMMERCIAL

## 2022-12-18 ENCOUNTER — APPOINTMENT (OUTPATIENT)
Dept: RADIOLOGY | Facility: HOSPITAL | Age: 72
DRG: 689 | End: 2022-12-18
Attending: FAMILY MEDICINE
Payer: COMMERCIAL

## 2022-12-18 ENCOUNTER — APPOINTMENT (OUTPATIENT)
Dept: CT IMAGING | Facility: HOSPITAL | Age: 72
DRG: 689 | End: 2022-12-18
Attending: FAMILY MEDICINE
Payer: COMMERCIAL

## 2022-12-18 DIAGNOSIS — E11.29 TYPE 2 DIABETES MELLITUS WITH MICROALBUMINURIA, WITHOUT LONG-TERM CURRENT USE OF INSULIN (H): ICD-10-CM

## 2022-12-18 DIAGNOSIS — K76.82 HEPATIC ENCEPHALOPATHY (H): ICD-10-CM

## 2022-12-18 DIAGNOSIS — I85.00 ESOPHAGEAL VARICES WITHOUT BLEEDING, UNSPECIFIED ESOPHAGEAL VARICES TYPE (H): ICD-10-CM

## 2022-12-18 DIAGNOSIS — N18.31 CHRONIC KIDNEY DISEASE, STAGE 3A (H): ICD-10-CM

## 2022-12-18 DIAGNOSIS — R80.9 TYPE 2 DIABETES MELLITUS WITH MICROALBUMINURIA, WITHOUT LONG-TERM CURRENT USE OF INSULIN (H): ICD-10-CM

## 2022-12-18 DIAGNOSIS — M71.22 POPLITEAL CYST, LEFT: ICD-10-CM

## 2022-12-18 DIAGNOSIS — I10 ESSENTIAL HYPERTENSION: ICD-10-CM

## 2022-12-18 DIAGNOSIS — K70.31 ALCOHOLIC CIRRHOSIS OF LIVER WITH ASCITES (H): ICD-10-CM

## 2022-12-18 DIAGNOSIS — Z76.0 ENCOUNTER FOR MEDICATION REFILL: ICD-10-CM

## 2022-12-18 DIAGNOSIS — K76.6 PORTAL HYPERTENSION (H): ICD-10-CM

## 2022-12-18 DIAGNOSIS — R15.2 INCONTINENCE OF FECES WITH FECAL URGENCY: ICD-10-CM

## 2022-12-18 DIAGNOSIS — J90 PLEURAL EFFUSION: ICD-10-CM

## 2022-12-18 DIAGNOSIS — K70.30 ALCOHOLIC CIRRHOSIS, UNSPECIFIED WHETHER ASCITES PRESENT (H): Primary | ICD-10-CM

## 2022-12-18 DIAGNOSIS — R15.9 INCONTINENCE OF FECES WITH FECAL URGENCY: ICD-10-CM

## 2022-12-18 DIAGNOSIS — D64.9 ANEMIA, UNSPECIFIED TYPE: ICD-10-CM

## 2022-12-18 LAB
ALBUMIN SERPL BCG-MCNC: 3.5 G/DL (ref 3.5–5.2)
ALBUMIN UR-MCNC: 20 MG/DL
ALP SERPL-CCNC: 130 U/L (ref 40–129)
ALT SERPL W P-5'-P-CCNC: 30 U/L (ref 10–50)
AMMONIA PLAS-SCNC: 119 UMOL/L (ref 16–60)
AMPHETAMINES UR QL SCN: NORMAL
ANION GAP SERPL CALCULATED.3IONS-SCNC: 14 MMOL/L (ref 7–15)
APPEARANCE UR: CLEAR
AST SERPL W P-5'-P-CCNC: 42 U/L (ref 10–50)
BACTERIA #/AREA URNS HPF: ABNORMAL /HPF
BARBITURATES UR QL SCN: NORMAL
BASE EXCESS BLDV CALC-SCNC: -0.6 MMOL/L
BASOPHILS # BLD AUTO: 0 10E3/UL (ref 0–0.2)
BASOPHILS NFR BLD AUTO: 1 %
BENZODIAZ UR QL SCN: NORMAL
BILIRUB DIRECT SERPL-MCNC: 0.55 MG/DL (ref 0–0.3)
BILIRUB SERPL-MCNC: 1.7 MG/DL
BILIRUB UR QL STRIP: NEGATIVE
BUN SERPL-MCNC: 11.6 MG/DL (ref 8–23)
BZE UR QL SCN: NORMAL
CALCIUM SERPL-MCNC: 8.7 MG/DL (ref 8.8–10.2)
CANNABINOIDS UR QL SCN: NORMAL
CHLORIDE SERPL-SCNC: 107 MMOL/L (ref 98–107)
COLOR UR AUTO: ABNORMAL
CREAT SERPL-MCNC: 1.32 MG/DL (ref 0.67–1.17)
DEPRECATED HCO3 PLAS-SCNC: 20 MMOL/L (ref 22–29)
EOSINOPHIL # BLD AUTO: 0.2 10E3/UL (ref 0–0.7)
EOSINOPHIL NFR BLD AUTO: 3 %
ERYTHROCYTE [DISTWIDTH] IN BLOOD BY AUTOMATED COUNT: 19 % (ref 10–15)
ETHANOL SERPL-MCNC: <0.01 G/DL
GFR SERPL CREATININE-BSD FRML MDRD: 58 ML/MIN/1.73M2
GLUCOSE BLDC GLUCOMTR-MCNC: 166 MG/DL (ref 70–99)
GLUCOSE SERPL-MCNC: 146 MG/DL (ref 70–99)
GLUCOSE UR STRIP-MCNC: NEGATIVE MG/DL
HCO3 BLDV-SCNC: 23 MMOL/L (ref 24–30)
HCT VFR BLD AUTO: 29.8 % (ref 40–53)
HGB BLD-MCNC: 9.5 G/DL (ref 13.3–17.7)
HGB UR QL STRIP: ABNORMAL
HOLD SPECIMEN: NORMAL
IMM GRANULOCYTES # BLD: 0 10E3/UL
IMM GRANULOCYTES NFR BLD: 0 %
INR PPP: 1.38 (ref 0.85–1.15)
KETONES UR STRIP-MCNC: NEGATIVE MG/DL
LACTATE SERPL-SCNC: 2.1 MMOL/L (ref 0.7–2)
LACTATE SERPL-SCNC: 2.7 MMOL/L (ref 0.7–2)
LEUKOCYTE ESTERASE UR QL STRIP: ABNORMAL
LIPASE SERPL-CCNC: 195 U/L (ref 13–60)
LYMPHOCYTES # BLD AUTO: 0.9 10E3/UL (ref 0.8–5.3)
LYMPHOCYTES NFR BLD AUTO: 19 %
MAGNESIUM SERPL-MCNC: 1.7 MG/DL (ref 1.7–2.3)
MCH RBC QN AUTO: 25.9 PG (ref 26.5–33)
MCHC RBC AUTO-ENTMCNC: 31.9 G/DL (ref 31.5–36.5)
MCV RBC AUTO: 81 FL (ref 78–100)
MONOCYTES # BLD AUTO: 0.8 10E3/UL (ref 0–1.3)
MONOCYTES NFR BLD AUTO: 16 %
MUCOUS THREADS #/AREA URNS LPF: PRESENT /LPF
NEUTROPHILS # BLD AUTO: 2.9 10E3/UL (ref 1.6–8.3)
NEUTROPHILS NFR BLD AUTO: 61 %
NITRATE UR QL: NEGATIVE
NRBC # BLD AUTO: 0 10E3/UL
NRBC BLD AUTO-RTO: 0 /100
OPIATES UR QL SCN: NORMAL
OXYHGB MFR BLDV: 79.9 % (ref 70–75)
PCO2 BLDV: 29 MM HG (ref 35–50)
PCP QUAL URINE (ROCHE): NORMAL
PH BLDV: 7.5 [PH] (ref 7.35–7.45)
PH UR STRIP: 7.5 [PH] (ref 5–7)
PLATELET # BLD AUTO: 151 10E3/UL (ref 150–450)
PO2 BLDV: 43 MM HG (ref 25–47)
POTASSIUM SERPL-SCNC: 3.7 MMOL/L (ref 3.4–5.3)
PROT SERPL-MCNC: 6.7 G/DL (ref 6.4–8.3)
RBC # BLD AUTO: 3.67 10E6/UL (ref 4.4–5.9)
RBC URINE: 171 /HPF
SAO2 % BLDV: 81.2 % (ref 70–75)
SODIUM SERPL-SCNC: 141 MMOL/L (ref 136–145)
SP GR UR STRIP: 1.01 (ref 1–1.03)
UROBILINOGEN UR STRIP-MCNC: 4 MG/DL
WBC # BLD AUTO: 4.7 10E3/UL (ref 4–11)
WBC URINE: 7 /HPF

## 2022-12-18 PROCEDURE — 120N000001 HC R&B MED SURG/OB

## 2022-12-18 PROCEDURE — 82140 ASSAY OF AMMONIA: CPT | Performed by: STUDENT IN AN ORGANIZED HEALTH CARE EDUCATION/TRAINING PROGRAM

## 2022-12-18 PROCEDURE — 70450 CT HEAD/BRAIN W/O DYE: CPT

## 2022-12-18 PROCEDURE — 83690 ASSAY OF LIPASE: CPT | Performed by: FAMILY MEDICINE

## 2022-12-18 PROCEDURE — 85610 PROTHROMBIN TIME: CPT | Performed by: FAMILY MEDICINE

## 2022-12-18 PROCEDURE — 250N000011 HC RX IP 250 OP 636: Performed by: FAMILY MEDICINE

## 2022-12-18 PROCEDURE — 82077 ASSAY SPEC XCP UR&BREATH IA: CPT | Performed by: FAMILY MEDICINE

## 2022-12-18 PROCEDURE — 82805 BLOOD GASES W/O2 SATURATION: CPT | Performed by: FAMILY MEDICINE

## 2022-12-18 PROCEDURE — 82310 ASSAY OF CALCIUM: CPT | Performed by: FAMILY MEDICINE

## 2022-12-18 PROCEDURE — 83605 ASSAY OF LACTIC ACID: CPT | Performed by: FAMILY MEDICINE

## 2022-12-18 PROCEDURE — 258N000003 HC RX IP 258 OP 636: Performed by: FAMILY MEDICINE

## 2022-12-18 PROCEDURE — 80307 DRUG TEST PRSMV CHEM ANLYZR: CPT | Performed by: FAMILY MEDICINE

## 2022-12-18 PROCEDURE — 96361 HYDRATE IV INFUSION ADD-ON: CPT

## 2022-12-18 PROCEDURE — 99285 EMERGENCY DEPT VISIT HI MDM: CPT | Mod: 25

## 2022-12-18 PROCEDURE — 83735 ASSAY OF MAGNESIUM: CPT | Performed by: FAMILY MEDICINE

## 2022-12-18 PROCEDURE — 93005 ELECTROCARDIOGRAM TRACING: CPT | Performed by: STUDENT IN AN ORGANIZED HEALTH CARE EDUCATION/TRAINING PROGRAM

## 2022-12-18 PROCEDURE — 74177 CT ABD & PELVIS W/CONTRAST: CPT

## 2022-12-18 PROCEDURE — 81001 URINALYSIS AUTO W/SCOPE: CPT | Performed by: FAMILY MEDICINE

## 2022-12-18 PROCEDURE — 36415 COLL VENOUS BLD VENIPUNCTURE: CPT | Performed by: STUDENT IN AN ORGANIZED HEALTH CARE EDUCATION/TRAINING PROGRAM

## 2022-12-18 PROCEDURE — 85025 COMPLETE CBC W/AUTO DIFF WBC: CPT | Performed by: FAMILY MEDICINE

## 2022-12-18 PROCEDURE — 87077 CULTURE AEROBIC IDENTIFY: CPT | Performed by: FAMILY MEDICINE

## 2022-12-18 PROCEDURE — 96360 HYDRATION IV INFUSION INIT: CPT | Mod: 59

## 2022-12-18 PROCEDURE — 82248 BILIRUBIN DIRECT: CPT | Performed by: FAMILY MEDICINE

## 2022-12-18 PROCEDURE — 71045 X-RAY EXAM CHEST 1 VIEW: CPT

## 2022-12-18 PROCEDURE — C9113 INJ PANTOPRAZOLE SODIUM, VIA: HCPCS | Performed by: FAMILY MEDICINE

## 2022-12-18 PROCEDURE — 99223 1ST HOSP IP/OBS HIGH 75: CPT | Performed by: FAMILY MEDICINE

## 2022-12-18 PROCEDURE — 36415 COLL VENOUS BLD VENIPUNCTURE: CPT | Performed by: FAMILY MEDICINE

## 2022-12-18 PROCEDURE — 87149 DNA/RNA DIRECT PROBE: CPT | Performed by: FAMILY MEDICINE

## 2022-12-18 RX ORDER — LIDOCAINE 40 MG/G
CREAM TOPICAL
Status: DISCONTINUED | OUTPATIENT
Start: 2022-12-18 | End: 2022-12-22 | Stop reason: HOSPADM

## 2022-12-18 RX ORDER — IOPAMIDOL 755 MG/ML
80 INJECTION, SOLUTION INTRAVASCULAR ONCE
Status: COMPLETED | OUTPATIENT
Start: 2022-12-18 | End: 2022-12-18

## 2022-12-18 RX ORDER — NICOTINE POLACRILEX 4 MG
15-30 LOZENGE BUCCAL
Status: DISCONTINUED | OUTPATIENT
Start: 2022-12-18 | End: 2022-12-22 | Stop reason: HOSPADM

## 2022-12-18 RX ORDER — FUROSEMIDE 10 MG/ML
40 INJECTION INTRAMUSCULAR; INTRAVENOUS ONCE
Status: COMPLETED | OUTPATIENT
Start: 2022-12-18 | End: 2022-12-19

## 2022-12-18 RX ORDER — CEFTRIAXONE 1 G/1
1 INJECTION, POWDER, FOR SOLUTION INTRAMUSCULAR; INTRAVENOUS EVERY 24 HOURS
Status: DISCONTINUED | OUTPATIENT
Start: 2022-12-18 | End: 2022-12-22 | Stop reason: HOSPADM

## 2022-12-18 RX ORDER — DEXTROSE MONOHYDRATE 25 G/50ML
25-50 INJECTION, SOLUTION INTRAVENOUS
Status: DISCONTINUED | OUTPATIENT
Start: 2022-12-18 | End: 2022-12-22 | Stop reason: HOSPADM

## 2022-12-18 RX ADMIN — SODIUM CHLORIDE 500 ML: 9 INJECTION, SOLUTION INTRAVENOUS at 20:05

## 2022-12-18 RX ADMIN — IOPAMIDOL 80 ML: 755 INJECTION, SOLUTION INTRAVENOUS at 21:14

## 2022-12-18 RX ADMIN — PANTOPRAZOLE SODIUM 40 MG: 40 INJECTION, POWDER, FOR SOLUTION INTRAVENOUS at 22:35

## 2022-12-18 ASSESSMENT — ACTIVITIES OF DAILY LIVING (ADL)
ADLS_ACUITY_SCORE: 35

## 2022-12-18 ASSESSMENT — ENCOUNTER SYMPTOMS: ABDOMINAL PAIN: 1

## 2022-12-19 ENCOUNTER — APPOINTMENT (OUTPATIENT)
Dept: ULTRASOUND IMAGING | Facility: HOSPITAL | Age: 72
DRG: 689 | End: 2022-12-19
Attending: INTERNAL MEDICINE
Payer: COMMERCIAL

## 2022-12-19 LAB
% LINING CELLS, BODY FLUID: 2 %
ABSOLUTE NEUTROPHILS, BODY FLUID: NORMAL
ALBUMIN SERPL BCG-MCNC: 3.4 G/DL (ref 3.5–5.2)
ALP SERPL-CCNC: 121 U/L (ref 40–129)
ALT SERPL W P-5'-P-CCNC: 27 U/L (ref 10–50)
AMMONIA PLAS-SCNC: 67 UMOL/L (ref 16–60)
ANION GAP SERPL CALCULATED.3IONS-SCNC: 12 MMOL/L (ref 7–15)
APPEARANCE FLD: ABNORMAL
AST SERPL W P-5'-P-CCNC: 40 U/L (ref 10–50)
BASOPHILS # BLD AUTO: 0 10E3/UL (ref 0–0.2)
BASOPHILS NFR BLD AUTO: 1 %
BILIRUB SERPL-MCNC: 1.9 MG/DL
BUN SERPL-MCNC: 11.4 MG/DL (ref 8–23)
CALCIUM SERPL-MCNC: 8.7 MG/DL (ref 8.8–10.2)
CELL COUNT BODY FLUID SOURCE: ABNORMAL
CHLORIDE SERPL-SCNC: 108 MMOL/L (ref 98–107)
COLOR FLD: ABNORMAL
CREAT SERPL-MCNC: 1.3 MG/DL (ref 0.67–1.17)
DEPRECATED HCO3 PLAS-SCNC: 21 MMOL/L (ref 22–29)
ENTEROCOCCUS FAECALIS: NOT DETECTED
ENTEROCOCCUS FAECIUM: NOT DETECTED
EOSINOPHIL # BLD AUTO: 0.2 10E3/UL (ref 0–0.7)
EOSINOPHIL NFR BLD AUTO: 3 %
ERYTHROCYTE [DISTWIDTH] IN BLOOD BY AUTOMATED COUNT: 19.1 % (ref 10–15)
GFR SERPL CREATININE-BSD FRML MDRD: 59 ML/MIN/1.73M2
GLUCOSE BLDC GLUCOMTR-MCNC: 128 MG/DL (ref 70–99)
GLUCOSE BLDC GLUCOMTR-MCNC: 144 MG/DL (ref 70–99)
GLUCOSE BLDC GLUCOMTR-MCNC: 145 MG/DL (ref 70–99)
GLUCOSE BLDC GLUCOMTR-MCNC: 146 MG/DL (ref 70–99)
GLUCOSE SERPL-MCNC: 139 MG/DL (ref 70–99)
HCT VFR BLD AUTO: 30.7 % (ref 40–53)
HGB BLD-MCNC: 9.6 G/DL (ref 13.3–17.7)
IMM GRANULOCYTES # BLD: 0 10E3/UL
IMM GRANULOCYTES NFR BLD: 0 %
LACTATE SERPL-SCNC: 2.5 MMOL/L (ref 0.7–2)
LACTATE SERPL-SCNC: 2.6 MMOL/L (ref 0.7–2)
LISTERIA SPECIES (DETECTED/NOT DETECTED): NOT DETECTED
LYMPHOCYTES # BLD AUTO: 0.9 10E3/UL (ref 0.8–5.3)
LYMPHOCYTES NFR BLD AUTO: 15 %
LYMPHOCYTES NFR FLD MANUAL: 7 %
MCH RBC QN AUTO: 25.4 PG (ref 26.5–33)
MCHC RBC AUTO-ENTMCNC: 31.3 G/DL (ref 31.5–36.5)
MCV RBC AUTO: 81 FL (ref 78–100)
MONOCYTES # BLD AUTO: 0.8 10E3/UL (ref 0–1.3)
MONOCYTES NFR BLD AUTO: 15 %
MONOS+MACROS NFR FLD MANUAL: 91 %
NEUTROPHILS # BLD AUTO: 3.8 10E3/UL (ref 1.6–8.3)
NEUTROPHILS NFR BLD AUTO: 66 %
NEUTS BAND NFR FLD MANUAL: NORMAL %
NRBC # BLD AUTO: 0 10E3/UL
NRBC BLD AUTO-RTO: 0 /100
PLATELET # BLD AUTO: 166 10E3/UL (ref 150–450)
POTASSIUM SERPL-SCNC: 3.8 MMOL/L (ref 3.4–5.3)
PROT SERPL-MCNC: 6.6 G/DL (ref 6.4–8.3)
RBC # BLD AUTO: 3.78 10E6/UL (ref 4.4–5.9)
RBC # FLD: 4000 /UL
SODIUM SERPL-SCNC: 141 MMOL/L (ref 136–145)
STAPHYLOCOCCUS AUREUS: NOT DETECTED
STAPHYLOCOCCUS EPIDERMIDIS: NOT DETECTED
STAPHYLOCOCCUS LUGDUNENSIS: NOT DETECTED
STAPHYLOCOCCUS SPECIES: DETECTED
STREPTOCOCCUS AGALACTIAE: NOT DETECTED
STREPTOCOCCUS ANGINOSUS GROUP: NOT DETECTED
STREPTOCOCCUS PNEUMONIAE: NOT DETECTED
STREPTOCOCCUS PYOGENES: NOT DETECTED
STREPTOCOCCUS SPECIES: NOT DETECTED
WBC # BLD AUTO: 5.7 10E3/UL (ref 4–11)
WBC # FLD AUTO: 599 /UL

## 2022-12-19 PROCEDURE — 36415 COLL VENOUS BLD VENIPUNCTURE: CPT | Performed by: INTERNAL MEDICINE

## 2022-12-19 PROCEDURE — 87040 BLOOD CULTURE FOR BACTERIA: CPT | Performed by: INTERNAL MEDICINE

## 2022-12-19 PROCEDURE — 272N000710 US PARACENTESIS WITHOUT ALBUMIN

## 2022-12-19 PROCEDURE — 36415 COLL VENOUS BLD VENIPUNCTURE: CPT | Performed by: FAMILY MEDICINE

## 2022-12-19 PROCEDURE — 250N000011 HC RX IP 250 OP 636: Performed by: FAMILY MEDICINE

## 2022-12-19 PROCEDURE — 89051 BODY FLUID CELL COUNT: CPT | Performed by: INTERNAL MEDICINE

## 2022-12-19 PROCEDURE — 120N000001 HC R&B MED SURG/OB

## 2022-12-19 PROCEDURE — 85025 COMPLETE CBC W/AUTO DIFF WBC: CPT | Performed by: FAMILY MEDICINE

## 2022-12-19 PROCEDURE — 250N000013 HC RX MED GY IP 250 OP 250 PS 637: Performed by: INTERNAL MEDICINE

## 2022-12-19 PROCEDURE — 36415 COLL VENOUS BLD VENIPUNCTURE: CPT | Performed by: STUDENT IN AN ORGANIZED HEALTH CARE EDUCATION/TRAINING PROGRAM

## 2022-12-19 PROCEDURE — 258N000003 HC RX IP 258 OP 636: Performed by: INTERNAL MEDICINE

## 2022-12-19 PROCEDURE — 80053 COMPREHEN METABOLIC PANEL: CPT | Performed by: FAMILY MEDICINE

## 2022-12-19 PROCEDURE — 250N000012 HC RX MED GY IP 250 OP 636 PS 637: Performed by: FAMILY MEDICINE

## 2022-12-19 PROCEDURE — 250N000009 HC RX 250: Performed by: FAMILY MEDICINE

## 2022-12-19 PROCEDURE — 83605 ASSAY OF LACTIC ACID: CPT | Performed by: FAMILY MEDICINE

## 2022-12-19 PROCEDURE — 250N000011 HC RX IP 250 OP 636: Performed by: INTERNAL MEDICINE

## 2022-12-19 PROCEDURE — 82140 ASSAY OF AMMONIA: CPT | Performed by: STUDENT IN AN ORGANIZED HEALTH CARE EDUCATION/TRAINING PROGRAM

## 2022-12-19 PROCEDURE — 250N000013 HC RX MED GY IP 250 OP 250 PS 637: Performed by: FAMILY MEDICINE

## 2022-12-19 PROCEDURE — 250N000011 HC RX IP 250 OP 636: Performed by: STUDENT IN AN ORGANIZED HEALTH CARE EDUCATION/TRAINING PROGRAM

## 2022-12-19 PROCEDURE — 250N000013 HC RX MED GY IP 250 OP 250 PS 637: Performed by: STUDENT IN AN ORGANIZED HEALTH CARE EDUCATION/TRAINING PROGRAM

## 2022-12-19 PROCEDURE — 99232 SBSQ HOSP IP/OBS MODERATE 35: CPT | Performed by: STUDENT IN AN ORGANIZED HEALTH CARE EDUCATION/TRAINING PROGRAM

## 2022-12-19 RX ORDER — SPIRONOLACTONE 25 MG/1
25 TABLET ORAL DAILY
Status: DISCONTINUED | OUTPATIENT
Start: 2022-12-19 | End: 2022-12-22 | Stop reason: HOSPADM

## 2022-12-19 RX ORDER — FUROSEMIDE 10 MG/ML
20 INJECTION INTRAMUSCULAR; INTRAVENOUS EVERY 12 HOURS
Status: DISCONTINUED | OUTPATIENT
Start: 2022-12-19 | End: 2022-12-20

## 2022-12-19 RX ORDER — LACTULOSE 10 G/15ML
20 SOLUTION ORAL 4 TIMES DAILY
Status: DISCONTINUED | OUTPATIENT
Start: 2022-12-19 | End: 2022-12-20

## 2022-12-19 RX ORDER — CEFAZOLIN SODIUM 1 G/50ML
1250 SOLUTION INTRAVENOUS ONCE
Status: COMPLETED | OUTPATIENT
Start: 2022-12-19 | End: 2022-12-20

## 2022-12-19 RX ADMIN — FUROSEMIDE 20 MG: 10 INJECTION, SOLUTION INTRAMUSCULAR; INTRAVENOUS at 17:42

## 2022-12-19 RX ADMIN — CEFTRIAXONE SODIUM 1 G: 1 INJECTION, POWDER, FOR SOLUTION INTRAMUSCULAR; INTRAVENOUS at 00:15

## 2022-12-19 RX ADMIN — INSULIN ASPART 1 UNITS: 100 INJECTION, SOLUTION INTRAVENOUS; SUBCUTANEOUS at 12:21

## 2022-12-19 RX ADMIN — CEFTRIAXONE SODIUM 1 G: 1 INJECTION, POWDER, FOR SOLUTION INTRAMUSCULAR; INTRAVENOUS at 22:39

## 2022-12-19 RX ADMIN — LACTULOSE 20 G: 10 SOLUTION ORAL at 17:42

## 2022-12-19 RX ADMIN — LACTULOSE 20 G: 10 SOLUTION ORAL at 21:09

## 2022-12-19 RX ADMIN — RIFAXIMIN 550 MG: 550 TABLET ORAL at 21:09

## 2022-12-19 RX ADMIN — OXYCODONE HYDROCHLORIDE 5 MG: 5 TABLET ORAL at 21:09

## 2022-12-19 RX ADMIN — VANCOMYCIN HYDROCHLORIDE 1250 MG: 5 INJECTION, POWDER, LYOPHILIZED, FOR SOLUTION INTRAVENOUS at 23:34

## 2022-12-19 RX ADMIN — RIFAXIMIN 550 MG: 550 TABLET ORAL at 11:36

## 2022-12-19 RX ADMIN — FUROSEMIDE 40 MG: 10 INJECTION, SOLUTION INTRAMUSCULAR; INTRAVENOUS at 00:11

## 2022-12-19 RX ADMIN — SPIRONOLACTONE 25 MG: 25 TABLET, FILM COATED ORAL at 17:42

## 2022-12-19 RX ADMIN — LACTULOSE 200 G: 10 SOLUTION ORAL; RECTAL at 06:19

## 2022-12-19 RX ADMIN — INSULIN ASPART 1 UNITS: 100 INJECTION, SOLUTION INTRAVENOUS; SUBCUTANEOUS at 18:52

## 2022-12-19 RX ADMIN — LACTULOSE 200 G: 10 SOLUTION ORAL; RECTAL at 03:49

## 2022-12-19 RX ADMIN — LACTULOSE 20 G: 10 SOLUTION ORAL at 09:34

## 2022-12-19 RX ADMIN — LACTULOSE 20 G: 10 SOLUTION ORAL at 12:20

## 2022-12-19 ASSESSMENT — ACTIVITIES OF DAILY LIVING (ADL)
ADLS_ACUITY_SCORE: 39
ADLS_ACUITY_SCORE: 39
ADLS_ACUITY_SCORE: 35
ADLS_ACUITY_SCORE: 39
ADLS_ACUITY_SCORE: 35
ADLS_ACUITY_SCORE: 39
ADLS_ACUITY_SCORE: 39
ADLS_ACUITY_SCORE: 35
ADLS_ACUITY_SCORE: 39
ADLS_ACUITY_SCORE: 35

## 2022-12-19 NOTE — CONSULTS
[facility]- GASTROENTEROLOGY CONSULTATION      Vito Sy  [address] [place] [address]  72 year old male     Admission Date/Time: 12/18/2022  Primary Care Provider: Amrik Mejia     We were asked to see the patient in consultation by  for evaluation of hepatic encephalopathy.  Alcoholic cirrhosis  ASSESSMENT:    1. Alcoholic cirrhosis, Abstinent, with recent TIPS 10 days ago.  MELD Na =11.   2. 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   3. Mild Ascites.  TIPS was for refractory ascites.   4. Chronic unchanged normocytic anemia    RECOMMENDATIONS:  1. Agree with lactulose, add rifaximin.  2. Agree with IV antibiotics and paracentesis of ascites to look for infection there as well.    3. Consider TIPS doppler if not improving.   4. Low dose sprironolactone and lasix for ongoing ascites.     George Ma MD   Cell [phone]  After 5 PM, please call [phone]     35 minutes of total time was spent providing patient care, including patient evaluation, reviewing documentation/test results, coordination of care with other providers, and .  ________________________________________________________________________        HPI:  Vito Sy is a 72 year old male who has a past history of alcoholic cirrhosis with ascites, has been abstinent for several years, who had TIPS procedure about 10 days ago now having increased confusion.  He is not able to give much history today so most of the history comes from the chart.  He does complain of some abdominal pain diffusely.  CT scan shows increasing ascites and bilateral pleural effusions, labs show slight increase in liver function test (total bili to 1.7 from 1.1) and stable creatinine.  It does not look like he was on lactulose as an outpatient.  Ammonia is 119.  Was found to have UTI, now on ceftriaxone.      ROS: A comprehensive ten point review of systems was negative aside from those  "in mentioned in the HPI.      PAST MEDICAL HISTORY:  Past Medical History:   Diagnosis Date     Diabetes mellitus (H)      Gout      Hypertension      Kidney stone      SOCIAL HISTORY:  Social History     Tobacco Use     Smoking status: Never     Smokeless tobacco: Never   Substance Use Topics     Alcohol use: Yes     Comment: none for the past 2 years     Drug use: No     FAMILY HISTORY:  Family History   Problem Relation Age of Onset     Heart Disease Brother      Hypertension Mother      Hypertension Father      ALLERGIES:   Allergies   Allergen Reactions     Sulfa Drugs Shortness Of Breath and Itching     Penicillins Unknown     Annotation: discussed with patient, he reports he had \"itching\" with penicillin many years ago in [facility] but no hives or throat or respiratory symptoms.  he also reports having tolerated amoxicillin since coming to [place].       MEDICATIONS:   Prior to Admission medications    Medication Sig Start Date End Date Taking? Authorizing Provider   acetaminophen (TYLENOL) 500 MG tablet Take 500-1,000 mg by mouth every 6 hours as needed for mild pain   Yes Unknown, Entered By History   Aromatic Inhalants (RA MENTHOL NASAL INHALER) INHA Spray 1 Inhalation in nostril as needed   Yes Unknown, Entered By History   bismuth subsalicylate (PEPTO BISMOL) 262 MG chewable tablet Take 1 tablet by mouth every 6 hours as needed for diarrhea   Yes Unknown, Entered By History   calcium carbonate (TUMS) 500 MG chewable tablet Take 1-2 chew tab by mouth 3 times daily as needed for heartburn   Yes Unknown, Entered By History   hypromellose (ARTIFICIAL TEARS) 0.5 % SOLN ophthalmic solution Place 1 drop into both eyes 4 times daily as needed for dry eyes   Yes Unknown, Entered By History   multivitamin, therapeutic (THERA-VIT) TABS tablet Take 1 tablet by mouth daily   Yes Unknown, Entered By History   omeprazole (PRILOSEC) 20 MG DR capsule TAKE 1 CAPSULE BY MOUTH TWICE DAILY BEFORE MEALS 12/8/21  Yes Amrik Mejia MD " "  pioglitazone (ACTOS) 30 MG tablet Take 1 tablet by mouth once daily  Patient taking differently: Take 15 mg by mouth daily 1/26/22  Yes Amrik Mejia MD   Probiotic, Lactobacillus, CAPS Take 1 capsule by mouth daily   Yes Reported, Patient   simvastatin (ZOCOR) 20 MG tablet Take 1 tablet (20 mg) by mouth At Bedtime 10/19/22  Yes Amrik Mejia MD   spironolactone (ALDACTONE) 25 MG tablet Take 25 mg by mouth daily 4/11/22  Yes Reported, Patient       PHYSICAL EXAM:   /57   Pulse 92   Temp 97.8  F (36.6  C) (Oral)   Resp 20   Ht 1.727 m (5' 8\")   SpO2 98%   BMI 30.27 kg/m     GEN: Somnolent but arousable, No distress.  DYLAN: Atraumatic, Oropharynx without erythema, exudate, or ulcers, No scleral icterus  NECK: Supple.    LYMPH: No LAD noted.  CV: RRR, no LE edema  LUNGS: Clear to auscultation bilaterally  ABD: +Bowel sounds, moderate distention, mild diffuse upper abdominal tenderness  SKIN: No rash, No jaundice   NEURO: Somnolent but arousable to verbal stimuli, drifts back to sleep quickly.  Positive asterixis.      ADDITIONAL DATA:   I reviewed the patient's new clinical lab test results.   Recent Labs   Lab Test 12/19/22  0411 12/18/22  1912 12/10/22  0542 12/09/22  1254 11/16/22  2024 11/16/22  1154   WBC 5.7 4.7 7.0  --    < > 4.5   HGB 9.6* 9.5* 8.9* 8.2*   < > 9.2*   MCV 81 81 84  --    < > 83    151 127* 116*   < > 130*   INR  --  1.38*  --  1.37*  --  1.33*    < > = values in this interval not displayed.     Recent Labs   Lab Test 12/19/22  0804 12/19/22 0411 12/18/22  2348 12/18/22  1912 12/10/22  0819 12/10/22  0542   NA  --  141  --  141  --  141  141   POTASSIUM  --  3.8  --  3.7  --  4.2  4.2   CHLORIDE  --  108*  --  107  --  110*  110*   CO2  --  21*  --  20*  --  20*  20*   BUN  --  11.4  --  11.6  --  19.1   CR  --  1.30*  --  1.32*  --  1.39*   ANIONGAP  --  12  --  14  --  11  11   SILVIA  --  8.7*  --  8.7*  --  8.5*   * 139* 166* 146*   < > 159*    < > = values " in this interval not displayed.     Recent Labs   Lab Test 12/19/22  0411 12/18/22  2119 12/18/22  1912 12/10/22  0542   ALBUMIN 3.4*  --  3.5 2.8*   BILITOTAL 1.9*  --  1.7* 1.1   ALT 27  --  30 28   AST 40  --  42 42   PROTEIN  --  20*  --   --    LIPASE  --   --  195*  --         Imaging:  CT abd/pelvis 12/18/22:  IMPRESSION:   1.  Cirrhosis with TIPS in place.  2.  Abdominal ascites has increased.  3.  New bilateral pleural effusions and bibasilar atelectasis.  4.  Nonobstructing stones both kidneys.

## 2022-12-19 NOTE — ED TRIAGE NOTES
Pt arrived via [facility] from home due to confusion and hx of liver stent placed 12/9/22. Pt has Hx of DM and cirrhosis. Blood glucose 222. VSS for EMS      Family coming #[phone] nba

## 2022-12-19 NOTE — H&P
"[facility]    History and Physical - Hospitalist Service       Date of Admission:  12/18/2022    Assessment & Plan      Vito Sy is a 72 year old male with PMH significant for alcoholic cirrhosis and DM who presented to ED due to AMS. Admitted on 12/18/2022.     1.  Altered mental status- Admit patient. Likely multifactorial: elevated ammonia level and UTI. Serial lactulose enemas. Rocephin ordered. CT head negative. Fall precautions. Supportive measures. Repeat labs in a.m. Wife contacted via telephone and updated on plan of care.    2. Hepatic Encephalopathy- Patient has hx of known alcoholic liver cirrhosis. Quit drinking 5 years ago. Ammonia level=119. Lactulose enemas ordered.    3. UTI- Urinalysis reviewed and consistent with infection. Empiric Rocephin ordered. Follow up urine culture.    4. Elevated lactic acid- Afebrile, no leukocytosis, VSS. Sepsis not suspected at this time. Elevation may be 2/2 to underlying CKD. S/p fluid bolus in ED. Repeat lactic acid level.    5. Ascites- 2/2 to cirrhosis. Recent TIPS last week. Monitor.    6. Bilateral Pleural Effusions- Trial of Lasix 40mg IV x 1 dose. Monitor vitals closely.    7. Hx of DM- Diet controlled. Not on any medications at home due to previous episodes of hypoglycemia. Check HBA1C. Place on ISS. Monitor accuchecks.     Diet:   Carb consistent  DVT Prophylaxis: Pneumatic Compression Devices  Cronin Catheter: Not present  Central Lines: None  Cardiac Monitoring: None  Code Status:   Full code per wife.    Clinically Significant Risk Factors Present on Admission               # Coagulation Defect: INR = 1.38 (Ref range: 0.85 - 1.15) and/or PTT = N/A, will monitor for bleeding         # Obesity: Estimated body mass index is 30.27 kg/m  as calculated from the following:    Height as of this encounter: 1.727 m (5' 8\").    Weight as of 12/10/22: 90.3 kg (199 lb 1.6 oz).           Disposition Plan      Expected Discharge Date: " 12/20/2022                The patient's care was discussed with the Patient's Family.    Miya Moreau MD  Hospitalist Service  [facility]  Securely message with the [employer] Web Console (learn more here)  Text page via [employer] Paging/Directory         ______________________________________________________________________    Chief Complaint   Confusion    History is obtained from the patient's daughter in law (present at bedside).    History of Present Illness   Vito CABRERA Yossi is a 72 year old male with PMH significant for alcoholic cirrhosis and DM who presented to ED due to AMS. Patient is obtunded. Daughter in law is present at bedside and assists in gathering information for HPI. Patient has been increasingly confused over the past 2 days.  He was able to speak yesterday, but today has become too lethargic to answer questions. ED work up revealed elevated Ammonia level and UTI.    Review of Systems    Unable to obtain due to obtundation.    Past Medical History    I have reviewed this patient's medical history and updated it with pertinent information if needed.   Past Medical History:   Diagnosis Date     Diabetes mellitus (H)      Gout      Hypertension      Kidney stone        Past Surgical History   I have reviewed this patient's surgical history and updated it with pertinent information if needed.  Past Surgical History:   Procedure Laterality Date     COLONOSCOPY       IR TRANSVEN INTRAHEPATIC PORTOSYST SHUNT  11/16/2022     IR TRANSVEN INTRAHEPATIC PORTOSYST SHUNT  12/9/2022     FL ESOPHAGOGASTRODUODENOSCOPY TRANSORAL DIAGNOSTIC N/A 11/16/2020    Procedure: ESOPHAGOGASTRODUODENOSCOPY (EGD);  Surgeon: Juan Garnett MD;  Location: [facility];  Service: Gastroenterology     FL ESOPHAGOGASTRODUODENOSCOPY TRANSORAL DIAGNOSTIC N/A 11/18/2020    Procedure: ESOPHAGOGASTRODUODENOSCOPY (EGD) WITH BANDING;  Surgeon: Juan Garnett MD;  Location: [facility];  Service: Gastroenterology      URETEROSCOPY         Social History   I have reviewed this patient's social history and updated it with pertinent information if needed.  Social History     Tobacco Use     Smoking status: Never     Smokeless tobacco: Never   Substance Use Topics     Alcohol use: Yes     Comment: none for the past 2 years     Drug use: No       Family History   I have reviewed this patient's family history and updated it with pertinent information if needed.  Family History   Problem Relation Age of Onset     Heart Disease Brother      Hypertension Mother      Hypertension Father        Prior to Admission Medications   Prior to Admission Medications   Prescriptions Last Dose Informant Patient Reported? Taking?   Aromatic Inhalants (RA MENTHOL NASAL INHALER) INHA   Yes Yes   Sig: Spray 1 Inhalation in nostril as needed   Probiotic, Lactobacillus, CAPS Past Week  Yes Yes   Sig: Take 1 capsule by mouth daily   acetaminophen (TYLENOL) 500 MG tablet   Yes Yes   Sig: Take 500-1,000 mg by mouth every 6 hours as needed for mild pain   bismuth subsalicylate (PEPTO BISMOL) 262 MG chewable tablet   Yes Yes   Sig: Take 1 tablet by mouth every 6 hours as needed for diarrhea   calcium carbonate (TUMS) 500 MG chewable tablet   Yes Yes   Sig: Take 1-2 chew tab by mouth 3 times daily as needed for heartburn   hypromellose (ARTIFICIAL TEARS) 0.5 % SOLN ophthalmic solution   Yes Yes   Sig: Place 1 drop into both eyes 4 times daily as needed for dry eyes   multivitamin, therapeutic (THERA-VIT) TABS tablet 12/18/2022  Yes Yes   Sig: Take 1 tablet by mouth daily   omeprazole (PRILOSEC) 20 MG DR capsule Past Week  No Yes   Sig: TAKE 1 CAPSULE BY MOUTH TWICE DAILY BEFORE MEALS   pioglitazone (ACTOS) 30 MG tablet Past Week  No Yes   Sig: Take 1 tablet by mouth once daily   Patient taking differently: Take 15 mg by mouth daily   simvastatin (ZOCOR) 20 MG tablet Past Week  No Yes   Sig: Take 1 tablet (20 mg) by mouth At Bedtime   spironolactone  "(ALDACTONE) 25 MG tablet Past Week  Yes Yes   Sig: Take 25 mg by mouth daily      Facility-Administered Medications: None     Allergies   Allergies   Allergen Reactions     Sulfa Drugs Shortness Of Breath and Itching     Penicillins Unknown     Annotation: discussed with patient, he reports he had \"itching\" with penicillin many years ago in [facility] but no hives or throat or respiratory symptoms.  he also reports having tolerated amoxicillin since coming to [place].         Physical Exam   Vital Signs: Temp: 97.8  F (36.6  C) Temp src: Oral BP: 125/58 Pulse: 90   Resp: 19 SpO2: 95 %      Weight: 0 lbs 0 oz    General Appearance: Obtunded  Eyes: scleral icterus  HEENT: Moist mucous membranes  Respiratory: Poor inspirator effort. Diminished breath sounds in bases bilaterally.  Cardiovascular: Regular rate. S1S2  GI: +BS, distended/ascites, nontender  Musculoskeletal: 1+ pedal edema bilaterally  Neurologic: Limited due to obtundation      Data   Data reviewed today: I reviewed all medications, new labs and imaging results over the last 24 hours. I personally reviewed the chest x-ray image(s) showing no infiltrates and the abdominal CT image(s) showing TIPS in place, increased abdominal ascites, new bilateral pleural effusions..    Recent Labs   Lab 12/18/22 1912   WBC 4.7   HGB 9.5*   MCV 81      INR 1.38*      POTASSIUM 3.7   CHLORIDE 107   CO2 20*   BUN 11.6   CR 1.32*   ANIONGAP 14   SILVIA 8.7*   *   ALBUMIN 3.5   PROTTOTAL 6.7   BILITOTAL 1.7*   ALKPHOS 130*   ALT 30   AST 42   LIPASE 195*     "

## 2022-12-19 NOTE — ED NOTES
Bed: JNED28  Expected date: 12/18/22  Expected time: 6:45 PM  Means of arrival: Ambulance  Comments:  SPF22 - 32yom, confused, recent liver procedure, vss, BS wnl

## 2022-12-19 NOTE — ED NOTES
Q2H lactulose enemas questioned by Rx, discontinued by provider and will give PO lactulose instead.

## 2022-12-19 NOTE — PROGRESS NOTES
Progress Note    Date of admission: 12/18/2022    Assessment/Plan  72 year old male with PMH significant for alcoholic cirrhosis and DM who presented to ED due to AMS.      Altered mental status secondary to hepatic encephalopathy versus UTI  Likely multifactorial: elevated ammonia level and UTI.   continue Rocephin and rifaximin   CT head negative.   Fall precautions. Supportive measures.   Patient has hx of known alcoholic liver cirrhosis. S/p TIPS on 12/09  Quit drinking 5 years ago. Ammonia level=119.   Continue lactulose   NPO for now until bed side swallow eval is done     UTI  Urinalysis reviewed and consistent with infection.   Empiric Rocephin. Follow up urine culture.     Elevated lactic acid  Afebrile, no leukocytosis, VSS. Sepsis not suspected at this time.      Ascites  2/2 to cirrhosis. Recent TIPS last week. Monitor.  paracentesis for symptom relief and to rue out SBP  GI consult appreciated and is following.     Bilateral Pleural Effusions  continue with lasix 20 mg IV twice daily      Hx of DM  Diet controlled. Not on any medications at home due to previous episodes of hypoglycemia. Check HBA1C. Place on ISS. Monitor accuchecks.     DVT PPX : SCD    Barriers to discharge: encephalopathy    Anticipated Discharge date  : 2-3 days    Subjective  Patient is very lethargic.  Is responsive to verbal stimuli.  No distress noted.  Has flapping tremor of his hands.  Management plan discussed with the patient and his spouse who was present at the bedside.    Objective  Vital signs in last 24 hours  Temp:  [97.8  F (36.6  C)] 97.8  F (36.6  C)  Pulse:  [72-96] 82  Resp:  [15-27] 17  BP: (102-141)/(46-80) 130/63  SpO2:  [95 %-100 %] 98 %    Input and Output in 24 hrs     Intake/Output Summary (Last 24 hours) at 12/19/2022 1053  Last data filed at 12/19/2022 1050  Gross per 24 hour   Intake 600 ml   Output 3325 ml   Net -2725 ml       Physical Exam:  GEN: Alert and oriented. Not in acute distress  HEENT: Mucous  membrane- moist and pink  CHEST: Clear to auscultation bilaterally  HEART: S1S2 regular. No murmurs, rubs or gallops  ABDOMEN: Soft. Non-tender, distended with positive fluid thrill no organomegaly. Bowel sounds- active  Extremities: No pedal oedema  CNS: No focal neurological deficit. No involuntary movements  SKIN: Intact and warm    Pertinent Labs   Lab Results: Personally Reviewed    Recent Results (from the past 24 hour(s))   Ammonia (on ice)    Collection Time: 12/18/22  7:12 PM   Result Value Ref Range    Ammonia 119 (HH) 16 - 60 umol/L   Extra Blue Top Tube    Collection Time: 12/18/22  7:12 PM   Result Value Ref Range    Hold Specimen JIC    Extra Red Top Tube    Collection Time: 12/18/22  7:12 PM   Result Value Ref Range    Hold Specimen JIC    Extra Green Top (Lithium Heparin) Tube    Collection Time: 12/18/22  7:12 PM   Result Value Ref Range    Hold Specimen JIC    Extra Purple Top Tube    Collection Time: 12/18/22  7:12 PM   Result Value Ref Range    Hold Specimen JIC    Extra Green Top (Lithium Heparin) ON ICE    Collection Time: 12/18/22  7:12 PM   Result Value Ref Range    Hold Specimen JIC    Basic metabolic panel    Collection Time: 12/18/22  7:12 PM   Result Value Ref Range    Sodium 141 136 - 145 mmol/L    Potassium 3.7 3.4 - 5.3 mmol/L    Chloride 107 98 - 107 mmol/L    Carbon Dioxide (CO2) 20 (L) 22 - 29 mmol/L    Anion Gap 14 7 - 15 mmol/L    Urea Nitrogen 11.6 8.0 - 23.0 mg/dL    Creatinine 1.32 (H) 0.67 - 1.17 mg/dL    Calcium 8.7 (L) 8.8 - 10.2 mg/dL    Glucose 146 (H) 70 - 99 mg/dL    GFR Estimate 58 (L) >60 mL/min/1.73m2   Lipase    Collection Time: 12/18/22  7:12 PM   Result Value Ref Range    Lipase 195 (H) 13 - 60 U/L   Lactic acid whole blood    Collection Time: 12/18/22  7:12 PM   Result Value Ref Range    Lactic Acid 2.1 (H) 0.7 - 2.0 mmol/L   Magnesium    Collection Time: 12/18/22  7:12 PM   Result Value Ref Range    Magnesium 1.7 1.7 - 2.3 mg/dL   Ethyl Alcohol Level     Collection Time: 12/18/22  7:12 PM   Result Value Ref Range    Alcohol ethyl <0.01 <=0.01 g/dL   CBC with platelets and differential    Collection Time: 12/18/22  7:12 PM   Result Value Ref Range    WBC Count 4.7 4.0 - 11.0 10e3/uL    RBC Count 3.67 (L) 4.40 - 5.90 10e6/uL    Hemoglobin 9.5 (L) 13.3 - 17.7 g/dL    Hematocrit 29.8 (L) 40.0 - 53.0 %    MCV 81 78 - 100 fL    MCH 25.9 (L) 26.5 - 33.0 pg    MCHC 31.9 31.5 - 36.5 g/dL    RDW 19.0 (H) 10.0 - 15.0 %    Platelet Count 151 150 - 450 10e3/uL    % Neutrophils 61 %    % Lymphocytes 19 %    % Monocytes 16 %    % Eosinophils 3 %    % Basophils 1 %    % Immature Granulocytes 0 %    NRBCs per 100 WBC 0 <1 /100    Absolute Neutrophils 2.9 1.6 - 8.3 10e3/uL    Absolute Lymphocytes 0.9 0.8 - 5.3 10e3/uL    Absolute Monocytes 0.8 0.0 - 1.3 10e3/uL    Absolute Eosinophils 0.2 0.0 - 0.7 10e3/uL    Absolute Basophils 0.0 0.0 - 0.2 10e3/uL    Absolute Immature Granulocytes 0.0 <=0.4 10e3/uL    Absolute NRBCs 0.0 10e3/uL   INR    Collection Time: 12/18/22  7:12 PM   Result Value Ref Range    INR 1.38 (H) 0.85 - 1.15   Hepatic function panel    Collection Time: 12/18/22  7:12 PM   Result Value Ref Range    Protein Total 6.7 6.4 - 8.3 g/dL    Albumin 3.5 3.5 - 5.2 g/dL    Bilirubin Total 1.7 (H) <=1.2 mg/dL    Alkaline Phosphatase 130 (H) 40 - 129 U/L    AST 42 10 - 50 U/L    ALT 30 10 - 50 U/L    Bilirubin Direct 0.55 (H) 0.00 - 0.30 mg/dL   Blood gas venous    Collection Time: 12/18/22  8:11 PM   Result Value Ref Range    pH Venous 7.50 (H) 7.35 - 7.45    pCO2 Venous 29 (L) 35 - 50 mm Hg    pO2 Venous 43 25 - 47 mm Hg    Bicarbonate Venous 23 (L) 24 - 30 mmol/L    Base Excess/Deficit (+/-) -0.6   mmol/L    Oxyhemoglobin Venous 79.9 (H) 70.0 - 75.0 %    O2 Sat, Venous 81.2 (H) 70.0 - 75.0 %   UA with Microscopic reflex to Culture    Collection Time: 12/18/22  9:19 PM    Specimen: Urine, Clean Catch   Result Value Ref Range    Color Urine Light Yellow Colorless, Straw, Light  Yellow, Yellow    Appearance Urine Clear Clear    Glucose Urine Negative Negative mg/dL    Bilirubin Urine Negative Negative    Ketones Urine Negative Negative mg/dL    Specific Gravity Urine 1.012 1.001 - 1.030    Blood Urine >1.0 mg/dL (A) Negative    pH Urine 7.5 (H) 5.0 - 7.0    Protein Albumin Urine 20 (A) Negative mg/dL    Urobilinogen Urine 4.0 (A) <2.0 mg/dL    Nitrite Urine Negative Negative    Leukocyte Esterase Urine 25 Watson/uL (A) Negative    Bacteria Urine Few (A) None Seen /HPF    Mucus Urine Present (A) None Seen /LPF    RBC Urine 171 (H) <=2 /HPF    WBC Urine 7 (H) <=5 /HPF   Drug abuse screen 77 urine (FL, RH, SH)    Collection Time: 12/18/22  9:19 PM   Result Value Ref Range    Amphetamines Urine Screen Negative Screen Negative    Barbituates Urine Screen Negative Screen Negative    Benzodiazepine Urine Screen Negative Screen Negative    Cannabinoids Urine Screen Negative Screen Negative    Opiates Urine Screen Negative Screen Negative    PCP Urine Screen Negative Screen Negative    Cocaine Urine Screen Negative Screen Negative   Lactic acid whole blood    Collection Time: 12/18/22 10:18 PM   Result Value Ref Range    Lactic Acid 2.7 (H) 0.7 - 2.0 mmol/L   Glucose by meter    Collection Time: 12/18/22 11:48 PM   Result Value Ref Range    GLUCOSE BY METER POCT 166 (H) 70 - 99 mg/dL   Lactic acid whole blood    Collection Time: 12/19/22 12:59 AM   Result Value Ref Range    Lactic Acid 2.5 (H) 0.7 - 2.0 mmol/L   Comprehensive metabolic panel    Collection Time: 12/19/22  4:11 AM   Result Value Ref Range    Sodium 141 136 - 145 mmol/L    Potassium 3.8 3.4 - 5.3 mmol/L    Chloride 108 (H) 98 - 107 mmol/L    Carbon Dioxide (CO2) 21 (L) 22 - 29 mmol/L    Anion Gap 12 7 - 15 mmol/L    Urea Nitrogen 11.4 8.0 - 23.0 mg/dL    Creatinine 1.30 (H) 0.67 - 1.17 mg/dL    Calcium 8.7 (L) 8.8 - 10.2 mg/dL    Glucose 139 (H) 70 - 99 mg/dL    Alkaline Phosphatase 121 40 - 129 U/L    AST 40 10 - 50 U/L    ALT 27 10 - 50  U/L    Protein Total 6.6 6.4 - 8.3 g/dL    Albumin 3.4 (L) 3.5 - 5.2 g/dL    Bilirubin Total 1.9 (H) <=1.2 mg/dL    GFR Estimate 59 (L) >60 mL/min/1.73m2   Lactic acid whole blood    Collection Time: 12/19/22  4:11 AM   Result Value Ref Range    Lactic Acid 2.6 (H) 0.7 - 2.0 mmol/L   CBC with platelets and differential    Collection Time: 12/19/22  4:11 AM   Result Value Ref Range    WBC Count 5.7 4.0 - 11.0 10e3/uL    RBC Count 3.78 (L) 4.40 - 5.90 10e6/uL    Hemoglobin 9.6 (L) 13.3 - 17.7 g/dL    Hematocrit 30.7 (L) 40.0 - 53.0 %    MCV 81 78 - 100 fL    MCH 25.4 (L) 26.5 - 33.0 pg    MCHC 31.3 (L) 31.5 - 36.5 g/dL    RDW 19.1 (H) 10.0 - 15.0 %    Platelet Count 166 150 - 450 10e3/uL    % Neutrophils 66 %    % Lymphocytes 15 %    % Monocytes 15 %    % Eosinophils 3 %    % Basophils 1 %    % Immature Granulocytes 0 %    NRBCs per 100 WBC 0 <1 /100    Absolute Neutrophils 3.8 1.6 - 8.3 10e3/uL    Absolute Lymphocytes 0.9 0.8 - 5.3 10e3/uL    Absolute Monocytes 0.8 0.0 - 1.3 10e3/uL    Absolute Eosinophils 0.2 0.0 - 0.7 10e3/uL    Absolute Basophils 0.0 0.0 - 0.2 10e3/uL    Absolute Immature Granulocytes 0.0 <=0.4 10e3/uL    Absolute NRBCs 0.0 10e3/uL   Glucose by meter    Collection Time: 12/19/22  8:04 AM   Result Value Ref Range    GLUCOSE BY METER POCT 128 (H) 70 - 99 mg/dL       Pertinent Radiology   Radiology Results reviewed      EKG Results reviewed       Advanced Care Planning      Karsten Arriola MD   [facility]

## 2022-12-19 NOTE — PHARMACY-ADMISSION MEDICATION HISTORY
Pharmacy Note - Admission Medication History    Pertinent Provider Information:   -Per family, pt has not taken any medications besides a multivitamin for the past three days.   -Family reports no medication changes since last admit 12/10/22 ______________________________________________________________________    Prior To Admission (PTA) med list completed and updated in EMR.       PTA Med List   Medication Sig Last Dose     acetaminophen (TYLENOL) 500 MG tablet Take 500-1,000 mg by mouth every 6 hours as needed for mild pain      Aromatic Inhalants (RA MENTHOL NASAL INHALER) INHA Spray 1 Inhalation in nostril as needed      bismuth subsalicylate (PEPTO BISMOL) 262 MG chewable tablet Take 1 tablet by mouth every 6 hours as needed for diarrhea      calcium carbonate (TUMS) 500 MG chewable tablet Take 1-2 chew tab by mouth 3 times daily as needed for heartburn      hypromellose (ARTIFICIAL TEARS) 0.5 % SOLN ophthalmic solution Place 1 drop into both eyes 4 times daily as needed for dry eyes      multivitamin, therapeutic (THERA-VIT) TABS tablet Take 1 tablet by mouth daily 12/18/2022     omeprazole (PRILOSEC) 20 MG DR capsule TAKE 1 CAPSULE BY MOUTH TWICE DAILY BEFORE MEALS Past Week     pioglitazone (ACTOS) 30 MG tablet Take 1 tablet by mouth once daily (Patient taking differently: Take 15 mg by mouth daily) Past Week     Probiotic, Lactobacillus, CAPS Take 1 capsule by mouth daily Past Week     simvastatin (ZOCOR) 20 MG tablet Take 1 tablet (20 mg) by mouth At Bedtime Past Week     spironolactone (ALDACTONE) 25 MG tablet Take 25 mg by mouth daily Past Week       Information source(s): Family member, Hospital records and [facility]  Method of interview communication: in-person    Summary of Changes to PTA Med List  New: -  Discontinued: -  Changed: -    Patient was asked about OTC/herbal products specifically.  PTA med list reflects this.    In the past week, patient estimated taking medication this  percent of the time:  50-90% due to illness.    Allergies were reviewed, assessed, and updated with the patient.      Patient does not anticipate needing any multi-use medications during admission.    The information provided in this note is only as accurate as the sources available at the time of the update(s).    Thank you for the opportunity to participate in the care of this patient.    Cassidy Savage, PharmD, BCPS 12/18/22 8:06 PM

## 2022-12-19 NOTE — ED PROVIDER NOTES
EMERGENCY DEPARTMENT ENCOUNTER      NAME: Vito Sy  AGE: 72 year old male  YOB: 1950  MRN: [id number]  EVALUATION DATE & TIME: 12/18/2022  7:02 PM    PCP: Amrik Mejia    ED PROVIDER: Jordi Pagan M.D.    Chief Complaint   Patient presents with     Altered Mental Status       FINAL IMPRESSION:  1. Hepatic encephalopathy    2. Anemia, unspecified type    3. Alcoholic cirrhosis of liver with ascites (H)    4. Type 2 diabetes mellitus with microalbuminuria, without long-term current use of insulin (H)    5. Chronic kidney disease, stage 3a (H)    6. Pleural effusion    7. Esophageal varices without bleeding, unspecified esophageal varices type (H)        ED COURSE & MEDICAL DECISION MAKING:    Pertinent Labs & Imaging studies personally reviewed and interpreted by me. (See chart for details)  7:10 PM Patient seen and examined, prior inpatient and outpatient records reviewed and show history of alcoholic cirrhosis with ascites, TIPS procedure December 9. PPE (gloves, glasses, N95 mask) was worn during patient encounters.  Differential diagnosis includes but not limited to intracranial hemorrhage, CVA, overdose, medication reaction, alcohol intoxication, electrolyte disturbance, pneumonia, urinary tract infection, sepsis, meningitis, encephalitis, hepatic encephalopathy.  Family has noted increased weakness and confusion over the last couple of days.  Increased lower extremity swelling as well.  On exam here, patient is alert to voice but does not talk.  We does follow commands.  Mild abdominal tenderness.  Labs and CT scan are ordered.  Concern for sepsis, hepatic encephalopathy, urinary tract infection possible but less likely.  7:44 PM Lab called to report a critical result for ammonia at 119.  Given altered mental status, this likely does represent hepatic encephalopathy.  Will discuss with GI and plan to start lactulose and possibly rifaximin as well  9:01 PM I spoke with   Julian, the gastroenterologist, regarding patient agrees that symptoms could be from hepatic encephalopathy and notes that this is not uncommon after TIPS procedure.  Recommends lactulose either orally or rectally every 2 hours, I am concerned the patient will not be able to safely swallow this given his altered mental status and therefore will order enema.  No indication for rifaximin yet as patient is unlikely to be able to tolerate it orally.  Updated patient and family regarding diagnosis and plan.  CT scan of the head, abdomen, pelvis still pending.  9:33 PM CT scan of the head personally reviewed and interpreted by me does not demonstrate any acute findings, radiology interpretation agrees.  CT scan of the abdomen pelvis personally reviewed and interpreted by me demonstrates bilateral pleural effusions as well as small to moderate ascites, no other acute findings.  9:49 PM radiology interpretation of CT agrees with my initial interpretation with increasing ascites and new TIPS procedure.  10:03 PM discussed lactulose with nursing staff.  Discussed possible placement of NG tube and administration from above by that route.  With patient's history of cirrhosis, reviewed prior EGD from March 2022 which does demonstrate small varices which would be a contraindication to NG tube placement using blind approach and so we will continue with plan for enema.  10:23 PM Care discussed with Dr. Bell for admission.    Patient presents with hepatic encephalopathy after TIPS procedure.  Hemoglobin is stable, patient has known varices but does not seem to have acute GI bleed.  No pneumonia on x-ray, no other indications of infection or sepsis.    At the conclusion of the encounter I discussed the results of all of the tests and the disposition. The questions were answered. The patient or family acknowledged understanding and was agreeable with the care plan.     Medical Decision Making    History:    Supplemental history  from: Family Member/Significant Other    External Record(s) reviewed: Documented in HPI, if applicable.    Work Up:    Chart documentation includes differential considered and any EKGs or imaging interpreted by provider.    In additional to work up documented, I considered the following work up: See chart documentation, if applicable.    External consultation:    Discussion of management with another provider: See chart documentation, if applicable    Complicating factors:    Care impacted by chronic illness: Chronic Kidney Disease, Diabetes, Hypertension and Other: liver cirrohsis    Care affected by social determinants of health: Alcohol Abuse and/or Recreational Drug Use    Disposition considerations: Admit.    PROCEDURES:   Procedures    MEDICATIONS GIVEN IN THE EMERGENCY:  Medications   lactulose (CHRONULAC) rectal enema 200 g (has no administration in time range)   pantoprazole (PROTONIX) IV push injection 40 mg (has no administration in time range)   0.9% sodium chloride BOLUS (0 mLs Intravenous Stopped 12/18/22 2141)   iopamidol (ISOVUE-370) solution 80 mL (80 mLs Intravenous Given 12/18/22 2114)       NEW PRESCRIPTIONS STARTED AT TODAY'S ER VISIT  New Prescriptions    No medications on file       =================================================================    [facility]    Patient information was obtained from: patient's family, limited secondary to patient confusion      Vito CABRERA Yossi is a 72 year old male with a pertinent history of type 2 diabetes mellitus, anemia, HTN, cirrhosis of liver, CKD3, gastrointestinal hemorrhage, s/p R transven intrahepatic portosyst shunt (12/922) who presents to this ED by EMS with family for evaluation of abdominal pain and confusion. Patient's family reports that patient has been complaining of abdominal pain. They state that patient has had a decreased appetite, confusion, weakness, and decreased speech for the last 3 days. Patient has also had increased urinary frequency  for the past day. Patient's family states that patient has chronic bilateral lower extremity swelling, with no change from baseline. They state that, at baseline, patient is able to walk, and talk. Patient's family state that patient did not have any falls or injury, they deny emesis, or additional medical concerns or complaints at this time.        REVIEW OF SYSTEMS   Review of Systems   Unable to perform ROS: Mental status change   Gastrointestinal: Positive for abdominal pain.   All other systems reviewed and are negative.     All other systems reviewed and negative    PAST MEDICAL HISTORY:  Past Medical History:   Diagnosis Date     Diabetes mellitus (H)      Gout      Hypertension      Kidney stone        PAST SURGICAL HISTORY:  Past Surgical History:   Procedure Laterality Date     COLONOSCOPY       IR TRANSVEN INTRAHEPATIC PORTOSYST SHUNT  11/16/2022     IR TRANSVEN INTRAHEPATIC PORTOSYST SHUNT  12/9/2022     RI ESOPHAGOGASTRODUODENOSCOPY TRANSORAL DIAGNOSTIC N/A 11/16/2020    Procedure: ESOPHAGOGASTRODUODENOSCOPY (EGD);  Surgeon: Juan Garnett MD;  Location: [facility];  Service: Gastroenterology     RI ESOPHAGOGASTRODUODENOSCOPY TRANSORAL DIAGNOSTIC N/A 11/18/2020    Procedure: ESOPHAGOGASTRODUODENOSCOPY (EGD) WITH BANDING;  Surgeon: Juan Garnett MD;  Location: [facility];  Service: Gastroenterology     URETEROSCOPY         CURRENT MEDICATIONS:    Current Facility-Administered Medications   Medication     lactulose (CHRONULAC) rectal enema 200 g     pantoprazole (PROTONIX) IV push injection 40 mg     Current Outpatient Medications   Medication     acetaminophen (TYLENOL) 500 MG tablet     Aromatic Inhalants (RA MENTHOL NASAL INHALER) INHA     bismuth subsalicylate (PEPTO BISMOL) 262 MG chewable tablet     calcium carbonate (TUMS) 500 MG chewable tablet     hypromellose (ARTIFICIAL TEARS) 0.5 % SOLN ophthalmic solution     multivitamin, therapeutic (THERA-VIT) TABS tablet     omeprazole (PRILOSEC)  "20 MG DR capsule     pioglitazone (ACTOS) 30 MG tablet     Probiotic, Lactobacillus, CAPS     simvastatin (ZOCOR) 20 MG tablet     spironolactone (ALDACTONE) 25 MG tablet       ALLERGIES:  Allergies   Allergen Reactions     Sulfa Drugs Shortness Of Breath and Itching     Penicillins Unknown     Annotation: discussed with patient, he reports he had \"itching\" with penicillin many years ago in [facility] but no hives or throat or respiratory symptoms.  he also reports having tolerated amoxicillin since coming to [place].         FAMILY HISTORY:  Family History   Problem Relation Age of Onset     Heart Disease Brother      Hypertension Mother      Hypertension Father        SOCIAL HISTORY:   Social History     Socioeconomic History     Marital status:    Tobacco Use     Smoking status: Never     Smokeless tobacco: Never   Substance and Sexual Activity     Alcohol use: Yes     Comment: none for the past 2 years     Drug use: No   Social History Narrative    Lives with wife - has worked as  in the past       VITALS:  /58   Pulse 90   Temp 97.8  F (36.6  C) (Oral)   Resp 19   Ht 1.727 m (5' 8\")   SpO2 95%   BMI 30.27 kg/m      PHYSICAL EXAM:  Physical Exam  Vitals and nursing note reviewed.   Constitutional:       Appearance: Normal appearance.   HENT:      Head: Normocephalic and atraumatic.      Comments: Lips dry and cracked     Right Ear: External ear normal.      Left Ear: External ear normal.      Nose: Nose normal.      Mouth/Throat:      Mouth: Mucous membranes are moist.   Eyes:      Extraocular Movements: Extraocular movements intact.      Conjunctiva/sclera: Conjunctivae normal.      Pupils: Pupils are equal, round, and reactive to light.   Cardiovascular:      Rate and Rhythm: Normal rate and regular rhythm.      Heart sounds: Murmur heard.    Systolic murmur is present.  Pulmonary:      Effort: Pulmonary effort is normal.      Breath sounds: Normal breath sounds. No wheezing or rales. "   Abdominal:      General: Abdomen is flat. There is no distension.      Palpations: Abdomen is soft.      Tenderness: There is abdominal tenderness (diffuse). There is no guarding.   Musculoskeletal:         General: Normal range of motion.      Cervical back: Normal range of motion and neck supple.      Right lower leg: No edema.      Left lower leg: No edema.   Lymphadenopathy:      Cervical: No cervical adenopathy.   Skin:     General: Skin is warm and dry.   Neurological:      Comments: Moves all extremities. Intermittently follows commands.   Psychiatric:         Mood and Affect: Mood normal.         Behavior: Behavior normal.         Thought Content: Thought content normal.          LAB:  All pertinent labs reviewed and interpreted.  Results for orders placed or performed during the hospital encounter of 12/18/22   Head CT w/o contrast    Impression    IMPRESSION:  1.  No CT evidence for acute intracranial process.  2.  Brain atrophy and presumed chronic microvascular ischemic changes as above.   CT Abdomen Pelvis w Contrast    Impression    IMPRESSION:   1.  Cirrhosis with TIPS in place.  2.  Abdominal ascites has increased.  3.  New bilateral pleural effusions and bibasilar atelectasis.  4.  Nonobstructing stones both kidneys.   XR Chest Port 1 View    Impression    IMPRESSION: Heart size prominent on this AP view. Pulmonary vascularity normal. Slightly prominent interstitial pattern both lungs could represent edema. No airspace infiltrates. Trace fluid or thickened pleura right costophrenic angle.   Ammonia (on ice)   Result Value Ref Range    Ammonia 119 (HH) 16 - 60 umol/L   Extra Blue Top Tube   Result Value Ref Range    Hold Specimen JIC    Extra Red Top Tube   Result Value Ref Range    Hold Specimen JIC    Extra Green Top (Lithium Heparin) Tube   Result Value Ref Range    Hold Specimen JIC    Extra Purple Top Tube   Result Value Ref Range    Hold Specimen JIC    Extra Green Top (Lithium Heparin) ON  ICE   Result Value Ref Range    Hold Specimen [facility]    Basic metabolic panel   Result Value Ref Range    Sodium 141 136 - 145 mmol/L    Potassium 3.7 3.4 - 5.3 mmol/L    Chloride 107 98 - 107 mmol/L    Carbon Dioxide (CO2) 20 (L) 22 - 29 mmol/L    Anion Gap 14 7 - 15 mmol/L    Urea Nitrogen 11.6 8.0 - 23.0 mg/dL    Creatinine 1.32 (H) 0.67 - 1.17 mg/dL    Calcium 8.7 (L) 8.8 - 10.2 mg/dL    Glucose 146 (H) 70 - 99 mg/dL    GFR Estimate 58 (L) >60 mL/min/1.73m2   Result Value Ref Range    Lipase 195 (H) 13 - 60 U/L   Lactic acid whole blood   Result Value Ref Range    Lactic Acid 2.1 (H) 0.7 - 2.0 mmol/L   Result Value Ref Range    Magnesium 1.7 1.7 - 2.3 mg/dL   Ethyl Alcohol Level   Result Value Ref Range    Alcohol ethyl <0.01 <=0.01 g/dL   UA with Microscopic reflex to Culture    Specimen: Urine, Clean Catch   Result Value Ref Range    Color Urine Light Yellow Colorless, Straw, Light Yellow, Yellow    Appearance Urine Clear Clear    Glucose Urine Negative Negative mg/dL    Bilirubin Urine Negative Negative    Ketones Urine Negative Negative mg/dL    Specific Gravity Urine 1.012 1.001 - 1.030    Blood Urine >1.0 mg/dL (A) Negative    pH Urine 7.5 (H) 5.0 - 7.0    Protein Albumin Urine 20 (A) Negative mg/dL    Urobilinogen Urine 4.0 (A) <2.0 mg/dL    Nitrite Urine Negative Negative    Leukocyte Esterase Urine 25 Watson/uL (A) Negative    Bacteria Urine Few (A) None Seen /HPF    Mucus Urine Present (A) None Seen /LPF    RBC Urine 171 (H) <=2 /HPF    WBC Urine 7 (H) <=5 /HPF   CBC with platelets and differential   Result Value Ref Range    WBC Count 4.7 4.0 - 11.0 10e3/uL    RBC Count 3.67 (L) 4.40 - 5.90 10e6/uL    Hemoglobin 9.5 (L) 13.3 - 17.7 g/dL    Hematocrit 29.8 (L) 40.0 - 53.0 %    MCV 81 78 - 100 fL    MCH 25.9 (L) 26.5 - 33.0 pg    MCHC 31.9 31.5 - 36.5 g/dL    RDW 19.0 (H) 10.0 - 15.0 %    Platelet Count 151 150 - 450 10e3/uL    % Neutrophils 61 %    % Lymphocytes 19 %    % Monocytes 16 %    % Eosinophils 3 %     % Basophils 1 %    % Immature Granulocytes 0 %    NRBCs per 100 WBC 0 <1 /100    Absolute Neutrophils 2.9 1.6 - 8.3 10e3/uL    Absolute Lymphocytes 0.9 0.8 - 5.3 10e3/uL    Absolute Monocytes 0.8 0.0 - 1.3 10e3/uL    Absolute Eosinophils 0.2 0.0 - 0.7 10e3/uL    Absolute Basophils 0.0 0.0 - 0.2 10e3/uL    Absolute Immature Granulocytes 0.0 <=0.4 10e3/uL    Absolute NRBCs 0.0 10e3/uL   Drug abuse screen 77 urine (FL, RH, SH)   Result Value Ref Range    Amphetamines Urine Screen Negative Screen Negative    Barbituates Urine Screen Negative Screen Negative    Benzodiazepine Urine Screen Negative Screen Negative    Cannabinoids Urine Screen Negative Screen Negative    Opiates Urine Screen Negative Screen Negative    PCP Urine Screen Negative Screen Negative    Cocaine Urine Screen Negative Screen Negative   Blood gas venous   Result Value Ref Range    pH Venous 7.50 (H) 7.35 - 7.45    pCO2 Venous 29 (L) 35 - 50 mm Hg    pO2 Venous 43 25 - 47 mm Hg    Bicarbonate Venous 23 (L) 24 - 30 mmol/L    Base Excess/Deficit (+/-) -0.6   mmol/L    Oxyhemoglobin Venous 79.9 (H) 70.0 - 75.0 %    O2 Sat, Venous 81.2 (H) 70.0 - 75.0 %   Result Value Ref Range    INR 1.38 (H) 0.85 - 1.15   Hepatic function panel   Result Value Ref Range    Protein Total 6.7 6.4 - 8.3 g/dL    Albumin 3.5 3.5 - 5.2 g/dL    Bilirubin Total 1.7 (H) <=1.2 mg/dL    Alkaline Phosphatase 130 (H) 40 - 129 U/L    AST 42 10 - 50 U/L    ALT 30 10 - 50 U/L    Bilirubin Direct 0.55 (H) 0.00 - 0.30 mg/dL       RADIOLOGY:  Reviewed all pertinent imaging. Please see official radiology report.  XR Chest Port 1 View   Final Result   IMPRESSION: Heart size prominent on this AP view. Pulmonary vascularity normal. Slightly prominent interstitial pattern both lungs could represent edema. No airspace infiltrates. Trace fluid or thickened pleura right costophrenic angle.      CT Abdomen Pelvis w Contrast   Final Result   IMPRESSION:    1.  Cirrhosis with TIPS in place.   2.   Abdominal ascites has increased.   3.  New bilateral pleural effusions and bibasilar atelectasis.   4.  Nonobstructing stones both kidneys.      Head CT w/o contrast   Final Result   IMPRESSION:   1.  No CT evidence for acute intracranial process.   2.  Brain atrophy and presumed chronic microvascular ischemic changes as above.          EKG:    Performed at: 7:11 PM  Impression: Prolonged QT, otherwise normal EKG  Rate: 86  Rhythm: Sinus  Axis: Normal  ID Interval: 126  QRS Interval: 94  QTc Interval: 497  ST Changes: No acute ischemic changes  Comparison: November 9, 2022, no acute change    I have independently reviewed and interpreted the EKG(s) documented above.    I, Elise Gryler, am serving as a scribe to document services personally performed by Dr. Pagan based on my observation and the provider's statements to me. I, Jordi Pagan MD attest that Elise Gryler, is acting in a scribe capacity, has observed my performance of the services and has documented them in accordance with my direction.    Jordi Pagan M.D.  Emergency Medicine  [facility] EMERGENCY DEPARTMENT  [facility] [facility] [id number] [phone]  Dept: [phone]       Jordi Pagan MD  12/18/22 2226       Jordi Pagan MD  12/18/22 2232

## 2022-12-19 NOTE — ED NOTES
Writer unable to reach phlebotomist re ammonia level. Writer called lab re ammonia level, per lab it is marked as to be collected and he will call phleb in ED to draw ASAP.

## 2022-12-20 ENCOUNTER — APPOINTMENT (OUTPATIENT)
Dept: ULTRASOUND IMAGING | Facility: HOSPITAL | Age: 72
DRG: 689 | End: 2022-12-20
Attending: INTERNAL MEDICINE
Payer: COMMERCIAL

## 2022-12-20 LAB
ALBUMIN SERPL BCG-MCNC: 2.9 G/DL (ref 3.5–5.2)
ALP SERPL-CCNC: 105 U/L (ref 40–129)
ALT SERPL W P-5'-P-CCNC: 23 U/L (ref 10–50)
ANION GAP SERPL CALCULATED.3IONS-SCNC: 12 MMOL/L (ref 7–15)
AST SERPL W P-5'-P-CCNC: 33 U/L (ref 10–50)
BILIRUB SERPL-MCNC: 1.6 MG/DL
BUN SERPL-MCNC: 13 MG/DL (ref 8–23)
CALCIUM SERPL-MCNC: 8.7 MG/DL (ref 8.8–10.2)
CHLORIDE SERPL-SCNC: 110 MMOL/L (ref 98–107)
CREAT SERPL-MCNC: 1.55 MG/DL (ref 0.67–1.17)
DEPRECATED HCO3 PLAS-SCNC: 22 MMOL/L (ref 22–29)
ERYTHROCYTE [DISTWIDTH] IN BLOOD BY AUTOMATED COUNT: 19.2 % (ref 10–15)
GFR SERPL CREATININE-BSD FRML MDRD: 48 ML/MIN/1.73M2
GLUCOSE BLDC GLUCOMTR-MCNC: 117 MG/DL (ref 70–99)
GLUCOSE BLDC GLUCOMTR-MCNC: 124 MG/DL (ref 70–99)
GLUCOSE BLDC GLUCOMTR-MCNC: 134 MG/DL (ref 70–99)
GLUCOSE BLDC GLUCOMTR-MCNC: 150 MG/DL (ref 70–99)
GLUCOSE BLDC GLUCOMTR-MCNC: 189 MG/DL (ref 70–99)
GLUCOSE SERPL-MCNC: 116 MG/DL (ref 70–99)
HCT VFR BLD AUTO: 30.1 % (ref 40–53)
HGB BLD-MCNC: 9.2 G/DL (ref 13.3–17.7)
MCH RBC QN AUTO: 25.4 PG (ref 26.5–33)
MCHC RBC AUTO-ENTMCNC: 30.6 G/DL (ref 31.5–36.5)
MCV RBC AUTO: 83 FL (ref 78–100)
PLATELET # BLD AUTO: 151 10E3/UL (ref 150–450)
POTASSIUM SERPL-SCNC: 3.1 MMOL/L (ref 3.4–5.3)
POTASSIUM SERPL-SCNC: 3.8 MMOL/L (ref 3.4–5.3)
PROT SERPL-MCNC: 6 G/DL (ref 6.4–8.3)
RBC # BLD AUTO: 3.62 10E6/UL (ref 4.4–5.9)
SODIUM SERPL-SCNC: 144 MMOL/L (ref 136–145)
WBC # BLD AUTO: 5.8 10E3/UL (ref 4–11)

## 2022-12-20 PROCEDURE — 85027 COMPLETE CBC AUTOMATED: CPT | Performed by: STUDENT IN AN ORGANIZED HEALTH CARE EDUCATION/TRAINING PROGRAM

## 2022-12-20 PROCEDURE — 99232 SBSQ HOSP IP/OBS MODERATE 35: CPT | Performed by: STUDENT IN AN ORGANIZED HEALTH CARE EDUCATION/TRAINING PROGRAM

## 2022-12-20 PROCEDURE — 120N000001 HC R&B MED SURG/OB

## 2022-12-20 PROCEDURE — 87086 URINE CULTURE/COLONY COUNT: CPT | Performed by: STUDENT IN AN ORGANIZED HEALTH CARE EDUCATION/TRAINING PROGRAM

## 2022-12-20 PROCEDURE — 250N000013 HC RX MED GY IP 250 OP 250 PS 637: Performed by: INTERNAL MEDICINE

## 2022-12-20 PROCEDURE — 80053 COMPREHEN METABOLIC PANEL: CPT | Performed by: STUDENT IN AN ORGANIZED HEALTH CARE EDUCATION/TRAINING PROGRAM

## 2022-12-20 PROCEDURE — 258N000003 HC RX IP 258 OP 636: Performed by: INTERNAL MEDICINE

## 2022-12-20 PROCEDURE — 250N000011 HC RX IP 250 OP 636: Performed by: FAMILY MEDICINE

## 2022-12-20 PROCEDURE — 250N000011 HC RX IP 250 OP 636: Performed by: INTERNAL MEDICINE

## 2022-12-20 PROCEDURE — 250N000011 HC RX IP 250 OP 636: Performed by: STUDENT IN AN ORGANIZED HEALTH CARE EDUCATION/TRAINING PROGRAM

## 2022-12-20 PROCEDURE — 250N000013 HC RX MED GY IP 250 OP 250 PS 637: Performed by: STUDENT IN AN ORGANIZED HEALTH CARE EDUCATION/TRAINING PROGRAM

## 2022-12-20 PROCEDURE — 93975 VASCULAR STUDY: CPT

## 2022-12-20 PROCEDURE — 36415 COLL VENOUS BLD VENIPUNCTURE: CPT | Performed by: STUDENT IN AN ORGANIZED HEALTH CARE EDUCATION/TRAINING PROGRAM

## 2022-12-20 PROCEDURE — 84132 ASSAY OF SERUM POTASSIUM: CPT | Performed by: STUDENT IN AN ORGANIZED HEALTH CARE EDUCATION/TRAINING PROGRAM

## 2022-12-20 RX ORDER — LACTULOSE 10 G/15ML
20 SOLUTION ORAL
Status: DISCONTINUED | OUTPATIENT
Start: 2022-12-20 | End: 2022-12-22 | Stop reason: HOSPADM

## 2022-12-20 RX ORDER — FUROSEMIDE 10 MG/ML
20 INJECTION INTRAMUSCULAR; INTRAVENOUS DAILY
Status: DISCONTINUED | OUTPATIENT
Start: 2022-12-21 | End: 2022-12-21

## 2022-12-20 RX ORDER — POTASSIUM CHLORIDE 1.5 G/1.58G
40 POWDER, FOR SOLUTION ORAL ONCE
Status: COMPLETED | OUTPATIENT
Start: 2022-12-20 | End: 2022-12-20

## 2022-12-20 RX ADMIN — FUROSEMIDE 20 MG: 10 INJECTION, SOLUTION INTRAMUSCULAR; INTRAVENOUS at 05:24

## 2022-12-20 RX ADMIN — LACTULOSE 20 G: 10 SOLUTION ORAL at 23:00

## 2022-12-20 RX ADMIN — RIFAXIMIN 550 MG: 550 TABLET ORAL at 08:53

## 2022-12-20 RX ADMIN — LACTULOSE 20 G: 10 SOLUTION ORAL at 07:51

## 2022-12-20 RX ADMIN — LACTULOSE 20 G: 10 SOLUTION ORAL at 19:16

## 2022-12-20 RX ADMIN — SPIRONOLACTONE 25 MG: 25 TABLET, FILM COATED ORAL at 08:53

## 2022-12-20 RX ADMIN — RIFAXIMIN 550 MG: 550 TABLET ORAL at 21:06

## 2022-12-20 RX ADMIN — LACTULOSE 20 G: 10 SOLUTION ORAL at 16:30

## 2022-12-20 RX ADMIN — CEFTRIAXONE SODIUM 1 G: 1 INJECTION, POWDER, FOR SOLUTION INTRAMUSCULAR; INTRAVENOUS at 22:59

## 2022-12-20 RX ADMIN — POTASSIUM CHLORIDE 40 MEQ: 1.5 POWDER, FOR SOLUTION ORAL at 13:19

## 2022-12-20 RX ADMIN — LACTULOSE 20 G: 10 SOLUTION ORAL at 13:18

## 2022-12-20 RX ADMIN — INSULIN ASPART 1 UNITS: 100 INJECTION, SOLUTION INTRAVENOUS; SUBCUTANEOUS at 17:40

## 2022-12-20 RX ADMIN — VANCOMYCIN HYDROCHLORIDE 750 MG: 5 INJECTION, POWDER, LYOPHILIZED, FOR SOLUTION INTRAVENOUS at 09:08

## 2022-12-20 ASSESSMENT — ACTIVITIES OF DAILY LIVING (ADL)
ADLS_ACUITY_SCORE: 41
ADLS_ACUITY_SCORE: 39
ADLS_ACUITY_SCORE: 41
ADLS_ACUITY_SCORE: 39
ADLS_ACUITY_SCORE: 41
ADLS_ACUITY_SCORE: 43
ADLS_ACUITY_SCORE: 39
ADLS_ACUITY_SCORE: 41
ADLS_ACUITY_SCORE: 43
ADLS_ACUITY_SCORE: 39
ADLS_ACUITY_SCORE: 41
ADLS_ACUITY_SCORE: 41

## 2022-12-20 NOTE — ED NOTES
Pt appears to be more alert and responding to questions. Still lethargic. Rectal tube removed. Using bedside urinal. VSS.

## 2022-12-20 NOTE — PHARMACY-VANCOMYCIN DOSING SERVICE
Pharmacy Vancomycin Initial Note  Date of Service 2022  Patient's  1950  72 year old, male    Indication: Bacteremia    Current estimated CrCl = Estimated Creatinine Clearance: 56.9 mL/min (A) (based on SCr of 1.3 mg/dL (H)).    Creatinine for last 3 days  2022:  7:12 PM Creatinine 1.32 mg/dL  2022:  4:11 AM Creatinine 1.30 mg/dL    Recent Vancomycin Level(s) for last 3 days  No results found for requested labs within last 72 hours.      Vancomycin IV Administrations (past 72 hours)      No vancomycin orders with administrations in past 72 hours.                Nephrotoxins and other renal medications (From now, onward)    Start     Dose/Rate Route Frequency Ordered Stop    22 1000  vancomycin (VANCOCIN) 750 mg in 0.9% NaCl 250 mL intermittent infusion        See Hyperspace for full Linked Orders Report.    750 mg  over 60 Minutes Intravenous EVERY 12 HOURS 22 2148      22 2200  vancomycin (VANCOCIN) 1,250 mg in sodium chloride 0.9 % 250 mL intermittent infusion        See Hyperspace for full Linked Orders Report.    1,250 mg  over 90 Minutes Intravenous ONCE 22 1700  furosemide (LASIX) injection 20 mg         20 mg  over 1-3 Minutes Intravenous EVERY 12 HOURS 22 1608            Contrast Orders - past 72 hours (72h ago, onward)    Start     Dose/Rate Route Frequency Stop    22 2130  iopamidol (ISOVUE-370) solution 80 mL         80 mL Intravenous ONCE 224          [employer] Prediction of Planned Initial Vancomycin Regimen  Loading dose: 1250 mg at 22:00 2022.  Regimen: 750 mg IV every 12 hours.  Start time: 21:49 on 2022  Exposure target: AUC24 (range)400-600 mg/L.hr   AUC24,ss: 517 mg/L.hr  Probability of AUC24 > 400: 77 %  Ctrough,ss: 18 mg/L  Probability of Ctrough,ss > 20: 40 %  Probability of nephrotoxicity (Lodise KESHIA ): 14 %          Plan:  1. Start vancomycin  1250 mg IV once, followed by 750 mg  Please inform the patient that her pap smear and nuswab are normal. IV q12h.   2. Vancomycin monitoring method: AUC  3. Vancomycin therapeutic monitoring goal: 400-600 mg*h/L  4. Pharmacy will check vancomycin levels as appropriate in 1-3 Days.      Rafia Blake RPH

## 2022-12-20 NOTE — ED NOTES
Attempted to call sister Betty for update per wife's request. No answer and no voicemail available will try again later.

## 2022-12-20 NOTE — ED NOTES
DATE:  12/19/2022 2  TIME OF RECEIPT FROM LAB:  2130  LAB TEST:  Gram + Verigene  LAB VALUE:  + Staph species   RESULTS GIVEN WITH READ-BACK TO (PROVIDER):  Dr. Will   TIME LAB VALUE REPORTED TO PROVIDER:   2133      Provider ordered repeat blood cultures and Vanco

## 2022-12-20 NOTE — PROGRESS NOTES
Progress Note    Date of admission: 12/18/2022    Assessment/Plan  72 year old male with PMH significant for alcoholic cirrhosis and DM who presented to ED due to AMS.      Metabolic encephalopathy, multifactorial   Likely multifactorial: elevated ammonia level and UTI.   continue Rocephin and rifaximin   CT head negative.   Fall precautions. Supportive measures.   Patient has hx of known alcoholic liver cirrhosis. S/p TIPS on 12/09  Quit drinking 5 years ago. Ammonia level=119.   Continue lactulose   NPO for now until bed side swallow eval is done     UTI  Urinalysis reviewed and consistent with infection.   Empiric Rocephin. Follow up urine culture.    Blood culture grew Staph   Vancomycin was started yesterday.   Staph saprophyticus  Most likely is  Contaminant.  Discontinue vancomycin.  Monitor for signs and symptoms of infection.     Lactic acidosis  Afebrile, no leukocytosis, VSS. Sepsis not suspected at this time.  Could be related to renal failure  NAGMA     Ascites  2/2 to cirrhosis. Recent TIPS last week. Monitor.  paracentesis for symptom relief and to rue out SBP  GI consult appreciated and is following.  Planning to check for TI PS Doppler  Decrease Lasix to 20 mg IV daily  Continue spironolactone 25 mg oral daily     Bilateral Pleural Effusions  Decrease lasix 20 mg IV  daily   Monitor inputs and outputs.  Monitor BMP     Hx of DM  Diet controlled. Not on any medications at home due to previous episodes of hypoglycemia. Check HBA1C. Place on ISS. Monitor accuchecks.     Hypokalemia  Replace per protocol    DVT PPX : SCD    Barriers to discharge: encephalopathy, iv abx    Anticipated Discharge date  : 2-3 days    Subjective  Patient's mental status waxes and wanes.  He was mildly lethargic overnight saw him.  But is arousable to verbal stimuli. Has passed his bed side swallow screen. left VM to his spouse    Objective  Vital signs in last 24 hours  Temp:  [97.9  F (36.6  C)] 97.9  F (36.6  C)  Pulse:   [56-90] 67  Resp:  [14-39] 23  BP: (104-147)/(51-69) 109/58  SpO2:  [98 %-100 %] 100 %    Input and Output in 24 hrs     Intake/Output Summary (Last 24 hours) at 12/19/2022 1053  Last data filed at 12/19/2022 1050  Gross per 24 hour   Intake 600 ml   Output 3325 ml   Net -2725 ml       Physical Exam:  GEN: Alert and oriented. Not in acute distress  HEENT: Mucous membrane- moist and pink  CHEST: Clear to auscultation bilaterally  HEART: S1S2 regular. No murmurs, rubs or gallops  ABDOMEN: Soft. Non-tender, distended with positive fluid thrill no organomegaly. Bowel sounds- active  Extremities: No pedal oedema  CNS: No focal neurological deficit. No involuntary movements  SKIN: Intact and warm    Pertinent Labs   Lab Results: Personally Reviewed    Recent Results (from the past 24 hour(s))   Glucose by meter    Collection Time: 12/19/22 12:13 PM   Result Value Ref Range    GLUCOSE BY METER POCT 144 (H) 70 - 99 mg/dL   Cell Count Body Fluid    Collection Time: 12/19/22  4:14 PM   Result Value Ref Range    Color Orange (A) Colorless, Yellow    Clarity Cloudy (A) Clear    Total Nucleated Cells 599 /uL    RBC Count 4,000 /uL    Cell Count Fluid Source Peritoneum    Differential Body Fluid    Collection Time: 12/19/22  4:14 PM   Result Value Ref Range    % Neutrophils      % Lymphocytes 7 %    % Monocyte/Macrophages 91 %    % Lining Cells 2 %    Absolute Neutrophils, Body Fluid     Glucose by meter    Collection Time: 12/19/22  5:30 PM   Result Value Ref Range    GLUCOSE BY METER POCT 146 (H) 70 - 99 mg/dL   Glucose by meter    Collection Time: 12/19/22 10:26 PM   Result Value Ref Range    GLUCOSE BY METER POCT 145 (H) 70 - 99 mg/dL   Glucose by meter    Collection Time: 12/20/22  2:12 AM   Result Value Ref Range    GLUCOSE BY METER POCT 134 (H) 70 - 99 mg/dL   Comprehensive metabolic panel    Collection Time: 12/20/22  7:07 AM   Result Value Ref Range    Sodium 144 136 - 145 mmol/L    Potassium 3.1 (L) 3.4 - 5.3 mmol/L     Chloride 110 (H) 98 - 107 mmol/L    Carbon Dioxide (CO2) 22 22 - 29 mmol/L    Anion Gap 12 7 - 15 mmol/L    Urea Nitrogen 13.0 8.0 - 23.0 mg/dL    Creatinine 1.55 (H) 0.67 - 1.17 mg/dL    Calcium 8.7 (L) 8.8 - 10.2 mg/dL    Glucose 116 (H) 70 - 99 mg/dL    Alkaline Phosphatase 105 40 - 129 U/L    AST 33 10 - 50 U/L    ALT 23 10 - 50 U/L    Protein Total 6.0 (L) 6.4 - 8.3 g/dL    Albumin 2.9 (L) 3.5 - 5.2 g/dL    Bilirubin Total 1.6 (H) <=1.2 mg/dL    GFR Estimate 48 (L) >60 mL/min/1.73m2   CBC with platelets    Collection Time: 12/20/22  7:07 AM   Result Value Ref Range    WBC Count 5.8 4.0 - 11.0 10e3/uL    RBC Count 3.62 (L) 4.40 - 5.90 10e6/uL    Hemoglobin 9.2 (L) 13.3 - 17.7 g/dL    Hematocrit 30.1 (L) 40.0 - 53.0 %    MCV 83 78 - 100 fL    MCH 25.4 (L) 26.5 - 33.0 pg    MCHC 30.6 (L) 31.5 - 36.5 g/dL    RDW 19.2 (H) 10.0 - 15.0 %    Platelet Count 151 150 - 450 10e3/uL   Glucose by meter    Collection Time: 12/20/22  7:50 AM   Result Value Ref Range    GLUCOSE BY METER POCT 124 (H) 70 - 99 mg/dL       Pertinent Radiology   Radiology Results reviewed      EKG Results reviewed       Advanced Care Planning      Karsten Arriola MD   [facility]

## 2022-12-20 NOTE — ED NOTES
Patient woke up and attempted to get out of bed.   I was able to redirect and assist him with a urinal but he was unable to go.

## 2022-12-20 NOTE — SIGNIFICANT EVENT
Significant Event Note    Time of event: 9:33 PM December 19, 2022    Description of event:  Notified by nursing staff for positive blood culture for gram positive staph    Plan:  Add vanco , repeat blood culture     Discussed with: bedside nursebedside nurse    Denilson Will MD

## 2022-12-20 NOTE — PROGRESS NOTES
"[facility] - GASTROENTEROLOGY PROGRESS NOTE     SUBJECTIVE:  Slightly more awake today.  Complains of mild abdominal discomfort.  Nurses report he is stooling well, has been more awake.       OBJECTIVE:  /58   Pulse 67   Temp 97.9  F (36.6  C) (Axillary)   Resp 23   Ht 1.727 m (5' 8\")   Wt 90.3 kg (199 lb)   SpO2 100%   BMI 30.26 kg/m    Temp (24hrs), Av.9  F (36.6  C), Min:97.9  F (36.6  C), Max:97.9  F (36.6  C)    Patient Vitals for the past 72 hrs:   Weight   22 0616 90.3 kg (199 lb)        PHYSICAL EXAM  GEN: Somnolent but awakens to voice  ABD: Soft, non-tender, mild distention  Neuro: Minimal asterixis.    Additional Data:  I have reviewed the patient's new clinical lab results:   Recent Labs   Lab Test 22  0707 22  0411 12/18/22  1912 12/10/22  0542 22  1254 22  1154   WBC 5.8 5.7 4.7   < >  --    < > 4.5   HGB 9.2* 9.6* 9.5*   < > 8.2*   < > 9.2*   MCV 83 81 81   < >  --    < > 83    166 151   < > 116*   < > 130*   INR  --   --  1.38*  --  1.37*  --  1.33*    < > = values in this interval not displayed.     Recent Labs   Lab Test 22  0750 22  0707 22  0212 22  0804 22  0411 22  2348 22   NA  --  144  --   --  141  --  141   POTASSIUM  --  3.1*  --   --  3.8  --  3.7   CHLORIDE  --  110*  --   --  108*  --  107   CO2  --  22  --   --  21*  --  20*   BUN  --  13.0  --   --  11.4  --  11.6   CR  --  1.55*  --   --  1.30*  --  1.32*   ANIONGAP  --  12  --   --  12  --  14   SILVIA  --  8.7*  --   --  8.7*  --  8.7*   * 116* 134*   < > 139*   < > 146*    < > = values in this interval not displayed.     Recent Labs   Lab Test 22  0707 22  0411 229 22  1912   ALBUMIN 2.9* 3.4*  --  3.5   BILITOTAL 1.6* 1.9*  --  1.7*   ALT 23 27  --  30   AST 33 40  --  42   PROTEIN  --   --  20*  --    LIPASE  --   --   --  195*   Ascites showed 600 white cells with 0 PMNs.  Blood culture " 1 of 2 bottles positive for gram-positive cocci in clusters.     IMPRESSION/Plan:  1. Alcoholic cirrhosis, abstinent for a few years with TIPS placement 11 days ago, meld score is 11.  2. Hepatic encephalopathy after TIPS with concomitant UTI and bacteremia, slight improvement on oral lactulose and rifaximin and treatment of infection.  a. We will check TIPS Doppler.  3. Ascites, negative for SBP (tested after initiation of antibiotics)  4. ID: UTI with bacteremia, on ceftriaxone and vancomycin.  5. NANO, increased creatinine today with UTI bacteremia.    25 minutes of total time was spent providing patient care, including patient evaluation, reviewing documentation/test results, coordination of care with other providers, and .    George Ma  Cell [phone]  After 5 PM, please call [phone]

## 2022-12-21 LAB
ALBUMIN SERPL BCG-MCNC: 2.9 G/DL (ref 3.5–5.2)
ALP SERPL-CCNC: 110 U/L (ref 40–129)
ALT SERPL W P-5'-P-CCNC: 23 U/L (ref 10–50)
ANION GAP SERPL CALCULATED.3IONS-SCNC: 11 MMOL/L (ref 7–15)
AST SERPL W P-5'-P-CCNC: 34 U/L (ref 10–50)
BACTERIA BLD CULT: ABNORMAL
BACTERIA BLD CULT: ABNORMAL
BILIRUB SERPL-MCNC: 1.3 MG/DL
BUN SERPL-MCNC: 15.9 MG/DL (ref 8–23)
CALCIUM SERPL-MCNC: 8.4 MG/DL (ref 8.8–10.2)
CHLORIDE SERPL-SCNC: 110 MMOL/L (ref 98–107)
CREAT SERPL-MCNC: 1.55 MG/DL (ref 0.67–1.17)
DEPRECATED HCO3 PLAS-SCNC: 21 MMOL/L (ref 22–29)
GFR SERPL CREATININE-BSD FRML MDRD: 48 ML/MIN/1.73M2
GLUCOSE BLDC GLUCOMTR-MCNC: 147 MG/DL (ref 70–99)
GLUCOSE BLDC GLUCOMTR-MCNC: 147 MG/DL (ref 70–99)
GLUCOSE BLDC GLUCOMTR-MCNC: 176 MG/DL (ref 70–99)
GLUCOSE BLDC GLUCOMTR-MCNC: 202 MG/DL (ref 70–99)
GLUCOSE SERPL-MCNC: 185 MG/DL (ref 70–99)
LACTATE SERPL-SCNC: 2.4 MMOL/L (ref 0.7–2)
POTASSIUM SERPL-SCNC: 3.2 MMOL/L (ref 3.4–5.3)
POTASSIUM SERPL-SCNC: 3.2 MMOL/L (ref 3.4–5.3)
POTASSIUM SERPL-SCNC: 3.8 MMOL/L (ref 3.4–5.3)
PROT SERPL-MCNC: 5.9 G/DL (ref 6.4–8.3)
SODIUM SERPL-SCNC: 142 MMOL/L (ref 136–145)

## 2022-12-21 PROCEDURE — 84132 ASSAY OF SERUM POTASSIUM: CPT | Performed by: STUDENT IN AN ORGANIZED HEALTH CARE EDUCATION/TRAINING PROGRAM

## 2022-12-21 PROCEDURE — 250N000013 HC RX MED GY IP 250 OP 250 PS 637: Performed by: STUDENT IN AN ORGANIZED HEALTH CARE EDUCATION/TRAINING PROGRAM

## 2022-12-21 PROCEDURE — 84132 ASSAY OF SERUM POTASSIUM: CPT | Performed by: INTERNAL MEDICINE

## 2022-12-21 PROCEDURE — 36415 COLL VENOUS BLD VENIPUNCTURE: CPT | Performed by: STUDENT IN AN ORGANIZED HEALTH CARE EDUCATION/TRAINING PROGRAM

## 2022-12-21 PROCEDURE — 36415 COLL VENOUS BLD VENIPUNCTURE: CPT | Performed by: INTERNAL MEDICINE

## 2022-12-21 PROCEDURE — 120N000001 HC R&B MED SURG/OB

## 2022-12-21 PROCEDURE — 99232 SBSQ HOSP IP/OBS MODERATE 35: CPT | Performed by: STUDENT IN AN ORGANIZED HEALTH CARE EDUCATION/TRAINING PROGRAM

## 2022-12-21 PROCEDURE — 83605 ASSAY OF LACTIC ACID: CPT | Performed by: STUDENT IN AN ORGANIZED HEALTH CARE EDUCATION/TRAINING PROGRAM

## 2022-12-21 PROCEDURE — 99221 1ST HOSP IP/OBS SF/LOW 40: CPT | Performed by: INTERNAL MEDICINE

## 2022-12-21 PROCEDURE — 250N000011 HC RX IP 250 OP 636: Performed by: FAMILY MEDICINE

## 2022-12-21 PROCEDURE — 80053 COMPREHEN METABOLIC PANEL: CPT | Performed by: STUDENT IN AN ORGANIZED HEALTH CARE EDUCATION/TRAINING PROGRAM

## 2022-12-21 PROCEDURE — 250N000013 HC RX MED GY IP 250 OP 250 PS 637: Performed by: INTERNAL MEDICINE

## 2022-12-21 RX ORDER — FUROSEMIDE 20 MG
20 TABLET ORAL DAILY
Status: DISCONTINUED | OUTPATIENT
Start: 2022-12-21 | End: 2022-12-22 | Stop reason: HOSPADM

## 2022-12-21 RX ORDER — NALOXONE HYDROCHLORIDE 0.4 MG/ML
0.2 INJECTION, SOLUTION INTRAMUSCULAR; INTRAVENOUS; SUBCUTANEOUS
Status: DISCONTINUED | OUTPATIENT
Start: 2022-12-21 | End: 2022-12-22 | Stop reason: HOSPADM

## 2022-12-21 RX ORDER — NALOXONE HYDROCHLORIDE 0.4 MG/ML
0.4 INJECTION, SOLUTION INTRAMUSCULAR; INTRAVENOUS; SUBCUTANEOUS
Status: DISCONTINUED | OUTPATIENT
Start: 2022-12-21 | End: 2022-12-22 | Stop reason: HOSPADM

## 2022-12-21 RX ORDER — MULTIVITAMIN,THERAPEUTIC
1 TABLET ORAL DAILY
Status: DISCONTINUED | OUTPATIENT
Start: 2022-12-21 | End: 2022-12-22 | Stop reason: HOSPADM

## 2022-12-21 RX ORDER — SIMVASTATIN 10 MG
20 TABLET ORAL AT BEDTIME
Status: DISCONTINUED | OUTPATIENT
Start: 2022-12-21 | End: 2022-12-22 | Stop reason: HOSPADM

## 2022-12-21 RX ORDER — POTASSIUM CHLORIDE 1500 MG/1
40 TABLET, EXTENDED RELEASE ORAL ONCE
Status: COMPLETED | OUTPATIENT
Start: 2022-12-21 | End: 2022-12-21

## 2022-12-21 RX ORDER — PANTOPRAZOLE SODIUM 40 MG/1
40 TABLET, DELAYED RELEASE ORAL
Status: DISCONTINUED | OUTPATIENT
Start: 2022-12-21 | End: 2022-12-22 | Stop reason: HOSPADM

## 2022-12-21 RX ORDER — POTASSIUM CHLORIDE 1500 MG/1
40 TABLET, EXTENDED RELEASE ORAL ONCE
Status: DISCONTINUED | OUTPATIENT
Start: 2022-12-21 | End: 2022-12-21

## 2022-12-21 RX ORDER — CARBOXYMETHYLCELLULOSE SODIUM 5 MG/ML
1 SOLUTION/ DROPS OPHTHALMIC
Status: DISCONTINUED | OUTPATIENT
Start: 2022-12-21 | End: 2022-12-22 | Stop reason: HOSPADM

## 2022-12-21 RX ADMIN — THERA TABS 1 TABLET: TAB at 08:03

## 2022-12-21 RX ADMIN — INSULIN ASPART 1 UNITS: 100 INJECTION, SOLUTION INTRAVENOUS; SUBCUTANEOUS at 12:40

## 2022-12-21 RX ADMIN — FUROSEMIDE 20 MG: 20 TABLET ORAL at 08:03

## 2022-12-21 RX ADMIN — SIMVASTATIN 20 MG: 10 TABLET, FILM COATED ORAL at 22:08

## 2022-12-21 RX ADMIN — INSULIN ASPART 1 UNITS: 100 INJECTION, SOLUTION INTRAVENOUS; SUBCUTANEOUS at 08:10

## 2022-12-21 RX ADMIN — POTASSIUM CHLORIDE 40 MEQ: 1500 TABLET, EXTENDED RELEASE ORAL at 09:08

## 2022-12-21 RX ADMIN — PANTOPRAZOLE SODIUM 40 MG: 40 TABLET, DELAYED RELEASE ORAL at 08:03

## 2022-12-21 RX ADMIN — LACTULOSE 20 G: 10 SOLUTION ORAL at 09:09

## 2022-12-21 RX ADMIN — LACTULOSE 20 G: 10 SOLUTION ORAL at 06:41

## 2022-12-21 RX ADMIN — LACTULOSE 20 G: 10 SOLUTION ORAL at 16:13

## 2022-12-21 RX ADMIN — LACTULOSE 20 G: 10 SOLUTION ORAL at 19:17

## 2022-12-21 RX ADMIN — SPIRONOLACTONE 25 MG: 25 TABLET, FILM COATED ORAL at 08:03

## 2022-12-21 RX ADMIN — CEFTRIAXONE SODIUM 1 G: 1 INJECTION, POWDER, FOR SOLUTION INTRAMUSCULAR; INTRAVENOUS at 22:09

## 2022-12-21 RX ADMIN — RIFAXIMIN 550 MG: 550 TABLET ORAL at 19:16

## 2022-12-21 RX ADMIN — LACTULOSE 20 G: 10 SOLUTION ORAL at 13:17

## 2022-12-21 RX ADMIN — RIFAXIMIN 550 MG: 550 TABLET ORAL at 08:07

## 2022-12-21 RX ADMIN — LACTULOSE 20 G: 10 SOLUTION ORAL at 22:08

## 2022-12-21 ASSESSMENT — ACTIVITIES OF DAILY LIVING (ADL)
ADLS_ACUITY_SCORE: 43

## 2022-12-21 NOTE — PLAN OF CARE
Problem: Plan of Care - These are the overarching goals to be used throughout the patient stay.    Goal: Absence of Hospital-Acquired Illness or Injury  Outcome: Progressing  Intervention: Identify and Manage Fall Risk  Recent Flowsheet Documentation  Taken 12/20/2022 1715 by Vangie Colon RN  Safety Promotion/Fall Prevention:   activity supervised   assistive device/personal items within reach   clutter free environment maintained   nonskid shoes/slippers when out of bed     Problem: Plan of Care - These are the overarching goals to be used throughout the patient stay.    Goal: Absence of Hospital-Acquired Illness or Injury  Intervention: Identify and Manage Fall Risk  Recent Flowsheet Documentation  Taken 12/20/2022 1715 by Vangie Colon RN  Safety Promotion/Fall Prevention:   activity supervised   assistive device/personal items within reach   clutter free environment maintained   nonskid shoes/slippers when out of bed     Problem: Risk for Delirium  Goal: Improved Behavioral Control  Outcome: Progressing   Pt came to P2 around 1715 today.  Pt on 1:1 observation for behaviors.  Trial off 1:1 has not needed redirecting and will be off for night shift.  Frequent checks on pt were done.   Pt has been calm, sleeping between cares.     Noted that pt does not have rectal tube.   Pt  asked what he needed to do if he needed to use restroom directed him to use call light.

## 2022-12-21 NOTE — PROGRESS NOTES
Progress Note    Date of admission: 12/18/2022    Assessment/Plan  72 year old male with PMH significant for alcoholic cirrhosis and DM who presented to ED due to AMS.      Acute encephalopathy  Likely multifactorial: Patient with history of alcoholic cirrhosis.  Elevated ammonia level and UTI.   Patient has hx of known alcoholic liver cirrhosis. S/p TIPS on 12/09  Quit drinking 5 years ago. Ammonia level=119.   Continue lactulose   continue Rocephin and rifaximin   CT head negative.   Fall precautions. Supportive measures.     Hepatic encephalopathy  Patient with history of alcoholic cirrhosis.  TI PS done recently.  Meld score is 11  Patient's mental status is gradually improving.  Continue lactulose until 4 bowel movements are achieved in a day.    Lactic acidosis  Vital signs stable.  Afebrile, no white blood cell counts elevation. Repeat lactic acid is 2.4   Likely related to NANO.   Hold spironolactone for now   nephrology consult for NANO and NAGMA     Ascites  2/2 to cirrhosis. Recent TIPS last week. Monitor.  Paracentesis done. SBP ruled out   Patient is on ceftriaxone . Will give short course of oral abx.  GI consult appreciated and is following.  Planning to check for TI PS Doppler   Lasix to 20 mg IV daily(hold)  Hold spironolactone due to NANO    Bilateral Pleural Effusions  Decrease lasix 20 mg IV  daily   Monitor inputs and outputs.  Monitor BMP     Hx of DM  Diet controlled. Not on any medications at home due to previous episodes of hypoglycemia.  Recent HBA1C: 6.2   Place on ISS. Monitor accuchecks.     Hypokalemia  Replace per protocol    DVT PPX : SCD    Barriers to discharge: encephalopathy, GI clearance    Anticipated Discharge date  : 2-3 days    Subjective  No distress noted.  Patient's mental status has significantly improved.  Management plan discussed with the patient and he expressed understanding.    Objective  Vital signs in last 24 hours  Temp:  [98  F (36.7  C)-99.6  F (37.6  C)] 98.1  F  (36.7  C)  Pulse:  [70-80] 78  Resp:  [12-18] 18  BP: ()/(52-59) 101/59  SpO2:  [100 %] 100 %    Input and Output in 24 hrs     Intake/Output Summary (Last 24 hours) at 12/19/2022 1053  Last data filed at 12/19/2022 1050  Gross per 24 hour   Intake 600 ml   Output 3325 ml   Net -2725 ml       Physical Exam:  GEN: Alert and oriented. Not in acute distress  HEENT: Mucous membrane- moist and pink  CHEST: Clear to auscultation bilaterally  HEART: S1S2 regular. No murmurs, rubs or gallops  ABDOMEN: Soft. Non-tender, distended with positive fluid thrill no organomegaly. Bowel sounds- active  Extremities: No pedal oedema  CNS: No focal neurological deficit. No involuntary movements  SKIN: Intact and warm    Pertinent Labs   Lab Results: Personally Reviewed    Recent Results (from the past 24 hour(s))   Glucose by meter    Collection Time: 12/20/22  5:11 PM   Result Value Ref Range    GLUCOSE BY METER POCT 150 (H) 70 - 99 mg/dL   Potassium    Collection Time: 12/20/22  5:22 PM   Result Value Ref Range    Potassium 3.8 3.4 - 5.3 mmol/L   Glucose by meter    Collection Time: 12/20/22  9:15 PM   Result Value Ref Range    GLUCOSE BY METER POCT 189 (H) 70 - 99 mg/dL   Potassium    Collection Time: 12/21/22  7:07 AM   Result Value Ref Range    Potassium 3.2 (L) 3.4 - 5.3 mmol/L   Glucose by meter    Collection Time: 12/21/22  8:09 AM   Result Value Ref Range    GLUCOSE BY METER POCT 202 (H) 70 - 99 mg/dL       Pertinent Radiology   Radiology Results reviewed      EKG Results reviewed       Advanced Care Planning      Karsten Arriola MD   [facility]

## 2022-12-21 NOTE — CONSULTS
RENAL CONSULT NOTE    REQUESTING PHYSICIAN: Dr Arriola    REASON FOR CONSULT:NANO    Consults      ASSESSMENT/PLAN:  Pt with alcohol related cirrhosis, abstinent x5 yrs, s/p TIPS admitted with  Hepatic/ infx related encephalopathy, now clearing.    CKD 3a baseline creat ~1.3, eGFR in 60% range and pretty stable for 4+ yrs. ?prior static injury, ongoing stone disease   ?any chronic bladder emptying issue. Denies NSAID.   Now with mild NANO temporally could have mild contrast ATN +/- UTI /infx contributing. Creat stable today, non oliguric, lytes ok except potassium low.     ?chronic NAGMA, low CO2.    UTI being treated, thick bladder ?due to acute cystitis.   Follow for any urinary retention.     Hx of nephrolithiasis, all calcium stones with ongoing stones, non obstructing. Followed by Dr Martinez.     Would check UA UPC after NANO/ UTI resolved  Agree with resuming diuretics ?tomorrow  F/u outpt CO2, consider base supplement if ongoing acidosis  Will review any prior outpt stone w/u.    Will follow    Glenny Piper MD  [facility]  [phone]      HPI: asked to see pt re: NANO.   Appears he has CKD with baseline Creat 1.2-1.3 for several years. Hx of Calcium ox and phos kidney stones, s/p urologic procedure in Jan to remove. Still has bilat non obstructing stones on CT  DM diet controlled    ETOH liver dz/cirrhosis, s/p TIPS 12/9/22 no ETOH x 5 yrs.   Admitted with inc confusion   S/p IV contrast CT on 12/18  S/p 800ml tap yesterday,   UA+ UCX so far neg but on rocephine  BCX only one +,  prob contaminate    Pt is alert and talkative, says feeling better, inc BM's on new med (lactulose) voiding normal, no dysuria or hematuria.   Denies sob  Eating ok  No abd pain  Anxious to go home.     REVIEW OF SYSTEMS:  COMPLETE REVIEW OF SYSTEMS reviewed and as above or is negative.     Past Medical History:   Diagnosis Date     Diabetes mellitus (H)      Gout      Hypertension      Kidney  "stone             ALLERGIES/SENSITIVITIES:  Allergies   Allergen Reactions     Sulfa Drugs Shortness Of Breath and Itching     Penicillins Unknown     Annotation: discussed with patient, he reports he had \"itching\" with penicillin many years ago in [facility] but no hives or throat or respiratory symptoms.  he also reports having tolerated amoxicillin since coming to [place].      no tobacco, no ETOH x5 yrs      PHYSICAL EXAM:  Physical Exam   Temp: 98.1  F (36.7  C) Temp src: Oral BP: 101/59 Pulse: 78   Resp: 18 SpO2: 100 % O2 Device: None (Room air)    Vitals:    12/20/22 0616 12/20/22 1701   Weight: 90.3 kg (199 lb) 76.8 kg (169 lb 6.4 oz)     Vital Signs with Ranges  Temp:  [98  F (36.7  C)-99.6  F (37.6  C)] 98.1  F (36.7  C)  Pulse:  [70-80] 78  Resp:  [12-18] 18  BP: ()/(52-59) 101/59  SpO2:  [100 %] 100 %  I/O last 3 completed shifts:  In: 480 [P.O.:480]  Out: 295 [Urine:295]         Patient Vitals for the past 72 hrs:   Weight   12/20/22 1701 76.8 kg (169 lb 6.4 oz)   12/20/22 0616 90.3 kg (199 lb)   [unfilled]    General - A & O x 3, NAD  HEENT - AT  no scleral icterus  Neck - no JVD  Respiratory - Lungs CTA bilat without wheeze, rhonchi, rales  Cardiovascular - AP RRR with murmur  Abdomen - soft, NT  Extremities - well perfused, minimal TIMMY  Integumentary - intact, good turgor, no rash/lesions  Neurologic - grossly intact  Psych:  Judgement intact, affect WNL      Laboratory:     Recent Labs   Lab 12/20/22  0707 12/19/22  0411 12/18/22  1912   WBC 5.8 5.7 4.7   RBC 3.62* 3.78* 3.67*   HGB 9.2* 9.6* 9.5*   HCT 30.1* 30.7* 29.8*    166 151       Basic Metabolic Panel:  Recent Labs   Lab 12/21/22  1322 12/21/22  1202 12/21/22  0809 12/21/22  0707 12/20/22  2115 12/20/22  1722 12/20/22  1711 12/20/22  1114 12/20/22  0750 12/20/22  0707 12/19/22  0804 12/19/22  0411 12/18/22  2348 12/18/22  1912   NA  --   --   --  142  --   --   --   --   --  144  --  141  --  141   POTASSIUM 3.8  --   --  3.2*  3.2*  --  " 3.8  --   --   --  3.1*  --  3.8  --  3.7   CHLORIDE  --   --   --  110*  --   --   --   --   --  110*  --  108*  --  107   CO2  --   --   --  21*  --   --   --   --   --  22  --  21*  --  20*   BUN  --   --   --  15.9  --   --   --   --   --  13.0  --  11.4  --  11.6   CR  --   --   --  1.55*  --   --   --   --   --  1.55*  --  1.30*  --  1.32*   GLC  --  147* 202* 185* 189*  --  150* 117*   < > 116*   < > 139*   < > 146*   SILVIA  --   --   --  8.4*  --   --   --   --   --  8.7*  --  8.7*  --  8.7*    < > = values in this interval not displayed.       INR  Recent Labs   Lab 12/18/22  1912   INR 1.38*       Recent Labs   Lab Test 12/21/22  1322 12/21/22  0707 12/20/22  1722 12/20/22  0707   POTASSIUM 3.8 3.2*  3.2*   < > 3.1*   CHLORIDE  --  110*  --  110*   BUN  --  15.9  --  13.0    < > = values in this interval not displayed.      Recent Labs   Lab Test 12/21/22  0707 12/20/22  0707 12/19/22  0411 12/18/22  2119   ALBUMIN 2.9* 2.9*   < >  --    BILITOTAL 1.3* 1.6*   < >  --    ALT 23 23   < >  --    AST 34 33   < >  --    PROTEIN  --   --   --  20*    < > = values in this interval not displayed.       Personally reviewed today's laboratory studies      Thank you for involving us in the care of this patient. We will continue to follow along with you.      Glenny Piper MD   [facility]  [phone]

## 2022-12-21 NOTE — PROGRESS NOTES
Readmit, Nov and Dec admissions. [place] speaking. Patient lives at home with wife and extended family. Independent at baseline. Anticipate pt to return home with son / family to transport. CM following.

## 2022-12-21 NOTE — PLAN OF CARE
Problem: Risk for Delirium  Goal: Optimal Coping  Outcome: Progressing  Goal: Improved Behavioral Control  Intervention: Minimize Safety Risk  Recent Flowsheet Documentation  Taken 12/21/2022 0900 by Davin Escobar RN  Enhanced Safety Measures: chair alarm set  Goal: Improved Attention and Thought Clarity  Outcome: Progressing     Problem: Plan of Care - These are the overarching goals to be used throughout the patient stay.    Goal: Optimal Comfort and Wellbeing  Outcome: Progressing     Problem: Risk for Delirium  Goal: Improved Attention and Thought Clarity  Outcome: Progressing   Goal Outcome Evaluation:         Pt alert and oriented x 4.  Cooperative.  Did not try to get out of bed by himself.  Bed alarm on.  Wife at bedside.   Lactulose given per orders.  Has had one large loose stool this day shift.  Pain controlled.

## 2022-12-21 NOTE — PLAN OF CARE
Problem: Plan of Care - These are the overarching goals to be used throughout the patient stay.    Goal: Absence of Hospital-Acquired Illness or Injury  Outcome: Progressing  Intervention: Identify and Manage Fall Risk  Recent Flowsheet Documentation  Taken 12/20/2022 2355 by Lázaro Suárez RN  Safety Promotion/Fall Prevention:   activity supervised   assistive device/personal items within reach   clutter free environment maintained   nonskid shoes/slippers when out of bed  Intervention: Prevent Skin Injury  Recent Flowsheet Documentation  Taken 12/20/2022 2355 by Lázaro Suárez RN  Body Position: position changed independently     Problem: Plan of Care - These are the overarching goals to be used throughout the patient stay.    Goal: Optimal Comfort and Wellbeing  Outcome: Progressing     Problem: Risk for Delirium  Goal: Optimal Coping  Outcome: Progressing   Goal Outcome Evaluation:    Patient has been off 1:1 overnight. He has been oriented x 3, and has not set off his bed alarm. Behaviors have been appropriate. Patient appears to have slept over night.

## 2022-12-21 NOTE — PROGRESS NOTES
"[facility] - GASTROENTEROLOGY PROGRESS NOTE     SUBJECTIVE:  Patient is awake and alert today, eating well, no significant abdominal pain although abdomen is distended.  He wants to go home.       OBJECTIVE:  /59 (BP Location: Right arm)   Pulse 78   Temp 98.1  F (36.7  C) (Oral)   Resp 18   Ht 1.727 m (5' 8\")   Wt 76.8 kg (169 lb 6.4 oz)   SpO2 100%   BMI 25.76 kg/m    Temp (24hrs), Av.4  F (36.9  C), Min:98  F (36.7  C), Max:99.6  F (37.6  C)    Patient Vitals for the past 72 hrs:   Weight   22 1701 76.8 kg (169 lb 6.4 oz)   22 0616 90.3 kg (199 lb)        PHYSICAL EXAM  GEN: Alert, no distress  ABD: Soft, non-tender  Neuro: No asterixis, awake and alert    Additional Data:  I have reviewed the patient's new clinical lab results:   Recent Labs   Lab Test 22  0707 22  0411 22  1912 12/10/22  0542 22  1254 22  2024 22  1154   WBC 5.8 5.7 4.7   < >  --    < > 4.5   HGB 9.2* 9.6* 9.5*   < > 8.2*   < > 9.2*   MCV 83 81 81   < >  --    < > 83    166 151   < > 116*   < > 130*   INR  --   --  1.38*  --  1.37*  --  1.33*    < > = values in this interval not displayed.     Recent Labs   Lab Test 22  0809 22  0707 22  2115 22  1722 22  1711 22  0750 22  0707 22  0804 22  0411 22  2348 222   NA  --   --   --   --   --   --  144  --  141  --  141   POTASSIUM  --  3.2*  --  3.8  --   --  3.1*  --  3.8  --  3.7   CHLORIDE  --   --   --   --   --   --  110*  --  108*  --  107   CO2  --   --   --   --   --   --  22  --  21*  --  20*   BUN  --   --   --   --   --   --  13.0  --  11.4  --  11.6   CR  --   --   --   --   --   --  1.55*  --  1.30*  --  1.32*   ANIONGAP  --   --   --   --   --   --  12  --  12  --  14   SILVIA  --   --   --   --   --   --  8.7*  --  8.7*  --  8.7*   *  --  189*  --  150*   < > 116*   < > 139*   < > 146*    < > = values in this interval not displayed.     Recent " Labs   Lab Test 12/20/22  0707 12/19/22  0411 12/18/22  2119 12/18/22  1912   ALBUMIN 2.9* 3.4*  --  3.5   BILITOTAL 1.6* 1.9*  --  1.7*   ALT 23 27  --  30   AST 33 40  --  42   PROTEIN  --   --  20*  --    LIPASE  --   --   --  195*     Doppler ultrasound of liver yesterday showed normal flow, patent TIPS.     IMPRESSION/Plan:  1. Alcoholic cirrhosis, abstinent from alcohol, TIPS 12 days ago, meld score is 11  2. Hepatic encephalopathy after TIPS with UTI and bacteremia (likely contaminant).  TIPS Doppler looked appropriate.  a. Talked about chronic lactulose use, he should be sent home with this  3. Ascites, negative for SBP  4. NANO, increased.  Diuretics on hold.  Should be able to go back on low-dose Lasix and spironolactone as an outpatient.    Discussed care with the patient's wife.  Probably needs 1 or 2 more days of medical optimization.    25 minutes of total time was spent providing patient care, including patient evaluation, reviewing documentation/test results, coordination of care with other providers, and .    George Ma  Cell [phone]  After 5 PM, please call [phone]

## 2022-12-22 VITALS
TEMPERATURE: 98.2 F | HEIGHT: 68 IN | DIASTOLIC BLOOD PRESSURE: 56 MMHG | RESPIRATION RATE: 18 BRPM | SYSTOLIC BLOOD PRESSURE: 113 MMHG | HEART RATE: 72 BPM | BODY MASS INDEX: 25.67 KG/M2 | WEIGHT: 169.4 LBS | OXYGEN SATURATION: 99 %

## 2022-12-22 LAB
ALBUMIN SERPL BCG-MCNC: 2.8 G/DL (ref 3.5–5.2)
ALP SERPL-CCNC: 107 U/L (ref 40–129)
ALT SERPL W P-5'-P-CCNC: 22 U/L (ref 10–50)
AMMONIA PLAS-SCNC: 23 UMOL/L (ref 16–60)
ANION GAP SERPL CALCULATED.3IONS-SCNC: 10 MMOL/L (ref 7–15)
AST SERPL W P-5'-P-CCNC: 34 U/L (ref 10–50)
BACTERIA UR CULT: NO GROWTH
BILIRUB SERPL-MCNC: 1.2 MG/DL
BUN SERPL-MCNC: 14.7 MG/DL (ref 8–23)
CALCIUM SERPL-MCNC: 8.2 MG/DL (ref 8.8–10.2)
CHLORIDE SERPL-SCNC: 109 MMOL/L (ref 98–107)
CREAT SERPL-MCNC: 1.44 MG/DL (ref 0.67–1.17)
DEPRECATED HCO3 PLAS-SCNC: 19 MMOL/L (ref 22–29)
ERYTHROCYTE [DISTWIDTH] IN BLOOD BY AUTOMATED COUNT: 18.7 % (ref 10–15)
GFR SERPL CREATININE-BSD FRML MDRD: 52 ML/MIN/1.73M2
GLUCOSE BLDC GLUCOMTR-MCNC: 125 MG/DL (ref 70–99)
GLUCOSE BLDC GLUCOMTR-MCNC: 132 MG/DL (ref 70–99)
GLUCOSE BLDC GLUCOMTR-MCNC: 136 MG/DL (ref 70–99)
GLUCOSE SERPL-MCNC: 129 MG/DL (ref 70–99)
HCT VFR BLD AUTO: 28.2 % (ref 40–53)
HGB BLD-MCNC: 8.7 G/DL (ref 13.3–17.7)
MCH RBC QN AUTO: 25.9 PG (ref 26.5–33)
MCHC RBC AUTO-ENTMCNC: 30.9 G/DL (ref 31.5–36.5)
MCV RBC AUTO: 84 FL (ref 78–100)
PLATELET # BLD AUTO: 128 10E3/UL (ref 150–450)
POTASSIUM SERPL-SCNC: 3.7 MMOL/L (ref 3.4–5.3)
PROT SERPL-MCNC: 5.8 G/DL (ref 6.4–8.3)
RBC # BLD AUTO: 3.36 10E6/UL (ref 4.4–5.9)
SODIUM SERPL-SCNC: 138 MMOL/L (ref 136–145)
WBC # BLD AUTO: 5.9 10E3/UL (ref 4–11)

## 2022-12-22 PROCEDURE — 80053 COMPREHEN METABOLIC PANEL: CPT | Performed by: STUDENT IN AN ORGANIZED HEALTH CARE EDUCATION/TRAINING PROGRAM

## 2022-12-22 PROCEDURE — 250N000013 HC RX MED GY IP 250 OP 250 PS 637: Performed by: INTERNAL MEDICINE

## 2022-12-22 PROCEDURE — 36415 COLL VENOUS BLD VENIPUNCTURE: CPT | Performed by: STUDENT IN AN ORGANIZED HEALTH CARE EDUCATION/TRAINING PROGRAM

## 2022-12-22 PROCEDURE — 250N000013 HC RX MED GY IP 250 OP 250 PS 637: Performed by: STUDENT IN AN ORGANIZED HEALTH CARE EDUCATION/TRAINING PROGRAM

## 2022-12-22 PROCEDURE — 82140 ASSAY OF AMMONIA: CPT | Performed by: STUDENT IN AN ORGANIZED HEALTH CARE EDUCATION/TRAINING PROGRAM

## 2022-12-22 PROCEDURE — 99239 HOSP IP/OBS DSCHRG MGMT >30: CPT | Performed by: INTERNAL MEDICINE

## 2022-12-22 PROCEDURE — 85027 COMPLETE CBC AUTOMATED: CPT | Performed by: STUDENT IN AN ORGANIZED HEALTH CARE EDUCATION/TRAINING PROGRAM

## 2022-12-22 PROCEDURE — 99232 SBSQ HOSP IP/OBS MODERATE 35: CPT | Performed by: NURSE PRACTITIONER

## 2022-12-22 RX ORDER — SPIRONOLACTONE 25 MG/1
25 TABLET ORAL DAILY
Qty: 30 TABLET | Refills: 4 | Status: SHIPPED | OUTPATIENT
Start: 2022-12-22 | End: 2023-07-17

## 2022-12-22 RX ORDER — SPIRONOLACTONE 25 MG/1
25 TABLET ORAL DAILY
Qty: 30 TABLET | Refills: 4 | Status: SHIPPED | OUTPATIENT
Start: 2022-12-22 | End: 2022-12-22

## 2022-12-22 RX ORDER — LACTULOSE 10 G/15ML
20 SOLUTION ORAL
Qty: 946 ML | Refills: 4 | Status: SHIPPED | OUTPATIENT
Start: 2022-12-22 | End: 2023-01-01

## 2022-12-22 RX ADMIN — LACTULOSE 20 G: 10 SOLUTION ORAL at 06:54

## 2022-12-22 RX ADMIN — RIFAXIMIN 550 MG: 550 TABLET ORAL at 09:20

## 2022-12-22 RX ADMIN — PANTOPRAZOLE SODIUM 40 MG: 40 TABLET, DELAYED RELEASE ORAL at 06:54

## 2022-12-22 RX ADMIN — THERA TABS 1 TABLET: TAB at 09:20

## 2022-12-22 RX ADMIN — LACTULOSE 20 G: 10 SOLUTION ORAL at 09:20

## 2022-12-22 RX ADMIN — LACTULOSE 20 G: 10 SOLUTION ORAL at 12:47

## 2022-12-22 RX ADMIN — LACTULOSE 20 G: 10 SOLUTION ORAL at 16:02

## 2022-12-22 ASSESSMENT — ACTIVITIES OF DAILY LIVING (ADL)
ADLS_ACUITY_SCORE: 44
ADLS_ACUITY_SCORE: 44
ADLS_ACUITY_SCORE: 43

## 2022-12-22 NOTE — DISCHARGE SUMMARY
[facility]  Hospitalist Discharge Summary      Date of Admission:  12/18/2022  Date of Discharge:  12/22/2022  Discharging Provider: Stiven Peters DO  Discharge Service: Hospitalist Service    Discharge Diagnoses   Acute encephalopathy  Likely multifactorial: Patient with history of alcoholic cirrhosis.  Elevated ammonia level and UTI.   Patient has hx of known alcoholic liver cirrhosis. S/p TIPS on 12/09  Quit drinking 5 years ago. Ammonia level=119.   Continue lactulose and rifaximin  CT head negative.   Fall precautions. Supportive measures.      Hepatic encephalopathy  Patient with history of alcoholic cirrhosis.  TI PS done recently.  Meld score is 11  Patient's mental status is gradually improving.  Continue lactulose until 4 bowel movements are achieved in a day.     Lactic acidosis  Vital signs stable.  Afebrile, no white blood cell counts elevation. Repeat lactic acid is 2.4   Likely related to NANO.    nephrology consult for NANO and NAGMA     Ascites  2/2 to cirrhosis. Recent TIPS last week. Monitor.  Paracentesis done. SBP ruled out   GI consult appreciated and is following.  Planning to check for TI PS Doppler       Bilateral Pleural Effusions     Hx of DM  Diet controlled. Not on any medications at home due to previous episodes of hypoglycemia.  Recent HBA1C: 6.2   Place on ISS. Monitor accuchecks.      Hypokalemia      Follow-ups Needed After Discharge   Follow-up Appointments     Follow-up and recommended labs and tests       Follow up with Gastroenterologist , at [facility], within 7 days after   discharge to evaluate treatment change. The following labs/tests are   recommended: Lactic acid.    Please also follow up with Nephrology within 7 days.           Unresulted Labs Ordered in the Past 30 Days of this Admission     Date and Time Order Name Status Description    12/19/2022  9:33 PM Blood Culture Peripheral Blood Preliminary     12/19/2022  9:33 PM Blood Culture Line, venous  Preliminary     12/18/2022  7:18 PM Blood Culture Peripheral Blood Preliminary           Discharge Disposition   Discharged to home  Condition at discharge: Stable      Hospital Course   The patient was admitted on 12/18/2022 with a chief complaint of altered mental status most likely secondary to hepatic encephalopathy.  The patient was also found to have possible UTI and was started on Rocephin for 5 days.  He also subsequently was found to have acute kidney injury.  Even though he was found to have bilateral pleural effusion, Lasix was held due to the rising creatinine.  The patient also underwent a paracentesis during the hospitalization and SBP was ruled out.  During today's visit the patient expressed a desire to go home.    Consultations This Hospital Stay   GASTROENTEROLOGY IP CONSULT  PHARMACY TO DOSE VANCO  NEPHROLOGY IP CONSULT    Code Status   Full Code    Time Spent on this Encounter   I, Stiven Petesr DO, personally saw the patient today and spent greater than 30 minutes discharging this patient.       Stiven Peters DO  [address] [address]  Phone: [phone]  Fax: [phone]  ______________________________________________________________________    Physical Exam   Vital Signs: Temp: 98.2  F (36.8  C) Temp src: Oral BP: 116/57 Pulse: 70   Resp: 18 SpO2: 100 % O2 Device: None (Room air)    Weight: 169 lbs 6.4 oz  Constitutional: awake, alert, cooperative, no apparent distress, and appears stated age  Eyes: pupils equal, round and reactive to light  Respiratory: no increased work of breathing  Cardiovascular: normal S1 and S2  GI: hypoactive bowel sounds.  Abdomen mildly distended.  Mild pain upon palpation.  Musculoskeletal: 2+ leg edema       Primary Care Physician   Amrik Mejia    Discharge Orders      Follow-up and recommended labs and tests     Follow up with Gastroenterologist , at [facility], within 7 days after discharge to  evaluate treatment change. The following labs/tests are recommended: Lactic acid.    Please also follow up with Nephrology within 7 days.     Reason for your hospital stay    Acute hepatic encephalopathy.     Activity    Your activity upon discharge: activity as tolerated.     Diet    Follow this diet upon discharge: Orders Placed This Encounter      Consistent Carbohydrate Diet Moderate Consistent Carb (60 g CHO per Meal) Diet       Significant Results and Procedures   Most Recent 3 CBC's:Recent Labs   Lab Test 12/22/22  0701 12/20/22  0707 12/19/22  0411   WBC 5.9 5.8 5.7   HGB 8.7* 9.2* 9.6*   MCV 84 83 81   * 151 166     Most Recent 3 BMP's:Recent Labs   Lab Test 12/22/22  1250 12/22/22  0756 12/22/22  0701 12/21/22  1652 12/21/22  1322 12/21/22  0809 12/21/22  0707 12/20/22  0750 12/20/22  0707   NA  --   --  138  --   --   --  142  --  144   POTASSIUM  --   --  3.7  --  3.8  --  3.2*  3.2*   < > 3.1*   CHLORIDE  --   --  109*  --   --   --  110*  --  110*   CO2  --   --  19*  --   --   --  21*  --  22   BUN  --   --  14.7  --   --   --  15.9  --  13.0   CR  --   --  1.44*  --   --   --  1.55*  --  1.55*   ANIONGAP  --   --  10  --   --   --  11  --  12   SILVIA  --   --  8.2*  --   --   --  8.4*  --  8.7*   * 132* 129*   < >  --    < > 185*   < > 116*    < > = values in this interval not displayed.     Most Recent 2 LFT's:Recent Labs   Lab Test 12/22/22  0701 12/21/22  0707   AST 34 34   ALT 22 23   ALKPHOS 107 110   BILITOTAL 1.2 1.3*     Most Recent INR's and Anticoagulation Dosing History:  Anticoagulation Dose History     Recent Dosing and Labs Latest Ref Rng & Units 11/19/2020 12/2/2021 2/17/2022 4/11/2022 11/16/2022 12/9/2022 12/18/2022    INR 0.85 - 1.15 1.23(H) 1.20(H) 1.1 1.1 1.33(H) 1.37(H) 1.38(H)        Most Recent 3 Creatinines:Recent Labs   Lab Test 12/22/22  0701 12/21/22  0707 12/20/22  0707   CR 1.44* 1.55* 1.55*     Most Recent 3 Hemoglobins:Recent Labs   Lab Test 12/22/22  0701  12/20/22  0707 12/19/22  0411   HGB 8.7* 9.2* 9.6*   ,   Results for orders placed or performed during the hospital encounter of 12/18/22   Head CT w/o contrast    Narrative    EXAM: CT HEAD W/O CONTRAST  LOCATION: [facility]  DATE/TIME: 12/18/2022 9:08 PM    INDICATION: AMS  COMPARISON: MRI brain 10/07/2013  TECHNIQUE: Routine CT Head without IV contrast. Multiplanar reformats. Dose reduction techniques were used.    FINDINGS:  INTRACRANIAL CONTENTS: No intracranial hemorrhage, extraaxial collection, or mass effect.  No CT evidence of acute infarct. Mild presumed chronic small vessel ischemic changes. Mild generalized volume loss. No hydrocephalus. There is intracranial   atherosclerotic versus.    VISUALIZED ORBITS/SINUSES/MASTOIDS: No intraorbital abnormality. No paranasal sinus mucosal disease. No middle ear or mastoid effusion.    BONES/SOFT TISSUES: No acute abnormality.      Impression    IMPRESSION:  1.  No CT evidence for acute intracranial process.  2.  Brain atrophy and presumed chronic microvascular ischemic changes as above.   CT Abdomen Pelvis w Contrast    Narrative    EXAM: CT ABDOMEN PELVIS W CONTRAST  LOCATION: [facility]  DATE/TIME: 12/18/2022 9:14 PM    INDICATION: AMS, abd pain  COMPARISON: CT abdomen and pelvis 11/07/2021  TECHNIQUE: CT scan of the abdomen and pelvis was performed following injection of IV contrast. Multiplanar reformats were obtained. Dose reduction techniques were used.  CONTRAST: [id number] 80ml    FINDINGS:   LOWER CHEST: Small to moderate-sized bilateral pleural effusions have developed since our study. Subsegmental atelectasis both bases.    HEPATOBILIARY: Cirrhosis. Patient has undergone TIPS placement since the prior exam extending between the right portal vein and IVC. Moderate volume of ascites along the liver margin has slightly increased. Gallstones.    PANCREAS: Normal.    SPLEEN: Small hypodense splenic lesion may  represent a cyst but too small to definitively characterize. Small amount of ascites left upper quadrant.    ADRENAL GLANDS: Normal.    KIDNEYS/BLADDER: Numerous nonobstructing stones lower poles of both kidneys and mid right kidney, the largest measuring 10 mm. Several small cysts right kidney. Moderately distended bladder.    BOWEL: No evidence for bowel obstruction. Gastric wall thickening presumably related to incomplete distention.    LYMPH NODES: Several nonenlarged retroperitoneal lymph nodes are unchanged.    VASCULATURE: Atherosclerotic disease abdominal aorta.    PELVIC ORGANS: Small amount of pelvic ascites.    MUSCULOSKELETAL: Hypertrophic changes lumbar spine and lower thoracic spine.      Impression    IMPRESSION:   1.  Cirrhosis with TIPS in place.  2.  Abdominal ascites has increased.  3.  New bilateral pleural effusions and bibasilar atelectasis.  4.  Nonobstructing stones both kidneys.   XR Chest Port 1 View    Narrative    EXAM: XR CHEST PORT 1 VIEW  LOCATION: [facility]  DATE/TIME: 12/18/2022 9:34 PM    INDICATION: AMS  COMPARISON: 12/02/2015    :     Impression    IMPRESSION: Heart size prominent on this AP view. Pulmonary vascularity normal. Slightly prominent interstitial pattern both lungs could represent edema. No airspace infiltrates. Trace fluid or thickened pleura right costophrenic angle.   US Paracentesis without Albumin    Narrative    EXAM:  1. PARACENTESIS  2. ULTRASOUND GUIDANCE  LOCATION: [facility]  DATE/TIME: 12/19/2022 4:27 PM    INDICATION: Ascites.    PROCEDURE: Informed consent obtained. Time out performed. The abdomen was prepped and draped in a sterile fashion. 8 mL of 1% lidocaine was infused into local soft tissues. A 5 Micronesian catheter system was introduced into the abdominal ascites under   ultrasound guidance.    0.8 liters of yellow fluid were removed and sent to lab if requested.    Patient tolerated procedure  well.    Ultrasound imaging was obtained and placed in the patient's permanent medical record.      Impression    IMPRESSION:  1.  Status post ultrasound-guided paracentesis.    Reference CPT Code: 56114   US Abdomen Limited with TIPSS Doppler    Narrative    EXAM: US ABDOMEN LIMITED WITH TIPSS DOPPLER  LOCATION: [facility]  DATE/TIME: 12/20/2022 12:45 PM    INDICATION: recent TIPS 12 9 with new hepatic encephalopathy and bacteremia  COMPARISON: None.  TECHNIQUE: Limited abdominal ultrasound. Color flow with spectral Doppler and waveform analysis performed.     FINDINGS:    GALLBLADDER: Gallstones in an otherwise normal gallbladder. No wall thickening, or pericholecystic fluid. Negative sonographic Mcdaniel's sign.     BILE DUCTS: No biliary dilatation.     LIVER: Coarsened echotexture, suggesting underlying fibrosis. Nodular contour. No focal mass.     LEFT KIDNEY: No hydronephrosis.     PANCREAS: The visualized portions are normal.    Mild perihepatic ascites.    ABDOMINAL DUPLEX: The hepatic artery, hepatic veins, IVC, portal veins, and splenic vein are patent with flow in the normal direction.     TIPS velocities (centimeters per second):  Proximal to stent: 33  portal venous and: 190  mid stent: 182  hepatic venous and: 123  distal to stent: 133      Impression    IMPRESSION:  1.  Cirrhotic liver morphology with the sequela portal hypertension including ascites.  2.  Patent hepatic vessels with appropriately directed flow. Note is made of a patent TIPS without sonographic evidence of stenosis.             Discharge Medications   Current Discharge Medication List      START taking these medications    Details   lactulose (CHRONULAC) 10 GM/15ML solution Take 30 mLs (20 g) by mouth 6 times daily  Qty: 946 mL, Refills: 4    Associated Diagnoses: Alcoholic cirrhosis, unspecified whether ascites present (H)      rifaximin (XIFAXAN) 550 MG TABS tablet Take 1 tablet (550 mg) by mouth 2 times  daily  Qty: 60 tablet, Refills: 4    Associated Diagnoses: Alcoholic cirrhosis, unspecified whether ascites present (H)         CONTINUE these medications which have CHANGED    Details   spironolactone (ALDACTONE) 25 MG tablet Take 1 tablet (25 mg) by mouth daily  Qty: 30 tablet, Refills: 4    Associated Diagnoses: Type 2 diabetes mellitus with microalbuminuria, without long-term current use of insulin (H); Chronic kidney disease, stage 3a (H); Alcoholic cirrhosis, unspecified whether ascites present (H); Portal hypertension (H); Incontinence of feces with fecal urgency; Popliteal cyst, left; Essential hypertension         CONTINUE these medications which have NOT CHANGED    Details   acetaminophen (TYLENOL) 500 MG tablet Take 500-1,000 mg by mouth every 6 hours as needed for mild pain      Aromatic Inhalants (RA MENTHOL NASAL INHALER) INHA Spray 1 Inhalation in nostril as needed      bismuth subsalicylate (PEPTO BISMOL) 262 MG chewable tablet Take 1 tablet by mouth every 6 hours as needed for diarrhea      calcium carbonate (TUMS) 500 MG chewable tablet Take 1-2 chew tab by mouth 3 times daily as needed for heartburn      hypromellose (ARTIFICIAL TEARS) 0.5 % SOLN ophthalmic solution Place 1 drop into both eyes 4 times daily as needed for dry eyes      omeprazole (PRILOSEC) 20 MG DR capsule TAKE 1 CAPSULE BY MOUTH TWICE DAILY BEFORE MEALS  Qty: 180 capsule, Refills: 3    Associated Diagnoses: Encounter for medication refill      simvastatin (ZOCOR) 20 MG tablet Take 1 tablet (20 mg) by mouth At Bedtime  Qty: 90 tablet, Refills: 2    Associated Diagnoses: Hypercholesteremia         STOP taking these medications       multivitamin, therapeutic (THERA-VIT) TABS tablet Comments:   Reason for Stopping:         pioglitazone (ACTOS) 30 MG tablet Comments:   Reason for Stopping:         Probiotic, Lactobacillus, CAPS Comments:   Reason for Stopping:             Allergies   Allergies   Allergen Reactions     Sulfa Drugs  "Shortness Of Breath and Itching     Penicillins Unknown     Annotation: discussed with patient, he reports he had \"itching\" with penicillin many years ago in [facility] but no hives or throat or respiratory symptoms.  he also reports having tolerated amoxicillin since coming to [place].       "

## 2022-12-22 NOTE — PLAN OF CARE
Goal Outcome Evaluation:      Problem: Gas Exchange Impaired  Goal: Optimal Gas Exchange  Outcome: Progressing  Intervention: Optimize Oxygenation and Ventilation  Recent Flowsheet Documentation  Taken 12/21/2022 1556 by Darling Torres RN  Head of Bed (HOB) Positioning: HOB at 30-45 degrees     Problem: Diabetes Comorbidity  Goal: Blood Glucose Level Within Targeted Range  Outcome: Progressing     No pain reported. VSS. A&O. Up to bathroom/commode with assist. Family visiting much of shift.     Darling Torres, RN

## 2022-12-22 NOTE — PROGRESS NOTES
"[facility] - GASTROENTEROLOGY PROGRESS NOTE     SUBJECTIVE:  Feels good, wants to go home, eating well, mild abdominal distention.  Thinks he had 4-5 bowel movements yesterday.  Had about 4 this morning, while on lactulose.       OBJECTIVE:  /57 (BP Location: Left arm)   Pulse 70   Temp 98.2  F (36.8  C) (Oral)   Resp 18   Ht 1.727 m (5' 8\")   Wt 76.8 kg (169 lb 6.4 oz)   SpO2 100%   BMI 25.76 kg/m    Temp (24hrs), Av.2  F (36.8  C), Min:98.1  F (36.7  C), Max:98.3  F (36.8  C)    Patient Vitals for the past 72 hrs:   Weight   22 1701 76.8 kg (169 lb 6.4 oz)   22 0616 90.3 kg (199 lb)        PHYSICAL EXAM  GEN: Alert, no distress  ABD: Soft, non-tender, moderate abdominal distention with fluid wave.    Additional Data:  I have reviewed the patient's new clinical lab results:   Recent Labs   Lab Test 22  0701 22  0707 22  0411 22  1912 12/10/22  0542 22  1254 22  2024 22  1154   WBC 5.9 5.8 5.7 4.7   < >  --    < > 4.5   HGB 8.7* 9.2* 9.6* 9.5*   < > 8.2*   < > 9.2*   MCV 84 83 81 81   < >  --    < > 83   * 151 166 151   < > 116*   < > 130*   INR  --   --   --  1.38*  --  1.37*  --  1.33*    < > = values in this interval not displayed.     Recent Labs   Lab Test 22  1250 22  0756 22  0701 22  1652 22  1322 22  0809 22  0707 22  0750 22  0707   NA  --   --  138  --   --   --  142  --  144   POTASSIUM  --   --  3.7  --  3.8  --  3.2*  3.2*   < > 3.1*   CHLORIDE  --   --  109*  --   --   --  110*  --  110*   CO2  --   --  19*  --   --   --  21*  --  22   BUN  --   --  14.7  --   --   --  15.9  --  13.0   CR  --   --  1.44*  --   --   --  1.55*  --  1.55*   ANIONGAP  --   --  10  --   --   --  11  --  12   SILVIA  --   --  8.2*  --   --   --  8.4*  --  8.7*   * 132* 129*   < >  --    < > 185*   < > 116*    < > = values in this interval not displayed.     Recent Labs   Lab Test 22  0701 " 12/21/22  0707 12/20/22  0707 12/19/22  0411 12/18/22  2119 12/18/22  1912   ALBUMIN 2.8* 2.9* 2.9*   < >  --  3.5   BILITOTAL 1.2 1.3* 1.6*   < >  --  1.7*   ALT 22 23 23   < >  --  30   AST 34 34 33   < >  --  42   PROTEIN  --   --   --   --  20*  --    LIPASE  --   --   --   --   --  195*    < > = values in this interval not displayed.        IMPRESSION/Plan:  1. Alcoholic cirrhosis, abstinent for several years, meld score is 11.  TIPS was 13 days ago.  TIPS Doppler shows good function.  2. Hepatic encephalopathy after TIPS with UTI  a. Will continue lactulose and rifaximin for now  3. NANO, diuretics were on hold, followed by renal, restarting Aldactone.    15 minutes of total time was spent providing patient care, including patient evaluation, reviewing documentation/test results, coordination of care with other providers, and .    GI will sign off, I have ordered follow-up in clinic - we will contact him to schedule, call with questions.    George Ma  Cell [phone]  After 5 PM, please call [phone]

## 2022-12-22 NOTE — PROGRESS NOTES
RENAL PROGRESS NOTE      PLAN:  Resume low dose Aldactone with Hold if SBP <105  NO other renal related changes today  PRN bladder scan  Weight today and daily  Daily BMP  I/o      NANO on CKD 3a: mild NANO post contrast, +- UTI, sl improvement today; non- oliguric but UO not doc as he urinates while stooling  baseline creat ~1.3, eGFR in 60% range and pretty stable for 4+ yrs.   ?prior static injury, ongoing stone disease   ?any chronic bladder emptying issues, no trouble voiding here though not all UO dox. Denies NSAID.   Would check UA UPC after NANO/ UTI resolved    Lytes/Acid-Base:    Hypokalemia:  Improved with replacement, will resume daily aldactone  Mild Metabolic acidosis:  ?chronic NAGMA, low CO2.likely compensatory for resp alk in the setting of liver failure F/u outpt CO2, consider base supplement if ongoing acidosis  PseudohypoCa: corrects normal with low Albumin     UTI: being treated, thick bladder ?due to acute cystitis.   Follow for any urinary retention.     Anemia:  Likely multifactorial with chronic dz, ? Infection.     Hx of nephrolithiasis, all calcium stones with ongoing stones, non obstructing. Followed by Dr Martinez.     alcohol related cirrhosis, abstinent x5 yrs, s/p TIPS admitted with  Hepatic/ infx related encephalopathy, now clearing. On Rifaximin and lactulose, monitor stool volume    SUBJECTIVE:  Pt is a bit lethargic, says he didn't sleep well, some edema, denies SOB/no CP, does speak some English and  not used for intake .  Discussed with RN who confirms that report indicated he did not sleep much, she was advised that his AMS had improved since admit, spt is stoolin 6 xdaily and urinates at the same time so I/o inaccurate for this reason, no wt today and she will get this a.m.     OBJECTIVE:  Physical Exam   Temp: 98.2  F (36.8  C) Temp src: Oral BP: 116/57 Pulse: 70   Resp: 18 SpO2: 100 % O2 Device: None (Room air)    Vitals:    12/20/22 0616 12/20/22 1701   Weight:  90.3 kg (199 lb) 76.8 kg (169 lb 6.4 oz)     Vital Signs with Ranges  Temp:  [98.1  F (36.7  C)-98.3  F (36.8  C)] 98.2  F (36.8  C)  Pulse:  [70-80] 70  Resp:  [18] 18  BP: (101-116)/(56-57) 116/57  SpO2:  [99 %-100 %] 100 %  I/O last 3 completed shifts:  In: 580 [P.O.:580]  Out: 200 [Urine:200]    Temp (24hrs), Av.2  F (36.8  C), Min:98.1  F (36.7  C), Max:98.3  F (36.8  C)      Patient Vitals for the past 72 hrs:   Weight   22 1701 76.8 kg (169 lb 6.4 oz)   22 0616 90.3 kg (199 lb)       Intake/Output Summary (Last 24 hours) at 2022 1012  Last data filed at 2022 0658  Gross per 24 hour   Intake 360 ml   Output 200 ml   Net 160 ml       PHYSICAL EXAM:  General - sleepy and groggy, but can answer q's appropriately, asking to go angus, appears comfortable, NAD  Cardiovascular - Rate  And BP controlled   Respiratory - Clear to auscultation bilaterally, no crackles or wheezes, sats good on RA  Abd: BS present, no guarding or pain with palpation, no overt ascites  Extremities -1-2+ lower extremity edema bilaterally, R > L, mostly ankles and calves   Skin: dry, intact, no rash, good turgor  Neuro:  Grossly intact, no focal deficits  MSK:  Grossly intact  Psych:  Flat/sleepy affec  :  Volume not doc d/t stooling and urinating in same time   Wt pending today     LABORATORY STUDIES:     Recent Labs   Lab 22  0701 22  0707 22  0411 22   WBC 5.9 5.8 5.7 4.7   RBC 3.36* 3.62* 3.78* 3.67*   HGB 8.7* 9.2* 9.6* 9.5*   HCT 28.2* 30.1* 30.7* 29.8*   * 151 166 151       Basic Metabolic Panel:  Recent Labs   Lab 22  0756 22  0701 22  0606 22  2044 22  1652 22  1322 22  1202 22  0809 22  0707 22  2115 22  1722 22  0750 22  0707 22  0804 22  0411 22  2348 22  1912   NA  --  138  --   --   --   --   --   --  142  --   --   --  144  --  141  --  141   POTASSIUM  --  3.7   --   --   --  3.8  --   --  3.2*  3.2*  --  3.8  --  3.1*  --  3.8  --  3.7   CHLORIDE  --  109*  --   --   --   --   --   --  110*  --   --   --  110*  --  108*  --  107   CO2  --  19*  --   --   --   --   --   --  21*  --   --   --  22  --  21*  --  20*   BUN  --  14.7  --   --   --   --   --   --  15.9  --   --   --  13.0  --  11.4  --  11.6   CR  --  1.44*  --   --   --   --   --   --  1.55*  --   --   --  1.55*  --  1.30*  --  1.32*   * 129* 125* 176* 147*  --  147*   < > 185*   < >  --    < > 116*   < > 139*   < > 146*   SILVIA  --  8.2*  --   --   --   --   --   --  8.4*  --   --   --  8.7*  --  8.7*  --  8.7*    < > = values in this interval not displayed.       INR  Recent Labs   Lab 12/18/22 1912   INR 1.38*        Recent Labs   Lab Test 12/22/22  0701 12/20/22  0707 12/19/22  0411 12/18/22  1912 12/10/22  0542 12/09/22  1254   INR  --   --   --  1.38*  --  1.37*   WBC 5.9 5.8   < > 4.7   < >  --    HGB 8.7* 9.2*   < > 9.5*   < > 8.2*   * 151   < > 151   < > 116*    < > = values in this interval not displayed.       Personally reviewed current labs      Carolina Sousa [facility]  [facility]  [phone]

## 2022-12-22 NOTE — PLAN OF CARE
Shift Summary [id number]  Patient Aox4. VSS on RA. Denies chest pain, n/v and SOB. Up SBA to commode, steady gait. IV abx. Fall precautions in place, bed alarm on. Using call light appropriately.    Goal Outcome Evaluation:  Problem: Plan of Care - These are the overarching goals to be used throughout the patient stay.    Goal: Absence of Hospital-Acquired Illness or Injury  Intervention: Identify and Manage Fall Risk  Recent Flowsheet Documentation  Taken 12/22/2022 0100 by Ara Hernandez, RN  Safety Promotion/Fall Prevention:    activity supervised    bed alarm on    assistive device/personal items within reach    fall prevention program maintained    nonskid shoes/slippers when out of bed    patient and family education

## 2022-12-24 LAB — BACTERIA BLD CULT: NO GROWTH

## 2022-12-25 LAB
BACTERIA BLD CULT: NO GROWTH
BACTERIA BLD CULT: NO GROWTH

## 2022-12-26 ENCOUNTER — PATIENT OUTREACH (OUTPATIENT)
Dept: CARE COORDINATION | Facility: CLINIC | Age: 72
End: 2022-12-26

## 2022-12-26 NOTE — PROGRESS NOTES
Clinic Care Coordination Contact  [facility]/Voicemail       Clinical Data: Care Coordinator Outreach  Outreach attempted x 2.  Left message on patient's voicemail with call back information and requested return call.    Plan: Care Coordinator will do no further outreaches at this time.    TASHIA Buchanan  [facility]  [facility]     *Connected Care Resource Team does NOT follow patient ongoing. Referrals are identified based on internal discharge reports and the outreach is to ensure patient has an understanding of their discharge instructions.

## 2022-12-30 DIAGNOSIS — Z76.0 ENCOUNTER FOR MEDICATION REFILL: Primary | ICD-10-CM

## 2023-01-01 ENCOUNTER — TRANSFERRED RECORDS (OUTPATIENT)
Dept: HEALTH INFORMATION MANAGEMENT | Facility: CLINIC | Age: 73
End: 2023-01-01
Payer: COMMERCIAL

## 2023-01-01 ENCOUNTER — PATIENT OUTREACH (OUTPATIENT)
Dept: CARE COORDINATION | Facility: CLINIC | Age: 73
End: 2023-01-01

## 2023-01-01 ENCOUNTER — PATIENT OUTREACH (OUTPATIENT)
Dept: CARE COORDINATION | Facility: CLINIC | Age: 73
End: 2023-01-01
Payer: COMMERCIAL

## 2023-01-01 ENCOUNTER — TELEPHONE (OUTPATIENT)
Dept: MEDSURG UNIT | Facility: CLINIC | Age: 73
End: 2023-01-01
Payer: COMMERCIAL

## 2023-01-01 ENCOUNTER — HOSPITAL ENCOUNTER (OUTPATIENT)
Dept: ULTRASOUND IMAGING | Facility: CLINIC | Age: 73
Discharge: HOME | End: 2023-11-03
Attending: INTERNAL MEDICINE | Admitting: INTERNAL MEDICINE
Payer: COMMERCIAL

## 2023-01-01 ENCOUNTER — TRANSFERRED RECORDS (OUTPATIENT)
Dept: HEALTH INFORMATION MANAGEMENT | Facility: CLINIC | Age: 73
End: 2023-01-01

## 2023-01-01 ENCOUNTER — APPOINTMENT (OUTPATIENT)
Dept: OCCUPATIONAL THERAPY | Facility: CLINIC | Age: 73
DRG: 372 | End: 2023-01-01
Payer: COMMERCIAL

## 2023-01-01 ENCOUNTER — HOSPITAL ENCOUNTER (OUTPATIENT)
Facility: CLINIC | Age: 73
Discharge: HOME | End: 2023-11-24
Admitting: RADIOLOGY
Payer: COMMERCIAL

## 2023-01-01 ENCOUNTER — APPOINTMENT (OUTPATIENT)
Dept: ULTRASOUND IMAGING | Facility: HOSPITAL | Age: 73
DRG: 433 | End: 2023-01-01
Attending: INTERNAL MEDICINE
Payer: COMMERCIAL

## 2023-01-01 ENCOUNTER — HOSPITAL ENCOUNTER (OUTPATIENT)
Dept: ULTRASOUND IMAGING | Facility: CLINIC | Age: 73
Discharge: HOME | End: 2023-09-06
Attending: INTERNAL MEDICINE | Admitting: INTERNAL MEDICINE
Payer: COMMERCIAL

## 2023-01-01 ENCOUNTER — HOSPITAL ENCOUNTER (OUTPATIENT)
Dept: ULTRASOUND IMAGING | Facility: CLINIC | Age: 73
Discharge: HOME | End: 2023-11-24
Attending: INTERNAL MEDICINE
Payer: COMMERCIAL

## 2023-01-01 ENCOUNTER — APPOINTMENT (OUTPATIENT)
Dept: PHYSICAL THERAPY | Facility: CLINIC | Age: 73
DRG: 372 | End: 2023-01-01
Payer: COMMERCIAL

## 2023-01-01 ENCOUNTER — HOSPITAL ENCOUNTER (INPATIENT)
Facility: HOSPITAL | Age: 73
LOS: 1 days | Discharge: HOME | DRG: 433 | End: 2023-11-17
Attending: EMERGENCY MEDICINE | Admitting: INTERNAL MEDICINE
Payer: COMMERCIAL

## 2023-01-01 ENCOUNTER — OFFICE VISIT (OUTPATIENT)
Dept: FAMILY MEDICINE | Facility: CLINIC | Age: 73
End: 2023-01-01
Payer: COMMERCIAL

## 2023-01-01 ENCOUNTER — TELEPHONE (OUTPATIENT)
Dept: INTERVENTIONAL RADIOLOGY/VASCULAR | Facility: CLINIC | Age: 73
End: 2023-01-01

## 2023-01-01 ENCOUNTER — HOSPITAL ENCOUNTER (OUTPATIENT)
Dept: ULTRASOUND IMAGING | Facility: CLINIC | Age: 73
Discharge: HOME | End: 2023-10-09
Attending: INTERNAL MEDICINE | Admitting: INTERNAL MEDICINE
Payer: COMMERCIAL

## 2023-01-01 ENCOUNTER — TELEPHONE (OUTPATIENT)
Dept: FAMILY MEDICINE | Facility: CLINIC | Age: 73
End: 2023-01-01

## 2023-01-01 ENCOUNTER — ANESTHESIA (OUTPATIENT)
Dept: SURGERY | Facility: CLINIC | Age: 73
End: 2023-01-01
Payer: COMMERCIAL

## 2023-01-01 ENCOUNTER — APPOINTMENT (OUTPATIENT)
Dept: NURSING | Facility: CLINIC | Age: 73
End: 2023-01-01
Payer: COMMERCIAL

## 2023-01-01 ENCOUNTER — APPOINTMENT (OUTPATIENT)
Dept: CARDIOLOGY | Facility: CLINIC | Age: 73
DRG: 372 | End: 2023-01-01
Payer: COMMERCIAL

## 2023-01-01 ENCOUNTER — APPOINTMENT (OUTPATIENT)
Dept: ULTRASOUND IMAGING | Facility: CLINIC | Age: 73
DRG: 372 | End: 2023-01-01
Attending: PHYSICIAN ASSISTANT
Payer: COMMERCIAL

## 2023-01-01 ENCOUNTER — PATIENT OUTREACH (OUTPATIENT)
Dept: NURSING | Facility: CLINIC | Age: 73
End: 2023-01-01
Payer: COMMERCIAL

## 2023-01-01 ENCOUNTER — HOSPITAL ENCOUNTER (INPATIENT)
Facility: CLINIC | Age: 73
LOS: 6 days | Discharge: HOME | DRG: 372 | End: 2023-11-09
Attending: EMERGENCY MEDICINE | Admitting: STUDENT IN AN ORGANIZED HEALTH CARE EDUCATION/TRAINING PROGRAM
Payer: COMMERCIAL

## 2023-01-01 ENCOUNTER — TELEPHONE (OUTPATIENT)
Dept: FAMILY MEDICINE | Facility: CLINIC | Age: 73
End: 2023-01-01
Payer: COMMERCIAL

## 2023-01-01 ENCOUNTER — APPOINTMENT (OUTPATIENT)
Dept: RADIOLOGY | Facility: CLINIC | Age: 73
DRG: 372 | End: 2023-01-01
Payer: COMMERCIAL

## 2023-01-01 ENCOUNTER — ANESTHESIA EVENT (OUTPATIENT)
Dept: SURGERY | Facility: CLINIC | Age: 73
End: 2023-01-01
Payer: COMMERCIAL

## 2023-01-01 ENCOUNTER — HOSPITAL ENCOUNTER (OUTPATIENT)
Dept: ULTRASOUND IMAGING | Facility: CLINIC | Age: 73
Discharge: HOME | End: 2023-10-04
Attending: INTERNAL MEDICINE | Admitting: RADIOLOGY
Payer: COMMERCIAL

## 2023-01-01 ENCOUNTER — HOSPITAL ENCOUNTER (OUTPATIENT)
Dept: ULTRASOUND IMAGING | Facility: CLINIC | Age: 73
Discharge: HOME | End: 2023-10-23
Attending: INTERNAL MEDICINE | Admitting: RADIOLOGY
Payer: COMMERCIAL

## 2023-01-01 ENCOUNTER — HOSPITAL ENCOUNTER (OUTPATIENT)
Facility: CLINIC | Age: 73
Discharge: HOME | End: 2023-10-10
Attending: INTERNAL MEDICINE | Admitting: INTERNAL MEDICINE
Payer: COMMERCIAL

## 2023-01-01 ENCOUNTER — APPOINTMENT (OUTPATIENT)
Dept: CT IMAGING | Facility: HOSPITAL | Age: 73
DRG: 433 | End: 2023-01-01
Attending: EMERGENCY MEDICINE
Payer: COMMERCIAL

## 2023-01-01 VITALS
DIASTOLIC BLOOD PRESSURE: 73 MMHG | WEIGHT: 197 LBS | HEART RATE: 64 BPM | BODY MASS INDEX: 31.66 KG/M2 | OXYGEN SATURATION: 100 % | SYSTOLIC BLOOD PRESSURE: 133 MMHG | TEMPERATURE: 98.7 F | RESPIRATION RATE: 24 BRPM | HEIGHT: 66 IN

## 2023-01-01 VITALS
HEART RATE: 84 BPM | TEMPERATURE: 98.7 F | DIASTOLIC BLOOD PRESSURE: 68 MMHG | SYSTOLIC BLOOD PRESSURE: 116 MMHG | RESPIRATION RATE: 16 BRPM | OXYGEN SATURATION: 100 %

## 2023-01-01 VITALS
WEIGHT: 166.3 LBS | DIASTOLIC BLOOD PRESSURE: 59 MMHG | SYSTOLIC BLOOD PRESSURE: 108 MMHG | OXYGEN SATURATION: 98 % | RESPIRATION RATE: 16 BRPM | TEMPERATURE: 98.2 F | HEIGHT: 68 IN | HEART RATE: 86 BPM | BODY MASS INDEX: 25.2 KG/M2

## 2023-01-01 VITALS
HEART RATE: 82 BPM | TEMPERATURE: 98.6 F | BODY MASS INDEX: 22.55 KG/M2 | OXYGEN SATURATION: 99 % | HEIGHT: 68 IN | WEIGHT: 148.8 LBS | RESPIRATION RATE: 20 BRPM | SYSTOLIC BLOOD PRESSURE: 112 MMHG | DIASTOLIC BLOOD PRESSURE: 58 MMHG

## 2023-01-01 VITALS
BODY MASS INDEX: 25.24 KG/M2 | WEIGHT: 166 LBS | TEMPERATURE: 97.8 F | DIASTOLIC BLOOD PRESSURE: 62 MMHG | HEART RATE: 73 BPM | RESPIRATION RATE: 16 BRPM | OXYGEN SATURATION: 100 % | SYSTOLIC BLOOD PRESSURE: 118 MMHG

## 2023-01-01 VITALS
RESPIRATION RATE: 14 BRPM | HEART RATE: 61 BPM | BODY MASS INDEX: 28.93 KG/M2 | DIASTOLIC BLOOD PRESSURE: 69 MMHG | SYSTOLIC BLOOD PRESSURE: 138 MMHG | TEMPERATURE: 97.3 F | WEIGHT: 180 LBS | OXYGEN SATURATION: 100 % | HEIGHT: 66 IN

## 2023-01-01 VITALS — DIASTOLIC BLOOD PRESSURE: 58 MMHG | OXYGEN SATURATION: 100 % | SYSTOLIC BLOOD PRESSURE: 109 MMHG | HEART RATE: 89 BPM

## 2023-01-01 DIAGNOSIS — R18.8 OTHER ASCITES: ICD-10-CM

## 2023-01-01 DIAGNOSIS — N18.31 CHRONIC KIDNEY DISEASE, STAGE 3A (H): ICD-10-CM

## 2023-01-01 DIAGNOSIS — K65.2 SBP (SPONTANEOUS BACTERIAL PERITONITIS) (H): Primary | ICD-10-CM

## 2023-01-01 DIAGNOSIS — K72.10 END-STAGE LIVER DISEASE (H): ICD-10-CM

## 2023-01-01 DIAGNOSIS — K76.82 HEPATIC ENCEPHALOPATHY (H): ICD-10-CM

## 2023-01-01 DIAGNOSIS — Z11.1 SCREENING FOR TUBERCULOSIS: ICD-10-CM

## 2023-01-01 DIAGNOSIS — Z01.818 PREOPERATIVE EXAMINATION: Primary | ICD-10-CM

## 2023-01-01 DIAGNOSIS — R80.9 TYPE 2 DIABETES MELLITUS WITH MICROALBUMINURIA, WITHOUT LONG-TERM CURRENT USE OF INSULIN (H): ICD-10-CM

## 2023-01-01 DIAGNOSIS — R15.2 INCONTINENCE OF FECES WITH FECAL URGENCY: ICD-10-CM

## 2023-01-01 DIAGNOSIS — I34.0 MITRAL VALVE INSUFFICIENCY, UNSPECIFIED ETIOLOGY: ICD-10-CM

## 2023-01-01 DIAGNOSIS — K74.69 OTHER CIRRHOSIS OF LIVER (H): ICD-10-CM

## 2023-01-01 DIAGNOSIS — R26.81 UNSTEADY GAIT: ICD-10-CM

## 2023-01-01 DIAGNOSIS — E11.29 TYPE 2 DIABETES MELLITUS WITH MICROALBUMINURIA, WITHOUT LONG-TERM CURRENT USE OF INSULIN (H): ICD-10-CM

## 2023-01-01 DIAGNOSIS — K72.10 END-STAGE LIVER DISEASE (H): Primary | ICD-10-CM

## 2023-01-01 DIAGNOSIS — Z23 NEED FOR PROPHYLACTIC VACCINATION AGAINST HEPATITIS A: ICD-10-CM

## 2023-01-01 DIAGNOSIS — E87.6 HYPOKALEMIA: ICD-10-CM

## 2023-01-01 DIAGNOSIS — K74.69 OTHER CIRRHOSIS OF LIVER (H): Primary | ICD-10-CM

## 2023-01-01 DIAGNOSIS — Z23 NEED FOR VACCINATION: ICD-10-CM

## 2023-01-01 DIAGNOSIS — K29.01 GASTROINTESTINAL HEMORRHAGE ASSOCIATED WITH ACUTE GASTRITIS: ICD-10-CM

## 2023-01-01 DIAGNOSIS — R15.9 INCONTINENCE OF FECES WITH FECAL URGENCY: ICD-10-CM

## 2023-01-01 DIAGNOSIS — K92.2 LOWER GI BLEED: ICD-10-CM

## 2023-01-01 DIAGNOSIS — K65.2 SBP (SPONTANEOUS BACTERIAL PERITONITIS) (H): ICD-10-CM

## 2023-01-01 DIAGNOSIS — R15.9 INCONTINENCE OF FECES, UNSPECIFIED FECAL INCONTINENCE TYPE: ICD-10-CM

## 2023-01-01 LAB
ABO/RH(D): NORMAL
ABSOLUTE NEUTROPHILS, BODY FLUID: 3413.9 /UL
ABSOLUTE NEUTROPHILS, BODY FLUID: NORMAL
ALBUMIN BODY FLUID SOURCE: NORMAL
ALBUMIN BODY FLUID SOURCE: NORMAL
ALBUMIN FLD-MCNC: 0.4 G/DL
ALBUMIN FLD-MCNC: 1 G/DL
ALBUMIN SERPL BCG-MCNC: 2.2 G/DL (ref 3.5–5.2)
ALBUMIN SERPL BCG-MCNC: 2.5 G/DL (ref 3.5–5.2)
ALBUMIN SERPL BCG-MCNC: 2.6 G/DL (ref 3.5–5.2)
ALBUMIN SERPL BCG-MCNC: 2.6 G/DL (ref 3.5–5.2)
ALBUMIN SERPL BCG-MCNC: 2.7 G/DL (ref 3.5–5.2)
ALBUMIN SERPL BCG-MCNC: 2.8 G/DL (ref 3.5–5.2)
ALBUMIN SERPL BCG-MCNC: 2.8 G/DL (ref 3.5–5.2)
ALBUMIN SERPL BCG-MCNC: 3.4 G/DL (ref 3.5–5.2)
ALBUMIN UR-MCNC: 30 MG/DL
ALP SERPL-CCNC: 106 U/L (ref 40–129)
ALP SERPL-CCNC: 126 U/L (ref 40–129)
ALP SERPL-CCNC: 134 U/L (ref 40–129)
ALP SERPL-CCNC: 148 U/L (ref 40–129)
ALP SERPL-CCNC: 155 U/L (ref 40–150)
ALP SERPL-CCNC: 158 U/L (ref 40–129)
ALP SERPL-CCNC: 197 U/L (ref 40–150)
ALP SERPL-CCNC: 201 U/L (ref 40–150)
ALT SERPL W P-5'-P-CCNC: 13 U/L (ref 0–70)
ALT SERPL W P-5'-P-CCNC: 16 U/L (ref 0–70)
ALT SERPL W P-5'-P-CCNC: 17 U/L (ref 0–70)
ALT SERPL W P-5'-P-CCNC: 18 U/L (ref 0–70)
ALT SERPL W P-5'-P-CCNC: 23 U/L (ref 0–70)
ALT SERPL W P-5'-P-CCNC: 51 U/L (ref 0–70)
ALT SERPL W P-5'-P-CCNC: 54 U/L (ref 0–70)
ALT SERPL W P-5'-P-CCNC: 76 U/L (ref 0–70)
ALT SERPL-CCNC: 14 IU/L (ref 0–44)
ALT SERPL-CCNC: 30 IU/L (ref 0–44)
ANION GAP SERPL CALCULATED.3IONS-SCNC: 10 MMOL/L (ref 7–15)
ANION GAP SERPL CALCULATED.3IONS-SCNC: 11 MMOL/L (ref 7–15)
ANION GAP SERPL CALCULATED.3IONS-SCNC: 12 MMOL/L (ref 7–15)
ANION GAP SERPL CALCULATED.3IONS-SCNC: 8 MMOL/L (ref 7–15)
ANTIBODY SCREEN: NEGATIVE
APPEARANCE FLD: ABNORMAL
APPEARANCE FLD: ABNORMAL
APPEARANCE FLD: CLEAR
APPEARANCE UR: ABNORMAL
APTT PPP: 41 SECONDS (ref 22–38)
AST SERPL W P-5'-P-CCNC: 26 U/L (ref 0–45)
AST SERPL W P-5'-P-CCNC: 33 U/L (ref 0–45)
AST SERPL W P-5'-P-CCNC: 37 U/L (ref 0–45)
AST SERPL W P-5'-P-CCNC: 38 U/L (ref 0–45)
AST SERPL W P-5'-P-CCNC: 47 U/L (ref 0–45)
AST SERPL W P-5'-P-CCNC: 50 U/L (ref 0–45)
AST SERPL W P-5'-P-CCNC: 68 U/L (ref 0–45)
AST SERPL W P-5'-P-CCNC: 92 U/L (ref 0–45)
AST SERPL-CCNC: 30 IU/L (ref 0–40)
AST SERPL-CCNC: 44 IU/L (ref 0–40)
ATRIAL RATE - MUSE: 62 BPM
BACTERIA #/AREA URNS HPF: ABNORMAL /HPF
BACTERIA BLD CULT: ABNORMAL
BACTERIA BLD CULT: ABNORMAL
BACTERIA BLD CULT: NO GROWTH
BACTERIA FLD CULT: NO GROWTH
BACTERIA FLD CULT: NO GROWTH
BACTERIA FLD CULT: NORMAL
BACTERIA PRT CULT: ABNORMAL
BACTERIA UR CULT: NORMAL
BASOPHILS # BLD AUTO: 0 10E3/UL (ref 0–0.2)
BASOPHILS NFR BLD AUTO: 0 %
BILIRUB DIRECT SERPL-MCNC: 0.83 MG/DL (ref 0–0.3)
BILIRUB SERPL-MCNC: 0.9 MG/DL
BILIRUB SERPL-MCNC: 1.1 MG/DL
BILIRUB SERPL-MCNC: 1.1 MG/DL
BILIRUB SERPL-MCNC: 1.4 MG/DL
BILIRUB SERPL-MCNC: 1.5 MG/DL
BILIRUB SERPL-MCNC: 1.6 MG/DL
BILIRUB SERPL-MCNC: 2.2 MG/DL
BILIRUB SERPL-MCNC: 2.6 MG/DL
BILIRUB UR QL STRIP: NEGATIVE
BUN SERPL-MCNC: 26 MG/DL (ref 8–23)
BUN SERPL-MCNC: 26.4 MG/DL (ref 8–23)
BUN SERPL-MCNC: 27.1 MG/DL (ref 8–23)
BUN SERPL-MCNC: 31.5 MG/DL (ref 8–23)
BUN SERPL-MCNC: 32.1 MG/DL (ref 8–23)
BUN SERPL-MCNC: 33 MG/DL (ref 8–23)
BUN SERPL-MCNC: 36 MG/DL (ref 8–23)
BUN SERPL-MCNC: 36.7 MG/DL (ref 8–23)
BUN SERPL-MCNC: 39.8 MG/DL (ref 8–23)
CALCIUM SERPL-MCNC: 10.3 MG/DL (ref 8.8–10.2)
CALCIUM SERPL-MCNC: 7.5 MG/DL (ref 8.8–10.2)
CALCIUM SERPL-MCNC: 7.7 MG/DL (ref 8.8–10.2)
CALCIUM SERPL-MCNC: 7.8 MG/DL (ref 8.8–10.2)
CALCIUM SERPL-MCNC: 8 MG/DL (ref 8.8–10.2)
CALCIUM SERPL-MCNC: 8 MG/DL (ref 8.8–10.2)
CALCIUM SERPL-MCNC: 8.1 MG/DL (ref 8.8–10.2)
CALCIUM SERPL-MCNC: 8.4 MG/DL (ref 8.8–10.2)
CALCIUM SERPL-MCNC: 8.6 MG/DL (ref 8.8–10.2)
CELL COUNT BODY FLUID SOURCE: ABNORMAL
CELL COUNT BODY FLUID SOURCE: ABNORMAL
CELL COUNT BODY FLUID SOURCE: NORMAL
CHLORIDE SERPL-SCNC: 109 MMOL/L (ref 98–107)
CHLORIDE SERPL-SCNC: 111 MMOL/L (ref 98–107)
CHLORIDE SERPL-SCNC: 112 MMOL/L (ref 98–107)
CHLORIDE SERPL-SCNC: 114 MMOL/L (ref 98–107)
CHLORIDE SERPL-SCNC: 115 MMOL/L (ref 98–107)
CHLORIDE SERPL-SCNC: 116 MMOL/L (ref 98–107)
CHLORIDE SERPL-SCNC: 120 MMOL/L (ref 98–107)
COLOR FLD: YELLOW
COLOR UR AUTO: YELLOW
CREAT SERPL-MCNC: 1.06 MG/DL (ref 0.67–1.17)
CREAT SERPL-MCNC: 1.2 MG/DL (ref 0.67–1.17)
CREAT SERPL-MCNC: 1.26 MG/DL (ref 0.67–1.17)
CREAT SERPL-MCNC: 1.29 MG/DL (ref 0.67–1.17)
CREAT SERPL-MCNC: 1.37 MG/DL (ref 0.67–1.17)
CREAT SERPL-MCNC: 1.41 MG/DL (ref 0.67–1.17)
CREAT SERPL-MCNC: 1.41 MG/DL (ref 0.67–1.17)
CREAT SERPL-MCNC: 1.47 MG/DL (ref 0.67–1.17)
CREAT SERPL-MCNC: 1.49 MG/DL (ref 0.67–1.17)
CREAT SERPL-MCNC: 1.51 MG/DL (ref 0.67–1.17)
CREATININE (EXTERNAL): 1.16 MG/DL (ref 0.76–1.27)
CREATININE (EXTERNAL): 1.28 MG/DL (ref 0.76–1.27)
CREATININE (EXTERNAL): 1.31 MG/DL (ref 0.76–1.27)
DEPRECATED HCO3 PLAS-SCNC: 12 MMOL/L (ref 22–29)
DEPRECATED HCO3 PLAS-SCNC: 13 MMOL/L (ref 22–29)
DEPRECATED HCO3 PLAS-SCNC: 13 MMOL/L (ref 22–29)
DEPRECATED HCO3 PLAS-SCNC: 14 MMOL/L (ref 22–29)
DEPRECATED HCO3 PLAS-SCNC: 15 MMOL/L (ref 22–29)
DEPRECATED HCO3 PLAS-SCNC: 17 MMOL/L (ref 22–29)
DEPRECATED HCO3 PLAS-SCNC: 17 MMOL/L (ref 22–29)
DEPRECATED HCO3 PLAS-SCNC: 19 MMOL/L (ref 22–29)
DEPRECATED HCO3 PLAS-SCNC: 21 MMOL/L (ref 22–29)
DIASTOLIC BLOOD PRESSURE - MUSE: NORMAL MMHG
EGFRCR SERPLBLD CKD-EPI 2021: 49 ML/MIN/1.73M2
EGFRCR SERPLBLD CKD-EPI 2021: 50 ML/MIN/1.73M2
EGFRCR SERPLBLD CKD-EPI 2021: 51 ML/MIN/1.73M2
EGFRCR SERPLBLD CKD-EPI 2021: 53 ML/MIN/1.73M2
EGFRCR SERPLBLD CKD-EPI 2021: 53 ML/MIN/1.73M2
EGFRCR SERPLBLD CKD-EPI 2021: 55 ML/MIN/1.73M2
EGFRCR SERPLBLD CKD-EPI 2021: 59 ML/MIN/1.73M2
EGFRCR SERPLBLD CKD-EPI 2021: 61 ML/MIN/1.73M2
EGFRCR SERPLBLD CKD-EPI 2021: 64 ML/MIN/1.73M2
EGFRCR SERPLBLD CKD-EPI 2021: 75 ML/MIN/1.73M2
ENTEROCOCCUS FAECALIS: NOT DETECTED
ENTEROCOCCUS FAECIUM: NOT DETECTED
EOSINOPHIL # BLD AUTO: 0 10E3/UL (ref 0–0.7)
EOSINOPHIL # BLD AUTO: 0.1 10E3/UL (ref 0–0.7)
EOSINOPHIL # BLD AUTO: 0.1 10E3/UL (ref 0–0.7)
EOSINOPHIL NFR BLD AUTO: 0 %
EOSINOPHIL NFR BLD AUTO: 1 %
EOSINOPHIL NFR BLD AUTO: 1 %
ERYTHROCYTE [DISTWIDTH] IN BLOOD BY AUTOMATED COUNT: 17.4 % (ref 10–15)
ERYTHROCYTE [DISTWIDTH] IN BLOOD BY AUTOMATED COUNT: 17.5 % (ref 10–15)
ERYTHROCYTE [DISTWIDTH] IN BLOOD BY AUTOMATED COUNT: 17.6 % (ref 10–15)
ERYTHROCYTE [DISTWIDTH] IN BLOOD BY AUTOMATED COUNT: 17.6 % (ref 10–15)
ERYTHROCYTE [DISTWIDTH] IN BLOOD BY AUTOMATED COUNT: 17.8 % (ref 10–15)
ERYTHROCYTE [DISTWIDTH] IN BLOOD BY AUTOMATED COUNT: 18 % (ref 10–15)
ERYTHROCYTE [DISTWIDTH] IN BLOOD BY AUTOMATED COUNT: 19.3 % (ref 10–15)
ERYTHROCYTE [DISTWIDTH] IN BLOOD BY AUTOMATED COUNT: 19.9 % (ref 10–15)
ERYTHROCYTE [DISTWIDTH] IN BLOOD BY AUTOMATED COUNT: 20 % (ref 10–15)
GAMMA INTERFERON BACKGROUND BLD IA-ACNC: 0.35 IU/ML
GFR ESTIMATED (EXTERNAL): 58 ML/MIN/1.73M2
GFR ESTIMATED (EXTERNAL): 60 ML/MIN/1.73
GFR ESTIMATED (EXTERNAL): 67 ML/MIN/1.73M2
GLUCOSE (EXTERNAL): 145 MG/DL (ref 70–99)
GLUCOSE (EXTERNAL): 186 MG/DL (ref 70–99)
GLUCOSE (EXTERNAL): 83 MG/DL (ref 70–99)
GLUCOSE BLDC GLUCOMTR-MCNC: 151 MG/DL (ref 70–99)
GLUCOSE BLDC GLUCOMTR-MCNC: 161 MG/DL (ref 70–99)
GLUCOSE BLDC GLUCOMTR-MCNC: 164 MG/DL (ref 70–99)
GLUCOSE BLDC GLUCOMTR-MCNC: 187 MG/DL (ref 70–99)
GLUCOSE BLDC GLUCOMTR-MCNC: 189 MG/DL (ref 70–99)
GLUCOSE BLDC GLUCOMTR-MCNC: 192 MG/DL (ref 70–99)
GLUCOSE BLDC GLUCOMTR-MCNC: 207 MG/DL (ref 70–99)
GLUCOSE BLDC GLUCOMTR-MCNC: 211 MG/DL (ref 70–99)
GLUCOSE SERPL-MCNC: 132 MG/DL (ref 70–99)
GLUCOSE SERPL-MCNC: 152 MG/DL (ref 70–99)
GLUCOSE SERPL-MCNC: 163 MG/DL (ref 70–99)
GLUCOSE SERPL-MCNC: 164 MG/DL (ref 70–99)
GLUCOSE SERPL-MCNC: 165 MG/DL (ref 70–99)
GLUCOSE SERPL-MCNC: 178 MG/DL (ref 70–99)
GLUCOSE SERPL-MCNC: 216 MG/DL (ref 70–99)
GLUCOSE SERPL-MCNC: 240 MG/DL (ref 70–99)
GLUCOSE SERPL-MCNC: 288 MG/DL (ref 70–99)
GLUCOSE UR STRIP-MCNC: NEGATIVE MG/DL
GRAM STAIN RESULT: NORMAL
GRAM STAIN RESULT: NORMAL
HBA1C MFR BLD: 5.8 %
HCT VFR BLD AUTO: 22.8 % (ref 40–53)
HCT VFR BLD AUTO: 23.4 % (ref 40–53)
HCT VFR BLD AUTO: 24 % (ref 40–53)
HCT VFR BLD AUTO: 24.7 % (ref 40–53)
HCT VFR BLD AUTO: 25.6 % (ref 40–53)
HCT VFR BLD AUTO: 25.7 % (ref 40–53)
HCT VFR BLD AUTO: 26 % (ref 40–53)
HCT VFR BLD AUTO: 26.7 % (ref 40–53)
HCT VFR BLD AUTO: 30.8 % (ref 40–53)
HGB BLD-MCNC: 7.1 G/DL (ref 13.3–17.7)
HGB BLD-MCNC: 7.4 G/DL (ref 13.3–17.7)
HGB BLD-MCNC: 7.4 G/DL (ref 13.3–17.7)
HGB BLD-MCNC: 7.6 G/DL (ref 13.3–17.7)
HGB BLD-MCNC: 7.7 G/DL (ref 13.3–17.7)
HGB BLD-MCNC: 7.8 G/DL (ref 13.3–17.7)
HGB BLD-MCNC: 8 G/DL (ref 13.3–17.7)
HGB BLD-MCNC: 8 G/DL (ref 13.3–17.7)
HGB BLD-MCNC: 8.1 G/DL (ref 13.3–17.7)
HGB BLD-MCNC: 8.1 G/DL (ref 13.3–17.7)
HGB BLD-MCNC: 8.3 G/DL (ref 13.3–17.7)
HGB BLD-MCNC: 9.3 G/DL (ref 13.3–17.7)
HGB UR QL STRIP: ABNORMAL
HOLD SPECIMEN: NORMAL
IMM GRANULOCYTES # BLD: 0 10E3/UL
IMM GRANULOCYTES # BLD: 0 10E3/UL
IMM GRANULOCYTES # BLD: 0.1 10E3/UL
IMM GRANULOCYTES NFR BLD: 1 %
INR (EXTERNAL): 1.2 (ref 0.9–1.2)
INR (EXTERNAL): 1.2 (ref 0.9–1.2)
INR PPP: 1.6 (ref 0.85–1.15)
INR PPP: 1.63 (ref 0.85–1.15)
INR PPP: 2.04 (ref 0.85–1.15)
INTERPRETATION ECG - MUSE: NORMAL
KETONES UR STRIP-MCNC: NEGATIVE MG/DL
LABORATORY REPORT: NORMAL
LACTATE SERPL-SCNC: 1.7 MMOL/L (ref 0.7–2)
LACTATE SERPL-SCNC: 1.9 MMOL/L (ref 0.7–2)
LEUKOCYTE ESTERASE UR QL STRIP: ABNORMAL
LISTERIA SPECIES (DETECTED/NOT DETECTED): NOT DETECTED
LYMPHOCYTES # BLD AUTO: 0.3 10E3/UL (ref 0.8–5.3)
LYMPHOCYTES # BLD AUTO: 0.3 10E3/UL (ref 0.8–5.3)
LYMPHOCYTES # BLD AUTO: 0.4 10E3/UL (ref 0.8–5.3)
LYMPHOCYTES NFR BLD AUTO: 3 %
LYMPHOCYTES NFR BLD AUTO: 4 %
LYMPHOCYTES NFR BLD AUTO: 5 %
LYMPHOCYTES NFR FLD MANUAL: 6 %
LYMPHOCYTES NFR FLD MANUAL: 7 %
M TB IFN-G BLD-IMP: ABNORMAL
M TB IFN-G CD4+ BCKGRND COR BLD-ACNC: -0.14 IU/ML
MAGNESIUM SERPL-MCNC: 2.1 MG/DL (ref 1.7–2.3)
MAGNESIUM SERPL-MCNC: 2.3 MG/DL (ref 1.7–2.3)
MCH RBC QN AUTO: 26.8 PG (ref 26.5–33)
MCH RBC QN AUTO: 27 PG (ref 26.5–33)
MCH RBC QN AUTO: 27 PG (ref 26.5–33)
MCH RBC QN AUTO: 27.1 PG (ref 26.5–33)
MCH RBC QN AUTO: 27.2 PG (ref 26.5–33)
MCH RBC QN AUTO: 27.2 PG (ref 26.5–33)
MCH RBC QN AUTO: 27.3 PG (ref 26.5–33)
MCH RBC QN AUTO: 27.3 PG (ref 26.5–33)
MCH RBC QN AUTO: 27.8 PG (ref 26.5–33)
MCHC RBC AUTO-ENTMCNC: 30.2 G/DL (ref 31.5–36.5)
MCHC RBC AUTO-ENTMCNC: 30.8 G/DL (ref 31.5–36.5)
MCHC RBC AUTO-ENTMCNC: 31.1 G/DL (ref 31.5–36.5)
MCHC RBC AUTO-ENTMCNC: 31.1 G/DL (ref 31.5–36.5)
MCHC RBC AUTO-ENTMCNC: 31.2 G/DL (ref 31.5–36.5)
MCHC RBC AUTO-ENTMCNC: 31.3 G/DL (ref 31.5–36.5)
MCHC RBC AUTO-ENTMCNC: 31.5 G/DL (ref 31.5–36.5)
MCHC RBC AUTO-ENTMCNC: 31.6 G/DL (ref 31.5–36.5)
MCHC RBC AUTO-ENTMCNC: 31.7 G/DL (ref 31.5–36.5)
MCV RBC AUTO: 86 FL (ref 78–100)
MCV RBC AUTO: 87 FL (ref 78–100)
MCV RBC AUTO: 88 FL (ref 78–100)
MCV RBC AUTO: 92 FL (ref 78–100)
MITOGEN IGNF BCKGRD COR BLD-ACNC: -0.16 IU/ML
MITOGEN IGNF BCKGRD COR BLD-ACNC: -0.19 IU/ML
MONOCYTES # BLD AUTO: 0.9 10E3/UL (ref 0–1.3)
MONOCYTES NFR BLD AUTO: 11 %
MONOCYTES NFR BLD AUTO: 13 %
MONOCYTES NFR BLD AUTO: 7 %
MONOS+MACROS NFR FLD MANUAL: 27 %
MONOS+MACROS NFR FLD MANUAL: 7 %
MUCOUS THREADS #/AREA URNS LPF: PRESENT /LPF
NEUTROPHILS # BLD AUTO: 11.4 10E3/UL (ref 1.6–8.3)
NEUTROPHILS # BLD AUTO: 6.1 10E3/UL (ref 1.6–8.3)
NEUTROPHILS # BLD AUTO: 6.5 10E3/UL (ref 1.6–8.3)
NEUTROPHILS NFR BLD AUTO: 81 %
NEUTROPHILS NFR BLD AUTO: 83 %
NEUTROPHILS NFR BLD AUTO: 88 %
NEUTS BAND NFR FLD MANUAL: 87 %
NEUTS BAND NFR FLD MANUAL: NORMAL %
NITRATE UR QL: NEGATIVE
NRBC # BLD AUTO: 0 10E3/UL
NRBC BLD AUTO-RTO: 0 /100
NT-PROBNP SERPL-MCNC: 1641 PG/ML (ref 0–900)
OTHER CELLS FLD MANUAL: 66 %
P AXIS - MUSE: 17 DEGREES
PATH REPORT.ADDENDUM SPEC: NORMAL
PATH REPORT.COMMENTS IMP SPEC: NORMAL
PATH REPORT.FINAL DX SPEC: NORMAL
PATH REPORT.FINAL DX SPEC: NORMAL
PATH REPORT.GROSS SPEC: NORMAL
PATH REPORT.GROSS SPEC: NORMAL
PATH REPORT.MICROSCOPIC SPEC OTHER STN: NORMAL
PATH REPORT.RELEVANT HX SPEC: NORMAL
PATH REPORT.RELEVANT HX SPEC: NORMAL
PETH INTERPRETATION: NORMAL
PH BODY FLUID SOURCE: NORMAL
PH FLD: 7.5 PH
PH UR STRIP: 5.5 [PH] (ref 5–7)
PHOTO IMAGE: NORMAL
PLATELET # BLD AUTO: 121 10E3/UL (ref 150–450)
PLATELET # BLD AUTO: 128 10E3/UL (ref 150–450)
PLATELET # BLD AUTO: 141 10E3/UL (ref 150–450)
PLATELET # BLD AUTO: 151 10E3/UL (ref 150–450)
PLATELET # BLD AUTO: 153 10E3/UL (ref 150–450)
PLATELET # BLD AUTO: 157 10E3/UL (ref 150–450)
PLATELET # BLD AUTO: 162 10E3/UL (ref 150–450)
PLATELET # BLD AUTO: 196 10E3/UL (ref 150–450)
PLATELET # BLD AUTO: 220 10E3/UL (ref 150–450)
PLPETH BLD-MCNC: <10 NG/ML
POPETH BLD-MCNC: <10 NG/ML
POTASSIUM (EXTERNAL): 3.3 MMOL/L (ref 3.5–5.2)
POTASSIUM (EXTERNAL): 3.8 MMOL/L (ref 3.5–5.2)
POTASSIUM (EXTERNAL): 4.3 MMOL/L (ref 3.5–5.2)
POTASSIUM SERPL-SCNC: 3 MMOL/L (ref 3.4–5.3)
POTASSIUM SERPL-SCNC: 3.1 MMOL/L (ref 3.4–5.3)
POTASSIUM SERPL-SCNC: 3.1 MMOL/L (ref 3.4–5.3)
POTASSIUM SERPL-SCNC: 3.2 MMOL/L (ref 3.4–5.3)
POTASSIUM SERPL-SCNC: 3.4 MMOL/L (ref 3.4–5.3)
POTASSIUM SERPL-SCNC: 3.6 MMOL/L (ref 3.4–5.3)
POTASSIUM SERPL-SCNC: 3.6 MMOL/L (ref 3.4–5.3)
POTASSIUM SERPL-SCNC: 4 MMOL/L (ref 3.4–5.3)
POTASSIUM SERPL-SCNC: 4 MMOL/L (ref 3.4–5.3)
POTASSIUM SERPL-SCNC: 4.1 MMOL/L (ref 3.4–5.3)
POTASSIUM SERPL-SCNC: 4.2 MMOL/L (ref 3.4–5.3)
POTASSIUM SERPL-SCNC: 4.3 MMOL/L (ref 3.4–5.3)
POTASSIUM SERPL-SCNC: 4.3 MMOL/L (ref 3.4–5.3)
POTASSIUM SERPL-SCNC: 4.6 MMOL/L (ref 3.4–5.3)
PR INTERVAL - MUSE: 142 MS
PROCALCITONIN SERPL IA-MCNC: 0.71 NG/ML
PROT FLD-MCNC: 1.9 G/DL
PROT SERPL-MCNC: 5.3 G/DL (ref 6.4–8.3)
PROT SERPL-MCNC: 5.4 G/DL (ref 6.4–8.3)
PROT SERPL-MCNC: 5.6 G/DL (ref 6.4–8.3)
PROT SERPL-MCNC: 5.7 G/DL (ref 6.4–8.3)
PROT SERPL-MCNC: 6 G/DL (ref 6.4–8.3)
PROT SERPL-MCNC: 6.7 G/DL (ref 6.4–8.3)
PROT SERPL-MCNC: 6.7 G/DL (ref 6.4–8.3)
PROT SERPL-MCNC: 8.2 G/DL (ref 6.4–8.3)
PROTEIN BODY FLUID SOURCE: NORMAL
QRS DURATION - MUSE: 108 MS
QT - MUSE: 470 MS
QTC - MUSE: 477 MS
QUANTIFERON MITOGEN: 0.21 IU/ML
QUANTIFERON NIL TUBE: 0.35 IU/ML
QUANTIFERON TB1 TUBE: 0.16 IU/ML
QUANTIFERON TB2 TUBE: 0.19
R AXIS - MUSE: 30 DEGREES
RBC # BLD AUTO: 2.65 10E6/UL (ref 4.4–5.9)
RBC # BLD AUTO: 2.72 10E6/UL (ref 4.4–5.9)
RBC # BLD AUTO: 2.78 10E6/UL (ref 4.4–5.9)
RBC # BLD AUTO: 2.84 10E6/UL (ref 4.4–5.9)
RBC # BLD AUTO: 2.94 10E6/UL (ref 4.4–5.9)
RBC # BLD AUTO: 2.96 10E6/UL (ref 4.4–5.9)
RBC # BLD AUTO: 2.97 10E6/UL (ref 4.4–5.9)
RBC # BLD AUTO: 3.07 10E6/UL (ref 4.4–5.9)
RBC # BLD AUTO: 3.34 10E6/UL (ref 4.4–5.9)
RBC URINE: >182 /HPF
SODIUM SERPL-SCNC: 138 MMOL/L (ref 135–145)
SODIUM SERPL-SCNC: 139 MMOL/L (ref 135–145)
SODIUM SERPL-SCNC: 139 MMOL/L (ref 135–145)
SODIUM SERPL-SCNC: 140 MMOL/L (ref 135–145)
SODIUM SERPL-SCNC: 140 MMOL/L (ref 135–145)
SODIUM SERPL-SCNC: 144 MMOL/L (ref 135–145)
SP GR UR STRIP: 1.02 (ref 1–1.03)
SPECIMEN EXPIRATION DATE: NORMAL
SQUAMOUS EPITHELIAL: 1 /HPF
STAPHYLOCOCCUS AUREUS: NOT DETECTED
STAPHYLOCOCCUS EPIDERMIDIS: NOT DETECTED
STAPHYLOCOCCUS LUGDUNENSIS: NOT DETECTED
STAPHYLOCOCCUS SPECIES: DETECTED
STREPTOCOCCUS AGALACTIAE: NOT DETECTED
STREPTOCOCCUS ANGINOSUS GROUP: NOT DETECTED
STREPTOCOCCUS PNEUMONIAE: NOT DETECTED
STREPTOCOCCUS PYOGENES: NOT DETECTED
STREPTOCOCCUS SPECIES: NOT DETECTED
SYSTOLIC BLOOD PRESSURE - MUSE: NORMAL MMHG
T AXIS - MUSE: 27 DEGREES
TRIGL FLD-MCNC: 16 MG/DL
TRIGLYCERIDE BODY FLUID SOURCE: NORMAL
UPPER GI ENDOSCOPY: NORMAL
UROBILINOGEN UR STRIP-MCNC: <2 MG/DL
VENTRICULAR RATE- MUSE: 62 BPM
WBC # BLD AUTO: 10 10E3/UL (ref 4–11)
WBC # BLD AUTO: 10.6 10E3/UL (ref 4–11)
WBC # BLD AUTO: 12.8 10E3/UL (ref 4–11)
WBC # BLD AUTO: 6.9 10E3/UL (ref 4–11)
WBC # BLD AUTO: 7.4 10E3/UL (ref 4–11)
WBC # BLD AUTO: 7.7 10E3/UL (ref 4–11)
WBC # BLD AUTO: 7.9 10E3/UL (ref 4–11)
WBC # BLD AUTO: 9 10E3/UL (ref 4–11)
WBC # BLD AUTO: 9.1 10E3/UL (ref 4–11)
WBC # FLD AUTO: 1243 /UL
WBC # FLD AUTO: 3924 /UL
WBC URINE: 3 /HPF

## 2023-01-01 PROCEDURE — 97535 SELF CARE MNGMENT TRAINING: CPT | Mod: GP

## 2023-01-01 PROCEDURE — 250N000011 HC RX IP 250 OP 636: Performed by: INTERNAL MEDICINE

## 2023-01-01 PROCEDURE — 250N000013 HC RX MED GY IP 250 OP 250 PS 637

## 2023-01-01 PROCEDURE — 88112 CYTOPATH CELL ENHANCE TECH: CPT | Mod: 26 | Performed by: PATHOLOGY

## 2023-01-01 PROCEDURE — 250N000011 HC RX IP 250 OP 636: Mod: JZ

## 2023-01-01 PROCEDURE — 84132 ASSAY OF SERUM POTASSIUM: CPT | Performed by: STUDENT IN AN ORGANIZED HEALTH CARE EDUCATION/TRAINING PROGRAM

## 2023-01-01 PROCEDURE — G0008 ADMIN INFLUENZA VIRUS VAC: HCPCS | Mod: 59 | Performed by: FAMILY MEDICINE

## 2023-01-01 PROCEDURE — 36415 COLL VENOUS BLD VENIPUNCTURE: CPT | Performed by: STUDENT IN AN ORGANIZED HEALTH CARE EDUCATION/TRAINING PROGRAM

## 2023-01-01 PROCEDURE — 250N000011 HC RX IP 250 OP 636: Mod: JZ | Performed by: INTERNAL MEDICINE

## 2023-01-01 PROCEDURE — 90632 HEPA VACCINE ADULT IM: CPT | Performed by: FAMILY MEDICINE

## 2023-01-01 PROCEDURE — 99214 OFFICE O/P EST MOD 30 MIN: CPT | Mod: 25 | Performed by: FAMILY MEDICINE

## 2023-01-01 PROCEDURE — 87086 URINE CULTURE/COLONY COUNT: CPT

## 2023-01-01 PROCEDURE — 36415 COLL VENOUS BLD VENIPUNCTURE: CPT

## 2023-01-01 PROCEDURE — 99285 EMERGENCY DEPT VISIT HI MDM: CPT | Mod: 25

## 2023-01-01 PROCEDURE — 90472 IMMUNIZATION ADMIN EACH ADD: CPT | Performed by: FAMILY MEDICINE

## 2023-01-01 PROCEDURE — 85610 PROTHROMBIN TIME: CPT | Performed by: INTERNAL MEDICINE

## 2023-01-01 PROCEDURE — 36415 COLL VENOUS BLD VENIPUNCTURE: CPT | Performed by: INTERNAL MEDICINE

## 2023-01-01 PROCEDURE — 370N000017 HC ANESTHESIA TECHNICAL FEE, PER MIN: Performed by: INTERNAL MEDICINE

## 2023-01-01 PROCEDURE — P9047 ALBUMIN (HUMAN), 25%, 50ML: HCPCS | Performed by: INTERNAL MEDICINE

## 2023-01-01 PROCEDURE — 85027 COMPLETE CBC AUTOMATED: CPT

## 2023-01-01 PROCEDURE — 36415 COLL VENOUS BLD VENIPUNCTURE: CPT | Performed by: FAMILY MEDICINE

## 2023-01-01 PROCEDURE — 82962 GLUCOSE BLOOD TEST: CPT

## 2023-01-01 PROCEDURE — 250N000009 HC RX 250: Performed by: ANESTHESIOLOGY

## 2023-01-01 PROCEDURE — 90480 ADMN SARSCOV2 VAC 1/ONLY CMP: CPT | Performed by: FAMILY MEDICINE

## 2023-01-01 PROCEDURE — 85610 PROTHROMBIN TIME: CPT | Performed by: PHYSICIAN ASSISTANT

## 2023-01-01 PROCEDURE — 250N000013 HC RX MED GY IP 250 OP 250 PS 637: Performed by: INTERNAL MEDICINE

## 2023-01-01 PROCEDURE — 91320 SARSCV2 VAC 30MCG TRS-SUC IM: CPT | Performed by: FAMILY MEDICINE

## 2023-01-01 PROCEDURE — 36415 COLL VENOUS BLD VENIPUNCTURE: CPT | Performed by: EMERGENCY MEDICINE

## 2023-01-01 PROCEDURE — 82565 ASSAY OF CREATININE: CPT

## 2023-01-01 PROCEDURE — 86850 RBC ANTIBODY SCREEN: CPT | Performed by: EMERGENCY MEDICINE

## 2023-01-01 PROCEDURE — 81001 URINALYSIS AUTO W/SCOPE: CPT

## 2023-01-01 PROCEDURE — 93010 ELECTROCARDIOGRAM REPORT: CPT | Performed by: INTERNAL MEDICINE

## 2023-01-01 PROCEDURE — 272N000706 US PARACENTESIS WITH ALBUMIN

## 2023-01-01 PROCEDURE — 89050 BODY FLUID CELL COUNT: CPT | Performed by: INTERNAL MEDICINE

## 2023-01-01 PROCEDURE — 250N000009 HC RX 250: Performed by: RADIOLOGY

## 2023-01-01 PROCEDURE — 272N000710 US PARACENTESIS WITH ALBUMIN

## 2023-01-01 PROCEDURE — 120N000001 HC R&B MED SURG/OB

## 2023-01-01 PROCEDURE — 80053 COMPREHEN METABOLIC PANEL: CPT | Performed by: EMERGENCY MEDICINE

## 2023-01-01 PROCEDURE — C9113 INJ PANTOPRAZOLE SODIUM, VIA: HCPCS | Mod: JZ | Performed by: EMERGENCY MEDICINE

## 2023-01-01 PROCEDURE — 85027 COMPLETE CBC AUTOMATED: CPT | Performed by: INTERNAL MEDICINE

## 2023-01-01 PROCEDURE — 93005 ELECTROCARDIOGRAM TRACING: CPT | Performed by: FAMILY MEDICINE

## 2023-01-01 PROCEDURE — 85025 COMPLETE CBC W/AUTO DIFF WBC: CPT | Performed by: EMERGENCY MEDICINE

## 2023-01-01 PROCEDURE — 99231 SBSQ HOSP IP/OBS SF/LOW 25: CPT | Mod: GC | Performed by: STUDENT IN AN ORGANIZED HEALTH CARE EDUCATION/TRAINING PROGRAM

## 2023-01-01 PROCEDURE — 99232 SBSQ HOSP IP/OBS MODERATE 35: CPT | Mod: GC | Performed by: STUDENT IN AN ORGANIZED HEALTH CARE EDUCATION/TRAINING PROGRAM

## 2023-01-01 PROCEDURE — 88305 TISSUE EXAM BY PATHOLOGIST: CPT | Mod: 26 | Performed by: PATHOLOGY

## 2023-01-01 PROCEDURE — 83735 ASSAY OF MAGNESIUM: CPT

## 2023-01-01 PROCEDURE — 999N000141 HC STATISTIC PRE-PROCEDURE NURSING ASSESSMENT: Performed by: INTERNAL MEDICINE

## 2023-01-01 PROCEDURE — 272N000001 HC OR GENERAL SUPPLY STERILE: Performed by: INTERNAL MEDICINE

## 2023-01-01 PROCEDURE — 74174 CTA ABD&PLVS W/CONTRAST: CPT

## 2023-01-01 PROCEDURE — 250N000011 HC RX IP 250 OP 636: Performed by: STUDENT IN AN ORGANIZED HEALTH CARE EDUCATION/TRAINING PROGRAM

## 2023-01-01 PROCEDURE — 250N000012 HC RX MED GY IP 250 OP 636 PS 637: Performed by: INTERNAL MEDICINE

## 2023-01-01 PROCEDURE — C9113 INJ PANTOPRAZOLE SODIUM, VIA: HCPCS | Mod: JZ | Performed by: INTERNAL MEDICINE

## 2023-01-01 PROCEDURE — 250N000011 HC RX IP 250 OP 636: Mod: JZ | Performed by: EMERGENCY MEDICINE

## 2023-01-01 PROCEDURE — 97166 OT EVAL MOD COMPLEX 45 MIN: CPT | Mod: GO

## 2023-01-01 PROCEDURE — 85610 PROTHROMBIN TIME: CPT | Performed by: EMERGENCY MEDICINE

## 2023-01-01 PROCEDURE — 80053 COMPREHEN METABOLIC PANEL: CPT

## 2023-01-01 PROCEDURE — 84132 ASSAY OF SERUM POTASSIUM: CPT

## 2023-01-01 PROCEDURE — 87070 CULTURE OTHR SPECIMN AEROBIC: CPT | Performed by: PHYSICIAN ASSISTANT

## 2023-01-01 PROCEDURE — 258N000003 HC RX IP 258 OP 636: Performed by: EMERGENCY MEDICINE

## 2023-01-01 PROCEDURE — 83986 ASSAY PH BODY FLUID NOS: CPT | Performed by: INTERNAL MEDICINE

## 2023-01-01 PROCEDURE — 84132 ASSAY OF SERUM POTASSIUM: CPT | Performed by: INTERNAL MEDICINE

## 2023-01-01 PROCEDURE — 90662 IIV NO PRSV INCREASED AG IM: CPT | Performed by: FAMILY MEDICINE

## 2023-01-01 PROCEDURE — 36415 COLL VENOUS BLD VENIPUNCTURE: CPT | Performed by: PHYSICIAN ASSISTANT

## 2023-01-01 PROCEDURE — 87077 CULTURE AEROBIC IDENTIFY: CPT | Performed by: EMERGENCY MEDICINE

## 2023-01-01 PROCEDURE — 87040 BLOOD CULTURE FOR BACTERIA: CPT | Performed by: INTERNAL MEDICINE

## 2023-01-01 PROCEDURE — 85018 HEMOGLOBIN: CPT

## 2023-01-01 PROCEDURE — 97161 PT EVAL LOW COMPLEX 20 MIN: CPT | Mod: GP

## 2023-01-01 PROCEDURE — 97116 GAIT TRAINING THERAPY: CPT | Mod: GP

## 2023-01-01 PROCEDURE — 87070 CULTURE OTHR SPECIMN AEROBIC: CPT | Performed by: INTERNAL MEDICINE

## 2023-01-01 PROCEDURE — 80321 ALCOHOLS BIOMARKERS 1OR 2: CPT | Performed by: INTERNAL MEDICINE

## 2023-01-01 PROCEDURE — 99214 OFFICE O/P EST MOD 30 MIN: CPT | Performed by: FAMILY MEDICINE

## 2023-01-01 PROCEDURE — 82042 OTHER SOURCE ALBUMIN QUAN EA: CPT | Performed by: INTERNAL MEDICINE

## 2023-01-01 PROCEDURE — 71045 X-RAY EXAM CHEST 1 VIEW: CPT

## 2023-01-01 PROCEDURE — 258N000003 HC RX IP 258 OP 636: Performed by: STUDENT IN AN ORGANIZED HEALTH CARE EDUCATION/TRAINING PROGRAM

## 2023-01-01 PROCEDURE — 88305 TISSUE EXAM BY PATHOLOGIST: CPT | Mod: TC | Performed by: INTERNAL MEDICINE

## 2023-01-01 PROCEDURE — 96374 THER/PROPH/DIAG INJ IV PUSH: CPT | Mod: 59

## 2023-01-01 PROCEDURE — 87040 BLOOD CULTURE FOR BACTERIA: CPT

## 2023-01-01 PROCEDURE — 88342 IMHCHEM/IMCYTCHM 1ST ANTB: CPT | Mod: TC | Performed by: INTERNAL MEDICINE

## 2023-01-01 PROCEDURE — 83735 ASSAY OF MAGNESIUM: CPT | Performed by: INTERNAL MEDICINE

## 2023-01-01 PROCEDURE — 85027 COMPLETE CBC AUTOMATED: CPT | Performed by: FAMILY MEDICINE

## 2023-01-01 PROCEDURE — 87149 DNA/RNA DIRECT PROBE: CPT | Performed by: EMERGENCY MEDICINE

## 2023-01-01 PROCEDURE — 83880 ASSAY OF NATRIURETIC PEPTIDE: CPT

## 2023-01-01 PROCEDURE — 93306 TTE W/DOPPLER COMPLETE: CPT | Mod: 26 | Performed by: INTERNAL MEDICINE

## 2023-01-01 PROCEDURE — 85018 HEMOGLOBIN: CPT | Performed by: INTERNAL MEDICINE

## 2023-01-01 PROCEDURE — 88341 IMHCHEM/IMCYTCHM EA ADD ANTB: CPT | Mod: TC | Performed by: INTERNAL MEDICINE

## 2023-01-01 PROCEDURE — P9047 ALBUMIN (HUMAN), 25%, 50ML: HCPCS | Performed by: PHYSICIAN ASSISTANT

## 2023-01-01 PROCEDURE — 83036 HEMOGLOBIN GLYCOSYLATED A1C: CPT | Performed by: INTERNAL MEDICINE

## 2023-01-01 PROCEDURE — 87075 CULTR BACTERIA EXCEPT BLOOD: CPT | Performed by: INTERNAL MEDICINE

## 2023-01-01 PROCEDURE — 250N000013 HC RX MED GY IP 250 OP 250 PS 637: Performed by: STUDENT IN AN ORGANIZED HEALTH CARE EDUCATION/TRAINING PROGRAM

## 2023-01-01 PROCEDURE — 82374 ASSAY BLOOD CARBON DIOXIDE: CPT

## 2023-01-01 PROCEDURE — 83605 ASSAY OF LACTIC ACID: CPT | Performed by: INTERNAL MEDICINE

## 2023-01-01 PROCEDURE — 250N000011 HC RX IP 250 OP 636: Performed by: PHYSICIAN ASSISTANT

## 2023-01-01 PROCEDURE — 258N000003 HC RX IP 258 OP 636: Performed by: INTERNAL MEDICINE

## 2023-01-01 PROCEDURE — 0W9G3ZZ DRAINAGE OF PERITONEAL CAVITY, PERCUTANEOUS APPROACH: ICD-10-PCS | Performed by: RADIOLOGY

## 2023-01-01 PROCEDURE — 250N000011 HC RX IP 250 OP 636: Mod: JZ | Performed by: ANESTHESIOLOGY

## 2023-01-01 PROCEDURE — 99232 SBSQ HOSP IP/OBS MODERATE 35: CPT | Mod: GC

## 2023-01-01 PROCEDURE — 80053 COMPREHEN METABOLIC PANEL: CPT | Performed by: FAMILY MEDICINE

## 2023-01-01 PROCEDURE — 93975 VASCULAR STUDY: CPT

## 2023-01-01 PROCEDURE — 83605 ASSAY OF LACTIC ACID: CPT | Performed by: EMERGENCY MEDICINE

## 2023-01-01 PROCEDURE — 96375 TX/PRO/DX INJ NEW DRUG ADDON: CPT

## 2023-01-01 PROCEDURE — 87015 SPECIMEN INFECT AGNT CONCNTJ: CPT | Performed by: INTERNAL MEDICINE

## 2023-01-01 PROCEDURE — 88342 IMHCHEM/IMCYTCHM 1ST ANTB: CPT | Mod: 26 | Performed by: PATHOLOGY

## 2023-01-01 PROCEDURE — 82310 ASSAY OF CALCIUM: CPT

## 2023-01-01 PROCEDURE — 84157 ASSAY OF PROTEIN OTHER: CPT | Performed by: INTERNAL MEDICINE

## 2023-01-01 PROCEDURE — 85025 COMPLETE CBC W/AUTO DIFF WBC: CPT | Performed by: PHYSICIAN ASSISTANT

## 2023-01-01 PROCEDURE — 85730 THROMBOPLASTIN TIME PARTIAL: CPT

## 2023-01-01 PROCEDURE — 86481 TB AG RESPONSE T-CELL SUSP: CPT | Performed by: FAMILY MEDICINE

## 2023-01-01 PROCEDURE — 80053 COMPREHEN METABOLIC PANEL: CPT | Performed by: INTERNAL MEDICINE

## 2023-01-01 PROCEDURE — 99239 HOSP IP/OBS DSCHRG MGMT >30: CPT | Performed by: INTERNAL MEDICINE

## 2023-01-01 PROCEDURE — 96365 THER/PROPH/DIAG IV INF INIT: CPT

## 2023-01-01 PROCEDURE — 84145 PROCALCITONIN (PCT): CPT | Performed by: INTERNAL MEDICINE

## 2023-01-01 PROCEDURE — 89051 BODY FLUID CELL COUNT: CPT | Performed by: PHYSICIAN ASSISTANT

## 2023-01-01 PROCEDURE — 84478 ASSAY OF TRIGLYCERIDES: CPT | Performed by: INTERNAL MEDICINE

## 2023-01-01 PROCEDURE — 82248 BILIRUBIN DIRECT: CPT | Performed by: INTERNAL MEDICINE

## 2023-01-01 PROCEDURE — 93306 TTE W/DOPPLER COMPLETE: CPT

## 2023-01-01 PROCEDURE — 88341 IMHCHEM/IMCYTCHM EA ADD ANTB: CPT | Mod: 26 | Performed by: PATHOLOGY

## 2023-01-01 PROCEDURE — 87205 SMEAR GRAM STAIN: CPT | Performed by: INTERNAL MEDICINE

## 2023-01-01 PROCEDURE — 99223 1ST HOSP IP/OBS HIGH 75: CPT | Performed by: INTERNAL MEDICINE

## 2023-01-01 PROCEDURE — 99238 HOSP IP/OBS DSCHRG MGMT 30/<: CPT | Mod: GC | Performed by: STUDENT IN AN ORGANIZED HEALTH CARE EDUCATION/TRAINING PROGRAM

## 2023-01-01 PROCEDURE — 360N000075 HC SURGERY LEVEL 2, PER MIN: Performed by: INTERNAL MEDICINE

## 2023-01-01 PROCEDURE — 710N000012 HC RECOVERY PHASE 2, PER MINUTE: Performed by: INTERNAL MEDICINE

## 2023-01-01 PROCEDURE — 258N000003 HC RX IP 258 OP 636: Performed by: ANESTHESIOLOGY

## 2023-01-01 PROCEDURE — 99223 1ST HOSP IP/OBS HIGH 75: CPT | Mod: AI

## 2023-01-01 PROCEDURE — 87077 CULTURE AEROBIC IDENTIFY: CPT | Performed by: INTERNAL MEDICINE

## 2023-01-01 PROCEDURE — 96367 TX/PROPH/DG ADDL SEQ IV INF: CPT

## 2023-01-01 PROCEDURE — 88112 CYTOPATH CELL ENHANCE TECH: CPT | Mod: TC | Performed by: INTERNAL MEDICINE

## 2023-01-01 PROCEDURE — 97535 SELF CARE MNGMENT TRAINING: CPT | Mod: GO

## 2023-01-01 PROCEDURE — 86901 BLOOD TYPING SEROLOGIC RH(D): CPT | Performed by: EMERGENCY MEDICINE

## 2023-01-01 PROCEDURE — 80053 COMPREHEN METABOLIC PANEL: CPT | Performed by: PHYSICIAN ASSISTANT

## 2023-01-01 RX ORDER — DIPHENHYDRAMINE HYDROCHLORIDE 50 MG/ML
25-50 INJECTION INTRAMUSCULAR; INTRAVENOUS
Status: CANCELLED | OUTPATIENT
Start: 2023-01-01

## 2023-01-01 RX ORDER — ALBUMIN (HUMAN) 12.5 G/50ML
25 SOLUTION INTRAVENOUS ONCE
Qty: 100 ML | Refills: 0 | Status: COMPLETED | OUTPATIENT
Start: 2023-01-01 | End: 2023-01-01

## 2023-01-01 RX ORDER — SODIUM CHLORIDE 9 MG/ML
INJECTION, SOLUTION INTRAVENOUS CONTINUOUS
Status: DISCONTINUED | OUTPATIENT
Start: 2023-01-01 | End: 2023-01-01

## 2023-01-01 RX ORDER — LIDOCAINE HYDROCHLORIDE 10 MG/ML
10 INJECTION, SOLUTION EPIDURAL; INFILTRATION; INTRACAUDAL; PERINEURAL ONCE
Status: COMPLETED | OUTPATIENT
Start: 2023-01-01 | End: 2023-01-01

## 2023-01-01 RX ORDER — MIDODRINE HYDROCHLORIDE 2.5 MG/1
2.5 TABLET ORAL
Status: DISCONTINUED | OUTPATIENT
Start: 2023-01-01 | End: 2023-01-01 | Stop reason: HOSPADM

## 2023-01-01 RX ORDER — LEVOFLOXACIN 5 MG/ML
750 INJECTION, SOLUTION INTRAVENOUS ONCE
Status: DISCONTINUED | OUTPATIENT
Start: 2023-01-01 | End: 2023-01-01

## 2023-01-01 RX ORDER — NALOXONE HYDROCHLORIDE 0.4 MG/ML
0.4 INJECTION, SOLUTION INTRAMUSCULAR; INTRAVENOUS; SUBCUTANEOUS
Status: CANCELLED | OUTPATIENT
Start: 2023-01-01

## 2023-01-01 RX ORDER — FENTANYL CITRATE 50 UG/ML
50-100 INJECTION, SOLUTION INTRAMUSCULAR; INTRAVENOUS EVERY 5 MIN PRN
Status: CANCELLED | OUTPATIENT
Start: 2023-01-01

## 2023-01-01 RX ORDER — POTASSIUM CHLORIDE 1500 MG/1
40 TABLET, EXTENDED RELEASE ORAL ONCE
Status: COMPLETED | OUTPATIENT
Start: 2023-01-01 | End: 2023-01-01

## 2023-01-01 RX ORDER — IOPAMIDOL 755 MG/ML
75 INJECTION, SOLUTION INTRAVASCULAR ONCE
Status: COMPLETED | OUTPATIENT
Start: 2023-01-01 | End: 2023-01-01

## 2023-01-01 RX ORDER — ATROPINE SULFATE 0.1 MG/ML
1 INJECTION INTRAVENOUS
Status: CANCELLED | OUTPATIENT
Start: 2023-01-01

## 2023-01-01 RX ORDER — ALBUMIN (HUMAN) 12.5 G/50ML
12.5-75 SOLUTION INTRAVENOUS ONCE
Status: DISCONTINUED | OUTPATIENT
Start: 2023-01-01 | End: 2023-01-01 | Stop reason: HOSPADM

## 2023-01-01 RX ORDER — ALBUMIN (HUMAN) 12.5 G/50ML
25-75 SOLUTION INTRAVENOUS ONCE
Status: COMPLETED | OUTPATIENT
Start: 2023-01-01 | End: 2023-01-01

## 2023-01-01 RX ORDER — SODIUM CHLORIDE, SODIUM LACTATE, POTASSIUM CHLORIDE, CALCIUM CHLORIDE 600; 310; 30; 20 MG/100ML; MG/100ML; MG/100ML; MG/100ML
INJECTION, SOLUTION INTRAVENOUS CONTINUOUS
Status: DISCONTINUED | OUTPATIENT
Start: 2023-01-01 | End: 2023-01-01 | Stop reason: HOSPADM

## 2023-01-01 RX ORDER — ALBUMIN (HUMAN) 12.5 G/50ML
12.5-75 SOLUTION INTRAVENOUS ONCE
Status: COMPLETED | OUTPATIENT
Start: 2023-01-01 | End: 2023-01-01

## 2023-01-01 RX ORDER — LACTULOSE 10 G/15ML
30 SOLUTION ORAL DAILY
COMMUNITY
End: 2024-01-01

## 2023-01-01 RX ORDER — CEFTRIAXONE 2 G/1
2 INJECTION, POWDER, FOR SOLUTION INTRAMUSCULAR; INTRAVENOUS EVERY 24 HOURS
Status: COMPLETED | OUTPATIENT
Start: 2023-01-01 | End: 2023-01-01

## 2023-01-01 RX ORDER — SIMETHICONE 80 MG
80 TABLET,CHEWABLE ORAL EVERY 6 HOURS PRN
Status: DISCONTINUED | OUTPATIENT
Start: 2023-01-01 | End: 2023-01-01 | Stop reason: HOSPADM

## 2023-01-01 RX ORDER — SPIRONOLACTONE 25 MG/1
50 TABLET ORAL DAILY
Status: DISCONTINUED | OUTPATIENT
Start: 2023-01-01 | End: 2023-01-01 | Stop reason: HOSPADM

## 2023-01-01 RX ORDER — EPINEPHRINE 1 MG/ML
0.1 INJECTION, SOLUTION INTRAMUSCULAR; SUBCUTANEOUS
Status: CANCELLED | OUTPATIENT
Start: 2023-01-01

## 2023-01-01 RX ORDER — SIMETHICONE 40MG/0.6ML
133 SUSPENSION, DROPS(FINAL DOSAGE FORM)(ML) ORAL
Status: CANCELLED | OUTPATIENT
Start: 2023-01-01

## 2023-01-01 RX ORDER — ACETAMINOPHEN 325 MG/1
325 TABLET ORAL EVERY 4 HOURS PRN
Status: DISCONTINUED | OUTPATIENT
Start: 2023-01-01 | End: 2023-01-01 | Stop reason: HOSPADM

## 2023-01-01 RX ORDER — LIDOCAINE HYDROCHLORIDE 10 MG/ML
INJECTION, SOLUTION INFILTRATION; PERINEURAL PRN
Status: DISCONTINUED | OUTPATIENT
Start: 2023-01-01 | End: 2023-01-01

## 2023-01-01 RX ORDER — CEFTRIAXONE 2 G/1
2 INJECTION, POWDER, FOR SOLUTION INTRAMUSCULAR; INTRAVENOUS EVERY 24 HOURS
Status: DISCONTINUED | OUTPATIENT
Start: 2023-01-01 | End: 2023-01-01

## 2023-01-01 RX ORDER — ALBUMIN (HUMAN) 12.5 G/50ML
50 SOLUTION INTRAVENOUS ONCE
Status: COMPLETED | OUTPATIENT
Start: 2023-01-01 | End: 2023-01-01

## 2023-01-01 RX ORDER — CIPROFLOXACIN 500 MG/1
500 TABLET, FILM COATED ORAL DAILY
Qty: 60 TABLET | Refills: 0 | Status: SHIPPED | OUTPATIENT
Start: 2023-01-01 | End: 2024-01-01

## 2023-01-01 RX ORDER — ALBUMIN (HUMAN) 12.5 G/50ML
25-50 SOLUTION INTRAVENOUS ONCE
Status: DISCONTINUED | OUTPATIENT
Start: 2023-01-01 | End: 2023-01-01

## 2023-01-01 RX ORDER — ONDANSETRON 2 MG/ML
4 INJECTION INTRAMUSCULAR; INTRAVENOUS EVERY 6 HOURS PRN
Status: CANCELLED | OUTPATIENT
Start: 2023-01-01

## 2023-01-01 RX ORDER — CEFTRIAXONE 2 G/1
2 INJECTION, POWDER, FOR SOLUTION INTRAMUSCULAR; INTRAVENOUS EVERY 24 HOURS
Status: DISCONTINUED | OUTPATIENT
Start: 2023-01-01 | End: 2023-01-01 | Stop reason: HOSPADM

## 2023-01-01 RX ORDER — NALOXONE HYDROCHLORIDE 0.4 MG/ML
0.2 INJECTION, SOLUTION INTRAMUSCULAR; INTRAVENOUS; SUBCUTANEOUS
Status: CANCELLED | OUTPATIENT
Start: 2023-01-01

## 2023-01-01 RX ORDER — FUROSEMIDE 10 MG/ML
20 INJECTION INTRAMUSCULAR; INTRAVENOUS DAILY
Status: DISCONTINUED | OUTPATIENT
Start: 2023-01-01 | End: 2023-01-01

## 2023-01-01 RX ORDER — ALBUMIN (HUMAN) 12.5 G/50ML
12.5 SOLUTION INTRAVENOUS ONCE
Status: DISCONTINUED | OUTPATIENT
Start: 2023-01-01 | End: 2023-01-01

## 2023-01-01 RX ORDER — AMOXICILLIN 250 MG
1 CAPSULE ORAL 2 TIMES DAILY PRN
Status: DISCONTINUED | OUTPATIENT
Start: 2023-01-01 | End: 2023-01-01 | Stop reason: HOSPADM

## 2023-01-01 RX ORDER — ENOXAPARIN SODIUM 100 MG/ML
40 INJECTION SUBCUTANEOUS EVERY 24 HOURS
Status: DISCONTINUED | OUTPATIENT
Start: 2023-01-01 | End: 2023-01-01 | Stop reason: HOSPADM

## 2023-01-01 RX ORDER — RESPIRATORY SYNCYTIAL VIRUS VACCINE 120MCG/0.5
0.5 KIT INTRAMUSCULAR ONCE
Qty: 1 EACH | Refills: 0 | Status: CANCELLED | OUTPATIENT
Start: 2023-01-01 | End: 2023-01-01

## 2023-01-01 RX ORDER — MIDODRINE HYDROCHLORIDE 2.5 MG/1
2.5 TABLET ORAL
Qty: 180 TABLET | Refills: 0 | Status: SHIPPED | OUTPATIENT
Start: 2023-01-01 | End: 2024-01-01

## 2023-01-01 RX ORDER — AMOXICILLIN 250 MG
2 CAPSULE ORAL 2 TIMES DAILY PRN
Status: DISCONTINUED | OUTPATIENT
Start: 2023-01-01 | End: 2023-01-01 | Stop reason: HOSPADM

## 2023-01-01 RX ORDER — PROCHLORPERAZINE MALEATE 5 MG
5 TABLET ORAL EVERY 6 HOURS PRN
Status: CANCELLED | OUTPATIENT
Start: 2023-01-01

## 2023-01-01 RX ORDER — FUROSEMIDE 20 MG
20 TABLET ORAL DAILY
Status: DISCONTINUED | OUTPATIENT
Start: 2023-01-01 | End: 2023-01-01 | Stop reason: HOSPADM

## 2023-01-01 RX ORDER — LIDOCAINE 40 MG/G
CREAM TOPICAL
Status: DISCONTINUED | OUTPATIENT
Start: 2023-01-01 | End: 2023-01-01 | Stop reason: HOSPADM

## 2023-01-01 RX ORDER — ONDANSETRON 2 MG/ML
4 INJECTION INTRAMUSCULAR; INTRAVENOUS
Status: DISCONTINUED | OUTPATIENT
Start: 2023-01-01 | End: 2023-01-01 | Stop reason: HOSPADM

## 2023-01-01 RX ORDER — OXYCODONE HYDROCHLORIDE 5 MG/1
5 TABLET ORAL
Status: DISCONTINUED | OUTPATIENT
Start: 2023-01-01 | End: 2023-01-01 | Stop reason: HOSPADM

## 2023-01-01 RX ORDER — ALBUMIN (HUMAN) 12.5 G/50ML
77 SOLUTION INTRAVENOUS ONCE
Status: COMPLETED | OUTPATIENT
Start: 2023-01-01 | End: 2023-01-01

## 2023-01-01 RX ORDER — FLUMAZENIL 0.1 MG/ML
0.2 INJECTION, SOLUTION INTRAVENOUS
Status: CANCELLED | OUTPATIENT
Start: 2023-01-01

## 2023-01-01 RX ORDER — PANTOPRAZOLE SODIUM 40 MG/1
40 TABLET, DELAYED RELEASE ORAL AT BEDTIME
Status: DISCONTINUED | OUTPATIENT
Start: 2023-01-01 | End: 2023-01-01

## 2023-01-01 RX ORDER — FENTANYL CITRATE 50 UG/ML
25 INJECTION, SOLUTION INTRAMUSCULAR; INTRAVENOUS EVERY 5 MIN PRN
Status: DISCONTINUED | OUTPATIENT
Start: 2023-01-01 | End: 2023-01-01 | Stop reason: HOSPADM

## 2023-01-01 RX ORDER — DEXTROSE MONOHYDRATE 25 G/50ML
25-50 INJECTION, SOLUTION INTRAVENOUS
Status: DISCONTINUED | OUTPATIENT
Start: 2023-01-01 | End: 2023-01-01 | Stop reason: HOSPADM

## 2023-01-01 RX ORDER — PROPOFOL 10 MG/ML
INJECTION, EMULSION INTRAVENOUS PRN
Status: DISCONTINUED | OUTPATIENT
Start: 2023-01-01 | End: 2023-01-01

## 2023-01-01 RX ORDER — POTASSIUM CHLORIDE 1500 MG/1
20 TABLET, EXTENDED RELEASE ORAL ONCE
Status: COMPLETED | OUTPATIENT
Start: 2023-01-01 | End: 2023-01-01

## 2023-01-01 RX ORDER — ONDANSETRON 2 MG/ML
4 INJECTION INTRAMUSCULAR; INTRAVENOUS EVERY 30 MIN PRN
Status: DISCONTINUED | OUTPATIENT
Start: 2023-01-01 | End: 2023-01-01 | Stop reason: HOSPADM

## 2023-01-01 RX ORDER — ONDANSETRON 4 MG/1
4 TABLET, ORALLY DISINTEGRATING ORAL EVERY 6 HOURS PRN
Status: DISCONTINUED | OUTPATIENT
Start: 2023-01-01 | End: 2023-01-01 | Stop reason: HOSPADM

## 2023-01-01 RX ORDER — SPIRONOLACTONE 25 MG/1
25 TABLET ORAL DAILY
Status: DISCONTINUED | OUTPATIENT
Start: 2023-01-01 | End: 2023-01-01

## 2023-01-01 RX ORDER — NICOTINE POLACRILEX 4 MG
15-30 LOZENGE BUCCAL
Status: DISCONTINUED | OUTPATIENT
Start: 2023-01-01 | End: 2023-01-01 | Stop reason: HOSPADM

## 2023-01-01 RX ORDER — POTASSIUM CHLORIDE 1.5 G/1.58G
40 POWDER, FOR SOLUTION ORAL ONCE
Status: COMPLETED | OUTPATIENT
Start: 2023-01-01 | End: 2023-01-01

## 2023-01-01 RX ORDER — HYDROMORPHONE HCL IN WATER/PF 6 MG/30 ML
0.2 PATIENT CONTROLLED ANALGESIA SYRINGE INTRAVENOUS EVERY 5 MIN PRN
Status: DISCONTINUED | OUTPATIENT
Start: 2023-01-01 | End: 2023-01-01 | Stop reason: HOSPADM

## 2023-01-01 RX ORDER — OXYCODONE HYDROCHLORIDE 5 MG/1
10 TABLET ORAL
Status: DISCONTINUED | OUTPATIENT
Start: 2023-01-01 | End: 2023-01-01 | Stop reason: HOSPADM

## 2023-01-01 RX ORDER — FUROSEMIDE 20 MG
20 TABLET ORAL DAILY
Qty: 60 TABLET | Refills: 0 | Status: SHIPPED | OUTPATIENT
Start: 2023-01-01 | End: 2024-01-01

## 2023-01-01 RX ORDER — LACTULOSE 10 G/15ML
30 SOLUTION ORAL DAILY
Status: DISCONTINUED | OUTPATIENT
Start: 2023-01-01 | End: 2023-01-01 | Stop reason: HOSPADM

## 2023-01-01 RX ORDER — SPIRONOLACTONE 50 MG/1
50 TABLET, FILM COATED ORAL DAILY
Qty: 60 TABLET | Refills: 0 | Status: SHIPPED | OUTPATIENT
Start: 2023-01-01 | End: 2024-01-01

## 2023-01-01 RX ORDER — ONDANSETRON 4 MG/1
4 TABLET, ORALLY DISINTEGRATING ORAL EVERY 30 MIN PRN
Status: DISCONTINUED | OUTPATIENT
Start: 2023-01-01 | End: 2023-01-01 | Stop reason: HOSPADM

## 2023-01-01 RX ORDER — ALBUMIN (HUMAN) 12.5 G/50ML
75 SOLUTION INTRAVENOUS ONCE
Status: COMPLETED | OUTPATIENT
Start: 2023-01-01 | End: 2023-01-01

## 2023-01-01 RX ORDER — ONDANSETRON 2 MG/ML
4 INJECTION INTRAMUSCULAR; INTRAVENOUS EVERY 6 HOURS PRN
Status: DISCONTINUED | OUTPATIENT
Start: 2023-01-01 | End: 2023-01-01 | Stop reason: HOSPADM

## 2023-01-01 RX ORDER — FENTANYL CITRATE 50 UG/ML
50 INJECTION, SOLUTION INTRAMUSCULAR; INTRAVENOUS EVERY 5 MIN PRN
Status: DISCONTINUED | OUTPATIENT
Start: 2023-01-01 | End: 2023-01-01 | Stop reason: HOSPADM

## 2023-01-01 RX ORDER — FLUMAZENIL 0.1 MG/ML
0.2 INJECTION, SOLUTION INTRAVENOUS
Status: CANCELLED | OUTPATIENT
Start: 2023-01-01 | End: 2023-01-01

## 2023-01-01 RX ORDER — ALBUMIN (HUMAN) 12.5 G/50ML
77 SOLUTION INTRAVENOUS ONCE
Status: DISCONTINUED | OUTPATIENT
Start: 2023-01-01 | End: 2023-01-01

## 2023-01-01 RX ORDER — SIMVASTATIN 20 MG
20 TABLET ORAL AT BEDTIME
Status: DISCONTINUED | OUTPATIENT
Start: 2023-01-01 | End: 2023-01-01 | Stop reason: HOSPADM

## 2023-01-01 RX ORDER — PANTOPRAZOLE SODIUM 20 MG/1
20 TABLET, DELAYED RELEASE ORAL
Status: DISCONTINUED | OUTPATIENT
Start: 2023-01-01 | End: 2023-01-01 | Stop reason: HOSPADM

## 2023-01-01 RX ORDER — PROPOFOL 10 MG/ML
INJECTION, EMULSION INTRAVENOUS CONTINUOUS PRN
Status: DISCONTINUED | OUTPATIENT
Start: 2023-01-01 | End: 2023-01-01

## 2023-01-01 RX ORDER — ONDANSETRON 4 MG/1
4 TABLET, ORALLY DISINTEGRATING ORAL EVERY 6 HOURS PRN
Status: CANCELLED | OUTPATIENT
Start: 2023-01-01

## 2023-01-01 RX ORDER — HYDROMORPHONE HCL IN WATER/PF 6 MG/30 ML
0.4 PATIENT CONTROLLED ANALGESIA SYRINGE INTRAVENOUS EVERY 5 MIN PRN
Status: DISCONTINUED | OUTPATIENT
Start: 2023-01-01 | End: 2023-01-01 | Stop reason: HOSPADM

## 2023-01-01 RX ORDER — ONDANSETRON 2 MG/ML
INJECTION INTRAMUSCULAR; INTRAVENOUS PRN
Status: DISCONTINUED | OUTPATIENT
Start: 2023-01-01 | End: 2023-01-01

## 2023-01-01 RX ADMIN — CEFTRIAXONE SODIUM 2 G: 2 INJECTION, POWDER, FOR SOLUTION INTRAMUSCULAR; INTRAVENOUS at 17:42

## 2023-01-01 RX ADMIN — FUROSEMIDE 20 MG: 10 INJECTION, SOLUTION INTRAMUSCULAR; INTRAVENOUS at 08:46

## 2023-01-01 RX ADMIN — ACETAMINOPHEN 325 MG: 325 TABLET ORAL at 14:20

## 2023-01-01 RX ADMIN — PANTOPRAZOLE SODIUM 20 MG: 20 TABLET, DELAYED RELEASE ORAL at 06:41

## 2023-01-01 RX ADMIN — CARBIDOPA AND LEVODOPA 2.5 MG: 50; 200 TABLET, EXTENDED RELEASE ORAL at 12:09

## 2023-01-01 RX ADMIN — RIFAXIMIN 550 MG: 550 TABLET ORAL at 09:36

## 2023-01-01 RX ADMIN — RIFAXIMIN 550 MG: 550 TABLET ORAL at 20:05

## 2023-01-01 RX ADMIN — CARBIDOPA AND LEVODOPA 2.5 MG: 50; 200 TABLET, EXTENDED RELEASE ORAL at 12:22

## 2023-01-01 RX ADMIN — CARBIDOPA AND LEVODOPA 2.5 MG: 50; 200 TABLET, EXTENDED RELEASE ORAL at 16:57

## 2023-01-01 RX ADMIN — CEFTRIAXONE SODIUM 2 G: 2 INJECTION, POWDER, FOR SOLUTION INTRAMUSCULAR; INTRAVENOUS at 17:08

## 2023-01-01 RX ADMIN — LACTULOSE 30 G: 10 SOLUTION ORAL at 09:20

## 2023-01-01 RX ADMIN — CARBIDOPA AND LEVODOPA 2.5 MG: 50; 200 TABLET, EXTENDED RELEASE ORAL at 17:33

## 2023-01-01 RX ADMIN — SIMVASTATIN 20 MG: 20 TABLET, FILM COATED ORAL at 21:41

## 2023-01-01 RX ADMIN — CARBIDOPA AND LEVODOPA 2.5 MG: 50; 200 TABLET, EXTENDED RELEASE ORAL at 11:26

## 2023-01-01 RX ADMIN — SPIRONOLACTONE 25 MG: 25 TABLET ORAL at 08:46

## 2023-01-01 RX ADMIN — PANTOPRAZOLE SODIUM 20 MG: 20 TABLET, DELAYED RELEASE ORAL at 16:41

## 2023-01-01 RX ADMIN — LIDOCAINE HYDROCHLORIDE 10 ML: 10 INJECTION, SOLUTION EPIDURAL; INFILTRATION; INTRACAUDAL; PERINEURAL at 09:51

## 2023-01-01 RX ADMIN — SIMVASTATIN 20 MG: 20 TABLET, FILM COATED ORAL at 20:34

## 2023-01-01 RX ADMIN — PANTOPRAZOLE SODIUM 20 MG: 20 TABLET, DELAYED RELEASE ORAL at 06:34

## 2023-01-01 RX ADMIN — ENOXAPARIN SODIUM 40 MG: 100 INJECTION SUBCUTANEOUS at 14:00

## 2023-01-01 RX ADMIN — CARBIDOPA AND LEVODOPA 2.5 MG: 50; 200 TABLET, EXTENDED RELEASE ORAL at 16:41

## 2023-01-01 RX ADMIN — FUROSEMIDE 20 MG: 20 TABLET ORAL at 08:04

## 2023-01-01 RX ADMIN — SIMVASTATIN 20 MG: 20 TABLET, FILM COATED ORAL at 21:56

## 2023-01-01 RX ADMIN — LIDOCAINE HYDROCHLORIDE 10 ML: 10 INJECTION, SOLUTION EPIDURAL; INFILTRATION; INTRACAUDAL; PERINEURAL at 10:24

## 2023-01-01 RX ADMIN — PROPOFOL 20 MG: 10 INJECTION, EMULSION INTRAVENOUS at 10:53

## 2023-01-01 RX ADMIN — POTASSIUM CHLORIDE 20 MEQ: 1500 TABLET, EXTENDED RELEASE ORAL at 22:37

## 2023-01-01 RX ADMIN — POTASSIUM CHLORIDE 40 MEQ: 1500 TABLET, EXTENDED RELEASE ORAL at 08:25

## 2023-01-01 RX ADMIN — LACTULOSE 30 G: 10 SOLUTION ORAL at 09:36

## 2023-01-01 RX ADMIN — POTASSIUM CHLORIDE 40 MEQ: 1500 TABLET, EXTENDED RELEASE ORAL at 08:49

## 2023-01-01 RX ADMIN — LACTULOSE 30 G: 10 SOLUTION ORAL at 09:05

## 2023-01-01 RX ADMIN — INSULIN ASPART 1 UNITS: 100 INJECTION, SOLUTION INTRAVENOUS; SUBCUTANEOUS at 00:28

## 2023-01-01 RX ADMIN — IOPAMIDOL 75 ML: 755 INJECTION, SOLUTION INTRAVENOUS at 12:58

## 2023-01-01 RX ADMIN — PANTOPRAZOLE SODIUM 20 MG: 20 TABLET, DELAYED RELEASE ORAL at 16:57

## 2023-01-01 RX ADMIN — SIMVASTATIN 20 MG: 20 TABLET, FILM COATED ORAL at 22:00

## 2023-01-01 RX ADMIN — LACTULOSE 30 G: 10 SOLUTION ORAL at 08:11

## 2023-01-01 RX ADMIN — INSULIN ASPART 1 UNITS: 100 INJECTION, SOLUTION INTRAVENOUS; SUBCUTANEOUS at 08:17

## 2023-01-01 RX ADMIN — CARBIDOPA AND LEVODOPA 2.5 MG: 50; 200 TABLET, EXTENDED RELEASE ORAL at 14:09

## 2023-01-01 RX ADMIN — ALBUMIN HUMAN 75 G: 0.25 SOLUTION INTRAVENOUS at 11:17

## 2023-01-01 RX ADMIN — CARBIDOPA AND LEVODOPA 2.5 MG: 50; 200 TABLET, EXTENDED RELEASE ORAL at 08:08

## 2023-01-01 RX ADMIN — PANTOPRAZOLE SODIUM 40 MG: 40 INJECTION, POWDER, FOR SOLUTION INTRAVENOUS at 11:38

## 2023-01-01 RX ADMIN — CEFTRIAXONE SODIUM 2 G: 2 INJECTION, POWDER, FOR SOLUTION INTRAMUSCULAR; INTRAVENOUS at 16:27

## 2023-01-01 RX ADMIN — FUROSEMIDE 20 MG: 20 TABLET ORAL at 10:40

## 2023-01-01 RX ADMIN — SPIRONOLACTONE 25 MG: 25 TABLET ORAL at 00:06

## 2023-01-01 RX ADMIN — CARBIDOPA AND LEVODOPA 2.5 MG: 50; 200 TABLET, EXTENDED RELEASE ORAL at 16:58

## 2023-01-01 RX ADMIN — INSULIN ASPART 1 UNITS: 100 INJECTION, SOLUTION INTRAVENOUS; SUBCUTANEOUS at 11:25

## 2023-01-01 RX ADMIN — INSULIN ASPART 1 UNITS: 100 INJECTION, SOLUTION INTRAVENOUS; SUBCUTANEOUS at 17:52

## 2023-01-01 RX ADMIN — INSULIN ASPART 1 UNITS: 100 INJECTION, SOLUTION INTRAVENOUS; SUBCUTANEOUS at 04:25

## 2023-01-01 RX ADMIN — SPIRONOLACTONE 25 MG: 25 TABLET ORAL at 10:40

## 2023-01-01 RX ADMIN — ACETAMINOPHEN 325 MG: 325 TABLET ORAL at 00:36

## 2023-01-01 RX ADMIN — CARBIDOPA AND LEVODOPA 2.5 MG: 50; 200 TABLET, EXTENDED RELEASE ORAL at 08:04

## 2023-01-01 RX ADMIN — RIFAXIMIN 550 MG: 550 TABLET ORAL at 21:56

## 2023-01-01 RX ADMIN — POTASSIUM CHLORIDE 40 MEQ: 1500 TABLET, EXTENDED RELEASE ORAL at 20:37

## 2023-01-01 RX ADMIN — ACETAMINOPHEN 325 MG: 325 TABLET ORAL at 22:43

## 2023-01-01 RX ADMIN — PANTOPRAZOLE SODIUM 20 MG: 20 TABLET, DELAYED RELEASE ORAL at 17:52

## 2023-01-01 RX ADMIN — PANTOPRAZOLE SODIUM 40 MG: 40 INJECTION, POWDER, FOR SOLUTION INTRAVENOUS at 11:26

## 2023-01-01 RX ADMIN — ALBUMIN HUMAN 77 G: 0.25 SOLUTION INTRAVENOUS at 10:27

## 2023-01-01 RX ADMIN — FUROSEMIDE 20 MG: 10 INJECTION, SOLUTION INTRAMUSCULAR; INTRAVENOUS at 00:06

## 2023-01-01 RX ADMIN — ENOXAPARIN SODIUM 40 MG: 100 INJECTION SUBCUTANEOUS at 14:09

## 2023-01-01 RX ADMIN — RIFAXIMIN 550 MG: 550 TABLET ORAL at 08:03

## 2023-01-01 RX ADMIN — POTASSIUM CHLORIDE 40 MEQ: 1.5 POWDER, FOR SOLUTION ORAL at 04:42

## 2023-01-01 RX ADMIN — SODIUM CHLORIDE 1000 ML: 9 INJECTION, SOLUTION INTRAVENOUS at 15:58

## 2023-01-01 RX ADMIN — SPIRONOLACTONE 25 MG: 25 TABLET ORAL at 08:08

## 2023-01-01 RX ADMIN — FUROSEMIDE 20 MG: 20 TABLET ORAL at 09:06

## 2023-01-01 RX ADMIN — LIDOCAINE HYDROCHLORIDE 2 ML: 10 INJECTION, SOLUTION INFILTRATION; PERINEURAL at 10:50

## 2023-01-01 RX ADMIN — FUROSEMIDE 20 MG: 20 TABLET ORAL at 08:25

## 2023-01-01 RX ADMIN — CARBIDOPA AND LEVODOPA 2.5 MG: 50; 200 TABLET, EXTENDED RELEASE ORAL at 09:40

## 2023-01-01 RX ADMIN — CARBIDOPA AND LEVODOPA 2.5 MG: 50; 200 TABLET, EXTENDED RELEASE ORAL at 14:01

## 2023-01-01 RX ADMIN — CEFTRIAXONE SODIUM 2 G: 2 INJECTION, POWDER, FOR SOLUTION INTRAMUSCULAR; INTRAVENOUS at 16:41

## 2023-01-01 RX ADMIN — RIFAXIMIN 550 MG: 550 TABLET ORAL at 20:37

## 2023-01-01 RX ADMIN — RIFAXIMIN 550 MG: 550 TABLET ORAL at 20:34

## 2023-01-01 RX ADMIN — ENOXAPARIN SODIUM 40 MG: 100 INJECTION SUBCUTANEOUS at 12:09

## 2023-01-01 RX ADMIN — RIFAXIMIN 550 MG: 550 TABLET ORAL at 10:02

## 2023-01-01 RX ADMIN — ALBUMIN HUMAN 50 G: 0.25 SOLUTION INTRAVENOUS at 11:00

## 2023-01-01 RX ADMIN — FUROSEMIDE 20 MG: 10 INJECTION, SOLUTION INTRAMUSCULAR; INTRAVENOUS at 08:17

## 2023-01-01 RX ADMIN — ENOXAPARIN SODIUM 40 MG: 100 INJECTION SUBCUTANEOUS at 12:23

## 2023-01-01 RX ADMIN — PANTOPRAZOLE SODIUM 20 MG: 20 TABLET, DELAYED RELEASE ORAL at 09:36

## 2023-01-01 RX ADMIN — CEFTRIAXONE SODIUM 2 G: 2 INJECTION, POWDER, FOR SOLUTION INTRAMUSCULAR; INTRAVENOUS at 15:52

## 2023-01-01 RX ADMIN — PANTOPRAZOLE SODIUM 40 MG: 40 INJECTION, POWDER, FOR SOLUTION INTRAVENOUS at 22:37

## 2023-01-01 RX ADMIN — CEFTRIAXONE SODIUM 2 G: 2 INJECTION, POWDER, FOR SOLUTION INTRAMUSCULAR; INTRAVENOUS at 17:52

## 2023-01-01 RX ADMIN — SPIRONOLACTONE 50 MG: 25 TABLET ORAL at 08:24

## 2023-01-01 RX ADMIN — PANTOPRAZOLE SODIUM 20 MG: 20 TABLET, DELAYED RELEASE ORAL at 06:58

## 2023-01-01 RX ADMIN — CARBIDOPA AND LEVODOPA 2.5 MG: 50; 200 TABLET, EXTENDED RELEASE ORAL at 13:44

## 2023-01-01 RX ADMIN — RIFAXIMIN 550 MG: 550 TABLET ORAL at 08:08

## 2023-01-01 RX ADMIN — CARBIDOPA AND LEVODOPA 2.5 MG: 50; 200 TABLET, EXTENDED RELEASE ORAL at 17:53

## 2023-01-01 RX ADMIN — CARBIDOPA AND LEVODOPA 2.5 MG: 50; 200 TABLET, EXTENDED RELEASE ORAL at 17:44

## 2023-01-01 RX ADMIN — VANCOMYCIN HYDROCHLORIDE 1250 MG: 5 INJECTION, POWDER, LYOPHILIZED, FOR SOLUTION INTRAVENOUS at 20:34

## 2023-01-01 RX ADMIN — PANTOPRAZOLE SODIUM 20 MG: 20 TABLET, DELAYED RELEASE ORAL at 09:06

## 2023-01-01 RX ADMIN — SIMETHICONE 80 MG: 80 TABLET, CHEWABLE ORAL at 22:17

## 2023-01-01 RX ADMIN — RIFAXIMIN 550 MG: 550 TABLET ORAL at 08:25

## 2023-01-01 RX ADMIN — INSULIN ASPART 1 UNITS: 100 INJECTION, SOLUTION INTRAVENOUS; SUBCUTANEOUS at 20:41

## 2023-01-01 RX ADMIN — PANTOPRAZOLE SODIUM 20 MG: 20 TABLET, DELAYED RELEASE ORAL at 16:40

## 2023-01-01 RX ADMIN — RIFAXIMIN 550 MG: 550 TABLET ORAL at 19:43

## 2023-01-01 RX ADMIN — ACETAMINOPHEN 325 MG: 325 TABLET ORAL at 16:45

## 2023-01-01 RX ADMIN — CEFTRIAXONE SODIUM 2 G: 2 INJECTION, POWDER, FOR SOLUTION INTRAMUSCULAR; INTRAVENOUS at 17:29

## 2023-01-01 RX ADMIN — CARBIDOPA AND LEVODOPA 2.5 MG: 50; 200 TABLET, EXTENDED RELEASE ORAL at 09:06

## 2023-01-01 RX ADMIN — RIFAXIMIN 550 MG: 550 TABLET ORAL at 09:05

## 2023-01-01 RX ADMIN — CEFTRIAXONE SODIUM 2 G: 2 INJECTION, POWDER, FOR SOLUTION INTRAMUSCULAR; INTRAVENOUS at 14:53

## 2023-01-01 RX ADMIN — ALBUMIN HUMAN 50 G: 0.25 SOLUTION INTRAVENOUS at 14:05

## 2023-01-01 RX ADMIN — PANTOPRAZOLE SODIUM 20 MG: 20 TABLET, DELAYED RELEASE ORAL at 08:08

## 2023-01-01 RX ADMIN — ALBUMIN HUMAN 50 G: 0.25 SOLUTION INTRAVENOUS at 13:15

## 2023-01-01 RX ADMIN — SODIUM CHLORIDE, POTASSIUM CHLORIDE, SODIUM LACTATE AND CALCIUM CHLORIDE: 600; 310; 30; 20 INJECTION, SOLUTION INTRAVENOUS at 10:09

## 2023-01-01 RX ADMIN — LACTULOSE 30 G: 10 SOLUTION ORAL at 08:49

## 2023-01-01 RX ADMIN — RIFAXIMIN 550 MG: 550 TABLET ORAL at 08:16

## 2023-01-01 RX ADMIN — SPIRONOLACTONE 50 MG: 25 TABLET ORAL at 09:06

## 2023-01-01 RX ADMIN — LEVOFLOXACIN 750 MG: 750 INJECTION, SOLUTION INTRAVENOUS at 15:58

## 2023-01-01 RX ADMIN — PROPOFOL 200 MCG/KG/MIN: 10 INJECTION, EMULSION INTRAVENOUS at 10:50

## 2023-01-01 RX ADMIN — SIMETHICONE 80 MG: 80 TABLET, CHEWABLE ORAL at 08:46

## 2023-01-01 RX ADMIN — SODIUM CHLORIDE: 9 INJECTION, SOLUTION INTRAVENOUS at 06:06

## 2023-01-01 RX ADMIN — CARBIDOPA AND LEVODOPA 2.5 MG: 50; 200 TABLET, EXTENDED RELEASE ORAL at 08:16

## 2023-01-01 RX ADMIN — SODIUM CHLORIDE: 9 INJECTION, SOLUTION INTRAVENOUS at 17:35

## 2023-01-01 RX ADMIN — RIFAXIMIN 550 MG: 550 TABLET ORAL at 20:53

## 2023-01-01 RX ADMIN — SIMVASTATIN 20 MG: 20 TABLET, FILM COATED ORAL at 20:53

## 2023-01-01 RX ADMIN — SPIRONOLACTONE 25 MG: 25 TABLET ORAL at 08:04

## 2023-01-01 RX ADMIN — RIFAXIMIN 550 MG: 550 TABLET ORAL at 19:33

## 2023-01-01 RX ADMIN — PROPOFOL 40 MG: 10 INJECTION, EMULSION INTRAVENOUS at 10:50

## 2023-01-01 RX ADMIN — CEFTRIAXONE SODIUM 2 G: 2 INJECTION, POWDER, FOR SOLUTION INTRAMUSCULAR; INTRAVENOUS at 16:57

## 2023-01-01 RX ADMIN — CARBIDOPA AND LEVODOPA 2.5 MG: 50; 200 TABLET, EXTENDED RELEASE ORAL at 08:25

## 2023-01-01 RX ADMIN — ENOXAPARIN SODIUM 40 MG: 100 INJECTION SUBCUTANEOUS at 13:44

## 2023-01-01 RX ADMIN — ALBUMIN HUMAN 25 G: 0.25 SOLUTION INTRAVENOUS at 11:26

## 2023-01-01 RX ADMIN — PANTOPRAZOLE SODIUM 20 MG: 20 TABLET, DELAYED RELEASE ORAL at 16:58

## 2023-01-01 RX ADMIN — ALBUMIN HUMAN 75 G: 0.25 SOLUTION INTRAVENOUS at 09:50

## 2023-01-01 RX ADMIN — ACETAMINOPHEN 325 MG: 325 TABLET ORAL at 21:02

## 2023-01-01 RX ADMIN — POTASSIUM CHLORIDE 40 MEQ: 1500 TABLET, EXTENDED RELEASE ORAL at 09:36

## 2023-01-01 RX ADMIN — ONDANSETRON 4 MG: 2 INJECTION INTRAMUSCULAR; INTRAVENOUS at 10:49

## 2023-01-01 RX ADMIN — LACTULOSE 30 G: 10 SOLUTION ORAL at 08:47

## 2023-01-01 RX ADMIN — ENOXAPARIN SODIUM 40 MG: 100 INJECTION SUBCUTANEOUS at 12:42

## 2023-01-01 ASSESSMENT — ACTIVITIES OF DAILY LIVING (ADL)
ADLS_ACUITY_SCORE: 38
ADLS_ACUITY_SCORE: 35
ADLS_ACUITY_SCORE: 39
DEPENDENT_IADLS:: CLEANING;COOKING;LAUNDRY;SHOPPING;MEAL PREPARATION;MEDICATION MANAGEMENT;TRANSPORTATION
ADLS_ACUITY_SCORE: 38
ADLS_ACUITY_SCORE: 40
ADLS_ACUITY_SCORE: 42
ADLS_ACUITY_SCORE: 37
ADLS_ACUITY_SCORE: 37
ADLS_ACUITY_SCORE: 40
DEPENDENT_IADLS:: CLEANING;COOKING;LAUNDRY;SHOPPING;MEAL PREPARATION;MEDICATION MANAGEMENT;TRANSPORTATION
ADLS_ACUITY_SCORE: 38
ADLS_ACUITY_SCORE: 37
ADLS_ACUITY_SCORE: 44
ADLS_ACUITY_SCORE: 37
ADLS_ACUITY_SCORE: 38
ADLS_ACUITY_SCORE: 35
ADLS_ACUITY_SCORE: 37
ADLS_ACUITY_SCORE: 38
ADLS_ACUITY_SCORE: 44
ADLS_ACUITY_SCORE: 35
ADLS_ACUITY_SCORE: 38
ADLS_ACUITY_SCORE: 42
ADLS_ACUITY_SCORE: 38
ADLS_ACUITY_SCORE: 38
ADLS_ACUITY_SCORE: 42
ADLS_ACUITY_SCORE: 38
ADLS_ACUITY_SCORE: 36
ADLS_ACUITY_SCORE: 37
ADLS_ACUITY_SCORE: 38
ADLS_ACUITY_SCORE: 36
ADLS_ACUITY_SCORE: 35
ADLS_ACUITY_SCORE: 38
ADLS_ACUITY_SCORE: 35
ADLS_ACUITY_SCORE: 38
ADLS_ACUITY_SCORE: 39
ADLS_ACUITY_SCORE: 40
ADLS_ACUITY_SCORE: 37
ADLS_ACUITY_SCORE: 35
ADLS_ACUITY_SCORE: 38
ADLS_ACUITY_SCORE: 36
ADLS_ACUITY_SCORE: 38
ADLS_ACUITY_SCORE: 41
ADLS_ACUITY_SCORE: 38
ADLS_ACUITY_SCORE: 40
ADLS_ACUITY_SCORE: 38
ADLS_ACUITY_SCORE: 35
ADLS_ACUITY_SCORE: 38
ADLS_ACUITY_SCORE: 40
ADLS_ACUITY_SCORE: 38
ADLS_ACUITY_SCORE: 35
ADLS_ACUITY_SCORE: 38
ADLS_ACUITY_SCORE: 40
ADLS_ACUITY_SCORE: 37
ADLS_ACUITY_SCORE: 41
ADLS_ACUITY_SCORE: 41
ADLS_ACUITY_SCORE: 33
ADLS_ACUITY_SCORE: 37
ADLS_ACUITY_SCORE: 38
ADLS_ACUITY_SCORE: 39
ADLS_ACUITY_SCORE: 38
ADLS_ACUITY_SCORE: 39
ADLS_ACUITY_SCORE: 38
ADLS_ACUITY_SCORE: 38
ADLS_ACUITY_SCORE: 35
ADLS_ACUITY_SCORE: 38
ADLS_ACUITY_SCORE: 37
ADLS_ACUITY_SCORE: 38
ADLS_ACUITY_SCORE: 37
ADLS_ACUITY_SCORE: 40
ADLS_ACUITY_SCORE: 35
ADLS_ACUITY_SCORE: 38

## 2023-01-01 ASSESSMENT — ENCOUNTER SYMPTOMS
ABDOMINAL DISTENTION: 1
DYSURIA: 0
BLOOD IN STOOL: 1
DIZZINESS: 0
DIARRHEA: 1
WEAKNESS: 1
APPETITE CHANGE: 1
LIGHT-HEADEDNESS: 0
FEVER: 0
CHILLS: 1
ABDOMINAL PAIN: 1

## 2023-01-04 ENCOUNTER — TRANSFERRED RECORDS (OUTPATIENT)
Dept: HEALTH INFORMATION MANAGEMENT | Facility: CLINIC | Age: 73
End: 2023-01-04
Payer: COMMERCIAL

## 2023-01-04 LAB
ALT SERPL-CCNC: 22 IU/L (ref 0–44)
AST SERPL-CCNC: 41 IU/L (ref 0–40)
CREATININE (EXTERNAL): 1.29 MG/DL (ref 0.76–1.27)
GFR ESTIMATED (EXTERNAL): 59 ML/MIN/1.73
GLUCOSE (EXTERNAL): 115 MG/DL (ref 70–99)
INR (EXTERNAL): 1.3 (ref 0.9–1.2)
POTASSIUM (EXTERNAL): 4.3 MMOL/L (ref 3.5–5.2)

## 2023-01-09 DIAGNOSIS — R26.89 IMPAIRED GAIT AND MOBILITY: Primary | ICD-10-CM

## 2023-01-09 DIAGNOSIS — K76.82 HEPATIC ENCEPHALOPATHY (H): ICD-10-CM

## 2023-01-17 ENCOUNTER — TELEPHONE (OUTPATIENT)
Dept: FAMILY MEDICINE | Facility: CLINIC | Age: 73
End: 2023-01-17
Payer: COMMERCIAL

## 2023-01-17 NOTE — TELEPHONE ENCOUNTER
Wife, Naomi called to report insomnia and confusion symptoms.  Patient went to [facility] on January 6-8 for same issue.  Unable to view treatment plan from [facility].  Family would like patient to see Dr. Mejia only if able to address symptoms.  Patient sleeps for most of the day and stays awake at night.  Family is wondering if provider has any treatment recommendation for insomnia.  RN advised family to keep patient occupy with activities and minimizes his sleep daylight so patient can sleep better at night.  Family reported they have tried but not effective.  Per chart review, patient hasn't been seen by pcp since 4/2022.  A future appointment scheduled with pcp and confirmed with family for 1/23/2023.  RN advised family to call back or take patient to urgent care should new or worsening symptoms developed.  Family understood recommendation.     Will route encounter to provider for review and treatment recommendation if applicable.     RAYMOND VelascoN, RN  [facility]

## 2023-01-17 NOTE — TELEPHONE ENCOUNTER
Attempted [phone] is not wife's phone #.   Called mobile and [facility] for return call.

## 2023-01-19 NOTE — TELEPHONE ENCOUNTER
Wife returns call. She miss 's call on 1/17.     Writer spoke to Dr. Mejia and he says patient has an appt on 01/23. If patient has needs something urgent, to let him know otherwise wait until appt.      Writer relayed message to caller. Caller states they can wait for appointment on Monday and has no further questions.

## 2023-01-23 ENCOUNTER — OFFICE VISIT (OUTPATIENT)
Dept: FAMILY MEDICINE | Facility: CLINIC | Age: 73
End: 2023-01-23
Payer: COMMERCIAL

## 2023-01-23 ENCOUNTER — HOSPITAL ENCOUNTER (INPATIENT)
Facility: HOSPITAL | Age: 73
LOS: 4 days | Discharge: HOME | DRG: 987 | End: 2023-01-27
Attending: EMERGENCY MEDICINE | Admitting: HOSPITALIST
Payer: COMMERCIAL

## 2023-01-23 VITALS
HEART RATE: 81 BPM | SYSTOLIC BLOOD PRESSURE: 124 MMHG | TEMPERATURE: 96.7 F | OXYGEN SATURATION: 98 % | DIASTOLIC BLOOD PRESSURE: 66 MMHG

## 2023-01-23 DIAGNOSIS — K76.82 HEPATIC ENCEPHALOPATHY (H): ICD-10-CM

## 2023-01-23 DIAGNOSIS — G47.00 INSOMNIA, UNSPECIFIED TYPE: ICD-10-CM

## 2023-01-23 DIAGNOSIS — K76.6 PORTAL HYPERTENSION (H): ICD-10-CM

## 2023-01-23 DIAGNOSIS — N18.31 CHRONIC KIDNEY DISEASE, STAGE 3A (H): ICD-10-CM

## 2023-01-23 DIAGNOSIS — K74.69 OTHER CIRRHOSIS OF LIVER (H): ICD-10-CM

## 2023-01-23 DIAGNOSIS — R80.9 TYPE 2 DIABETES MELLITUS WITH MICROALBUMINURIA, WITHOUT LONG-TERM CURRENT USE OF INSULIN (H): ICD-10-CM

## 2023-01-23 DIAGNOSIS — E11.29 TYPE 2 DIABETES MELLITUS WITH MICROALBUMINURIA, WITHOUT LONG-TERM CURRENT USE OF INSULIN (H): ICD-10-CM

## 2023-01-23 DIAGNOSIS — K70.31 ALCOHOLIC CIRRHOSIS OF LIVER WITH ASCITES (H): Primary | ICD-10-CM

## 2023-01-23 DIAGNOSIS — K76.82 HEPATIC ENCEPHALOPATHY (H): Primary | ICD-10-CM

## 2023-01-23 LAB
ALBUMIN SERPL BCG-MCNC: 3.6 G/DL (ref 3.5–5.2)
ALP SERPL-CCNC: 183 U/L (ref 40–129)
ALT SERPL W P-5'-P-CCNC: 35 U/L (ref 10–50)
AMMONIA PLAS-SCNC: 135 UMOL/L (ref 16–60)
ANION GAP SERPL CALCULATED.3IONS-SCNC: 13 MMOL/L (ref 7–15)
AST SERPL W P-5'-P-CCNC: 51 U/L (ref 10–50)
BASOPHILS # BLD AUTO: 0.1 10E3/UL (ref 0–0.2)
BASOPHILS NFR BLD AUTO: 1 %
BILIRUB SERPL-MCNC: 1.8 MG/DL
BUN SERPL-MCNC: 22.9 MG/DL (ref 8–23)
CALCIUM SERPL-MCNC: 9.9 MG/DL (ref 8.8–10.2)
CHLORIDE SERPL-SCNC: 105 MMOL/L (ref 98–107)
CREAT SERPL-MCNC: 1.47 MG/DL (ref 0.67–1.17)
DEPRECATED HCO3 PLAS-SCNC: 18 MMOL/L (ref 22–29)
EOSINOPHIL # BLD AUTO: 0.1 10E3/UL (ref 0–0.7)
EOSINOPHIL NFR BLD AUTO: 2 %
ERYTHROCYTE [DISTWIDTH] IN BLOOD BY AUTOMATED COUNT: 18.4 % (ref 10–15)
GFR SERPL CREATININE-BSD FRML MDRD: 51 ML/MIN/1.73M2
GLUCOSE SERPL-MCNC: 197 MG/DL (ref 70–99)
HCT VFR BLD AUTO: 37.3 % (ref 40–53)
HGB BLD-MCNC: 12.1 G/DL (ref 13.3–17.7)
HOLD SPECIMEN: NORMAL
IMM GRANULOCYTES # BLD: 0 10E3/UL
IMM GRANULOCYTES NFR BLD: 0 %
INR PPP: 1.35 (ref 0.85–1.15)
LIPASE SERPL-CCNC: 156 U/L (ref 13–60)
LYMPHOCYTES # BLD AUTO: 0.8 10E3/UL (ref 0.8–5.3)
LYMPHOCYTES NFR BLD AUTO: 13 %
MCH RBC QN AUTO: 26 PG (ref 26.5–33)
MCHC RBC AUTO-ENTMCNC: 32.4 G/DL (ref 31.5–36.5)
MCV RBC AUTO: 80 FL (ref 78–100)
MONOCYTES # BLD AUTO: 0.6 10E3/UL (ref 0–1.3)
MONOCYTES NFR BLD AUTO: 11 %
NEUTROPHILS # BLD AUTO: 4.1 10E3/UL (ref 1.6–8.3)
NEUTROPHILS NFR BLD AUTO: 73 %
NRBC # BLD AUTO: 0 10E3/UL
NRBC BLD AUTO-RTO: 0 /100
PLATELET # BLD AUTO: 158 10E3/UL (ref 150–450)
POTASSIUM SERPL-SCNC: 4.7 MMOL/L (ref 3.4–5.3)
PROT SERPL-MCNC: 7.8 G/DL (ref 6.4–8.3)
RBC # BLD AUTO: 4.65 10E6/UL (ref 4.4–5.9)
SODIUM SERPL-SCNC: 136 MMOL/L (ref 136–145)
WBC # BLD AUTO: 5.6 10E3/UL (ref 4–11)

## 2023-01-23 PROCEDURE — 36415 COLL VENOUS BLD VENIPUNCTURE: CPT | Performed by: EMERGENCY MEDICINE

## 2023-01-23 PROCEDURE — 99285 EMERGENCY DEPT VISIT HI MDM: CPT | Mod: 25

## 2023-01-23 PROCEDURE — 80053 COMPREHEN METABOLIC PANEL: CPT | Performed by: EMERGENCY MEDICINE

## 2023-01-23 PROCEDURE — 82140 ASSAY OF AMMONIA: CPT | Performed by: EMERGENCY MEDICINE

## 2023-01-23 PROCEDURE — 85025 COMPLETE CBC W/AUTO DIFF WBC: CPT | Performed by: EMERGENCY MEDICINE

## 2023-01-23 PROCEDURE — 83690 ASSAY OF LIPASE: CPT | Performed by: EMERGENCY MEDICINE

## 2023-01-23 PROCEDURE — 96360 HYDRATION IV INFUSION INIT: CPT

## 2023-01-23 PROCEDURE — 258N000003 HC RX IP 258 OP 636: Performed by: EMERGENCY MEDICINE

## 2023-01-23 PROCEDURE — 99223 1ST HOSP IP/OBS HIGH 75: CPT | Performed by: HOSPITALIST

## 2023-01-23 PROCEDURE — 99215 OFFICE O/P EST HI 40 MIN: CPT | Performed by: FAMILY MEDICINE

## 2023-01-23 PROCEDURE — 120N000001 HC R&B MED SURG/OB

## 2023-01-23 PROCEDURE — 85610 PROTHROMBIN TIME: CPT | Performed by: EMERGENCY MEDICINE

## 2023-01-23 PROCEDURE — C9803 HOPD COVID-19 SPEC COLLECT: HCPCS

## 2023-01-23 PROCEDURE — 250N000013 HC RX MED GY IP 250 OP 250 PS 637: Performed by: EMERGENCY MEDICINE

## 2023-01-23 RX ORDER — LACTULOSE 10 G/15ML
20 SOLUTION ORAL ONCE
Status: COMPLETED | OUTPATIENT
Start: 2023-01-23 | End: 2023-01-23

## 2023-01-23 RX ADMIN — LACTULOSE 20 G: 10 SOLUTION ORAL at 19:45

## 2023-01-23 RX ADMIN — SODIUM CHLORIDE 500 ML: 9 INJECTION, SOLUTION INTRAVENOUS at 19:53

## 2023-01-23 ASSESSMENT — ACTIVITIES OF DAILY LIVING (ADL)
ADLS_ACUITY_SCORE: 35

## 2023-01-23 NOTE — ED NOTES
Expected Patient Referral to ED  4:50 PM    Referring Clinic/Provider:  [facility]    Reason for referral/Clinical facts:  End stage liver disease, encephalopathic.  Needs to be admitted, no beds in community per doctor    Recommendations provided:  Send to ED for further evaluation    Caller was informed that this institution does possess the capabilities and/or resources to provide for patient and should be transferred to our facility.    Discussed that if direct admit is sought and any hurdles are encountered, this ED would be happy to see the patient and evaluate.    Informed caller that recommendations provided are recommendations based only on the facts provided and that they responsible to accept or reject the advice, or to seek a formal in person consultation as needed and that this ED will see/treat patient should they arrive.      Scotty Zheng MD  [facility] EMERGENCY DEPARTMENT  [address] [place] [id number]  [phone]       Scotty Zheng MD  01/23/23 7238

## 2023-01-23 NOTE — ED NOTES
"ED Provider In Triage Note  [facility]  Encounter Date: Jan 23, 2023    Chief Complaint   Patient presents with     Altered Mental Status       Brief HPI:   Vito Sy is a 72 year old male presenting to the Emergency Department with a chief complaint of confusion. Pt has alcoholic cirrhosis on lactulose, spironolactone. Family took him to PCP clinic today and was sent here for admission for hepatic encephalopathy. Family notes patient becoming progressively more weak and unable to to ADLs.    Brief Physical Exam:  Ht 1.676 m (5' 6\")   Wt 76.7 kg (169 lb)   BMI 27.28 kg/m    General: Non-toxic appearing, generally weak, needs full assist to remove jacket while in wheelchair  HEENT: Atraumatic  Resp: No respiratory distress  Abdomen: Non-peritoneal  Neuro: fatigued, falling asleep, encephalopathic  Psych: Behavior appropriate      Plan Initiated in Triage:  Orders Placed This Encounter     Comprehensive metabolic panel     Lipase     Ammonia       PIT Dispo:   Place patient in the next available ED bed    Brittany Mccartney MD on 1/23/2023 at 5:34 PM    Patient was evaluated by the Physician in Triage due to a limitation of available rooms in the Emergency Department. A plan of care was discussed based on the information obtained on the initial evaluation and patient was consuled to return back to the Emergency Department lobby after this initial evalutaiton until results were obtained or a room became available in the Emergency Department. Patient was counseled not to leave prior to receiving the results of their workup.     Brittany Mccartney MD  [facility] EMERGENCY DEPARTMENT  [address] [place] [id number]  [phone]     Brittany Mccartney MD  01/23/23 1736    "

## 2023-01-23 NOTE — PROGRESS NOTES
ASSESMENT AND PLAN:  Diagnoses and all orders for this visit:  Hepatic encephalopathy, severe  Worse since his TIPS procedure.  Failure to respond to outpatient treatment with lactulose 6 times per day and rifaximin.  Given the progressive worsening and the severity and the episode of vomiting today I think he needs admission.  Patient's family agreeable and I called around to look for any open beds but none are available at our 3 usual hospitals that I would use for admission.  Therefore, he is going to go to [facility].  I called up and discussed the case with the ER physician.  Other cirrhosis of liver (H) with ascites  Plan as detailed above.  Patient likely needs admission to the hospital followed by hepatology consultation.  Extensive counseling done today with the patient and his family although the patient's mental status does not allow comprehension at this point.  It is possible that the only long-term intervention available would be liver transplantation and I am not sure if he would be a transplant candidate, although would be great if he could get a transplant given that he is only 72 years old and otherwise has excellent health and cognition.  Family will discuss this specifically with the consulting hepatologist.  In the past his main hepatologist has been Dr. Montenegro at [facility].  Portal hypertension (H)  Status post TIPS procedure.  Plan as above.  Type 2 diabetes mellitus with microalbuminuria, without long-term current use of insulin (H)  Stable.  Chronic kidney disease, stage 3a (H)  We will need labs to check for progression, this will be done as part of his ER evaluation.  Insomnia, unspecified type  Counseling done with the patient and family on options.  Could consider adding hydroxyzine at bedtime at 25 mg dose but we will see what the hospital evaluation and treatment plan entails.    Reviewed the risks and benefits of the treatment plan with the patient and/or caregiver and we discussed  indications for routine and emergent follow-up.    55 minutes spent on the date of the encounter doing chart review, patient visit and documentation and face to face counseling on above.      SUBJECTIVE: 72-year-old male in with his son and wife.  Patient was hospitalized at [facility] for hepatic encephalopathy admitted January 6 and discharged January 8.  We were able to review that discharge summary in care everywhere.  Unfortunately, since discharge, he has not had any improvement in his confusion, in fact it has worsened.  Over the last couple of weeks he has been minimally able to communicate verbally and only rarely has his eyes open and looking about, most of the time he is mumbling incoherently and very somnolent.  He does not seem to be in pain.  No fevers.  He is taking his medications and taking oral liquids, has been taking lactulose 6 times per day as prescribed but is only getting about 1 bowel movement per day.  Bowel movement is large.  1 episode of vomiting today.  It has been a struggle for the family taking care of him, he is currently dependent in all of his ADLs, he is not sleeping well at night and is getting very confused, asking to talk to people who are no longer living.    Past Medical History:   Diagnosis Date     Diabetes mellitus (H)      Gout      Hypertension      Kidney stone      Patient Active Problem List   Diagnosis     Anemia     Hematuria     Hypercholesterolemia     Hypertension     Type 2 diabetes mellitus with microalbuminuria, without long-term current use of insulin (H)     Nephrolithiasis     Benign Adenomatous Polyp Of The Large Intestine     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     GI bleeding     Anemia due to blood loss, acute     Hypotension due to blood loss     Hyperkalemia     Gastrointestinal hemorrhage associated with acute gastritis     Popliteal cyst, left     Popliteal cyst, right     Incontinence of feces with fecal urgency      Other cirrhosis of liver (H) with ascites     Chronic kidney disease, stage 3a (H)     Portal hypertension (H)     Alcoholic cirrhosis of liver with ascites (H)     Cirrhosis of liver (H)     Hepatic encephalopathy     Pleural effusion     Anemia, unspecified type     Current Outpatient Medications   Medication Sig Dispense Refill     lactulose (CHRONULAC) 10 GM/15ML solution Take 30 mLs (20 g) by mouth 6 times daily 946 mL 4     omeprazole (PRILOSEC) 20 MG DR capsule TAKE 1 CAPSULE BY MOUTH TWICE DAILY BEFORE MEAL(S) 180 capsule 3     rifaximin (XIFAXAN) 550 MG TABS tablet Take 1 tablet (550 mg) by mouth 2 times daily 60 tablet 4     simvastatin (ZOCOR) 20 MG tablet Take 1 tablet (20 mg) by mouth At Bedtime 90 tablet 2     spironolactone (ALDACTONE) 25 MG tablet Take 1 tablet (25 mg) by mouth daily 30 tablet 4     acetaminophen (TYLENOL) 500 MG tablet Take 500-1,000 mg by mouth every 6 hours as needed for mild pain       Aromatic Inhalants (RA MENTHOL NASAL INHALER) INHA Spray 1 Inhalation in nostril as needed       bismuth subsalicylate (PEPTO BISMOL) 262 MG chewable tablet Take 1 tablet by mouth every 6 hours as needed for diarrhea       calcium carbonate (TUMS) 500 MG chewable tablet Take 1-2 chew tab by mouth 3 times daily as needed for heartburn       hypromellose (ARTIFICIAL TEARS) 0.5 % SOLN ophthalmic solution Place 1 drop into both eyes 4 times daily as needed for dry eyes       History   Smoking Status     Never   Smokeless Tobacco     Never       OBJECTICE: /66 (BP Location: Left arm)   Pulse 81   Temp (!) 96.7  F (35.9  C) (Axillary)   SpO2 98%      No results found for this or any previous visit (from the past 24 hour(s)).     GEN-somnolent, not able to meaningfully communicate verbally, in no acute distress   CV-regular rate and rhythm   RESP-lungs clear   ABDOMINAL-soft, no ascites visible or palpable, some possible discomfort on abdominal exam as he does groan when I palpate more deeply  on his abdominal exam.   EXTREM-no ankle edema      Amrik Mejia MD

## 2023-01-23 NOTE — ED TRIAGE NOTES
Patient comes in with wife, who interpreting for the patient. Patient has liver failure, and has seen an increase in confusion and weakness since December. Patient barely lifts arms in triage.      Triage Assessment     Row Name 01/23/23 173       Triage Assessment (Adult)    Airway WDL WDL

## 2023-01-24 ENCOUNTER — APPOINTMENT (OUTPATIENT)
Dept: PHYSICAL THERAPY | Facility: HOSPITAL | Age: 73
DRG: 987 | End: 2023-01-24
Attending: HOSPITALIST
Payer: COMMERCIAL

## 2023-01-24 ENCOUNTER — APPOINTMENT (OUTPATIENT)
Dept: OCCUPATIONAL THERAPY | Facility: HOSPITAL | Age: 73
DRG: 987 | End: 2023-01-24
Attending: HOSPITALIST
Payer: COMMERCIAL

## 2023-01-24 ENCOUNTER — APPOINTMENT (OUTPATIENT)
Dept: RADIOLOGY | Facility: HOSPITAL | Age: 73
DRG: 987 | End: 2023-01-24
Attending: HOSPITALIST
Payer: COMMERCIAL

## 2023-01-24 LAB
ALBUMIN SERPL BCG-MCNC: 3.3 G/DL (ref 3.5–5.2)
ALBUMIN UR-MCNC: 10 MG/DL
ALP SERPL-CCNC: 157 U/L (ref 40–129)
ALT SERPL W P-5'-P-CCNC: 29 U/L (ref 10–50)
AMMONIA PLAS-SCNC: 63 UMOL/L (ref 16–60)
ANION GAP SERPL CALCULATED.3IONS-SCNC: 11 MMOL/L (ref 7–15)
APPEARANCE UR: CLEAR
AST SERPL W P-5'-P-CCNC: 40 U/L (ref 10–50)
BACTERIA #/AREA URNS HPF: ABNORMAL /HPF
BILIRUB SERPL-MCNC: 1.7 MG/DL
BILIRUB UR QL STRIP: NEGATIVE
BUN SERPL-MCNC: 23 MG/DL (ref 8–23)
CALCIUM SERPL-MCNC: 9.2 MG/DL (ref 8.8–10.2)
CHLORIDE SERPL-SCNC: 109 MMOL/L (ref 98–107)
COLOR UR AUTO: YELLOW
CREAT SERPL-MCNC: 1.46 MG/DL (ref 0.67–1.17)
DEPRECATED HCO3 PLAS-SCNC: 20 MMOL/L (ref 22–29)
ERYTHROCYTE [DISTWIDTH] IN BLOOD BY AUTOMATED COUNT: 18.4 % (ref 10–15)
FLUAV RNA SPEC QL NAA+PROBE: NEGATIVE
FLUBV RNA RESP QL NAA+PROBE: NEGATIVE
FOLATE SERPL-MCNC: 19.8 NG/ML (ref 4.6–34.8)
GFR SERPL CREATININE-BSD FRML MDRD: 51 ML/MIN/1.73M2
GLUCOSE BLDC GLUCOMTR-MCNC: 145 MG/DL (ref 70–99)
GLUCOSE SERPL-MCNC: 119 MG/DL (ref 70–99)
GLUCOSE UR STRIP-MCNC: NEGATIVE MG/DL
HCT VFR BLD AUTO: 33.8 % (ref 40–53)
HGB BLD-MCNC: 10.6 G/DL (ref 13.3–17.7)
HGB UR QL STRIP: ABNORMAL
KETONES UR STRIP-MCNC: NEGATIVE MG/DL
LACTATE SERPL-SCNC: 2 MMOL/L (ref 0.7–2)
LACTATE SERPL-SCNC: 2.3 MMOL/L (ref 0.7–2)
LEUKOCYTE ESTERASE UR QL STRIP: ABNORMAL
MAGNESIUM SERPL-MCNC: 2.1 MG/DL (ref 1.7–2.3)
MCH RBC QN AUTO: 25.6 PG (ref 26.5–33)
MCHC RBC AUTO-ENTMCNC: 31.4 G/DL (ref 31.5–36.5)
MCV RBC AUTO: 82 FL (ref 78–100)
NITRATE UR QL: NEGATIVE
PH UR STRIP: 6 [PH] (ref 5–7)
PLATELET # BLD AUTO: 130 10E3/UL (ref 150–450)
POTASSIUM SERPL-SCNC: 4.4 MMOL/L (ref 3.4–5.3)
PROT SERPL-MCNC: 7 G/DL (ref 6.4–8.3)
RBC # BLD AUTO: 4.14 10E6/UL (ref 4.4–5.9)
RBC URINE: >182 /HPF
RSV RNA SPEC NAA+PROBE: NEGATIVE
SARS-COV-2 RNA RESP QL NAA+PROBE: NEGATIVE
SODIUM SERPL-SCNC: 140 MMOL/L (ref 136–145)
SP GR UR STRIP: 1.02 (ref 1–1.03)
TSH SERPL DL<=0.005 MIU/L-ACNC: 0.92 UIU/ML (ref 0.3–4.2)
UROBILINOGEN UR STRIP-MCNC: <2 MG/DL
VIT B12 SERPL-MCNC: 1689 PG/ML (ref 232–1245)
WBC # BLD AUTO: 5.5 10E3/UL (ref 4–11)
WBC URINE: 25 /HPF

## 2023-01-24 PROCEDURE — 250N000013 HC RX MED GY IP 250 OP 250 PS 637: Performed by: INTERNAL MEDICINE

## 2023-01-24 PROCEDURE — 120N000001 HC R&B MED SURG/OB

## 2023-01-24 PROCEDURE — 82607 VITAMIN B-12: CPT | Performed by: HOSPITALIST

## 2023-01-24 PROCEDURE — 81001 URINALYSIS AUTO W/SCOPE: CPT | Performed by: HOSPITALIST

## 2023-01-24 PROCEDURE — 36415 COLL VENOUS BLD VENIPUNCTURE: CPT | Performed by: HOSPITALIST

## 2023-01-24 PROCEDURE — 250N000013 HC RX MED GY IP 250 OP 250 PS 637: Performed by: HOSPITALIST

## 2023-01-24 PROCEDURE — 71045 X-RAY EXAM CHEST 1 VIEW: CPT

## 2023-01-24 PROCEDURE — 87040 BLOOD CULTURE FOR BACTERIA: CPT | Performed by: HOSPITALIST

## 2023-01-24 PROCEDURE — 97166 OT EVAL MOD COMPLEX 45 MIN: CPT | Mod: GO

## 2023-01-24 PROCEDURE — 258N000003 HC RX IP 258 OP 636: Performed by: HOSPITALIST

## 2023-01-24 PROCEDURE — 97535 SELF CARE MNGMENT TRAINING: CPT | Mod: GO

## 2023-01-24 PROCEDURE — 85027 COMPLETE CBC AUTOMATED: CPT | Performed by: HOSPITALIST

## 2023-01-24 PROCEDURE — 99233 SBSQ HOSP IP/OBS HIGH 50: CPT | Performed by: HOSPITALIST

## 2023-01-24 PROCEDURE — 84443 ASSAY THYROID STIM HORMONE: CPT | Performed by: HOSPITALIST

## 2023-01-24 PROCEDURE — 82746 ASSAY OF FOLIC ACID SERUM: CPT | Performed by: HOSPITALIST

## 2023-01-24 PROCEDURE — 97162 PT EVAL MOD COMPLEX 30 MIN: CPT | Mod: GP | Performed by: PHYSICAL THERAPIST

## 2023-01-24 PROCEDURE — 87637 SARSCOV2&INF A&B&RSV AMP PRB: CPT | Performed by: HOSPITALIST

## 2023-01-24 PROCEDURE — 83605 ASSAY OF LACTIC ACID: CPT | Performed by: HOSPITALIST

## 2023-01-24 PROCEDURE — 83735 ASSAY OF MAGNESIUM: CPT | Performed by: HOSPITALIST

## 2023-01-24 PROCEDURE — 82962 GLUCOSE BLOOD TEST: CPT

## 2023-01-24 PROCEDURE — 82248 BILIRUBIN DIRECT: CPT | Performed by: HOSPITALIST

## 2023-01-24 PROCEDURE — 99222 1ST HOSP IP/OBS MODERATE 55: CPT | Performed by: PHYSICIAN ASSISTANT

## 2023-01-24 PROCEDURE — 80053 COMPREHEN METABOLIC PANEL: CPT | Performed by: HOSPITALIST

## 2023-01-24 PROCEDURE — 87086 URINE CULTURE/COLONY COUNT: CPT | Performed by: HOSPITALIST

## 2023-01-24 PROCEDURE — 82140 ASSAY OF AMMONIA: CPT | Performed by: HOSPITALIST

## 2023-01-24 PROCEDURE — 96361 HYDRATE IV INFUSION ADD-ON: CPT

## 2023-01-24 PROCEDURE — 97116 GAIT TRAINING THERAPY: CPT | Mod: GP | Performed by: PHYSICAL THERAPIST

## 2023-01-24 RX ORDER — BISMUTH SUBSALICYLATE 262 MG/1
1 TABLET, CHEWABLE ORAL EVERY 6 HOURS PRN
Status: DISCONTINUED | OUTPATIENT
Start: 2023-01-24 | End: 2023-01-27 | Stop reason: HOSPADM

## 2023-01-24 RX ORDER — LIDOCAINE 40 MG/G
CREAM TOPICAL
Status: DISCONTINUED | OUTPATIENT
Start: 2023-01-24 | End: 2023-01-27 | Stop reason: HOSPADM

## 2023-01-24 RX ORDER — SIMVASTATIN 10 MG
20 TABLET ORAL AT BEDTIME
Status: DISCONTINUED | OUTPATIENT
Start: 2023-01-24 | End: 2023-01-27 | Stop reason: HOSPADM

## 2023-01-24 RX ORDER — ACETAMINOPHEN 325 MG/1
650 TABLET ORAL EVERY 6 HOURS PRN
Status: DISCONTINUED | OUTPATIENT
Start: 2023-01-24 | End: 2023-01-27 | Stop reason: HOSPADM

## 2023-01-24 RX ORDER — ACETAMINOPHEN 650 MG/1
650 SUPPOSITORY RECTAL EVERY 6 HOURS PRN
Status: DISCONTINUED | OUTPATIENT
Start: 2023-01-24 | End: 2023-01-27 | Stop reason: HOSPADM

## 2023-01-24 RX ORDER — SPIRONOLACTONE 25 MG/1
25 TABLET ORAL DAILY
Status: DISCONTINUED | OUTPATIENT
Start: 2023-01-24 | End: 2023-01-27 | Stop reason: HOSPADM

## 2023-01-24 RX ORDER — LACTULOSE 10 G/15ML
20 SOLUTION ORAL
Status: DISCONTINUED | OUTPATIENT
Start: 2023-01-24 | End: 2023-01-24

## 2023-01-24 RX ORDER — BISACODYL 10 MG
10 SUPPOSITORY, RECTAL RECTAL DAILY PRN
Status: DISCONTINUED | OUTPATIENT
Start: 2023-01-24 | End: 2023-01-27 | Stop reason: HOSPADM

## 2023-01-24 RX ORDER — PANTOPRAZOLE SODIUM 40 MG/1
40 TABLET, DELAYED RELEASE ORAL
Status: DISCONTINUED | OUTPATIENT
Start: 2023-01-24 | End: 2023-01-27 | Stop reason: HOSPADM

## 2023-01-24 RX ORDER — CALCIUM CARBONATE 500 MG/1
500-1000 TABLET, CHEWABLE ORAL 3 TIMES DAILY PRN
Status: DISCONTINUED | OUTPATIENT
Start: 2023-01-24 | End: 2023-01-27 | Stop reason: HOSPADM

## 2023-01-24 RX ORDER — ONDANSETRON 2 MG/ML
4 INJECTION INTRAMUSCULAR; INTRAVENOUS EVERY 6 HOURS PRN
Status: DISCONTINUED | OUTPATIENT
Start: 2023-01-24 | End: 2023-01-27 | Stop reason: HOSPADM

## 2023-01-24 RX ORDER — CARBOXYMETHYLCELLULOSE SODIUM 10 MG/ML
1 GEL OPHTHALMIC 4 TIMES DAILY PRN
Status: DISCONTINUED | OUTPATIENT
Start: 2023-01-24 | End: 2023-01-27 | Stop reason: HOSPADM

## 2023-01-24 RX ORDER — POLYETHYLENE GLYCOL 3350 17 G/17G
17 POWDER, FOR SOLUTION ORAL 2 TIMES DAILY PRN
Status: DISCONTINUED | OUTPATIENT
Start: 2023-01-24 | End: 2023-01-27 | Stop reason: HOSPADM

## 2023-01-24 RX ORDER — POLYETHYLENE GLYCOL 3350 17 G/17G
17 POWDER, FOR SOLUTION ORAL 2 TIMES DAILY
Status: DISCONTINUED | OUTPATIENT
Start: 2023-01-24 | End: 2023-01-25

## 2023-01-24 RX ORDER — ONDANSETRON 4 MG/1
4 TABLET, ORALLY DISINTEGRATING ORAL EVERY 6 HOURS PRN
Status: DISCONTINUED | OUTPATIENT
Start: 2023-01-24 | End: 2023-01-27 | Stop reason: HOSPADM

## 2023-01-24 RX ORDER — SODIUM CHLORIDE 9 MG/ML
INJECTION, SOLUTION INTRAVENOUS CONTINUOUS
Status: ACTIVE | OUTPATIENT
Start: 2023-01-24 | End: 2023-01-24

## 2023-01-24 RX ORDER — LACTULOSE 10 G/15ML
20 SOLUTION ORAL EVERY 4 HOURS
Status: DISCONTINUED | OUTPATIENT
Start: 2023-01-24 | End: 2023-01-27 | Stop reason: HOSPADM

## 2023-01-24 RX ADMIN — RIFAXIMIN 550 MG: 550 TABLET ORAL at 21:30

## 2023-01-24 RX ADMIN — RIFAXIMIN 550 MG: 550 TABLET ORAL at 10:52

## 2023-01-24 RX ADMIN — LACTULOSE 20 G: 10 SOLUTION ORAL at 16:28

## 2023-01-24 RX ADMIN — PANTOPRAZOLE SODIUM 40 MG: 40 TABLET, DELAYED RELEASE ORAL at 16:27

## 2023-01-24 RX ADMIN — LACTULOSE 20 G: 10 SOLUTION ORAL at 18:43

## 2023-01-24 RX ADMIN — POLYETHYLENE GLYCOL 3350 17 G: 17 POWDER, FOR SOLUTION ORAL at 16:29

## 2023-01-24 RX ADMIN — SODIUM CHLORIDE, PRESERVATIVE FREE: 5 INJECTION INTRAVENOUS at 01:34

## 2023-01-24 RX ADMIN — SIMVASTATIN 20 MG: 10 TABLET, FILM COATED ORAL at 21:30

## 2023-01-24 RX ADMIN — LACTULOSE 20 G: 10 SOLUTION ORAL at 11:53

## 2023-01-24 RX ADMIN — LACTULOSE 20 G: 10 SOLUTION ORAL at 22:21

## 2023-01-24 RX ADMIN — SIMVASTATIN 20 MG: 10 TABLET, FILM COATED ORAL at 02:04

## 2023-01-24 ASSESSMENT — ACTIVITIES OF DAILY LIVING (ADL)
ADLS_ACUITY_SCORE: 35
ADLS_ACUITY_SCORE: 37
ADLS_ACUITY_SCORE: 35

## 2023-01-24 NOTE — CONSULTS
[facility] Gastroenterology Consultation    Vito Sy MRN# [id number]   Age: 72 year old YOB: 1950     Date of Admission:  1/23/2023    Reason for consult: Cirrhosis    Encephalopathy   Requesting physician:        Level of consult: Consult, follow and place orders           Assessment and Plan:   Assessment:   Cirrhosis, encephalopathy:    This is a 72-year-old [place] gentleman who speaks English well and is an .  His wife is also an  and was present for this interview.  The patient is extremely confused with asterixis.  He is getting lactulose every 3 hours at home and is on Xifaxan.  Despite this he only has 1 bowel movement daily and has been seen for intermittent encephalopathy after TIPS.  He denies any abdominal pain.  No fevers chills or sweats.  No dysuria.  No cough.  No blood in the stool or black stool.  No swelling of the ankles or abdomen.  Majority of this information comes from his wife.  He is unable to answer questions.    This is a gentleman with post TIPS encephalopathy.  Options are to add MiraLAX to increase number of bowel movements a day.  He is also already on a significant amount of lactulose.  He is already on Xifaxan.  If this is ineffective may consider downsizing the TIPS.      Plan:   Continue lactulose and Xifaxan.  We will add MiraLAX.  If he continues to have intermittent encephalopathy we will have IR assess for possible downsizing of his TIPS.             Chief Complaint:   Confusion     History is obtained from the patient  History is from the chart and the patient's wife.  Please see assessment  -           Past Medical History:   I have reviewed this patient's past medical history          Past Surgical History:   I have reviewed this patient's past surgical history          Social History:             Family History:   I have reviewed this patient's family history          Immunizations:             Allergies:              Medications:     Current Facility-Administered Medications   Medication     acetaminophen (TYLENOL) tablet 650 mg    Or     acetaminophen (TYLENOL) Suppository 650 mg     bismuth subsalicylate (PEPTO BISMOL) chewable tablet 262 mg     calcium carbonate (TUMS) chewable tablet 500-1,000 mg     carboxymethylcellulose PF (REFRESH LIQUIGEL) 1 % ophthalmic gel 1 drop     Daily 2 GRAM acetaminophen limit, unless fulminent liver failure or transaminases greater than or equal to 300 - 400, then none     lactulose (CHRONULAC) rectal enema 200 g    Or     lactulose (CHRONULAC) solution 20 g     lidocaine (LMX4) cream     lidocaine 1 % 0.1-1 mL     melatonin tablet 1 mg     ondansetron (ZOFRAN ODT) ODT tab 4 mg    Or     ondansetron (ZOFRAN) injection 4 mg     pantoprazole (PROTONIX) EC tablet 40 mg     polyethylene glycol (MIRALAX) Packet 17 g     rifaximin (XIFAXAN) tablet 550 mg     simvastatin (ZOCOR) tablet 20 mg     sodium chloride (PF) 0.9% PF flush 3 mL     sodium chloride (PF) 0.9% PF flush 3 mL     sodium chloride 0.9% infusion     spironolactone (ALDACTONE) tablet 25 mg     Current Outpatient Medications   Medication Sig     acetaminophen (TYLENOL) 500 MG tablet Take 500-1,000 mg by mouth every 6 hours as needed for mild pain     Aromatic Inhalants (RA MENTHOL NASAL INHALER) INHA Spray 1 Inhalation in nostril as needed     bismuth subsalicylate (PEPTO BISMOL) 262 MG chewable tablet Take 1 tablet by mouth every 6 hours as needed for diarrhea     calcium carbonate (TUMS) 500 MG chewable tablet Take 1-2 chew tab by mouth 3 times daily as needed for heartburn     hypromellose (ARTIFICIAL TEARS) 0.5 % SOLN ophthalmic solution Place 1 drop into both eyes 4 times daily as needed for dry eyes     lactulose (CHRONULAC) 10 GM/15ML solution Take 30 mLs (20 g) by mouth 6 times daily     omeprazole (PRILOSEC) 20 MG DR capsule TAKE 1 CAPSULE BY MOUTH TWICE DAILY BEFORE MEAL(S)     rifaximin (XIFAXAN) 550 MG TABS tablet Take 1  tablet (550 mg) by mouth 2 times daily     simvastatin (ZOCOR) 20 MG tablet Take 1 tablet (20 mg) by mouth At Bedtime     spironolactone (ALDACTONE) 25 MG tablet Take 1 tablet (25 mg) by mouth daily             Review of Systems:   Review of systems is not obtainable due to patient factors - mental status          Physical Exam:   Vitals were reviewed  Lungs:   No increased work of breathing, good air exchange, clear to auscultation bilaterally, no crackles or wheezing     Cardiovascular:   Normal apical impulse, regular rate and rhythm, normal S1 and S2, no S3 or S4, and no murmur noted     Abdomen:   No scars, normal bowel sounds, soft, non-distended, non-tender, no masses palpated, no hepatosplenomegally     Skin:   no bruising or bleeding     Neuropsychiatric:   Level of consciousness: obtunded asterixis to gentle external pressure             Data:   All laboratory data reviewed  -  All imaging studies reviewed by me.    Attestation:  I have reviewed today's vital signs, notes, medications, labs and imaging.    Rip Montenegro MD

## 2023-01-24 NOTE — PROGRESS NOTES
Care Management Chart Review    Length of Stay (days): 1    Expected Discharge Date: 01/25/2023    Concerns to be Addressed:   Pending GI consult: patient has a history of liver cirrhosis secondary to alcohol and JENNINGS with refractory ascites and portal hypertension.  He is s/p TIPS procedure on December 9 without complications.  Since being discharged patient has had recurrent symptoms suggestive of hepatic encephalopathy and has not recovered to baseline.  He presents again with ammonia level of 135, confused and disoriented. Lactulose being administered, monitoring labs.  Patient plan of care discussed at interdisciplinary rounds: Yes    Anticipated Discharge Disposition:  Discharge goal is home pending response to treatment, medical needs and mobility closer to discharge.      Anticipated Discharge Services:  To be determined by destination, patient/family preferences, medical needs and mobility status closer to the time of discharge.  Anticipated Discharge DME:  Per therapy (if indicated).    Patient/family educated on Medicare website which has current facility and service quality ratings:   NA  Education Provided on the Discharge Plan:   Per team  Patient/Family in Agreement with the Plan:   Yes    Referrals Placed by CM/SW:   None  Private pay costs discussed: Not applicable at this time.     Additional Information:  Patient lives at home with wife and extended family. He is usually independent at baseline but is currently confused. His primary language is Somali. Anticipated goal is for patient to return home with family to transport but care management will follow along.    Olivia Mayen RN

## 2023-01-24 NOTE — CONSULTS
[facility]  Palliative Care Consultation Note    Patient: Vito Sy  Date of Admission:  1/23/2023    Requesting Clinician / Team: Hospitalist  Reason for consult: Goals of care    Impression & Recommendations:  Zaire remains lethargic and minimally able to engage in conversation. He is accompanied by his wife Naomi. 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 Zaire has been persistently lethargic at home with poor oral intake per family. Naomi worries about his overall prognosis and would like more information from the GI team.    Discussed with GI team. Difficult to determine prognosis at this time, plan to better manage encephalopathy with bowel regimen and consider downsizing TIPS.    Goals of Care: life-prolonging    Advanced Care Planning:  Advance directive: No  POLST: No  Code status: Full Code (confirmed today)  Surrogate: Naomi (spouse)    Symptom Management:  #Altered mental status   - Attributed to hepatic encephalopathy s/p TIPS last month  - GI planning to increase bowel regimen and consider downsizing TIPS    Psychosocial & Spiritual:   - Supported by wife Naomi and their 3 adult sons. Naomi and her son and daughter-in-law take care of Zaire at home.  - [place]. They both have 12 siblings  - Zaire is Latter-day and deeply Mandaen, he and Naomi would appreciate Spiritual Care    These recommendations have been discussed with Dr. Holguin (hospitalist).      Thank you for the opportunity to participate in the care of this patient and family. Our team: will continue to follow.     During regular M-F work hours -- if you are not sure who specifically to contact -- please contact us by calling us directly at the Palliative Care Main Line [phone]    After regular work hours and on weekends/holidays, you can leave a message at [phone]    Assessments:  Vito is a 71 y/o man with cirrhosis  complicated by refractory ascites and portal hypertension now s/p TIPS 12/9/2022, CKD3, HTN and gout, admitted 1/23 for recurrent hepatic encephalopathy.  - Admitted 1/6-1/8/2023 for hepatic encephalopathy, improved and discharged home  - Admitted 12/18-12/22/2022 with hepatic encephalopathy    Today, the patient was seen for:  Intro to Palliative Care, Goals of Care    Prognosis, Goals, & Planning:      Functional Status just prior to hospitalization: Palliative Performance Score:       20%- 1. Totally bed bound; 2. Unable to do any activity, extensive disease; 3. Total care; 4. Minimal to sips; 5. Full or drowsy +/- confusion       Prognosis, Goals, and/or Advance Care Planning were addressed today: Yes        Summary/Comments: No HCD. Naomi (spouse) is surrogate decision maker. She confirmed that Zaire is full code. She understands that resuscitation may not be beneficial in setting of his advanced liver failure, but feels that everything possible should be done and the rest is in god's hands.      Patient's decision making preferences: unable to assess          Patient has decision-making capacity today for complex decisions: No            I have concerns about the patient/family's health literacy today: No           Patient has a completed Health Care Directive: No.       Code status: Full Code    Coping, Meaning, & Spirituality:   Mood, coping, and/or meaning in the context of serious illness were addressed today: Yes  Summary/Comments: Naomi notes increased toll of near total care for Zaire at home with support of her son and daughter-in-law. She and Zaire find support in their bhavik.    Social:     Living situation: home with Naomi (spouse), son and daughter-in-law    Key family / caregivers: Naomi (spouse), 3 adult sons and daughters-in-law    Occupational history:  in [employer], now works as an     Current in-home services: none    History of Present Illness:  History gathered  "today from: patient, family/loved ones, medical chart, medical team members    Zaire is sleeping soundly during my visit, arousable to voice. He denies pain or dyspnea. Quickly falls back asleep.    Naomi (wife) at bedside and provided most history. I introduced myself and the role of Palliative Care: The Palliative Care team provides additional support for patients with serious illness and their loved ones. This includes managing symptoms, clarifying goals of care and navigating difficult decisions with the patient and their family.    Naomi shared that Zaire's mental status has been persistently lethargic since TIPS last month. He spends most of the day in a recliner chair and is unable to do anything without assistance. His oral intake has decreased and she believes he is losing weight. Zaire consistently denies any symptoms to her.     Naomi worries about Zaire's prognosis and we discussed uncertainty around this. She and Zaire never previously discussed his wishes or completed and advanced directive. They are both deeply Scientology and believe that only god knows when a person's time has come. Naomi confirms that Zaire is full code and they want everything done to prolong his life, the rest is in god's hands.    Zaire is \"famous\" for being a good father and , being honest and generous, and reading many books. He and Naomi met in [facility] when he was a  there. They fled the country in 1988 and have raised their 3 sons here. They were some of the earliest members of the [place] community in the [facility]. 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    Key Palliative Symptom Data:  Denies pain or dyspnea    ROS:  Unable to assess     Past Medical History:  Past Medical History:   Diagnosis Date     Diabetes mellitus (H)      Gout      Hypertension      Kidney stone         Past Surgical History:  Past Surgical History:   Procedure Laterality Date     COLONOSCOPY       IR TRANSVEN " "INTRAHEPATIC PORTOSYST SHUNT  11/16/2022     IR TRANSVEN INTRAHEPATIC PORTOSYST SHUNT  12/9/2022     NE ESOPHAGOGASTRODUODENOSCOPY TRANSORAL DIAGNOSTIC N/A 11/16/2020    Procedure: ESOPHAGOGASTRODUODENOSCOPY (EGD);  Surgeon: Juan Garnett MD;  Location: [facility];  Service: Gastroenterology     NE ESOPHAGOGASTRODUODENOSCOPY TRANSORAL DIAGNOSTIC N/A 11/18/2020    Procedure: ESOPHAGOGASTRODUODENOSCOPY (EGD) WITH BANDING;  Surgeon: Juan Garnett MD;  Location: [facility];  Service: Gastroenterology     URETEROSCOPY       Family History:  Family History   Problem Relation Age of Onset     Heart Disease Brother      Hypertension Mother      Hypertension Father       Allergies:  Allergies   Allergen Reactions     Sulfa Drugs Shortness Of Breath and Itching     Penicillins Unknown     Annotation: discussed with patient, he reports he had \"itching\" with penicillin many [place] ago [place] but no hives or throat or respiratory symptoms.  he also reports having tolerated amoxicillin since coming to [place].       Medications:  I have reviewed this patient's medication profile and medications from this hospitalization.   Noted:  Current Facility-Administered Medications   Medication     acetaminophen (TYLENOL) tablet 650 mg    Or     acetaminophen (TYLENOL) Suppository 650 mg     bisacodyl (DULCOLAX) suppository 10 mg     bismuth subsalicylate (PEPTO BISMOL) chewable tablet 262 mg     calcium carbonate (TUMS) chewable tablet 500-1,000 mg     carboxymethylcellulose PF (REFRESH LIQUIGEL) 1 % ophthalmic gel 1 drop     Daily 2 GRAM acetaminophen limit, unless fulminent liver failure or transaminases greater than or equal to 300 - 400, then none     lactulose (CHRONULAC) rectal enema 200 g    Or     lactulose (CHRONULAC) solution 20 g     lidocaine (LMX4) cream     lidocaine 1 % 0.1-1 mL     melatonin tablet 1 mg     ondansetron (ZOFRAN ODT) ODT tab 4 mg    Or     ondansetron (ZOFRAN) injection 4 mg     pantoprazole (PROTONIX) " "EC tablet 40 mg     polyethylene glycol (MIRALAX) Packet 17 g     polyethylene glycol (MIRALAX) Packet 17 g     rifaximin (XIFAXAN) tablet 550 mg     simvastatin (ZOCOR) tablet 20 mg     sodium chloride (PF) 0.9% PF flush 3 mL     sodium chloride (PF) 0.9% PF flush 3 mL     sodium chloride 0.9% infusion     spironolactone (ALDACTONE) tablet 25 mg     Current Outpatient Medications   Medication     acetaminophen (TYLENOL) 500 MG tablet     Aromatic Inhalants (RA MENTHOL NASAL INHALER) INHA     bismuth subsalicylate (PEPTO BISMOL) 262 MG chewable tablet     calcium carbonate (TUMS) 500 MG chewable tablet     hypromellose (ARTIFICIAL TEARS) 0.5 % SOLN ophthalmic solution     lactulose (CHRONULAC) 10 GM/15ML solution     omeprazole (PRILOSEC) 20 MG DR capsule     rifaximin (XIFAXAN) 550 MG TABS tablet     simvastatin (ZOCOR) 20 MG tablet     spironolactone (ALDACTONE) 25 MG tablet     PERTINENT PHYSICAL EXAMINATION:  Vital Signs: Blood pressure 129/63, pulse 75, temperature 97.6  F (36.4  C), temperature source Oral, resp. rate 11, height 1.676 m (5' 6\"), weight 76.7 kg (169 lb), SpO2 100 %.   GENERAL: laying in bed, lethargic, arousable to voice  SKIN: Warm and dry   HEENT: Normocephalic, moist mucous membranes  LUNGS: bradypnea   CARDIAC: bradycardic  ABDOMINAL: soft, non distended, non tender  MUSKL: no gross joint deformities   EXTREMITIES: No edema or cyanosis  NEUROLOGIC: lethargic, arouses to voice    Data reviewed:  Recent imaging reviewed, my comments on pertinents:   CXR 1/24  IMPRESSION: The lungs are clear. The heart size and pulmonary vascularity are normal. No pneumothorax or pleural effusion. There has been improvement in the perihilar vascular congestion seen on the prior study.    US abdomen 12/20  IMPRESSION:  1.  Cirrhotic liver morphology with the sequela portal hypertension including ascites.  2.  Patent hepatic vessels with appropriately directed flow. Note is made of a patent TIPS without " sonographic evidence of stenosis.    Recent lab data reviewed, my comments on pertinents:   CMP  Recent Labs   Lab 01/24/23  0743 01/24/23  0008 01/23/23  1744     --  136   POTASSIUM 4.4  --  4.7   CHLORIDE 109*  --  105   CO2 20*  --  18*   ANIONGAP 11  --  13   * 145* 197*   BUN 23.0  --  22.9   CR 1.46*  --  1.47*   GFRESTIMATED 51*  --  51*   SILVIA 9.2  --  9.9   MAG 2.1  --   --    PROTTOTAL 7.0  --  7.8   ALBUMIN 3.3*  --  3.6   BILITOTAL 1.7*  --  1.8*   ALKPHOS 157*  --  183*   AST 40  --  51*   ALT 29  --  35     CBC  Recent Labs   Lab 01/24/23  0743 01/23/23  1744   WBC 5.5 5.6   RBC 4.14* 4.65   HGB 10.6* 12.1*   HCT 33.8* 37.3*   MCV 82 80   MCH 25.6* 26.0*   MCHC 31.4* 32.4   RDW 18.4* 18.4*   * 158       TTS: I have personally spent a total of 60 minutes today on the unit in review of medical record, consultation with the medical providers and assessment of patient today, with more than 50% of this time spent in counseling, coordination of care, and conversation in a family meeting re: diagnostic results, prognosis, symptom management, risks and benefits of management options,  emotional support and development of plan of care.     Cristina Bermudez PA-C  [facility], Palliative Care  Dept phone: [phone]  Securely message with the Vocera Web Console

## 2023-01-24 NOTE — H&P
"[facility]    History and Physical - Hospitalist Service       Date of Admission:  1/23/2023    Assessment & Plan      Vito Sy is a 72 year old male admitted on 1/23/2023. 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  He is s/p TIPS procedure on December 9 without complications.  Since being discharged patient has had recurrent symptoms suggestive of hepatic encephalopathy and has not recovered to baseline.  He presents again with ammonia level of 135, confused and disoriented.      Hepatic encephalopathy  -Patient has a history of alcoholic cirrhosis with refractory ascites and portal hypertension.  -He is s/p TIPS on December 9, 2023  -According to family members he is reportedly compliant with rifaximin and lactulose however symptoms remain refractory  -Ammonia 135 today  -Continue spironolactone and omeprazole  -We will admit and continue current treatment plan.  -Consider MI lactulose if unable to tolerate p.o.  -Consult gastroenterology      Chronic kidney disease stage IIIa  -Creatinine at baseline  -Normal saline overnight for 1 L           Diet:   Low NA  DVT Prophylaxis: Pneumatic Compression Devices  Cronin Catheter: Not present  Lines: None     Cardiac Monitoring: None  Code Status:   Full Code    Clinically Significant Risk Factors Present on Admission               # Coagulation Defect: INR = 1.35 (Ref range: 0.85 - 1.15) and/or PTT = N/A, will monitor for bleeding         # Overweight: Estimated body mass index is 27.28 kg/m  as calculated from the following:    Height as of this encounter: 1.676 m (5' 6\").    Weight as of this encounter: 76.7 kg (169 lb).           Disposition Plan      Expected Discharge Date: 01/25/2023                  Juma Holguin MD  Hospitalist Service  [facility]  Securely message with [employer] (more info)  Text page via [facility] Paging/Directory "     ______________________________________________________________________    Chief Complaint     Confusion and disorientation    History is obtained from the patient    History of Present Illness     Vito CABRERA Yossi is a 72 year old male.. 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  He is s/p TIPS procedure on December 9 without complications.  Since being discharged patient has had recurrent symptoms suggestive of hepatic encephalopathy and has not recovered to baseline.  According to family members initially patient was able to ambulate with assistance however over the past few days have been more obtunded and unable to ambulate.  He reportedly is compliant with lactulose and rifaximin however symptoms remain persistent.  He was brought into the emergency room today for evaluation    Labs today show ammonia level of 135, creatinine 1.47,, dioxide 18.  Hemoglobin 12.1.  Patient was given lactulose in the emergency room.          Past Medical History    Past Medical History:   Diagnosis Date     Diabetes mellitus (H)      Gout      Hypertension      Kidney stone        Past Surgical History   Past Surgical History:   Procedure Laterality Date     COLONOSCOPY       IR TRANSVEN INTRAHEPATIC PORTOSYST SHUNT  11/16/2022     IR TRANSVEN INTRAHEPATIC PORTOSYST SHUNT  12/9/2022     MS ESOPHAGOGASTRODUODENOSCOPY TRANSORAL DIAGNOSTIC N/A 11/16/2020    Procedure: ESOPHAGOGASTRODUODENOSCOPY (EGD);  Surgeon: Juan Garnett MD;  Location: [facility];  Service: Gastroenterology     MS ESOPHAGOGASTRODUODENOSCOPY TRANSORAL DIAGNOSTIC N/A 11/18/2020    Procedure: ESOPHAGOGASTRODUODENOSCOPY (EGD) WITH BANDING;  Surgeon: Juan Garentt MD;  Location: [facility];  Service: Gastroenterology     URETEROSCOPY         Prior to Admission Medications   Prior to Admission Medications   Prescriptions Last Dose Informant Patient Reported? Taking?   Aromatic Inhalants (RA  MENTHOL NASAL INHALER) INHA Unknown  Yes Yes   Sig: Spray 1 Inhalation in nostril as needed   acetaminophen (TYLENOL) 500 MG tablet Unknown  Yes Yes   Sig: Take 500-1,000 mg by mouth every 6 hours as needed for mild pain   bismuth subsalicylate (PEPTO BISMOL) 262 MG chewable tablet Unknown  Yes Yes   Sig: Take 1 tablet by mouth every 6 hours as needed for diarrhea   calcium carbonate (TUMS) 500 MG chewable tablet Unknown  Yes Yes   Sig: Take 1-2 chew tab by mouth 3 times daily as needed for heartburn   hypromellose (ARTIFICIAL TEARS) 0.5 % SOLN ophthalmic solution Unknown  Yes Yes   Sig: Place 1 drop into both eyes 4 times daily as needed for dry eyes   lactulose (CHRONULAC) 10 GM/15ML solution 1/23/2023 at x 3  No Yes   Sig: Take 30 mLs (20 g) by mouth 6 times daily   omeprazole (PRILOSEC) 20 MG DR capsule 1/23/2023 at am  No Yes   Sig: TAKE 1 CAPSULE BY MOUTH TWICE DAILY BEFORE MEAL(S)   rifaximin (XIFAXAN) 550 MG TABS tablet 1/23/2023 at am  No Yes   Sig: Take 1 tablet (550 mg) by mouth 2 times daily   simvastatin (ZOCOR) 20 MG tablet 1/22/2023  No Yes   Sig: Take 1 tablet (20 mg) by mouth At Bedtime   spironolactone (ALDACTONE) 25 MG tablet 1/23/2023  No Yes   Sig: Take 1 tablet (25 mg) by mouth daily      Facility-Administered Medications: None        Review of Systems    Review of systems not obtained due to patient factors - confusion     Physical Exam   Vital Signs: Temp: 97.9  F (36.6  C) Temp src: Temporal BP: (!) 141/66 Pulse: 76   Resp: 15 SpO2: 100 % O2 Device: None (Room air)    Weight: 169 lbs 0 oz    Physical Exam  HENT:      Head: Normocephalic.      Nose: Nose normal.   Eyes:      General: Scleral icterus present.   Cardiovascular:      Rate and Rhythm: Normal rate.      Pulses: Normal pulses.   Pulmonary:      Effort: Pulmonary effort is normal.   Abdominal:      General: Abdomen is flat.      Palpations: Abdomen is soft.   Musculoskeletal:         General: Normal range of motion.   Skin:      General: Skin is warm.   Neurological:      General: No focal deficit present.      Mental Status: He is alert. He is disoriented.   Psychiatric:         Mood and Affect: Mood normal.         Medical Decision Making       45 MINUTES SPENT BY ME on the date of service doing chart review, history, exam, documentation & further activities per the note.      Data     I have personally reviewed the following data over the past 24 hrs:    5.6  \   12.1 (L)   / 158     136 105 22.9 /  197 (H)   4.7 18 (L) 1.47 (H) \       ALT: 35 AST: 51 (H) AP: 183 (H) TBILI: 1.8 (H)   ALB: 3.6 TOT PROTEIN: 7.8 LIPASE: 156 (H)       INR:  1.35 (H) PTT:  N/A   D-dimer:  N/A Fibrinogen:  N/A       Imaging results reviewed over the past 24 hrs:   No results found for this or any previous visit (from the past 24 hour(s)).

## 2023-01-24 NOTE — ED NOTES
Paged provider regarding diet this am. Will  keep  npo until provider sees the pt. Paged regarding inability to give meds when possible lactulose enema.

## 2023-01-24 NOTE — ED PROVIDER NOTES
EMERGENCY DEPARTMENT ENCOUNTER            IMPRESSION:  Hepatic encephalopathy      MEDICAL DECISION MAKING:  Patient here for evaluation of hepatic encephalopathy.  He has a history of end-stage liver disease.  He was seen in GI clinic today and referred for further inpatient evaluation.  Patient encephalopathic and unable to provide significant meaningful history    On exam vital signs are normal.  He is awake and responsive but lethargic.  No icterus.    IV was started and he was given fluid bolus    Ammonia is elevated at 135; he was given a dose of lactulose    Lipase elevated at 156    INR 1.35    Chemistry shows slightly elevated creatinine and liver functions    Patient will be admitted to hospitalist    =================================================================  CHIEF COMPLAINT:  Chief Complaint   Patient presents with     Altered Mental Status         HPI  Vito Sy is a 72 year old male with a history of HTN, DM II, CKD, ascites due to alcoholic cirrhosis, hypomagnesemia, hyperkalemia, and anemia, who presents to the ED by walk in accompanied by wife for evaluation of altered mental status.     History is mostly obtained from wife due to patient's altered mental status.     Per wife, the patient has liver failure and has had an increase in confusion since December (~1 month ago). The wife adds that the patient has had decreased appetite and has been unable to sleep. The patient has been compliant with his Lactulose prescription. Today, the patient was able to go to the clinic, but they prompted him to be present in the ED due to his condition. His wife denies any other symptoms or complaints at this time.     Per chart review, the patient was seen earlier today at [facility] for hepatic encephalopathy. PCP reported that patient has worsened since his TIPS procedure (1/4/23). Failure to respond to outpatient treatment with lactulose 6 times per day and rifaximin. Given  "the progressive worsening and the severity and the episode of vomiting, recommended admission. In the past his main hepatologist has been Dr. Montenegro at [facility].    Social Hx: Wife denies a history of alcohol use. Wife states that the patient \"drank socially.\"     I, Diego Ugarte am serving as a scribe to document services personally performed by Dr. Jordi Goddard MD, based on my observation and the provider's statements to me. I, Dr. Jordi Goddard MD attest that Diego Ugarte is acting in a scribe capacity, has observed my performance of the services and has documented them in accordance with my direction.      REVIEW OF SYSTEMS   Constitutional: Positive for appetite change. Denies fever, chills, unintentional weight loss or fatigue   Eyes: Denies visual changes or discharge    HENT: Denies sore throat, ear pain or neck pain  Respiratory: Denies cough or shortness of breath    Cardiovascular: Denies chest pain, palpitations or leg swelling  GI: Denies abdominal pain, nausea, vomiting, or dark, bloody stools.    : Denies hematuria, dysuria, or flank pain  Musculoskeletal: Denies any new back pain or new muscle/joint pains  Skin: Denies rash or wound  Neurologic: Denies current headache, new weakness, focal weakness, or sensory changes    Lymphatic: Denies swollen glands    Psychiatric: Positive for confusion and sleep disturbance. Denies depression, suicidal ideation or homicidal ideation.    Remainder of systems reviewed, unless noted in HPI all others negative.      PAST MEDICAL HISTORY:  Past Medical History:   Diagnosis Date     Diabetes mellitus (H)      Gout      Hypertension      Kidney stone        PAST SURGICAL HISTORY:  Past Surgical History:   Procedure Laterality Date     COLONOSCOPY       IR TRANSVEN INTRAHEPATIC PORTOSYST SHUNT  11/16/2022     IR TRANSVEN INTRAHEPATIC PORTOSYST SHUNT  12/9/2022     NM ESOPHAGOGASTRODUODENOSCOPY TRANSORAL DIAGNOSTIC N/A 11/16/2020    Procedure: ESOPHAGOGASTRODUODENOSCOPY " "(EGD);  Surgeon: Juan Garnett MD;  Location: [facility];  Service: Gastroenterology     HI ESOPHAGOGASTRODUODENOSCOPY TRANSORAL DIAGNOSTIC N/A 11/18/2020    Procedure: ESOPHAGOGASTRODUODENOSCOPY (EGD) WITH BANDING;  Surgeon: Juan Garnett MD;  Location: [facility];  Service: Gastroenterology     URETEROSCOPY           CURRENT MEDICATIONS:    acetaminophen (TYLENOL) 500 MG tablet  Aromatic Inhalants (RA MENTHOL NASAL INHALER) INHA  bismuth subsalicylate (PEPTO BISMOL) 262 MG chewable tablet  calcium carbonate (TUMS) 500 MG chewable tablet  hypromellose (ARTIFICIAL TEARS) 0.5 % SOLN ophthalmic solution  lactulose (CHRONULAC) 10 GM/15ML solution  omeprazole (PRILOSEC) 20 MG DR capsule  rifaximin (XIFAXAN) 550 MG TABS tablet  simvastatin (ZOCOR) 20 MG tablet  spironolactone (ALDACTONE) 25 MG tablet        ALLERGIES:  Allergies   Allergen Reactions     Sulfa Drugs Shortness Of Breath and Itching     Penicillins Unknown     Annotation: discussed with patient, he reports he had \"itching\" with penicillin many years ago in [facility] but no hives or throat or respiratory symptoms.  he also reports having tolerated amoxicillin since coming to [place].         FAMILY HISTORY:  Family History   Problem Relation Age of Onset     Heart Disease Brother      Hypertension Mother      Hypertension Father        SOCIAL HISTORY:   Social History     Socioeconomic History     Marital status:    Tobacco Use     Smoking status: Never     Smokeless tobacco: Never   Substance and Sexual Activity     Alcohol use: Yes     Comment: none for the past 2 years     Drug use: No   Social History Narrative    Lives with wife - has worked as  in the past       PHYSICAL EXAM:    /60   Pulse 72   Temp 97.9  F (36.6  C) (Temporal)   Resp 12   Ht 1.676 m (5' 6\")   Wt 76.7 kg (169 lb)   SpO2 100%   BMI 27.28 kg/m      Constitutional: He is lethargic but responsive to voice and able to follow commands  Head: Normocephalic, " atraumatic.  ENT: Mucous membranes moist. Posterior oropharynx appears normal.  Eyes: Pupils midrange and reactive ,no conjunctival discharge  Neck: No lymphadenopathy, no stridor, supple, no soft tissue swelling  Chest: No tenderness   Respiratory: Respirations even, unlabored. Lungs clear to ascultation bilaterally, in no acute respiratory distress.  Cardiovascular: Regular rate and rhythm.+2 radial pulses, equal bilaterally.  No murmurs.   GI: Abdomen is mildly distended  Back: No CVA tenderness.    Musculoskeletal: Moves all 4 extremities equally, strength symmetrical on bilateral uppers and lowers.  No peripheral edema  Integument: Warm, dry. No rash. No bruising or petechiae.  Lymphatic: No cervical lymphadenopathy  Neurologic: Alert & oriented x 3. Normal speech. Grossly normal motor and sensory function. No focal deficits noted.  NIHSS = 0  Psychiatric: Normal mood and affect. Normal judgement.    ED COURSE:    7:09 PM I met with the patient, obtained history, performed an initial exam, and discussed options and plan for diagnostics and treatment here in the ED. PPE worn: surgical mask, gloves   7:57 PM I discussed the case with hospitalist, Dr Holguin, who accepts the patient.      Medical Decision Making    History:    Supplemental history from: Family    External Record(s) reviewed: External medical records care everywhere    Work Up:    Chart documentation includes differential considered and any EKGs or imaging independently interpreted by provider.  Laboratory, EKG, radiology independently reviewed    In additional to work up documented, I considered the following work up:    External consultation:    Discussion of management with another provider: Hospitalist     Complicating factors:    Care impacted by chronic illness: HTN, DM II, CKD    Care affected by social determinants of health: Access to care    Disposition considerations: Admit.          LAB:  Laboratory results were independently reviewed  and interpreted  Results for orders placed or performed during the hospital encounter of 01/23/23   Comprehensive metabolic panel   Result Value Ref Range    Sodium 136 136 - 145 mmol/L    Potassium 4.7 3.4 - 5.3 mmol/L    Chloride 105 98 - 107 mmol/L    Carbon Dioxide (CO2) 18 (L) 22 - 29 mmol/L    Anion Gap 13 7 - 15 mmol/L    Urea Nitrogen 22.9 8.0 - 23.0 mg/dL    Creatinine 1.47 (H) 0.67 - 1.17 mg/dL    Calcium 9.9 8.8 - 10.2 mg/dL    Glucose 197 (H) 70 - 99 mg/dL    Alkaline Phosphatase 183 (H) 40 - 129 U/L    AST 51 (H) 10 - 50 U/L    ALT 35 10 - 50 U/L    Protein Total 7.8 6.4 - 8.3 g/dL    Albumin 3.6 3.5 - 5.2 g/dL    Bilirubin Total 1.8 (H) <=1.2 mg/dL    GFR Estimate 51 (L) >60 mL/min/1.73m2   Result Value Ref Range    Lipase 156 (H) 13 - 60 U/L   Result Value Ref Range    Ammonia 135 (HH) 16 - 60 umol/L   CBC with platelets and differential   Result Value Ref Range    WBC Count 5.6 4.0 - 11.0 10e3/uL    RBC Count 4.65 4.40 - 5.90 10e6/uL    Hemoglobin 12.1 (L) 13.3 - 17.7 g/dL    Hematocrit 37.3 (L) 40.0 - 53.0 %    MCV 80 78 - 100 fL    MCH 26.0 (L) 26.5 - 33.0 pg    MCHC 32.4 31.5 - 36.5 g/dL    RDW 18.4 (H) 10.0 - 15.0 %    Platelet Count 158 150 - 450 10e3/uL    % Neutrophils 73 %    % Lymphocytes 13 %    % Monocytes 11 %    % Eosinophils 2 %    % Basophils 1 %    % Immature Granulocytes 0 %    NRBCs per 100 WBC 0 <1 /100    Absolute Neutrophils 4.1 1.6 - 8.3 10e3/uL    Absolute Lymphocytes 0.8 0.8 - 5.3 10e3/uL    Absolute Monocytes 0.6 0.0 - 1.3 10e3/uL    Absolute Eosinophils 0.1 0.0 - 0.7 10e3/uL    Absolute Basophils 0.1 0.0 - 0.2 10e3/uL    Absolute Immature Granulocytes 0.0 <=0.4 10e3/uL    Absolute NRBCs 0.0 10e3/uL   Extra Blue Top Tube   Result Value Ref Range    Hold Specimen JIC    Extra Red Top Tube   Result Value Ref Range    Hold Specimen JIC    Extra Green Top (Lithium Heparin) ON ICE   Result Value Ref Range    Hold Specimen JIC    Result Value Ref Range    INR 1.35 (H) 0.85 - 1.15            MEDICATIONS GIVEN IN THE EMERGENCY:  Medications   0.9% sodium chloride BOLUS (500 mLs Intravenous New Bag 1/23/23 1953)   lactulose (CHRONULAC) solution 20 g (20 g Oral Given 1/23/23 1945)           NEW PRESCRIPTIONS STARTED AT TODAY'S ER VISIT:  New Prescriptions    No medications on file          FINAL DIAGNOSIS:    ICD-10-CM    1. Hepatic encephalopathy  K76.82                At the conclusion of the encounter I discussed the results of all of the tests and the disposition. The questions were answered. The patient or family acknowledged understanding and was agreeable with the care plan.     NAME: Vito Sy  AGE: 72 year old male  YOB: 1950  MRN: [id number]  EVALUATION DATE & TIME: 1/23/2023  5:46 PM    PCP: Amrik Mejia    ED PROVIDER: MARTIN Nelson, Diego Ugarte, am serving as a scribe to document services personally performed by Dr. Jordi Goddard based on my observation and the provider's statements to me. I, Jordi Goddard MD attest that Diego Ugarte is acting in a scribe capacity, has observed my performance of the services and has documented them in accordance with my direction.    Jordi Goddard M.D.  Emergency Medicine  [facility] EMERGENCY DEPARTMENT  [address] [facility] [id number] [phone]  Dept: [phone]  1/23/2023       Jordi Goddard MD  01/23/23 1741

## 2023-01-24 NOTE — PROGRESS NOTES
"   01/24/23 1320   Appointment Info   Signing Clinician's Name / Credentials (PT) Shraddha Yen, PT, DPT       Present no   Language Maldivian  (Pt and spouse are both interpreters, spouse declines formal . Assists with interpretation as needed.)   Living Environment   People in Home spouse   Current Living Arrangements house   Home Accessibility stairs to enter home   Number of Stairs, Main Entrance 6   Stair Railings, Main Entrance railings safe and in good condition   Living Environment Comments Pt lives in a split-level with spouse. Spouse states patient uses both levels equally.   Self-Care   Equipment Currently Used at Home walker, rolling  (spouse has ordered 4WW and commode, does not have yet)   Fall history within last six months no   Activity/Exercise/Self-Care Comment Spouse reports assisting with all ADLs/IADLs over last several weeks, including transfers and ambulating.   General Information   Onset of Illness/Injury or Date of Surgery 01/23/23   Referring Physician Juma Holguin MD   Patient/Family Therapy Goals Statement (PT) None stated.   Pertinent History of Current Problem (include personal factors and/or comorbidities that impact the POC) Per H&P: \"72 year old male admitted on 1/23/2023. 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  He is s/p TIPS procedure on December 9 without complications.  Since being discharged patient has had recurrent symptoms suggestive of hepatic encephalopathy and has not recovered to baseline.  He presents again with ammonia level of 135, confused and disoriented.\"   Existing Precautions/Restrictions fall   Range of Motion (ROM)   Range of Motion ROM is WFL   Strength (Manual Muscle Testing)   Strength (Manual Muscle Testing) Deficits observed during functional mobility   Bed Mobility   Bed Mobility supine-sit   Supine-Sit [place] (Bed Mobility) moderate " assist (50% patient effort);verbal cues   Bed Mobility Limitations decreased ability to use arms for pushing/pulling;decreased ability to use legs for bridging/pushing   Impairments Contributing to Impaired Bed Mobility decreased strength;impaired balance   Assistive Device (Bed Mobility) bed rails   Transfers   Transfers sit-stand transfer   Transfer Safety Concerns Noted decreased weight-shifting ability   Impairments Contributing to Impaired Transfers impaired balance;decreased strength   Sit-Stand Transfer   Sit-Stand [place] (Transfers) minimum assist (75% patient effort);verbal cues   Assistive Device (Sit-Stand Transfers) walker, front-wheeled   Gait/Stairs (Locomotion)   [place] Level (Gait) minimum assist (75% patient effort);verbal cues   Assistive Device (Gait) walker, front-wheeled   Distance in Feet 10'   Distance in Feet (Gait) additional 60'   Pattern (Gait) step-to   Deviations/Abnormal Patterns (Gait) samm decreased;gait speed decreased   Clinical Impression   Criteria for Skilled Therapeutic Intervention Yes, treatment indicated   PT Diagnosis (PT) impaired functional mobility   Influenced by the following impairments decreased strength, endurance, balance   Functional limitations due to impairments transfers, ambulation, stairs   Clinical Presentation (PT Evaluation Complexity) Evolving/Changing   Clinical Presentation Rationale Pt presents as medically diagnosed.   Clinical Decision Making (Complexity) moderate complexity   Planned Therapy Interventions (PT) balance training;bed mobility training;gait training;home exercise program;neuromuscular re-education;patient/family education;strengthening;transfer training;stair training   Risk & Benefits of therapy have been explained evaluation/treatment results reviewed;participants voiced agreement with care plan;participants included;patient   PT Total Evaluation Time   PT [place], Moderate Complexity Minutes (77492) 15   Physical Therapy  Goals   PT Frequency Daily   PT Predicted Duration/Target Date for Goal Attainment 01/31/23   PT Goals Bed Mobility;Transfers;Gait   PT: Bed Mobility Supervision/stand-by assist;Supine to/from sit   PT: Transfers Supervision/stand-by assist;Sit to/from stand;Assistive device   PT: Gait Supervision/stand-by assist;Assistive device;Rolling walker;Greater than 200 feet   Interventions   Interventions Quick Adds Gait Training   Gait Training   Gait Training Minutes (65365) 10   Symptoms Noted During/After Treatment (Gait Training) fatigue   Treatment Detail/Skilled Intervention With FWW. Requires max assist to guide walker during turns. Frequent verbal and tactile cues for walker proximity and posture.   [place] Level (Gait Training) minimum assist (75% patient effort)   Physical Assistance Level (Gait Training) verbal cues;1 person assist;nonverbal cues (demo/gestures)   Assistive Device (Gait Training) rolling walker   Gait Analysis Deviations decreased samm;decreased step length   Impairments (Gait Analysis/Training) balance impaired;strength decreased   PT Discharge Planning   PT Plan gait with FWW, LE strengthening, stairs when ready   PT Discharge Recommendation (DC Rec) Transitional Care Facility   PT Rationale for DC Rec Patient's strength, endurance and balance are below baseline. Patient's spouse is currently assisting with all ADLs/IADLs. Recommend TCU at discharge. If patient unable to discharge to TCU, recommend 24 hour supervision/assist, hands on assist of 1 with FWW for all transfers and ambulation, and home PT.   PT Brief overview of current status Supine > sit, mod assist of 1. Sit <> stand, min assist of 1. Ambulates 60' with FWW, min assist of 1.   Total Session Time   Timed Code Treatment Minutes 10   Total Session Time (sum of timed and untimed services) 25     Shraddha Yen, PT  1/24/2023

## 2023-01-24 NOTE — PHARMACY-ADMISSION MEDICATION HISTORY
Pharmacy Note - Admission Medication History     ______________________________________________________________________    Prior To Admission (PTA) med list completed and updated in EMR.       PTA Med List   Medication Sig Last Dose     acetaminophen (TYLENOL) 500 MG tablet Take 500-1,000 mg by mouth every 6 hours as needed for mild pain Unknown     Aromatic Inhalants (RA MENTHOL NASAL INHALER) INHA Spray 1 Inhalation in nostril as needed Unknown     bismuth subsalicylate (PEPTO BISMOL) 262 MG chewable tablet Take 1 tablet by mouth every 6 hours as needed for diarrhea Unknown     calcium carbonate (TUMS) 500 MG chewable tablet Take 1-2 chew tab by mouth 3 times daily as needed for heartburn Unknown     hypromellose (ARTIFICIAL TEARS) 0.5 % SOLN ophthalmic solution Place 1 drop into both eyes 4 times daily as needed for dry eyes Unknown     lactulose (CHRONULAC) 10 GM/15ML solution Take 30 mLs (20 g) by mouth 6 times daily 1/23/2023 at x 3     omeprazole (PRILOSEC) 20 MG DR capsule TAKE 1 CAPSULE BY MOUTH TWICE DAILY BEFORE MEAL(S) 1/23/2023 at am     rifaximin (XIFAXAN) 550 MG TABS tablet Take 1 tablet (550 mg) by mouth 2 times daily 1/23/2023 at am     simvastatin (ZOCOR) 20 MG tablet Take 1 tablet (20 mg) by mouth At Bedtime 1/22/2023     spironolactone (ALDACTONE) 25 MG tablet Take 1 tablet (25 mg) by mouth daily 1/23/2023       Information source(s): Family member, Clinic records and [facility]    Method of interview communication: in-person    Patient was asked about OTC/herbal products specifically.  PTA med list reflects this.    Based on the pharmacist's assessment, the PTA med list information appears reliable    Medication Affordability:       Allergies were reviewed, assessed, and updated with the patient.      Patient did not bring any medications to the hospital and can't retrieve from home. No multi-dose medications are available for use during hospital stay.      Thank you for the  opportunity to participate in the care of this patient.      Cruzito Garcia [facility]     1/23/2023     8:26 PM

## 2023-01-24 NOTE — PROGRESS NOTES
01/24/23 1330   Appointment Info   Signing Clinician's Name / Credentials (OT) Isamar Avila OTR/L       Present no  (both pt. and wife were intrepreters)   Language Venezuelan   Living Environment   People in Home spouse   Current Living Arrangements house;other (see comments)  (split level)   Self-Care   Equipment Currently Used at Home shower chair;other (see comments)  (no grab bars, family working to get commode)   Activity/Exercise/Self-Care Comment Pt. was independent with self care until stent surgery in Dec.   General Information   Onset of Illness/Injury or Date of Surgery 01/23/23   Referring Physician Juma Holguin MD   Patient/Family Therapy Goal Statement (OT) Family(wife) would like pt. 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   Existing Precautions/Restrictions no known precautions/restrictions   Cognitive Status Examination   Orientation Status place  (Not orientated to year but aware of month)   Behavioral Issues other (see comments)  (unable to initiate tasks unless physically prompted)   Affect/Mental Status (Cognitive) confused   Follows Commands follows one-step commands;delayed response/completion;increased processing time needed;physical/tactile prompts required   Visual Perception   Visual Impairment/Limitations other (see comments)  (difficulty using objects in environment when suggested -needed objected handed)   Pain Assessment   Patient Currently in Pain No   Bed Mobility   Bed Mobility sit-supine   Sit-Supine [place] (Bed Mobility) dependent (less than 25% patient effort)   Transfers   Transfers sit-stand transfer   Sit-Stand Transfer   Sit-Stand [place] (Transfers) minimum assist (75% patient effort)   Assistive Device (Sit-Stand Transfers) walker, front-wheeled   Balance   Balance Assessment standing balance: static;standing balance: dynamic    Balance Comments SBA/CGA at sink and with and without walker   Activities of Daily Living   BADL Assessment/Intervention grooming   Grooming Assessment/Training   Position (Grooming) unsupported standing  (CGA)   [place] Level (Grooming) set up;minimum assist (75% patient effort)   Clinical Impression   Criteria for Skilled Therapeutic Interventions Met (OT) Yes, treatment indicated   OT Diagnosis decreased ADL/cognition due to hepatic encephalopathy   OT Problem List-Impairments impacting ADL activity tolerance impaired;cognition;communication;mobility;strength;balance   Assessment of Occupational Performance 3-5 Performance Deficits   Identified Performance Deficits groom/hygiene, transfers, bed mobility, cognition   Planned Therapy Interventions (OT) ADL retraining;balance training;bed mobility training;cognition;progressive activity/exercise;transfer training;strengthening   Clinical Decision Making Complexity (OT) moderate complexity   Anticipated Equipment Needs Upon Discharge (OT) other (see comments)  (grab bars)   Risk & Benefits of therapy have been explained evaluation/treatment results reviewed;patient;spouse/significant other   Clinical Impression Comments Pt. demonstrates decreased ADL's, requiring step by step physical assist, objects placed in hands/demonstration to participate. Recommend continued OT to progress ADL skills and assist with discharge planning needs and family ed. for safe discharge.   OT Total Evaluation Time   OT Eval, Moderate Complexity Minutes (60053) 35   OT Goals   Therapy Frequency (OT) Daily   OT Goals Hygiene/Grooming;Transfers;Cognition;Toilet Transfer/Toileting   OT: Hygiene/Grooming supervision/stand-by assist   OT: Transfer Supervision/stand-by assist   OT: Toilet Transfer/Toileting Supervision/stand-by assist   OT: Cognitive Patient/caregiver will verbalize understanding of cognitive assessment results/recommendations as needed for safe discharge planning    Self-Care/Home Management   Self-Care/Home Mgmt/ADL, Compensatory, Meal Prep Minutes (53061) 10   Symptoms Noted During/After Treatment (Meal Preparation/Planning Training) fatigue   Treatment Detail/Skilled Intervention Pt. ambulated in room/bathroom and rubio with FWW requiring total assist to guide walker as unable to go straight or turn device, per wife he is doing better especially now that he has gotten up and moving   OT Discharge Planning   OT Plan progress ADL's-toileting/G/H, assess cognition-ACL/SLUMS   OT Discharge Recommendation (DC Rec) (S)  home with home care occupational therapy   OT Rationale for DC Rec Wife states they are able to provide 24 hour care   OT Brief overview of current status min assist and set-up, objects placed in hand/ physical cues to initiate tasks   Total Session Time   Timed Code Treatment Minutes 10   Total Session Time (sum of timed and untimed services) 45

## 2023-01-24 NOTE — PROGRESS NOTES
[facility]    Medicine Progress Note - Hospitalist Service    Date of Admission:  1/23/2023    Assessment & Plan     Vito Sy is a 72 year old male admitted on 1/23/2023. 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  He is s/p TIPS procedure on December 9 without complications.  Since being discharged patient has had recurrent symptoms suggestive of hepatic encephalopathy and has not recovered to baseline.  He presents again with ammonia level of 135, confused and disoriented.        Hepatic encephalopathy  -Patient has a history of alcoholic cirrhosis with refractory ascites and portal hypertension.  -He is s/p TIPS on December 9, 2022  -According to family members he is reportedly compliant with rifaximin and lactulose however symptoms remain persistent  -Ammonia improved from 1 35-63 today  -Continue spironolactone and omeprazole  -Consider HI lactulose if unable to tolerate p.o.  -Consult gastroenterology,recommendations appreciated.  Continue rifaximin and lactulose and consider IR consult for downsizing his TIPS        Chronic kidney disease stage IIIa  -Creatinine at baseline  -Normal saline overnight for 1 L       Diet: Combination Diet Low Saturated Fat Na <2400mg Diet, No Caffeine Diet    DVT Prophylaxis: Pneumatic Compression Devices  Cronin Catheter: Not present  Lines: None     Cardiac Monitoring: None  Code Status: Full Code      Clinically Significant Risk Factors Present on Admission           # Hypercalcemia: corrected calcium is >10.1, will monitor as appropriate    # Hypoalbuminemia: Lowest albumin = 3.3 g/dL at 1/24/2023  7:43 AM, will monitor as appropriate  # Coagulation Defect: INR = 1.35 (Ref range: 0.85 - 1.15) and/or PTT = N/A, will monitor for bleeding         # Overweight: Estimated body mass index is 27.28 kg/m  as calculated from the following:    Height as of this encounter: 1.676 m (5'  "6\").    Weight as of this encounter: 76.7 kg (169 lb).           Disposition Plan     Expected Discharge Date: 01/25/2023                  Juma Holguin MD  Hospitalist Service  [facility] [place]  Securely message with [employer] (more info)  Text page via [employer] Paging/Directory   ______________________________________________________________________    Interval History     Patient remains drowsy/lethargic however per family he appears a bit more perky compared to days prior.  Ammonia has improved with lactulose and rifaximin.    Physical Exam   Vital Signs: Temp: 98.3  F (36.8  C) Temp src: Oral BP: (!) 142/70 Pulse: 52   Resp: 16 SpO2: 100 % O2 Device: None (Room air)    Weight: 169 lbs 0 oz      Physical Exam  HENT:      Head: Normocephalic.      Nose: Nose normal.      Mouth/Throat:      Mouth: Mucous membranes are moist.   Eyes:      General: Scleral icterus present.      Pupils: Pupils are equal, round, and reactive to light.   Cardiovascular:      Rate and Rhythm: Normal rate.      Pulses: Normal pulses.   Pulmonary:      Effort: Pulmonary effort is normal.   Abdominal:      General: Abdomen is flat.   Musculoskeletal:         General: Normal range of motion.      Cervical back: Normal range of motion.   Skin:     General: Skin is warm.      Capillary Refill: Capillary refill takes less than 2 seconds.   Neurological:      Mental Status: He is lethargic and disoriented.           Medical Decision Making       35 MINUTES SPENT BY ME on the date of service doing chart review, history, exam, documentation & further activities per the note.      Data     I have personally reviewed the following data over the past 24 hrs:    5.5  \   10.6 (L)   / 130 (L)     140 109 (H) 23.0 /  119 (H)   4.4 20 (L) 1.46 (H) \       ALT: 29 AST: 40 AP: 157 (H) TBILI: 1.7 (H)   ALB: 3.3 (L) TOT PROTEIN: 7.0 LIPASE: 156 (H)       TSH: 0.92 T4: N/A A1C: N/A       Procal: N/A CRP: N/A Lactic Acid: 2.0       INR:  1.35 " (H) PTT:  N/A   D-dimer:  N/A Fibrinogen:  N/A       Imaging results reviewed over the past 24 hrs:   Recent Results (from the past 24 hour(s))   XR Chest Port 1 View    Narrative    EXAM: XR CHEST PORT 1 VIEW  LOCATION: [facility]  DATE/TIME: 1/24/2023 10:16 AM    INDICATION: Encephalopathy  COMPARISON: 01/06/2023.      Impression    IMPRESSION: The lungs are clear. The heart size and pulmonary vascularity are normal. No pneumothorax or pleural effusion. There has been improvement in the perihilar vascular congestion seen on the prior study.

## 2023-01-24 NOTE — ED NOTES
Per patient's wife, patient has been had altered mental status on-and-off since December after he had a liver stent placed.  Patient has been seen both here and at [facility] for similar situations in the last month with elevated ammonia levels.

## 2023-01-25 ENCOUNTER — APPOINTMENT (OUTPATIENT)
Dept: PHYSICAL THERAPY | Facility: HOSPITAL | Age: 73
DRG: 987 | End: 2023-01-25
Payer: COMMERCIAL

## 2023-01-25 LAB
ALBUMIN SERPL BCG-MCNC: 3.3 G/DL (ref 3.5–5.2)
ALP SERPL-CCNC: 157 U/L (ref 40–129)
ALT SERPL W P-5'-P-CCNC: 29 U/L (ref 10–50)
AMMONIA PLAS-SCNC: 68 UMOL/L (ref 16–60)
ANION GAP SERPL CALCULATED.3IONS-SCNC: 12 MMOL/L (ref 7–15)
AST SERPL W P-5'-P-CCNC: 40 U/L (ref 10–50)
BACTERIA UR CULT: NORMAL
BILIRUB DIRECT SERPL-MCNC: 0.59 MG/DL (ref 0–0.3)
BILIRUB SERPL-MCNC: 1.7 MG/DL
BUN SERPL-MCNC: 21.2 MG/DL (ref 8–23)
CALCIUM SERPL-MCNC: 9.1 MG/DL (ref 8.8–10.2)
CHLORIDE SERPL-SCNC: 109 MMOL/L (ref 98–107)
CREAT SERPL-MCNC: 1.53 MG/DL (ref 0.67–1.17)
DEPRECATED HCO3 PLAS-SCNC: 20 MMOL/L (ref 22–29)
GFR SERPL CREATININE-BSD FRML MDRD: 48 ML/MIN/1.73M2
GLUCOSE SERPL-MCNC: 109 MG/DL (ref 70–99)
INR PPP: 1.38 (ref 0.85–1.15)
POTASSIUM SERPL-SCNC: 4.3 MMOL/L (ref 3.4–5.3)
PROT SERPL-MCNC: 7 G/DL (ref 6.4–8.3)
SODIUM SERPL-SCNC: 141 MMOL/L (ref 136–145)

## 2023-01-25 PROCEDURE — 99231 SBSQ HOSP IP/OBS SF/LOW 25: CPT | Performed by: PHYSICIAN ASSISTANT

## 2023-01-25 PROCEDURE — 82140 ASSAY OF AMMONIA: CPT | Performed by: HOSPITALIST

## 2023-01-25 PROCEDURE — 36415 COLL VENOUS BLD VENIPUNCTURE: CPT | Performed by: HOSPITALIST

## 2023-01-25 PROCEDURE — 99233 SBSQ HOSP IP/OBS HIGH 50: CPT | Performed by: HOSPITALIST

## 2023-01-25 PROCEDURE — 120N000001 HC R&B MED SURG/OB

## 2023-01-25 PROCEDURE — 82310 ASSAY OF CALCIUM: CPT | Performed by: HOSPITALIST

## 2023-01-25 PROCEDURE — 85610 PROTHROMBIN TIME: CPT | Performed by: HOSPITALIST

## 2023-01-25 PROCEDURE — 250N000013 HC RX MED GY IP 250 OP 250 PS 637: Performed by: HOSPITALIST

## 2023-01-25 PROCEDURE — 97530 THERAPEUTIC ACTIVITIES: CPT | Mod: GP

## 2023-01-25 PROCEDURE — 250N000013 HC RX MED GY IP 250 OP 250 PS 637: Performed by: INTERNAL MEDICINE

## 2023-01-25 PROCEDURE — 97116 GAIT TRAINING THERAPY: CPT | Mod: GP

## 2023-01-25 RX ORDER — CEFTRIAXONE 1 G/1
1 INJECTION, POWDER, FOR SOLUTION INTRAMUSCULAR; INTRAVENOUS
Status: COMPLETED | OUTPATIENT
Start: 2023-01-25 | End: 2023-01-26

## 2023-01-25 RX ORDER — POLYETHYLENE GLYCOL 3350 17 G/17G
17 POWDER, FOR SOLUTION ORAL 3 TIMES DAILY
Status: DISCONTINUED | OUTPATIENT
Start: 2023-01-25 | End: 2023-01-27 | Stop reason: HOSPADM

## 2023-01-25 RX ADMIN — LACTULOSE 20 G: 10 SOLUTION ORAL at 07:03

## 2023-01-25 RX ADMIN — Medication 1 MG: at 01:15

## 2023-01-25 RX ADMIN — LACTULOSE 20 G: 10 SOLUTION ORAL at 01:15

## 2023-01-25 RX ADMIN — PANTOPRAZOLE SODIUM 40 MG: 40 TABLET, DELAYED RELEASE ORAL at 07:03

## 2023-01-25 RX ADMIN — SPIRONOLACTONE 25 MG: 25 TABLET, FILM COATED ORAL at 08:22

## 2023-01-25 RX ADMIN — PANTOPRAZOLE SODIUM 40 MG: 40 TABLET, DELAYED RELEASE ORAL at 15:54

## 2023-01-25 RX ADMIN — POLYETHYLENE GLYCOL 3350 17 G: 17 POWDER, FOR SOLUTION ORAL at 15:55

## 2023-01-25 RX ADMIN — RIFAXIMIN 550 MG: 550 TABLET ORAL at 08:22

## 2023-01-25 RX ADMIN — LACTULOSE 20 G: 10 SOLUTION ORAL at 21:44

## 2023-01-25 RX ADMIN — RIFAXIMIN 550 MG: 550 TABLET ORAL at 20:38

## 2023-01-25 RX ADMIN — POLYETHYLENE GLYCOL 3350 17 G: 17 POWDER, FOR SOLUTION ORAL at 08:21

## 2023-01-25 RX ADMIN — LACTULOSE 20 G: 10 SOLUTION ORAL at 17:27

## 2023-01-25 RX ADMIN — SIMVASTATIN 20 MG: 10 TABLET, FILM COATED ORAL at 21:44

## 2023-01-25 RX ADMIN — LACTULOSE 20 G: 10 SOLUTION ORAL at 09:35

## 2023-01-25 RX ADMIN — LACTULOSE 20 G: 10 SOLUTION ORAL at 13:58

## 2023-01-25 RX ADMIN — POLYETHYLENE GLYCOL 3350 17 G: 17 POWDER, FOR SOLUTION ORAL at 20:37

## 2023-01-25 ASSESSMENT — ACTIVITIES OF DAILY LIVING (ADL)
TOILETING_MANAGEMENT: A1
DRESS: 1-->ASSISTANCE (EQUIPMENT/PERSON) NEEDED
EQUIPMENT_CURRENTLY_USED_AT_HOME: WALKER, STANDARD;SHOWER CHAIR
ADLS_ACUITY_SCORE: 37
ADLS_ACUITY_SCORE: 37
ADLS_ACUITY_SCORE: 41
ADLS_ACUITY_SCORE: 37
WEAR_GLASSES_OR_BLIND: YES
TOILETING_ISSUES: YES
ADLS_ACUITY_SCORE: 44
TOILETING: 1-->ASSISTANCE (EQUIPMENT/PERSON) NEEDED (NOT DEVELOPMENTALLY APPROPRIATE)
WALKING_OR_CLIMBING_STAIRS: AMBULATION DIFFICULTY, REQUIRES EQUIPMENT
ADLS_ACUITY_SCORE: 37
DRESSING/BATHING_DIFFICULTY: YES
TOILETING: 1-->ASSISTANCE (EQUIPMENT/PERSON) NEEDED
ADLS_ACUITY_SCORE: 44
FALL_HISTORY_WITHIN_LAST_SIX_MONTHS: NO
DOING_ERRANDS_INDEPENDENTLY_DIFFICULTY: YES
DRESS: 0-->ASSISTANCE NEEDED (DEVELOPMENTALLY APPROPRIATE)
BATHING: 1-->ASSISTANCE NEEDED
DIFFICULTY_EATING/SWALLOWING: NO
CONCENTRATING,_REMEMBERING_OR_MAKING_DECISIONS_DIFFICULTY: YES
WALKING_OR_CLIMBING_STAIRS_DIFFICULTY: YES
ADLS_ACUITY_SCORE: 44
ADLS_ACUITY_SCORE: 41
TRANSFERRING: 0-->ASSISTANCE NEEDED (DEVELOPMENTALLY APPROPRIATE)
ADLS_ACUITY_SCORE: 37
DRESSING/BATHING: DRESSING DIFFICULTY, ASSISTANCE 1 PERSON
CHANGE_IN_FUNCTIONAL_STATUS_SINCE_ONSET_OF_CURRENT_ILLNESS/INJURY: YES
VISION_MANAGEMENT: GLASSESS
ADLS_ACUITY_SCORE: 41
DRESSING/BATHING_MANAGEMENT: A1
TOILETING_ASSISTANCE: TOILETING DIFFICULTY, ASSISTANCE 1 PERSON
TRANSFERRING: 1-->ASSISTANCE (EQUIPMENT/PERSON) NEEDED
ADLS_ACUITY_SCORE: 44

## 2023-01-25 NOTE — PROGRESS NOTES
"As part of the Palliative Care Team, I followed up with pt and his spouse. Zaire is Rastafarian, Naomi is Evangelical. Yojana is paramount in how they experience the world and its ups and downs. Belief in the power and wisdom of God, Rafi and the Holy Spirit is shared.    Zaire was responsive and able to acknowledge that a  was present. He soon became lethargic, perhaps a consequence of medication. His head drooped as he sat upright in the recliner. Naomi encouraged him to wake to no avail. Naomi is demonstrative in her affection.    Naomi and I discussed her yojana and what gave her strength. We discussed the belief that \"God's will be done\" and how difficult it is when that will is not our own. Naomi seemed to understand the [place] of life and heaven awaiting, an eternity with God. She has experienced a near death experience while pregnant with her second child. The baby  and she lived. She has vivid recall of being with Rafi in heaven. The power of God is real for her.    Naomi is hopeful that Zaire might have a liver transplant. If this cannot happen, she would like her family to provide as much comfort as possible. There did not appear resistance to the thought that Zaire's body may not recover.    I offered prayer and we prayed the Lord's Prayer aloud together. Zaire remained sleeping.    Spiritual Care is available as needed or requested.    ANDREW Zabala.  Palliative Care Team  "

## 2023-01-25 NOTE — PROGRESS NOTES
"[facility]  Palliative Care Daily Progress Note       Recommendations & Counseling     Zaire is more alert today but remains confused and sleepy. Discussed advanced care planning with Naomi and provided with HCD form to complete if/when Zaire is at cognitive baseline.    Goals of Care: life-prolonging     Advanced Care Planning:  Advance directive: No  POLST: No  Code status: Full Code  Surrogate: Naomi (spouse)    Symptom Management:  #Altered mental status   - Attributed to hepatic encephalopathy s/p TIPS last month  - GI planning to increase bowel regimen and consider downsizing TIPS    Psychosocial & Spiritual:   - Supported by wife Naomi and their 3 adult sons. Naomi and her son and daughter-in-law take care of Zaire at home.  - Zaire and Naomi immigrated from [place] in [place]. They both have 12 siblings  - Zaire is Samaritan and deeply Zoroastrian, he and Naomi would appreciate Spiritual Care      Assessments          Zaire is a 71 y/o man with cirrhosis complicated by refractory ascites and portal hypertension now s/p TIPS 12/9/2022, CKD3, HTN and gout, admitted 1/23 for recurrent hepatic encephalopathy.    Today, the patient was seen for:  Advanced care planning, emotional support, symptom management    Prognosis, Goals, or Advance Care Planning was addressed today with: Yes.  Mood, coping, and/or meaning in the context of serious illness were addressed today: No.              Interval History:     Chart review/discussion with unit or clinical team members:   - Per RN notes getting up to bathroom with walker  - 2 BMs documented  - PT recs TCU    Per patient or family/caregivers today:  Zaire is sitting up on edge of bed with wife Naomi at bedside. She is helping him use walker to sit in chair. Zaire is more alert but still sleepy. He responds slowly to verbal cues. Zaire knows he is at \"[place]\" but is unable to articulate what brought him here or what month it is. He denies any " pain or dyspnea.    Provided Naomi with HCD forms and reviewed together. Explained that if/when Zaire is more alert, we would encourage them to complete together.              Review of Systems:     Unable to assess          Medications:     Lactulose PO or MN q4h  Miralax BID  Rifaximin BID           Physical Exam:   VSS stable  GENERAL: sitting at edge of bed, A&Ox2, delayed responses  SKIN: Warm and dry   HEENT: Normocephalic, anicteric sclera, moist mucous membranes  LUNGS: non-labored   MUSKL: no gross joint deformities   EXTREMITIES: No edema  NEUROLOGIC: A&Ox2, delayed responses, follows commands             Data Reviewed:     Reviewed recent pertinent imaging:   No new imaging    Reviewed recent labs:   Creatinine 1.53 (uptrending), BUN stable  INR 1.38       Cristina Bermudez PA-C  [facility], Palliative Care  Dept phone: [phone]  Securely message with the Vocera Web Console

## 2023-01-25 NOTE — PLAN OF CARE
Problem: Thought Process Alteration  Goal: Optimal Thought Clarity  Outcome: Progressing  Intervention: Minimize Safety Risk and Altered Thought   Pt now alert and oriented x3, was awake until about 4am calling out for his wife.  Taking scheduled lactulose - has had 2 bms overnight.     Problem: Muscle Strength Impairment  Goal: Improved Muscle Strength  Outcome: Progressing  Intervention: Optimize Muscle Strength   Pt up to bathroom with walker and gait belt, unsteady gait.

## 2023-01-25 NOTE — PROGRESS NOTES
"[facility]    Medicine Progress Note - Hospitalist Service    Date of Admission:  1/23/2023    Assessment & Plan     Vito Sy is a 72 year old male admitted on 1/23/2023. 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  He is s/p TIPS procedure on December 9 without complications.  Since being discharged patient has had recurrent symptoms suggestive of hepatic encephalopathy and has not recovered to baseline.  He presents again with ammonia level of 135, confused and disoriented.        Hepatic encephalopathy  End-stage liver cirrhosis  -Patient has a history of alcoholic cirrhosis with refractory ascites and portal hypertension.  -He is s/p TIPS on December 9, 2022  -According to family members he is reportedly compliant with rifaximin and lactulose however symptoms remain persistent  -Ammonia improved   -Continue spironolactone and omeprazole  -Consult gastroenterology,recommendations appreciated.  Continue rifaximin and lactulose  -IR consult for downsizing his TIPS        Chronic kidney disease stage IIIa  -Creatinine at baseline  -Avoid nephrotoxic agents         Diet: Combination Diet Low Saturated Fat Na <2400mg Diet, No Caffeine Diet    DVT Prophylaxis: Pneumatic Compression Devices  Cronin Catheter: Not present  Lines: None     Cardiac Monitoring: None  Code Status: Full Code      Clinically Significant Risk Factors           # Hypercalcemia: corrected calcium is >10.1, will monitor as appropriate    # Hypoalbuminemia: Lowest albumin = 3.3 g/dL at 1/24/2023  7:43 AM, will monitor as appropriate            # Overweight: Estimated body mass index is 27.28 kg/m  as calculated from the following:    Height as of this encounter: 1.676 m (5' 6\").    Weight as of this encounter: 76.7 kg (169 lb)., PRESENT ON ADMISSION         Disposition Plan     Expected Discharge Date: 01/25/2023                  Juma Holguin " MD  Hospitalist Service  [facility] [place]  Securely message with [employer] (more info)  Text page via [employer] Paging/Directory   ______________________________________________________________________    Interval History     Patient appears clinically improved.  He is able to answer most questions appropriately today although he remains lethargic.  No nausea, vomiting or diarrhea.  Responding well to lactulose and rifaximin.  We will consult IR today.    Physical Exam   Vital Signs: Temp: 97.9  F (36.6  C) Temp src: Oral BP: 98/55 Pulse: 81   Resp: 16 SpO2: 100 % O2 Device: None (Room air)    Weight: 169 lbs 0 oz      Physical Exam  HENT:      Head: Normocephalic.      Nose: Nose normal.      Mouth/Throat:      Mouth: Mucous membranes are moist.   Eyes:      General: Scleral icterus present.      Pupils: Pupils are equal, round, and reactive to light.   Cardiovascular:      Rate and Rhythm: Normal rate.      Pulses: Normal pulses.   Pulmonary:      Effort: Pulmonary effort is normal.   Abdominal:      General: Abdomen is flat.   Musculoskeletal:         General: Normal range of motion.      Cervical back: Normal range of motion.   Skin:     General: Skin is warm.      Capillary Refill: Capillary refill takes less than 2 seconds.   Neurological:      Mental Status: He is lethargic and disoriented.           Medical Decision Making       35 MINUTES SPENT BY ME on the date of service doing chart review, history, exam, documentation & further activities per the note.      Data     I have personally reviewed the following data over the past 24 hrs:    N/A  \   N/A   / N/A     141 109 (H) 21.2 /  109 (H)   4.3 20 (L) 1.53 (H) \       ALT: N/A AST: N/A AP: N/A TBILI: N/A   ALB: N/A TOT PROTEIN: N/A LIPASE: N/A       INR:  1.38 (H) PTT:  N/A   D-dimer:  N/A Fibrinogen:  N/A       Imaging results reviewed over the past 24 hrs:   No results found for this or any previous visit (from the past 24 hour(s)).

## 2023-01-25 NOTE — PLAN OF CARE
"Goal Outcome Evaluation:      Problem: Plan of Care - These are the overarching goals to be used throughout the patient stay.    Goal: Plan of Care Review  Description: The Plan of Care Review/Shift note should be completed every shift.  The Outcome Evaluation is a brief statement about your assessment that the patient is improving, declining, or no change.  This information will be displayed automatically on your shift note.  Outcome: Progressing  Goal: Patient-Specific Goal (Individualized)  Description: You can add care plan individualizations to a care plan. Examples of Individualization might be:  \"Parent requests to be called daily at 9am for status\", \"I have a hard time hearing out of my right ear\", or \"Do not touch me to wake me up as it startles me\".  Outcome: Progressing  Goal: Absence of Hospital-Acquired Illness or Injury  Outcome: Progressing  Intervention: Identify and Manage Fall Risk  Recent Flowsheet Documentation  Taken 1/24/2023 1640 by Elizabeth Bradley RN  Safety Promotion/Fall Prevention: activity supervised  Taken 1/24/2023 1330 by Elizabeth Bradley RN  Safety Promotion/Fall Prevention: activity supervised  Intervention: Prevent and Manage VTE (Venous Thromboembolism) Risk  Recent Flowsheet Documentation  Taken 1/24/2023 1640 by Elizabeth Bradley RN  VTE Prevention/Management: SCDs (sequential compression devices) on  Taken 1/24/2023 1330 by Elizabeth Bradley RN  VTE Prevention/Management: SCDs (sequential compression devices) on  Goal: Optimal Comfort and Wellbeing  Outcome: Progressing  Goal: Readiness for Transition of Care  Outcome: Progressing     Pt has been vitally stable, R/A, family has been helpful in cares.  Pt continues to be drowsy and confused, not able to participate in answering questions.  Pt did participate in PT/OT.  Pt had 1 BM, no bloody stools.                        "

## 2023-01-25 NOTE — PLAN OF CARE
"  Problem: Plan of Care - These are the overarching goals to be used throughout the patient stay.    Goal: Plan of Care Review  Description: The Plan of Care Review/Shift note should be completed every shift.  The Outcome Evaluation is a brief statement about your assessment that the patient is improving, declining, or no change.  This information will be displayed automatically on your shift note.  Outcome: Progressing  Goal: Patient-Specific Goal (Individualized)  Description: You can add care plan individualizations to a care plan. Examples of Individualization might be:  \"Parent requests to be called daily at 9am for status\", \"I have a hard time hearing out of my right ear\", or \"Do not touch me to wake me up as it startles me\".  Outcome: Progressing  Goal: Absence of Hospital-Acquired Illness or Injury  Outcome: Progressing  Intervention: Identify and Manage Fall Risk  Recent Flowsheet Documentation  Taken 1/25/2023 0819 by Amos Vu RN  Safety Promotion/Fall Prevention: activity supervised  Intervention: Prevent Skin Injury  Recent Flowsheet Documentation  Taken 1/25/2023 0819 by Amos Vu RN  Body Position: turned  Intervention: Prevent and Manage VTE (Venous Thromboembolism) Risk  Recent Flowsheet Documentation  Taken 1/25/2023 0819 by Amos Vu RN  VTE Prevention/Management: SCDs (sequential compression devices) on  Goal: Optimal Comfort and Wellbeing  Outcome: Progressing   Goal Outcome Evaluation:    Pt alert, denied any discomfort. Pt ambulates to the restroom with assist of 1,  a rolling walker, gait belt. Lung sounds clear, VSS, eating and voiding well. Pt had x 2 loose stools, no nausea and denied abdominal pain. Report called to P 1, patient and spouse aware of transfer to unit.                 "

## 2023-01-25 NOTE — CONSULTS
"  Interventional Radiology - Consultation Note:  Inpatient - [facility]: Interventional Radiology   [phone]  1/25/2023    Reason for Consult:  \"S/p TIPS 1 month ago with recurrent encephalopathy\"  Requesting Provider:  Juma Holguin MD    HPI:  Vito Sy is a 72 year old male PMH cirrhosis, ascites, portal HTN, s/p TIPS 12/9/2022, HTN gout, who admitted 1/23/23 2/2 recurrent encephalopathy.    Per chart review, patient was taking Rifaximin and Lactulose as prescribed.  GI consulted, recommended tips revision and increasing Miralax.      IMAGING:    Narrative & Impression   EXAM: US ABDOMEN LIMITED WITH TIPSS DOPPLER  LOCATION: [facility]  DATE/TIME: 12/20/2022 12:45 PM     INDICATION: recent TIPS 12 9 with new hepatic encephalopathy and bacteremia  COMPARISON: None.  TECHNIQUE: Limited abdominal ultrasound. Color flow with spectral Doppler and waveform analysis performed.     FINDINGS:     GALLBLADDER: Gallstones in an otherwise normal gallbladder. No wall thickening, or pericholecystic fluid. Negative sonographic Mcdaniel's sign.      BILE DUCTS: No biliary dilatation.      LIVER: Coarsened echotexture, suggesting underlying fibrosis. Nodular contour. No focal mass.      LEFT KIDNEY: No hydronephrosis.      PANCREAS: The visualized portions are normal.     Mild perihepatic ascites.     ABDOMINAL DUPLEX: The hepatic artery, hepatic veins, IVC, portal veins, and splenic vein are patent with flow in the normal direction.      TIPS velocities (centimeters per second):  Proximal to stent: 33  portal venous and: 190  mid stent: 182  hepatic venous and: 123  distal to stent: 133                                                                      IMPRESSION:  1.  Cirrhotic liver morphology with the sequela portal hypertension including ascites.  2.  Patent hepatic vessels with appropriately directed flow. Note is made of a patent TIPS without sonographic evidence of " stenosis.       [facility]     DATE: 12/9/2022     PROCEDURE: TRANSJUGULAR INTRAHEPATIC PORTOSYSTEMIC SHUNT (TIPS) PLACEMENT.   1.  Ultrasound-guided access of the right internal jugular vein. A permanent image was stored.  2.  Digital subtraction right hepatic venography.  3.  Digital subtraction wedge CO2 portal venography.  4.  Transjugular intrahepatic portosystemic needle passage.  5.  Digital subtraction portal venography.  6.  Portosystemic pre-TIPS intravenous pressure monitoring.  7.  Placement of a TIPS (8-10 mm x 8 cm x 2 cm Viatorr, controlled expansion).  8.  Portosystemic post TIPS intravenous pressure monitoring.     INTERVENTIONAL RADIOLOGIST: Austin Desai M.D.     INDICATION: 72-year-old male with history of cirrhosis and the sequela portal hypertension including recurrent abdominal ascites. TIPS was attempted on 11/6/2022 although was unsuccessful. Patient presents again for transjugular intrahepatic   portosystemic shunt.     MODERATE SEDATION: General endotracheal anesthesia.     FLUOROSCOPIC TIME: 31.7 minutes.     AIR KERMA: 1763     CONTRAST: 80 mL Omnipaque.     COMPLICATIONS: No immediate complications.     PROCEDURE/TECHNIQUE:    The patient was brought to the Interventional Radiology suite, placed in the supine position, and a timeout was performed.      The right side of the patient's neck was then sterilely prepped and draped. After giving local anesthesia, using ultrasound guidance, access was achieved into the internal jugular vein using a 21-gauge needle. A permanent image was stored for the record.   A 0.018 inch wire was advanced through the needle. The needle was then exchanged for a 4 Malian coaxial dilator. The inner 3 Malian dilator and 0.018 inch wire were then exchanged for a 0.035 inch Amplatz wire. The Amplatz wire was then navigated into   the IVC. The 4 Malian dilator was removed and dilatation was performed over the Amplatz wire with a 10 Malian dilator  followed by placement of a 10 Mauritian sheath.The tip of which was advanced into the right atrium. The sheath was then attached to a   saline drip. Through the sheath, a 5 Mauritian MPA catheter was used to obtain a pressure in the right atrium. The catheter was then advanced into the renal IVC followed by the hepatic IVC where pressure measurements were obtained. The catheter was then   manipulated into a rightward hepatic vein with location confirmed fluoroscopically and by brief venogram. Free and wedged hepatic vein pressures were obtained, with wedging confirmed by brief gentle contrast injection.      The pressures, in mean mmHg were: right atrium 20, renal IVC 23, hepatic IVC 24, free hepatic vein 25, wedged hepatic vein 27. Findings are likely consistent with compensated portal hypertension.     Following this, with the catheter wedged, CO2 portogram was then performed in the AP projection. Portal venous anatomy was mapped out using these images. The Amplatz wire was advanced through the MPA catheter in the hepatic vein, and the catheter was   removed over the wire. The dilator to the sheath was advanced over the wire and the sheath was advanced into the hepatic vein. Next the dilator was exchanged over the Amplatz wire for the Colapinto needle. Several passes were made into the liver with the   needle prepped anteriorly when a right portal vein branch was entered successfully. Position of the needle within a portal vein branch was confirmed by contrast injection through the needle. A 0.035 inch angled Glidewire was advanced through the needle   and directed into the main portal vein. The needle and introducer were then exchanged over the Glidewire for a 4 Mauritian Glidecath which was advanced over the wire into the main portal vein.      The direct portal pressure was measured at this point to be 26 mmHg, again suggestive of compensated portal hypertension.     Digital portal venography was then performed which  demonstrated hepatopedal blood flow within the main portal vein.      Subsequently, the Glidecath was removed over 8 0.035 inch Nitrex wire. A small amount of contrast was administered through the 10 Maltese sheath to identify the point at which the parenchymal tract began. Next a 7 mm x 4 cm angioplasty balloon was   advanced over the wire and used to dilate the hepatic parenchymal tract. The balloon was then exchanged over the wire for a 5 Maltese marked pigtail catheter was then advanced into the main portal vein. Digital angiography was then performed, the the   right anterior oblique projection, through the sheath (to define the hepatic vein/parenchymal tract interface) as well as through the pigtail catheter ( to define the parenchymal tract/portal vein interface). A measurement of the length of the   parenchymal tract was also taken at this time.      Next, the pigtail catheter was exchanged over the wire for the dilator for the 10 Maltese sheath and the sheath was able to be advanced into the main portal vein. An 8-10 mm x 8 cm controlled expansion Viatorr partially covered stent (8 cm covered and 2   cm uncovered) was selected for placement, the stent was placed through the sheath and deployed from the portal vein, across the parenchymal track, into the hepatic vein, with the covered portion extending across the hepatic vein and parenchymal tract,   and slightly into the portal vein.      Angioplasty was performed throughout the TIPS using a and 8 mm x 8 cm balloon. The multipurpose catheter was advanced through the TIPS into the main portal vein. Pressure measurements were obtained. The pressures, in mean mmHg were: main portal vein 28,   proximal TIPS 28, mid TIPS 27, distal TIPS 25, and right atrium 24.      A completion portogram was performed. The decision was made to conclude the procedure. All wires and catheters were removed. The indwelling 10 Maltese sheath was removed and hemostasis was achieved with  "manual compression.         FINDINGS:     The digital subtraction right hepatic venography shows the catheter to lie within the periphery of the hepatic vein. The CO2 venography shows a normal caliber of portal vein with antegrade flow.     Following angioplasty and placement of the TIPS the stent is shown to be widely patent.                                                                       IMPRESSION:    1.  Successful placement of a TIPS extending from the right hepatic vein to the right portal vein, as detailed above.   2. Portosystemic gradient at the conclusion of the procedure measured 4 mmHg.    NPO Status:  MN ordered  Anticoagulation/Antiplatelets/Bleeding tendencies:  No anticoagulation  Antibiotics:  Will need Rocephin 1g IV x1 for IR procedural ppxs     REVIEW OF SYSTEMS:  A comprehensive 10-point review of systems was performed. All systems were reviewed and negative with exception to those reported in the HPI.     PAST MEDICAL HISTORY:  Past Medical History:   Diagnosis Date     Diabetes mellitus (H)      Gout      Hypertension      Kidney stone        PAST SURGICAL HISTORY:  Past Surgical History:   Procedure Laterality Date     COLONOSCOPY       IR TRANSVEN INTRAHEPATIC PORTOSYST SHUNT  11/16/2022     IR TRANSVEN INTRAHEPATIC PORTOSYST SHUNT  12/9/2022     AL ESOPHAGOGASTRODUODENOSCOPY TRANSORAL DIAGNOSTIC N/A 11/16/2020    Procedure: ESOPHAGOGASTRODUODENOSCOPY (EGD);  Surgeon: Juan Garnett MD;  Location: [facility];  Service: Gastroenterology     AL ESOPHAGOGASTRODUODENOSCOPY TRANSORAL DIAGNOSTIC N/A 11/18/2020    Procedure: ESOPHAGOGASTRODUODENOSCOPY (EGD) WITH BANDING;  Surgeon: Juan Garnett MD;  Location: [facility];  Service: Gastroenterology     URETEROSCOPY         ALLERGIES:  Allergies   Allergen Reactions     Sulfa Drugs Shortness Of Breath and Itching     Penicillins Unknown     Annotation: discussed with patient, he reports he had \"itching\" with penicillin many years ago " in [facility] but no hives or throat or respiratory symptoms.  he also reports having tolerated amoxicillin since coming to [place].          MEDICATIONS:  Current Facility-Administered Medications   Medication     acetaminophen (TYLENOL) tablet 650 mg    Or     acetaminophen (TYLENOL) Suppository 650 mg     bisacodyl (DULCOLAX) suppository 10 mg     bismuth subsalicylate (PEPTO BISMOL) chewable tablet 262 mg     calcium carbonate (TUMS) chewable tablet 500-1,000 mg     carboxymethylcellulose PF (REFRESH LIQUIGEL) 1 % ophthalmic gel 1 drop     Daily 2 GRAM acetaminophen limit, unless fulminent liver failure or transaminases greater than or equal to 300 - 400, then none     lactulose (CHRONULAC) rectal enema 200 g    Or     lactulose (CHRONULAC) solution 20 g     lidocaine (LMX4) cream     lidocaine 1 % 0.1-1 mL     melatonin tablet 1 mg     ondansetron (ZOFRAN ODT) ODT tab 4 mg    Or     ondansetron (ZOFRAN) injection 4 mg     pantoprazole (PROTONIX) EC tablet 40 mg     polyethylene glycol (MIRALAX) Packet 17 g     polyethylene glycol (MIRALAX) Packet 17 g     rifaximin (XIFAXAN) tablet 550 mg     simvastatin (ZOCOR) tablet 20 mg     sodium chloride (PF) 0.9% PF flush 3 mL     sodium chloride (PF) 0.9% PF flush 3 mL     spironolactone (ALDACTONE) tablet 25 mg     Current Outpatient Medications   Medication     acetaminophen (TYLENOL) 500 MG tablet     Aromatic Inhalants (RA MENTHOL NASAL INHALER) INHA     bismuth subsalicylate (PEPTO BISMOL) 262 MG chewable tablet     calcium carbonate (TUMS) 500 MG chewable tablet     hypromellose (ARTIFICIAL TEARS) 0.5 % SOLN ophthalmic solution     lactulose (CHRONULAC) 10 GM/15ML solution     omeprazole (PRILOSEC) 20 MG DR capsule     rifaximin (XIFAXAN) 550 MG TABS tablet     simvastatin (ZOCOR) 20 MG tablet     spironolactone (ALDACTONE) 25 MG tablet        LABS:  INR   Date Value Ref Range Status   01/25/2023 1.38 (H) 0.85 - 1.15 Final     INR (External)   Date Value Ref Range Status  "  04/11/2022 1.1 0.9 - 1.2 Final      Hemoglobin   Date Value Ref Range Status   01/24/2023 10.6 (L) 13.3 - 17.7 g/dL Final   ]  Platelet Count   Date Value Ref Range Status   01/24/2023 130 (L) 150 - 450 10e3/uL Final     Creatinine   Date Value Ref Range Status   01/25/2023 1.53 (H) 0.67 - 1.17 mg/dL Final     Potassium   Date Value Ref Range Status   01/25/2023 4.3 3.4 - 5.3 mmol/L Final   04/27/2022 3.9 3.5 - 5.0 mmol/L Final       Liver Function Studies - Recent Labs   Lab Test 01/24/23  0743   PROTTOTAL 7.0  7.0   ALBUMIN 3.3*  3.3*   BILITOTAL 1.7*  1.7*   ALKPHOS 157*  157*   AST 40  40   ALT 29  29     ABO/RH BPOS, antibody screen NEGATIVE 12/9      EXAM:  BP 98/55 (BP Location: Right arm, Patient Position: Sitting, Cuff Size: Adult Regular)   Pulse 81   Temp 97.9  F (36.6  C) (Oral)   Resp 16   Ht 1.676 m (5' 6\")   Wt 76.7 kg (169 lb)   SpO2 100%   BMI 27.28 kg/m    General:  Stable.  In no acute distress.    Neuro: Minimally interactive with this writer  Resp:  Lungs clear to auscultation bilaterally.  Cardio:  S1S2 and reg, without murmur, clicks or rubs  Abdomen:  Soft, non-distended, non-tender, positive bowel sounds.    Skin:  Without excoriations, ecchymosis, erythema, lesions or open sores on neck and RIGHT groin.    RLE good perfusion, pulses 2+.    Pre-Sedation Assessment:  Mallampati Airway Classification:  II - Faucial pillars and soft palate may be seen, but uvula is masked by the base of the tongue  Previous reaction to anesthesia/sedation:  No  Sedation plan based on assessment: Moderate (conscious) sedation  ASA Classification: Class 3 - SEVERE SYSTEMIC DISEASE, DEFINITE FUNCTIONAL LIMITATIONS.   Code Status/Advanced care directive: Full Code intra procedure, per discussion with wife.     ASSESSMENT:  71 yo M    - PMH per above s/p TIPS, admitted with encephalopathy   - Labs reviewed, acceptable to proceed with IR procedure, per clinical practice guidelines      PLAN:    Proceed " with transjugular intrahepatic portosystemic shunt revision, with sedation    - 1/26/2023 PENDING AM [facility]  - Case discussed with Dr. Bayron May 1g IV x1 for IR procedural ppxs   - NPO MN  - Consented, in chart      Recommendations, its risks/benefits, details and alternatives were discussed with patient's wife, all questions answered and she verbalized understanding. OK to proceed with above recommended radiology procedure.     Thank you for kindly for this consultation.     Total time spent on the date of the encounter is 30 minutes, including time spent counseling the patient, performing a medically appropriate evaluation, reviewing prior medical history, ordering medications and tests, documenting clinical information in the medical record, and communication of results.    ROBERT MO NP  Interventional Radiology  [phone]        E/M codes for reference only:  53352

## 2023-01-25 NOTE — PROGRESS NOTES
[facility] DIGESTIVE HEALTH PROGRESS NOTE      Subjective:              Mental status has improved.  He is still weak and lethargic.  No asterixis.  Only 3 bowel movements this since I saw him yesterday.    Objective:                  Vital signs in last 24 hrs;  Temp:  [98.1  F (36.7  C)-98.5  F (36.9  C)] 98.5  F (36.9  C)  Pulse:  [52-78] 71  Resp:  [16-20] 16  BP: (112-142)/(58-70) 122/58  SpO2:  [100 %] 100 %    Physical Exam:   General: Comfortable appearing. Awake.   Cardiovascular: Regular rate. No edema  Chest: Non-labored breathing. Symmetric chest rise.   Abdomen: soft, non-tender, non-distended  Neurologic: Lethargic, no focal defects.  Answers appropriately.    Current Labs:  CBC RESULTS: Recent Labs   Lab Test 01/24/23  0743   WBC 5.5   RBC 4.14*   HGB 10.6*   HCT 33.8*   MCV 82   MCH 25.6*   MCHC 31.4*   RDW 18.4*   *        CMP Results:   Recent Labs   Lab Test 01/25/23  0740 01/24/23  0743    140   POTASSIUM 4.3 4.4   CHLORIDE 109* 109*   CO2 20* 20*   ANIONGAP 12 11   * 119*   BUN 21.2 23.0   CR 1.53* 1.46*   BILITOTAL  --  1.7*  1.7*   ALKPHOS  --  157*  157*   ALT  --  29  29   AST  --  40  40        INR Results:   Recent Labs   Lab Test 01/25/23  0740   INR 1.38*          Relevant Imaging:      Assessment:       End-stage liver disease with significant problems with encephalopathy.  We will have IR evaluate for possible downsizing to TIPS.  Would have them check a gradient and decide on whether that would be likely helpful.  I will increase his MiraLAX.  Continue lactulose on his present schedule and continue Xifaxan.  Physical therapy working with the patient.  It is not clear what his mental baseline is like.        Plan:     Increase MiraLAX.  Continue Xifaxan and lactulose at present schedule.   Discussed downsizing with Dr Campbell  (IR)        Rip Montenegro MD  Thank you for the opportunity to participate in the care of this patient.   Please feel free to call me with  any questions or concerns.  Phone number [phone].

## 2023-01-26 ENCOUNTER — APPOINTMENT (OUTPATIENT)
Dept: INTERVENTIONAL RADIOLOGY/VASCULAR | Facility: HOSPITAL | Age: 73
DRG: 987 | End: 2023-01-26
Attending: NURSE PRACTITIONER
Payer: COMMERCIAL

## 2023-01-26 ENCOUNTER — APPOINTMENT (OUTPATIENT)
Dept: PHYSICAL THERAPY | Facility: HOSPITAL | Age: 73
DRG: 987 | End: 2023-01-26
Payer: COMMERCIAL

## 2023-01-26 LAB
AMMONIA PLAS-SCNC: 113 UMOL/L (ref 16–60)
ANION GAP SERPL CALCULATED.3IONS-SCNC: 11 MMOL/L (ref 7–15)
BUN SERPL-MCNC: 21.7 MG/DL (ref 8–23)
CALCIUM SERPL-MCNC: 8.9 MG/DL (ref 8.8–10.2)
CHLORIDE SERPL-SCNC: 105 MMOL/L (ref 98–107)
CREAT SERPL-MCNC: 1.51 MG/DL (ref 0.67–1.17)
DEPRECATED HCO3 PLAS-SCNC: 18 MMOL/L (ref 22–29)
GFR SERPL CREATININE-BSD FRML MDRD: 49 ML/MIN/1.73M2
GLUCOSE SERPL-MCNC: 106 MG/DL (ref 70–99)
HOLD SPECIMEN: NORMAL
INR PPP: 1.43 (ref 0.85–1.15)
POTASSIUM SERPL-SCNC: 4.3 MMOL/L (ref 3.4–5.3)
SODIUM SERPL-SCNC: 134 MMOL/L (ref 136–145)

## 2023-01-26 PROCEDURE — C1769 GUIDE WIRE: HCPCS

## 2023-01-26 PROCEDURE — 80048 BASIC METABOLIC PNL TOTAL CA: CPT | Performed by: HOSPITALIST

## 2023-01-26 PROCEDURE — 97530 THERAPEUTIC ACTIVITIES: CPT | Mod: GP

## 2023-01-26 PROCEDURE — 99152 MOD SED SAME PHYS/QHP 5/>YRS: CPT

## 2023-01-26 PROCEDURE — 272N000123 HC CATH CR9

## 2023-01-26 PROCEDURE — 99233 SBSQ HOSP IP/OBS HIGH 50: CPT | Performed by: HOSPITALIST

## 2023-01-26 PROCEDURE — 272N000570 HC SHEATH CR7

## 2023-01-26 PROCEDURE — B5191ZA FLUOROSCOPY OF INFERIOR VENA CAVA USING LOW OSMOLAR CONTRAST, GUIDANCE: ICD-10-PCS | Performed by: RADIOLOGY

## 2023-01-26 PROCEDURE — 97116 GAIT TRAINING THERAPY: CPT | Mod: GP

## 2023-01-26 PROCEDURE — 250N000013 HC RX MED GY IP 250 OP 250 PS 637: Performed by: HOSPITALIST

## 2023-01-26 PROCEDURE — 37183 REVISION TIPS: CPT

## 2023-01-26 PROCEDURE — 99207 PR NO CHARGE LOS: CPT | Performed by: NURSE PRACTITIONER

## 2023-01-26 PROCEDURE — 76937 US GUIDE VASCULAR ACCESS: CPT

## 2023-01-26 PROCEDURE — 272N000500 HC NEEDLE CR2

## 2023-01-26 PROCEDURE — 36415 COLL VENOUS BLD VENIPUNCTURE: CPT | Performed by: HOSPITALIST

## 2023-01-26 PROCEDURE — C1874 STENT, COATED/COV W/DEL SYS: HCPCS

## 2023-01-26 PROCEDURE — 250N000009 HC RX 250: Performed by: NURSE PRACTITIONER

## 2023-01-26 PROCEDURE — 272N000566 HC SHEATH CR3

## 2023-01-26 PROCEDURE — 06WY3JZ REVISION OF SYNTHETIC SUBSTITUTE IN LOWER VEIN, PERCUTANEOUS APPROACH: ICD-10-PCS | Performed by: RADIOLOGY

## 2023-01-26 PROCEDURE — 82140 ASSAY OF AMMONIA: CPT | Performed by: HOSPITALIST

## 2023-01-26 PROCEDURE — 120N000001 HC R&B MED SURG/OB

## 2023-01-26 PROCEDURE — 4A143B2 MONITORING OF VENOUS PRESSURE, PORTAL, PERCUTANEOUS APPROACH: ICD-10-PCS | Performed by: RADIOLOGY

## 2023-01-26 PROCEDURE — C1887 CATHETER, GUIDING: HCPCS

## 2023-01-26 PROCEDURE — 250N000013 HC RX MED GY IP 250 OP 250 PS 637: Performed by: INTERNAL MEDICINE

## 2023-01-26 PROCEDURE — 272N000302 HC DEVICE INFLATION CR5

## 2023-01-26 PROCEDURE — 250N000011 HC RX IP 250 OP 636: Performed by: NURSE PRACTITIONER

## 2023-01-26 PROCEDURE — 85610 PROTHROMBIN TIME: CPT | Performed by: HOSPITALIST

## 2023-01-26 PROCEDURE — 255N000002 HC RX 255 OP 636: Performed by: HOSPITALIST

## 2023-01-26 RX ORDER — FENTANYL CITRATE 50 UG/ML
25-50 INJECTION, SOLUTION INTRAMUSCULAR; INTRAVENOUS EVERY 5 MIN PRN
Status: DISCONTINUED | OUTPATIENT
Start: 2023-01-26 | End: 2023-01-26

## 2023-01-26 RX ORDER — NALOXONE HYDROCHLORIDE 0.4 MG/ML
0.4 INJECTION, SOLUTION INTRAMUSCULAR; INTRAVENOUS; SUBCUTANEOUS
Status: DISCONTINUED | OUTPATIENT
Start: 2023-01-26 | End: 2023-01-26

## 2023-01-26 RX ORDER — NALOXONE HYDROCHLORIDE 0.4 MG/ML
0.2 INJECTION, SOLUTION INTRAMUSCULAR; INTRAVENOUS; SUBCUTANEOUS
Status: DISCONTINUED | OUTPATIENT
Start: 2023-01-26 | End: 2023-01-26

## 2023-01-26 RX ORDER — FLUMAZENIL 0.1 MG/ML
0.2 INJECTION, SOLUTION INTRAVENOUS
Status: DISCONTINUED | OUTPATIENT
Start: 2023-01-26 | End: 2023-01-26

## 2023-01-26 RX ADMIN — PANTOPRAZOLE SODIUM 40 MG: 40 TABLET, DELAYED RELEASE ORAL at 16:47

## 2023-01-26 RX ADMIN — LACTULOSE 20 G: 10 SOLUTION ORAL at 17:47

## 2023-01-26 RX ADMIN — RIFAXIMIN 550 MG: 550 TABLET ORAL at 20:20

## 2023-01-26 RX ADMIN — MIDAZOLAM HYDROCHLORIDE 0.5 MG: 1 INJECTION, SOLUTION INTRAMUSCULAR; INTRAVENOUS at 10:56

## 2023-01-26 RX ADMIN — CEFTRIAXONE SODIUM 1 G: 1 INJECTION, POWDER, FOR SOLUTION INTRAMUSCULAR; INTRAVENOUS at 10:15

## 2023-01-26 RX ADMIN — PANTOPRAZOLE SODIUM 40 MG: 40 TABLET, DELAYED RELEASE ORAL at 06:06

## 2023-01-26 RX ADMIN — MIDAZOLAM HYDROCHLORIDE 0.5 MG: 1 INJECTION, SOLUTION INTRAMUSCULAR; INTRAVENOUS at 10:38

## 2023-01-26 RX ADMIN — POLYETHYLENE GLYCOL 3350 17 G: 17 POWDER, FOR SOLUTION ORAL at 13:02

## 2023-01-26 RX ADMIN — LIDOCAINE HYDROCHLORIDE 10 ML: 10 INJECTION, SOLUTION INFILTRATION; PERINEURAL at 10:32

## 2023-01-26 RX ADMIN — FENTANYL CITRATE 25 MCG: 50 INJECTION, SOLUTION INTRAMUSCULAR; INTRAVENOUS at 10:48

## 2023-01-26 RX ADMIN — MIDAZOLAM HYDROCHLORIDE 0.5 MG: 1 INJECTION, SOLUTION INTRAMUSCULAR; INTRAVENOUS at 10:19

## 2023-01-26 RX ADMIN — LACTULOSE 20 G: 10 SOLUTION ORAL at 06:06

## 2023-01-26 RX ADMIN — FENTANYL CITRATE 25 MCG: 50 INJECTION, SOLUTION INTRAMUSCULAR; INTRAVENOUS at 10:23

## 2023-01-26 RX ADMIN — FENTANYL CITRATE 25 MCG: 50 INJECTION, SOLUTION INTRAMUSCULAR; INTRAVENOUS at 11:02

## 2023-01-26 RX ADMIN — IOHEXOL 75 ML: 350 INJECTION, SOLUTION INTRAVENOUS at 11:29

## 2023-01-26 RX ADMIN — LACTULOSE 20 G: 10 SOLUTION ORAL at 13:02

## 2023-01-26 RX ADMIN — LACTULOSE 20 G: 10 SOLUTION ORAL at 01:04

## 2023-01-26 RX ADMIN — SIMVASTATIN 20 MG: 10 TABLET, FILM COATED ORAL at 23:50

## 2023-01-26 RX ADMIN — POLYETHYLENE GLYCOL 3350 17 G: 17 POWDER, FOR SOLUTION ORAL at 20:22

## 2023-01-26 RX ADMIN — RIFAXIMIN 550 MG: 550 TABLET ORAL at 08:35

## 2023-01-26 RX ADMIN — SPIRONOLACTONE 25 MG: 25 TABLET, FILM COATED ORAL at 08:35

## 2023-01-26 RX ADMIN — LACTULOSE 20 G: 10 SOLUTION ORAL at 23:50

## 2023-01-26 ASSESSMENT — ACTIVITIES OF DAILY LIVING (ADL)
ADLS_ACUITY_SCORE: 43
ADLS_ACUITY_SCORE: 44
ADLS_ACUITY_SCORE: 43
ADLS_ACUITY_SCORE: 44
ADLS_ACUITY_SCORE: 44
ADLS_ACUITY_SCORE: 43

## 2023-01-26 NOTE — PROGRESS NOTES
"  Interventional Radiology  1/26/2023    Reason for Consult:  \"S/p TIPS 1 month ago with recurrent encephalopathy\"- Requesting TIPS REVISION  Requesting Provider:  Juma Holguin MD     Please see full note from ROBERT MO NP on 1/25/2023 for patient's IR pre-procedure note.    NPO status reviewed: MN    Clinical notes reviewed.  DESIREE.      Pertinent medications reviewed :  - Rocephin 1g IV x1 for IR procedural ppxs   - No anticoagulation  - Remains on Miralax, Lactulose, and Xifaxan       LABS:  CBC RESULTS: Recent Labs   Lab Test 01/24/23  0743   WBC 5.5   RBC 4.14*   HGB 10.6*   HCT 33.8*   MCV 82   MCH 25.6*   MCHC 31.4*   RDW 18.4*   *      INR   Date Value Ref Range Status   01/26/2023 1.43 (H) 0.85 - 1.15 Final     INR (External)   Date Value Ref Range Status   04/11/2022 1.1 0.9 - 1.2 Final      Creatinine   Date Value Ref Range Status   01/26/2023 1.51 (H) 0.67 - 1.17 mg/dL Final     PLAN:    Chart reviewed today and patient appropriate to proceed with above procedure.    ROBERT MO NP  Interventional Radiology  [phone]    "

## 2023-01-26 NOTE — PROGRESS NOTES
[facility] DIGESTIVE HEALTH PROGRESS NOTE      Subjective:              Still somewhat somnolent, no asterixis.  He is oriented to place and person.  Appreciate interventional radiology assessment.    Objective:                  Vital signs in last 24 hrs;  Temp:  [97.1  F (36.2  C)-98.2  F (36.8  C)] 98  F (36.7  C)  Pulse:  [72-81] 74  Resp:  [16-18] 18  BP: ()/(55-62) 110/59  SpO2:  [100 %] 100 %    Physical Exam:   General: Comfortable appearing. Awake.   Cardiovascular: Regular rate. No edema  Chest: Non-labored breathing. Symmetric chest rise.   Abdomen: soft, non-tender, non-distended  Neurologic: Lethargic, answering questions more appropriately..     Current Labs:  CBC RESULTS: Recent Labs   Lab Test 01/24/23  0743   WBC 5.5   RBC 4.14*   HGB 10.6*   HCT 33.8*   MCV 82   MCH 25.6*   MCHC 31.4*   RDW 18.4*   *        CMP Results:   Recent Labs   Lab Test 01/26/23  0639 01/25/23  0740 01/24/23  0743   *   < > 140   POTASSIUM 4.3   < > 4.4   CHLORIDE 105   < > 109*   CO2 18*   < > 20*   ANIONGAP 11   < > 11   *   < > 119*   BUN 21.7   < > 23.0   CR 1.51*   < > 1.46*   BILITOTAL  --   --  1.7*  1.7*   ALKPHOS  --   --  157*  157*   ALT  --   --  29  29   AST  --   --  40  40    < > = values in this interval not displayed.        INR Results:   Recent Labs   Lab Test 01/26/23  0639   INR 1.43*          Relevant Imaging:  Reviewed    Assessment:       Post TIPS encephalopathy.  Will be interesting to see what the gradient is and whether downsizing is helpful.  It is unclear to me if there is some underlying cognitive decline other than his encephalopathy.        Plan:     IR to check portosystemic shunt gradient and downsize if appropriate.  If that gradient is appropriate then further evaluation of cognitive issues other than encephalopathy may be helpful.            Rip Montenegro MD  Thank you for the opportunity to participate in the care of this patient.   Please feel free to call me  with any questions or concerns.  Phone number [phone].

## 2023-01-26 NOTE — PLAN OF CARE
Goal Outcome Evaluation:   Patient still with intermittent confusion. Alert to self and place. No BM since arrival on unit. Lactulose and Miralax given. Plan for TIPS procedure tomorrow. NPO midnight. Appetite better this evening per wife. Patient ate 50 % of supper. Ambulated hallways with PT to build strength and endurance.     Problem: Plan of Care - These are the overarching goals to be used throughout the patient stay.    Goal: Absence of Hospital-Acquired Illness or Injury  Intervention: Identify and Manage Fall Risk  Recent Flowsheet Documentation  Taken 1/25/2023 1544 by Eric Hartmann, RN  Safety Promotion/Fall Prevention:   assistive device/personal items within reach   bed alarm on   mobility aid in reach   nonskid shoes/slippers when out of bed   A2 with walker and gait belt to BR.

## 2023-01-26 NOTE — PROGRESS NOTES
Care Management Chart Review     Length of Stay (days): 3     Expected Discharge Date: 01/25/2023     Concerns to be Addressed:   Pt. Has TIPS revision this a.m. He is not at baseline.  He presented this admission with ammonia level of 135, confused and disoriented. Lactulose is being administered, monitoring labs.  Patient plan of care discussed at interdisciplinary rounds: Yes     Anticipated Discharge Disposition:  Discharge goal is home pending response to treatment, medical needs and mobility closer to discharge.      Anticipated Discharge Services:  To be determined by destination, patient/family preferences, medical needs and mobility status closer to the time of discharge.  Anticipated Discharge DME:  Per therapy (if indicated).     Patient/family educated on Medicare website which has current facility and service quality ratings:   NA  Education Provided on the Discharge Plan:   Per team  Patient/Family in Agreement with the Plan:   Yes     Referrals Placed by CM/SW:   None  Private pay costs discussed: Not applicable at this time.      Additional Information:  Patient lives at home with wife and extended family. He is usually independent at baseline but is currently confused. His primary language is [place]ese. Anticipated goal is for patient to return home with family to transport but care management will follow along. Family is hopeful he will recover but is also open to considering palliative care or hospice services if recommended by the team.    Amy Lainez, ELIZABETH

## 2023-01-26 NOTE — PLAN OF CARE
Problem: Plan of Care - These are the overarching goals to be used throughout the patient stay.    Goal: Optimal Comfort and Wellbeing  Outcome: Progressing     Problem: Thought Process Alteration  Goal: Optimal Thought Clarity  Outcome: Progressing  Intervention: Minimize Safety Risk and Altered Thought  Recent Flowsheet Documentation  Taken 1/25/2023 2020 by Janine Desir RN  Sensory Stimulation Regulation:   lighting decreased   quiet environment promoted  Enhanced Safety Measures: family to remain at bedside     Goal Outcome Evaluation:  Pt pretty drowsy. Able to awaken to voice and take medications. Wife at bedside to help with cares and interpret. NPO at MN for TIPS procedure in AM. A/O x2-fluctuates. Speech is slow but clear. Will continue to monitor and notify MD of any changes.

## 2023-01-26 NOTE — PRE-PROCEDURE
GENERAL PRE-PROCEDURE:   Procedure:  TIPS Revision  Date/Time:  1/26/2023 7:38 AM    Written consent obtained?: Yes    Risks and benefits: Risks, benefits and alternatives were discussed    Consent given by:  Patient  Patient states understanding of procedure being performed: Yes    Patient's understanding of procedure matches consent: Yes    Procedure consent matches procedure scheduled: Yes    Expected level of sedation:  Moderate  Appropriately NPO:  Yes  ASA Class:  2  Mallampati  :  Grade 2- soft palate, base of uvula, tonsillar pillars, and portion of posterior pharyngeal wall visible  Lungs:  Lungs clear with good breath sounds bilaterally  Heart:  Normal heart sounds and rate  History & Physical reviewed:  History and physical reviewed and no updates needed  Statement of review:  I have reviewed the lab findings, diagnostic data, medications, and the plan for sedation

## 2023-01-26 NOTE — PROGRESS NOTES
[facility]    Medicine Progress Note - Hospitalist Service    Date of Admission:  1/23/2023    Assessment & Plan     Vito Sy is a 72 year old male admitted on 1/23/2023. 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  He is s/p TIPS procedure on December 9 without complications.  Since being discharged patient has had recurrent symptoms suggestive of hepatic encephalopathy and has not recovered to baseline.  He presents again with ammonia level of 135, confused and disoriented.        Hepatic encephalopathy  End-stage liver cirrhosis  -Patient has a history of alcoholic cirrhosis with refractory ascites and portal hypertension.  -He is s/p TIPS on December 9, 2022  -According to family members he is reportedly compliant with rifaximin and lactulose however symptoms remain persistent  -Ammonia improved   -Continue spironolactone and omeprazole  -Consult gastroenterology,recommendations appreciated.  Continue rifaximin and lactulose  -Renal consulted.    -Patient is s/p TIPS revision January 26.  Placement of a constrained balloon expandable covered stent within the existing TIPS     Chronic kidney disease stage IIIa  -Creatinine at baseline  -Avoid nephrotoxic agents         Diet: Combination Diet Low Saturated Fat Na <2400mg Diet  Snacks/Supplements Adult: Ensure Enlive; With Meals    DVT Prophylaxis: Pneumatic Compression Devices  Cronin Catheter: Not present  Lines: None     Cardiac Monitoring: None  Code Status: Full Code      Clinically Significant Risk Factors              # Hypoalbuminemia: Lowest albumin = 3.3 g/dL at 1/24/2023  7:43 AM, will monitor as appropriate             # Severe Malnutrition: based on nutrition assessment, PRESENT ON ADMISSION       Disposition Plan      Expected Discharge Date: 01/27/2023        Discharge Comments: TIPS procedure 1/26          Juma Holguin MD  Hospitalist Service    [facility]  Securely message with [employer] (more info)  Text page via [employer] Paging/Directory   ______________________________________________________________________    Interval History     Patient seen and examined with wife at bedside.  He is currently being fed and appetite appears to have recovered.  He answers all questions appropriately and states he feels better.  He inquires about being discharged.    Physical Exam   Vital Signs: Temp: 98  F (36.7  C) Temp src: Oral BP: 102/64 Pulse: 81   Resp: 18 SpO2: 100 % O2 Device: None (Room air)    Weight: 153 lbs 7.04 oz      Physical Exam  HENT:      Head: Normocephalic.      Nose: Nose normal.      Mouth/Throat:      Mouth: Mucous membranes are moist.   Eyes:      General: Scleral icterus present.      Pupils: Pupils are equal, round, and reactive to light.   Cardiovascular:      Rate and Rhythm: Normal rate.      Pulses: Normal pulses.   Pulmonary:      Effort: Pulmonary effort is normal.   Abdominal:      General: Abdomen is flat.   Musculoskeletal:         General: Normal range of motion.      Cervical back: Normal range of motion.   Skin:     General: Skin is warm.      Capillary Refill: Capillary refill takes less than 2 seconds.   Neurological:      Mental Status: He is lethargic and disoriented.           Medical Decision Making       20 MINUTES SPENT BY ME on the date of service doing chart review, history, exam, documentation & further activities per the note.      Data     I have personally reviewed the following data over the past 24 hrs:    N/A  \   N/A   / N/A     134 (L) 105 21.7 /  106 (H)   4.3 18 (L) 1.51 (H) \       INR:  1.43 (H) PTT:  N/A   D-dimer:  N/A Fibrinogen:  N/A       Imaging results reviewed over the past 24 hrs:   Recent Results (from the past 24 hour(s))   IR Transven Intrahepatic Portosyst Rev    Narrative    LOCATION: [facility]  DATE: 1/26/2023    PROCEDURE: TRANSJUGULAR INTRAHEPATIC  PORTOSYSTEMIC SHUNT (TIPS) REVISION  1.  Ultrasound-guided access of the right internal jugular vein. A permanent image was stored.  2.  Digital subtraction portal venography.  3.  Portosystemic pre intravenous pressure monitoring.  4.  Placement of a constrained balloon expandable covered stent within the existing TIPS  5.  Portosystemic post intravenous pressure monitoring.  6.  Moderate sedation    INTERVENTIONAL RADIOLOGIST: Yaquelin Marion MD    INDICATION: 72-year-old male with history of cirrhosis status post TIPS placement on 12/9/2022 for recurrent ascites. The patient is noted to have hepatic encephalopathy resistant to optimal medical management. Plan for TIPS revision to decrease shunted   flow through the TIPS stent    CONSENT: The risks, benefits and alternatives of the procedure were discussed with the patient  in detail. All questions were answered. Informed consent was given to proceed with the procedure.    MODERATE SEDATION: Prior to the procedure, the patient was alert and oriented. Versed 1.5 mg and fentanyl 75 mcg were administered intravenously with continuous vital signs monitoring by the radiology nurse under my direct supervision. Patient was alert   and oriented post procedure. Total face to face moderate sedation time: 60 minutes.    CONTRAST: 75 cc Omnipaque  ANTIBIOTICS: Rocephin  ADDITIONAL MEDICATIONS: None.    FLUOROSCOPIC TIME: 14.7 minutes.  RADIATION DOSE: Air Kerma: 1451 mGy.    COMPLICATIONS: No immediate complications.    UNIVERSAL PROTOCOL: The operative site was marked and any prior imaging was reviewed. Required items including blood products, implants, devices and special equipment was made available. Patient identity was confirmed either verbally, with demographic   information, hospital assigned identification or other identification markers. A timeout was performed immediately prior to the procedure.    STERILE BARRIER TECHNIQUE: Maximum sterile barrier technique was  used. Cutaneous antisepsis was performed at the operative site with application of 2% chlorhexidine and large sterile drape. Prior to the procedure, the  and assistant performed   hand hygiene and wore hat, mask, sterile gown, and sterile gloves during the entire procedure.    PROCEDURE/TECHNIQUE:    Using real-time sonographic guidance, access was achieved into the right internal jugular vein and conversion was made for a 12 Samoan vascular sheath. The catheter was carefully positioned in the right atrium. An MPA catheter and guidewire were used to   selectively catheterize the TIPS stent. Over-the-wire exchange is made for a flush catheter which was positioned within the main portal vein. Pre-intervention pressure measurements were obtained in the right atrium and main portal vein and a gradient   calculated.    An exchange Amplatz wire was advanced through the flush catheter and directed into the splenic vein. The flush catheter was removed and the 12 Samoan sheath was advanced with the sheath dilator under fluoroscopic guidance to the main portal vein. The   dilator was removed and over-the-wire exchange is made for a 10 mm x 59 mm Viabahn VBX balloon extendable covered stent. The stent was carefully positioned within the covered portion of the Viatorr stent. The distal 2 cm of the stent was unsheathed and   the balloon was expanded to nominal pressure expanding the distal end of the stent while keeping the remaining stent within the sheath. The balloon was deflated and the sheath was retracted into the inferior vena cava. The balloon was carefully retracted   and positioned along the cranial 2 cm of the stent and expanded to nominal pressure. This was successful in creating a constrained segment within the middle of the Viabahn VBX stent. Over-the-wire exchange is made for the flush catheter which was   positioned in the main portal vein. A portal venogram was obtained demonstrating patent constrained  TIPS with flow into the inferior vena cava/right atrium.    Post intervention pressure measurements were obtained. At this point, the procedure was considered complete. The sheath was removed and hemostasis was achieved with manual pressure.    FINDINGS:  Ultrasound demonstrates an anechoic and compressible right internal jugular vein.    Preintervention portosystemic gradient: 10 mmHg (RA 0, PV 10)    Post intervention portosystemic gradient: 19 mmHg (RA 0, PV 19)      Impression    IMPRESSION:    1.  Successful TIPS reduction utilizing a single constrained balloon expandable covered stent.  2.  Significant increase in the portosystemic gradient, measuring 10 mmHg preintervention and 19 mmHg post intervention.

## 2023-01-26 NOTE — PROGRESS NOTES
CLINICAL NUTRITION SERVICES - ASSESSMENT NOTE     Nutrition Prescription    RECOMMENDATIONS FOR MDs/PROVIDERS TO ORDER:    Malnutrition Status:    Severe malnutrition  In Context of:  Acute illness or injury    Recommendations already ordered by Registered Dietitian (RD):  Lodge Ensure Enlive breakfast and dinner trays    Future/Additional Recommendations:  Perform NFPE as able. Monitor po intake, weight, labs     REASON FOR ASSESSMENT  Vito Sy is a/an 72 year old male assessed by the dietitian for Admission Nutrition Risk Screen for positive for poor appetite and weight loss    NUTRITION HISTORY  Attempted interview with pt, sleeping, did not wake. Son present so spoke with him. States ate well for lunch-had beef (pot roast) and some fruit. PTA was eating very poorly, for at least 1 week. Typically eats 3 meals per day and was eating less than 50% of those meals. Son bought some puree (baby) food for pt so it would be easier to eat. Pt did not like it, but would eat some. Some issues with nausea PTA from what son states was d/t feelings of motion sickness. Does drink Ensure, 1-2 per day, morning and evening. Will send on breakfast and dinner trays. Son confirms pt has lost a significant amount of weight    PMH: hepatic encephalopathy, end-stage liver disease, alcoholic cirrhosis of liver with ascites, HTN, DM2, CKD stage 3, s/p TIPS 12/9/2023    CURRENT NUTRITION ORDERS  Diet: Low Saturated Fat/2400 mg Sodium  Intake/Tolerance: 50% of 1 meal on 1/25 per flowsheets    LABS  Electrolytes  Potassium (mmol/L)   Date Value   01/26/2023 4.3   01/25/2023 4.3   01/24/2023 4.4   04/27/2022 3.9   12/02/2021 3.6   11/19/2020 3.9    Blood Glucose  Glucose (mg/dL)   Date Value   01/26/2023 106 (H)   01/25/2023 109 (H)   01/24/2023 119 (H)   01/23/2023 197 (H)   12/22/2022 129 (H)   04/27/2022 111   12/02/2021 157 (H)   11/19/2020 130 (H)   11/18/2020 125   11/17/2020 178 (H)     GLUCOSE BY METER POCT (mg/dL)   Date  "Value   01/24/2023 145 (H)   12/22/2022 136 (H)   12/22/2022 132 (H)   12/22/2022 125 (H)   12/21/2022 176 (H)     Hemoglobin A1C (%)   Date Value   11/09/2022 6.2 (H)   04/27/2022 6.2 (H)   08/02/2021 6.0 (H)   10/21/2020 7.4 (H)   12/23/2019 6.3 (H)    Inflammatory Markers  WBC Count (10e3/uL)   Date Value   01/24/2023 5.5   01/23/2023 5.6   12/22/2022 5.9     Albumin (g/dL)   Date Value   01/24/2023 3.3 (L)   01/24/2023 3.3 (L)   01/23/2023 3.6   04/27/2022 2.6 (L)   12/02/2021 2.9 (L)   11/19/2020 2.7 (L)      Magnesium (mg/dL)   Date Value   01/24/2023 2.1   12/18/2022 1.7   11/19/2020 1.8     Sodium (mmol/L)   Date Value   01/26/2023 134 (L)   01/25/2023 141   01/24/2023 140    Renal  Urea Nitrogen (mg/dL)   Date Value   01/26/2023 21.7   01/25/2023 21.2   01/24/2023 23.0   04/27/2022 15   12/02/2021 12   11/19/2020 17     Creatinine (mg/dL)   Date Value   01/26/2023 1.51 (H)   01/25/2023 1.53 (H)   01/24/2023 1.46 (H)     Additional  Triglycerides (mg/dL)   Date Value   04/27/2022 63   04/05/2012 90     Ketones Urine (mg/dL)   Date Value   01/24/2023 Negative        MEDICATIONS  Lactulose  Miralax    ANTHROPOMETRICS  Height: 167.6 cm (5' 6\")  Most Recent Weight: 69.6 kg (153 lb 7 oz)    IBW: 64.5 kg  BMI: Normal BMI  Weight History:   Wt Readings from Last 20 Encounters:   01/25/23 69.6 kg (153 lb 7 oz)   12/20/22 76.8 kg (169 lb 6.4 oz)   12/10/22 90.3 kg (199 lb 1.6 oz)   11/16/22 84.7 kg (186 lb 11.2 oz)   11/09/22 84.4 kg (186 lb)   04/27/22 96.6 kg (213 lb)   01/26/22 95.7 kg (211 lb)   01/07/22 90.4 kg (199 lb 6.4 oz)   12/02/21 90.3 kg (199 lb 1 oz)   11/16/21 91.2 kg (201 lb)   08/02/21 93.9 kg (207 lb)   11/25/20 101.6 kg (224 lb)   11/17/20 101.4 kg (223 lb 8 oz)   11/17/20 101.4 kg (223 lb 8 oz)   10/21/20 101.8 kg (224 lb 8 oz)   12/23/19 91.2 kg (201 lb)   09/18/19 94.1 kg (207 lb 6.4 oz)   05/30/19 96.2 kg (212 lb)   06/19/18 93 kg (205 lb)   04/10/17 88.9 kg (196 lb)     Dosing Weight: 69.6 " kg    ASSESSED NUTRITION NEEDS  Estimated Energy Needs: 1029-2437 kcals/day (25 - 30 kcals/kg)  Justification: Maintenance  Estimated Protein Needs: 76-97 grams protein/day (1.1 - 1.4 grams of pro/kg)  Justification: Increased needs  Estimated Fluid Needs: 3100-5812 mL/day (1 mL/kcal)   Justification: Maintenance    PHYSICAL FINDINGS  See malnutrition section below.  Jaundiced  Incisional wound right throat     MALNUTRITION:  % Weight Loss:  > 5% in 1 month (severe malnutrition)  % Intake:  </= 50% for >/= 5 days (severe malnutrition)  Subcutaneous Fat Loss:  Unable to assess, pt sleeping  Muscle Loss:  Unable to assess, pt sleeping  Fluid Retention:  None noted    Malnutrition Diagnosis: Severe malnutrition  In Context of:  Acute illness or injury    NUTRITION DIAGNOSIS  Malnutrition related to illness as evidenced by weight loss of 9% x 1 month and PO intake < 50% x 1+ weeks.      INTERVENTIONS  Implementation  Medical food supplement therapy     Goals  Patient to consume % of nutritionally adequate meals three times per day, or the equivalent with supplements/snacks.  Maintain weight     Monitoring/Evaluation  Progress toward goals will be monitored and evaluated per protocol.

## 2023-01-26 NOTE — PROGRESS NOTES
[facility]  Palliative Care Chart Check Note    Palliative medicine was consulted for Advanced care planning, emotional support, symptom management.    Goals of Care: life-prolonging     Advanced Care Planning:  Advance directive: No, information given to family 1/25.   POLST: No  Code status: Full Code  Surrogate: Naomi (spouse)     Symptom Management:  #Altered mental status   - Attributed to hepatic encephalopathy s/p TIPS last month  - GI managing, increased bowel regimen and downsizing TIPS (Procedure done this AM).      Psychosocial & Spiritual:   - Supported by wife Naomi and their 3 adult sons. Naomi and her son and daughter-in-law take care of [place] at home.  - [place] and [place]. They both have 12 siblings  - [place] is [employer] and deeply Congregational, he and Naomi would appreciate Spiritual Care    Palliative Care will continue to follow along. We will hold on formally seeing him today, if the medical team or family have questions please do not hesitate to contact us.    Rajesh Avalos NP  Pager: [phone] (M-F [phone])   Securely message with [employer] (more info)  Text page via [facility] Paging/Directory

## 2023-01-27 ENCOUNTER — APPOINTMENT (OUTPATIENT)
Dept: PHYSICAL THERAPY | Facility: HOSPITAL | Age: 73
DRG: 987 | End: 2023-01-27
Payer: COMMERCIAL

## 2023-01-27 ENCOUNTER — APPOINTMENT (OUTPATIENT)
Dept: OCCUPATIONAL THERAPY | Facility: HOSPITAL | Age: 73
DRG: 987 | End: 2023-01-27
Payer: COMMERCIAL

## 2023-01-27 VITALS
RESPIRATION RATE: 18 BRPM | HEART RATE: 74 BPM | DIASTOLIC BLOOD PRESSURE: 58 MMHG | HEIGHT: 66 IN | BODY MASS INDEX: 24.66 KG/M2 | SYSTOLIC BLOOD PRESSURE: 107 MMHG | TEMPERATURE: 98 F | OXYGEN SATURATION: 100 % | WEIGHT: 153.44 LBS

## 2023-01-27 DIAGNOSIS — K74.60 CIRRHOSIS OF LIVER (H): Primary | ICD-10-CM

## 2023-01-27 DIAGNOSIS — K76.82 HEPATIC ENCEPHALOPATHY (H): Primary | ICD-10-CM

## 2023-01-27 DIAGNOSIS — K74.69 OTHER CIRRHOSIS OF LIVER (H): ICD-10-CM

## 2023-01-27 DIAGNOSIS — K70.31 ALCOHOLIC CIRRHOSIS OF LIVER WITH ASCITES (H): ICD-10-CM

## 2023-01-27 LAB
ALBUMIN SERPL BCG-MCNC: 3.1 G/DL (ref 3.5–5.2)
ALP SERPL-CCNC: 151 U/L (ref 40–129)
ALT SERPL W P-5'-P-CCNC: 35 U/L (ref 10–50)
AMMONIA PLAS-SCNC: 66 UMOL/L (ref 16–60)
ANION GAP SERPL CALCULATED.3IONS-SCNC: 10 MMOL/L (ref 7–15)
AST SERPL W P-5'-P-CCNC: 55 U/L (ref 10–50)
BILIRUB DIRECT SERPL-MCNC: 0.54 MG/DL (ref 0–0.3)
BILIRUB SERPL-MCNC: 1.7 MG/DL
BUN SERPL-MCNC: 21.6 MG/DL (ref 8–23)
CALCIUM SERPL-MCNC: 8.7 MG/DL (ref 8.8–10.2)
CHLORIDE SERPL-SCNC: 105 MMOL/L (ref 98–107)
CREAT SERPL-MCNC: 1.56 MG/DL (ref 0.67–1.17)
DEPRECATED HCO3 PLAS-SCNC: 21 MMOL/L (ref 22–29)
GFR SERPL CREATININE-BSD FRML MDRD: 47 ML/MIN/1.73M2
GLUCOSE SERPL-MCNC: 111 MG/DL (ref 70–99)
INR PPP: 1.37 (ref 0.85–1.15)
LACTATE SERPL-SCNC: 2.3 MMOL/L (ref 0.7–2)
LACTATE SERPL-SCNC: 3 MMOL/L (ref 0.7–2)
POTASSIUM SERPL-SCNC: 4.5 MMOL/L (ref 3.4–5.3)
PROT SERPL-MCNC: 6.5 G/DL (ref 6.4–8.3)
SODIUM SERPL-SCNC: 136 MMOL/L (ref 136–145)

## 2023-01-27 PROCEDURE — 82140 ASSAY OF AMMONIA: CPT | Performed by: HOSPITALIST

## 2023-01-27 PROCEDURE — 82248 BILIRUBIN DIRECT: CPT | Performed by: HOSPITALIST

## 2023-01-27 PROCEDURE — 99233 SBSQ HOSP IP/OBS HIGH 50: CPT | Performed by: CLINICAL NURSE SPECIALIST

## 2023-01-27 PROCEDURE — 99239 HOSP IP/OBS DSCHRG MGMT >30: CPT | Performed by: HOSPITALIST

## 2023-01-27 PROCEDURE — 85610 PROTHROMBIN TIME: CPT | Performed by: HOSPITALIST

## 2023-01-27 PROCEDURE — 250N000013 HC RX MED GY IP 250 OP 250 PS 637: Performed by: HOSPITALIST

## 2023-01-27 PROCEDURE — 36415 COLL VENOUS BLD VENIPUNCTURE: CPT | Performed by: HOSPITALIST

## 2023-01-27 PROCEDURE — 97110 THERAPEUTIC EXERCISES: CPT | Mod: GO

## 2023-01-27 PROCEDURE — 97116 GAIT TRAINING THERAPY: CPT | Mod: GP

## 2023-01-27 PROCEDURE — 250N000013 HC RX MED GY IP 250 OP 250 PS 637: Performed by: INTERNAL MEDICINE

## 2023-01-27 PROCEDURE — 97530 THERAPEUTIC ACTIVITIES: CPT | Mod: GO

## 2023-01-27 PROCEDURE — 83605 ASSAY OF LACTIC ACID: CPT | Performed by: HOSPITALIST

## 2023-01-27 RX ORDER — POLYETHYLENE GLYCOL 3350 17 G/17G
17 POWDER, FOR SOLUTION ORAL 3 TIMES DAILY
Qty: 90 PACKET | Refills: 0 | Status: SHIPPED | OUTPATIENT
Start: 2023-01-27 | End: 2023-01-31

## 2023-01-27 RX ADMIN — LACTULOSE 20 G: 10 SOLUTION ORAL at 13:26

## 2023-01-27 RX ADMIN — LACTULOSE 20 G: 10 SOLUTION ORAL at 10:14

## 2023-01-27 RX ADMIN — PANTOPRAZOLE SODIUM 40 MG: 40 TABLET, DELAYED RELEASE ORAL at 06:22

## 2023-01-27 RX ADMIN — POLYETHYLENE GLYCOL 3350 17 G: 17 POWDER, FOR SOLUTION ORAL at 13:26

## 2023-01-27 RX ADMIN — RIFAXIMIN 550 MG: 550 TABLET ORAL at 08:21

## 2023-01-27 RX ADMIN — POLYETHYLENE GLYCOL 3350 17 G: 17 POWDER, FOR SOLUTION ORAL at 08:21

## 2023-01-27 RX ADMIN — LACTULOSE 20 G: 10 SOLUTION ORAL at 02:39

## 2023-01-27 RX ADMIN — LACTULOSE 20 G: 10 SOLUTION ORAL at 06:22

## 2023-01-27 RX ADMIN — PANTOPRAZOLE SODIUM 40 MG: 40 TABLET, DELAYED RELEASE ORAL at 16:36

## 2023-01-27 ASSESSMENT — ACTIVITIES OF DAILY LIVING (ADL)
ADLS_ACUITY_SCORE: 43
ADLS_ACUITY_SCORE: 48
ADLS_ACUITY_SCORE: 43
ADLS_ACUITY_SCORE: 48
ADLS_ACUITY_SCORE: 43
ADLS_ACUITY_SCORE: 48
ADLS_ACUITY_SCORE: 48

## 2023-01-27 NOTE — DISCHARGE SUMMARY
[facility] MEDICINE  DISCHARGE SUMMARY     Primary Care Physician: Amrik Mejia  Admission Date: 1/23/2023   Discharge Provider: Juma Holguin MD Discharge Date: 1/27/2023   Diet:   Active Diet and Nourishment Order   Procedures     Snacks/Supplements Adult: Ensure Enlive; With Meals     Room Service     Combination Diet Low Saturated Fat Na <2400mg Diet     Diet       Code Status: Full Code   Activity: DCACTIVITY: Activity as tolerated        Condition at Discharge: Fair       PRINCIPAL & ACTIVE DISCHARGE DIAGNOSES     Principal Problem:    Hepatic encephalopathy      PENDING LABS     Unresulted Labs Ordered in the Past 30 Days of this Admission     Date and Time Order Name Status Description    1/24/2023  8:26 AM Blood Culture Peripheral Blood Preliminary             PROCEDURES ( this hospitalization only)          RECOMMENDATIONS TO OUTPATIENT PROVIDER FOR F/U VISIT     Follow-up Appointments     Follow-up and recommended labs and tests       Follow up with primary care provider, Amrik Mejia, within 7 days for   hospital follow- up.  No follow up labs or test are needed.  Titrate   lactulose to 3-4 bowel movements a day.  Reevaluate need for continuation   of MiraLAX.  Patient was started on MiraLAX as he did not appear to be   responding very well to lactulose alone.  He was having 1-2 stools per day   on lactulose 6 times a day therefore MiraLAX was added.                 DISPOSITION     Home    SUMMARY OF HOSPITAL COURSE:      Initial presentation (Copied from [facility])      Vito Sy is a 72 year old male.. 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  He is s/p TIPS procedure on December 9 without complications.  Since being discharged patient has had recurrent symptoms suggestive of hepatic encephalopathy and has not recovered to baseline.  According to family members initially patient was able to  ambulate with assistance however over the past few days have been more obtunded and unable to ambulate.  He reportedly is compliant with lactulose and rifaximin however symptoms remain persistent.  He was brought into the emergency room today for evaluation     Labs today show ammonia level of 135, creatinine 1.47,, dioxide 18.  Hemoglobin 12.1.  Patient was given lactulose in the emergency room.    Problems addressed during hospital stay    Hepatic encephalopathy  End-stage liver cirrhosis  -Patient has a history of alcoholic cirrhosis with refractory ascites and portal hypertension.  -He is s/p TIPS on December 9, 2022  -According to family members he is reportedly compliant with rifaximin and lactulose however symptoms remain persistent  -MiraLAX was added during hospital stay  -Ammonia improved   -Continue spironolactone and omeprazole  -Gastroenterology,recommendations appreciated.  Continue rifaximin and lactulose  -Interventional radiology consulted.  -Patient is s/p TIPS revision January 26.  Placement of a constrained balloon expandable covered stent within the existing TIPS     Chronic kidney disease stage IIIa  -Creatinine at baseline  -Avoid nephrotoxic agents    Discharge Medications with Med changes:     Current Discharge Medication List      START taking these medications    Details   polyethylene glycol (MIRALAX) 17 g packet Take 17 g by mouth 3 times daily for 30 days  Qty: 90 packet, Refills: 0    Associated Diagnoses: Hepatic encephalopathy; Alcoholic cirrhosis of liver with ascites (H)         CONTINUE these medications which have NOT CHANGED    Details   acetaminophen (TYLENOL) 500 MG tablet Take 500-1,000 mg by mouth every 6 hours as needed for mild pain      Aromatic Inhalants (RA MENTHOL NASAL INHALER) INHA Spray 1 Inhalation in nostril as needed      bismuth subsalicylate (PEPTO BISMOL) 262 MG chewable tablet Take 1 tablet by mouth every 6 hours as needed for diarrhea      calcium carbonate  (TUMS) 500 MG chewable tablet Take 1-2 chew tab by mouth 3 times daily as needed for heartburn      hypromellose (ARTIFICIAL TEARS) 0.5 % SOLN ophthalmic solution Place 1 drop into both eyes 4 times daily as needed for dry eyes      lactulose (CHRONULAC) 10 GM/15ML solution Take 30 mLs (20 g) by mouth 6 times daily  Qty: 946 mL, Refills: 4    Associated Diagnoses: Alcoholic cirrhosis, unspecified whether ascites present (H)      omeprazole (PRILOSEC) 20 MG DR capsule TAKE 1 CAPSULE BY MOUTH TWICE DAILY BEFORE MEAL(S)  Qty: 180 capsule, Refills: 3    Associated Diagnoses: Encounter for medication refill      rifaximin (XIFAXAN) 550 MG TABS tablet Take 1 tablet (550 mg) by mouth 2 times daily  Qty: 60 tablet, Refills: 4    Associated Diagnoses: Alcoholic cirrhosis, unspecified whether ascites present (H)      simvastatin (ZOCOR) 20 MG tablet Take 1 tablet (20 mg) by mouth At Bedtime  Qty: 90 tablet, Refills: 2    Associated Diagnoses: Hypercholesteremia      spironolactone (ALDACTONE) 25 MG tablet Take 1 tablet (25 mg) by mouth daily  Qty: 30 tablet, Refills: 4    Associated Diagnoses: Type 2 diabetes mellitus with microalbuminuria, without long-term current use of insulin (H); Chronic kidney disease, stage 3a (H); Alcoholic cirrhosis, unspecified whether ascites present (H); Portal hypertension (H); Incontinence of feces with fecal urgency; Popliteal cyst, left; Essential hypertension                   Rationale for medication changes:      MiraLAX added as patient was having only 1-2 bowel movements on lactulose 3 times a day.  Has bowel movements increasing frequency consider discontinuing MiraLAX and titrating lactulose to 3-4 bowel movements a day.        Consults       GASTROENTEROLOGY IP CONSULT  PHYSICAL THERAPY ADULT IP CONSULT  OCCUPATIONAL THERAPY ADULT IP CONSULT  PALLIATIVE CARE ADULT IP CONSULT  PALLIATIVE CARE ADULT IP CONSULT  SPIRITUAL HEALTH SERVICES IP CONSULT  INTERVENTIONAL RADIOLOGY ADULT/PEDS IP  CONSULT    Immunizations given this encounter     Most Recent Immunizations   Administered Date(s) Administered     COVID-19 Vaccine 12+ (Pfizer 2022) 04/03/2022     COVID-19 Vaccine 12+ (Pfizer) 10/24/2021     COVID-19 Vaccine Bivalent Booster 12+ (Pfizer) 10/27/2022     Flu 65+ Years 09/07/2022     Flu, Unspecified 09/24/2018     Influenza (High Dose) 3 valent vaccine 09/18/2019     Influenza (IIV3) PF 10/18/2014     Influenza Vaccine 65+ (Fluzone HD) 10/21/2020     Influenza Vaccine >6 months (Alfuria,Fluzone) 09/25/2015     Influenza Vaccine, 6+MO IM (QUADRIVALENT W/PRESERVATIVES) 10/18/2014     Pneumo Conj 13-V (2010&after) 11/14/2016     Pneumococcal 23 valent 03/17/2014     Td (Adult), Adsorbed 1950     Tdap (Adacel,Boostrix) 03/17/2014     Typhoid IM 11/14/2016     Varicella Immunity: Titer/MD Dx 03/30/2007     Zoster vaccine recombinant adjuvanted (SHINGRIX) 07/06/2022           Anticoagulation Information      Recent INR results:   Recent Labs   Lab 01/27/23  0649 01/26/23  0639 01/25/23  0740 01/23/23  1750   INR 1.37* 1.43* 1.38* 1.35*           SIGNIFICANT IMAGING FINDINGS     Results for orders placed or performed during the hospital encounter of 01/23/23   XR Chest Port 1 View    Impression    IMPRESSION: The lungs are clear. The heart size and pulmonary vascularity are normal. No pneumothorax or pleural effusion. There has been improvement in the perihilar vascular congestion seen on the prior study.   IR Transven Intrahepatic Portosyst Rev    Impression    IMPRESSION:    1.  Successful TIPS reduction utilizing a single constrained balloon expandable covered stent.  2.  Significant increase in the portosystemic gradient, measuring 10 mmHg preintervention and 19 mmHg post intervention.       SIGNIFICANT LABORATORY FINDINGS     Most Recent 2 LFT's:Recent Labs   Lab Test 01/27/23  0649 01/24/23  0743   AST 55* 40  40   ALT 35 29  29   ALKPHOS 151* 157*  157*   BILITOTAL 1.7* 1.7*  1.7*        Ammonia 135    Discharge Orders        Reason for your hospital stay    Vito Sy is a 72 year old male admitted on 1/23/2023. 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  He is s/p TIPS procedure on December 9 without complications.  Since being discharged patient has had recurrent symptoms suggestive of hepatic encephalopathy and has not recovered to baseline.  He presents again with ammonia level of 135, confused and disoriented.     Activity    Your activity upon discharge: activity as tolerated     Follow-up and recommended labs and tests     Follow up with primary care provider, Amrik Mejia, within 7 days for hospital follow- up.  No follow up labs or test are needed.  Titrate lactulose to 3-4 bowel movements a day.  Reevaluate need for continuation of MiraLAX.  Patient was started on MiraLAX as he did not appear to be responding very well to lactulose alone.  He was having 1-2 stools per day on lactulose 6 times a day therefore MiraLAX was added.     Diet    Follow this diet upon discharge: Orders Placed This Encounter      Snacks/Supplements Adult: Ensure Enlive; With Meals      Room Service      Combination Diet Low Saturated Fat Na <2400mg Diet       Examination   Physical Exam   Temp:  [98  F (36.7  C)-98.6  F (37  C)] 98  F (36.7  C)  Pulse:  [60-92] 74  Resp:  [18-20] 18  BP: ()/(58-65) 107/58  SpO2:  [100 %] 100 %  Wt Readings from Last 1 Encounters:   01/25/23 69.6 kg (153 lb 7 oz)       Physical Exam  HENT:      Head: Normocephalic.   Cardiovascular:      Rate and Rhythm: Normal rate.      Pulses: Normal pulses.   Pulmonary:      Effort: Pulmonary effort is normal.   Skin:     General: Skin is warm.      Capillary Refill: Capillary refill takes less than 2 seconds.   Neurological:      Mental Status: Mental status is at baseline.           Please see EMR for more detailed significant labs, imaging, consultant  notes etc.    I, Juma Holguin MD, personally saw the patient today and spent greater than 30 minutes discharging this patient.    Juma Holguin MD  [facility]    CC:Amrik Mejia

## 2023-01-27 NOTE — PROGRESS NOTES
[facility]    Medicine Progress Note - Hospitalist Service    Date of Admission:  1/23/2023    Assessment & Plan     Vito Sy is a 72 year old male admitted on 1/23/2023. 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  He is s/p TIPS procedure on December 9 without complications.  Since being discharged patient has had recurrent symptoms suggestive of hepatic encephalopathy and has not recovered to baseline.  He presents again with ammonia level of 135, confused and disoriented.        Hepatic encephalopathy  End-stage liver cirrhosis  -Patient has a history of alcoholic cirrhosis with refractory ascites and portal hypertension.  -He is s/p TIPS on December 9, 2022  -According to family members he is reportedly compliant with rifaximin and lactulose however symptoms remain persistent  -Ammonia improved   -Continue spironolactone and omeprazole  -Consult gastroenterology,recommendations appreciated.  Continue rifaximin and lactulose  -Renal consulted.    -Patient is s/p TIPS revision January 26.  Placement of a constrained balloon expandable covered stent within the existing TIPS     Chronic kidney disease stage IIIa  -Creatinine at baseline  -Avoid nephrotoxic agents         Diet: Combination Diet Low Saturated Fat Na <2400mg Diet  Snacks/Supplements Adult: Ensure Enlive; With Meals  Room Service    DVT Prophylaxis: Pneumatic Compression Devices  Cronin Catheter: Not present  Lines: None     Cardiac Monitoring: None  Code Status: Full Code      Clinically Significant Risk Factors              # Hypoalbuminemia: Lowest albumin = 3.1 g/dL at 1/27/2023  6:49 AM, will monitor as appropriate             # Severe Malnutrition: based on nutrition assessment, PRESENT ON ADMISSION       Disposition Plan      Expected Discharge Date: 01/30/2023    Discharge Delays: Placement - TCU    Discharge Comments: TIPS procedure 1/26           Juma Holguin MD  Hospitalist Service  [facility]  Securely message with [employer] (more info)  Text page via [employer] Paging/Directory   ______________________________________________________________________    Interval History   Wife at bedside  Patient seen at bedside and appears close to baseline.  He was able to answer all questions today.  No fever recorded overnight.  S/p TIPS revision yesterday.    Physical Exam   Vital Signs: Temp: 98.3  F (36.8  C) Temp src: Oral BP: 109/65 Pulse: 68   Resp: 18 SpO2: 100 % O2 Device: None (Room air)    Weight: 153 lbs 7.04 oz      Physical Exam  HENT:      Head: Normocephalic.      Nose: Nose normal.      Mouth/Throat:      Mouth: Mucous membranes are moist.   Eyes:      General: Scleral icterus present.      Pupils: Pupils are equal, round, and reactive to light.   Cardiovascular:      Rate and Rhythm: Normal rate.      Pulses: Normal pulses.   Pulmonary:      Effort: Pulmonary effort is normal.   Abdominal:      General: Abdomen is flat.   Musculoskeletal:         General: Normal range of motion.      Cervical back: Normal range of motion.   Skin:     General: Skin is warm.      Capillary Refill: Capillary refill takes less than 2 seconds.   Neurological:      Mental Status: He is lethargic and disoriented.           Medical Decision Making       20 MINUTES SPENT BY ME on the date of service doing chart review, history, exam, documentation & further activities per the note.      Data     I have personally reviewed the following data over the past 24 hrs:    N/A  \   N/A   / N/A     136 105 21.6 /  111 (H)   4.5 21 (L) 1.56 (H) \       ALT: 35 AST: 55 (H) AP: 151 (H) TBILI: 1.7 (H)   ALB: 3.1 (L) TOT PROTEIN: 6.5 LIPASE: N/A       Procal: N/A CRP: N/A Lactic Acid: 2.3 (H)       INR:  1.37 (H) PTT:  N/A   D-dimer:  N/A Fibrinogen:  N/A       Imaging results reviewed over the past 24 hrs:   No results found for this or any previous visit (from the  past 24 hour(s)).

## 2023-01-27 NOTE — DISCHARGE INSTRUCTIONS
Transjugular Intrahepatic Portosystemic Shunt (TIPS) REVISION:    You had Transjugular Intrahepatic Portosystemic Shunt (TIPS) procedure during your hospitalization. This is a procedure that helps reduce portal vein pressure for treatment of symptoms associated with portal hypertension and/or liver disease.    Care Instructions:    - Do not lift greater than 10 pounds for 2 days.  - May remove bandage/dressing and shower beginning the day after your procedure.    - Do not submerge neck access site underneath water in a tub, Jacuzzi or pool for 3 days or until the site is well healed.    - Do not apply any creams, ointments or lotions over the neck access site.   - Observe site for infection (redness, purulent drainage, increasing pain, fever).    Follow up:    - Our [facility] office will call you to schedule a post TIPS ultrasound and outpatient clinic follow up.This usually occurs within 1 month following the procedure. Please call sooner if any TIPS related questions or concerns ([phone]).    Please seek medical evaluation for:   - Fever (greater than 101 F (38.3C)).  - Purulent (yellow/green/ foul smelling) drainage from neck access site.  - Increasing pain/redness/swelling/drainage at neck access site.  - If you develop worsening confusion or increasing abdominal distention.  - If you have uncontrolled bleeding from the neck access site.  - If you begin coughing up or vomiting blood or if you have blood in your stool.

## 2023-01-27 NOTE — PROGRESS NOTES
[facility]  Palliative Care Daily Progress Note      Code status: Full Code     Impressions, Recommendations & Counseling     SYMPTOM ASSESSMENT:  Hepatic encephaloapthy and altered mental status 2/2 cirrhosis and s/p TIPS   - 1/26/2023 TIPS downsized  - GI managing medications: lactulose and rifaximin    Generalized muscle weakness  -PT working with patient.  - Believe patient would benefit from home PT.    ADVANCED CARE PLANNING  Advance directive: No  POLST: No  Code status: Full Code  Surrogate: Naomi (spouse)    GOALS OF CARE DISCUSSION  - Goals are life prolonging.  - Continue all current cares and treatments.  - Referral placed to Outpatient palliative care clinic for ongoing goals of care discussion.    Psychosocial & Spiritual:   - Supported by wife Naomi and their 3 adult sons. Naomi and her son and daughter-in-law take care of Zaire at home.  - [place] and Naomi immigrated from [facility] in [place]. They both have 12 siblings.  44 years. Moved to [place] 1996 from [place].  - Zaire is Religion and deeply Rastafarian. Spiritual Care visits welcome.  - Naomi and Zaire are professional Cypriot interpreters.        HPI   Notes and chart reviewed.         Zaire Sy is a 72 year old man with PMH of  cirrhosis complicated by refractory ascites and portal hypertension s/p TIPS 12/9/2022, CKD3, HTN and gout. Patient admitted 1/23/2023 for recurrent hepatic encephalopathy.     Consultative services: GI, IR, PT, RD, care management    Today, the patient was seen for:  Encephalopathy, goals of care    Prognosis, Goals, or Advance Care Planning was addressed today with: Yes.  Mood, coping, and/or meaning in the context of serious illness were addressed today: Yes.            Interval History:     Chart review/discussion with unit or clinical team members:   Afebrile. VSS. 1/26/2023 TIPS downsized. Ammonia to 66 from 113 today. More alert today.    Per patient or family/caregivers  today:  Patient denies all symptoms except weakness and fatigue. Naomi feels he is more alert and communicative today. Less confusion.    Wife describes caring for patient. She feels that she could use some assistance at home like home PT.  Her sister  on hospice from cirrhosis 2 years ago. She acknowledges that at some point in the hospice would help Zaire. Very clear that goals are life prolonging at this time including full code.  Introduced role of Outpatient palliative care clinic for ongoing goals of care discussion and symptom management. She would be interested in a referral for this support if covered by insurance.    Key Palliative Symptoms:  # Pain severity the last 12 hours: none  # Dyspnea severity the last 12 hours: none  # Nausea severity the last 12 hours: none  # Anxiety severity the last 12 hours: none  Weakness and fatigue: moderate-severe           Review of Systems:     Besides above, an additional system ROS was reviewed and is unremarkable          Medications:     I have reviewed this patient's medication profile and medications during this hospitalization.           Physical Exam:   Temp:  [96.4  F (35.8  C)-99.5  F (37.5  C)] 98.4  F (36.9  C)  Pulse:  [67-92] 86  Resp:  [12-20] 20  BP: ()/(53-66) 98/59  SpO2:  [100 %] 100 %    General appearance: alert, cooperative, fatigued and no distress  Head: Normocephalic, without obvious abnormality, atraumatic  Eyes: lids and lashed normal  Nose: no discharge  Neck: right neck dressing dry and intact from TIPS  Lungs: clear to auscultation bilaterally  Heart: regular rate  Abdomen: soft, non-tender, mildly distended  Extremities: no edema noted  Neurologic: Alert. Oriented x4. Drifts off to sleep during visit - arouses easily.           Data Reviewed:     Pertinent Labs  Lab Results: personally reviewed.   Lab Results   Component Value Date     2023    CO2 21 2023    CO2 22 2022    BUN 21.6 2023    BUN 15  04/27/2022     Lab Results   Component Value Date    WBC 5.5 01/24/2023    HGB 10.6 01/24/2023    HCT 33.8 01/24/2023    MCV 82 01/24/2023     01/24/2023     AST   Date Value Ref Range Status   01/27/2023 55 (H) 10 - 50 U/L Final     ALT   Date Value Ref Range Status   01/27/2023 35 10 - 50 U/L Final     Alkaline Phosphatase   Date Value Ref Range Status   01/27/2023 151 (H) 40 - 129 U/L Final     Albumin   Date Value Ref Range Status   01/27/2023 3.1 (L) 3.5 - 5.2 g/dL Final   04/27/2022 2.6 (L) 3.5 - 5.0 g/dL Final         Radiology Results  XR Chest Port 1 View    Result Date: 1/24/2023  EXAM: XR CHEST PORT 1 VIEW LOCATION: [facility] DATE/TIME: 1/24/2023 10:16 AM INDICATION: Encephalopathy COMPARISON: 01/06/2023.     IMPRESSION: The lungs are clear. The heart size and pulmonary vascularity are normal. No pneumothorax or pleural effusion. There has been improvement in the perihilar vascular congestion seen on the prior study.    IR Transven Intrahepatic Portosyst Rev    Result Date: 1/26/2023  LOCATION: [facility] DATE: 1/26/2023 PROCEDURE: TRANSJUGULAR INTRAHEPATIC PORTOSYSTEMIC SHUNT (TIPS) REVISION 1.  Ultrasound-guided access of the right internal jugular vein. A permanent image was stored. 2.  Digital subtraction portal venography. 3.  Portosystemic pre intravenous pressure monitoring. 4.  Placement of a constrained balloon expandable covered stent within the existing TIPS 5.  Portosystemic post intravenous pressure monitoring. 6.  Moderate sedation INTERVENTIONAL RADIOLOGIST: Yaquelin Marion MD INDICATION: 72-year-old male with history of cirrhosis status post TIPS placement on 12/9/2022 for recurrent ascites. The patient is noted to have hepatic encephalopathy resistant to optimal medical management. Plan for TIPS revision to decrease shunted flow through the TIPS stent CONSENT: The risks, benefits and alternatives of the procedure were discussed with  the patient  in detail. All questions were answered. Informed consent was given to proceed with the procedure. MODERATE SEDATION: Prior to the procedure, the patient was alert and oriented. Versed 1.5 mg and fentanyl 75 mcg were administered intravenously with continuous vital signs monitoring by the radiology nurse under my direct supervision. Patient was alert and oriented post procedure. Total face to face moderate sedation time: 60 minutes. CONTRAST: 75 cc Omnipaque ANTIBIOTICS: Rocephin ADDITIONAL MEDICATIONS: None. FLUOROSCOPIC TIME: 14.7 minutes. RADIATION DOSE: Air Kerma: 1451 mGy. COMPLICATIONS: No immediate complications. UNIVERSAL PROTOCOL: The operative site was marked and any prior imaging was reviewed. Required items including blood products, implants, devices and special equipment was made available. Patient identity was confirmed either verbally, with demographic information, hospital assigned identification or other identification markers. A timeout was performed immediately prior to the procedure. STERILE BARRIER TECHNIQUE: Maximum sterile barrier technique was used. Cutaneous antisepsis was performed at the operative site with application of 2% chlorhexidine and large sterile drape. Prior to the procedure, the  and assistant performed hand hygiene and wore hat, mask, sterile gown, and sterile gloves during the entire procedure. PROCEDURE/TECHNIQUE:  Using real-time sonographic guidance, access was achieved into the right internal jugular vein and conversion was made for a 12 Jordanian vascular sheath. The catheter was carefully positioned in the right atrium. An MPA catheter and guidewire were used to selectively catheterize the TIPS stent. Over-the-wire exchange is made for a flush catheter which was positioned within the main portal vein. Pre-intervention pressure measurements were obtained in the right atrium and main portal vein and a gradient calculated. An exchange Amplatz wire was  advanced through the flush catheter and directed into the splenic vein. The flush catheter was removed and the 12 Pakistani sheath was advanced with the sheath dilator under fluoroscopic guidance to the main portal vein. The dilator was removed and over-the-wire exchange is made for a 10 mm x 59 mm Viabahn VBX balloon extendable covered stent. The stent was carefully positioned within the covered portion of the Viatorr stent. The distal 2 cm of the stent was unsheathed and the balloon was expanded to nominal pressure expanding the distal end of the stent while keeping the remaining stent within the sheath. The balloon was deflated and the sheath was retracted into the inferior vena cava. The balloon was carefully retracted  and positioned along the cranial 2 cm of the stent and expanded to nominal pressure. This was successful in creating a constrained segment within the middle of the Viabahn VBX stent. Over-the-wire exchange is made for the flush catheter which was positioned in the main portal vein. A portal venogram was obtained demonstrating patent constrained TIPS with flow into the inferior vena cava/right atrium. Post intervention pressure measurements were obtained. At this point, the procedure was considered complete. The sheath was removed and hemostasis was achieved with manual pressure. FINDINGS: Ultrasound demonstrates an anechoic and compressible right internal jugular vein. Preintervention portosystemic gradient: 10 mmHg (RA 0, PV 10) Post intervention portosystemic gradient: 19 mmHg (RA 0, PV 19)     IMPRESSION:  1.  Successful TIPS reduction utilizing a single constrained balloon expandable covered stent. 2.  Significant increase in the portosystemic gradient, measuring 10 mmHg preintervention and 19 mmHg post intervention.     TTS: I have personally spent a total of 50 minutes today on unit in review of medical record, consultation with the medical providers and assessment of patient today, with more  than 50% of this time spent in counseling and discussing goals of care with patient and family and development of plan of care as noted above.    DONATO Stokes, APRN, CNS  Palliative Care  [phone]

## 2023-01-27 NOTE — PROGRESS NOTES
"  Interventional Radiology - Progress Note  Inpatient - [facility]: Interventional Radiology   [phone]  01/27/2023     S:  Patient interacts with this writer today and answers questions.  Wife is present and reports that she feels his mentation is improving.       O:  /61 (BP Location: Left arm)   Pulse 60   Temp 98.6  F (37  C) (Oral)   Resp 18   Ht 1.676 m (5' 6\")   Wt 69.6 kg (153 lb 7 oz)   SpO2 100%   BMI 24.77 kg/m    General:  Stable.  In no acute distress.    Neuro:  A&O x 3. Moves all extremities equally.  Resp:  Lungs clear to auscultation bilaterally.  Cardio:  S1S2 and reg, without murmur, clicks or rubs  Abdomen:  Soft, rounded.    Skin:  Without excoriations, ecchymosis, erythema, lesions or open sores on RIGHT neck puncture site.        IMAGING:  IR Transven Intrahepatic Portosyst Rev  Narrative: LOCATION: [facility]  DATE: 1/26/2023    PROCEDURE: TRANSJUGULAR INTRAHEPATIC PORTOSYSTEMIC SHUNT (TIPS) REVISION  1.  Ultrasound-guided access of the right internal jugular vein. A permanent image was stored.  2.  Digital subtraction portal venography.  3.  Portosystemic pre intravenous pressure monitoring.  4.  Placement of a constrained balloon expandable covered stent within the existing TIPS  5.  Portosystemic post intravenous pressure monitoring.  6.  Moderate sedation    INTERVENTIONAL RADIOLOGIST: Yaquelin Marion MD    INDICATION: 72-year-old male with history of cirrhosis status post TIPS placement on 12/9/2022 for recurrent ascites. The patient is noted to have hepatic encephalopathy resistant to optimal medical management. Plan for TIPS revision to decrease shunted   flow through the TIPS stent    CONSENT: The risks, benefits and alternatives of the procedure were discussed with the patient  in detail. All questions were answered. Informed consent was given to proceed with the procedure.    MODERATE SEDATION: Prior to the procedure, the patient " was alert and oriented. Versed 1.5 mg and fentanyl 75 mcg were administered intravenously with continuous vital signs monitoring by the radiology nurse under my direct supervision. Patient was alert   and oriented post procedure. Total face to face moderate sedation time: 60 minutes.    CONTRAST: 75 cc Omnipaque  ANTIBIOTICS: Rocephin  ADDITIONAL MEDICATIONS: None.    FLUOROSCOPIC TIME: 14.7 minutes.  RADIATION DOSE: Air Kerma: 1451 mGy.    COMPLICATIONS: No immediate complications.    UNIVERSAL PROTOCOL: The operative site was marked and any prior imaging was reviewed. Required items including blood products, implants, devices and special equipment was made available. Patient identity was confirmed either verbally, with demographic   information, hospital assigned identification or other identification markers. A timeout was performed immediately prior to the procedure.    STERILE BARRIER TECHNIQUE: Maximum sterile barrier technique was used. Cutaneous antisepsis was performed at the operative site with application of 2% chlorhexidine and large sterile drape. Prior to the procedure, the  and assistant performed   hand hygiene and wore hat, mask, sterile gown, and sterile gloves during the entire procedure.    PROCEDURE/TECHNIQUE:    Using real-time sonographic guidance, access was achieved into the right internal jugular vein and conversion was made for a 12 Jamaican vascular sheath. The catheter was carefully positioned in the right atrium. An MPA catheter and guidewire were used to   selectively catheterize the TIPS stent. Over-the-wire exchange is made for a flush catheter which was positioned within the main portal vein. Pre-intervention pressure measurements were obtained in the right atrium and main portal vein and a gradient   calculated.    An exchange Amplatz wire was advanced through the flush catheter and directed into the splenic vein. The flush catheter was removed and the 12 Jamaican sheath was  advanced with the sheath dilator under fluoroscopic guidance to the main portal vein. The   dilator was removed and over-the-wire exchange is made for a 10 mm x 59 mm Viabahn VBX balloon extendable covered stent. The stent was carefully positioned within the covered portion of the Viatorr stent. The distal 2 cm of the stent was unsheathed and   the balloon was expanded to nominal pressure expanding the distal end of the stent while keeping the remaining stent within the sheath. The balloon was deflated and the sheath was retracted into the inferior vena cava. The balloon was carefully retracted   and positioned along the cranial 2 cm of the stent and expanded to nominal pressure. This was successful in creating a constrained segment within the middle of the Viabahn VBX stent. Over-the-wire exchange is made for the flush catheter which was   positioned in the main portal vein. A portal venogram was obtained demonstrating patent constrained TIPS with flow into the inferior vena cava/right atrium.    Post intervention pressure measurements were obtained. At this point, the procedure was considered complete. The sheath was removed and hemostasis was achieved with manual pressure.    FINDINGS:  Ultrasound demonstrates an anechoic and compressible right internal jugular vein.    Preintervention portosystemic gradient: 10 mmHg (RA 0, PV 10)    Post intervention portosystemic gradient: 19 mmHg (RA 0, PV 19)  Impression: IMPRESSION:    1.  Successful TIPS reduction utilizing a single constrained balloon expandable covered stent.  2.  Significant increase in the portosystemic gradient, measuring 10 mmHg preintervention and 19 mmHg post intervention.        LABS:  Recent Labs   Lab 01/27/23  0649 01/26/23  0639 01/25/23  0740 01/24/23  0743 01/23/23  1750 01/23/23  1744   WBC  --   --   --  5.5  --  5.6   HGB  --   --   --  10.6*  --  12.1*   PLT  --   --   --  130*  --  158   INR 1.37* 1.43* 1.38*  --  1.35*  --    CR 1.56*  1.51* 1.53* 1.46*  --  1.47*     Liver Function Studies - Recent Labs   Lab Test 01/27/23  0649   PROTTOTAL 6.5   ALBUMIN 3.1*   BILITOTAL 1.7*   ALKPHOS 151*   AST 55*   ALT 35             A:  72 year old yo male PMH cirrhosis, ascites, portal HTN, s/p TIPS 12/9/2022, HTN gout, who admitted 1/23/23 2/2 recurrent encephalopathy.    S/P TIPS revision 1/26/23 per above    P:    MELD-Na score: 18 at 1/27/2023  6:49 AM  MELD score: 17 at 1/27/2023  6:49 AM  Calculated from:  Serum Creatinine: 1.56 mg/dL at 1/27/2023  6:49 AM  Serum Sodium: 136 mmol/L at 1/27/2023  6:49 AM  Total Bilirubin: 1.7 mg/dL at 1/27/2023  6:49 AM  INR(ratio): 1.37 at 1/27/2023  6:49 AM  Age: 72 years    - Continue to follow Hgb and MELD labs.   - Lactose, Xifaximin, Miralax per GI. Monitor mentation.  - Post TIPS cares discussed with patient's wife. Questions answered.  - TIPS discharge care instructions entered into D/C navigator.  - Patient may require 1-2 paracentesis following TIPS revision procedure as body adjusts to new pathway   - Called and spoke to Eben RN and MWR- aware of follow up needs.  MWR will call and schedule with patient.        Total time spent on the date of the encounter is 25 minutes, including time spent counseling the patient, performing a medically appropriate evaluation, reviewing prior medical history, ordering medications and tests, documenting clinical information in the medical record, and communication of results.    ROBERT MO NP  Interventional Radiology  [phone]    E/M codes for reference only:  94443

## 2023-01-27 NOTE — PROGRESS NOTES
"  GASTROENTEROLOGY PROGRESS NOTE     SUBJECTIVE   The patient reports feeling better today with less confusion. The patient's wife reports her  has significantly improved and is more interactive. She hopes to discharge to home soon.   Passed two stools in the past 24 hours. Stool is not bloody.      OBJECTIVE     Vitals Blood pressure 109/65, pulse 68, temperature 98.3  F (36.8  C), temperature source Oral, resp. rate 18, height 1.676 m (5' 6\"), weight 69.6 kg (153 lb 7 oz), SpO2 100 %.          Physical Exam   General: awake, up in chair, responds appropriately, mild asterixis    Cardiovascular: S1S2    Chest: lungs are clear     Abdomen: +bs, soft, not tender          LABORATORY    ELECTROLYTE PANEL   Recent Labs   Lab 01/27/23  0649 01/26/23  0639 01/25/23  0740    134* 141   POTASSIUM 4.5 4.3 4.3   CHLORIDE 105 105 109*   CO2 21* 18* 20*   * 106* 109*   CR 1.56* 1.51* 1.53*   BUN 21.6 21.7 21.2      HEMATOLOGY PANEL   Recent Labs   Lab 01/27/23  0649 01/26/23  0639 01/25/23  0740 01/24/23  0743 01/23/23  1750 01/23/23  1744   HGB  --   --   --  10.6*  --  12.1*   MCV  --   --   --  82  --  80   WBC  --   --   --  5.5  --  5.6   PLT  --   --   --  130*  --  158   INR 1.37* 1.43* 1.38*  --    < >  --     < > = values in this interval not displayed.      LIVER AND PANCREAS PANEL   Recent Labs   Lab 01/27/23  0649 01/24/23  0743 01/23/23  1744   AST 55* 40  40 51*   ALT 35 29  29 35   ALKPHOS 151* 157*  157* 183*   BILITOTAL 1.7* 1.7*  1.7* 1.8*   LIPASE  --   --  156*   Ammonia is trending down-- 63  IMAGING STUDIES        I have reviewed the current diagnostic and laboratory tests.              IMPRESSION   Cirrhosis with history of refractory ascites and portal htn s/p TIPS 12/9/23 who was admitted 1/23/23 with worsening encephalopathy. The patient is S/p TIPS revision 1/26/23 (downsized) and has continued on Rifaximin and Lactulose. Encephalopathy seems to be improving.   I discussed with " the patient and his family re: the importance of titrating Lactulose (has been passing one stool per day at home).         PLAN   Encephalopathy seems to be improving.   Continue Lactulose at current dose (will reduce once stooling more frequently). Discussed discontinuation of Miralax with Dr Montenegro who recommends continuation.   Continue Rifaximin.   If ongoing improvement, possible discharge to home 1/28/23. I will arrange for outpatient f/up in hepatology with Dr MICHAEL Montenegro.         Alanis Toribio PA-C  Thank you for the opportunity to participate in the care of this patient.   Please feel free to call me with any questions or concerns.  Phone number [phone].

## 2023-01-27 NOTE — PLAN OF CARE
Goal Outcome Evaluation:      Alert and oriented. Answers questions appropriately but slow.  Forgetful. Assist of one to the restroom with the walker. Up in the chair. Tolerating diet.  Some stomach upset after lunch today in the epigastric area.  One large stool this am.

## 2023-01-27 NOTE — PROGRESS NOTES
Follow up with pt and spouse for support and prayer. Naomi reports that Zaire is doing better. He was able to acknowledge my role as  and said yes to prayer. Naomi is hopeful that Zaire will discharge home this weekend.    Naomi and I informally visited and then we prayed. Family finds comfort through bhavik and prayer.She expressed gratitude for visit.    Spiritual Care is available as needed or requested.    ANDREW Zabala.  Palliative Care Team

## 2023-01-27 NOTE — PLAN OF CARE
Goal Outcome Evaluation:  Ammonia up to 113 today but mentation seems to be better. Patient alert to person, place and situation. TIPS downsizing done this shift. No bleeding noted to site; rt upper neck. Patient sleepy upon arrival on unit but more alert later and ate 75 % of lunch and a good amount of food brought from home by wife. Vitals stable. Voiding appropriately. No BM this shift. Continues to be on Lactulose and Miralax.      Problem: Thought Process Alteration  Goal: Optimal Thought Clarity  Outcome: Progressing  Intervention: Minimize Safety Risk and Altered Thought  Recent Flowsheet Documentation  Taken 1/26/2023 1647 by Eric Hartmann, RN  Enhanced Safety Measures:   bed alarm set   chair alarm set  Taken 1/26/2023 1145 by Eric Hartmann, RN  Enhanced Safety Measures:   bed alarm set   chair alarm set  Taken 1/26/2023 0816 by Eric Hartmann, RN  Enhanced Safety Measures:   bed alarm set   chair alarm set     Problem: Plan of Care - These are the overarching goals to be used throughout the patient stay.    Goal: Absence of Hospital-Acquired Illness or Injury  Intervention: Identify and Manage Fall Risk  Recent Flowsheet Documentation  Taken 1/26/2023 1647 by Eric Hartmann, RN  Safety Promotion/Fall Prevention:   activity supervised   assistive device/personal items within reach   bed alarm on   chair alarm on   mobility aid in reach   nonskid shoes/slippers when out of bed  Taken 1/26/2023 1145 by Eric Hartmann, RN  Safety Promotion/Fall Prevention:   activity supervised   assistive device/personal items within reach   bed alarm on   chair alarm on   mobility aid in reach   nonskid shoes/slippers when out of bed  Taken 1/26/2023 0816 by Eric Hartmann, RN  Safety Promotion/Fall Prevention:   activity supervised   assistive device/personal items within reach   bed alarm on   chair alarm on   mobility aid in reach   nonskid shoes/slippers when out of bed   A1 with a walker and gait belt.     Problem:  Malnutrition  Goal: Improved Nutritional Intake  Outcome: Progressing

## 2023-01-28 ENCOUNTER — PATIENT OUTREACH (OUTPATIENT)
Dept: CARE COORDINATION | Facility: CLINIC | Age: 73
End: 2023-01-28
Payer: COMMERCIAL

## 2023-01-28 NOTE — PLAN OF CARE
Occupational Therapy Discharge Summary    Reason for therapy discharge:    Discharged to home.    Progress towards therapy goal(s). See goals on Care Plan in [facility] electronic health record for goal details.  Goals partially met.  Barriers to achieving goals:   discharge from facility.    Therapy recommendation(s):    home OT recommended

## 2023-01-28 NOTE — PROGRESS NOTES
[facility]    Background: Transitional Care Management program identified per system criteria and reviewed by [facility] team for possible outreach.    Assessment: Upon chart review, [facility] Team member will not proceed with patient outreach related to this episode of Transitional Care Management program due to reason below:    Patient has a follow up appointment with an appropriate provider today for hospital discharge    Plan: Transitional Care Management episode addressed appropriately per reason noted above.      TASHIA Rodrigez  [facility], [facility]    *Connected Care Resource Team does NOT follow patient ongoing. Referrals are identified based on internal discharge reports and the outreach is to ensure patient has an understanding of their discharge instructions.

## 2023-01-28 NOTE — PLAN OF CARE
Physical Therapy Discharge Summary    Reason for therapy discharge:    Discharged to home.    Progress towards therapy goal(s). See goals on Care Plan in [facility] electronic health record for goal details.  Goals met    Therapy recommendation(s):    No further therapy is recommended.

## 2023-01-28 NOTE — PLAN OF CARE
Occupational Therapy Discharge Summary    Reason for therapy discharge:    Discharged to home.    Progress towards therapy goal(s). See goals on Care Plan in [facility] electronic health record for goal details.  Goals partially met.  Barriers to achieving goals:   discharge from facility.    Therapy recommendation(s):    No further therapy is recommended.

## 2023-01-29 LAB — BACTERIA BLD CULT: NO GROWTH

## 2023-01-31 ENCOUNTER — OFFICE VISIT (OUTPATIENT)
Dept: FAMILY MEDICINE | Facility: CLINIC | Age: 73
End: 2023-01-31
Payer: COMMERCIAL

## 2023-01-31 VITALS
HEIGHT: 66 IN | OXYGEN SATURATION: 100 % | TEMPERATURE: 98.7 F | BODY MASS INDEX: 25.88 KG/M2 | DIASTOLIC BLOOD PRESSURE: 60 MMHG | SYSTOLIC BLOOD PRESSURE: 92 MMHG | HEART RATE: 71 BPM | WEIGHT: 161 LBS

## 2023-01-31 DIAGNOSIS — K74.69 OTHER CIRRHOSIS OF LIVER (H): ICD-10-CM

## 2023-01-31 DIAGNOSIS — E11.29 TYPE 2 DIABETES MELLITUS WITH MICROALBUMINURIA, WITHOUT LONG-TERM CURRENT USE OF INSULIN (H): ICD-10-CM

## 2023-01-31 DIAGNOSIS — Z23 NEED FOR VACCINATION: ICD-10-CM

## 2023-01-31 DIAGNOSIS — K76.82 HEPATIC ENCEPHALOPATHY (H): Primary | ICD-10-CM

## 2023-01-31 DIAGNOSIS — B07.9 VIRAL WARTS, UNSPECIFIED TYPE: ICD-10-CM

## 2023-01-31 DIAGNOSIS — R80.9 TYPE 2 DIABETES MELLITUS WITH MICROALBUMINURIA, WITHOUT LONG-TERM CURRENT USE OF INSULIN (H): ICD-10-CM

## 2023-01-31 PROCEDURE — G0009 ADMIN PNEUMOCOCCAL VACCINE: HCPCS | Performed by: FAMILY MEDICINE

## 2023-01-31 PROCEDURE — 99207 PR NON-BILLABLE SERV PER CHARTING: CPT | Mod: 25 | Performed by: FAMILY MEDICINE

## 2023-01-31 PROCEDURE — 90677 PCV20 VACCINE IM: CPT | Performed by: FAMILY MEDICINE

## 2023-01-31 PROCEDURE — 17000 DESTRUCT PREMALG LESION: CPT | Performed by: FAMILY MEDICINE

## 2023-01-31 RX ORDER — POLYETHYLENE GLYCOL 3350 17 G/17G
17 POWDER, FOR SOLUTION ORAL 3 TIMES DAILY
Qty: 90 PACKET | Refills: 6 | Status: SHIPPED | OUTPATIENT
Start: 2023-01-31 | End: 2023-01-01

## 2023-01-31 NOTE — PROGRESS NOTES
Hospital Follow-up Visit:    Hospital/Nursing Home/IP Rehab Facility: [facility]  Date of Admission: 1/23/2023  Date of Discharge: 1/27/2023  Reason(s) for Admission: Hepatic encephalopathy    Was your hospitalization related to COVID-19? No   Problems taking medications regularly:  None  Medication changes since discharge: No  Problems adhering to non-medication therapy:  None    Summary of hospitalization:  [facility] discharge summary reviewed  Diagnostic Tests/Treatments reviewed.  Follow up needed: GI/hepatology  Other Healthcare Providers Involved in Patient s Care:         Specialist appointment - GI/hepatology  Update since discharge: improved.       MED REC REQUIRED  Post Medication Reconciliation Status: discharge medications reconciled and changed, per note/orders  Plan of care communicated with patient and family      ASSESMENT AND PLAN:  Diagnoses and all orders for this visit:  Hepatic encephalopathy  Reviewed discharge summary with the patient.  No follow-up labs are recommended.  Medication reconciliation and review and counseling done.  Dramatic improvement since last visit from when she was admitted to the hospital, see those notes for details.  Patient completed a TIPS revision during his hospitalization and also augmentation of his lactulose with polyethylene glycol which will be refilled as below and can be titrated in the future based on number of stools per day.  Given that he is exceeding 4 stools per day currently, will reduce lactulose from 6 times daily down to 5 times daily.  -     polyethylene glycol (MIRALAX) 17 g packet; Take 17 g by mouth 3 times daily With adjustment in number of times per day per physician instructions  Other cirrhosis of liver (H) with ascites with end-stage liver disease  A portion of the patient's liver disease was alcoholic but he has not had any alcoholic drink in over 2 years.  I am wondering and the patient and his  family are wondering whether he might be a candidate for transplant for the long-term plan.  Counseling done today with the patient and his family, they are going to check with his hepatology team to see if it is appropriate for him to be referred to transplant evaluation.  Type 2 diabetes mellitus with microalbuminuria, without long-term current use of insulin (H)  Well-controlled.  Last A1c was 6.2, he is not due for recheck yet.  Continue current plan.  Need for vaccination  -     Pneumococcal 20 Valent Conjugate (PCV20)  Viral warts, unspecified type  Discussed options with the patient, he elects for shave/curette destruction.  This was done today in clinic without complication after preparation of the skin with rubbing alcohol and the specimen was not sent to lab.  Patient and family counseled to watch the area closely and if there is a recurrence then he will need to have a deeper shave biopsy with pathology specimen sent.  Aftercare instructions discussed with the patient and his family.  -     Treat/Destroy Premalignant - the FIRST wart      Reviewed the risks and benefits of the treatment plan with the patient and/or caregiver and we discussed indications for routine and emergent follow-up.        SUBJECTIVE: Since discharge patient is continue to improve with his cognitive status clearing and the confusion decreasing.  His strength has been improving, he is able to transfer and walk on his own power.  He is having 4-5 bowel movements per day on his current regimen of lactulose and polyethylene glycol.  No vomiting, no fever, appetite and eating have been improving.  Patient has a skin wart on the right side of the nose that has appeared over the last couple of months that he would like removed.    Past Medical History:   Diagnosis Date     Alcoholic cirrhosis of liver with ascites (H)      Cirrhosis of liver (H)      Diabetes mellitus (H)      Gout      Hypertension      Kidney stone      Patient Active  Problem List   Diagnosis     Anemia     Hematuria     Hypercholesterolemia     Hypertension     Type 2 diabetes mellitus with microalbuminuria, without long-term current use of insulin (H)     Nephrolithiasis     Benign Adenomatous Polyp Of The Large Intestine     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     GI bleeding     Anemia due to blood loss, acute     Hypotension due to blood loss     Hyperkalemia     Gastrointestinal hemorrhage associated with acute gastritis     Popliteal cyst, left     Popliteal cyst, right     Incontinence of feces with fecal urgency     Other cirrhosis of liver (H) with ascites     Chronic kidney disease, stage 3a (H)     Portal hypertension (H)     Alcoholic cirrhosis of liver with ascites (H)     Cirrhosis of liver (H)     Hepatic encephalopathy     Pleural effusion     Anemia, unspecified type     Current Outpatient Medications   Medication Sig Dispense Refill     acetaminophen (TYLENOL) 500 MG tablet Take 500-1,000 mg by mouth every 6 hours as needed for mild pain       bismuth subsalicylate (PEPTO BISMOL) 262 MG chewable tablet Take 1 tablet by mouth every 6 hours as needed for diarrhea       calcium carbonate (TUMS) 500 MG chewable tablet Take 1-2 chew tab by mouth 3 times daily as needed for heartburn       hypromellose (ARTIFICIAL TEARS) 0.5 % SOLN ophthalmic solution Place 1 drop into both eyes 4 times daily as needed for dry eyes       lactulose (CHRONULAC) 10 GM/15ML solution Take 30 mLs (20 g) by mouth 6 times daily 946 mL 4     omeprazole (PRILOSEC) 20 MG DR capsule TAKE 1 CAPSULE BY MOUTH TWICE DAILY BEFORE MEAL(S) 180 capsule 3     polyethylene glycol (MIRALAX) 17 g packet Take 17 g by mouth 3 times daily With adjustment in number of times per day per physician instructions 90 packet 6     rifaximin (XIFAXAN) 550 MG TABS tablet Take 1 tablet (550 mg) by mouth 2 times daily 60 tablet 4     simvastatin (ZOCOR) 20 MG tablet Take 1 tablet (20  "mg) by mouth At Bedtime 90 tablet 2     spironolactone (ALDACTONE) 25 MG tablet Take 1 tablet (25 mg) by mouth daily 30 tablet 4     Aromatic Inhalants (RA MENTHOL NASAL INHALER) INHA Spray 1 Inhalation in nostril as needed       History   Smoking Status     Never   Smokeless Tobacco     Never       OBJECTICE: BP 92/60 (BP Location: Left arm)   Pulse 71   Temp 98.7  F (37.1  C) (Oral)   Ht 1.676 m (5' 6\")   Wt 73 kg (161 lb)   SpO2 100%   BMI 25.99 kg/m       No results found for this or any previous visit (from the past 24 hour(s)).     GEN-alert, appropriately communicative, in no acute distress   HEENT-mucous membranes are moist   CV-regular rate and rhythm   RESP-lungs clear   ABDOMINAL-soft, nontender, no palpable ascites or mass   EXTREM-no pitting edema of the ankles   SKIN-1 mm raised warty skin lesion of the right side of the nose      Amrik Mejia MD   "

## 2023-02-09 ENCOUNTER — TRANSFERRED RECORDS (OUTPATIENT)
Dept: HEALTH INFORMATION MANAGEMENT | Facility: CLINIC | Age: 73
End: 2023-02-09
Payer: COMMERCIAL

## 2023-02-09 LAB
ALT SERPL-CCNC: 32 IU/L (ref 0–44)
AST SERPL-CCNC: 45 IU/L (ref 0–40)
CREATININE (EXTERNAL): 1.49 MG/DL (ref 0.76–1.27)
GFR ESTIMATED (EXTERNAL): 50 ML/MIN/1.73
GLUCOSE (EXTERNAL): 97 MG/DL (ref 70–99)
INR (EXTERNAL): 1.1 (ref 0.9–1.2)
POTASSIUM (EXTERNAL): 4.5 MMOL/L (ref 3.5–5.2)

## 2023-02-27 ENCOUNTER — HOSPITAL ENCOUNTER (OUTPATIENT)
Dept: ULTRASOUND IMAGING | Facility: HOSPITAL | Age: 73
Discharge: HOME | End: 2023-02-27
Attending: RADIOLOGY | Admitting: RADIOLOGY
Payer: COMMERCIAL

## 2023-02-27 DIAGNOSIS — K74.60 CIRRHOSIS OF LIVER (H): ICD-10-CM

## 2023-02-27 PROCEDURE — 93975 VASCULAR STUDY: CPT

## 2023-03-06 ENCOUNTER — OFFICE VISIT (OUTPATIENT)
Dept: FAMILY MEDICINE | Facility: CLINIC | Age: 73
End: 2023-03-06
Payer: COMMERCIAL

## 2023-03-06 VITALS
RESPIRATION RATE: 18 BRPM | HEART RATE: 82 BPM | WEIGHT: 179.5 LBS | HEIGHT: 66 IN | DIASTOLIC BLOOD PRESSURE: 76 MMHG | SYSTOLIC BLOOD PRESSURE: 113 MMHG | TEMPERATURE: 98.1 F | OXYGEN SATURATION: 100 % | BODY MASS INDEX: 28.85 KG/M2

## 2023-03-06 DIAGNOSIS — E11.29 TYPE 2 DIABETES MELLITUS WITH MICROALBUMINURIA, WITHOUT LONG-TERM CURRENT USE OF INSULIN (H): ICD-10-CM

## 2023-03-06 DIAGNOSIS — R80.9 TYPE 2 DIABETES MELLITUS WITH MICROALBUMINURIA, WITHOUT LONG-TERM CURRENT USE OF INSULIN (H): ICD-10-CM

## 2023-03-06 DIAGNOSIS — K74.69 OTHER CIRRHOSIS OF LIVER (H): Primary | ICD-10-CM

## 2023-03-06 PROCEDURE — 99213 OFFICE O/P EST LOW 20 MIN: CPT | Performed by: FAMILY MEDICINE

## 2023-03-06 NOTE — PROGRESS NOTES
ASSESMENT AND PLAN:  Diagnoses and all orders for this visit:  Other cirrhosis of liver (H) with ascites  Extensive counseling today with the patient around finding the right balance with his medications and his TIPS adjustment that occurred recently to find the right balance between reducing his risk of hepatic encephalopathy recurrence and managing the ascites.  Based on his exam today and his symptoms as detailed below as well as his recent ultrasound results I think he would benefit from paracentesis at this point, patient in agreement with the plan, will do this in consultation with his gastroenterologist Dr. Montenegro.  I left a message for him in detail asking him to review the recent ultrasound and reach out to the patient done around scheduling the paracentesis.  Continue lactulose titrating to about 4 bowel movements per day.    Type 2 diabetes mellitus with microalbuminuria, without long-term current use of insulin (H)  Stable.  His last A1c was 6.2 indicating excellent control.  Continue current plan he will be due for A1c again in May.    Reviewed the risks and benefits of the treatment plan with the patient and/or caregiver and we discussed indications for routine and emergent follow-up.        SUBJECTIVE: 72-year-old male in for follow-up.  His encephalopathy has cleared completely.  He is back to his normal cognitive functioning.  This has been a complicated course recently, see recent notes for details.  He is currently using his lactulose twice daily and having 5-6 bowel movements per day.  He is also noticed over this past couple of weeks some increasing diffuse abdominal pressure and bloating.    Past Medical History:   Diagnosis Date     Alcoholic cirrhosis of liver with ascites (H)      Cirrhosis of liver (H)      Diabetes mellitus (H)      Gout      Hypertension      Kidney stone      Patient Active Problem List   Diagnosis     Anemia     Hematuria     Hypercholesterolemia     Hypertension     Type  2 diabetes mellitus with microalbuminuria, without long-term current use of insulin (H)     Nephrolithiasis     Benign Adenomatous Polyp Of The Large Intestine     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     GI bleeding     Anemia due to blood loss, acute     Hypotension due to blood loss     Hyperkalemia     Gastrointestinal hemorrhage associated with acute gastritis     Popliteal cyst, left     Popliteal cyst, right     Incontinence of feces with fecal urgency     Other cirrhosis of liver (H) with ascites     Chronic kidney disease, stage 3a (H)     Portal hypertension (H)     Alcoholic cirrhosis of liver with ascites (H)     Cirrhosis of liver (H)     Hepatic encephalopathy     Pleural effusion     Anemia, unspecified type     Current Outpatient Medications   Medication Sig Dispense Refill     acetaminophen (TYLENOL) 500 MG tablet Take 500-1,000 mg by mouth every 6 hours as needed for mild pain       Aromatic Inhalants (RA MENTHOL NASAL INHALER) INHA Spray 1 Inhalation in nostril as needed       bismuth subsalicylate (PEPTO BISMOL) 262 MG chewable tablet Take 1 tablet by mouth every 6 hours as needed for diarrhea       calcium carbonate (TUMS) 500 MG chewable tablet Take 1-2 chew tab by mouth 3 times daily as needed for heartburn       hypromellose (ARTIFICIAL TEARS) 0.5 % SOLN ophthalmic solution Place 1 drop into both eyes 4 times daily as needed for dry eyes       lactulose (CHRONULAC) 10 GM/15ML solution Take 30 mLs (20 g) by mouth 6 times daily 946 mL 4     omeprazole (PRILOSEC) 20 MG DR capsule TAKE 1 CAPSULE BY MOUTH TWICE DAILY BEFORE MEAL(S) 180 capsule 3     polyethylene glycol (MIRALAX) 17 g packet Take 17 g by mouth 3 times daily With adjustment in number of times per day per physician instructions 90 packet 6     rifaximin (XIFAXAN) 550 MG TABS tablet Take 1 tablet (550 mg) by mouth 2 times daily 60 tablet 4     simvastatin (ZOCOR) 20 MG tablet Take 1 tablet (20 mg)  "by mouth At Bedtime 90 tablet 2     spironolactone (ALDACTONE) 25 MG tablet Take 1 tablet (25 mg) by mouth daily 30 tablet 4     History   Smoking Status     Never   Smokeless Tobacco     Never       OBJECTICE: /76   Pulse 82   Temp 98.1  F (36.7  C) (Oral)   Resp 18   Ht 1.676 m (5' 5.98\")   Wt 81.4 kg (179 lb 8 oz)   SpO2 100%   BMI 28.99 kg/m       No results found for this or any previous visit (from the past 24 hour(s)).     GEN-alert, appropriate, in no apparent distress   CV-regular rate and rhythm with a grade 2 out of 6 systolic murmur   RESP-lungs clear to auscultation   ABDOMINAL-obvious ascites, nontender       Amrik Mejia MD     "

## 2023-03-14 ENCOUNTER — TRANSFERRED RECORDS (OUTPATIENT)
Dept: HEALTH INFORMATION MANAGEMENT | Facility: CLINIC | Age: 73
End: 2023-03-14
Payer: COMMERCIAL

## 2023-03-14 LAB
CREATININE (EXTERNAL): 1.12 MG/DL (ref 0.76–1.27)
GFR ESTIMATED (EXTERNAL): 70 ML/MIN/1.73
GFR ESTIMATED (EXTERNAL): 70 ML/MIN/1.73
GLUCOSE (EXTERNAL): 111 MG/DL (ref 70–99)
POTASSIUM (EXTERNAL): 3.8 MMOL/L (ref 3.5–5.2)

## 2023-03-16 ENCOUNTER — HOSPITAL ENCOUNTER (OUTPATIENT)
Dept: ULTRASOUND IMAGING | Facility: CLINIC | Age: 73
Discharge: HOME | End: 2023-03-16
Attending: INTERNAL MEDICINE | Admitting: INTERNAL MEDICINE
Payer: COMMERCIAL

## 2023-03-16 DIAGNOSIS — R18.8 OTHER ASCITES: ICD-10-CM

## 2023-03-16 DIAGNOSIS — K74.60 CIRRHOSIS OF LIVER WITHOUT ASCITES, UNSPECIFIED HEPATIC CIRRHOSIS TYPE (H): ICD-10-CM

## 2023-03-16 DIAGNOSIS — Z71.3 DIETARY COUNSELING AND SURVEILLANCE: ICD-10-CM

## 2023-03-16 DIAGNOSIS — K74.69 OTHER CIRRHOSIS OF LIVER (H): ICD-10-CM

## 2023-03-16 PROCEDURE — 76705 ECHO EXAM OF ABDOMEN: CPT

## 2023-04-04 ENCOUNTER — DOCUMENTATION ONLY (OUTPATIENT)
Dept: INTERVENTIONAL RADIOLOGY/VASCULAR | Facility: HOSPITAL | Age: 73
End: 2023-04-04
Payer: COMMERCIAL

## 2023-04-04 NOTE — PROGRESS NOTES
VASCULAR AND INTERVENTIONAL OUTPATIENT CONSULT OR VISIT  PHYSICIAN: Yaquelin Marion MD  ESTABLISHED PATIENT    LOCATION: [facility]    Vito Sy   Medical Record #:  [id number]  YOB: 1950  Age:  72 year old     Date of Service: 4/4/2023    PRIMARY CARE PROVIDER: Amrik Mejia    Reason for visit: Follow-up TIPS revision on 1/26/2023    IMPRESSION: 72-year-old male with history of alcoholic cirrhosis status post TIPS placement on 12/9/2022 who subsequently developed hepatic encephalopathy requiring admission to [facility] at the end of January 2023.  The patient continued to experience symptoms despite maximal therapy with lactulose and rifaximin.  He subsequent underwent TIPS revision on 1/26/2023 with subsequent reduction in lumen size utilizing a constrained Viabahn VBX balloon expandable covered stent.  Overall, the patient has done extremely well since his TIPS revision.  He reports no return of confusion or encephalopathy like symptoms.  He recently underwent an abdominal ultrasound on 3/16/2023 which did not demonstrate any abdominal ascites.    RECOMMENDATION:    1.  Continue current regimen with rifaximin and lactulose.  The patient does complain of the high cost of rifaximin and his son is working on minimizing the co-pays required.  2.  The patient has not required any paracentesis since discharge from the hospital.  A recent abdominal ultrasound demonstrates no ascites.   3.  Would suggest obtaining a repeat TIPS ultrasound if there is any change in his neurological status or evidence of recurrent ascites.  In addition, the patient may require work-up for a portosystemic shunt which would be amenable to retrograde transvenous obliteration.  4.  Continue current surveillance for hepatic lesions and gastroesophageal varices as per Dr. Montenegro    HPI:  Vito Sy is a 72 year old male who was seen today in follow-up after undergoing a TIPS revision at [facility] on 1/26/2023.  The patient was admitted to the hospital after experiencing significant hepatic encephalopathy after initially undergoing placement of a TIPS on 12/9/2022.  The patient underwent successful reduction in the TIPS.  He reports complete resolution of his symptoms since discharge.  He does not report any abdominal distention and has not required any paracentesis since discharge.  He has no active complaints at this time.    PHH:    Past Medical History:   Diagnosis Date     Alcoholic cirrhosis of liver with ascites (H)      Cirrhosis of liver (H)      Diabetes mellitus (H)      Gout      Hypertension      Kidney stone         Past Surgical History:   Procedure Laterality Date     COLONOSCOPY       IR TRANSVEN INTRAHEPATIC PORTOSYST REV  1/26/2023     IR TRANSVEN INTRAHEPATIC PORTOSYST SHUNT  11/16/2022     IR TRANSVEN INTRAHEPATIC PORTOSYST SHUNT  12/9/2022     DC ESOPHAGOGASTRODUODENOSCOPY TRANSORAL DIAGNOSTIC N/A 11/16/2020    Procedure: ESOPHAGOGASTRODUODENOSCOPY (EGD);  Surgeon: Juan Garnett MD;  Location: [facility];  Service: Gastroenterology     DC ESOPHAGOGASTRODUODENOSCOPY TRANSORAL DIAGNOSTIC N/A 11/18/2020    Procedure: ESOPHAGOGASTRODUODENOSCOPY (EGD) WITH BANDING;  Surgeon: Juan Garnett MD;  Location: [facility];  Service: Gastroenterology     URETEROSCOPY         ALLERGIES:  Sulfa drugs and Penicillins    MEDS:    Current Outpatient Medications:      acetaminophen (TYLENOL) 500 MG tablet, Take 500-1,000 mg by mouth every 6 hours as needed for mild pain, Disp: , Rfl:      Aromatic Inhalants (RA MENTHOL NASAL INHALER) INHA, Spray 1 Inhalation in nostril as needed, Disp: , Rfl:      bismuth subsalicylate (PEPTO BISMOL) 262 MG chewable tablet, Take 1 tablet by mouth every 6 hours as needed for diarrhea, Disp: , Rfl:      calcium carbonate (TUMS) 500 MG chewable tablet, Take 1-2 chew tab by mouth 3 times daily as needed for heartburn, Disp: , Rfl:      hypromellose  (ARTIFICIAL TEARS) 0.5 % SOLN ophthalmic solution, Place 1 drop into both eyes 4 times daily as needed for dry eyes, Disp: , Rfl:      lactulose (CHRONULAC) 10 GM/15ML solution, Take 30 mLs (20 g) by mouth 6 times daily, Disp: 946 mL, Rfl: 4     omeprazole (PRILOSEC) 20 MG DR capsule, TAKE 1 CAPSULE BY MOUTH TWICE DAILY BEFORE MEAL(S), Disp: 180 capsule, Rfl: 3     polyethylene glycol (MIRALAX) 17 g packet, Take 17 g by mouth 3 times daily With adjustment in number of times per day per physician instructions, Disp: 90 packet, Rfl: 6     rifaximin (XIFAXAN) 550 MG TABS tablet, Take 1 tablet (550 mg) by mouth 2 times daily, Disp: 60 tablet, Rfl: 4     simvastatin (ZOCOR) 20 MG tablet, Take 1 tablet (20 mg) by mouth At Bedtime, Disp: 90 tablet, Rfl: 2     spironolactone (ALDACTONE) 25 MG tablet, Take 1 tablet (25 mg) by mouth daily, Disp: 30 tablet, Rfl: 4    SOCIAL HABITS:    History   Smoking Status     Never   Smokeless Tobacco     Never     Social History    Substance and Sexual Activity      Alcohol use: Yes        Comment: none for the past 2 years      History   Drug Use No       FAMILY HISTORY:    Family History   Problem Relation Age of Onset     Heart Disease Brother      Hypertension Mother      Hypertension Father        ADVANCE CARE DIRECTIVES:    Advance care directives reviewed in the chart and no changes made.     PE:  There were no vitals taken for this visit.  Wt Readings from Last 1 Encounters:   03/06/23 81.4 kg (179 lb 8 oz)     There is no height or weight on file to calculate BMI.    EXAM:  GENERAL: This is a well-developed 72 year old male who appears his stated age  EYES: Grossly normal.  MOUTH: Buccal mucosa normal   MUSCULOSKELETAL: Grossly normal and both lower extremities are intact.  HEME/LYMPH: No lymphedema  NEUROLOGIC: Focally intact, Alert and oriented x 3.   PSYCH: appropriate affect  INTEGUMENT: No open lesions or ulcers    DIAGNOSTIC STUDIES:     Images:  US Abdomen  Limited    Result Date: 3/16/2023  EXAM: US ABDOMEN LIMITED LOCATION: [facility] DATE/TIME: 3/16/2023 1:43 PM INDICATION:  Other cirrhosis of liver (H), Cirrhosis of liver without ascites, unspecified hepatic cirrhosis type (H), Other ascites, Dietary counseling and surveillance COMPARISON: None. TECHNIQUE: Limited abdominal ultrasound. FINDINGS: Scant abdominal ascites. Paracentesis not performed today.     IMPRESSION: 1.  Scant ascites. Paracentesis not performed today.       LABS:      Sodium   Date Value Ref Range Status   01/27/2023 136 136 - 145 mmol/L Final   01/26/2023 134 (L) 136 - 145 mmol/L Final   01/25/2023 141 136 - 145 mmol/L Final     Urea Nitrogen   Date Value Ref Range Status   01/27/2023 21.6 8.0 - 23.0 mg/dL Final   01/26/2023 21.7 8.0 - 23.0 mg/dL Final   01/25/2023 21.2 8.0 - 23.0 mg/dL Final   04/27/2022 15 8 - 28 mg/dL Final   12/02/2021 12 8 - 28 mg/dL Final   11/19/2020 17 8 - 22 mg/dL Final     Hemoglobin   Date Value Ref Range Status   01/24/2023 10.6 (L) 13.3 - 17.7 g/dL Final   01/23/2023 12.1 (L) 13.3 - 17.7 g/dL Final   12/22/2022 8.7 (L) 13.3 - 17.7 g/dL Final     Platelet Count   Date Value Ref Range Status   01/24/2023 130 (L) 150 - 450 10e3/uL Final   01/23/2023 158 150 - 450 10e3/uL Final   12/22/2022 128 (L) 150 - 450 10e3/uL Final     INR   Date Value Ref Range Status   01/27/2023 1.37 (H) 0.85 - 1.15 Final   01/26/2023 1.43 (H) 0.85 - 1.15 Final   01/25/2023 1.38 (H) 0.85 - 1.15 Final     INR (External)   Date Value Ref Range Status   02/09/2023 1.1 0.9 - 1.2 Final   01/04/2023 1.3 (A) 0.9 - 1.2 Final   04/11/2022 1.1 0.9 - 1.2 Final       This was a in person visit in which 45 minutes of  total time was spent (either in face-to-face or non-face-to-face time).    Yaquelin Marion MD, [facility]  VASCULAR AND INTERVENTIONAL PHYSICIAN  VASCULAR MEDICINE  INTERNAL MEDICINE  PAGER: [phone]  CALL SERVICE: [phone]

## 2023-05-05 ENCOUNTER — TRANSFERRED RECORDS (OUTPATIENT)
Dept: HEALTH INFORMATION MANAGEMENT | Facility: CLINIC | Age: 73
End: 2023-05-05
Payer: COMMERCIAL

## 2023-05-05 LAB
ALT SERPL-CCNC: 14 IU/L (ref 0–44)
AST SERPL-CCNC: 26 IU/L (ref 0–40)
CREATININE (EXTERNAL): 1.2 MG/DL (ref 0.76–1.27)
GFR ESTIMATED (EXTERNAL): 65 ML/MIN/1.73M2
GLUCOSE (EXTERNAL): 107 MG/DL (ref 70–99)
POTASSIUM (EXTERNAL): 3.8 MMOL/L (ref 3.5–5.2)

## 2023-05-12 ENCOUNTER — HOSPITAL ENCOUNTER (OUTPATIENT)
Dept: ULTRASOUND IMAGING | Facility: HOSPITAL | Age: 73
Discharge: HOME | End: 2023-05-12
Attending: INTERNAL MEDICINE | Admitting: INTERNAL MEDICINE
Payer: COMMERCIAL

## 2023-05-12 DIAGNOSIS — R18.8 OTHER ASCITES: ICD-10-CM

## 2023-05-12 DIAGNOSIS — R18.8 CIRRHOSIS OF LIVER WITH ASCITES, UNSPECIFIED HEPATIC CIRRHOSIS TYPE (H): ICD-10-CM

## 2023-05-12 DIAGNOSIS — B18.1 CHRONIC HEPATITIS B (H): ICD-10-CM

## 2023-05-12 DIAGNOSIS — K74.60 CIRRHOSIS OF LIVER WITH ASCITES, UNSPECIFIED HEPATIC CIRRHOSIS TYPE (H): ICD-10-CM

## 2023-05-12 PROCEDURE — 76705 ECHO EXAM OF ABDOMEN: CPT

## 2023-05-13 ENCOUNTER — HEALTH MAINTENANCE LETTER (OUTPATIENT)
Age: 73
End: 2023-05-13

## 2023-06-14 ENCOUNTER — TRANSFERRED RECORDS (OUTPATIENT)
Dept: HEALTH INFORMATION MANAGEMENT | Facility: CLINIC | Age: 73
End: 2023-06-14
Payer: COMMERCIAL

## 2023-06-14 ENCOUNTER — TELEPHONE (OUTPATIENT)
Dept: FAMILY MEDICINE | Facility: CLINIC | Age: 73
End: 2023-06-14
Payer: COMMERCIAL

## 2023-06-14 LAB
CREATININE (EXTERNAL): 1.43 MG/DL (ref 0.76–1.27)
GFR ESTIMATED (EXTERNAL): 52 ML/MIN/1.73M2
GLUCOSE (EXTERNAL): 104 MG/DL (ref 70–99)
POTASSIUM (EXTERNAL): 4.3 MMOL/L (ref 3.5–5.2)

## 2023-06-14 NOTE — TELEPHONE ENCOUNTER
Patient Quality Outreach    Patient is due for the following:   Diabetes -  A1C, Eye Exam, Diabetic Follow-Up Visit and Foot Exam    Next Steps:   Schedule a office visit for Diabetes follow up    Type of outreach:    Phone, spoke to patient/parent. Scheduled appointment    Next Steps:  Reach out within 90 days via Phone.    Max number of attempts reached: No. Will try again in 90 days if patient still on fail list.    Questions for provider review:    None           Wilder Barnes  Chart routed to Care Team.

## 2023-06-23 ENCOUNTER — HOSPITAL ENCOUNTER (OUTPATIENT)
Dept: ULTRASOUND IMAGING | Facility: CLINIC | Age: 73
Discharge: HOME | End: 2023-06-23
Attending: INTERNAL MEDICINE | Admitting: INTERNAL MEDICINE
Payer: COMMERCIAL

## 2023-06-23 DIAGNOSIS — R18.8 OTHER ASCITES: Primary | ICD-10-CM

## 2023-06-23 LAB
ABSOLUTE NEUTROPHILS, BODY FLUID: 40.1 /UL
APPEARANCE FLD: ABNORMAL
CELL COUNT BODY FLUID SOURCE: ABNORMAL
COLOR FLD: YELLOW
LYMPHOCYTES NFR FLD MANUAL: 31 %
MONOS+MACROS NFR FLD MANUAL: 57 %
NEUTS BAND NFR FLD MANUAL: 12 %
WBC # FLD AUTO: 334 /UL

## 2023-06-23 PROCEDURE — 89051 BODY FLUID CELL COUNT: CPT | Performed by: INTERNAL MEDICINE

## 2023-06-23 PROCEDURE — 272N000710 US PARACENTESIS WITHOUT ALBUMIN

## 2023-06-27 ENCOUNTER — TRANSFERRED RECORDS (OUTPATIENT)
Dept: HEALTH INFORMATION MANAGEMENT | Facility: CLINIC | Age: 73
End: 2023-06-27
Payer: COMMERCIAL

## 2023-06-29 ENCOUNTER — TRANSFERRED RECORDS (OUTPATIENT)
Dept: HEALTH INFORMATION MANAGEMENT | Facility: CLINIC | Age: 73
End: 2023-06-29
Payer: COMMERCIAL

## 2023-06-29 LAB
CREATININE (EXTERNAL): 1.48 MG/DL (ref 0.76–1.27)
GFR ESTIMATED (EXTERNAL): 50 ML/MIN/1.73
GLUCOSE (EXTERNAL): 186 MG/DL (ref 70–99)
POTASSIUM (EXTERNAL): 4.1 MMOL/L (ref 3.5–5.2)

## 2023-07-17 ENCOUNTER — OFFICE VISIT (OUTPATIENT)
Dept: FAMILY MEDICINE | Facility: CLINIC | Age: 73
End: 2023-07-17
Payer: COMMERCIAL

## 2023-07-17 VITALS
BODY MASS INDEX: 29.73 KG/M2 | DIASTOLIC BLOOD PRESSURE: 61 MMHG | HEIGHT: 66 IN | TEMPERATURE: 98.9 F | WEIGHT: 185 LBS | RESPIRATION RATE: 16 BRPM | SYSTOLIC BLOOD PRESSURE: 106 MMHG | OXYGEN SATURATION: 100 % | HEART RATE: 72 BPM

## 2023-07-17 DIAGNOSIS — Z23 HIGH PRIORITY FOR COVID-19 VACCINATION: ICD-10-CM

## 2023-07-17 DIAGNOSIS — R80.9 TYPE 2 DIABETES MELLITUS WITH MICROALBUMINURIA, WITHOUT LONG-TERM CURRENT USE OF INSULIN (H): Primary | ICD-10-CM

## 2023-07-17 DIAGNOSIS — N18.31 CHRONIC KIDNEY DISEASE, STAGE 3A (H): ICD-10-CM

## 2023-07-17 DIAGNOSIS — R60.0 EDEMA OF BOTH LEGS: ICD-10-CM

## 2023-07-17 DIAGNOSIS — E11.29 TYPE 2 DIABETES MELLITUS WITH MICROALBUMINURIA, WITHOUT LONG-TERM CURRENT USE OF INSULIN (H): Primary | ICD-10-CM

## 2023-07-17 LAB
ANION GAP SERPL CALCULATED.3IONS-SCNC: 10 MMOL/L (ref 7–15)
BUN SERPL-MCNC: 13.4 MG/DL (ref 8–23)
CALCIUM SERPL-MCNC: 8.1 MG/DL (ref 8.8–10.2)
CHLORIDE SERPL-SCNC: 113 MMOL/L (ref 98–107)
CHOLEST SERPL-MCNC: 128 MG/DL
CREAT SERPL-MCNC: 1.38 MG/DL (ref 0.67–1.17)
DEPRECATED HCO3 PLAS-SCNC: 20 MMOL/L (ref 22–29)
GFR SERPL CREATININE-BSD FRML MDRD: 55 ML/MIN/1.73M2
GLUCOSE SERPL-MCNC: 143 MG/DL (ref 70–99)
HBA1C MFR BLD: 5.8 % (ref 0–5.6)
HDLC SERPL-MCNC: 60 MG/DL
LDLC SERPL CALC-MCNC: 54 MG/DL
NONHDLC SERPL-MCNC: 68 MG/DL
POTASSIUM SERPL-SCNC: 3.8 MMOL/L (ref 3.4–5.3)
SODIUM SERPL-SCNC: 143 MMOL/L (ref 136–145)
TRIGL SERPL-MCNC: 71 MG/DL

## 2023-07-17 PROCEDURE — 36415 COLL VENOUS BLD VENIPUNCTURE: CPT | Performed by: FAMILY MEDICINE

## 2023-07-17 PROCEDURE — 83036 HEMOGLOBIN GLYCOSYLATED A1C: CPT | Performed by: FAMILY MEDICINE

## 2023-07-17 PROCEDURE — 80061 LIPID PANEL: CPT | Performed by: FAMILY MEDICINE

## 2023-07-17 PROCEDURE — 99214 OFFICE O/P EST MOD 30 MIN: CPT | Mod: 25 | Performed by: FAMILY MEDICINE

## 2023-07-17 PROCEDURE — 91312 COVID-19 BIVALENT 12+ (PFIZER): CPT | Performed by: FAMILY MEDICINE

## 2023-07-17 PROCEDURE — 0124A COVID-19 BIVALENT 12+ (PFIZER): CPT | Performed by: FAMILY MEDICINE

## 2023-07-17 PROCEDURE — 80048 BASIC METABOLIC PNL TOTAL CA: CPT | Performed by: FAMILY MEDICINE

## 2023-07-17 RX ORDER — LOSARTAN POTASSIUM 25 MG/1
25 TABLET ORAL DAILY
Qty: 90 TABLET | Refills: 3 | Status: SHIPPED | OUTPATIENT
Start: 2023-07-17 | End: 2023-01-01

## 2023-07-17 NOTE — PROGRESS NOTES
ASSESMENT AND PLAN:  Diagnoses and all orders for this visit:  Type 2 diabetes mellitus with microalbuminuria, without long-term current use of insulin (H)  -     Lipid panel reflex to direct LDL Non-fasting; Future  -     HEMOGLOBIN A1C done today is 5.8 showing excellent control.  Continue current plan and recheck again in 6 months.  -     losartan (COZAAR) 25 MG tablet; Take 1 tablet (25 mg) by mouth daily  Chronic kidney disease, stage 3a (H)  Patient had been on losartan in the past but it had been discontinued previously, we decided to restart it for renal protection given his underlying type 2 diabetes.  -     Basic metabolic panel  (Ca, Cl, CO2, Creat, Gluc, K, Na, BUN); Future  Edema of both legs  Written prescription for medium compression knee-high compression stockings.  Also counseled on elevation.  He will continue to work with his gastroenterologist on whether he should restart spironolactone or furosemide.  High priority for COVID-19 vaccination  -     COVID-19 BIVALENT 12+ (PFIZER)      Reviewed the risks and benefits of the treatment plan with the patient and/or caregiver and we discussed indications for routine and emergent follow-up.        SUBJECTIVE: 72-year-old male concerned by some worsening of swelling in both ankles and lower legs.  Patient has a complex liver history but has been doing much better recently.  He has had no recurrence of confusion.  He is using lactulose and polyethylene glycol to titrate his stools to 4-5 loose stools per day.  His gastroenterologist had recently discontinued his spironolactone and furosemide because of concerns about his kidneys.  Since that time, he has noticed that the swelling in the legs has increased.  However, his last paracentesis produced only less than a liter of fluid and he has not had any recurrence of abdominal pain or distention since then.    Past Medical History:   Diagnosis Date     Alcoholic cirrhosis of liver with ascites (H)       Cirrhosis of liver (H)      Diabetes mellitus (H)      Gout      Hypertension      Kidney stone      Patient Active Problem List   Diagnosis     Anemia     Hematuria     Hypercholesterolemia     Hypertension     Type 2 diabetes mellitus with microalbuminuria, without long-term current use of insulin (H)     Nephrolithiasis     Benign Adenomatous Polyp Of The Large Intestine     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     GI bleeding     Anemia due to blood loss, acute     Hypotension due to blood loss     Hyperkalemia     Gastrointestinal hemorrhage associated with acute gastritis     Popliteal cyst, left     Popliteal cyst, right     Incontinence of feces with fecal urgency     Other cirrhosis of liver (H) with ascites     Chronic kidney disease, stage 3a (H)     Portal hypertension (H)     Alcoholic cirrhosis of liver with ascites (H)     Cirrhosis of liver (H)     Hepatic encephalopathy (H)     Pleural effusion     Anemia, unspecified type     Current Outpatient Medications   Medication Sig Dispense Refill     acetaminophen (TYLENOL) 500 MG tablet Take 500-1,000 mg by mouth every 6 hours as needed for mild pain       Aromatic Inhalants (RA MENTHOL NASAL INHALER) INHA Spray 1 Inhalation in nostril as needed       bismuth subsalicylate (PEPTO BISMOL) 262 MG chewable tablet Take 1 tablet by mouth every 6 hours as needed for diarrhea       calcium carbonate (TUMS) 500 MG chewable tablet Take 1-2 chew tab by mouth 3 times daily as needed for heartburn       hypromellose (ARTIFICIAL TEARS) 0.5 % SOLN ophthalmic solution Place 1 drop into both eyes 4 times daily as needed for dry eyes       lactulose (CHRONULAC) 10 GM/15ML solution Take 30 mLs (20 g) by mouth 6 times daily 946 mL 4     losartan (COZAAR) 25 MG tablet Take 1 tablet (25 mg) by mouth daily 90 tablet 3     omeprazole (PRILOSEC) 20 MG DR capsule TAKE 1 CAPSULE BY MOUTH TWICE DAILY BEFORE MEAL(S) 180 capsule 3      "polyethylene glycol (MIRALAX) 17 g packet Take 17 g by mouth 3 times daily With adjustment in number of times per day per physician instructions 90 packet 6     rifaximin (XIFAXAN) 550 MG TABS tablet Take 1 tablet (550 mg) by mouth 2 times daily 60 tablet 4     simvastatin (ZOCOR) 20 MG tablet Take 1 tablet (20 mg) by mouth At Bedtime 90 tablet 2     History   Smoking Status     Never   Smokeless Tobacco     Never       OBJECTICE: /61   Pulse 72   Temp 98.9  F (37.2  C) (Oral)   Resp 16   Ht 1.676 m (5' 5.98\")   Wt 83.9 kg (185 lb)   SpO2 100%   BMI 29.88 kg/m       Recent Results (from the past 24 hour(s))   HEMOGLOBIN A1C    Collection Time: 07/17/23  4:00 PM   Result Value Ref Range    Hemoglobin A1C 5.8 (H) 0.0 - 5.6 %        GEN-alert, appropriate, in no apparent distress   CV-regular rate and rhythm with a grade 2 out of 6 systolic murmur (known mild mitral regurgitation on recent echo)   RESP-lungs clear to auscultation   ABDOMINAL-soft, nontender   EXTREM-mild to moderate edema of the feet, ankles, lower legs bilaterally symmetric.   Foot exam-normal.  No ulcers.  Palpable pedal pulses bilaterally.      Amrik Mejia MD     "

## 2023-07-24 DIAGNOSIS — E78.00 HYPERCHOLESTEREMIA: ICD-10-CM

## 2023-07-24 RX ORDER — SIMVASTATIN 20 MG
TABLET ORAL
Qty: 90 TABLET | Refills: 3 | Status: SHIPPED | OUTPATIENT
Start: 2023-07-24 | End: 2023-01-01

## 2023-07-24 NOTE — TELEPHONE ENCOUNTER
"Last Written Prescription Date:  10/19/2022  Last Fill Quantity: 90,  # refills: 2   Last office visit provider:  7/17/2023     Requested Prescriptions   Pending Prescriptions Disp Refills    simvastatin (ZOCOR) 20 MG tablet [Pharmacy Med Name: Simvastatin 20 MG Oral Tablet] 90 tablet 0     Sig: TAKE 1 TABLET BY MOUTH AT BEDTIME       [facility] Protocol Passed - 7/24/2023  5:31 AM        Passed - LDL on file in past 12 months     Recent Labs   Lab Test 07/17/23  1600   LDL 54             Passed - No abnormal creatine kinase in past 12 months     No lab results found.             Passed - Recent (12 mo) or future (30 days) visit within the authorizing provider's specialty     Patient has had an office visit with the authorizing provider or a provider within the authorizing providers department within the previous 12 mos or has a future within next 30 days. See \"Patient Info\" tab in inbasket, or \"Choose Columns\" in Meds & Orders section of the refill encounter.              Passed - Medication is active on med list        Passed - Patient is age 18 or older             JO ASCENCIO RN 07/24/23 10:48 AM  "

## 2023-08-02 ENCOUNTER — TRANSFERRED RECORDS (OUTPATIENT)
Dept: HEALTH INFORMATION MANAGEMENT | Facility: CLINIC | Age: 73
End: 2023-08-02
Payer: COMMERCIAL

## 2023-08-02 LAB
CREATININE (EXTERNAL): 1.34 MG/DL (ref 0.76–1.27)
GFR ESTIMATED (EXTERNAL): 57 ML/MIN/1.73M2
GLUCOSE (EXTERNAL): 94 MG/DL (ref 70–99)
POTASSIUM (EXTERNAL): 4.3 MMOL/L (ref 3.5–5.2)

## 2023-09-06 NOTE — PROGRESS NOTES
Patient tolerated paracentesis with albumin with no concerns expressed or observed.    8.4 L removed with 50 grams albumin given per order.

## 2023-09-18 PROBLEM — I34.0 MITRAL VALVE INSUFFICIENCY, UNSPECIFIED ETIOLOGY: Status: ACTIVE | Noted: 2023-01-01

## 2023-09-18 NOTE — PROGRESS NOTES
[address]  [place] [address]  Phone: [phone]  Fax: [phone]  Primary Provider: Farrukh Mejia  Pre-op Performing Provider: FARRUKH MEJIA      PREOPERATIVE EVALUATION:  Today's date: 9/18/2023    Vito Sy is a 72 year old male who presents for a preoperative evaluation.      9/18/2023    12:58 PM   Additional Questions   Roomed by Wilder CABRERA       Surgical Information:  Surgery/Procedure: ESOPHAGOGASTRODUODENOSCOPY WITH DILATION   Surgery Location: [facility]  Surgeon: Dr. Plunkett  Surgery Date: 10/10/2023  Time of Surgery: tbd  Where patient plans to recover: At home with family  Fax number for surgical facility: Note does not need to be faxed, will be available electronically in Epic.    Assessment & Plan     The proposed surgical procedure is considered LOW risk.    Preoperative examination -cleared to proceed with above procedure with any appropriate anesthesia  - EKG 12-lead, tracing only  Recent lab work as detailed below.    Mild mitral valve insufficiency, mild aortic insufficiency, unspecified etiology  Stable.  This accounts for the patient's cardiac murmur on exam, see recent echo report for further details.    Mild cough   Likely secondary to a viral upper respiratory infection, improving.  Normal lung exam with O2 sats 100% on room air.  Observation, okay to use an over-the-counter cough suppressant if needed.    Other cirrhosis of liver (H) with ascites  Continue plan per gastroenterology, I reviewed Dr Montenegro's most recent note and recommendations with the patient.  Patient is getting periodic paracentesis for removal of fluid.  They are considering adding spironolactone.  - EKG 12-lead, tracing only    Type 2 diabetes mellitus with microalbuminuria, without long-term current use of insulin (H)  Excellent control.  Last A1c was 5.8 in July 2023.  Discontinue losartan given the concerns raised by Dr Montenegro.  If his blood pressure  elevates significantly then could consider a replacement medication in the future.  - EKG 12-lead, tracing only    Chronic kidney disease, stage 3a (H)  Stable.  Recent labs done as detailed below with creatinine of 1.31 on 9/12/2023.    Need for prophylactic vaccination against hepatitis A  - HEPATITIS A 19+ (HAVRIX/VAQTA)    Need for vaccination  - INFLUENZA VACCINE 65+ (FLUZONE HD)    Risks and Recommendations:  The patient has the following additional risks and recommendations for perioperative complications:  Diabetes:  - Patient is not on insulin therapy: regular NPO guidelines can be followed.     Antiplatelet or Anticoagulation Medication Instructions:   - Patient is on no antiplatelet or anticoagulation medications.    Additional Medication Instructions:  Patient is to take all scheduled medications on the day of surgery    RECOMMENDATION:  APPROVAL GIVEN to proceed with proposed procedure, without further diagnostic evaluation.    Subjective       HPI related to upcoming procedure: Patient has been getting recurrent ascites.  He had a large amount of ascites fluid removed recently under ultrasound guidance.  Since then, he has had some gradual reaccumulation of fluid and is now having recurrent problems with pressure and mild discomfort in the abdomen.  He has known advanced liver disease and has had problems with hepatic encephalopathy in the past.  He has not had any recent issues with confusion and has been taking his lactulose to get 4 bowel movements per day.  Patient had been on losartan at low-dose in the past for his renal protection with his diabetes as well as optimizing his blood pressure control.  However, recent concerns about the losartan detailed in his most recent gastroenterology consultation with the recommendation to consider stopping that medication.  See that note for details.        9/18/2023    12:56 PM   Preop Questions   1. Have you ever had a heart attack or stroke? No   2. Have  you ever had surgery on your heart or blood vessels, such as a stent placement, a coronary artery bypass, or surgery on an artery in your head, neck, heart, or legs? No   3. Do you have chest pain with activity? No   4. Do you have a history of  heart failure? No   5. Do you currently have a cold, bronchitis or symptoms of other infection? No   6. Do you have a cough, shortness of breath, or wheezing? YES -cough and congestion over the past 1 to 2 weeks, no fevers or shortness of breath.   7. Do you or anyone in your family have previous history of blood clots? No   8. Do you or does anyone in your family have a serious bleeding problem such as prolonged bleeding following surgeries or cuts? No   9. Have you ever had problems with anemia or been told to take iron pills? No   10. Have you had any abnormal blood loss such as black, tarry or bloody stools? No   11. Have you ever had a blood transfusion? UNKNOWN    12. Are you willing to have a blood transfusion if it is medically needed before, during, or after your surgery? Yes   13. Have you or any of your relatives ever had problems with anesthesia? No   14. Do you have sleep apnea, excessive snoring or daytime drowsiness? UNKNOWN    15. Do you have any artifical heart valves or other implanted medical devices like a pacemaker, defibrillator, or continuous glucose monitor? No   16. Do you have artificial joints? No   17. Are you allergic to latex? No       Review of Systems  No fevers, no chest pain, no shortness of breath, no blood in the urine or blood in the stool, remainder of review of systems is as above or negative.    Patient Active Problem List    Diagnosis Date Noted    Hepatic encephalopathy (H) 12/18/2022     Priority: Medium    Pleural effusion 12/18/2022     Priority: Medium    Anemia, unspecified type 12/18/2022     Priority: Medium    Alcoholic cirrhosis of liver with ascites (H) 11/16/2022     Priority: Medium    Cirrhosis of liver (H) 11/16/2022      Priority: Medium    Chronic kidney disease, stage 3a (H) 11/09/2022     Priority: Medium    Portal hypertension (H) 11/09/2022     Priority: Medium    Other cirrhosis of liver (H) with ascites 04/27/2022     Priority: Medium    Incontinence of feces with fecal urgency 01/26/2022     Priority: Medium    Type 2 diabetes mellitus with microalbuminuria, without long-term current use of insulin (H)      Priority: Medium     Created by Conversion        Popliteal cyst, left 11/17/2020     Priority: Medium    Popliteal cyst, right 11/17/2020     Priority: Medium    GI bleeding 11/16/2020     Priority: Medium    Anemia due to blood loss, acute 11/16/2020     Priority: Medium    Hypotension due to blood loss 11/16/2020     Priority: Medium    Hyperkalemia 11/16/2020     Priority: Medium    Gastrointestinal hemorrhage associated with acute gastritis 11/15/2020     Priority: Medium     Added automatically from request for surgery [id number]        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 11/14/2016     Priority: Medium    Hypertension      Priority: Medium     Created by Conversion  Replacement Utility updated for latest IMO load        Hematuria      Priority: Medium     Created by Conversion        Nephrolithiasis      Priority: Medium     Created by Conversion        Anemia      Priority: Medium     Created by Conversion        Hypercholesterolemia      Priority: Medium     Created by Conversion        Benign Adenomatous Polyp Of The Large Intestine      Priority: Medium     Created by Conversion          Past Medical History:   Diagnosis Date    Alcoholic cirrhosis of liver with ascites (H)     Cirrhosis of liver (H)     Diabetes mellitus (H)     Gout     Hypertension     Kidney stone      Past Surgical History:   Procedure Laterality Date    COLONOSCOPY      IR TRANSVEN INTRAHEPATIC PORTOSYST REV  1/26/2023    IR TRANSVEN INTRAHEPATIC PORTOSYST SHUNT  11/16/2022    IR TRANSVEN INTRAHEPATIC  "PORTOSYST SHUNT  12/9/2022    AK ESOPHAGOGASTRODUODENOSCOPY TRANSORAL DIAGNOSTIC N/A 11/16/2020    Procedure: ESOPHAGOGASTRODUODENOSCOPY (EGD);  Surgeon: Juan Garnett MD;  Location: [facility];  Service: Gastroenterology    AK ESOPHAGOGASTRODUODENOSCOPY TRANSORAL DIAGNOSTIC N/A 11/18/2020    Procedure: ESOPHAGOGASTRODUODENOSCOPY (EGD) WITH BANDING;  Surgeon: Juan Garnett MD;  Location: [facility];  Service: Gastroenterology    URETEROSCOPY       Current Outpatient Medications   Medication Sig Dispense Refill    acetaminophen (TYLENOL) 500 MG tablet Take 500-1,000 mg by mouth every 6 hours as needed for mild pain      calcium carbonate (TUMS) 500 MG chewable tablet Take 1-2 chew tab by mouth 3 times daily as needed for heartburn      lactulose (CHRONULAC) 10 GM/15ML solution Take 30 mLs (20 g) by mouth 6 times daily 946 mL 4    losartan (COZAAR) 25 MG tablet Take 1 tablet (25 mg) by mouth daily 90 tablet 3    omeprazole (PRILOSEC) 20 MG DR capsule TAKE 1 CAPSULE BY MOUTH TWICE DAILY BEFORE MEAL(S) 180 capsule 3    polyethylene glycol (MIRALAX) 17 g packet Take 17 g by mouth 3 times daily With adjustment in number of times per day per physician instructions 90 packet 6    rifaximin (XIFAXAN) 550 MG TABS tablet Take 1 tablet (550 mg) by mouth 2 times daily 60 tablet 4    simvastatin (ZOCOR) 20 MG tablet TAKE 1 TABLET BY MOUTH AT BEDTIME 90 tablet 3    Aromatic Inhalants (RA MENTHOL NASAL INHALER) INHA Spray 1 Inhalation in nostril as needed      bismuth subsalicylate (PEPTO BISMOL) 262 MG chewable tablet Take 1 tablet by mouth every 6 hours as needed for diarrhea      hypromellose (ARTIFICIAL TEARS) 0.5 % SOLN ophthalmic solution Place 1 drop into both eyes 4 times daily as needed for dry eyes         Allergies   Allergen Reactions    Sulfa Antibiotics Shortness Of Breath and Itching    Penicillins Unknown     Annotation: discussed with patient, he reports he had \"itching\" with penicillin many years ago in " "[place] but no hives or throat or respiratory symptoms.  he also reports having tolerated amoxicillin since coming to [place].          Social History     Tobacco Use    Smoking status: Never     Passive exposure: Never    Smokeless tobacco: Never   Substance Use Topics    Alcohol use: Yes     Comment: none for the past 2 years       History   Drug Use No         Objective     /73   Pulse 64   Temp 98.7  F (37.1  C) (Oral)   Resp 24   Ht 1.676 m (5' 5.98\")   Wt 89.4 kg (197 lb)   SpO2 100%   BMI 31.82 kg/m      Physical Exam  Gen - alert, orientated, NAD  Eyes - fundascopic exam limited by the undialated pupil but looks symmetric  ENT - oropharynx clear, TMs clear  Neck - supple, no palpable mass or lymphadenopathy  CV - RRR, grade 2 out of 6 systolic murmur  Resp - lungs CTA  Ab -moderate distention with obvious ascites, nontender to palpation  Extrem - warm with bilateral moderate lower leg edema  Neuro - CN II-XII intact, strength, sensation, reflexes intact and symmetric  Skin - no rash, no atypical appearing lesions seen.          Diagnostics:  Recent Results (from the past 24 hour(s))   EKG 12-lead, tracing only    Collection Time: 09/18/23  1:51 PM   Result Value Ref Range    Systolic Blood Pressure  mmHg    Diastolic Blood Pressure  mmHg    Ventricular Rate 62 BPM    Atrial Rate 62 BPM    MA Interval 142 ms    QRS Duration 108 ms     ms    QTc 477 ms    P Axis 17 degrees    R AXIS 30 degrees    T Axis 27 degrees    Interpretation ECG       Sinus rhythm with sinus arrhythmia  Normal ECG  When compared with ECG of 18-DEC-2022 19:11,  Questionable change in QRS axis        Patient had recent labs done 9/12/2023 at [facility].  Results:  AST 30, ALT 14, albumin 2.8, alkaline phosphatase 99, total bilirubin 0.9, creatinine 1.31, glucose 83, potassium 3.8, sodium 144.    Hemoglobin 9.0, platelet count 103,000, WBC 4.2    INR 1.2, prothrombin time 13.2    Revised Cardiac Risk Index " (RCRI):  The patient has the following serious cardiovascular risks for perioperative complications:   - No serious cardiac risks = 0 points     RCRI Interpretation: 0 points: Class I (very low risk - 0.4% complication rate)         Signed Electronically by: Amrik Mejia MD  Copy of this evaluation report is provided to requesting physician.

## 2023-09-18 NOTE — H&P (VIEW-ONLY)
[address]  [place] [address]  Phone: [phone]  Fax: [phone]  Primary Provider: Farrukh Mejia  Pre-op Performing Provider: FARRUKH MEJIA      PREOPERATIVE EVALUATION:  Today's date: 9/18/2023    Vito Sy is a 72 year old male who presents for a preoperative evaluation.      9/18/2023    12:58 PM   Additional Questions   Roomed by Wilder CABRERA       Surgical Information:  Surgery/Procedure: ESOPHAGOGASTRODUODENOSCOPY WITH DILATION   Surgery Location: [facility]  Surgeon: Dr. Plunkett  Surgery Date: 10/10/2023  Time of Surgery: tbd  Where patient plans to recover: At home with family  Fax number for surgical facility: Note does not need to be faxed, will be available electronically in Epic.    Assessment & Plan     The proposed surgical procedure is considered LOW risk.    Preoperative examination -cleared to proceed with above procedure with any appropriate anesthesia  - EKG 12-lead, tracing only  Recent lab work as detailed below.    Mild mitral valve insufficiency, mild aortic insufficiency, unspecified etiology  Stable.  This accounts for the patient's cardiac murmur on exam, see recent echo report for further details.    Mild cough   Likely secondary to a viral upper respiratory infection, improving.  Normal lung exam with O2 sats 100% on room air.  Observation, okay to use an over-the-counter cough suppressant if needed.    Other cirrhosis of liver (H) with ascites  Continue plan per gastroenterology, I reviewed Dr Montenegro's most recent note and recommendations with the patient.  Patient is getting periodic paracentesis for removal of fluid.  They are considering adding spironolactone.  - EKG 12-lead, tracing only    Type 2 diabetes mellitus with microalbuminuria, without long-term current use of insulin (H)  Excellent control.  Last A1c was 5.8 in July 2023.  Discontinue losartan given the concerns raised by Dr Montenegro.  If his blood pressure  elevates significantly then could consider a replacement medication in the future.  - EKG 12-lead, tracing only    Chronic kidney disease, stage 3a (H)  Stable.  Recent labs done as detailed below with creatinine of 1.31 on 9/12/2023.    Need for prophylactic vaccination against hepatitis A  - HEPATITIS A 19+ (HAVRIX/VAQTA)    Need for vaccination  - INFLUENZA VACCINE 65+ (FLUZONE HD)    Risks and Recommendations:  The patient has the following additional risks and recommendations for perioperative complications:  Diabetes:  - Patient is not on insulin therapy: regular NPO guidelines can be followed.     Antiplatelet or Anticoagulation Medication Instructions:   - Patient is on no antiplatelet or anticoagulation medications.    Additional Medication Instructions:  Patient is to take all scheduled medications on the day of surgery    RECOMMENDATION:  APPROVAL GIVEN to proceed with proposed procedure, without further diagnostic evaluation.    Subjective       HPI related to upcoming procedure: Patient has been getting recurrent ascites.  He had a large amount of ascites fluid removed recently under ultrasound guidance.  Since then, he has had some gradual reaccumulation of fluid and is now having recurrent problems with pressure and mild discomfort in the abdomen.  He has known advanced liver disease and has had problems with hepatic encephalopathy in the past.  He has not had any recent issues with confusion and has been taking his lactulose to get 4 bowel movements per day.  Patient had been on losartan at low-dose in the past for his renal protection with his diabetes as well as optimizing his blood pressure control.  However, recent concerns about the losartan detailed in his most recent gastroenterology consultation with the recommendation to consider stopping that medication.  See that note for details.        9/18/2023    12:56 PM   Preop Questions   1. Have you ever had a heart attack or stroke? No   2. Have  you ever had surgery on your heart or blood vessels, such as a stent placement, a coronary artery bypass, or surgery on an artery in your head, neck, heart, or legs? No   3. Do you have chest pain with activity? No   4. Do you have a history of  heart failure? No   5. Do you currently have a cold, bronchitis or symptoms of other infection? No   6. Do you have a cough, shortness of breath, or wheezing? YES -cough and congestion over the past 1 to 2 weeks, no fevers or shortness of breath.   7. Do you or anyone in your family have previous history of blood clots? No   8. Do you or does anyone in your family have a serious bleeding problem such as prolonged bleeding following surgeries or cuts? No   9. Have you ever had problems with anemia or been told to take iron pills? No   10. Have you had any abnormal blood loss such as black, tarry or bloody stools? No   11. Have you ever had a blood transfusion? UNKNOWN    12. Are you willing to have a blood transfusion if it is medically needed before, during, or after your surgery? Yes   13. Have you or any of your relatives ever had problems with anesthesia? No   14. Do you have sleep apnea, excessive snoring or daytime drowsiness? UNKNOWN    15. Do you have any artifical heart valves or other implanted medical devices like a pacemaker, defibrillator, or continuous glucose monitor? No   16. Do you have artificial joints? No   17. Are you allergic to latex? No       Review of Systems  No fevers, no chest pain, no shortness of breath, no blood in the urine or blood in the stool, remainder of review of systems is as above or negative.    Patient Active Problem List    Diagnosis Date Noted    Hepatic encephalopathy (H) 12/18/2022     Priority: Medium    Pleural effusion 12/18/2022     Priority: Medium    Anemia, unspecified type 12/18/2022     Priority: Medium    Alcoholic cirrhosis of liver with ascites (H) 11/16/2022     Priority: Medium    Cirrhosis of liver (H) 11/16/2022      Priority: Medium    Chronic kidney disease, stage 3a (H) 11/09/2022     Priority: Medium    Portal hypertension (H) 11/09/2022     Priority: Medium    Other cirrhosis of liver (H) with ascites 04/27/2022     Priority: Medium    Incontinence of feces with fecal urgency 01/26/2022     Priority: Medium    Type 2 diabetes mellitus with microalbuminuria, without long-term current use of insulin (H)      Priority: Medium     Created by Conversion        Popliteal cyst, left 11/17/2020     Priority: Medium    Popliteal cyst, right 11/17/2020     Priority: Medium    GI bleeding 11/16/2020     Priority: Medium    Anemia due to blood loss, acute 11/16/2020     Priority: Medium    Hypotension due to blood loss 11/16/2020     Priority: Medium    Hyperkalemia 11/16/2020     Priority: Medium    Gastrointestinal hemorrhage associated with acute gastritis 11/15/2020     Priority: Medium     Added automatically from request for surgery [id number]        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 11/14/2016     Priority: Medium    Hypertension      Priority: Medium     Created by Conversion  Replacement Utility updated for latest IMO load        Hematuria      Priority: Medium     Created by Conversion        Nephrolithiasis      Priority: Medium     Created by Conversion        Anemia      Priority: Medium     Created by Conversion        Hypercholesterolemia      Priority: Medium     Created by Conversion        Benign Adenomatous Polyp Of The Large Intestine      Priority: Medium     Created by Conversion          Past Medical History:   Diagnosis Date    Alcoholic cirrhosis of liver with ascites (H)     Cirrhosis of liver (H)     Diabetes mellitus (H)     Gout     Hypertension     Kidney stone      Past Surgical History:   Procedure Laterality Date    COLONOSCOPY      IR TRANSVEN INTRAHEPATIC PORTOSYST REV  1/26/2023    IR TRANSVEN INTRAHEPATIC PORTOSYST SHUNT  11/16/2022    IR TRANSVEN INTRAHEPATIC  "PORTOSYST SHUNT  12/9/2022    [place] ESOPHAGOGASTRODUODENOSCOPY TRANSORAL DIAGNOSTIC N/A 11/16/2020    Procedure: ESOPHAGOGASTRODUODENOSCOPY (EGD);  Surgeon: Juan Garnett MD;  Location: [facility];  Service: Gastroenterology    [place] ESOPHAGOGASTRODUODENOSCOPY TRANSORAL DIAGNOSTIC N/A 11/18/2020    Procedure: ESOPHAGOGASTRODUODENOSCOPY (EGD) WITH BANDING;  Surgeon: Juan Garnett MD;  Location: [facility];  Service: Gastroenterology    URETEROSCOPY       Current Outpatient Medications   Medication Sig Dispense Refill    acetaminophen (TYLENOL) 500 MG tablet Take 500-1,000 mg by mouth every 6 hours as needed for mild pain      calcium carbonate (TUMS) 500 MG chewable tablet Take 1-2 chew tab by mouth 3 times daily as needed for heartburn      lactulose (CHRONULAC) 10 GM/15ML solution Take 30 mLs (20 g) by mouth 6 times daily 946 mL 4    losartan (COZAAR) 25 MG tablet Take 1 tablet (25 mg) by mouth daily 90 tablet 3    omeprazole (PRILOSEC) 20 MG DR capsule TAKE 1 CAPSULE BY MOUTH TWICE DAILY BEFORE MEAL(S) 180 capsule 3    polyethylene glycol (MIRALAX) 17 g packet Take 17 g by mouth 3 times daily With adjustment in number of times per day per physician instructions 90 packet 6    rifaximin (XIFAXAN) 550 MG TABS tablet Take 1 tablet (550 mg) by mouth 2 times daily 60 tablet 4    simvastatin (ZOCOR) 20 MG tablet TAKE 1 TABLET BY MOUTH AT BEDTIME 90 tablet 3    Aromatic Inhalants (RA MENTHOL NASAL INHALER) INHA Spray 1 Inhalation in nostril as needed      bismuth subsalicylate (PEPTO BISMOL) 262 MG chewable tablet Take 1 tablet by mouth every 6 hours as needed for diarrhea      hypromellose (ARTIFICIAL TEARS) 0.5 % SOLN ophthalmic solution Place 1 drop into both eyes 4 times daily as needed for dry eyes         Allergies   Allergen Reactions    Sulfa Antibiotics Shortness Of Breath and Itching    Penicillins Unknown     Annotation: discussed with patient, he reports he had \"itching\" with penicillin many years ago in " "[place] but no hives or throat or respiratory symptoms.  he also reports having tolerated amoxicillin since coming to [place].          Social History     Tobacco Use    Smoking status: Never     Passive exposure: Never    Smokeless tobacco: Never   Substance Use Topics    Alcohol use: Yes     Comment: none for the past 2 years       History   Drug Use No         Objective     /73   Pulse 64   Temp 98.7  F (37.1  C) (Oral)   Resp 24   Ht 1.676 m (5' 5.98\")   Wt 89.4 kg (197 lb)   SpO2 100%   BMI 31.82 kg/m      Physical Exam  Gen - alert, orientated, NAD  Eyes - fundascopic exam limited by the undialated pupil but looks symmetric  ENT - oropharynx clear, TMs clear  Neck - supple, no palpable mass or lymphadenopathy  CV - RRR, grade 2 out of 6 systolic murmur  Resp - lungs CTA  Ab -moderate distention with obvious ascites, nontender to palpation  Extrem - warm with bilateral moderate lower leg edema  Neuro - CN II-XII intact, strength, sensation, reflexes intact and symmetric  Skin - no rash, no atypical appearing lesions seen.          Diagnostics:  Recent Results (from the past 24 hour(s))   EKG 12-lead, tracing only    Collection Time: 09/18/23  1:51 PM   Result Value Ref Range    Systolic Blood Pressure  mmHg    Diastolic Blood Pressure  mmHg    Ventricular Rate 62 BPM    Atrial Rate 62 BPM    KS Interval 142 ms    QRS Duration 108 ms     ms    QTc 477 ms    P Axis 17 degrees    R AXIS 30 degrees    T Axis 27 degrees    Interpretation ECG       Sinus rhythm with sinus arrhythmia  Normal ECG  When compared with ECG of 18-DEC-2022 19:11,  Questionable change in QRS axis        Patient had recent labs done 9/12/2023 at [facility].  Results:  AST 30, ALT 14, albumin 2.8, alkaline phosphatase 99, total bilirubin 0.9, creatinine 1.31, glucose 83, potassium 3.8, sodium 144.    Hemoglobin 9.0, platelet count 103,000, WBC 4.2    INR 1.2, prothrombin time 13.2    Revised Cardiac Risk Index " (RCRI):  The patient has the following serious cardiovascular risks for perioperative complications:   - No serious cardiac risks = 0 points     RCRI Interpretation: 0 points: Class I (very low risk - 0.4% complication rate)         Signed Electronically by: Amrik Mejia MD  Copy of this evaluation report is provided to requesting physician.

## 2023-10-09 NOTE — PROGRESS NOTES
Patient tolerated paracentesis with albumin with no concerns expressed or observed.    8.2 L removed with 50 grams albumin given per order.

## 2023-10-10 NOTE — ANESTHESIA PREPROCEDURE EVALUATION
"Anesthesia Pre-Procedure Evaluation    Patient: Vito Sy   MRN: [id number] : 1950        Procedure : * No procedures listed *  ESOPHAGOGASTRODUODENOSCOPY WITH DILATION (Esophagus)        Past Medical History:   Diagnosis Date    Alcoholic cirrhosis of liver with ascites (H)     Cirrhosis of liver (H)     Diabetes mellitus (H)     Gout     Hypertension     Kidney stone       Past Surgical History:   Procedure Laterality Date    COLONOSCOPY      IR TRANSVEN INTRAHEPATIC PORTOSYST REV  2023    IR TRANSVEN INTRAHEPATIC PORTOSYST SHUNT  2022    IR TRANSVEN INTRAHEPATIC PORTOSYST SHUNT  2022    [place] ESOPHAGOGASTRODUODENOSCOPY TRANSORAL DIAGNOSTIC N/A 2020    Procedure: ESOPHAGOGASTRODUODENOSCOPY (EGD);  Surgeon: Juan Garnett MD;  Location: [facility];  Service: Gastroenterology    [place] ESOPHAGOGASTRODUODENOSCOPY TRANSORAL DIAGNOSTIC N/A 2020    Procedure: ESOPHAGOGASTRODUODENOSCOPY (EGD) WITH BANDING;  Surgeon: Juan Garnett MD;  Location: [facility];  Service: Gastroenterology    URETEROSCOPY        Allergies   Allergen Reactions    Sulfa Antibiotics Shortness Of Breath and Itching    Penicillins Unknown     Annotation: discussed with patient, he reports he had \"itching\" with penicillin many years ago in [facility] but no hives or throat or respiratory symptoms.  he also reports having tolerated amoxicillin since coming to [place].        Social History     Tobacco Use    Smoking status: Never     Passive exposure: Never    Smokeless tobacco: Never   Substance Use Topics    Alcohol use: Not Currently     Comment: none for the past 2 years      Wt Readings from Last 1 Encounters:   10/10/23 81.6 kg (180 lb)        Anesthesia Evaluation   Pt has had prior anesthetic.     No history of anesthetic complications       ROS/MED HX  ENT/Pulmonary:       Neurologic:       Cardiovascular: Comment:    Contrast not used due to patient declined.  The left ventricle is normal in size.  Left " ventricular systolic function is normal.  The visual ejection fraction is 55-60%.  Diastolic Doppler findings (E/E' ratio and/or other parameters) suggest left  ventricular filling pressures are increased.  The left atrium is mildly dilated.  There is mild (1+) mitral regurgitation.  There is mild mitral stenosis.Mean gradient 3.8mmhg at a heart rate of 72  beats/minute  There is mild (1+) aortic regurgitation.  Mild to moderate enlargement of the ascending aorta.Measures 4.3cm      (+) Dyslipidemia hypertension- -   -  - -                           valvular problems/murmurs type: MR and AI          METS/Exercise Tolerance:     Hematologic:       Musculoskeletal:       GI/Hepatic:     (+)             liver disease,       Renal/Genitourinary:     (+) renal disease,             Endo:     (+)  type II DM,                    Psychiatric/Substance Use:       Infectious Disease:       Malignancy:       Other:            Physical Exam    Airway        Mallampati: II   TM distance: > 3 FB   Neck ROM: full   Mouth opening: > 3 cm    Respiratory Devices and Support         Dental       (+) Modest Abnormalities - crowns, retainers, 1 or 2 missing teeth      Cardiovascular   cardiovascular exam normal       Rhythm and rate: regular and normal     Pulmonary   pulmonary exam normal        breath sounds clear to auscultation           OUTSIDE LABS:  CBC:   Lab Results   Component Value Date    WBC 5.5 01/24/2023    WBC 5.6 01/23/2023    HGB 10.6 (L) 01/24/2023    HGB 12.1 (L) 01/23/2023    HCT 33.8 (L) 01/24/2023    HCT 37.3 (L) 01/23/2023     (L) 01/24/2023     01/23/2023     BMP:   Lab Results   Component Value Date     07/17/2023     01/27/2023    POTASSIUM 3.8 07/17/2023    POTASSIUM 4.5 01/27/2023    CHLORIDE 113 (H) 07/17/2023    CHLORIDE 105 01/27/2023    CO2 20 (L) 07/17/2023    CO2 21 (L) 01/27/2023    BUN 13.4 07/17/2023    BUN 21.6 01/27/2023    CR 1.38 (H) 07/17/2023    CR 1.56 (H) 01/27/2023      (H) 07/17/2023     (H) 01/27/2023     COAGS:   Lab Results   Component Value Date    INR 1.1 02/09/2023     POC: No results found for: BGM, HCG, HCGS  HEPATIC:   Lab Results   Component Value Date    ALBUMIN 3.1 (L) 01/27/2023    PROTTOTAL 6.5 01/27/2023    ALT 35 01/27/2023    AST 55 (H) 01/27/2023    ALKPHOS 151 (H) 01/27/2023    BILITOTAL 1.7 (H) 01/27/2023    EUGENE 66 (H) 01/27/2023     OTHER:   Lab Results   Component Value Date    PH 7.34 (L) 04/27/2022    LACT 2.3 (H) 01/27/2023    A1C 5.8 (H) 07/17/2023    SILVIA 8.1 (L) 07/17/2023    MAG 2.1 01/24/2023    LIPASE 156 (H) 01/23/2023    TSH 0.92 01/24/2023       Anesthesia Plan    ASA Status:  4       Anesthesia Type: MAC.              Consents    Anesthesia Plan(s) and associated risks, benefits, and realistic alternatives discussed. Questions answered and patient/representative(s) expressed understanding.     - Discussed: Risks, Benefits and Alternatives for the PROCEDURE were discussed     - Discussed with:  Patient      - Extended Intubation/Ventilatory Support Discussed: No.      - Patient is DNR/DNI Status: No     Use of blood products discussed: No .     Postoperative Care       PONV prophylaxis: Ondansetron (or other 5HT-3)     Comments:                Pratima Leslie MD

## 2023-10-10 NOTE — INTERVAL H&P NOTE
"I have reviewed the surgical (or preoperative) H&P that is linked to this encounter, and examined the patient. There are no significant changes    Clinical Conditions Present on Arrival:  Clinically Significant Risk Factors Present on Admission                  # Overweight: Estimated body mass index is 29.05 kg/m  as calculated from the following:    Height as of this encounter: 1.676 m (5' 6\").    Weight as of this encounter: 81.6 kg (180 lb).       "

## 2023-10-10 NOTE — PROGRESS NOTES
PRE-PROCEDURE NOTE      REASON FOR PROCEDURE: Variceal surveillance    History and Physical: Reviewed, no changes    Pre-sedation Assessment:     VS: AVSS  General: Alert, NAD  Airway: normal  Heart: RRR  Lungs: CTA    Previous Reaction to Sedation: None    Sedation Plan Based on Assessment: MAC    Mallampati: II    ASA Classification: 4      Impression: Patient deemed adequate candidate for MAC sedation    Plan: esophagogastroduodenoscopy              Puneet Plunkett MD  Thank you for the opportunity to participate in the care of this patient.   Please feel free to call me with any questions or concerns.  Phone number [phone].            10/10/2023 10:27 AM

## 2023-10-10 NOTE — ANESTHESIA POSTPROCEDURE EVALUATION
Patient: Vito Sy    Procedure: Procedure(s):  ESOPHAGOGASTRODUODENOSCOPY with biopsy       Anesthesia Type:  MAC    Note:     Postop Pain Control: Uneventful            Sign Out: Well controlled pain   PONV: No   Neuro/Psych: Uneventful            Sign Out: Acceptable/Baseline neuro status   Airway/Respiratory: Uneventful            Sign Out: Acceptable/Baseline resp. status   CV/Hemodynamics: Uneventful            Sign Out: Acceptable CV status; No obvious hypovolemia; No obvious fluid overload   Other NRE: NONE   DID A NON-ROUTINE EVENT OCCUR? No           Last vitals:  Vitals Value Taken Time   /69 10/10/23 1121   Temp 36.3  C (97.3  F) 10/10/23 1105   Pulse 62 10/10/23 1122   Resp 14 10/10/23 1120   SpO2 100 % 10/10/23 1122   Vitals shown include unvalidated device data.    Electronically Signed By: Pratima Leslie MD  October 10, 2023  11:59 AM

## 2023-10-10 NOTE — ANESTHESIA CARE TRANSFER NOTE
Patient: Vito Sy    Procedure: Procedure(s):  ESOPHAGOGASTRODUODENOSCOPY with biopsy       Diagnosis: Hepatitis B [B19.10]  Cirrhosis (H) [K74.60]  Varices, esophageal (H) [I85.00]  Diagnosis Additional Information: No value filed.    Anesthesia Type:   MAC     Note:    Oropharynx: oropharynx clear of all foreign objects and spontaneously breathing  Level of Consciousness: drowsy  Oxygen Supplementation: face mask  Level of Supplemental Oxygen (L/min / FiO2): 8  Independent Airway: airway patency satisfactory and stable  Dentition: dentition unchanged  Vital Signs Stable: post-procedure vital signs reviewed and stable    Patient transferred to: Phase II    Handoff Report: Identifed the Patient, Identified the Reponsible Provider, Reviewed the pertinent medical history, Discussed the surgical course, Reviewed Intra-OP anesthesia mangement and issues during anesthesia, Set expectations for post-procedure period and Allowed opportunity for questions and acknowledgement of understanding      Vitals:  Vitals Value Taken Time   /64 10/10/23 1105   Temp 36.3  C (97.3  F) 10/10/23 1105   Pulse 65 10/10/23 1105   Resp 14 10/10/23 1105   SpO2 100 % 10/10/23 1104   Vitals shown include unvalidated device data.    Electronically Signed By: PEDRO CASON CRNA  October 10, 2023  11:05 AM

## 2023-10-16 NOTE — TELEPHONE ENCOUNTER
Patient Quality Outreach    Patient is due for the following:   Physical Annual Wellness Visit    Next Steps:   Patient has upcoming appointment, these items will be addressed at that time.    Type of outreach:    Chart review performed, no outreach needed.    Next Steps:  Reach out within 90 days via Phone.    Max number of attempts reached: No. Will try again in 90 days if patient still on fail list.    Questions for provider review:    None           Rose Barkley MA  Chart routed to Care Team.

## 2023-10-23 NOTE — PROGRESS NOTES
Patient tolerated paracentesis with albumin with no concerns expressed or observed.    9 L removed with 75 grams albumin given per order.     Slaima Fischer RN

## 2023-11-03 PROBLEM — K65.2 SBP (SPONTANEOUS BACTERIAL PERITONITIS) (H): Status: ACTIVE | Noted: 2023-01-01

## 2023-11-03 NOTE — PROGRESS NOTES
Pt was in Radiology today for a paracentesis. Procedure performed by Ava Procedure was tolerated well, vitals remained stable.9700 cc's of clear kevyn colored fluid removed. 75 G Albumin instilled per protocol. Written and verbal instructions were reviewed here is no evidence of bleeding upon discharge.  Pt left department in stable satisfactory condition with son.

## 2023-11-03 NOTE — ED NOTES
Introduced self to patient.  Whiteboard updated.  Plan of care and length of time discussed with patient.  Will continue to monitor. Mary Mullen RN.......11/3/2023 4:02 PM

## 2023-11-03 NOTE — ED PROVIDER NOTES
EMERGENCY DEPARTMENT ENCOUNTER            IMPRESSION:  Spontaneous bacterial peritonitis        MEDICAL DECISION MAKING:  It was my pleasure to provide care for Vito Sy who presented for evaluation of abdominal pain.  He has a history of ascites.  He was seen at the gastroenterology clinic earlier today and had paracentesis.  The results of the paracentesis revealed evidence of peritonitis    On my exam patient is pleasant and cooperative.   Vital signs show tachycardia and elevated temp.  Physical exam notable for distended abdomen.     The fluid administered for symptom relief.  Patient's symptoms improved.     Broad-spectrum antibiotics administered.  Patient is allergic to Rocephin and therefore Levaquin was ordered    Laboratory investigation independently interpreted and notable for evidence of renal failure and anemia    Blood cultures collected    ED evaluation is consistent with spontaneous bacterial peritonitis    Patient will be admitted to the residency service.      Patient was reevaluated and results were discussed.      Prior to making a final disposition on this patient the results of patient's tests and other diagnostic studies were discussed with the patient. All questions were answered. Patient expressed understanding of the plan and was amenable.       =================================================================  CHIEF COMPLAINT:  Chief Complaint   Patient presents with    Abdominal Pain    Fever         HPI  Vito Sy is a 72 year old male with a history of DM2, CKD3, HTN, Alcoholic cirrhosis of liver with ascites,  who presents to the ED by walk in for evaluation of abdominal pain and fever    Patient has had abdominal pain for 2 weeks and developed a fever yesterday. He had a paracentesis dome this morning. He also feels generally weak.       REVIEW OF SYSTEMS  Constitutional: Does not report chills, unintentional weight loss. Reports fever  Eyes: Does not report visual  changes or discharge    HENT: Does not report sore throat, ear pain or neck pain  Respiratory: Does not report cough or shortness of breath    Cardiovascular: Does not report chest pain, palpitations or leg swelling  GI: Does not report nausea, vomiting, or dark, bloody stools. Reports abdominal pain  : Does not report hematuria, dysuria, or flank pain  Musculoskeletal: Does not report any new musculoskeletal pain or new muscle/joint pains  Skin: Does not report rash or wound  Neurologic: Does not report current headache, focal weakness, or sensory changes. Reports generalized weakness.       Remainder of systems reviewed, unless noted in HPI all others negative.      PAST MEDICAL HISTORY:  Past Medical History:   Diagnosis Date    Alcoholic cirrhosis of liver with ascites (H)     Cirrhosis of liver (H)     Diabetes mellitus (H)     Gout     Hypertension     Kidney stone        PAST SURGICAL HISTORY:  Past Surgical History:   Procedure Laterality Date    COLONOSCOPY      ESOPHAGOSCOPY, GASTROSCOPY, DUODENOSCOPY (EGD), COMBINED N/A 10/10/2023    Procedure: ESOPHAGOGASTRODUODENOSCOPY with biopsy;  Surgeon: Puneet Plunkett MD, MD;  Location: [facility] OR    IR TRANSVEN INTRAHEPATIC PORTOSYST REV  1/26/2023    IR TRANSVEN INTRAHEPATIC PORTOSYST SHUNT  11/16/2022    IR TRANSVEN INTRAHEPATIC PORTOSYST SHUNT  12/9/2022    [place] ESOPHAGOGASTRODUODENOSCOPY TRANSORAL DIAGNOSTIC N/A 11/16/2020    Procedure: ESOPHAGOGASTRODUODENOSCOPY (EGD);  Surgeon: Juan Garnett MD;  Location: [facility];  Service: Gastroenterology    [place] ESOPHAGOGASTRODUODENOSCOPY TRANSORAL DIAGNOSTIC N/A 11/18/2020    Procedure: ESOPHAGOGASTRODUODENOSCOPY (EGD) WITH BANDING;  Surgeon: Juan Garnett MD;  Location: [facility];  Service: Gastroenterology    URETEROSCOPY           CURRENT MEDICATIONS:    bismuth subsalicylate (PEPTO BISMOL) 262 MG chewable tablet  calcium carbonate (TUMS) 500 MG chewable tablet  lactulose (CHRONULAC) 10 GM/15ML  "solution  omeprazole (PRILOSEC) 20 MG DR capsule  rifaximin (XIFAXAN) 550 MG TABS tablet  simvastatin (ZOCOR) 20 MG tablet        ALLERGIES:  Allergies   Allergen Reactions    Sulfa Antibiotics Shortness Of Breath and Itching    Penicillins Unknown     Annotation: discussed with patient, he reports he had \"itching\" with penicillin many years ago in [facility] but no hives or throat or respiratory symptoms.  he also reports having tolerated amoxicillin since coming to [place].         FAMILY HISTORY:  Family History   Problem Relation Age of Onset    Heart Disease Brother     Hypertension Mother     Hypertension Father        SOCIAL HISTORY:   Social History     Socioeconomic History    Marital status:    Tobacco Use    Smoking status: Never     Passive exposure: Never    Smokeless tobacco: Never   Vaping Use    Vaping Use: Never used   Substance and Sexual Activity    Alcohol use: Not Currently     Comment: none for the past 2 years    Drug use: No   Social History Narrative    Lives with wife - has worked as  in the past       PHYSICAL EXAM:    /64 (BP Location: Right arm)   Pulse 92   Temp 99.6  F (37.6  C) (Oral)   Resp 18   Ht 1.727 m (5' 8\")   Wt 81.6 kg (180 lb)   SpO2 100%   BMI 27.37 kg/m          Constitutional: He appears chronically ill  Head: Normocephalic, atraumatic.  ENT: Mucous membranes are moist.  No pallor.   Eyes: Pupils are reactive.  No discoloration.  Neck: No lymphadenopathy, no stridor, supple, no soft tissue swelling  Chest: No tenderness   Respiratory: Respirations even, unlabored. Lungs clear to ascultation bilaterally, in no acute respiratory distress.  Cardiovascular: Regular rate and rhythm.  Good overall perfusion.  Upper and lower extremity pulses are equal.  GI: Abdomen distended and tender  Back: No CVA tenderness.    Musculoskeletal: Moves all 4 extremities equally, full function and capacity no peripheral edema.   Integument: Warm, dry. No rash. No bruising " or petechiae.  Neurologic: Alert & oriented x 3. Normal speech. Grossly normal motor and sensory function. No focal deficits noted.   Psychiatric: Normal mood and affect.  Appropriate judgement.    ED COURSE:  3:24 PM I introduced myself to the patient, obtained patient history, performed a physical exam, and discussed plan for ED workup including potential diagnostic laboratory/imaging studies and interventions.  4:46 PM I spoke with Resident Hospitalist, Dr. Nguyen Anton          Medical Decision Making    History:  Supplemental history from: Wife  External Record(s) reviewed: External medical records including care everywhere reviewed 10/10 EGD    Work Up:  EKG, laboratory and imaging studies as ordered were independently interpreted by myself.   Broad differential diagnosis considered for sepsis  The patient's presentation was of high complexity.     External consultation:  Discussion of management with another provider: Inpatient provider    Complicating factors:  Patient has a complicated past medical history including DM2, CKD3,, HTN  Care affected by social determinants of health: Access to primary care    Disposition involved shared decision-making with the patient.      LAB:  Laboratory results were independently reviewed and interpreted  Results for orders placed or performed during the hospital encounter of 11/03/23   Extra Blood Culture Bottle   Result Value Ref Range    Hold Specimen JIC    Extra Green Top (Lithium Heparin) Tube   Result Value Ref Range    Hold Specimen JIC    Extra Green Top (Lithium Heparin) Tube   Result Value Ref Range    Hold Specimen JIC    Extra Purple Top Tube   Result Value Ref Range    Hold Specimen JIC    Extra Green Top (Lithium Heparin) ON ICE   Result Value Ref Range    Hold Specimen JIC    Extra Blue Top Tube   Result Value Ref Range    Hold Specimen JIC    Result Value Ref Range    INR 1.63 (H) 0.85 - 1.15   Comprehensive metabolic panel   Result Value Ref Range     Sodium 140 135 - 145 mmol/L    Potassium 4.3 3.4 - 5.3 mmol/L    Carbon Dioxide (CO2) 19 (L) 22 - 29 mmol/L    Anion Gap 12 7 - 15 mmol/L    Urea Nitrogen 26.0 (H) 8.0 - 23.0 mg/dL    Creatinine 1.26 (H) 0.67 - 1.17 mg/dL    GFR Estimate 61 >60 mL/min/1.73m2    Calcium 8.0 (L) 8.8 - 10.2 mg/dL    Chloride 109 (H) 98 - 107 mmol/L    Glucose 240 (H) 70 - 99 mg/dL    Alkaline Phosphatase 158 (H) 40 - 129 U/L    AST 37 0 - 45 U/L    ALT 18 0 - 70 U/L    Protein Total 6.7 6.4 - 8.3 g/dL    Albumin 3.4 (L) 3.5 - 5.2 g/dL    Bilirubin Total 2.2 (H) <=1.2 mg/dL   Lactic acid whole blood   Result Value Ref Range    Lactic Acid 1.9 0.7 - 2.0 mmol/L   CBC with platelets and differential   Result Value Ref Range    WBC Count 7.4 4.0 - 11.0 10e3/uL    RBC Count 3.07 (L) 4.40 - 5.90 10e6/uL    Hemoglobin 8.3 (L) 13.3 - 17.7 g/dL    Hematocrit 26.7 (L) 40.0 - 53.0 %    MCV 87 78 - 100 fL    MCH 27.0 26.5 - 33.0 pg    MCHC 31.1 (L) 31.5 - 36.5 g/dL    RDW 17.8 (H) 10.0 - 15.0 %    Platelet Count 220 150 - 450 10e3/uL    % Neutrophils 81 %    % Lymphocytes 5 %    % Monocytes 13 %    % Eosinophils 0 %    % Basophils 0 %    % Immature Granulocytes 1 %    NRBCs per 100 WBC 0 <1 /100    Absolute Neutrophils 6.1 1.6 - 8.3 10e3/uL    Absolute Lymphocytes 0.3 (L) 0.8 - 5.3 10e3/uL    Absolute Monocytes 0.9 0.0 - 1.3 10e3/uL    Absolute Eosinophils 0.0 0.0 - 0.7 10e3/uL    Absolute Basophils 0.0 0.0 - 0.2 10e3/uL    Absolute Immature Granulocytes 0.0 <=0.4 10e3/uL    Absolute NRBCs 0.0 10e3/uL         RADIOLOGY:  Radiology reports were independently reviewed and interpreted  Echocardiogram Complete    (Results Pending)   XR Chest 2 Views    (Results Pending)        EKG:    ECG results from 09/18/23   EKG 12-lead, tracing only     Value    Systolic Blood Pressure     Diastolic Blood Pressure     Ventricular Rate 62    Atrial Rate 62    SC Interval 142    QRS Duration 108        QTc 477    P Axis 17    R AXIS 30    T Axis 27     Interpretation ECG      Sinus rhythm with sinus arrhythmia  Normal ECG  When compared with ECG of 18-DEC-2022 19:11,  Questionable change in QRS axis  Confirmed by KRYSTEN MILES MD LOC:[facility] ([id number]) on 9/22/2023 3:03:40 PM         I have independently reviewed and interpreted the EKG(s) documented above.        MEDICATIONS GIVEN IN THE EMERGENCY:  Medications   cefTRIAXone (ROCEPHIN) 2 g vial to attach to  ml bag for ADULTS or NS 50 ml bag for PEDS (0 g Intravenous Stopped 11/3/23 1835)   melatonin tablet 1 mg (has no administration in time range)   senna-docusate (SENOKOT-S/PERICOLACE) 8.6-50 MG per tablet 1 tablet (has no administration in time range)     Or   senna-docusate (SENOKOT-S/PERICOLACE) 8.6-50 MG per tablet 2 tablet (has no administration in time range)   ondansetron (ZOFRAN ODT) ODT tab 4 mg (has no administration in time range)     Or   ondansetron (ZOFRAN) injection 4 mg (has no administration in time range)   lactulose (CHRONULAC) solution 30 g (30 g Oral Not Given 11/3/23 1910)   rifaximin (XIFAXAN) tablet 550 mg (550 mg Oral $Given 11/3/23 1943)   simvastatin (ZOCOR) tablet 20 mg (20 mg Oral $Given 11/3/23 2156)   simethicone (MYLICON) chewable tablet 80 mg (80 mg Oral $Given 11/3/23 2217)   pantoprazole (PROTONIX) EC tablet 20 mg (has no administration in time range)   furosemide (LASIX) injection 20 mg (has no administration in time range)   spironolactone (ALDACTONE) tablet 25 mg (has no administration in time range)   sodium chloride 0.9% BOLUS 1,000 mL (0 mLs Intravenous Stopped 11/3/23 1834)           NEW PRESCRIPTIONS STARTED AT TODAY'S ER VISIT:  New Prescriptions    No medications on file                FINAL DIAGNOSIS:    ICD-10-CM    1. SBP (spontaneous bacterial peritonitis) (H)  K65.2                  NAME: Vito Sy  AGE: 72 year old male  YOB: 1950  MRN: [id number]  EVALUATION DATE & TIME: 11/3/2023  3:19 PM    PCP: Amrik Mejia    ED PROVIDER: Jordi Goddard,  M.D.      I, Raul Almonte, am serving as a scribe to document services personally performed by Dr. Jrodi Goddard based on my observation and the provider's statements to me. I, Jordi Goddard MD attest that Raul Almonte is acting in a scribe capacity, has observed my performance of the services and has documented them in accordance with my direction.    Jordi Goddard M.D.  Emergency Medicine  [facility] EMERGENCY ROOM  [address] [address]  [phone]  Dept: [phone]  11/3/2023         Jordi Goddard MD  11/03/23 [id number]

## 2023-11-03 NOTE — ED TRIAGE NOTES
Patient has abdominal discomfort for 2 weeks, subjective fever since yesterday. Pain 8/10. Had paracentesis today- had 9.7 taken off. Fall 2 days ago. Feeling generalized weakness.

## 2023-11-03 NOTE — ED NOTES
Expected Patient Referral to ED  2:23 PM    Referring Clinic/Provider:  [facility]      Reason for referral/Clinical facts:  71y/o male with findings of SBP in clinical lab test.  History of cirrhosis.      Recommendations provided:  Send to ED for further evaluation    Caller was informed that this institution does possess the capabilities and/or resources to provide for patient and should be transferred to our facility.    Discussed that if direct admit is sought and any hurdles are encountered, this ED would be happy to see the patient and evaluate.    Informed caller that recommendations provided are recommendations based only on the facts provided and that they responsible to accept or reject the advice, or to seek a formal in person consultation as needed and that this ED will see/treat patient should they arrive.      FARRUKH GOMEZ DO  [facility] EMERGENCY ROOM  [address] [address] [phone]       Farrukh Gomez DO  11/03/23 [id number]

## 2023-11-03 NOTE — TELEPHONE ENCOUNTER
James at [facility] notified to let  know that patient had a critical lab value on his paracentesis fluid of absolute neutrophile at 3413.9. at 1303.  It was requested that he relay the information within 15 minutes.   Patient is no longer here.

## 2023-11-03 NOTE — H&P
[facility]    History and Physical - Hospitalist Service       Date of Admission:  11/3/2023    Assessment & Plan   Vito Sy is a 72 year old male who has a history of alcoholic cirrhosis complicated by ascites, esophageal varices, and hepatic encephalopathy s/p TIPS in 2022, type 2 diabetes, and CKD3, and is admitted for spontaneous bacterial peritonitis in addition to concern for previously undiagnosed heart failure.    Spontaneous bacterial peritonitis  Presented after findings of elevated ANC of 3400 concerning for SBP after routine paracentesis of 9.7 L out on day of admission with reports of abdominal pain for the last few days in addition to chills but no documented fever.  Abdomen diffusely tender to palpation and distended.  Patient initially received Levaquin in the ED. Remains afebrile and hemodynamically stable.    - GI consulted, appreciate recommendations  - Ceftriaxone 2 grams q 24 hours  - Consider Albumin 25% on day 3, s/p Albumin 25% 75 g with paracentesis  11/3  - Follow ascitic fluid cultures and sensitivities   - Follow blood cultures  - UA    Alcoholic cirrhosis  Recurrent ascites  S/p TIPS  History of hepatic encephalopathy  History of esophageal varices  Follows with Dr. Montenegro at [facility]. History of diuretic refractory ascites and underwent TIPS procedure in December 2022.  There was concern about progressive renal insufficiency on low-dose spironolactone which was discontinued in June 2022 however he has since had significant progression of ascites and lower extremity edema since that time requiring more frequent paracenteses.  There was thought that there could be a TIPS dysfunction versus underlying heart failure; echo was ordered but has not yet been done.  Last EGD in October 2023 with small esophageal varices and portal hypertensive gastropathy without gastric varices.  MELD-Na on admit 17.  No signs of decompensated hepatic encephalopathy.  Hepatic  function labs relatively reassuring with mildly elevated total bilirubin and alkaline phosphatase.  - GI consulted, appreciate recommendations  - PTA lactulose  - PTA rifaximin  - PTA omeprazole 20 mg twice daily, sub pantoprazole  - Low sodium diet  - Fluid restrict 2L  - Follow CBC and CMP    Concern for possible heart failure  Bilateral lower extremity edema  Noted to have 2-3+ pitting edema to upper shins on physical exam that patient states has been present and stable for months. Does endorse orthopnea as well with bibasilar crackles on exam.  Most recent echo in May 2022 with EF of 55 to 60% but diastolic Doppler findings suggesting increased left ventricular filling pressures.  Echo had been ordered by GI, as above, but has yet to be done.  -CXR now  -Lasix 20 mg IV daily  -Spironolactone 25 mg daily  -Echo in AM  -Follow CMP  -Strict I/Os    Type 2 diabetes  Most recent A1c of 5.8 in July 2023.  Diet controlled.  - Follow CMP    Normocytic anemia  Hemoglobin 8.3 on admission with MCV of 87.  Baseline hemoglobin of 8-10. No signs of acute bleeding.  - Follow CBC    CKD3A  Near baseline of 1.2-1.5 on admission.  History of microalbuminuria.  -Follow CMP    HLD  - PTA simvastatin 20 mg        Diet: 2 Gram Sodium Diet  Fluid restriction 2000 ML FLUID  DVT Prophylaxis: Pneumatic Compression Devices  Cronin Catheter: Not present  Fluids: PO  Lines: None     Cardiac Monitoring: None  Code Status: Full Code    Clinically Significant Risk Factors Present on Admission          # Hypocalcemia: Lowest Ca = 8 mg/dL in last 2 days, will monitor and replace as appropriate     # Hypoalbuminemia: Lowest albumin = 3.4 g/dL at 11/3/2023  3:07 PM, will monitor as appropriate    # Coagulation Defect: INR = 1.63 (Ref range: 0.85 - 1.15) and/or PTT = N/A, will monitor for bleeding    # Hypertension: Noted on problem list      # Overweight: Estimated body mass index is 27.37 kg/m  as calculated from the following:    Height as of  "this encounter: 1.727 m (5' 8\").    Weight as of this encounter: 81.6 kg (180 lb).       # Financial/Environmental Concerns: none         Disposition Plan      Expected Discharge Date: 11/05/2023                Patient's care was discussed with attending physician Dr. Anaya and patient will be seen in the a.m. by Dr. Anaya.    Olivia Gardner MD  Hospitalist Service  [facility]  Securely message with [employer] (more info)  Text page via [employer] Paging/Directory   ______________________________________________________________________    Chief Complaint   Abdominal pain, chills    History is obtained from the patient and spouse    History of Present Illness   Vito Sy is a 72 year old male who has a history of alcoholic cirrhosis complicated by ascites, esophageal varices, and hepatic encephalopathy s/p TIPS in 2022, type 2 diabetes, and CKD, and is admitted for spontaneous bacterial peritonitis.    Patient follows with [facility] outpatient for therapeutic paracenteses every 10 to 14 days.  He had a routine paracentesis with 9.7 L of clear fluid out earlier on day of admission with critical lab value of elevated absolute neutrophil count on initial paracentesis labs.  He was contacted and recommended to present to the emergency department.    Patient states that he has had a few days of \"pain in the belly\".  He states the pain is worse on his right side than his left side.  He states a few days ago the pain was as severe as it 8/10 however now he states it is improved and shares that it is a 4/10.  Patient states he has felt chills but he has not taken his temperature to know if he has had a fever.  He denies any chest pain or shortness of breath.  His stools are at baseline of 4-6 a day on his current hepatic encephalopathy medications.  Patient states he takes all his medications as prescribed and has not missed any doses.    Patient shares that he has had multiple months of bilateral lower " extremity edema that is not worse in recent days or weeks.  He does state he also feels short of breath when he tries to lay flat to sleep at night.  He states this has been present for the last few weeks.    Per chart review, follows with Dr. Montenegro at [facility] and most recent office note on 9/27/2023 was reviewed. Has history of diuretic refractory ascites and underwent TIPS procedure in December 2022.  There was concern about progressive renal insufficiency on low-dose spironolactone which was discontinued in June 2022 however he has since had significant progression of ascites and lower extremity edema since that time requiring more frequent paracenteses.  There was thought that there could be a TIPS dysfunction versus underlying heart failure; echo was ordered but has not yet been done.  He was recommended to closely follow-up with his primary care doctor for further evaluation.      Last EGD in October 2023 with small esophageal varices and portal hypertensive gastropathy without gastric varices.    Patient was at home with his wife, his son, and 2 grandchildren.  He denies any tobacco use current or former.  He states it has been 7 years since he was using alcohol heavily.    Patient wishes to be full code.    Past Medical History    Past Medical History:   Diagnosis Date    Alcoholic cirrhosis of liver with ascites (H)     Cirrhosis of liver (H)     Diabetes mellitus (H)     Gout     Hypertension     Kidney stone        Past Surgical History   Past Surgical History:   Procedure Laterality Date    COLONOSCOPY      ESOPHAGOSCOPY, GASTROSCOPY, DUODENOSCOPY (EGD), COMBINED N/A 10/10/2023    Procedure: ESOPHAGOGASTRODUODENOSCOPY with biopsy;  Surgeon: Puneet Plunkett MD, MD;  Location: [facility] OR    IR TRANSVEN INTRAHEPATIC PORTOSYST REV  1/26/2023    IR TRANSVEN INTRAHEPATIC PORTOSYST SHUNT  11/16/2022    IR TRANSVEN INTRAHEPATIC PORTOSYST SHUNT  12/9/2022    CA ESOPHAGOGASTRODUODENOSCOPY TRANSORAL DIAGNOSTIC  N/A 11/16/2020    Procedure: ESOPHAGOGASTRODUODENOSCOPY (EGD);  Surgeon: Juan Garnett MD;  Location: [facility];  Service: Gastroenterology    FL ESOPHAGOGASTRODUODENOSCOPY TRANSORAL DIAGNOSTIC N/A 11/18/2020    Procedure: ESOPHAGOGASTRODUODENOSCOPY (EGD) WITH BANDING;  Surgeon: Juan Garnett MD;  Location: [facility];  Service: Gastroenterology    URETEROSCOPY         Prior to Admission Medications   Prior to Admission Medications   Prescriptions Last Dose Informant Patient Reported? Taking?   bismuth subsalicylate (PEPTO BISMOL) 262 MG chewable tablet Unknown  Yes Yes   Sig: Take 1 tablet by mouth every 6 hours as needed for diarrhea   calcium carbonate (TUMS) 500 MG chewable tablet Unknown  Yes Yes   Sig: Take 1-2 chew tab by mouth 3 times daily as needed for heartburn   lactulose (CHRONULAC) 10 GM/15ML solution 11/2/2023 at 0900  Yes Yes   Sig: Take 30 g by mouth daily   omeprazole (PRILOSEC) 20 MG DR capsule 11/2/2023 at 2100  No Yes   Sig: TAKE 1 CAPSULE BY MOUTH TWICE DAILY BEFORE MEAL(S)   rifaximin (XIFAXAN) 550 MG TABS tablet 11/2/2023 at 2100  No Yes   Sig: Take 1 tablet (550 mg) by mouth 2 times daily   simvastatin (ZOCOR) 20 MG tablet 11/2/2023 at 2100  No Yes   Sig: TAKE 1 TABLET BY MOUTH AT BEDTIME      Facility-Administered Medications: None         Physical Exam   Vital Signs: Temp: 99.6  F (37.6  C) Temp src: Oral BP: 113/64 Pulse: 92   Resp: 18 SpO2: 100 % O2 Device: None (Room air)    Weight: 180 lbs 0 oz  Constitutional: awake, alert, cooperative, no apparent distress, and appears stated age  Eyes: Lids and lashes normal, pupils equal, round and reactive to light, extra ocular muscles intact, sclera clear, conjunctiva normal  ENT: Normocephalic, without obvious abnormality, atraumatic, external ears without lesions, oral pharynx with moist mucous membranes, tonsils without erythema or exudates, poor dentition with missing teeth  Hematologic / Lymphatic: no cervical lymphadenopathy and  no supraclavicular lymphadenopathy  Respiratory: No increased work of breathing, good air exchange, bibasilar crackles  Cardiovascular: Regular rate and rhythm, normal S1 and S2, no S3 or S4, systolic murmur, 2-3+ pitting edema to knees  GI: Paracentesis bandage on left abdomen without significant surrounding bruising or erythema, normal bowel sounds, soft, moderately distended, diffusely tender to palpation, no rebound or guarding  Skin: no bruising or bleeding on exposed skin, hyperpigmentation of bilateral lower extremities  Musculoskeletal: There is no redness, warmth, or swelling of the joints.  Full range of motion noted. Tone is normal.  Neurologic: Awake, alert, oriented to name, place and time.  Cranial nerves II-XII are grossly intact.  Motor is 5 out of 5 bilaterally.  Sensory is intact.  No asterixis.  Neuropsychiatric: General: normal, calm, and normal eye contact      Data     I have personally reviewed the following data over the past 24 hrs:    7.4  \   8.3 (L)   / 220     140 109 (H) 26.0 (H) /  240 (H)   4.3 19 (L) 1.26 (H) \     ALT: 18 AST: 37 AP: 158 (H) TBILI: 2.2 (H)   ALB: 3.4 (L) TOT PROTEIN: 6.7 LIPASE: N/A     Procal: N/A CRP: N/A Lactic Acid: 1.9       INR:  1.63 (H) PTT:  N/A   D-dimer:  N/A Fibrinogen:  N/A       Imaging results reviewed over the past 24 hrs:   Recent Results (from the past 24 hour(s))   US Paracentesis with Albumin    Narrative    EXAM:  1. PARACENTESIS  2. ULTRASOUND GUIDANCE  LOCATION: [facility]  DATE: 11/3/2023    INDICATION: Ascites.    PROCEDURE: Informed consent obtained. Time out performed. The abdomen  was prepped and draped in a sterile fashion. 10 mL of 1% lidocaine was  infused into local soft tissues. A 7 Hungarian catheter system was  introduced into the abdominal ascites under ultrasound guidance.    9.7 liters of clear fluid were removed and sent to lab if requested.    Patient tolerated procedure well.    Ultrasound imaging was  obtained and placed in the patient's permanent  medical record.      Impression    IMPRESSION:  1.  Status post ultrasound-guided paracentesis.    Reference CPT Code: 12433    SANDRINE JOHNSON MD         SYSTEM ID:  [id number]

## 2023-11-03 NOTE — PHARMACY-ADMISSION MEDICATION HISTORY
Pharmacist Admission Medication History    Admission medication history is complete. The information provided in this note is only as accurate as the sources available at the time of the update.    Information Source(s): Patient via in-person    Pertinent Information:     Titrating  lactulose to get 4 bowel movements per day ... Currently at 45ml once daily per report      Changes made to PTA medication list:  Added: None  Deleted: None  Changed: None    Medication Affordability:       Allergies reviewed with patient and updates made in EHR: yes    Medication History Completed By: Vlad Gray RPH 11/3/2023 4:02 PM    PTA Med List   Medication Sig Last Dose    bismuth subsalicylate (PEPTO BISMOL) 262 MG chewable tablet Take 1 tablet by mouth every 6 hours as needed for diarrhea Unknown    calcium carbonate (TUMS) 500 MG chewable tablet Take 1-2 chew tab by mouth 3 times daily as needed for heartburn Unknown    lactulose (CHRONULAC) 10 GM/15ML solution Take 30 g by mouth daily 11/2/2023 at 0900    omeprazole (PRILOSEC) 20 MG DR capsule TAKE 1 CAPSULE BY MOUTH TWICE DAILY BEFORE MEAL(S) 11/2/2023 at 2100    rifaximin (XIFAXAN) 550 MG TABS tablet Take 1 tablet (550 mg) by mouth 2 times daily 11/2/2023 at 2100    simvastatin (ZOCOR) 20 MG tablet TAKE 1 TABLET BY MOUTH AT BEDTIME 11/2/2023 at 2100

## 2023-11-04 PROBLEM — R60.0 BILATERAL LOWER EXTREMITY EDEMA: Chronic | Status: ACTIVE | Noted: 2023-01-01

## 2023-11-04 NOTE — PLAN OF CARE
Problem: Adult Inpatient Plan of Care  Goal: Plan of Care Review  Description: The Plan of Care Review/Shift note should be completed every shift.  The Outcome Evaluation is a brief statement about your assessment that the patient is improving, declining, or no change.  This information will be displayed automatically on your shift  note.  Outcome: Progressing    Patient Is alert and orientated times four. Given prn acetaminophen for temp. 100.6 and c/o abdominal pain. BP: was soft tonight (see PN) most recent 109/60. Urine specimen was collected and sent (see PN) Blood cultures pending. Family at bedside throughout the night. On fluid restriction and had 240ml.

## 2023-11-04 NOTE — CONSULTS
GASTROENTEROLOGY CONSULTATION      Vito Sy  [address]  [place] [address]  72 year old male     Admission Date/Time: 11/3/2023  Primary Care Provider: Amrik Mejia  Referring / Attending Physician:  Dr. Gardner     We were asked to see the patient in consultation by Dr. Gardner for evaluation of SBP.        HPI:  Vito Sy is a 72 year old [place]ian male with JENNINGS cirrhosis complicated by esophageal varices refractory ascites status post TIPS (at the end of 2022), and subsequent hepatic encephalopathy who was found to have spontaneous bacterial peritonitis after paracentesis on 11/3.    Patient follows closely with his hepatologist, Dr. Montenegro, at [facility].  He was last seen on November 1.  He was struggling with significant volume overload at the time.  Patient was found to have diuretic refractory ascites and as result underwent TIPS procedure at the end of 2022.  Unfortunately he continues to have recurrent ascites with some lower extremity edema.  He does develop renal insufficiency and even small doses of diuretics.  As result, he has been requiring large-volume paracentesis every 7 to 10 days.  On 11/3 9.7 L of ascitic fluid were removed.  ANC was 3400.  He was started on ceftriaxone.  The patient's wife denies any significant confusion.  He has been taking his lactulose and rifaximin with good stool output.  Last upper endoscopy was October 2023 which revealed small esophageal varices, and portal hypertensive gastropathy.  Transaminases are normal as his alkaline phosphatase.  Total bilirubin slightly elevated at 2.6.  MELD NA score today is 19.    Currently no vomiting, nausea, melena, or hematochezia.  The patient does not drink alcohol.  As an outpatient, plans were in place for further diagnostic evaluation with echocardiogram, tip surveillance ultrasound.       PAST MEDICAL HISTORY:  Patient Active Problem List    Diagnosis Date Noted    SBP (spontaneous bacterial peritonitis) (H)  11/03/2023     Priority: Medium    Mitral valve insufficiency, unspecified etiology 09/18/2023     Priority: Medium    Hepatic encephalopathy (H) 12/18/2022     Priority: Medium    Pleural effusion 12/18/2022     Priority: Medium    Anemia, unspecified type 12/18/2022     Priority: Medium    Alcoholic cirrhosis of liver with ascites (H) 11/16/2022     Priority: Medium    Cirrhosis of liver (H) 11/16/2022     Priority: Medium    Chronic kidney disease, stage 3a (H) 11/09/2022     Priority: Medium    Portal hypertension (H) 11/09/2022     Priority: Medium    Other cirrhosis of liver (H) with ascites 04/27/2022     Priority: Medium    Incontinence of feces with fecal urgency 01/26/2022     Priority: Medium    Type 2 diabetes mellitus with microalbuminuria, without long-term current use of insulin (H)      Priority: Medium     Created by Conversion        Popliteal cyst, left 11/17/2020     Priority: Medium    Popliteal cyst, right 11/17/2020     Priority: Medium    GI bleeding 11/16/2020     Priority: Medium    Anemia due to blood loss, acute 11/16/2020     Priority: Medium    Hypotension due to blood loss 11/16/2020     Priority: Medium    Hyperkalemia 11/16/2020     Priority: Medium    Gastrointestinal hemorrhage associated with acute gastritis 11/15/2020     Priority: Medium     Added automatically from request for surgery [id number]        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 11/14/2016     Priority: Medium    Hypertension      Priority: Medium     Created by Conversion  Replacement Utility updated for latest IMO load        Hematuria      Priority: Medium     Created by Conversion        Nephrolithiasis      Priority: Medium     Created by Conversion        Anemia      Priority: Medium     Created by Conversion        Hypercholesterolemia      Priority: Medium     Created by Conversion        Benign Adenomatous Polyp Of The Large Intestine      Priority: Medium     Created by  "Conversion              ROS: A comprehensive ten point review of systems was negative aside from those in mentioned in the HPI.       MEDICATIONS:   Prior to Admission medications    Medication Sig Start Date End Date Taking? Authorizing Provider   bismuth subsalicylate (PEPTO BISMOL) 262 MG chewable tablet Take 1 tablet by mouth every 6 hours as needed for diarrhea   Yes Unknown, Entered By History   calcium carbonate (TUMS) 500 MG chewable tablet Take 1-2 chew tab by mouth 3 times daily as needed for heartburn   Yes Unknown, Entered By History   lactulose (CHRONULAC) 10 GM/15ML solution Take 30 g by mouth daily   Yes Unknown, Entered By History   omeprazole (PRILOSEC) 20 MG DR capsule TAKE 1 CAPSULE BY MOUTH TWICE DAILY BEFORE MEAL(S) 12/30/22  Yes Felipe Isaacs MD   rifaximin (XIFAXAN) 550 MG TABS tablet Take 1 tablet (550 mg) by mouth 2 times daily 12/22/22  Yes Stiven Peters DO   simvastatin (ZOCOR) 20 MG tablet TAKE 1 TABLET BY MOUTH AT BEDTIME 7/24/23  Yes Amrik Mejia MD        ALLERGIES:   Allergies   Allergen Reactions    Sulfa Antibiotics Shortness Of Breath and Itching    Penicillins Unknown     Annotation: discussed with patient, he reports he had \"itching\" with penicillin many years ago in [facility] but no hives or throat or respiratory symptoms.  he also reports having tolerated amoxicillin since coming to [place].          SOCIAL HISTORY:  Social History     Tobacco Use    Smoking status: Never     Passive exposure: Never    Smokeless tobacco: Never   Vaping Use    Vaping Use: Never used   Substance Use Topics    Alcohol use: Not Currently     Comment: none for the past 2 years    Drug use: No        FAMILY HISTORY:  Family History   Problem Relation Age of Onset    Heart Disease Brother     Hypertension Mother     Hypertension Father         PHYSICAL EXAM:     /58 (BP Location: Right arm)   Pulse 100   Temp 98.1  F (36.7  C) (Oral)   Resp 18   Ht 1.727 m (5' 8\")   Wt 81.6 kg (180 lb)   SpO2 " 99%   BMI 27.37 kg/m       PHYSICAL EXAM:  GENERAL: Chronically ill appearing   SKIN: no suspicious lesions, rashes, jaundice  HEAD: Normocephalic. Atraumatic.  NECK: Neck supple. No adenopathy.   EYES: No scleral icterus  GASTROINTESTINAL: large, distended, diffusely tender, no guarding/rebound  JOINT/EXTREMITIES:  no gross deformities noted, normal muscle tone, 1 + LE edema  NEURO: CN 2-12 grossly intact, no focal deficits, no asterixis   PSYCH: Normal affect        ADDITIONAL COMMENTS:   I reviewed the patient's new clinical lab test results.     Recent Labs   Lab 11/04/23  0540 11/03/23  1507   WBC 9.0 7.4   RBC 2.97* 3.07*   HGB 8.1* 8.3*   HCT 25.7* 26.7*   MCV 87 87   MCH 27.3 27.0   MCHC 31.5 31.1*   RDW 17.4* 17.8*    220     Recent Labs   Lab Test 11/04/23  0540 11/03/23  1507 07/17/23  1600   POTASSIUM 4.2 4.3 3.8   CHLORIDE 111* 109* 113*   CO2 17* 19* 20*   BUN 26.4* 26.0* 13.4   ANIONGAP 11 12 10     Recent Labs   Lab Test 11/04/23  0540 11/04/23  0337 11/03/23  1507 01/27/23  0649 01/24/23  0845 01/24/23  0743 01/23/23  1744 12/19/22  0411 12/18/22  2119 12/18/22  1912   ALBUMIN 2.6*  --  3.4* 3.1*  --    < > 3.6   < >  --  3.5   BILITOTAL 2.6*  --  2.2* 1.7*  --    < > 1.8*   < >  --  1.7*   ALT 13  --  18 35  --    < > 35   < >  --  30   AST 26  --  37 55*  --    < > 51*   < >  --  42   PROTEIN  --  30*  --   --  10*  --   --   --  20*  --    LIPASE  --   --   --   --   --   --  156*  --   --  195*    < > = values in this interval not displayed.       Recent Labs   Lab 11/03/23  1511   INR 1.63*     MELD 3.0: 19 at 11/4/2023  5:40 AM  MELD-Na: 19 at 11/4/2023  5:40 AM  Calculated from:  Serum Creatinine: 1.37 mg/dL at 11/4/2023  5:40 AM  Serum Sodium: 139 mmol/L (Using max of 137 mmol/L) at 11/4/2023  5:40 AM  Total Bilirubin: 2.6 mg/dL at 11/4/2023  5:40 AM  Serum Albumin: 2.6 g/dL at 11/4/2023  5:40 AM  INR(ratio): 1.63 at 11/3/2023  3:11 PM  Age at listing (hypothetical): 72 years  Sex:  Male at 11/4/2023  5:40 AM       IMAGING / ENDOSCOPY     Recent Results (from the past 24 hour(s))   US Paracentesis with Albumin    Narrative    EXAM:  1. PARACENTESIS  2. ULTRASOUND GUIDANCE  LOCATION: [facility]  DATE: 11/3/2023    INDICATION: Ascites.    PROCEDURE: Informed consent obtained. Time out performed. The abdomen  was prepped and draped in a sterile fashion. 10 mL of 1% lidocaine was  infused into local soft tissues. A 7 [place] catheter system was  introduced into the abdominal ascites under ultrasound guidance.    9.7 liters of clear fluid were removed and sent to lab if requested.    Patient tolerated procedure well.    Ultrasound imaging was obtained and placed in the patient's permanent  medical record.      Impression    IMPRESSION:  1.  Status post ultrasound-guided paracentesis.    Reference CPT Code: 73111    SANDRINE JOHNSON MD         SYSTEM ID:  [id number]   XR Chest Port 1 View    Narrative    EXAM: XR CHEST PORT 1 VIEW  LOCATION: [facility]  DATE: 11/4/2023    INDICATION: ETOH, cirrhosis with recurrent ascites, bibasilar crackles, pitting LE edema, CHF.  COMPARISON: 01/24/2023.      Impression    IMPRESSION: Moderate low lung volumes. Heart size and pulmonary vascularity upper limits of normal. Calcified granuloma in the left lung base. No focal lung infiltrates. A portion of the left costophrenic angle was omitted from this exam. Osseous   structures grossly intact. Visualized upper abdomen unremarkable.         CONSULTATION ASSESSMENT AND PLAN:    Vito Sy is a 72 year old [place] male with JENNINGS cirrhosis complicated by esophageal varices refractory ascites status post TIPS (at the end of 2022), and subsequent hepatic encephalopathy who was found to have spontaneous bacterial peritonitis after paracentesis on 11/3 presenting with volume overload.    1.  SBP: This is in the setting of known cirrhosis with current team compensation due  to refractory ascites, despite TIPS procedure.  Currently treating SBP with ceftriaxone.  Patient did receive albumin during his paracentesis.  Would recommend albumin 25% on day 3.  We will follow ascitic fluid culture.  Blood cultures x2 with no growth to date.    2.  JENNINGS cirrhosis, decompensated: Patient has had significant issues with refractory ascites despite TIPS.  Renal insufficiency is significantly limited diuretics.  Plans are in place for echocardiogram to evaluate for cardiac etiology of volume overload.  Certainly could be dealing with worsening portal hypertension.  LFTs are stable.  No evidence of GI bleeding or encephalopathy.  Continue lactulose, rifaximin, low-sodium diet.  MELD NA score 19.      I discussed the patient plan with Dr. Hedrick, GI staff physician. Thank you for asking us to participate in the care of this patient.     75 min of total time was spent providing patient care, including patient evaluation, reviewing documentation/ test results, and .     Laura Dominguez PA-C  [facility] ( [facility])         Agree with above note and examination by Laura Dominguez PA-C    72 M with JENNINGS cirrhosis with manifestations of ascites, varices and encephaloapthy admitted with SBP.   Patient with TIPS in 2022.   Ascites present and TIPS refractory with continued therapeutic paracenteses.     Pt found to have SBP on most recent paracentesis prompting admission  Physical exam shows 72 M in NAD, Chest/Pulm exam normal. Abd distended with ascites.   A/P   SBP- on ceftriaxone for treatment  Received albumin today and will need to repeat on Day 3  Continue furosemide and spironolactone  Will need repeat therapeutic paracentesis this admission  Agree with midodrine    Cardiac ECHO without dysfunction.   Will need U/S of TIPS prior to discharge    Cirrhosis- continue rifaximin and lactulose    Dontae Hedrick M.D.  [facility]  [phone]- business phone number (for  scheduling)

## 2023-11-04 NOTE — PROVIDER NOTIFICATION
Provider notified that patient UA results are in.    Update: Urine culture ordered and is in process.

## 2023-11-04 NOTE — PHARMACY-VANCOMYCIN DOSING SERVICE
Pharmacy Vancomycin Initial Note  Date of Service 2023  Patient's  1950  72 year old, male    Vancomycin IV Administrations (past 72 hours)        No vancomycin orders with administrations in past 72 hours.                    Nephrotoxins and other renal medications (From now, onward)      Start     Dose/Rate Route Frequency Ordered Stop    23 1900  vancomycin (VANCOCIN) 1,250 mg in 0.9% NaCl 250 mL intermittent infusion         1,250 mg  over 90 Minutes Intravenous EVERY 24 HOURS 23 1855      23 2300  furosemide (LASIX) injection 20 mg         20 mg  over 1-3 Minutes Intravenous DAILY 23 2247              Contrast Orders - past 72 hours (72h ago, onward)      None          Regimen: 1250 mg IV every 24 hours.  Start time: 18:54 on 2023  Exposure target: AUC24 (range)400-600 mg/L.hr   AUC24,ss: 501 mg/L.hr  Probability of AUC24 > 400: 76 %  Ctrough,ss: 15.3 mg/L  Probability of Ctrough,ss > 20: 24 %  Probability of nephrotoxicity (Lodise KESHIA ): 11 %  Plan:  Start vancomycin  as above   Vancomycin monitoring method: AUC  Vancomycin therapeutic monitoring goal: 400-600 mg*h/L  Pharmacy will check vancomycin levels as appropriate in 1-3 Days.    Serum creatinine levels will be ordered daily for the first week of therapy and at least twice weekly for subsequent weeks.      Vlad Gray [facility]

## 2023-11-04 NOTE — PLAN OF CARE
Goal Outcome Evaluation:    Problem: Adult Inpatient Plan of Care  Goal: Optimal Comfort and Wellbeing  Outcome: Progressing     Problem: Risk for Delirium  Goal: Optimal Coping  Intervention: Optimize Psychosocial Adjustment to Delirium  Recent Flowsheet Documentation  Taken 11/4/2023 1334 by Adriana Lynn RN  Supportive Measures: active listening utilized     Problem: Fall Injury Risk  Goal: Absence of Fall and Fall-Related Injury  Intervention: Promote Injury-Free Environment  Recent Flowsheet Documentation  Taken 11/4/2023 1334 by Adriana Lynn RN  Safety Promotion/Fall Prevention:   activity supervised   safety round/check completed     Problem: Liver Failure  Goal: Improved Oral Intake  Outcome: Progressing       Received patient from ED at 1:30 pm. Patient and wife settled into room. Ordered meal. Stated that he has no pain. Alert and oriented x 4. Chair alarm is on.  Patient's wife Naomi is very helpful.

## 2023-11-04 NOTE — PLAN OF CARE
Problem: Risk for Delirium  Goal: Optimal Coping  Outcome: Progressing   Goal Outcome Evaluation:  Pt A/o x4. Wife at bedside and attentive to pt. Lab called with critical blood culture results from 11/03;    Positive on the 1st day of incubation Abnormal       Gram positive cocci in clusters Panic    1 of 2 bottles      Senior provider with [facility] medicine updated & FYI about loose stools continued. States improved abdominal discomfort from PRN admin in am. PIV infusing ivabx, compliant with fluid restriction. Calls appropriately with needs.

## 2023-11-04 NOTE — PLAN OF CARE
Problem: Adult Inpatient Plan of Care  Goal: Optimal Comfort and Wellbeing  Outcome: Progressing   Goal Outcome Evaluation:    Pt AxOx4, no acute changes this shift. Pt requesting rx for gas relief d/t abdominal discomfort. Provider paged, rx ordered, meds administered. No acute distress noted w/assessment. Pt

## 2023-11-04 NOTE — PROGRESS NOTES
[facility]    Progress Note - Hospitalist Service       Date of Admission:  11/3/2023    Assessment & Plan     Vito Sy is a 72 year old [place] male with JENNINGS cirrhosis complicated by esophageal varices refractory ascites status post TIPS (at the end of 2022), and subsequent hepatic encephalopathy who was found to have spontaneous bacterial peritonitis after paracentesis on 11/3.     Spontaneous bacterial peritonitis  Presented after findings of elevated ANC of 3400 concerning for SBP after routine paracentesis of 9.7 L out on day of admission with reports of abdominal pain for the last few days in addition to chills but no documented fever.  Abdomen diffusely tender to palpation and distended.  Patient initially received Levaquin in the ED. Remains afebrile and hemodynamically stable.    - GI consulted, appreciate recommendations  - Plan for Liver US to assess if TIPS is patent after the infection has started to clear  - Ceftriaxone 2 grams q 24 hours  - Consider Albumin 25% on day 3, s/p Albumin 25% 75 g with paracentesis  11/3  - Follow ascitic fluid cultures and sensitivities   - Follow blood cultures  - UA negative      Alcoholic cirrhosis  Recurrent ascites  S/p TIPS  History of hepatic encephalopathy  History of esophageal varices  Follows with Dr. Montenegro at [facility]. Hx of diuretic refractory ascites s/p TIPS 12/2022.  Concern for progressive renal insufficiency when on spironolactone and this was discontinued in 6/2022. [place] has had ongoing progression of ascites requiring more frequent paracentesis. EGD 10/2023 w/ small esophageal varices and portal hypertensive gastropathy w/o gastric varices. MELD-Na on admission was 17. No hepatic encephalopathy. Hepatic function relatively normal with mild elevated bilirubin 2.6. ALT AST normal (although underlying cirrhosis), slight elevated PTT 41 and INR 1.63.   -GI consulted, appreciate recommendations   -TIPS dysfunction versus  "underlying heart failure  -Echo 11/04/23 with EF 55-60%, similar to May 2022  - GI consulted, appreciate recommendations  - PTA lactulose  - PTA rifaximin  - PTA omeprazole 20 mg twice daily, sub pantoprazole  - Low sodium diet  - Fluid restrict 2L  - Follow CBC and CMP     Concern for possible heart failure  Bilateral lower extremity edema  Noted to have 2-3+ pitting edema to upper shins on physical exam that patient states has been present and stable for months. Does endorse orthopnea as well with bibasilar crackles on exam.  Most recent echo in May 2022 with EF of 55 to 60% but diastolic Doppler findings suggesting increased left ventricular filling pressures.  Echo 11/04/23 with EF 55-60%, similar to May 2022. BNP 1,641 11/04/23.   -Midodrine 2.5 mg TID (start 11/04/23)  -Lasix 20 mg IV daily  -Spironolactone 25 mg daily  -Follow CMP  -Strict I/Os     Type 2 diabetes  Most recent A1c of 5.8 in July 2023.  Diet controlled.  - Follow CMP     Normocytic anemia  Hemoglobin 8.3 on admission with MCV of 87.  Baseline hemoglobin of 8-10. No signs of acute bleeding.  - Follow CBC     CKD3A  Near baseline of 1.2-1.5 on admission.  History of microalbuminuria.  -Follow CMP     HLD  - PTA simvastatin 20 mg        Diet: 2 Gram Sodium Diet  Fluid restriction 2000 ML FLUID    DVT Prophylaxis: Enoxaparin (Lovenox) SQ  Cronin Catheter: Not present  Fluids: PO fluid limited 2L  Lines: None     Cardiac Monitoring: None  Code Status: Full Code      Clinically Significant Risk Factors Present on Admission              # Hypoalbuminemia: Lowest albumin = 2.6 g/dL at 11/4/2023  5:40 AM, will monitor as appropriate  # Coagulation Defect: INR = 1.63 (Ref range: 0.85 - 1.15) and/or PTT = 41 Seconds (Ref range: 22 - 38 Seconds), will monitor for bleeding    # Hypertension: Noted on problem list      # Overweight: Estimated body mass index is 27.37 kg/m  as calculated from the following:    Height as of this encounter: 1.727 m (5' 8\").    " Weight as of this encounter: 81.6 kg (180 lb).       # Financial/Environmental Concerns: none         Disposition Plan     Expected Discharge Date: 11/05/2023                The patient's care was discussed with the Attending Physician, Dr. Anaya .    MAY SAUCEDO MD  Hospitalist Service  [facility]  Securely message with [employer] (more info)  Text page via [employer] Paging/Directory   ______________________________________________________________________    Interval History   Patient was comfortably resting in bed.  He and his wife had concerns about how rapidly the fluid in his abdomen returns.  They were curious if anything was wrong with his TIPS.  [place] states that he has been slightly short of breath and often has to sleep sitting up for lying flat exacerbates this.    Physical Exam   Vital Signs: Temp: 98.1  F (36.7  C) Temp src: Oral BP: 108/58 Pulse: 100   Resp: 18 SpO2: 99 % O2 Device: None (Room air)    Weight: 180 lbs 0 oz    PHYSICAL EXAM:  GENERAL: Awake, alert, lying in hospital bed. No acute distress.   HEENT: No scleral icterus or conjunctival injection. Oral cavity moist and pink with no ulcers, exudate, or thrush present. No cervical or supraclavicular lymphadenopathy. Notable Jaundice throughout.   SKIN: Warm and dry. No bruises, rashes, or skin lesions.  LUNGS: Normal work of breathing with no use of accessory muscles. Clear breath sounds in upper lung fields. Crackles bilaterally in lower lung fields. No wheezes appreciated.   CARDIAC: RRR. Normal S1 and S2. No murmurs, clicks, or rubs appreciated. No JVD. No peripheral edema.  ABDOMEN: Profusely distended. Fluid wave present. Tenderness to palpation throughout.   NEUROLOGIC: Alert and oriented. Sensation to light touch involving upper and lower extremities intact bilaterally.   EXTREMITIES: Bilateral 2+ pitting edema throughout his legs and his hips. Appear well-perfused.     Data     I have personally reviewed the  following data over the past 24 hrs:    9.0  \   8.1 (L)   / 157     139 111 (H) 26.4 (H) /  132 (H)   4.2 17 (L) 1.41 (H) \     ALT: 13 AST: 26 AP: 126 TBILI: 2.6 (H)   ALB: 2.6 (L) TOT PROTEIN: 5.4 (L) LIPASE: N/A     Trop: N/A BNP: 1,641 (H)     Procal: N/A CRP: N/A Lactic Acid: 1.9       INR:  1.63 (H) PTT:  41 (H)   D-dimer:  N/A Fibrinogen:  N/A       Imaging results reviewed over the past 24 hrs:   Recent Results (from the past 24 hour(s))   XR Chest Port 1 View    Narrative    EXAM: XR CHEST PORT 1 VIEW  LOCATION: [facility]  DATE: 2023    INDICATION: ETOH, cirrhosis with recurrent ascites, bibasilar crackles, pitting LE edema, CHF.  COMPARISON: 2023.      Impression    IMPRESSION: Moderate low lung volumes. Heart size and pulmonary vascularity upper limits of normal. Calcified granuloma in the left lung base. No focal lung infiltrates. A portion of the left costophrenic angle was omitted from this exam. Osseous   structures grossly intact. Visualized upper abdomen unremarkable.   Echocardiogram Complete    Narrative    [id number]  [id number]  [id number]  [id number]^PAULINO^[place] [id number]     Name: JAC LOWERY  MRN: [id number]  : 1950  Study Date: 2023 10:33 AM  Age: 72 yrs  Gender: Male  Patient Location: [facility]  Reason For Study: Edema  Ordering Physician: CARLOS BOB  Performed By: MB     BSA: 2.0 m2  Height: 68 in  Weight: 180 lb  HR: 91  BP: 109/60 mmHg  ______________________________________________________________________________  Procedure  Complete Echo Adult.  ______________________________________________________________________________  Interpretation Summary     1. Normal left ventricular size and systolic performance with a visually  estimated ejection fraction of 55-60%.  2. There is mild aortic insufficiency.  3. There is mild calcific mitral stenosis.  4. Normal right ventricular size  and systolic performance.  5. There is mild left atrial enlargement.  6. There is mild enlargement of the proximal ascending aorta.     /When compared to the prior real-time echocardiogram dated 20 May 2022, the  findings are felt to be fairly similar on both examinations.  ______________________________________________________________________________  Left ventricle:  Normal left ventricular size and systolic performance with a visually  estimated ejection fraction of 55-60%. There is normal regional wall motion.  Left ventricular wall thickness is normal.     Assessment of LV Diastolic Function: The evaluation of diastolic filling is  hampered by the presence of significant mitral annular calcification.     Right ventricle:  Normal right ventricular size and systolic performance.     Left atrium:  There is mild left atrial enlargement.     Right atrium:  The right atrium is of normal size.     IVC:  The IVC is not well-visualized.     Aortic valve:  The aortic valve is not well visualized, but suspected to be comprised of  three cusps. There is no significant aortic stenosis. There is mild aortic  insufficiency.     Mitral valve:  There is mild nonspecific mitral valve leaflet thickening. There is moderate  to severe mitral annular calcification. There is mild calcific mitral  stenosis. There is trace-mild mitral insufficiency.     Tricuspid valve:  The tricuspid valve is grossly morphologically normal. There is trace  tricuspid insufficiency.     Pulmonic valve:  The pulmonic valve is grossly morphologically normal. There is trace pulmonic  insufficiency.     Thoracic aorta:  There is mild enlargement of the proximal ascending aorta.     Pericardium:  There is no significant pericardial effusion.  ______________________________________________________________________________  ______________________________________________________________________________  MMode/2D Measurements & Calculations  IVSd: 0.81 cm  LVIDd:  3.5 cm  LVIDs: 2.5 cm  LVPWd: 0.96 cm  FS: 27.7 %  LV mass(C)d: 85.3 grams  LV mass(C)dI: 43.6 grams/m2  Ao root diam: 3.8 cm  asc Aorta Diam: 4.5 cm  LVOT diam: 1.9 cm  LVOT area: 2.7 cm2  Ao root diam index Ht(cm/m): 2.2  Ao root diam index BSA (cm/m2): 2.0  Asc Ao diam index BSA (cm/m2): 2.3  Asc Ao diam index Ht(cm/m): 2.6  LA Volume (BP): 70.3 ml     LA Volume Index (BP): 36.1 ml/m2  LA Volume Indexed (AL/bp): 38.4 ml/m2  RV Base: 4.2 cm  RWT: 0.55  TAPSE: 2.4 cm     Time Measurements  MM HR: 81.0 BPM     Doppler Measurements & Calculations  MV E max patric: 90.6 cm/sec  MV A max patric: 140.2 cm/sec  MV E/A: 0.65  MV max P.4 mmHg  MV mean PG: 3.6 mmHg  MV V2 VTI: 37.6 cm  MVA(VTI): 1.9 cm2  MV dec slope: 357.3 cm/sec2  MV dec time: 0.25 sec  Ao V2 max: 190.7 cm/sec  Ao max PG: 15.0 mmHg  Ao V2 mean: 129.2 cm/sec  Ao mean P.6 mmHg  Ao V2 VTI: 37.2 cm  LORENA(I,D): 1.9 cm2  LORENA(V,D): 2.1 cm2  AI P1/2t: 464.9 msec  LV V1 max P.9 mmHg  LV V1 max: 149.4 cm/sec  LV V1 VTI: 26.5 cm  SV(LVOT): 71.2 ml  SI(LVOT): 36.5 ml/m2  PA acc time: 0.12 sec  AV Patric Ratio (DI): 0.78  LORENA Index (cm2/m2): 0.98  E/E': 16.0     E/E' av.3  Lateral E/e': 6.6  Medial E/e': 16.0  Peak E' Patric: 5.7 cm/sec  RV S Patric: 14.0 cm/sec     ______________________________________________________________________________  Report approved by: Toñito Damian 2023 11:56 AM

## 2023-11-04 NOTE — PROVIDER NOTIFICATION
Provider notifed that patient blood pressures have been soft. 86/50 and then 94/56 after walking to the restroom.     Update 0352: No new orders at this time, continue to monitor.

## 2023-11-04 NOTE — PROVIDER NOTIFICATION
Provider notified that patient has temp. Of 100.6, c/o abdominal pain (has no prn pain medication), and that urine specimen still needs to be collected; however, patient has male purewick in place for strict I/O and to promote sleep.     Update 0025: received order for prn acetaminophen. Ok to hold off on urine specimen collection until AM.

## 2023-11-04 NOTE — CONSULTS
Care Management Initial Consult    General Information  Assessment completed with: Patient, Spouse or significant other, pt and wife  Type of CM/SW Visit: Initial Assessment    Primary Care Provider verified and updated as needed: Yes   Readmission within the last 30 days: no previous admission in last 30 days      Reason for Consult: discharge planning  Advance Care Planning: Advance Care Planning Reviewed: no concerns identified          Communication Assessment  Patient's communication style: spoken language (English or Bilingual)             Cognitive  Cognitive/Neuro/Behavioral: WDL                      Living Environment:   People in home: spouse     Current living Arrangements: house      Able to return to prior arrangements: yes       Family/Social Support:  Care provided by: self, child(manav), spouse/significant other  Provides care for: no one  Marital Status:   Wife, Children          Description of Support System: Supportive, Involved    Support Assessment: Adequate family and caregiver support, Adequate social supports    Current Resources:   Patient receiving home care services: No     Community Resources: Other (see comment) (out patient paracentesis)  Equipment currently used at home:    Supplies currently used at home:  (bed that reclines/hospital bed without railings that his son purchased for him)    Employment/Financial:  Employment Status:          Financial Concerns: none   Referral to Financial Worker: No       Does the patient's insurance plan have a 3 day qualifying hospital stay waiver?  No    Lifestyle & Psychosocial Needs:  Social Determinants of Health     Food Insecurity: Not on file   Depression: Not at risk (7/17/2023)    PHQ-2     PHQ-2 Score: 0   Housing Stability: Not on file   Tobacco Use: Low Risk  (10/10/2023)    Patient History     Smoking Tobacco Use: Never     Smokeless Tobacco Use: Never     Passive Exposure: Never   Financial Resource Strain: Not on file   Alcohol  Use: Not on file   Transportation Needs: Not on file   Physical Activity: Not on file   Interpersonal Safety: Not on file   Stress: Not on file   Social Connections: Not on file       Functional Status:  Prior to admission patient needed assistance:   Dependent ADLs:: Toileting, Grooming, Dressing, Positioning, Transfers, Ambulation-no assistive device  Dependent IADLs:: Cleaning, Cooking, Laundry, Shopping, Meal Preparation, Medication Management, Transportation  Assesssment of Functional Status: Needs assistive devices (DME), Not at  functional baseline, Needs assistance with dressing, Needs assistance with bathing, Needs assistance with shopping (needs wheechair, walker, shower chair at discharge before going home)    Mental Health Status:  Mental Health Status: No Current Concerns       Chemical Dependency Status:                Values/Beliefs:  Spiritual, Cultural Beliefs, Mandaeism Practices, Values that affect care:                 Additional Information:  Assessed, lives w/wife and no svcs, needs more help at home, would like WC, walker and shower chair for home use, OP paracentesis, wife can transport. CM to follow for discharge needs.    Agustín Badillo RN

## 2023-11-05 NOTE — PLAN OF CARE
Problem: Adult Inpatient Plan of Care  Goal: Absence of Hospital-Acquired Illness or Injury  Intervention: Identify and Manage Fall Risk  Recent Flowsheet Documentation  Taken 11/5/2023 0919 by Adriana Lynn RN  Safety Promotion/Fall Prevention: safety round/check completed  Taken 11/5/2023 0700 by Adriana Lynn RN  Safety Promotion/Fall Prevention: safety round/check completed     Problem: Adult Inpatient Plan of Care  Goal: Absence of Hospital-Acquired Illness or Injury  Intervention: Prevent Infection  Recent Flowsheet Documentation  Taken 11/5/2023 0919 by Adriana Lynn RN  Infection Prevention:   rest/sleep promoted   hand hygiene promoted     Problem: Risk for Delirium  Goal: Optimal Coping  Outcome: Progressing  Intervention: Optimize Psychosocial Adjustment to Delirium  Recent Flowsheet Documentation  Taken 11/5/2023 0919 by Adriana Lynn RN  Supportive Measures: active listening utilized     Problem: Liver Failure  Goal: Effective Oxygenation and Ventilation  Intervention: Promote Airway Secretion Clearance  Recent Flowsheet Documentation  Taken 11/5/2023 0919 by Adriana Lynn RN  Cough And Deep Breathing: done with encouragement   Goal Outcome Evaluation:       Patient tolerating sitting up in chair. Plan for abdominal US in am, patient made awarre of NPO status after midnight.

## 2023-11-05 NOTE — PLAN OF CARE
Problem: Adult Inpatient Plan of Care  Goal: Optimal Comfort and Wellbeing  Outcome: Progressing       Visitor exception wife in room. Wife helps with cares. 2000 ml fluid restriction continued. I&O continued.       Lori Garvey RN on 11/4/2023 at 9:14 PM

## 2023-11-05 NOTE — PROGRESS NOTES
"GASTROENTEROLOGY PROGRESS NOTE        SUBJECTIVE:  Reports improving abdominal pain since starting antibiotics. Passing 3-4 loose BMs daily. No fever or chills.      OBJECTIVE:    /57 (BP Location: Right arm)   Pulse 86   Temp 98.7  F (37.1  C) (Oral)   Resp 16   Ht 1.727 m (5' 8\")   Wt 82.4 kg (181 lb 10.5 oz)   SpO2 98%   BMI 27.62 kg/m    Temp (24hrs), Av.8  F (37.1  C), Min:97.9  F (36.6  C), Max:99.9  F (37.7  C)    Patient Vitals for the past 72 hrs:   Weight   23 0631 82.4 kg (181 lb 10.5 oz)   23 1258 77.8 kg (171 lb 8 oz)   23 1432 81.6 kg (180 lb)       Intake/Output Summary (Last 24 hours) at 2023 0908  Last data filed at 2023 0800  Gross per 24 hour   Intake 900 ml   Output --   Net 900 ml        PHYSICAL EXAM     Constitutional: NAD    Abdomen: large, distended, no rebound or guarding  No asterixis  LE edema 2+ bilaterally         Additional Comments:  ROS, FH, SH: See initial GI consult for details.     I have reviewed the patient's new clinical lab results:     Recent Labs   Lab Test 2340 23  1511 23  1507 23  1141 23  0649   WBC 10.6 9.0  --  7.4  --   --    HGB 8.0* 8.1*  --  8.3*  --   --    MCV 87 87  --  87  --   --     157  --  220  --   --    INR  --   --  1.63*  --  1.1 1.37*     Recent Labs   Lab Test 2352 23  0540 23  1507   POTASSIUM 4.1 4.2 4.3   CHLORIDE 111* 111* 109*   CO2 17* 17* 19*   BUN 31.5* 26.4* 26.0*   ANIONGAP 10 11 12     Recent Labs   Lab Test 2352 23  0540 23  0337 23  1507 23  0649 23  0845 23  0743 23  1744 22  0411 229 22   ALBUMIN 2.5* 2.6*  --  3.4*   < >  --    < > 3.6   < >  --  3.5   BILITOTAL 1.5* 2.6*  --  2.2*   < >  --    < > 1.8*   < >  --  1.7*   ALT 17 13  --  18   < >  --    < > 35   < >  --  30   AST 38 26  --  37   < >  --    < > 51*   < >  --  42   PROTEIN "  --   --  30*  --   --  10*  --   --   --  20*  --    LIPASE  --   --   --   --   --   --   --  156*  --   --  195*    < > = values in this interval not displayed.     MELD 3.0: 18 at 11/5/2023  6:52 AM  MELD-Na: 17 at 11/5/2023  6:52 AM  Calculated from:  Serum Creatinine: 1.47 mg/dL at 11/5/2023  6:52 AM  Serum Sodium: 138 mmol/L (Using max of 137 mmol/L) at 11/5/2023  6:52 AM  Total Bilirubin: 1.5 mg/dL at 11/5/2023  6:52 AM  Serum Albumin: 2.5 g/dL at 11/5/2023  6:52 AM  INR(ratio): 1.63 at 11/3/2023  3:11 PM  Age at listing (hypothetical): 72 years  Sex: Male at 11/5/2023  6:52 AM      ASSESSMENT/ PLAN    Vito Sy is a 72 year old [place] male with JENNINGS cirrhosis complicated by esophageal varices refractory ascites status post TIPS (at the end of 2022), and subsequent hepatic encephalopathy who was found to have spontaneous bacterial peritonitis after paracentesis on 11/3 presenting with volume overload.     1.  SBP: This is in the setting of known cirrhosis with current team compensation due to refractory ascites, despite TIPS procedure.  Currently treating SBP with ceftriaxone.  Patient did receive albumin during his paracentesis.  We will follow ascitic fluid culture.  Blood cultures x 2 with no growth to date.    -- Continue antibiotic for 5 days.   -- Will give albumin 25%, 1 gm/kg as today is day 3  -- Ultrasound to evaluate TIPS   -- Plan for diagnostic and therapeutic paracentesis tomorrow.      2.  JENNINGS cirrhosis, decompensated: Patient has had significant issues with refractory ascites despite TIPS.  Renal insufficiency is significantly limited diuretics.  Plans are in place for echocardiogram to evaluate for cardiac etiology of volume overload.  Certainly could be dealing with worsening portal hypertension.  LFTs are stable.  No evidence of GI bleeding or encephalopathy.  Continue lactulose, rifaximin, low-sodium diet.  MELD NA score 17.    -- Echocardiogram is normal without evidence of heart  failure.  -- Receiving low dose furosemide and spironolactone, stable renal function.   -- Follow lytes, renal function, daily weights, urine output.       Discussed with Dr. Hedrick  Total time: 45 minutes of total time was spent providing patient care, including patient evaluation, reviewing documentation/test results, and .     Laura Dominguez PA-C  [facility] ( [facility])

## 2023-11-05 NOTE — PROGRESS NOTES
[facility]    Progress Note - Hospitalist Service       Date of Admission:  11/3/2023    Assessment & Plan     Vito Sy is a 72 year old [place] male with alcohol related cirrhosis complicated by esophageal varices refractory ascites status post TIPS (at the end of 2022), and subsequent hepatic encephalopathy who was found to have spontaneous bacterial peritonitis after paracentesis on 11/3 and appeared fluid overloaded on admission.      Spontaneous bacterial peritonitis  Presented after findings of elevated ANC of 3400 concerning for SBP after routine paracentesis of 9.7 L out on day of admission with reports of abdominal pain for the last few days in addition to chills but no documented fever.  Abdomen diffusely tender to palpation and distended. Remains afebrile and hemodynamically stable.    - GI consulted, appreciate recommendations   - Continue Ceftriaxone 2 G daily for 5 days.   - Will give albumin 25%, 1 gm/kg as today is day 3  - Ultrasound to evaluate TIPS   - Plan for diagnostic and therapeutic paracentesis tomorrow.  - Follow ascitic fluid cultures and sensitivities    - 11/3 culture growing strep mitis/oralis   - Follow blood cultures  - UA negative      Alcoholic cirrhosis  Recurrent ascites  S/p TIPS  History of hepatic encephalopathy  History of esophageal varices  Follows with Dr. Montenegro at [facility]. Hx of diuretic refractory ascites s/p TIPS 12/2022.  Concern for progressive renal insufficiency when on spironolactone and this was discontinued in 6/2022. [place] has had ongoing progression of ascites requiring more frequent high volume paracentesis. EGD 10/2023 w/ small esophageal varices and portal hypertensive gastropathy w/o gastric varices. MELD-Na on admission was 17. No hepatic encephalopathy. Hepatic function relatively normal with mild elevated bilirubin 2.6. ALT AST normal (although underlying cirrhosis), slight elevated PTT 41 and INR 1.63.   - GI consulted,  appreciate recommendations   - Abdominal US today to evaluate patency of TIPS  - PTA lactulose  - PTA rifaximin  - PTA omeprazole 20 mg twice daily, sub pantoprazole  - Lasix 20 mg IV daily  - Spironolactone 25 mg daily  - Low sodium diet  - Fluid restrict 2L  - Follow CBC and CMP     Hypervolemia   Bilateral lower extremity edema  Noted to have 2-3+ pitting edema to upper shins on physical exam that patient states has been present and stable for months. Does endorse orthopnea as well with bibasilar crackles on exam.  Most recent echo in May 2022 with EF of 55 to 60% but diastolic Doppler findings suggesting increased left ventricular filling pressures.  Echo 11/04/23 with EF 55-60%, similar to May 2022. BNP 1,641 11/04/23.   - Midodrine 2.5 mg TID (start 11/04/23)  - Lasix 20 mg IV daily  - Spironolactone 25 mg daily  - Could consider increased to (100:40) ratio of diuretics tomorrow if patient continues to have lower urine output  - Follow CMP  - Strict I/Os, daily standing weights   - Abdominal US as above to evaluate patency of TIPS  - OT consult for Lymphedema     Gram positive blood culture  Positive 1/2 bottles from 11/3. Positive for coagulase negative staph. Likely skin contaminate, did receive IV Vanco x 1 dose.  - Discontinue IV Vanco  - Follow blood culture for final speciation      Type 2 diabetes  Most recent A1c of 5.8 in July 2023.  Diet controlled.  - Follow CMP     Normocytic anemia  Hemoglobin 8.3 on admission with MCV of 87.  Baseline hemoglobin of 8-10. No signs of acute bleeding.  - Follow CBC     CKD3A  Near baseline of 1.2-1.5 on admission.  History of microalbuminuria.  -Follow CMP     HLD  - PTA simvastatin 20 mg        Diet: 2 Gram Sodium Diet  Fluid restriction 2000 ML FLUID    DVT Prophylaxis: Enoxaparin (Lovenox) SQ  Cronin Catheter: Not present  Fluids: PO fluid limited 2L  Lines: None     Cardiac Monitoring: None  Code Status: Full Code      Clinically Significant Risk Factors           "    # Hypoalbuminemia: Lowest albumin = 2.5 g/dL at 11/5/2023  6:52 AM, will monitor as appropriate    # Coagulation Defect: INR = 1.63 (Ref range: 0.85 - 1.15) and/or PTT = 41 Seconds (Ref range: 22 - 38 Seconds), will monitor for bleeding    # Hypertension: Noted on problem list        # Overweight: Estimated body mass index is 27.62 kg/m  as calculated from the following:    Height as of this encounter: 1.727 m (5' 8\").    Weight as of this encounter: 82.4 kg (181 lb 10.5 oz)., PRESENT ON ADMISSION       # Financial/Environmental Concerns: none         Disposition Plan      Expected Discharge Date: 11/06/2023      Destination: home with family          The patient's care was discussed with the Attending Physician, Dr. Anaya .    Nguyen Anton MD  Hospitalist Service  [facility]  ______________________________________________________________________    Interval History   Patient was sitting in chair. He reports some ongoing abdominal pain, improved from admission. Wife present and supportive. Wondering how long he will be admitted. Having 4-5 stools per day.     Physical Exam   Vital Signs: Temp: 98.7  F (37.1  C) Temp src: Oral BP: 101/57 Pulse: 86   Resp: 16 SpO2: 98 % O2 Device: None (Room air)    Weight: 181 lbs 10.54 oz    PHYSICAL EXAM:  GENERAL: Awake, alert, No acute distress, Appears somewhat uncomfortable  HEENT: No scleral icterus or conjunctival injection  SKIN: Warm and dry.   LUNGS: Normal work of breathing with no use of accessory muscles. Clear breath sounds in upper and lower lung fields. No wheezes. Regular respiratory rate, effort.   CARDIAC: RRR. Normal S1 and S2. No murmurs, clicks, or rubs appreciated. +2 BLE pitting edema.   ABDOMEN: +Tenderness to palpation throughout, distended. No rebound or guarding. +bowel sounds.   NEUROLOGIC: Alert and oriented. No asterixis present on exam. CN 2-12 grossly intact. Moves all extremities equally.   EXTREMITIES: Bilateral 2+ " pitting edema throughout his legs and his hips. Appear well-perfused.     Data     I have personally reviewed the following data over the past 24 hrs:    10.6  \   8.0 (L)   / 196     138 111 (H) 31.5 (H) /  152 (H)   4.1 17 (L) 1.47 (H) \     ALT: 17 AST: 38 AP: 134 (H) TBILI: 1.5 (H)   ALB: 2.5 (L) TOT PROTEIN: 5.7 (L) LIPASE: N/A     Trop: N/A BNP: N/A       Imaging results reviewed over the past 24 hrs:   Recent Results (from the past 24 hour(s))   Echocardiogram Complete    Narrative    [id number]  [id number]  [id number]  [id number]^PAULINO^[place] [address]     Name: JAC LOWERY  MRN: [id number]  : 1950  Study Date: 2023 10:33 AM  Age: 72 yrs  Gender: Male  Patient Location: [facility]  Reason For Study: Edema  Ordering Physician: CARLOS BOB  Performed By: MB     BSA: 2.0 m2  Height: 68 in  Weight: 180 lb  HR: 91  BP: 109/60 mmHg  ______________________________________________________________________________  Procedure  Complete Echo Adult.  ______________________________________________________________________________  Interpretation Summary     1. Normal left ventricular size and systolic performance with a visually  estimated ejection fraction of 55-60%.  2. There is mild aortic insufficiency.  3. There is mild calcific mitral stenosis.  4. Normal right ventricular size and systolic performance.  5. There is mild left atrial enlargement.  6. There is mild enlargement of the proximal ascending aorta.     /When compared to the prior real-time echocardiogram dated 20 May 2022, the  findings are felt to be fairly similar on both examinations.  ______________________________________________________________________________  Left ventricle:  Normal left ventricular size and systolic performance with a visually  estimated ejection fraction of 55-60%. There is normal regional wall motion.  Left ventricular wall thickness is normal.     Assessment of LV  Diastolic Function: The evaluation of diastolic filling is  hampered by the presence of significant mitral annular calcification.     Right ventricle:  Normal right ventricular size and systolic performance.     Left atrium:  There is mild left atrial enlargement.     Right atrium:  The right atrium is of normal size.     IVC:  The IVC is not well-visualized.     Aortic valve:  The aortic valve is not well visualized, but suspected to be comprised of  three cusps. There is no significant aortic stenosis. There is mild aortic  insufficiency.     Mitral valve:  There is mild nonspecific mitral valve leaflet thickening. There is moderate  to severe mitral annular calcification. There is mild calcific mitral  stenosis. There is trace-mild mitral insufficiency.     Tricuspid valve:  The tricuspid valve is grossly morphologically normal. There is trace  tricuspid insufficiency.     Pulmonic valve:  The pulmonic valve is grossly morphologically normal. There is trace pulmonic  insufficiency.     Thoracic aorta:  There is mild enlargement of the proximal ascending aorta.     Pericardium:  There is no significant pericardial effusion.  ______________________________________________________________________________  ______________________________________________________________________________  MMode/2D Measurements & Calculations  IVSd: 0.81 cm  LVIDd: 3.5 cm  LVIDs: 2.5 cm  LVPWd: 0.96 cm  FS: 27.7 %  LV mass(C)d: 85.3 grams  LV mass(C)dI: 43.6 grams/m2  Ao root diam: 3.8 cm  asc Aorta Diam: 4.5 cm  LVOT diam: 1.9 cm  LVOT area: 2.7 cm2  Ao root diam index Ht(cm/m): 2.2  Ao root diam index BSA (cm/m2): 2.0  Asc Ao diam index BSA (cm/m2): 2.3  Asc Ao diam index Ht(cm/m): 2.6  LA Volume (BP): 70.3 ml     LA Volume Index (BP): 36.1 ml/m2  LA Volume Indexed (AL/bp): 38.4 ml/m2  RV Base: 4.2 cm  RWT: 0.55  TAPSE: 2.4 cm     Time Measurements  MM HR: 81.0 BPM     Doppler Measurements & Calculations  MV E max tima: 90.6 cm/sec  MV A  max patric: 140.2 cm/sec  MV E/A: 0.65  MV max P.4 mmHg  MV mean PG: 3.6 mmHg  MV V2 VTI: 37.6 cm  MVA(VTI): 1.9 cm2  MV dec slope: 357.3 cm/sec2  MV dec time: 0.25 sec  Ao V2 max: 190.7 cm/sec  Ao max PG: 15.0 mmHg  Ao V2 mean: 129.2 cm/sec  Ao mean P.6 mmHg  Ao V2 VTI: 37.2 cm  LORENA(I,D): 1.9 cm2  LORENA(V,D): 2.1 cm2  AI P1/2t: 464.9 msec  LV V1 max P.9 mmHg  LV V1 max: 149.4 cm/sec  LV V1 VTI: 26.5 cm  SV(LVOT): 71.2 ml  SI(LVOT): 36.5 ml/m2  PA acc time: 0.12 sec  AV Patric Ratio (DI): 0.78  LORENA Index (cm2/m2): 0.98  E/E': 16.0     E/E' av.3  Lateral E/e': 6.6  Medial E/e': 16.0  Peak E' Patric: 5.7 cm/sec  RV S Patric: 14.0 cm/sec     ______________________________________________________________________________  Report approved by: Toñito Damian 2023 11:56 AM

## 2023-11-06 NOTE — PROGRESS NOTES
11/06/23 1400   Appointment Info   Signing Clinician's Name / Credentials (PT) Jp Bowden, PT, DPT   Rehab Comments (PT) OT student observing throughout session   Quick Adds   Quick Adds Edema Eval   Integumentary/Edema   Integumentary/Edema Comments Pt with mild BLE edema, more pronounced in feet than lower legs. Agreeable to trial lymph wraps.   Onset of Edema 11/03/23   Affected Body Part(s) Left LE;Right LE   Etiology Comments Onset with liver cirrhosis, ascites, CKD   Edema Precaution Comments No precautions   Edema Examination/Assessment   Skin Condition Pitting   Scar No   Ulcerations No   Skin Integrity Intact   Pitting Assessment 2+   Fibrosis Assessment Not present   Clinical Impression   Criteria for Skilled Therapeutic Intervention Yes, treatment indicated   Edema: Patient Presentation Edema   Clinical Presentation (PT Evaluation Complexity) stable   Clinical Decision Making (Complexity) low complexity   Edema: Planned Interventions Gradient compression bandaging;Edema exercises;Precautions to prevent infection/exacerbation;Education   Risk & Benefits of therapy have been explained care plan/treatment goals reviewed;patient;spouse/significant other   PT Total Evaluation Time   PT Eval, Low Complexity Minutes (65677) 10   Plan of Care Review   Plan of Care Reviewed With patient;spouse   Physical Therapy Goals   PT Frequency Daily   PT Predicted Duration/Target Date for Goal Attainment 11/13/23   PT Goals Edema   PT: Edema education to increase ability to manage edema after discharge from the hospital Patient;Verbalize;[place];Skin care routine;signs/symptoms of intolerance;wear schedule   PT: Management of edema bandages Patient;Verbalize;[place];quick wrap;garment(s)   PT: Functional edema exercise program to reduce limb volume, increase activity tolerance and improve independence with ADL Patient;Verbalize;[place];HEP;walking program   Interventions   Interventions Quick Adds  Self-Care/Home Mgmt   Self-Care/Home Management   Self-Care/Home Mgmt/ADL, Compensatory, Meal Prep Minutes (21547) 25   Symptoms Noted During/After Treatment increased pain   Treatment Detail/Skilled Intervention Educated on lymphedema treatment in the hospital, POC, role of wraps. Lotioned BLE after assessment. Applied TG medium from base of toes to below knee. Applied compression wraps size 6x5 to feet and 8x5 to lower legs, feet in a figure-8 pattern and spinal technique for lower legs, 50% overlap of feet and 50-75% of lower legs. Pt reports no adverse symptoms with wraps, and understands s/s of wear schedule, intolerance, and when to have nursing take them off if needed.   PT Discharge Planning   PT Plan Re-wrap or tubigrip as indicated   Total Session Time   Timed Code Treatment Minutes 25   Total Session Time (sum of timed and untimed services) 35

## 2023-11-06 NOTE — PLAN OF CARE
Problem: Adult Inpatient Plan of Care  Goal: Plan of Care Review  Description: The Plan of Care Review/Shift note should be completed every shift.  The Outcome Evaluation is a brief statement about your assessment that the patient is improving, declining, or no change.  This information will be displayed automatically on your shift  note.  Outcome: Not Progressing  Abdomen remains rounded and taut. Patient completed another US Paracentesis today - vitals stable and tylenol administered for abdominal tenderness. Albumin and Hemoglobin management per Residency Team. Remains on IV Rocephin and switched to PO Lasix.

## 2023-11-06 NOTE — PLAN OF CARE
Goal Outcome Evaluation:      Plan of Care Reviewed With: patient, spouse        Problem: Infection  Goal: Absence of Infection Signs and Symptoms  Outcome: Progressing     Problem: Liver Failure  Goal: Fluid and Electrolyte Balance  Outcome: Progressing     Problem: Liver Failure  Goal: Absence of Infection Signs and Symptoms  Outcome: Progressing   Uneventful night , wife at bedside, NPO for US today. HG is trending down to 7.1 from 8, K 3.1 and Platelet 141, sticky note was left for the provider.

## 2023-11-06 NOTE — PROGRESS NOTES
GI PROGRESS NOTE  11/6/2023  Vito Sy  1950  /-81    Subjective:  He denies having abdominal pain or confusion.  He's having 3-4 Bms daily.       Objective:    Patient Vitals for the past 24 hrs:   BP Temp Temp src Pulse Resp SpO2 Weight   11/06/23 0934 99/58 -- -- -- -- -- --   11/06/23 0753 100/54 98.5  F (36.9  C) Oral 76 18 98 % --   11/06/23 0622 -- -- -- -- -- -- 78.1 kg (172 lb 1.6 oz)   11/05/23 2353 122/64 98.4  F (36.9  C) Oral 88 18 100 % --   11/05/23 2059 120/61 99.6  F (37.6  C) Oral 89 19 98 % --   11/05/23 1609 120/62 98.4  F (36.9  C) Oral 90 15 100 % --     Body mass index is 26.17 kg/m .  Gen: NAD, wife at his side.  He's sitting up in a chair  GI: Distended, BS positive, soft, non-tender    Laboratory  Recent Labs   Lab Test 11/06/23  0516 11/05/23  0652 11/04/23  0540 11/03/23  1511 11/03/23  1507 02/09/23  1141 01/27/23  0649   WBC 7.7 10.6 9.0  --    < >  --   --    HGB 7.1* 8.0* 8.1*  --    < >  --   --    MCV 86 87 87  --    < >  --   --    * 196 157  --    < >  --   --    INR  --   --   --  1.63*  --  1.1 1.37*    < > = values in this interval not displayed.     Recent Labs   Lab Test 11/06/23  0516 11/05/23  0652 11/04/23  1333 11/04/23  1222 11/04/23  0540    138  --   --  139   POTASSIUM 3.1* 4.1  --   --  4.2   CHLORIDE 112* 111*  --   --  111*   CO2 15* 17*  --   --  17*   BUN 33.0* 31.5*  --   --  26.4*   CR 1.49* 1.47*  --  1.41* 1.37*   ANIONGAP 11 10  --   --  11   SILVIA 8.1* 7.7*  --   --  7.5*   * 152* 189*  --  132*     Recent Labs   Lab Test 11/06/23  0516 11/05/23  0652 11/04/23  0540 11/04/23  0337 01/27/23  0649 01/24/23  0845 01/24/23  0743 01/23/23  1744 12/19/22  0411 12/18/22  2119 12/18/22  1912   ALBUMIN 2.8* 2.5* 2.6*  --    < >  --    < > 3.6   < >  --  3.5   BILITOTAL 1.1 1.5* 2.6*  --    < >  --    < > 1.8*   < >  --  1.7*   ALT 16 17 13  --    < >  --    < > 35   < >  --  30   AST 33 38 26  --    < >  --    < > 51*   < >  --   42   ALKPHOS 106 134* 126  --    < >  --    < > 183*   < >  --  130*   PROTEIN  --   --   --  30*  --  10*  --   --   --  20*  --    LIPASE  --   --   --   --   --   --   --  156*  --   --  195*    < > = values in this interval not displayed.     MELD 3.0: 18 at 11/5/2023  6:52 AM  MELD-Na: 17 at 11/5/2023  6:52 AM  Calculated from:  Serum Creatinine: 1.47 mg/dL at 11/5/2023  6:52 AM  Serum Sodium: 138 mmol/L (Using max of 137 mmol/L) at 11/5/2023  6:52 AM  Total Bilirubin: 1.5 mg/dL at 11/5/2023  6:52 AM  Serum Albumin: 2.5 g/dL at 11/5/2023  6:52 AM  INR(ratio): 1.63 at 11/3/2023  3:11 PM  Age at listing (hypothetical): 72 years  Sex: Male at 11/5/2023  6:52 AM    Echocardiogram Complete  Result Date: 11/4/2023  Interpretation Summary  1. Normal left ventricular size and systolic performance with a visually estimated ejection fraction of 55-60%. 2. There is mild aortic insufficiency. 3. There is mild calcific mitral stenosis. 4. Normal right ventricular size and systolic performance. 5. There is mild left atrial enlargement. 6. There is mild enlargement of the proximal ascending aorta.  /When compared to the prior real-time echocardiogram dated 20 May 2022, the findings are felt to be fairly similar on both examinations.     XR Chest Port 1 View  Result Date: 11/4/2023  IMPRESSION: Moderate low lung volumes. Heart size and pulmonary vascularity upper limits of normal. Calcified granuloma in the left lung base. No focal lung infiltrates. A portion of the left costophrenic angle was omitted from this exam. Osseous structures grossly intact. Visualized upper abdomen unremarkable.    US Paracentesis with Albumin  Result Date: 11/3/2023  IMPRESSION: 1.  Status post ultrasound-guided paracentesis. 9.7 liters of clear fluid removed.    TIPS ultrasound - results pending    Assessment/Plan:  Vito Sy is a 72 year old [place] male with JENNINGS cirrhosis complicated by esophageal varices refractory ascites status post  TIPS (at the end of 2022), and subsequent hepatic encephalopathy who was found to have spontaneous bacterial peritonitis after paracentesis on 11/3 presenting with volume overload.     1.  SBP: This is in the setting of known cirrhosis with decompensation due to refractory ascites, despite TIPS procedure.  Currently treating SBP with ceftriaxone.  Patient did receive albumin during his paracentesis 11/3 and on day 3 (11/5).       -- Continue antibiotic for 5 days. Today will be day 4 of antibiotics.  -- Ultrasound to evaluate TIPS - results pending  -- Plan for diagnostic and therapeutic paracentesis today.  Will order albumin- 25 grams of 25% per every 3 liters removed, max 9 liters.     2.  JENNINGS cirrhosis, decompensated: Patient has had significant issues with refractory ascites despite TIPS.  Renal insufficiency is significantly limiting use of diuretics.  Certainly could be dealing with worsening portal hypertension.  LFTs are stable.  No evidence of GI bleeding or encephalopathy.  Continue lactulose, rifaximin, low-sodium diet.  MELD NA score 17.     -- Echocardiogram is normal without evidence of heart failure.  -- Receiving low dose furosemide and spironolactone, stable renal function.   -- Follow lytes, renal function, daily weights, urine output.   -- Consider renal consultation if needed given his history of renal dysfunction.    Patient Active Problem List   Diagnosis    Anemia    Hematuria    Hypercholesterolemia    Hypertension    Type 2 diabetes mellitus with microalbuminuria, without long-term current use of insulin (H)    Nephrolithiasis    Benign Adenomatous Polyp Of The Large Intestine    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    GI bleeding    Anemia due to blood loss, acute    Hypotension due to blood loss    Hyperkalemia    Gastrointestinal hemorrhage associated with acute gastritis    Popliteal cyst, left    Popliteal cyst, right    Incontinence of feces with  fecal urgency    Other cirrhosis of liver (H) with ascites    Chronic kidney disease, stage 3a (H)    Portal hypertension (H)    Alcoholic cirrhosis of liver with ascites (H)    Cirrhosis of liver (H)    Hepatic encephalopathy (H)    Pleural effusion    Anemia, unspecified type    Mitral valve insufficiency, unspecified etiology    SBP (spontaneous bacterial peritonitis) (H)    Bilateral lower extremity edema       25 minutes of total time was spent providing patient care, including patient evaluation, reviewing documentation/test results and .                                                Reta Knight PA-C  Thank you for the opportunity to participate in the care of this patient.   Please feel free to call me with any questions or concerns.  Phone number [phone].              ADDENDUM:  -Report is pending from paracentesis, but radiologist said there were multiple septations related to SBP, and less fluid was removed compared to usual.    -TIPS ultrasound from this morning as follows:  IMPRESSION:  1.  Indeterminant 16 x 12 mm solid masslike lesion lower pole the spleen.  2.  Moderate ascites.  3.  Right renal cyst.  4.  Cirrhotic appearance to the liver.  5.  Gallstones. Thick-walled gallbladder, nonspecific and similar to previous.  6.  Patent TIPS. However there are elevated velocities in the mid stent suggesting there may be significant stenosis at this level. This finding is similar to the prior post TIPS revision/reduction duplex exam.    I spoke with radiology who compared this splenic lesion to 12/2022 CT.  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  Further imaging is not recommended at this time given the stability over nearly a year.    Will ask for IR consult to consider TIPS revision.  We'll need to monitor closely for mental status changes.    Reta Knight PA-C  [facility]  [phone]    Second ADDENDUM:  Cancel IR consult given  active SBP.  Will instead recommend outpt clinic follow up with IR.    Reta Knight PA-C  [facility]  [phone]

## 2023-11-06 NOTE — PROGRESS NOTES
[facility]    Progress Note - Hospitalist Service       Date of Admission:  11/3/2023    Assessment & Plan     Vito Sy is a 72 year old [place] male with alcohol related cirrhosis complicated by esophageal varices refractory ascites status post TIPS (at the end of 2022), and subsequent hepatic encephalopathy who was found to have spontaneous bacterial peritonitis after paracentesis on 11/3 and appeared fluid overloaded on admission.      Spontaneous bacterial peritonitis  Presented after findings of elevated ANC of 3400 concerning for SBP after routine paracentesis of 9.7 L out on day of admission with reports of abdominal pain for the last few days in addition to chills but no documented fever.  Abdomen diffusely tender to palpation and distended. Remains afebrile and hemodynamically stable.    - GI consulted, appreciate recommendations   - Continue Ceftriaxone 2 G daily for 5 days.   - Will give albumin 25%, 1 gm/kg as today is day 3  - Ultrasound to evaluate TIPS 11/06/23   - Plan for diagnostic and therapeutic paracentesis 11/06/23 .  - Follow ascitic fluid cultures and sensitivities    - 11/3 culture growing strep mitis/oralis   - Follow blood cultures  - UA negative      Alcoholic cirrhosis  Recurrent ascites  S/p TIPS  History of hepatic encephalopathy  History of esophageal varices  Follows with Dr. Montenegro at [facility]. Hx of diuretic refractory ascites s/p TIPS 12/2022.  Concern for progressive renal insufficiency when on spironolactone and this was discontinued in 6/2022. [place] has had ongoing progression of ascites requiring more frequent high volume paracentesis. EGD 10/2023 w/ small esophageal varices and portal hypertensive gastropathy w/o gastric varices. MELD-Na on admission was 17. No hepatic encephalopathy. Hepatic function relatively normal with mild elevated bilirubin 2.6. ALT AST normal (although underlying cirrhosis), slight elevated PTT 41 and INR 1.63.   - GI  consulted, appreciate recommendations   - Abdominal US today to evaluate patency of TIPS, pending ultrasound   - PTA lactulose & rifaximin   - PTA omeprazole 20 mg twice daily, sub pantoprazole (consider removing now that patient has SBP)  - Lasix, switched to 20 mg ORAL 11/06/23 from IV, hold for MAP<65  - Spironolactone 25 mg daily, hold for MAP <65  - Low sodium diet  - Fluid restrict 2L  - Follow CBC and CMP    Hypervolemia   Bilateral lower extremity edema  Improvement in bilateral pitting edema bilaterally although still 2+ pitting edema. No events of orthopnea. Diminished lung sounds bilaterally. Most recent echo in May 2022 with EF of 55 to 60% but diastolic Doppler findings suggesting increased left ventricular filling pressures.  Echo 11/04/23 with EF 55-60%, similar to May 2022. BNP 1,641 11/04/23.   - Midodrine 2.5 mg TID (start 11/04/23), can give extra dose if MAP<65 in the evening.  - Lasix 20 mg oral   - Spironolactone 25 mg daily  - Could consider increased to (100:40) ratio of diuretics tomorrow if patient continues to have lower urine output  - Follow CMP  - Strict I/Os, daily standing weights   - Abdominal US as above to evaluate patency of TIPS  - OT consult for Lymphedema     Gram positive blood culture  Positive 1/2 bottles from 11/3. Positive for coagulase negative staph. Likely skin contaminate, did receive IV Vanco x 1 dose.  - Discontinue IV Vanco  - Follow blood culture for final speciation      Type 2 diabetes  Most recent A1c of 5.8 in July 2023.  Diet controlled.  - Follow CMP     Normocytic anemia  Hemoglobin 8.3 on admission with MCV of 87.  Baseline hemoglobin of 8-10. No signs of acute bleeding. 7.1 today, will have paracentesis 11/06/23 and see if this improves.   - Follow CBC     CKD3A  Near baseline of 1.2-1.5 on admission.  History of microalbuminuria.  -Follow CMP     HLD  - PTA simvastatin 20 mg        Diet: Fluid restriction 2000 ML FLUID  NPO per Anesthesia Guidelines for  "Procedure/Surgery Except for: Meds    DVT Prophylaxis: Enoxaparin (Lovenox) SQ  Cronin Catheter: Not present  Fluids: PO fluid limited 2L  Lines: None     Cardiac Monitoring: None  Code Status: Full Code      Clinically Significant Risk Factors        # Hypokalemia: Lowest K = 3.1 mmol/L in last 2 days, will replace as needed       # Hypoalbuminemia: Lowest albumin = 2.5 g/dL at 11/5/2023  6:52 AM, will monitor as appropriate       # Hypertension: Noted on problem list        # Overweight: Estimated body mass index is 26.17 kg/m  as calculated from the following:    Height as of this encounter: 1.727 m (5' 8\").    Weight as of this encounter: 78.1 kg (172 lb 1.6 oz)., PRESENT ON ADMISSION       # Financial/Environmental Concerns: none         Disposition Plan      Expected Discharge Date: 11/07/2023      Destination: home with family  Discharge Comments: NPO for TIPS        The patient's care was discussed with the Attending Physician, Dr. Benjamin .    MAY SAUCEDO MD  Hospitalist Service  [facility]  ______________________________________________________________________    Interval History   Patient sitting in chair after ultrasound. Wondering how long he will be in the hospital. Wife is present and very supportive. No orthopnea last night. Improvement in abdominal pain. Pending TIPSS US and paracentesis 11/06/23.     Physical Exam   Vital Signs: Temp: 98.4  F (36.9  C) Temp src: Oral BP: 122/64 Pulse: 88   Resp: 18 SpO2: 100 % O2 Device: None (Room air)    Weight: 172 lbs 1.6 oz    PHYSICAL EXAM:  GENERAL: Awake, alert, No acute distress, Appears somewhat uncomfortable  HEENT: No scleral icterus or conjunctival injection  SKIN: Warm and dry.   LUNGS: Normal work of breathing with no use of accessory muscles. Clear breath sounds in upper and lower lung fields. No wheezes. Regular respiratory rate, effort. Diminished lung sounds in lower fields.   CARDIAC: RRR. Normal S1 and S2. No " murmurs, clicks, or rubs appreciated. +2 BLE pitting edema. JVD noticed (echo normal this hospitalization)  ABDOMEN: +Tenderness to palpation throughout, distended. No rebound or guarding. +bowel sounds. Fluid wave present.   NEUROLOGIC: Alert and oriented. No asterixis present on exam. CN 2-12 grossly intact. Moves all extremities equally.   EXTREMITIES: Bilateral 2+ pitting edema throughout his legs and his hips. Appear well-perfused.     Data     I have personally reviewed the following data over the past 24 hrs:    7.7  \   7.1 (L)   / 141 (L)     138 112 (H) 33.0 (H) /  164 (H)   3.1 (L) 15 (L) 1.49 (H) \     ALT: 16 AST: 33 AP: 106 TBILI: 1.1   ALB: 2.8 (L) TOT PROTEIN: 5.3 (L) LIPASE: N/A       Imaging results reviewed over the past 24 hrs:   No results found for this or any previous visit (from the past 24 hour(s)).

## 2023-11-06 NOTE — PLAN OF CARE
Problem: Adult Inpatient Plan of Care  Goal: Absence of Hospital-Acquired Illness or Injury  11/5/2023 2125 by Lori Garvey RN  Outcome: Progressing  11/5/2023 2108 by Lori Garvey RN  Outcome: Progressing     Problem: Infection  Goal: Absence of Infection Signs and Symptoms  11/5/2023 2125 by Lori aGrvey RN  Outcome: Progressing  11/5/2023 2108 by Lori Garvey RN  Outcome: Progressing     Wife in room, visitor exception. Pt had slightly febrile at 99.6, tylenol given. I&O continued, 2000ml fluid restriction continued.     Lori Garvey RN on 11/5/2023 at 9:31 PM

## 2023-11-07 NOTE — PROGRESS NOTES
[facility]    Progress Note - Hospitalist Service       Date of Admission:  11/3/2023    Assessment & Plan     Vito Sy is a 72 year old [place] male with alcohol related cirrhosis complicated by esophageal varices refractory ascites status post TIPS (at the end of 2022), and subsequent hepatic encephalopathy who was found to have spontaneous bacterial peritonitis after paracentesis on 11/3 and appeared fluid overloaded on admission.      Spontaneous bacterial peritonitis  ANC of 3,400 consistent with SBP after paracentesis with 9.7 L removed.  2-3-day history of abdominal pain prior to admission with chills.  Afebrile and hemodynamically stable throughout admission.    -GI consulted, appreciate recommendations  -Ceftriaxone 2 g daily for 5 days ending on 11/8/2023  -Follow ascitic fluid cultures and sensitivities   -Follow blood cultures  -UA negative   -Consider SBP Prophylaxis, appreciate GI's guidance     Alcoholic cirrhosis  Recurrent ascites  S/p TIPS  History of hepatic encephalopathy  History of esophageal varices  Follows with Dr. Montenegro at [facility]. Hx of diuretic refractory ascites s/p TIPS 12/2022. Concern for progressive renal insufficiency when on spironolactone and this was discontinued in 6/2022. EGD 10/2023 w/ small esophageal varices and portal hypertensive gastropathy w/o gastric varices. MELD-Na on admission was 17. No hepatic encephalopathy. Hepatic function relatively normal with mild elevated bilirubin 2.6. ALT AST normal (although underlying cirrhosis), slight elevated PTT 41 and INR 1.63. Ultrasound on 11/06 that showed patent TIPS with area of increased velocity with recommendation for outpatient IR for slight revision.  Received albumin on admission and day 3 post initial paracentesis.  - GI consulted, appreciate recommendations   - PTA lactulose & rifaximin   - PTA omeprazole 20 mg twice daily, sub pantoprazole (consider removing now that patient has SBP)  -  Lasix, switched to 20 mg ORAL 11/06/23 from IV, hold for MAP<65  - Spironolactone 50 mg daily, hold for MAP <65  - Low sodium diet  - Fluid restrict 2L  - Follow CBC and CMP    Hypervolemia   Bilateral lower extremity edema  Improvement in bilateral pitting edema bilaterally although still 2+ pitting edema. No events of orthopnea. Diminished lung sounds bilaterally. Most recent echo in May 2022 with EF of 55 to 60% but diastolic Doppler findings suggesting increased left ventricular filling pressures.  Echo 11/04/23 with EF 55-60%, similar to May 2022. BNP 1,641 11/04/23.   - Midodrine 2.5 mg TID (start 11/04/23), can give extra dose if MAP<65 in the evening.  - Lasix 20 mg oral   - Spironolactone 50 mg daily (increased from 25)  - Could consider increased to (100:40) ratio of diuretics tomorrow if patient continues to have lower urine output  - Follow CMP  - Strict I/Os, daily standing weights   - Abdominal US as above to evaluate patency of TIPS  - OT consult for Lymphedema     Gram positive blood culture  Positive 1/2 bottles from 11/3. Positive for coagulase negative staph. Likely skin contaminate, did receive IV Vanco x 1 dose.  - Discontinue IV Vanco  - Follow blood culture for final speciation      Type 2 diabetes  Most recent A1c of 5.8 in July 2023.  Diet controlled.  - Follow CMP     Normocytic anemia  Hemoglobin 8.3 on admission with MCV of 87.  Baseline hemoglobin of 8-10. No signs of acute bleeding. 7.1 today, will have paracentesis 11/06/23 and see if this improves.   - Follow CBC     CKD3A  Near baseline of 1.2-1.5 on admission.  History of microalbuminuria.  -Follow CMP     HLD  - PTA simvastatin 20 mg        Diet: Fluid restriction 2000 ML FLUID  2 Gram Sodium Diet    DVT Prophylaxis: Enoxaparin (Lovenox) SQ  Cronin Catheter: Not present  Fluids: PO fluid limited 2L  Lines: None     Cardiac Monitoring: None  Code Status: Full Code      Clinically Significant Risk Factors        # Hypokalemia: Lowest  "K = 3.1 mmol/L in last 2 days, will replace as needed       # Hypoalbuminemia: Lowest albumin = 2.5 g/dL at 11/5/2023  6:52 AM, will monitor as appropriate    # Coagulation Defect: INR = 1.60 (Ref range: 0.85 - 1.15) and/or PTT = 41 Seconds (Ref range: 22 - 38 Seconds), will monitor for bleeding    # Hypertension: Noted on problem list        # Overweight: Estimated body mass index is 27.15 kg/m  as calculated from the following:    Height as of this encounter: 1.727 m (5' 8\").    Weight as of this encounter: 81 kg (178 lb 9.2 oz)., PRESENT ON ADMISSION       # Financial/Environmental Concerns: none         Disposition Plan      Expected Discharge Date: 11/08/2023      Destination: home with family  Discharge Comments: NPO for TIPS        The patient's care was discussed with the Attending Physician, Dr. Benjamin .    MAY SAUCEDO MD  Hospitalist Service  [facility]  ______________________________________________________________________    Interval History   Patient sitting on couch in the room. Wife by his side. Wondering about length of stay in the hospital. No orthopnea or dyspnea last night. Abdominal pain is stable, with slight tenderness throughout. Optimizing diuresis today. Follow up blood cultures. Final dose of ceftriaxone tomorrow.     Physical Exam   Vital Signs: Temp: 98.1  F (36.7  C) Temp src: Oral BP: 124/59 Pulse: 90   Resp: 16 SpO2: 100 % O2 Device: None (Room air)    Weight: 178 lbs 9.16 oz    PHYSICAL EXAM:  GENERAL: Awake, alert, No acute distress, Appears somewhat uncomfortable  HEENT: No scleral icterus or conjunctival injection  SKIN: Warm and dry.   LUNGS: Normal work of breathing with no use of accessory muscles. Clear breath sounds in upper and lower lung fields. No wheezes. Regular respiratory rate, effort. Diminished lung sounds in lower fields.   CARDIAC: RRR. Normal S1 and S2. No murmurs, clicks, or rubs appreciated. Bilaterally lower extremity edema, he has " compression socks and ace wraps in place. JVD noticed (echo normal this hospitalization)  ABDOMEN: +Tenderness to palpation throughout, distended. No rebound or guarding. +bowel sounds. Fluid wave present.   NEUROLOGIC: Alert and oriented. No asterixis present on exam. CN 2-12 grossly intact. Moves all extremities equally.   EXTREMITIES: Bilateral 2+ pitting edema throughout his legs and his hips. Appear well-perfused.     Data     I have personally reviewed the following data over the past 24 hrs:    7.9  \   7.4 (L)   / 153     138 114 (H) 32.1 (H) /  163 (H)   4.0 14 (L) 1.29 (H) \     ALT: 23 AST: 50 (H) AP: 148 (H) TBILI: 1.1   ALB: 2.7 (L) TOT PROTEIN: 5.6 (L) LIPASE: N/A     INR:  1.60 (H) PTT:  N/A   D-dimer:  N/A Fibrinogen:  N/A       Imaging results reviewed over the past 24 hrs:   No results found for this or any previous visit (from the past 24 hour(s)).

## 2023-11-07 NOTE — PLAN OF CARE
Problem: Adult Inpatient Plan of Care  Goal: Plan of Care Review  Description: The Plan of Care Review/Shift note should be completed every shift.  The Outcome Evaluation is a brief statement about your assessment that the patient is improving, declining, or no change.  This information will be displayed automatically on your shift  note.  Outcome: Progressing   Goal Outcome Evaluation:  Morning Hemoglobin 7.4. Vitals stable and reports mild abdominal pain but declines intervention. Abdomen remains rounded/taut. Ambulated hallway with wife.

## 2023-11-07 NOTE — PLAN OF CARE
Problem: Adult Inpatient Plan of Care  Goal: Absence of Hospital-Acquired Illness or Injury  Intervention: Prevent Skin Injury  Recent Flowsheet Documentation  Taken 11/7/2023 0100 by Marina Garcia RN  Body Position: position changed independently  Taken 11/6/2023 2030 by Marina Garcia RN  Body Position: position changed independently     Problem: Adult Inpatient Plan of Care  Goal: Absence of Hospital-Acquired Illness or Injury  Intervention: Prevent Infection  Recent Flowsheet Documentation  Taken 11/7/2023 0100 by Marina Garcia RN  Infection Prevention:   rest/sleep promoted   hand hygiene promoted  Taken 11/6/2023 2030 by Marina Garcia RN  Infection Prevention:   rest/sleep promoted   hand hygiene promoted     Problem: Adult Inpatient Plan of Care  Goal: Optimal Comfort and Wellbeing  Intervention: Monitor Pain and Promote Comfort  Recent Flowsheet Documentation  Taken 11/6/2023 2328 by Marina Garcia RN  Pain Management Interventions:   pillow support provided   rest   repositioned   Goal Outcome Evaluation:    A&OX4 and VSS on RA. Lower extremities +3 edema. Wife at bedside. Pain in abdomin minimal treated w/ pain meds (see mar). 2 gram sodium diet w/ 2000 fluid restriction w/ minimal sips over night w/ pills. K+ protocols w/ recheck in the AM. Hemoglobin low and will recheck in am. Strict I&Os and Daily weights. Discharge home pending medical progression.

## 2023-11-07 NOTE — PLAN OF CARE
Problem: Adult Inpatient Plan of Care  Goal: Optimal Comfort and Wellbeing  Outcome: Progressing  Problem: Liver Failure  Goal: Fluid and Electrolyte Balance  Outcome: Progressing   Goal Outcome Evaluation:  Pt A/Ox4, denies p/n/v, spouse remained a bedside, calm and supportive, VSS, all scheduled medication given, pt maintaining fluid restriction, continue to monitor.

## 2023-11-07 NOTE — PROGRESS NOTES
"GI PROGRESS NOTE  11/7/2023  Vito Sy  1950  /-03    Subjective:  He has \"minor\" abdominal pain today.  No other complaints.     Objective:    Patient Vitals for the past 24 hrs:   BP Temp Temp src Pulse Resp SpO2 Weight   11/07/23 0735 109/58 98  F (36.7  C) Oral 80 19 100 % --   11/07/23 0600 -- -- -- -- -- -- 81 kg (178 lb 9.2 oz)   11/07/23 0000 109/59 98.2  F (36.8  C) Oral 81 18 98 % --   11/06/23 1700 104/59 -- -- -- -- -- --   11/06/23 1609 101/56 98.7  F (37.1  C) Oral 80 20 99 % --     Body mass index is 27.15 kg/m .  Gen: NAD  GI: Mildly distended, BS positive, soft, non-tender    Laboratory  Recent Labs   Lab Test 11/07/23 0426 11/06/23  0516 11/05/23  0652 11/04/23  0540 11/03/23  1511 11/03/23  1507 02/09/23  1141   WBC 7.9 7.7 10.6   < >  --    < >  --    HGB 7.4* 7.1* 8.0*   < >  --    < >  --    MCV 86 86 87   < >  --    < >  --     141* 196   < >  --    < >  --    INR 1.60*  --   --   --  1.63*  --  1.1    < > = values in this interval not displayed.     Recent Labs   Lab Test 11/07/23 0426 11/06/23  1446 11/06/23  0516 11/05/23  0652     --  138 138   POTASSIUM 3.2* 3.6 3.1* 4.1   CHLORIDE 114*  --  112* 111*   CO2 14*  --  15* 17*   BUN 32.1*  --  33.0* 31.5*   CR 1.29*  --  1.49* 1.47*   ANIONGAP 10  --  11 10   SILVIA 7.8*  --  8.1* 7.7*   *  --  164* 152*     Recent Labs   Lab Test 11/07/23  0426 11/06/23  0516 11/05/23  0652 11/04/23  0540 11/04/23  0337 01/27/23  0649 01/24/23  0845 01/24/23  0743 01/23/23  1744 12/19/22  0411 12/18/22  2119 12/18/22  1912   ALBUMIN 2.7* 2.8* 2.5*   < >  --    < >  --    < > 3.6   < >  --  3.5   BILITOTAL 1.1 1.1 1.5*   < >  --    < >  --    < > 1.8*   < >  --  1.7*   ALT 23 16 17   < >  --    < >  --    < > 35   < >  --  30   AST 50* 33 38   < >  --    < >  --    < > 51*   < >  --  42   ALKPHOS 148* 106 134*   < >  --    < >  --    < > 183*   < >  --  130*   PROTEIN  --   --   --   --  30*  --  10*  --   --   --  20* "  --    LIPASE  --   --   --   --   --   --   --   --  156*  --   --  195*    < > = values in this interval not displayed.      Latest Reference Range & Units 11/03/23 09:48   Cell Count Fluid Source  Peritoneum   Total Nucleated Cells /uL 3,924   Absolute Neutrophils, Body Fluid /uL 3,413.9 (HH)   % Neutrophils Fluid % 87   % Lymphocytes Fluid % 6   % Mono/Macro Fluid % 7   Color Fluid Colorless, Yellow  Yellow   Appearance Fluid Clear  Cloudy !   Albumin Fluid Source  Peritoneum   Albumin Fluid g/dL 0.4   (HH): Data is critically high  !: Data is abnormal     Latest Reference Range & Units 11/06/23 12:53   Cell Count Fluid Source  Abdomen   Total Nucleated Cells /uL 1,243   Absolute Neutrophils, Body Fluid  See Comment   % Neutrophils Fluid  See Comment   % Lymphocytes Fluid % 7   % Mono/Macro Fluid % 27   % Other Cells Fluid % 66   Color Fluid Colorless, Yellow  Yellow   Appearance Fluid Clear  Clear   Comment: Other cells are granulocytes, favor eosinophils.  Favor reactive mesothelial cells on scan.     MELD 3.0: 15 at 11/7/2023  4:26 AM  MELD-Na: 15 at 11/7/2023  4:26 AM  Calculated from:  Serum Creatinine: 1.29 mg/dL at 11/7/2023  4:26 AM  Serum Sodium: 138 mmol/L (Using max of 137 mmol/L) at 11/7/2023  4:26 AM  Total Bilirubin: 1.1 mg/dL at 11/7/2023  4:26 AM  Serum Albumin: 2.7 g/dL at 11/7/2023  4:26 AM  INR(ratio): 1.60 at 11/7/2023  4:26 AM  Age at listing (hypothetical): 72 years  Sex: Male at 11/7/2023  4:26 AM    US Abdomen Limited with TIPSS Doppler  Addendum Date: 11/6/2023    Indeterminant masslike lesion lower pole the spleen is unchanged when compared to a CT study from 12/18/2022. Findings likely reflect a small splenic hemangioma.    Result Date: 11/6/2023  EXAM: US ABDOMEN LIMITED WITH TIPSS DOPPLER LOCATION: [facility] DATE: 11/6/2023 INDICATION: refractory ascites, check patency. Portal hypertension status post TIPS. TIPS revision with reduction on 01/26/2023.  COMPARISON: TIPS duplex 02/27/2023 TECHNIQUE: Limited abdominal ultrasound. Color flow with spectral Doppler and waveform analysis performed.  FINDINGS: GALLBLADDER: Shadowing gallstones. Thick-walled, somewhat contracted appearing gallbladder wall. BILE DUCTS: No biliary dilatation. The common duct measures 7 mm. LIVER: Heterogeneous parenchymal echo pattern with nodular contour consistent with cirrhosis. No focal mass. RIGHT KIDNEY: 2.5 x 2.6 x 2.2 cm cyst, otherwise negative. PANCREAS: The visualized portions are normal. Spleen: There is a 16 x 12 mm relatively hyperechoic complex solid mass lower pole the spleen which is indeterminate. IVC: Patent Moderate ascites. ABDOMINAL DUPLEX: Velocities are as follows (cm/s): Main portal vein: 26 Left portal vein: 62 Right portal vein: Not visualized TIPS stent in the MPV: 26 TIPS portal vein end: 114 TIPS mid stent: 327 TIPS IVC end: 127 Hepatic artery: 214 The right, middle and left hepatic veins are patent with normal directional flow.     IMPRESSION: 1.  Indeterminant 16 x 12 mm solid masslike lesion lower pole the spleen. 2.  Moderate ascites. 3.  Right renal cyst. 4.  Cirrhotic appearance to the liver. 5.  Gallstones. Thick-walled gallbladder, nonspecific and similar to previous. 6.  Patent TIPS. However there are elevated velocities in the mid stent suggesting there may be significant stenosis at this level. This finding is similar to the prior post TIPS revision/reduction duplex exam.     US Paracentesis with Albumin  Result Date: 11/6/2023  EXAM: 1. PARACENTESIS 2. ULTRASOUND GUIDANCE LOCATION: [facility] DATE: 11/6/2023 INDICATION: Cirrhosis with ascites. SBP. PROCEDURE: Informed consent obtained. Time out performed. A limited ultrasound abdomen is performed demonstrating a moderate amount of ascites. The largest collection of fluid was localized to the left lower quadrant and the overlying skin was prepped and draped in a sterile  fashion. 10 mL of 1% lidocaine was infused into local soft tissues. A 5 [place] catheter system was introduced into the abdominal ascites under ultrasound guidance. Initially only 200 mL of fluid was obtained. No additional fluid could be obtained due to internal septations. A second puncture was made in the right upper quadrant. No significant fluid could be obtained due to internal septations. 0.2 liters of clear fluid were removed and sent for specified labs. Patient tolerated procedure well. Ultrasound imaging was obtained and placed in the patient's permanent medical record.     IMPRESSION: 1.  Ultrasound-guided diagnostic/therapeutic paracentesis performed. Only 200 mL of fluid able to be obtained due to some internal septations within the peritoneum. Reference CPT Code: 80270    Assessment/Plan:  Vito Sy is a 72 year old [place] male with JENNINGS cirrhosis complicated by esophageal varices refractory ascites status post TIPS (at the end of 2022), and subsequent hepatic encephalopathy who was found to have spontaneous bacterial peritonitis after paracentesis on 11/3 presenting with volume overload.     1.  SBP: This is in the setting of known cirrhosis with decompensation due to refractory ascites, despite TIPS procedure.  Currently treating SBP with ceftriaxone.  Patient did receive albumin during his paracentesis 11/3 and on day 3 (11/5).  Repeat tap 11/6 shows improved total cell count but likely ongoing infection.     -- Continue antibiotics  --consider repeat tap again in 48 hours  --recommend outpatient clinic evaluation with IR (Dr. Cruzito Verma with [facility])     2.  JENNINGS cirrhosis, decompensated: Patient has had significant issues with refractory ascites despite TIPS.  Renal insufficiency is significantly limiting use of diuretics.  Certainly could be dealing with worsening portal hypertension.  LFTs are stable.  No evidence of GI bleeding or encephalopathy.  Continue lactulose, rifaximin,  low-sodium diet.  MELD NA score 15.  Ultrasound was reviewed with radiology, and splenic lesion appears to be a stable benign hemangioma.     -- Echocardiogram is normal without evidence of heart failure.  -- Receiving low dose furosemide and spironolactone, stable renal function.   -- Follow lytes, renal function, daily weights, urine output.   -- Consider renal consultation if needed given his history of renal dysfunction.      Patient Active Problem List   Diagnosis    Anemia    Hematuria    Hypercholesterolemia    Hypertension    Type 2 diabetes mellitus with microalbuminuria, without long-term current use of insulin (H)    Nephrolithiasis    Benign Adenomatous Polyp Of The Large Intestine    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    GI bleeding    Anemia due to blood loss, acute    Hypotension due to blood loss    Hyperkalemia    Gastrointestinal hemorrhage associated with acute gastritis    Popliteal cyst, left    Popliteal cyst, right    Incontinence of feces with fecal urgency    Other cirrhosis of liver (H) with ascites    Chronic kidney disease, stage 3a (H)    Portal hypertension (H)    Alcoholic cirrhosis of liver with ascites (H)    Cirrhosis of liver (H)    Hepatic encephalopathy (H)    Pleural effusion    Anemia, unspecified type    Mitral valve insufficiency, unspecified etiology    SBP (spontaneous bacterial peritonitis) (H)    Bilateral lower extremity edema       35 minutes of total time was spent providing patient care, including patient evaluation, reviewing documentation/test results and .                                                Reta Knight PA-C  Thank you for the opportunity to participate in the care of this patient.   Please feel free to call me with any questions or concerns.  Phone number [phone].

## 2023-11-08 NOTE — PROGRESS NOTES
GI PROGRESS NOTE  11/8/2023  Vito Sy  1950  /-38    Subjective:  He has very minor abd pain today.  Feels sleepy but says there are multiple interruptions to his sleep.  Denies feeling confused.  Multiple stools daily.     Objective:    Patient Vitals for the past 24 hrs:   BP Temp Temp src Pulse Resp SpO2 Weight   11/08/23 0748 102/58 98.2  F (36.8  C) Oral 80 18 100 % --   11/08/23 0528 -- -- -- -- -- -- 76.1 kg (167 lb 11.2 oz)   11/08/23 0016 111/58 98.4  F (36.9  C) Oral 78 16 100 % --   11/07/23 1555 124/59 98.1  F (36.7  C) Oral 90 16 100 % --     Body mass index is 25.5 kg/m .  Gen: NAD, wife at bedside  GI: Distended, BS positive, soft, non-tender    Laboratory  Recent Labs   Lab Test 11/07/23  0426 11/06/23  0516 11/05/23  0652 11/04/23  0540 11/03/23  1511 11/03/23  1507 02/09/23  1141   WBC 7.9 7.7 10.6   < >  --    < >  --    HGB 7.4* 7.1* 8.0*   < >  --    < >  --    MCV 86 86 87   < >  --    < >  --     141* 196   < >  --    < >  --    INR 1.60*  --   --   --  1.63*  --  1.1    < > = values in this interval not displayed.     Recent Labs   Lab Test 11/08/23  0530 11/07/23  1322 11/07/23  0426 11/06/23  1446 11/06/23  0516 11/05/23  0652   NA  --   --  138  --  138 138   POTASSIUM 3.4 4.0 3.2*   < > 3.1* 4.1   CHLORIDE  --   --  114*  --  112* 111*   CO2  --   --  14*  --  15* 17*   BUN  --   --  32.1*  --  33.0* 31.5*   CR  --   --  1.29*  --  1.49* 1.47*   ANIONGAP  --   --  10  --  11 10   SILVIA  --   --  7.8*  --  8.1* 7.7*   GLC  --   --  163*  --  164* 152*    < > = values in this interval not displayed.     Recent Labs   Lab Test 11/07/23  0426 11/06/23  0516 11/05/23  0652 11/04/23  0540 11/04/23  0337 01/27/23  0649 01/24/23  0845 01/24/23  0743 01/23/23  1744 12/19/22  0411 12/18/22  2119 12/18/22  1912   ALBUMIN 2.7* 2.8* 2.5*   < >  --    < >  --    < > 3.6   < >  --  3.5   BILITOTAL 1.1 1.1 1.5*   < >  --    < >  --    < > 1.8*   < >  --  1.7*   ALT 23 16 17   <  >  --    < >  --    < > 35   < >  --  30   AST 50* 33 38   < >  --    < >  --    < > 51*   < >  --  42   ALKPHOS 148* 106 134*   < >  --    < >  --    < > 183*   < >  --  130*   PROTEIN  --   --   --   --  30*  --  10*  --   --   --  20*  --    LIPASE  --   --   --   --   --   --   --   --  156*  --   --  195*    < > = values in this interval not displayed.       US Abdomen Limited with TIPSS Doppler  Addendum Date: 11/6/2023    Indeterminant masslike lesion lower pole the spleen is unchanged when compared to a CT study from 12/18/2022. Findings likely reflect a small splenic hemangioma.  Result Date: 11/6/2023  EXAM: US ABDOMEN LIMITED WITH TIPSS DOPPLER LOCATION: [facility] DATE: 11/6/2023 INDICATION: refractory ascites, check patency. Portal hypertension status post TIPS. TIPS revision with reduction on 01/26/2023. COMPARISON: TIPS duplex 02/27/2023 TECHNIQUE: Limited abdominal ultrasound. Color flow with spectral Doppler and waveform analysis performed.  FINDINGS: GALLBLADDER: Shadowing gallstones. Thick-walled, somewhat contracted appearing gallbladder wall. BILE DUCTS: No biliary dilatation. The common duct measures 7 mm. LIVER: Heterogeneous parenchymal echo pattern with nodular contour consistent with cirrhosis. No focal mass. RIGHT KIDNEY: 2.5 x 2.6 x 2.2 cm cyst, otherwise negative. PANCREAS: The visualized portions are normal. Spleen: There is a 16 x 12 mm relatively hyperechoic complex solid mass lower pole the spleen which is indeterminate. IVC: Patent Moderate ascites. ABDOMINAL DUPLEX: Velocities are as follows (cm/s): Main portal vein: 26 Left portal vein: 62 Right portal vein: Not visualized TIPS stent in the MPV: 26 TIPS portal vein end: 114 TIPS mid stent: 327 TIPS IVC end: 127 Hepatic artery: 214 The right, middle and left hepatic veins are patent with normal directional flow.     IMPRESSION: 1.  Indeterminant 16 x 12 mm solid masslike lesion lower pole the spleen. 2.   Moderate ascites. 3.  Right renal cyst. 4.  Cirrhotic appearance to the liver. 5.  Gallstones. Thick-walled gallbladder, nonspecific and similar to previous. 6.  Patent TIPS. However there are elevated velocities in the mid stent suggesting there may be significant stenosis at this level. This finding is similar to the prior post TIPS revision/reduction duplex exam.     US Paracentesis with Albumin  Result Date: 11/6/2023  EXAM: 1. PARACENTESIS 2. ULTRASOUND GUIDANCE LOCATION: [facility] DATE: 11/6/2023 INDICATION: Cirrhosis with ascites. SBP. PROCEDURE: Informed consent obtained. Time out performed. A limited ultrasound abdomen is performed demonstrating a moderate amount of ascites. The largest collection of fluid was localized to the left lower quadrant and the overlying skin was prepped and draped in a sterile fashion. 10 mL of 1% lidocaine was infused into local soft tissues. A 5 [place] catheter system was introduced into the abdominal ascites under ultrasound guidance. Initially only 200 mL of fluid was obtained. No additional fluid could be obtained due to internal septations. A second puncture was made in the right upper quadrant. No significant fluid could be obtained due to internal septations. 0.2 liters of clear fluid were removed and sent for specified labs. Patient tolerated procedure well. Ultrasound imaging was obtained and placed in the patient's permanent medical record.     IMPRESSION: 1.  Ultrasound-guided diagnostic/therapeutic paracentesis performed. Only 200 mL of fluid able to be obtained due to some internal septations within the peritoneum. Reference CPT Code: 69991    Assessment/Plan:   Vito Sy is a 72 year old [place] male with JENNINGS cirrhosis complicated by esophageal varices refractory ascites status post TIPS (at the end of 2022), and subsequent hepatic encephalopathy who was found to have spontaneous bacterial peritonitis after paracentesis on 11/3  presenting with volume overload.     1.  SBP: This is in the setting of known cirrhosis with decompensation due to refractory ascites, despite TIPS procedure.  Currently treating SBP with ceftriaxone.  Patient did receive albumin during his paracentesis 11/3 and on day 3 (11/5).  Repeat tap 11/6 shows improved total cell count but likely ongoing infection.  Discussed briefly with ID and medicine resident.     -- Continue antibiotics x 7 days total (through 11/9 evening)  --after treatment, he should start daily abx for SBP prophylaxis  --possible discharge home soon but defer to primary team     2.  JENNINGS cirrhosis, decompensated: Patient has had significant issues with refractory ascites despite TIPS.  Renal insufficiency is significantly limiting use of diuretics.  Certainly could be dealing with worsening portal hypertension.  LFTs are stable.  No evidence of GI bleeding or encephalopathy.  Continue lactulose, rifaximin, low-sodium diet.  MELD NA score 15.  Ultrasound was reviewed with radiology, and splenic lesion appears to be a stable benign hemangioma.     -- Echocardiogram is normal without evidence of heart failure.  -- Receiving low dose furosemide and spironolactone, stable renal function.   -- Follow lytes, renal function, daily weights, urine output  --Low NA/high protein diet  --recommend outpatient clinic evaluation with IR (Dr. Cruzito Verma with [facility]).  [facility] will place orders for this consult.  --outpatient Hep f/up will be ordered as well      Patient Active Problem List   Diagnosis    Anemia    Hematuria    Hypercholesterolemia    Hypertension    Type 2 diabetes mellitus with microalbuminuria, without long-term current use of insulin (H)    Nephrolithiasis    Benign Adenomatous Polyp Of The Large Intestine    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    GI bleeding    Anemia due to blood loss, acute    Hypotension due to blood loss    Hyperkalemia     Gastrointestinal hemorrhage associated with acute gastritis    Popliteal cyst, left    Popliteal cyst, right    Incontinence of feces with fecal urgency    Other cirrhosis of liver (H) with ascites    Chronic kidney disease, stage 3a (H)    Portal hypertension (H)    Alcoholic cirrhosis of liver with ascites (H)    Cirrhosis of liver (H)    Hepatic encephalopathy (H)    Pleural effusion    Anemia, unspecified type    Mitral valve insufficiency, unspecified etiology    SBP (spontaneous bacterial peritonitis) (H)    Bilateral lower extremity edema          25 minutes of total time was spent providing patient care, including patient evaluation, reviewing documentation/test results and .                                                Reta Knight PA-C  Thank you for the opportunity to participate in the care of this patient.   Please feel free to call me with any questions or concerns.  Phone number [phone].

## 2023-11-08 NOTE — PLAN OF CARE
Goal Outcome Evaluation:         Patient alert and oriented. Vitally stable. Reports minimal discomfort in abdomen.   Good oral intake. To complete 2 additionaly days of antibiotics-- today is 6/7.

## 2023-11-08 NOTE — PROGRESS NOTES
[facility]    Progress Note - Hospitalist Service       Date of Admission:  11/3/2023    Assessment & Plan     Vito Sy is a 72 year old [place] male with alcohol related cirrhosis complicated by esophageal varices refractory ascites status post TIPS (at the end of 2022), and subsequent hepatic encephalopathy who was found to have spontaneous bacterial peritonitis after paracentesis on 11/3 and appeared fluid overloaded on admission.      Spontaneous bacterial peritonitis  ANC of 3,400 consistent with SBP after paracentesis with 9.7 L removed.  2-3-day history of abdominal pain prior to admission with chills.  Afebrile and hemodynamically stable throughout admission.    -GI consulted, appreciate recommendations  -Ceftriaxone 2 g daily for 7 days ending on evening 11/9  -SBP prophylaxis on discharge   -Follow ascitic fluid cultures and sensitivities   -Follow blood cultures  -UA negative   -Consider SBP Prophylaxis, appreciate GI's guidance     Alcoholic cirrhosis  Recurrent ascites  S/p TIPS  History of hepatic encephalopathy  History of esophageal varices  Follows with Dr. Montenegro at [facility]. Hx of diuretic refractory ascites s/p TIPS 12/2022. Concern for progressive renal insufficiency when on spironolactone and this was discontinued in 6/2022. EGD 10/2023 w/ small esophageal varices and portal hypertensive gastropathy w/o gastric varices. MELD-Na on admission was 17. No hepatic encephalopathy. Hepatic function relatively normal with mild elevated bilirubin 2.6. ALT AST normal (although underlying cirrhosis), slight elevated PTT 41 and INR 1.63. Ultrasound on 11/06 that showed patent TIPS with area of increased velocity with recommendation for outpatient IR for slight revision.  Received albumin on admission and day 3 post initial paracentesis.  - GI consulted, appreciate recommendations   - PTA lactulose & rifaximin   - PTA omeprazole 20 mg twice daily, sub pantoprazole (consider  removing now that patient has SBP)  - Lasix, switched to 20 mg ORAL 11/06/23 from IV, hold for MAP<65  - Spironolactone 50 mg daily, hold for MAP <65  - Low sodium diet  - Fluid restrict 2L  - Follow CBC and CMP    Hypervolemia   Bilateral lower extremity edema  Improvement in bilateral pitting edema bilaterally although still 2+ pitting edema. No events of orthopnea. Diminished lung sounds bilaterally. Most recent echo in May 2022 with EF of 55 to 60% but diastolic Doppler findings suggesting increased left ventricular filling pressures.  Echo 11/04/23 with EF 55-60%, similar to May 2022. BNP 1,641 11/04/23.   - Midodrine 2.5 mg TID (start 11/04/23), can give extra dose if MAP<65 in the evening.  - Lasix 20 mg oral   - Spironolactone 50 mg daily (increased from 25)  - Follow CMP  - Strict I/Os, daily standing weights   - OT consult for Lymphedema     Gram positive blood culture  Positive 1/2 bottles from 11/3. Positive for coagulase negative staph. Likely skin contaminate, did receive IV Vanco x 1 dose.  - Discontinue IV Vanco  - Follow blood culture for final speciation      Type 2 diabetes  Most recent A1c of 5.8 in July 2023.  Diet controlled.  - Follow CMP     Normocytic anemia  Hemoglobin 8.3 on admission with MCV of 87.  Baseline hemoglobin of 8-10. No signs of acute bleeding. 7.1 today, will have paracentesis 11/06/23 and see if this improves.   - Follow CBC     CKD3A  Near baseline of 1.2-1.5 on admission.  History of microalbuminuria.  -Follow CMP     HLD  - PTA simvastatin 20 mg        Diet: Fluid restriction 2000 ML FLUID  2 Gram Sodium Diet    DVT Prophylaxis: Enoxaparin (Lovenox) SQ  Cronin Catheter: Not present  Fluids: PO fluid limited 2L  Lines: None     Cardiac Monitoring: None  Code Status: Full Code      Clinically Significant Risk Factors        # Hypokalemia: Lowest K = 3.2 mmol/L in last 2 days, will replace as needed       # Hypoalbuminemia: Lowest albumin = 2.5 g/dL at 11/5/2023  6:52 AM,  "will monitor as appropriate    # Coagulation Defect: INR = 1.60 (Ref range: 0.85 - 1.15) and/or PTT = 41 Seconds (Ref range: 22 - 38 Seconds), will monitor for bleeding    # Hypertension: Noted on problem list        # Overweight: Estimated body mass index is 25.5 kg/m  as calculated from the following:    Height as of this encounter: 1.727 m (5' 8\").    Weight as of this encounter: 76.1 kg (167 lb 11.2 oz).        # Financial/Environmental Concerns: none         Disposition Plan      Expected Discharge Date: 11/09/2023      Destination: home with family  Discharge Comments: NPO for TIPS        The patient's care was discussed with the Attending Physician, Dr. Benjamin .    MAY SAUCEDO MD  Hospitalist Service  [facility]  ______________________________________________________________________    Interval History   Patient sitting in the chair. Wondering about length of stay in the hospital. No shortness of breath last night. Abdominal pain is stable with slight tenderness throughout. Will follow up on blood cultures. Final dose of ceftriaxone evening of 11/9/23.     Physical Exam   Vital Signs: Temp: 98.4  F (36.9  C) Temp src: Oral BP: 111/58 Pulse: 78   Resp: 16 SpO2: 100 % O2 Device: None (Room air)    Weight: 167 lbs 11.2 oz    PHYSICAL EXAM:  GENERAL: Awake, alert, No acute distress, Appears somewhat uncomfortable  HEENT: No scleral icterus or conjunctival injection  SKIN: Warm and dry.   LUNGS: Normal work of breathing with no use of accessory muscles. Clear breath sounds in upper and lower lung fields. No wheezes. Regular respiratory rate, effort. Diminished lung sounds in lower fields, slight improvement from before.  CARDIAC: RRR. Normal S1 and S2. No murmurs, clicks, or rubs appreciated. Bilaterally lower extremity edema, he has compression socks and ace wraps in place. JVD noticed (echo normal this hospitalization)  ABDOMEN: +Tenderness to palpation throughout, distended. No " rebound or guarding. +bowel sounds. Fluid wave present.   NEUROLOGIC: Alert and oriented. No asterixis present on exam. CN 2-12 grossly intact. Moves all extremities equally.   EXTREMITIES: Bilateral 2+ pitting edema throughout his legs and his hips. Appear well-perfused.     Data     I have personally reviewed the following data over the past 24 hrs:    N/A  \   N/A   / N/A     N/A N/A N/A /  N/A   3.4 N/A N/A \       Imaging results reviewed over the past 24 hrs:   No results found for this or any previous visit (from the past 24 hour(s)).

## 2023-11-08 NOTE — PROGRESS NOTES
11/08/23 1543   Appointment Info   Signing Clinician's Name / Credentials (PT) Jessie Mcgrath PT   Living Environment   People in Home spouse;child(manav), adult   Current Living Arrangements house   Home Accessibility stairs to enter home;stairs within home   Number of Stairs, Main Entrance 5   Stair Railings, Main Entrance railing on right side (ascending)   Number of Stairs, Within Home, Primary seven   Stair Railings, Within Home, Primary railing on right side (ascending)   Living Environment Comments Able to live on one level once upstairs. Has a hospital bed without rails that son bought him.   Self-Care   Usual Activity Tolerance moderate   Current Activity Tolerance fair   Equipment Currently Used at Home none   Activity/Exercise/Self-Care Comment Spouse reports pt able to ambulate household distances without AD at baseline.   General Information   Onset of Illness/Injury or Date of Surgery 11/03/23   Referring Physician Dr Shraddha Anaya   Pertinent History of Current Problem (include personal factors and/or comorbidities that impact the POC) Admit for SBP. PMH includes colon cancer with colostomy   Pain Assessment   Patient Currently in Pain Yes, see Vital Sign flowsheet   Transfers   Impairments Contributing to Impaired Transfers pain;decreased strength   Comment, (Transfers) Sit<>stand CGA.   Gait/Stairs (Locomotion)   [place] Level (Gait) contact guard   Assistive Device (Gait) walker, front-wheeled   Distance in Feet (Gait) 10'x1   Pattern (Gait) step-through   Deviations/Abnormal Patterns (Gait) gait speed decreased;stride length decreased   Clinical Impression   Criteria for Skilled Therapeutic Intervention Yes, treatment indicated   PT Diagnosis (PT) Impaired functional mobility   Influenced by the following impairments pain, weakness   Functional limitations due to impairments transfers, gait, stairs   Clinical Presentation (PT Evaluation Complexity) stable   Clinical Presentation Rationale  Presents as medically diagnosed.   Clinical Decision Making (Complexity) low complexity   Planned Therapy Interventions (PT) gait training;patient/family education;stair training;transfer training   Risk & Benefits of therapy have been explained evaluation/treatment results reviewed;participants voiced agreement with care plan;participants included;patient;spouse/significant other   PT Total Evaluation Time   PT Eval, Low Complexity Minutes (51101) 10   Plan of Care Review   Plan of Care Reviewed With patient;spouse   Physical Therapy Goals   PT Frequency Daily   PT Predicted Duration/Target Date for Goal Attainment 11/13/23   PT Goals Transfers;Gait;Stairs   PT: Transfers Supervision/stand-by assist;Sit to/from stand;Assistive device   PT: Gait Supervision/stand-by assist;Assistive device;Rolling walker;50 feet   PT: Stairs Supervision/stand-by assist;4 stairs;Rail on both sides   Interventions   Interventions Quick Adds Gait Training;Therapeutic Activity   Therapeutic Activity   Symptoms Noted During/After Treatment Increased pain;Fatigue   Treatment Detail/Skilled Intervention Sit<>stand recliner to FWW CGA with cues for safe hand placement and technique.   Gait Training   Gait Training Minutes (99219) 10   Symptoms Noted During/After Treatment (Gait Training) fatigue;increased pain   Treatment Detail/Skilled Intervention Educated on safe use of FWW with gait, turns and approach to chair to sit.   Distance in Feet 15'x2   [place] Level (Gait Training) contact guard   Physical Assistance Level (Gait Training) verbal cues   Assistive Device (Gait Training) rolling walker   Pattern Analysis (Gait Training) other (see comments)  (step through)   Gait Analysis Deviations decreased samm;decreased step length   Impairments (Gait Analysis/Training) pain;strength decreased   PT Discharge Planning   PT Plan transfers, gait, stairs, BLE strengthening   PT Discharge Recommendation (DC Rec) (S)  home with assist   PT  Rationale for DC Rec Requires CGA with transfers and gait. Wife present and very attentive to pt.   PT Brief overview of current status Sit<>stand CGA, amb 15'x2 with FWW CGA.   PT Equipment Needed at Discharge walker, rolling  (Will need to FWW at d/c.)   Total Session Time   Timed Code Treatment Minutes 10   Total Session Time (sum of timed and untimed services) 20

## 2023-11-08 NOTE — PLAN OF CARE
Goal Outcome Evaluation:    Problem: Adult Inpatient Plan of Care  Goal: Optimal Comfort and Wellbeing  Intervention: Monitor Pain and Promote Comfort  Patient is alert and oriented X 4. Wife at bedside. Patient denies pain. Voided. VSS on RA. IV saline locked. Call light within reach.

## 2023-11-08 NOTE — PROGRESS NOTES
[facility]    Progress Note - Hospitalist Service       Date of Admission:  11/3/2023    Assessment & Plan     Vito Sy is a 72 year old [place] male with alcohol related cirrhosis complicated by esophageal varices refractory ascites status post TIPS (at the end of 2022), and subsequent hepatic encephalopathy who was found to have spontaneous bacterial peritonitis after paracentesis on 11/3 and appeared fluid overloaded on admission.      Spontaneous bacterial peritonitis  ANC of 3,400 consistent with SBP after paracentesis with 9.7 L removed.  2-3-day history of abdominal pain prior to admission with chills.  Afebrile and hemodynamically stable throughout admission.    -GI consulted, appreciate recommendations  -Ceftriaxone 2 g daily for 7 days ending on 11/9/2023  -SBP Prophylaxis after treatment   -Follow ascitic fluid cultures and sensitivities   -Follow blood cultures  -UA negative   -Consider SBP Prophylaxis, appreciate GI's guidance     Alcoholic cirrhosis  Recurrent ascites  S/p TIPS  History of hepatic encephalopathy  History of esophageal varices  Follows with Dr. Montenegro at [facility]. Hx of diuretic refractory ascites s/p TIPS 12/2022. Concern for progressive renal insufficiency when on spironolactone and this was discontinued in 6/2022. EGD 10/2023 w/ small esophageal varices and portal hypertensive gastropathy w/o gastric varices. MELD-Na on admission was 17. No hepatic encephalopathy. Hepatic function relatively normal with mild elevated bilirubin 2.6. ALT AST normal (although underlying cirrhosis), slight elevated PTT 41 and INR 1.63. Ultrasound on 11/06 that showed patent TIPS with area of increased velocity with recommendation for outpatient IR for slight revision.  Received albumin on admission and day 3 post initial paracentesis.  - GI consulted, appreciate recommendations   - PTA lactulose & rifaximin   - PTA omeprazole 20 mg twice daily, sub pantoprazole (consider  removing now that patient has SBP)  - Lasix, switched to 20 mg ORAL 11/06/23 from IV, hold for MAP<65  - Spironolactone 50 mg daily, hold for MAP <65  - Low sodium diet  - Fluid restrict 2L  - Follow CBC and CMP    Hypervolemia   Bilateral lower extremity edema  Improvement in bilateral pitting edema bilaterally although still 2+ pitting edema. No events of orthopnea. Diminished lung sounds bilaterally. Most recent echo in May 2022 with EF of 55 to 60% but diastolic Doppler findings suggesting increased left ventricular filling pressures.  Echo 11/04/23 with EF 55-60%, similar to May 2022. BNP 1,641 11/04/23.   - Midodrine 2.5 mg TID (start 11/04/23), can give extra dose if MAP<65 in the evening.  - Lasix 20 mg oral   - Spironolactone 50 mg daily (increased from 25)  - Follow CMP  - Strict I/Os, daily standing weights   - OT consult for Lymphedema     Gram positive blood culture  Positive 1/2 bottles from 11/3. Positive for coagulase negative staph. Likely skin contaminate, did receive IV Vanco x 1 dose.  - Discontinue IV Vanco  - Follow blood culture for final speciation      Type 2 diabetes  Most recent A1c of 5.8 in July 2023.  Diet controlled.  - Follow CMP     Normocytic anemia  Hemoglobin 8.3 on admission with MCV of 87.  Baseline hemoglobin of 8-10. No signs of acute bleeding. 7.1 today, will have paracentesis 11/06/23 and see if this improves.   - Follow CBC     CKD3A  Near baseline of 1.2-1.5 on admission.  History of microalbuminuria.  -Follow CMP     HLD  - PTA simvastatin 20 mg        Diet: Fluid restriction 2000 ML FLUID  2 Gram Sodium Diet    DVT Prophylaxis: Enoxaparin (Lovenox) SQ  Cronin Catheter: Not present  Fluids: PO fluid limited 2L  Lines: None     Cardiac Monitoring: None  Code Status: Full Code      Clinically Significant Risk Factors        # Hypokalemia: Lowest K = 3.2 mmol/L in last 2 days, will replace as needed       # Hypoalbuminemia: Lowest albumin = 2.5 g/dL at 11/5/2023  6:52 AM,  "will monitor as appropriate    # Coagulation Defect: INR = 1.60 (Ref range: 0.85 - 1.15) and/or PTT = 41 Seconds (Ref range: 22 - 38 Seconds), will monitor for bleeding    # Hypertension: Noted on problem list        # Overweight: Estimated body mass index is 25.5 kg/m  as calculated from the following:    Height as of this encounter: 1.727 m (5' 8\").    Weight as of this encounter: 76.1 kg (167 lb 11.2 oz).        # Financial/Environmental Concerns: none         Disposition Plan      Expected Discharge Date: 11/09/2023      Destination: home with family  Discharge Comments: NPO for TIPS        The patient's care was discussed with the Attending Physician, Dr. Benjamin .    MAY SAUCEDO MD  Hospitalist Service  [facility]  ______________________________________________________________________    Interval History   Patient sitting in chair. Wondering about discharge time. No shortness of breath over night. Abdominal pain is stable. Final dose of ceftriazone tomorrow evening.     Physical Exam   Vital Signs: Temp: 98.2  F (36.8  C) Temp src: Oral BP: 102/58 Pulse: 80   Resp: 18 SpO2: 100 % O2 Device: None (Room air)    Weight: 167 lbs 11.2 oz    PHYSICAL EXAM:  GENERAL: Awake, alert, No acute distress, Appears somewhat uncomfortable  HEENT: No scleral icterus or conjunctival injection  SKIN: Warm and dry.   LUNGS: Normal work of breathing with no use of accessory muscles. Clear breath sounds in upper and lower lung fields. No wheezes. Regular respiratory rate, effort. Diminished lung sounds in lower fields.   CARDIAC: RRR. Normal S1 and S2. No murmurs, clicks, or rubs appreciated. Bilaterally lower extremity edema, he has compression socks and ace wraps in place. JVD noticed (echo normal this hospitalization)  ABDOMEN: +Tenderness to palpation throughout, distended. No rebound or guarding. +bowel sounds. Fluid wave present.   NEUROLOGIC: Alert and oriented. No asterixis present on exam. CN " 2-12 grossly intact. Moves all extremities equally.   EXTREMITIES: Bilateral 2+ pitting edema throughout his legs and his hips. Appear well-perfused.     Data     I have personally reviewed the following data over the past 24 hrs:    N/A  \   N/A   / N/A     N/A N/A N/A /  N/A   4.3 N/A N/A \       Imaging results reviewed over the past 24 hrs:   No results found for this or any previous visit (from the past 24 hour(s)).

## 2023-11-09 NOTE — PLAN OF CARE
Goal Outcome Evaluation:    Patient is alert & oriented x4. Vital signs are stable. On room air. Denies pain. Voiding & having watery stools. Lymph wraps in place. PIV is saline locked. Ambulates with SBA. Family member is in room with a visitor exception. Tolerating  2 GM Na diet & 2000 ML fluids restriction.

## 2023-11-09 NOTE — PROGRESS NOTES
Care Management Follow Up    Length of Stay (days): 6    Expected Discharge Date: 11/09/2023     Concerns to be Addressed:       Patient plan of care discussed at interdisciplinary rounds: Yes    Anticipated Discharge Disposition: Home         Additional Information:  Met with pt and wife regarding discharge plans.  Pt plans to return home at discharge.  Pt and wife discussing medical equipment they are requesting for at home.  Pt requesting a walker which pt can obtain prior to living.  Pt also requesting an order for wheelchair and commode, wife plans to take order for equipment to pharmacy by their home that has medical equipment.      TASHIA Cristobal LSW

## 2023-11-09 NOTE — DISCHARGE SUMMARY
"[facility]  Discharge Summary - Medicine & Pediatrics       Date of Admission:  11/3/2023  Date of Discharge:  11/9/2023  Discharging Provider: Pedro Clement MD, Amrik Benjamin MD  Discharge Service: Hospitalist Service    Discharge Diagnoses   Spontaneous Bacterial Peritonitis   Alcoholic Cirrhosis  Recurrent ascites    Clinically Significant Risk Factors     # Overweight: Estimated body mass index is 25.29 kg/m  as calculated from the following:    Height as of this encounter: 1.727 m (5' 8\").    Weight as of this encounter: 75.4 kg (166 lb 4.8 oz).       Follow-ups Needed After Discharge   Follow-up Appointments     Follow-up and recommended labs and tests       Follow up with primary care provider, Amrik Mejia, within 7 days for   hospital follow- up.    Follow up with [facility] physicians for further management of fluid in the belly   and his liver.            Unresulted Labs Ordered in the Past 30 Days of this Admission       Date and Time Order Name Status Description    11/6/2023  9:37 AM Ascites Fluid Aerobic Bacterial Culture Routine Preliminary     11/4/2023  6:54 PM Blood Culture Peripheral Blood Preliminary     11/4/2023  6:54 PM Blood Culture Peripheral Blood Preliminary         These results will be followed up by PCP    Discharge Disposition   Discharged to home  Condition at discharge: Stable    Hospital Course   Vito Sy is a 72 year old [place] male with alcohol related cirrhosis complicated by esophageal varices refractory ascites status post TIPS (at the end of 2022), and subsequent hepatic encephalopathy who was found to have spontaneous bacterial peritonitis after paracentesis on 11/3 with 9 L off and appeared fluid overloaded on admission.       Ziare was admitted to the hospital on 11/3 with a 2-3 day history of abdominal pain with chills. On admission, Zaire was afebrile and hemodynamically stable throughout. GI was quickly consulted and he was treated with 7 days of " ceftriaxone for spontaneous bacterial peritonitis. He also had bilateral lower extremity edema for which he received an echo that showed normal cardiac function. He had a repeat paracentesis on 11/6. Patient given PTA lactulose and rifaximin. During admission his diuresis was optimized with spironolactone 50 mg lasix 20 mg. He was started on midodrine 2.5 TID to promote diuresis for prior to hospitalization he started to have a diminished response to his PTA diuretics.     He was found to have a gram positive blood culture which was likely a contaminant. His Diabetes was diet controlled throughout. He was discharged with prophylactic antibiotics to try and prevent further episodes of SBP. He has follow up with [facility].       Consultations This Hospital Stay   GASTROENTEROLOGY IP CONSULT  CARE MANAGEMENT / SOCIAL WORK IP CONSULT  PHARMACY TO DOSE VANCO  LYMPHEDEMA THERAPY IP CONSULT  INTERVENTIONAL RADIOLOGY ADULT/PEDS IP CONSULT  PHYSICAL THERAPY ADULT IP CONSULT  OCCUPATIONAL THERAPY ADULT IP CONSULT    Code Status   Full Code       The patient was discussed with Dr. Sourav SAUCEDO MD  [address] [address]  Phone: [phone]  Fax: [phone]  ______________________________________________________________________    Physical Exam   Vital Signs: Temp: 98.2  F (36.8  C) Temp src: Oral BP: 108/59 Pulse: 86   Resp: 16 SpO2: 98 % O2 Device: None (Room air)    Weight: 166 lbs 4.8 oz    GENERAL: Awake, alert, No acute distress, Appears somewhat uncomfortable  HEENT: No scleral icterus or conjunctival injection  SKIN: Warm and dry.   LUNGS: Normal work of breathing with no use of accessory muscles. Clear breath sounds in upper and lower lung fields. No wheezes. Regular respiratory rate, effort. Diminished lung sounds in lower fields, \improvement from before.  CARDIAC: RRR. Normal S1 and S2. No murmurs, clicks, or rubs appreciated.  Bilateral profound lower extremity edema, he has compression socks and ace wraps in place. JVD noticed (echo normal this hospitalization)  ABDOMEN: +Tenderness to palpation throughout, distended. No rebound or guarding. +bowel sounds. Fluid wave present.   NEUROLOGIC: Alert and oriented. No asterixis present on exam. CN 2-12 grossly intact. Moves all extremities equally.   EXTREMITIES: Bilateral 2+ pitting edema throughout his legs and his hips. Appear well-perfused.      Primary Care Physician   Amrik Mejia    Discharge Orders      Reason for your hospital stay    You were hospitalized for Spontaneous Bacterial Peritonitis which is an infection of the fluid in your abdomen. This was treated with antibiotics and you will be on an antibiotic to try and prevent this from recurring.     Follow-up and recommended labs and tests     Follow up with primary care provider, Amrik Mejia, within 7 days for hospital follow- up.    Follow up with [facility] physicians for further management of fluid in the belly and his liver.     Activity    Your activity upon discharge: activity as tolerated     Commode    DME Documentation:   Describe the reason for need to support medical necessity: profound mobility concerns with ascites and lower extremity edema.     I, the undersigned, certify that the above prescribed supplies are medically necessary for this patient and is both reasonable and necessary in reference to accepted standards of medical and necessary in reference to accepted standards of medical practice in the treatment of this patient's condition and is not prescribed as a convenience.     Wheelchair    Wheelchair Documentation:   Describe the reason for need to support medical necessity: Profound ascites and lower extremity edema.  1. The patient has mobility limitations that impairs their ability to participate in one or more mobility related activities: Toileting, Grooming, and Bathing.  2. The patient's mobility limitations  cannot be safely resolved by using a cane/walker: Yes  3. The patients home has adequate access to use a manual wheelchair: Yes  4. The use of a manual wheelchair on a regular basis will improve the patients ability to participate in mobility related ADL's at home: Yes  5. The patient is willing to use a manual wheelchair at home: Yes  6. The patient has adequate upper body strength and the mental capability to safely use a manual wheelchair and/or has a caregiver that is able to assist: Yes  7. Does the patient have a lower extremity injury or edema?: Yes    Reason for Type of Wheelchair: Light Weight Wheelchair: Patient is unable to self-propel a standard wheelchair in the home but can self propel a light weight wheelchair.    **Use of a manual wheelchair will significantly improve the patient's ability to participate in MRADLs and the patient will use it on a regular basis in the home. The patient has not expressed an unwillingness to use the manual wheelchair that is provided in the home.**    I, the undersigned, certify that the above prescribed supplies are medically necessary for this patient and is both reasonable and necessary in reference to accepted standards of medical and necessary in reference to accepted standards of medical practice in the treatment of this patient's condition and is not prescribed as a convenience.     Walker    I, the undersigned, certify that the above prescribed supplies are medically necessary for this patient and is both reasonable and necessary in reference to accepted standards of medical and necessary in reference to accepted standards of medical practice in the treatment of this patient's condition and is not prescribed as a convenience.      Bath Seat/Shower Chair Order    DME Documentation:   Describe the reason for need to support medical necessity: Pt would benefit from shower chair w/ back support to increase safety and independence w/ bathing.     I, the undersigned,  certify that the above prescribed supplies are medically necessary for this patient and is both reasonable and necessary in reference to accepted standards of medical and necessary in reference to accepted standards of medical practice in the treatment of this patient's condition and is not prescribed as a convenience.     Diet    Follow this diet upon discharge: Orders Placed This Encounter      Fluid restriction 2000 ML FLUID      2 Gram Sodium Diet       Significant Results and Procedures   Most Recent 3 CBC's:  Recent Labs   Lab Test 11/09/23  0803 11/07/23  0426 11/06/23  0516   WBC 9.1 7.9 7.7   HGB 7.6* 7.4* 7.1*   MCV 86 86 86    153 141*     Most Recent 3 BMP's:  Recent Labs   Lab Test 11/09/23  0803 11/08/23  1225 11/08/23  0530 11/07/23  1322 11/07/23  0426 11/06/23  1446 11/06/23  0516     --   --   --  138  --  138   POTASSIUM 3.6 4.3 3.4   < > 3.2*   < > 3.1*   CHLORIDE 116*  --   --   --  114*  --  112*   CO2 12*  --   --   --  14*  --  15*   BUN 27.1*  --   --   --  32.1*  --  33.0*   CR 1.20*  --   --   --  1.29*  --  1.49*   ANIONGAP 10  --   --   --  10  --  11   SILVIA 8.4*  --   --   --  7.8*  --  8.1*   *  --   --   --  163*  --  164*    < > = values in this interval not displayed.   ,   Results for orders placed or performed during the hospital encounter of 11/03/23   XR Chest Port 1 View    Narrative    EXAM: XR CHEST PORT 1 VIEW  LOCATION: [facility]  DATE: 11/4/2023    INDICATION: ETOH, cirrhosis with recurrent ascites, bibasilar crackles, pitting LE edema, CHF.  COMPARISON: 01/24/2023.      Impression    IMPRESSION: Moderate low lung volumes. Heart size and pulmonary vascularity upper limits of normal. Calcified granuloma in the left lung base. No focal lung infiltrates. A portion of the left costophrenic angle was omitted from this exam. Osseous   structures grossly intact. Visualized upper abdomen unremarkable.   US Abdomen Limited with TIPSS  Doppler    Addendum: 11/6/2023    Indeterminant masslike lesion lower pole the spleen is unchanged when compared to a CT study from 12/18/2022. Findings likely reflect a small splenic hemangioma.      Narrative    EXAM: US ABDOMEN LIMITED WITH TIPSS DOPPLER  LOCATION: [facility]  DATE: 11/6/2023    INDICATION: refractory ascites, check patency. Portal hypertension status post TIPS. TIPS revision with reduction on 01/26/2023.  COMPARISON: TIPS duplex 02/27/2023  TECHNIQUE: Limited abdominal ultrasound. Color flow with spectral Doppler and waveform analysis performed.     FINDINGS:    GALLBLADDER: Shadowing gallstones. Thick-walled, somewhat contracted appearing gallbladder wall.     BILE DUCTS: No biliary dilatation. The common duct measures 7 mm.    LIVER: Heterogeneous parenchymal echo pattern with nodular contour consistent with cirrhosis. No focal mass.     RIGHT KIDNEY: 2.5 x 2.6 x 2.2 cm cyst, otherwise negative.     PANCREAS: The visualized portions are normal.    Spleen: There is a 16 x 12 mm relatively hyperechoic complex solid mass lower pole the spleen which is indeterminate.    IVC: Patent    Moderate ascites.    ABDOMINAL DUPLEX:   Velocities are as follows (cm/s):  Main portal vein: 26  Left portal vein: 62  Right portal vein: Not visualized  TIPS stent in the MPV: 26  TIPS portal vein end: 114  TIPS mid stent: 327  TIPS IVC end: 127  Hepatic artery: 214    The right, middle and left hepatic veins are patent with normal directional flow.        Impression    IMPRESSION:  1.  Indeterminant 16 x 12 mm solid masslike lesion lower pole the spleen.  2.  Moderate ascites.  3.  Right renal cyst.  4.  Cirrhotic appearance to the liver.  5.  Gallstones. Thick-walled gallbladder, nonspecific and similar to previous.  6.  Patent TIPS. However there are elevated velocities in the mid stent suggesting there may be significant stenosis at this level. This finding is similar to the prior post  TIPS revision/reduction duplex exam.     US Paracentesis with Albumin    Narrative    EXAM:  1. PARACENTESIS  2. ULTRASOUND GUIDANCE  LOCATION: [facility]  DATE: 2023    INDICATION: Cirrhosis with ascites. SBP.    PROCEDURE: Informed consent obtained. Time out performed. A limited ultrasound abdomen is performed demonstrating a moderate amount of ascites. The largest collection of fluid was localized to the left lower quadrant and the overlying skin was prepped   and draped in a sterile fashion. 10 mL of 1% lidocaine was infused into local soft tissues. A 5 Yemeni catheter system was introduced into the abdominal ascites under ultrasound guidance. Initially only 200 mL of fluid was obtained. No additional fluid   could be obtained due to internal septations. A second puncture was made in the right upper quadrant. No significant fluid could be obtained due to internal septations.    0.2 liters of clear fluid were removed and sent for specified labs.    Patient tolerated procedure well.    Ultrasound imaging was obtained and placed in the patient's permanent medical record.      Impression    IMPRESSION:  1.  Ultrasound-guided diagnostic/therapeutic paracentesis performed. Only 200 mL of fluid able to be obtained due to some internal septations within the peritoneum.    Reference CPT Code: 24264   Echocardiogram Complete    Narrative    [id number]  [id number]  [id number]  [id number]^PAULINO^[place] [address]     Name: JAC LOWERY  MRN: [id number]  : 1950  Study Date: 2023 10:33 AM  Age: 72 yrs  Gender: Male  Patient Location: [facility]  Reason For Study: Edema  Ordering Physician: CARLOS BOB  Performed By: MB     BSA: 2.0 m2  Height: 68 in  Weight: 180 lb  HR: 91  BP: 109/60 mmHg  ______________________________________________________________________________  Procedure  Complete Echo  Adult.  ______________________________________________________________________________  Interpretation Summary     1. Normal left ventricular size and systolic performance with a visually  estimated ejection fraction of 55-60%.  2. There is mild aortic insufficiency.  3. There is mild calcific mitral stenosis.  4. Normal right ventricular size and systolic performance.  5. There is mild left atrial enlargement.  6. There is mild enlargement of the proximal ascending aorta.     /When compared to the prior real-time echocardiogram dated 20 May 2022, the  findings are felt to be fairly similar on both examinations.  ______________________________________________________________________________  Left ventricle:  Normal left ventricular size and systolic performance with a visually  estimated ejection fraction of 55-60%. There is normal regional wall motion.  Left ventricular wall thickness is normal.     Assessment of LV Diastolic Function: The evaluation of diastolic filling is  hampered by the presence of significant mitral annular calcification.     Right ventricle:  Normal right ventricular size and systolic performance.     Left atrium:  There is mild left atrial enlargement.     Right atrium:  The right atrium is of normal size.     IVC:  The IVC is not well-visualized.     Aortic valve:  The aortic valve is not well visualized, but suspected to be comprised of  three cusps. There is no significant aortic stenosis. There is mild aortic  insufficiency.     Mitral valve:  There is mild nonspecific mitral valve leaflet thickening. There is moderate  to severe mitral annular calcification. There is mild calcific mitral  stenosis. There is trace-mild mitral insufficiency.     Tricuspid valve:  The tricuspid valve is grossly morphologically normal. There is trace  tricuspid insufficiency.     Pulmonic valve:  The pulmonic valve is grossly morphologically normal. There is trace pulmonic  insufficiency.     Thoracic  aorta:  There is mild enlargement of the proximal ascending aorta.     Pericardium:  There is no significant pericardial effusion.  ______________________________________________________________________________  ______________________________________________________________________________  MMode/2D Measurements & Calculations  IVSd: 0.81 cm  LVIDd: 3.5 cm  LVIDs: 2.5 cm  LVPWd: 0.96 cm  FS: 27.7 %  LV mass(C)d: 85.3 grams  LV mass(C)dI: 43.6 grams/m2  Ao root diam: 3.8 cm  asc Aorta Diam: 4.5 cm  LVOT diam: 1.9 cm  LVOT area: 2.7 cm2  Ao root diam index Ht(cm/m): 2.2  Ao root diam index BSA (cm/m2): 2.0  Asc Ao diam index BSA (cm/m2): 2.3  Asc Ao diam index Ht(cm/m): 2.6  LA Volume (BP): 70.3 ml     LA Volume Index (BP): 36.1 ml/m2  LA Volume Indexed (AL/bp): 38.4 ml/m2  RV Base: 4.2 cm  RWT: 0.55  TAPSE: 2.4 cm     Time Measurements  MM HR: 81.0 BPM     Doppler Measurements & Calculations  MV E max patric: 90.6 cm/sec  MV A max patric: 140.2 cm/sec  MV E/A: 0.65  MV max P.4 mmHg  MV mean PG: 3.6 mmHg  MV V2 VTI: 37.6 cm  MVA(VTI): 1.9 cm2  MV dec slope: 357.3 cm/sec2  MV dec time: 0.25 sec  Ao V2 max: 190.7 cm/sec  Ao max PG: 15.0 mmHg  Ao V2 mean: 129.2 cm/sec  Ao mean P.6 mmHg  Ao V2 VTI: 37.2 cm  LORENA(I,D): 1.9 cm2  LORENA(V,D): 2.1 cm2  AI P1/2t: 464.9 msec  LV V1 max P.9 mmHg  LV V1 max: 149.4 cm/sec  LV V1 VTI: 26.5 cm  SV(LVOT): 71.2 ml  SI(LVOT): 36.5 ml/m2  PA acc time: 0.12 sec  AV Patric Ratio (DI): 0.78  LORENA Index (cm2/m2): 0.98  E/E': 16.0     E/E' av.3  Lateral E/e': 6.6  Medial E/e': 16.0  Peak E' Patric: 5.7 cm/sec  RV S Patric: 14.0 cm/sec     ______________________________________________________________________________  Report approved by: Toñito Damian 2023 11:56 AM             Discharge Medications   Current Discharge Medication List        START taking these medications    Details   ciprofloxacin (CIPRO) 500 MG tablet Take 1 tablet (500 mg) by mouth daily  Qty: 60 tablet,  "Refills: 0    Associated Diagnoses: SBP (spontaneous bacterial peritonitis) (H)      furosemide (LASIX) 20 MG tablet Take 1 tablet (20 mg) by mouth daily  Qty: 60 tablet, Refills: 0    Associated Diagnoses: Other cirrhosis of liver (H)      midodrine (PROAMATINE) 2.5 MG tablet Take 1 tablet (2.5 mg) by mouth 3 times daily (with meals)  Qty: 180 tablet, Refills: 0    Associated Diagnoses: Other cirrhosis of liver (H)      spironolactone (ALDACTONE) 50 MG tablet Take 1 tablet (50 mg) by mouth daily  Qty: 60 tablet, Refills: 0    Associated Diagnoses: Other cirrhosis of liver (H)           CONTINUE these medications which have NOT CHANGED    Details   bismuth subsalicylate (PEPTO BISMOL) 262 MG chewable tablet Take 1 tablet by mouth every 6 hours as needed for diarrhea      lactulose (CHRONULAC) 10 GM/15ML solution Take 30 g by mouth daily      omeprazole (PRILOSEC) 20 MG DR capsule TAKE 1 CAPSULE BY MOUTH TWICE DAILY BEFORE MEAL(S)  Qty: 180 capsule, Refills: 3    Associated Diagnoses: Encounter for medication refill      rifaximin (XIFAXAN) 550 MG TABS tablet Take 1 tablet (550 mg) by mouth 2 times daily  Qty: 60 tablet, Refills: 4    Associated Diagnoses: Alcoholic cirrhosis, unspecified whether ascites present (H)      simvastatin (ZOCOR) 20 MG tablet TAKE 1 TABLET BY MOUTH AT BEDTIME  Qty: 90 tablet, Refills: 3    Associated Diagnoses: Hypercholesteremia           STOP taking these medications       calcium carbonate (TUMS) 500 MG chewable tablet Comments:   Reason for Stopping:             Allergies   Allergies   Allergen Reactions    Sulfa Antibiotics Shortness Of Breath and Itching    Penicillins Unknown     Annotation: discussed with patient, he reports he had \"itching\" with penicillin many years ago in [facility] but no hives or throat or respiratory symptoms.  he also reports having tolerated amoxicillin since coming to [place].       "

## 2023-11-09 NOTE — PLAN OF CARE
Goal Outcome Evaluation:         Patient alert and oriented. Vitally stable. Reports minimal discomfort in abdomen. Having loose stools 3+ per day per lactulose. Patient had last dose of IV antibiotic in afternoon. IV removed. Patient to discharge via wife in private vehicle . Discharge education given and gone over with, all questions answered.

## 2023-11-09 NOTE — PROGRESS NOTES
11/09/23 1400   Appointment Info   Signing Clinician's Name / Credentials (OT) Howie Barrios OTR/L   Living Environment   People in Home spouse;child(manav), adult   Current Living Arrangements house   Transportation Anticipated family or friend will provide   Living Environment Comments Pt has hospital bed, tub shower, standard toilet   Self-Care   Usual Activity Tolerance moderate   Current Activity Tolerance fair   Equipment Currently Used at Home none   Fall history within last six months yes   Number of times patient has fallen within last six months 3   Activity/Exercise/Self-Care Comment Pt gets assistance from family w/ most ADLs and all IADLs at baseline   General Information   Onset of Illness/Injury or Date of Surgery 11/03/23   Referring Physician Shraddha Anaya MD   Patient/Family Therapy Goal Statement (OT) wants to go home   Additional Occupational Profile Info/Pertinent History of Current Problem Vito Sy is a 72 year old [place] male with alcohol related cirrhosis complicated by esophageal varices refractory ascites status post TIPS (at the end of 2022), and subsequent hepatic encephalopathy who was found to have spontaneous bacterial peritonitis after paracentesis on 11/3 and appeared fluid overloaded on admission.   Existing Precautions/Restrictions fall   Cognitive Status Examination   Orientation Status orientation to person, place and time   Visual Perception   Visual Impairment/Limitations WFL   Sensory   Sensory Quick Adds sensation intact   Pain Assessment   Patient Currently in Pain Yes, see Vital Sign flowsheet   Posture   Posture not impaired   Range of Motion Comprehensive   General Range of Motion bilateral upper extremity ROM WFL   Strength Comprehensive (MMT)   Comment, General Manual Muscle Testing (MMT) Assessment mild weakness   Bed Mobility   Bed Mobility supine-sit;sit-supine   Comment (Bed Mobility) Min A   Transfers   Transfers bed-chair transfer;sit-stand  transfer;toilet transfer;shower transfer   Transfer Comments CGA   Activities of Daily Living   BADL Assessment/Intervention bathing;toileting;lower body dressing   Bathing Assessment/Intervention   [place] Level (Bathing) lower body;moderate assist (50% patient effort)   Lower Body Dressing Assessment/Training   [place] Level (Lower Body Dressing) lower body dressing skills;minimum assist (75% patient effort)   Toileting   [place] Level (Toileting) toileting skills;adjust/manage clothing;minimum assist (75% patient effort)   Clinical Impression   Criteria for Skilled Therapeutic Interventions Met (OT) Yes, treatment indicated   OT Diagnosis decreased ADLs   Influenced by the following impairments SBP, weakness   OT Problem List-Impairments impacting ADL activity tolerance impaired;pain;mobility;strength   Assessment of Occupational Performance 3-5 Performance Deficits   Identified Performance Deficits dressing, bed mobility, bathing, toileting   Planned Therapy Interventions (OT) ADL retraining;bed mobility training;transfer training   Clinical Decision Making Complexity (OT) detailed assessment/moderate complexity   Risk & Benefits of therapy have been explained evaluation/treatment results reviewed;patient;spouse/significant other   OT Total Evaluation Time   OT Eval, Moderate Complexity Minutes (44546) 10   OT Goals   Therapy Frequency (OT) One time eval and treatment   OT Predicted Duration/Target Date for Goal Attainment 11/09/23   OT Goals Transfers;Toilet Transfer/Toileting   OT: Transfer Minimal assist;with assistive device;Goal Met;Completed  (tub shower w/ chair)   OT: Toilet Transfer/Toileting Minimal assist;toilet transfer;cleaning and garment management;Goal Met;Completed   Interventions   Interventions Quick Adds Self-Care/Home Management   Self-Care/Home Management   Self-Care/Home Mgmt/ADL, Compensatory, Meal Prep Minutes (45186) 26   Symptoms Noted During/After Treatment (Meal  Preparation/Planning Training) fatigue   Treatment Detail/Skilled Intervention Pt sleeping upon arrival - discussed home setup w/ wife who assists w/ all ADLs as needed. Educ wife on shower chair and commode for home. Pt later woken up and agreeable to therapy. Pt cued for bed mobility and completed w/ Min A from wife. Pt STS w/ CGA and amb. 15 ft to BR w/ FWW and CGA. Pt educ pt and wife on safe commode transfer - completed w/ Min A from wife. Pt amb. ~100 ft to OT clinic at slow pace w/ FWW and SBA. Educ on safe tub shower transfer w/ shower chair - completed w/ Min A to swing BLEs over tub. Pt amb. back to room but was fatigued after. Educ pt/wife on safe home setup and disucssed DME - order placed for shower chair and pt will be getting reacher as well.   OT Discharge Planning   OT Plan DC OT   OT Discharge Recommendation (DC Rec) (S)  home with assist   OT Rationale for DC Rec Pt fatigues quickly but tolerating therapy. DME ordered for home including commode, shower chair, and reacher. Family assists w/ all ADLs and IADLs as needed.   OT Brief overview of current status Min-Mod A for all ADLs   OT Equipment Needed at Discharge commode chair;reacher;shower chair   Total Session Time   Timed Code Treatment Minutes 26   Total Session Time (sum of timed and untimed services) 36

## 2023-11-09 NOTE — PROGRESS NOTES
GI PROGRESS NOTE  11/9/2023  Vito Sy  1950  /-75    Subjective:  Today [birth year] birthday.  He feels tired again but not confused.  He's having several Bms daily.  Appetite is low but he plans to try to eat today.  He likes having Ensure three times daily.     Objective:    Patient Vitals for the past 24 hrs:   BP Temp Temp src Pulse Resp SpO2 Weight   11/09/23 0856 108/59 98.2  F (36.8  C) Oral 86 16 98 % --   11/09/23 0600 -- -- -- -- -- -- 75.4 kg (166 lb 4.8 oz)   11/09/23 0049 111/57 99.1  F (37.3  C) Oral 86 20 99 % --   11/08/23 1610 125/60 98.5  F (36.9  C) Oral 88 18 100 % --     Body mass index is 25.29 kg/m .  Gen: NAD, wife at his side  GI: Mildly distended, BS positive, soft, non-tender    Laboratory  Recent Labs   Lab Test 11/09/23  0803 11/07/23  0426 11/06/23  0516 11/04/23  0540 11/03/23  1511 11/03/23  1507 02/09/23  1141   WBC 9.1 7.9 7.7   < >  --    < >  --    HGB 7.6* 7.4* 7.1*   < >  --    < >  --    MCV 86 86 86   < >  --    < >  --     153 141*   < >  --    < >  --    INR  --  1.60*  --   --  1.63*  --  1.1    < > = values in this interval not displayed.     Recent Labs   Lab Test 11/09/23  0803 11/08/23  1225 11/08/23  0530 11/07/23  1322 11/07/23  0426 11/06/23  1446 11/06/23  0516     --   --   --  138  --  138   POTASSIUM 3.6 4.3 3.4   < > 3.2*   < > 3.1*   CHLORIDE 116*  --   --   --  114*  --  112*   CO2 12*  --   --   --  14*  --  15*   BUN 27.1*  --   --   --  32.1*  --  33.0*   CR 1.20*  --   --   --  1.29*  --  1.49*   ANIONGAP 10  --   --   --  10  --  11   SILVIA 8.4*  --   --   --  7.8*  --  8.1*   *  --   --   --  163*  --  164*    < > = values in this interval not displayed.     Recent Labs   Lab Test 11/07/23  0426 11/06/23  0516 11/05/23  0652 11/04/23  0540 11/04/23  0337 01/27/23  0649 01/24/23  0845 01/24/23  0743 01/23/23  1744 12/19/22  0411 12/18/22  2119 12/18/22  1912   ALBUMIN 2.7* 2.8* 2.5*   < >  --    < >  --    < > 3.6   < >   --  3.5   BILITOTAL 1.1 1.1 1.5*   < >  --    < >  --    < > 1.8*   < >  --  1.7*   ALT 23 16 17   < >  --    < >  --    < > 35   < >  --  30   AST 50* 33 38   < >  --    < >  --    < > 51*   < >  --  42   ALKPHOS 148* 106 134*   < >  --    < >  --    < > 183*   < >  --  130*   PROTEIN  --   --   --   --  30*  --  10*  --   --   --  20*  --    LIPASE  --   --   --   --   --   --   --   --  156*  --   --  195*    < > = values in this interval not displayed.     US Abdomen Limited with TIPSS Doppler  Addendum Date: 11/6/2023    Indeterminant masslike lesion lower pole the spleen is unchanged when compared to a CT study from 12/18/2022. Findings likely reflect a small splenic hemangioma.  Result Date: 11/6/2023  EXAM: US ABDOMEN LIMITED WITH TIPSS DOPPLER LOCATION: [facility] DATE: 11/6/2023 INDICATION: refractory ascites, check patency. Portal hypertension status post TIPS. TIPS revision with reduction on 01/26/2023. COMPARISON: TIPS duplex 02/27/2023 TECHNIQUE: Limited abdominal ultrasound. Color flow with spectral Doppler and waveform analysis performed.  FINDINGS: GALLBLADDER: Shadowing gallstones. Thick-walled, somewhat contracted appearing gallbladder wall. BILE DUCTS: No biliary dilatation. The common duct measures 7 mm. LIVER: Heterogeneous parenchymal echo pattern with nodular contour consistent with cirrhosis. No focal mass. RIGHT KIDNEY: 2.5 x 2.6 x 2.2 cm cyst, otherwise negative. PANCREAS: The visualized portions are normal. Spleen: There is a 16 x 12 mm relatively hyperechoic complex solid mass lower pole the spleen which is indeterminate. IVC: Patent Moderate ascites. ABDOMINAL DUPLEX: Velocities are as follows (cm/s): Main portal vein: 26 Left portal vein: 62 Right portal vein: Not visualized TIPS stent in the MPV: 26 TIPS portal vein end: 114 TIPS mid stent: 327 TIPS IVC end: 127 Hepatic artery: 214 The right, middle and left hepatic veins are patent with normal directional  flow.      IMPRESSION: 1.  Indeterminant 16 x 12 mm solid masslike lesion lower pole the spleen. 2.  Moderate ascites. 3.  Right renal cyst. 4.  Cirrhotic appearance to the liver. 5.  Gallstones. Thick-walled gallbladder, nonspecific and similar to previous. 6.  Patent TIPS. However there are elevated velocities in the mid stent suggesting there may be significant stenosis at this level. This finding is similar to the prior post TIPS revision/reduction duplex exam.      US Paracentesis with Albumin  Result Date: 11/6/2023  EXAM: 1. PARACENTESIS 2. ULTRASOUND GUIDANCE LOCATION: [facility] DATE: 11/6/2023 INDICATION: Cirrhosis with ascites. SBP. PROCEDURE: Informed consent obtained. Time out performed. A limited ultrasound abdomen is performed demonstrating a moderate amount of ascites. The largest collection of fluid was localized to the left lower quadrant and the overlying skin was prepped and draped in a sterile fashion. 10 mL of 1% lidocaine was infused into local soft tissues. A 5 Sami catheter system was introduced into the abdominal ascites under ultrasound guidance. Initially only 200 mL of fluid was obtained. No additional fluid could be obtained due to internal septations. A second puncture was made in the right upper quadrant. No significant fluid could be obtained due to internal septations. 0.2 liters of clear fluid were removed and sent for specified labs. Patient tolerated procedure well. Ultrasound imaging was obtained and placed in the patient's permanent medical record.      IMPRESSION: 1.  Ultrasound-guided diagnostic/therapeutic paracentesis performed. Only 200 mL of fluid able to be obtained due to some internal septations within the peritoneum. Reference CPT Code: 69250     Assessment/Plan:   Vito Sy is a 72 year old French male with JENNINGS cirrhosis complicated by esophageal varices refractory ascites status post TIPS (at the end of 2022), and subsequent  hepatic encephalopathy who was found to have spontaneous bacterial peritonitis after paracentesis on 11/3 presenting with volume overload.     1.  SBP: This is in the setting of known cirrhosis with decompensation due to refractory ascites, despite TIPS procedure.  Currently treating SBP with ceftriaxone.  Patient did receive albumin during his paracentesis 11/3 and on day 3 (11/5).  Repeat tap 11/6 shows improved total cell count but likely ongoing infection.  Discussed briefly with ID and medicine resident.     -- Continue antibiotics x 7 days total (through 11/9)  --after treatment, he should start daily abx for SBP prophylaxis  --possible discharge home soon but defer to primary team     2.  JENNINGS cirrhosis, decompensated: Patient has had significant issues with refractory ascites despite TIPS.  Renal insufficiency is significantly limiting use of diuretics.  Certainly could be dealing with worsening portal hypertension.  LFTs are stable.  No evidence of GI bleeding or encephalopathy.  Continue lactulose, rifaximin, low-sodium diet.  MELD NA score 15.  Ultrasound was reviewed with radiology, and splenic lesion appears to be a stable benign hemangioma.     -- Echocardiogram is normal without evidence of heart failure.  -- Receiving low dose furosemide 20 mg and spironolactone 25 --> 50mg, stable renal function.   -- Follow lytes, renal function, daily weights, urine output  --Low NA/high protein diet  --recommend outpatient clinic evaluation with IR (Dr. Cruzito Verma with [facility]).  [facility] will place orders for this consult.  --outpatient Hep f/up will be ordered as well    Patient Active Problem List   Diagnosis    Anemia    Hematuria    Hypercholesterolemia    Hypertension    Type 2 diabetes mellitus with microalbuminuria, without long-term current use of insulin (H)    Nephrolithiasis    Benign Adenomatous Polyp Of The Large Intestine    Latent tuberculosis - completed treatment with 9 months isoniazid  in 2008.    GI bleeding    Anemia due to blood loss, acute    Hypotension due to blood loss    Hyperkalemia    Gastrointestinal hemorrhage associated with acute gastritis    Popliteal cyst, left    Popliteal cyst, right    Incontinence of feces with fecal urgency    Other cirrhosis of liver (H) with ascites    Chronic kidney disease, stage 3a (H)    Portal hypertension (H)    Alcoholic cirrhosis of liver with ascites (H)    Cirrhosis of liver (H)    Hepatic encephalopathy (H)    Pleural effusion    Anemia, unspecified type    Mitral valve insufficiency, unspecified etiology    SBP (spontaneous bacterial peritonitis) (H)    Bilateral lower extremity edema       25 minutes of total time was spent providing patient care, including patient evaluation, reviewing documentation/test results and .                                                Reta Knight PA-C  Thank you for the opportunity to participate in the care of this patient.   Please feel free to call me with any questions or concerns.  Phone number [phone].

## 2023-11-09 NOTE — PLAN OF CARE
Problem: Adult Inpatient Plan of Care  Goal: Optimal Comfort and Wellbeing  Outcome: Progressing   Pt's vitals were stable. Denied any abdominal discomfort. Rocephin administered. Complained of general weakness. Oral Intake was poor. Briefly up in chair

## 2023-11-14 PROBLEM — K72.10 END-STAGE LIVER DISEASE (H): Status: ACTIVE | Noted: 2023-01-01

## 2023-11-14 PROBLEM — R15.9 INCONTINENCE OF FECES, UNSPECIFIED FECAL INCONTINENCE TYPE: Status: ACTIVE | Noted: 2023-01-01

## 2023-11-14 NOTE — PROGRESS NOTES
Hospital Follow-up Visit:    Hospital/Nursing Home/IP Rehab Facility: [facility]  Date of Admission: 11/03/2023  Date of Discharge: 11/09/2023  Reason(s) for Admission: Spontaneous bacterial peritonitis    Was your hospitalization related to COVID-19? No   Problems taking medications regularly:  None  Medication changes since discharge: None  Problems adhering to non-medication therapy:  None    Summary of hospitalization:  [facility] discharge summary reviewed  Diagnostic Tests/Treatments reviewed.  Follow up needed: none  Other Healthcare Providers Involved in Patient s Care:         MARI Barnes Rank  Update since discharge: stable.     Plan of care communicated with patient and family     MED REC REQUIRED{  Post Medication Reconciliation Status:  Discharge medications reconciled, continue medications without change except for discontinuation of simvastatin as detailed below.     ASSESMENT AND PLAN:  Diagnoses and all orders for this visit:  SBP (spontaneous bacterial peritonitis) (H)  Reviewed discharge summary with the patient and wife.  Medication reconciliation and review and counseling done.  Missing from the medication bottles that he brought with him today are furosemide and ciprofloxacin, ciprofloxacin is a new prescription prescribed on discharge.  I did call the community pharmacy and verified that this medication was dispensed on 11/10/2023.  Patient believes that it is at home and that he forgot to bring it into the clinic along with the furosemide.  Patient has paracentesis scheduled for 11/24/2023 and he will follow-up with gastroenterology sooner than that if symptoms demand.  End-stage liver disease (H)  Patient is status post TIPS procedure, medication review and reconciliation done as detailed above, continue current plan.  We did decide to discontinue simvastatin given the end-stage liver disease and given the long-term cardiovascular benefits of that  medication.  We will use Ace wraps to help with the lower extremity edema.  These can be used on an as-needed basis.    I am putting in a care coordination referral to see if we can get the patient some home care although this has been a huge problem with this type of insurance none of the home care agencies are accepting it.  I think the patient would also qualify for PCA services given his high needs and ADLs.  We will see what the care coordination team can do to help with this.  Hepatic encephalopathy (H)  Stable since discharge.  Continue current medications.  Incontinence of feces, unspecified fecal incontinence type  Written prescription given for adult large diapers 4 times daily.      Reviewed the risks and benefits of the treatment plan with the patient and/or caregiver and we discussed indications for routine and emergent follow-up.        SUBJECTIVE: Patient has been stable since discharge from the hospital.  He was hospitalized for spontaneous bacterial peritonitis.  Since being home he has not had any fevers or vomiting.  He continues to have loose stools about 5 or more per day.  His mental status has been stable.  He is not sleeping well at night because he has to get up so often to urinate or have a bowel movement.  He is very fatigued and very weak overall, he is requiring assistance from his wife in all of his ADLs.  His wife is asking about the possibility of getting some increased help at home in the form of home care and/or PCA services.  Patient is too weak to transfer, toilet, prepare food, or do basic personal hygiene.  He is requiring help with all of this.  He also has a poor appetite.  He is taking his medications as prescribed and there have not been any noticed difficulties or problems with the medications although 2 of the medications from the discharge summary are missing on review of his medication bottles today.  Patient is rarely able to get to the toilet in time to have a bowel  movement.  He is using adult large diapers.    Past Medical History:   Diagnosis Date    Alcoholic cirrhosis of liver with ascites (H)     Cirrhosis of liver (H)     Diabetes mellitus (H)     Gout     Hypertension     Kidney stone      Patient Active Problem List   Diagnosis    Anemia    Hematuria    Hypercholesterolemia    Hypertension    Type 2 diabetes mellitus with microalbuminuria, without long-term current use of insulin (H)    Nephrolithiasis    Benign Adenomatous Polyp Of The Large Intestine    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    GI bleeding    Anemia due to blood loss, acute    Hypotension due to blood loss    Hyperkalemia    Gastrointestinal hemorrhage associated with acute gastritis    Popliteal cyst, left    Popliteal cyst, right    Incontinence of feces with fecal urgency    Other cirrhosis of liver (H) with ascites    Chronic kidney disease, stage 3a (H)    Portal hypertension (H)    Cirrhosis of liver (H)    Hepatic encephalopathy (H)    Pleural effusion    Anemia, unspecified type    Mitral valve insufficiency, unspecified etiology    SBP (spontaneous bacterial peritonitis) (H)    Bilateral lower extremity edema    End-stage liver disease (H)    Incontinence of feces, unspecified fecal incontinence type     Current Outpatient Medications   Medication Sig Dispense Refill    lactulose (CHRONULAC) 10 GM/15ML solution Take 30 g by mouth daily      midodrine (PROAMATINE) 2.5 MG tablet Take 1 tablet (2.5 mg) by mouth 3 times daily (with meals) 180 tablet 0    omeprazole (PRILOSEC) 20 MG DR capsule TAKE 1 CAPSULE BY MOUTH TWICE DAILY BEFORE MEAL(S) 180 capsule 3    rifaximin (XIFAXAN) 550 MG TABS tablet Take 1 tablet (550 mg) by mouth 2 times daily 60 tablet 4    spironolactone (ALDACTONE) 50 MG tablet Take 1 tablet (50 mg) by mouth daily 60 tablet 0    bismuth subsalicylate (PEPTO BISMOL) 262 MG chewable tablet Take 1 tablet by mouth every 6 hours as needed for  diarrhea      ciprofloxacin (CIPRO) 500 MG tablet Take 1 tablet (500 mg) by mouth daily 60 tablet 0    furosemide (LASIX) 20 MG tablet Take 1 tablet (20 mg) by mouth daily 60 tablet 0     History   Smoking Status    Never   Smokeless Tobacco    Never       OBJECTICE: /68   Pulse 84   Temp 98.7  F (37.1  C) (Oral)   Resp 16   SpO2 100%      No results found for this or any previous visit (from the past 24 hour(s)).     GEN-in no acute distress, starting to appear somewhat cachectic.   HEENT-mucous membranes are moist   CV-regular rate and rhythm with a grade 3 out of 6 systolic murmur   RESP-lungs clear to auscultation   ABDOMINAL-soft, nontender, obvious ascites   EXTREM-severe pitting edema of both legs up to the knees    SKIN-some redness around the umbilicus      Amrik Mejia MD

## 2023-11-16 PROBLEM — N20.1 URETERAL STONE: Status: ACTIVE | Noted: 2021-11-07

## 2023-11-16 PROBLEM — K92.2 LOWER GI BLEED: Status: ACTIVE | Noted: 2023-01-01

## 2023-11-16 PROBLEM — K31.9 DISORDER OF FUNCTION OF STOMACH: Status: ACTIVE | Noted: 2021-02-22

## 2023-11-16 PROBLEM — R10.84 GENERALIZED ABDOMINAL PAIN: Status: ACTIVE | Noted: 2023-01-06

## 2023-11-16 PROBLEM — K70.31 ASCITES DUE TO ALCOHOLIC CIRRHOSIS (H): Status: ACTIVE | Noted: 2023-01-06

## 2023-11-16 PROBLEM — R18.8 ASCITES: Status: ACTIVE | Noted: 2023-01-01

## 2023-11-16 PROBLEM — E87.6 HYPOKALEMIA: Status: ACTIVE | Noted: 2023-01-01

## 2023-11-16 PROBLEM — E83.42 HYPOMAGNESEMIA: Status: ACTIVE | Noted: 2023-01-06

## 2023-11-16 PROBLEM — R74.8 ELEVATED LIPASE: Status: ACTIVE | Noted: 2023-01-06

## 2023-11-16 PROBLEM — I85.00 ESOPHAGEAL VARICES WITHOUT BLEEDING (H): Status: ACTIVE | Noted: 2020-11-24

## 2023-11-16 NOTE — CONSULTS
GI CONSULT NOTE    Name: Vito Sy  Medical Record #: [id number]  YOB: 1950  Date of Admission: 11/16/2023  Date/Time: 11/16/2023/3:31 PM     CHIEF COMPLAINT: Rectal bleeding     HISTORY OF PRESENT ILLNESS: We were asked to see Vito Sy by Dr. Dominguez for rectal bleeding.     Vito Sy is a 73 year old male with history of SBP, JENNINGS cirrhosis with complication, HTN, gout, T2DM who presents with rectal bleeding.     Patient states he had one stool this morning with bright red blood noted. Does also have some associated rectal pain. States he has been having 4-5 soft stools daily and they are otherwise brown with no sign of melena. Denies any GERD, nausea, vomiting, or dysphagia. Endorses discomfort with abdominal bloating and ascites but no overall abdominal pain.     No changes in medications or diet. Has been taking his lactulose and rifaximin as ordered.     EGD 10/10/2023- Small esophageal varices. Portal HTN gastropathy (no gastric varices). Duodenal nodules   Colonoscopy 1/25/23- incomplete procedure due to redundant colon. angiectasias throughout the visualized colon without bleeding.     Family present at bedside and helped with history giving.    REVIEW OF SYSTEMS (ROS): Complete review of systems negative other than listed in HPI.    PAST MEDICAL HISTORY:  Past Medical History:   Diagnosis Date    Alcoholic cirrhosis of liver with ascites (H)     Cirrhosis of liver (H)     Diabetes mellitus (H)     Gout     Hypertension     Kidney stone       FAMILY HISTORY:  Family History   Problem Relation Age of Onset    Heart Disease Brother     Hypertension Mother     Hypertension Father      SOCIAL HISTORY:  Social History     Socioeconomic History    Marital status:      Spouse name: Not on file    Number of children: Not on file    Years of education: Not on file    Highest education level: Not on file   Occupational History    Not on file   Tobacco Use    Smoking status:  Never     Passive exposure: Never    Smokeless tobacco: Never   Vaping Use    Vaping Use: Never used   Substance and Sexual Activity    Alcohol use: Not Currently     Comment: none for the past 2 years    Drug use: No    Sexual activity: Not on file   Other Topics Concern    Not on file   Social History Narrative    Lives with wife - has worked as  in the past     Social Determinants of Health     Financial Resource Strain: Low Risk  (11/14/2023)    Financial Resource Strain     Within the past 12 months, have you or your family members you live with been unable to get utilities (heat, electricity) when it was really needed?: No   Food Insecurity: Low Risk  (11/14/2023)    Food Insecurity     Within the past 12 months, did you worry that your food would run out before you got money to buy more?: No     Within the past 12 months, did the food you bought just not last and you didn t have money to get more?: No   Transportation Needs: Low Risk  (11/14/2023)    Transportation Needs     Within the past 12 months, has lack of transportation kept you from medical appointments, getting your medicines, non-medical meetings or appointments, work, or from getting things that you need?: No   Physical Activity: Not on file   Stress: Not on file   Social Connections: Not on file   Interpersonal Safety: Low Risk  (11/14/2023)    Interpersonal Safety     Do you feel physically and emotionally safe where you currently live?: Yes     Within the past 12 months, have you been hit, slapped, kicked or otherwise physically hurt by someone?: No     Within the past 12 months, have you been humiliated or emotionally abused in other ways by your partner or ex-partner?: No   Housing Stability: Low Risk  (11/14/2023)    Housing Stability     Do you have housing? : Yes     Are you worried about losing your housing?: No     MEDICATIONS PRIOR TO ADMISSION: (Not in a hospital admission)       ALLERGIES: Sulfa antibiotics and  Penicillins    PHYSICAL EXAM:    /57   Pulse 74   Temp 98.5  F (36.9  C) (Oral)   Resp 14   Wt 75.3 kg (166 lb)   SpO2 100%   BMI 25.24 kg/m      GENERAL: Pleasant  NECK: Supple without adenopathy  EYES: mild jaundice  LUNGS: Clear to auscultation bilaterally  HEART: Regular rate and rhythm, S1 and S2 present, trace bilateral lower extremity edema  ABDOMEN: distended. Positive bowel sounds. Firm abdomen, tenderness throughout. no guarding/rebound/mass, no obvious organomegaly. Unable to do rectal exam patient in hallway holding area in ER, not private.  MUSKULOSKELETAL:  Warm and well perfused, moves all extremities well  SKIN: mild jaundice  NEUROLOGIC: Alert and oriented  PSYCHIATRIC: Normal affect    LAB DATA:  CMP Results:   Recent Labs   Lab Test 11/16/23  1132 11/09/23  0803 11/08/23  1225 11/07/23  1322 11/07/23  0426 11/06/23  1446 11/06/23  0516    138  --   --  138  --  138   POTASSIUM 3.0* 3.6 4.3   < > 3.2*   < > 3.1*   CHLORIDE 115* 116*  --   --  114*  --  112*   CO2 13* 12*  --   --  14*  --  15*   ANIONGAP 11 10  --   --  10  --  11   * 165*  --   --  163*  --  164*   BUN 39.8* 27.1*  --   --  32.1*  --  33.0*   CR 1.51* 1.20*  --   --  1.29*  --  1.49*   BILITOTAL 1.6*  --   --   --  1.1  --  1.1   ALKPHOS 201*  --   --   --  148*  --  106   ALT 76*  --   --   --  23  --  16   AST 92*  --   --   --  50*  --  33    < > = values in this interval not displayed.      CBC  Recent Labs   Lab 11/16/23  1132   WBC 12.8*   RBC 2.96*   HGB 8.0*   HCT 26.0*   MCV 88   MCH 27.0   MCHC 30.8*   RDW 19.9*        INR  Recent Labs   Lab 11/16/23  1620   INR 2.04*      Lipase   Date Value Ref Range Status   01/23/2023 156 (H) 13 - 60 U/L Final   12/18/2022 195 (H) 13 - 60 U/L Final     IMAGING:  EXAM: CTA ABDOMEN PELVIS WITH CONTRAST  LOCATION: [facility]  DATE: 11/16/2023     INDICATION: Gastrointestinal bleeding, rectal bleeding, history of acute on chronic  duodenitis  COMPARISON: CT 12/18/2022  TECHNIQUE: CT angiogram abdomen pelvis during arterial phase of injection of IV contrast. 2D and 3D MIP reconstructions were performed by the CT technologist. Dose reduction techniques were used.  CONTRAST: IsoVue 370 75mL     FINDINGS:  ANGIOGRAM ABDOMEN/PELVIS: No active contrast extravasation within the stomach, small bowel, colon or rectum. Normal caliber abdominal aorta without evidence of a dissection. Mild to moderate atherosclerosis. Patent celiac artery and major branch vessels,   patent SMA, renal arteries and BLANK. Common origin of the common hepatic and celiac arteries. Grossly patent right sided TIPS. Patent portal, splenic and superior mesenteric veins.     LOWER CHEST: Small bilateral pleural effusions with adjacent atelectasis, left greater the right. Mitral annulus calcifications. Bilateral gynecomastia.     HEPATOBILIARY: Cirrhosis with diffuse atrophy and a nodular surface contour. No focal liver lesion. Cholelithiasis. No biliary ductal dilatation. Right-sided TIPS appears grossly patent.     PANCREAS: Normal.     SPLEEN: Enlarged, measuring 14.7 cm craniocaudal dimension. Stable 1.1 cm central hypodense lesion, likely a cyst.     ADRENAL GLANDS: Normal.     KIDNEYS/BLADDER: Multiple bilateral renal cysts. No follow-up is indicated. Bilateral multifocal renal cortical scarring. Multiple bilateral nonobstructing renal calculi with a dominant 11 mm lower right renal stone and dominant 10 mm lower left renal   stone. No hydronephrosis or hydroureter.     BOWEL: No active contrast extravasation within the stomach, small bowel, colon or rectum. Normal caliber small bowel and colon. Mild diffuse small bowel and colonic wall thickening. Moderate to large volume simple ascites. Diffuse mesenteric and body   wall edema. No free air.     LYMPH NODES: Stable prominent likely reactive upper abdominal and retroperitoneal lymph nodes     PELVIC ORGANS: Normal.      MUSCULOSKELETAL: Spinal and pelvic degenerative changes.                                                                    IMPRESSION:  1.  No evidence for active gastrointestinal tract bleeding.  2.  Nonspecific diffuse small bowel wall thickening which may reflect portal hypertensive enterocolopathy.  3.  Cirrhosis with sequelae of portal venous hypertension including mild splenomegaly, moderate to large volume ascites and a recanalized umbilical vein.  4.  Bilateral nonobstructing renal stones.  5.  Small bilateral pleural effusions with adjacent atelectasis    ASSESSMENT:    JENNINGS Cirrhosis   73 year old male with history of SBP, JENNINGS cirrhosis with complication, HTN, gout, T2DM who presents with rectal bleeding. CTA with no evidence for active GI bleeding.   Tbilli slightly elevated at 1.6, ALT 76, AST 92, Alk Phos 201.   MELD: 20  Anemia  Hgb 8 today. One episode of BRBPR. No melena.   Unlikely joseph upper GI bleed but was considered. More suspicion for outlet source.   Coagulopathy  INR 2.04  Hepatic Encephalopathy  Lactulose and Rifaximin at home, takes consistently.   Currently no asterixis and oriented.   SBP  Ceftriaxone for SBP given 11/3/23-11/9/2023.   On daily ciprofloxacin  for prophylaxis, per family patient has been taking consistently at home.   Slight elevated WBC, afebrile.   Ceftriaxone started at admit today.   Ascites  Present despite TIPS.  Renal insufficiency limiting diuretic use.    Requiring large-volume paracentesis every 7-10 days.   Creat 1.51    Recommendations:    1. Continue Rifaximin and Lactulose  2. Low sodium diet  3. Paracentesis today- diagnostic/therapeutic  4. Continue to trend MELD labs  5. Increased protein intake, consider nutritional beverages daily.  6. IV PPI   7. Continue to trend Hgb, transfuse per primary   8. GI will continue to follow, please call with status changes or questions.     Discussed with Dr. Garcia who will also visit with the patient.     TIME  SPENT: 60 min including chart review, patient interview and care coordination.                                                Berenice Gill CNP  Thank you for the opportunity to participate in the care of this patient.   Please feel free to call me with any questions or concerns.  Phone number [phone].            The patient was seen and evaluated in conjunction with Berenice Gill. Please see her note for details.     Zaire has hx of JENNINGS cirrhosis with refractory ascites despite TIPS, recent SBP admission and now with rectal bleeding x 1. Hgb at baseline. Unlikely to be variceal bleed. Rectal exam by me did show small internal hemorrhoid on MAURA. Pin prick of blood on finger. No clear anal fissure. I suspect bleeding is hemorrhoidal in nature. Will monitor for now. If he has significant bleeding, please start octreotide and notify GI. Otherwise, may be able to get him set up for an outpatient colonoscopy.     Wally Garcia DO  11/16/20238:11 PM  [facility]    40 minutes of total time was spent providing patient care, including patient evaluation, reviewing documentation/test results and .

## 2023-11-16 NOTE — Clinical Note
Hi Dr. Mejia,  Thank you for your referral. I want to share outreach notes as a FYI. Patient is schedule for SW assessment on 11/27. Thank you.   ROYA Jones

## 2023-11-16 NOTE — MEDICATION SCRIBE - ADMISSION MEDICATION HISTORY
Medication Scribe Admission Medication History    Admission medication history is complete. The information provided in this note is only as accurate as the sources available at the time of the update.    Information Source(s): Patient, Family member, and CareEverywhere/SureScripts via in-person    Pertinent Information: pt spouse provided med list and states pt hasn't taken any morning med's today    Changes made to PTA medication list:  Added: None  Deleted: None  Changed: None    Medication Affordability:  Not including over the counter (OTC) medications, was there a time in the past 3 months when you did not take your medications as prescribed because of cost?: No    Allergies reviewed with patient and updates made in EHR: yes    Medication History Completed By: ASHWIN MIR 11/16/2023 2:08 PM    PTA Med List   Medication Sig Last Dose    bismuth subsalicylate (PEPTO BISMOL) 262 MG chewable tablet Take 1 tablet by mouth every 6 hours as needed for diarrhea Unknown at prn    ciprofloxacin (CIPRO) 500 MG tablet Take 1 tablet (500 mg) by mouth daily 11/15/2023 at am    furosemide (LASIX) 20 MG tablet Take 1 tablet (20 mg) by mouth daily 11/15/2023 at am    lactulose (CHRONULAC) 10 GM/15ML solution Take 30 g by mouth daily 11/15/2023 at am    midodrine (PROAMATINE) 2.5 MG tablet Take 1 tablet (2.5 mg) by mouth 3 times daily (with meals) 11/15/2023 at pm    omeprazole (PRILOSEC) 20 MG DR capsule TAKE 1 CAPSULE BY MOUTH TWICE DAILY BEFORE MEAL(S) 11/15/2023 at pm    rifaximin (XIFAXAN) 550 MG TABS tablet Take 1 tablet (550 mg) by mouth 2 times daily 11/15/2023 at pm    spironolactone (ALDACTONE) 50 MG tablet Take 1 tablet (50 mg) by mouth daily 11/15/2023 at am

## 2023-11-16 NOTE — ED TRIAGE NOTES
Patient presents here for evaluation of bright red blood in his stools today. He was discharged from [facility] recently after treatment for peritonitis. His fingernail beds are very pale and he reports significant fatigue.     Triage Assessment (Adult)       Row Name 11/16/23 1058          Triage Assessment    Airway WDL WDL        Respiratory WDL    Respiratory WDL WDL        Skin Circulation/Temperature WDL    Skin Circulation/Temperature WDL WDL        Cardiac WDL    Cardiac WDL WDL        Peripheral/Neurovascular WDL    Peripheral Neurovascular WDL WDL        Cognitive/Neuro/Behavioral WDL    Cognitive/Neuro/Behavioral WDL WDL        [place]ar[place] Coma Scale    Best Eye Response 4-->(E4) spontaneous     Best Motor Response 6-->(M6) obeys commands     Best Verbal Response 5-->(V5) oriented     [place] Coma Scale Score 15

## 2023-11-16 NOTE — H&P
"[facility]    History and Physical - Hospitalist Service       Date of Admission:  11/16/2023    Assessment & Plan      Vito Sy is a 73 year old male admitted on 11/16/2023. He has hx of liver cirrhosis associated JENNINGS, refractory ascites, TIPPS, hx of esophageal varices, recently was [facility] for [facility]itis, now here with BRBPR and recurrent ascites    1.BRBPR  -concern lower GIB now  -if upper would be brisk  -iv ppi  -iv ceftriaxone   -serial hgb q 4 hours   -consent in chart by me for blood  -npo    2.Ascites and abd pain--in pt with liver cirrhrosis, MELD 20  -pain now 2-3 /10  -on exam and on CT ascites again  -gets taps q 2 weeks  -get paracentesis  diagnostic and therapeutic-today, I placed call to [facility] -albumin ordered today with paracentesis,  and send labs  -family interested in more info on liver transplant--has not started work up    3.Hypokalemia   -replace and check mag now    4.DM  -check bs q 4    5.CKD stage 3 with NANO  -avoid nephrotoxins  -no nsaids  -trend creat, hold lasix and spirolactone    6.Hypotension, chronic  -on midodrine    7.DVT prevent- scd    8.Code-full            Diet: NPO for Medical/Clinical Reasons Except for: No Exceptions    DVT Prophylaxis: Pneumatic Compression Devices  Cronin Catheter: Not present  Lines: None     Cardiac Monitoring: None  Code Status:  full    Clinically Significant Risk Factors Present on Admission        # Hypokalemia: Lowest K = 3 mmol/L in last 2 days, will replace as needed       # Hypoalbuminemia: Lowest albumin = 2.6 g/dL at 11/16/2023 11:32 AM, will monitor as appropriate     # Hypertension: Noted on problem list      # Overweight: Estimated body mass index is 25.24 kg/m  as calculated from the following:    Height as of 11/4/23: 1.727 m (5' 8\").    Weight as of this encounter: 75.3 kg (166 lb).       # Financial/Environmental Concerns:           Disposition Plan      Expected Discharge Date: 11/18/2023   "                Indu Dominguez MD  Hospitalist Service  [facility]  Securely message with [place] (more info)  Text page via [employer] Paging/Directory     ______________________________________________________________________    Chief Complaint   BRBPR today    History is obtained from the patient and patient's significant other    History of Present Illness   Vito CABRERA Yossi is a 73 year old male who has JENNINGS associated cirrhosis, with hx of esophageal varices, refractory ascites with TIPS(2022), hx of SBP recently and now here with BRBPR    He notes today this am had BRBPR--wife shows me a picture of blood on floor by toilet this am and in toilet  He notes pain in abd today 2-3/10 and does not usually have pain  He denied n/v.or any emesis  He was just at [facility] and treated for peritonitis  No fever or chills now    Per wife she notes that he sees   Liver failure not from alcohol  She is very interested in getting info on liver transplant(they have not done any work up with any transplant center yet)        Past Medical History    Past Medical History:   Diagnosis Date    Alcoholic cirrhosis of liver with ascites (H)     Cirrhosis of liver (H)     Diabetes mellitus (H)     Gout     Hypertension     Kidney stone        Past Surgical History   Past Surgical History:   Procedure Laterality Date    COLONOSCOPY      ESOPHAGOSCOPY, GASTROSCOPY, DUODENOSCOPY (EGD), COMBINED N/A 10/10/2023    Procedure: ESOPHAGOGASTRODUODENOSCOPY with biopsy;  Surgeon: Puneet Plunkett MD, MD;  Location: [facility] OR    IR TRANSVEN INTRAHEPATIC PORTOSYST REV  1/26/2023    IR TRANSVEN INTRAHEPATIC PORTOSYST SHUNT  11/16/2022    IR TRANSVEN INTRAHEPATIC PORTOSYST SHUNT  12/9/2022    MT ESOPHAGOGASTRODUODENOSCOPY TRANSORAL DIAGNOSTIC N/A 11/16/2020    Procedure: ESOPHAGOGASTRODUODENOSCOPY (EGD);  Surgeon: Juan Garnett MD;  Location: [facility];  Service: Gastroenterology    MT ESOPHAGOGASTRODUODENOSCOPY  TRANSORAL DIAGNOSTIC N/A 11/18/2020    Procedure: ESOPHAGOGASTRODUODENOSCOPY (EGD) WITH BANDING;  Surgeon: Juan Garnett MD;  Location: [facility];  Service: Gastroenterology    URETEROSCOPY         Prior to Admission Medications   Prior to Admission Medications   Prescriptions Last Dose Informant Patient Reported? Taking?   bismuth subsalicylate (PEPTO BISMOL) 262 MG chewable tablet Unknown at prn  Yes Yes   Sig: Take 1 tablet by mouth every 6 hours as needed for diarrhea   ciprofloxacin (CIPRO) 500 MG tablet 11/15/2023 at am  No Yes   Sig: Take 1 tablet (500 mg) by mouth daily   furosemide (LASIX) 20 MG tablet 11/15/2023 at am  No Yes   Sig: Take 1 tablet (20 mg) by mouth daily   lactulose (CHRONULAC) 10 GM/15ML solution 11/15/2023 at am  Yes Yes   Sig: Take 30 g by mouth daily   midodrine (PROAMATINE) 2.5 MG tablet 11/15/2023 at pm  No Yes   Sig: Take 1 tablet (2.5 mg) by mouth 3 times daily (with meals)   omeprazole (PRILOSEC) 20 MG DR capsule 11/15/2023 at pm  No Yes   Sig: TAKE 1 CAPSULE BY MOUTH TWICE DAILY BEFORE MEAL(S)   rifaximin (XIFAXAN) 550 MG TABS tablet 11/15/2023 at pm  No Yes   Sig: Take 1 tablet (550 mg) by mouth 2 times daily   spironolactone (ALDACTONE) 50 MG tablet 11/15/2023 at am  No Yes   Sig: Take 1 tablet (50 mg) by mouth daily      Facility-Administered Medications: None        Review of Systems    CONSTITUTIONAL:  positive for  fatigue and malaise  EYES:  negative  HEENT:  negative  RESPIRATORY:  negative  CARDIOVASCULAR:  neg  GASTROINTESTINAL:  positive for hematochezia, liver dx  GENITOURINARY:  negative  INTEGUMENT/BREAST:  negative  HEMATOLOGIC/LYMPHATIC:  negative  ALLERGIC/IMMUNOLOGIC:  negative  ENDOCRINE:  DM  MUSCULOSKELETAL:  negative  NEUROLOGICAL:  negative  BEHAVIOR/PSYCH:  negative    Social History   I have reviewed this patient's social history and updated it with pertinent information if needed.  Social History     Tobacco Use    Smoking status: Never     Passive  "exposure: Never    Smokeless tobacco: Never   Vaping Use    Vaping Use: Never used   Substance Use Topics    Alcohol use: Not Currently     Comment: none for the past 2 years    Drug use: No         Family History   I have reviewed this patient's family history and updated it with pertinent information if needed.  Family History   Problem Relation Age of Onset    Heart Disease Brother     Hypertension Mother     Hypertension Father          Allergies   Allergies   Allergen Reactions    Sulfa Antibiotics Shortness Of Breath and Itching    Penicillins Unknown     Annotation: discussed with patient, he reports he had \"itching\" with penicillin many years ago in [facility] but no hives or throat or respiratory symptoms.  he also reports having tolerated amoxicillin since coming to [place].          Physical Exam   Vital Signs: Temp: 98.5  F (36.9  C) Temp src: Oral BP: 107/57 Pulse: 74   Resp: 14 SpO2: 100 %      Weight: 166 lbs 0 oz    Constitutional: awake, alert, cooperative, and no apparent distress  Eyes: sclera clear  Respiratory: no increased work of breathing, good air exchange, no retractions, and diminished breath sounds right base and left base  Cardiovascular: regular rate and rhythm and normal S1 and S2  GI: hypoactive bowel sounds, soft, distended, and tenderness noted lower abd  Skin: no bruising or bleeding  Musculoskeletal: edema legs  Neurologic: Mental Status Exam:  Level of Alertness:   awake  Motor Exam:  moves all extremities well and symmetrically  Neuropsychiatric: General: normal, calm, and normal eye contact    Medical Decision Making       75 MINUTES SPENT BY ME on the date of service doing chart review, history, exam, documentation & further activities per the note.      Data     I have personally reviewed the following data over the past 24 hrs:    12.8 (H)  \   8.0 (L)   / 151     139 115 (H) 39.8 (H) /  288 (H)   3.0 (L) 13 (L) 1.51 (H) \     ALT: 76 (H) AST: 92 (H) AP: 201 (H) TBILI: 1.6 (H)   ALB: " 2.6 (L) TOT PROTEIN: 6.7 LIPASE: N/A       Imaging results reviewed over the past 24 hrs:   Recent Results (from the past 24 hour(s))   CTA Abdomen Pelvis with Contrast    Narrative    EXAM: CTA ABDOMEN PELVIS WITH CONTRAST  LOCATION: [facility]  DATE: 11/16/2023    INDICATION: Gastrointestinal bleeding, rectal bleeding, history of acute on chronic duodenitis  COMPARISON: CT 12/18/2022  TECHNIQUE: CT angiogram abdomen pelvis during arterial phase of injection of IV contrast. 2D and 3D MIP reconstructions were performed by the CT technologist. Dose reduction techniques were used.  CONTRAST: IsoVue 370 75mL    FINDINGS:  ANGIOGRAM ABDOMEN/PELVIS: No active contrast extravasation within the stomach, small bowel, colon or rectum. Normal caliber abdominal aorta without evidence of a dissection. Mild to moderate atherosclerosis. Patent celiac artery and major branch vessels,   patent SMA, renal arteries and BLANK. Common origin of the common hepatic and celiac arteries. Grossly patent right sided TIPS. Patent portal, splenic and superior mesenteric veins.    LOWER CHEST: Small bilateral pleural effusions with adjacent atelectasis, left greater the right. Mitral annulus calcifications. Bilateral gynecomastia.    HEPATOBILIARY: Cirrhosis with diffuse atrophy and a nodular surface contour. No focal liver lesion. Cholelithiasis. No biliary ductal dilatation. Right-sided TIPS appears grossly patent.    PANCREAS: Normal.    SPLEEN: Enlarged, measuring 14.7 cm craniocaudal dimension. Stable 1.1 cm central hypodense lesion, likely a cyst.    ADRENAL GLANDS: Normal.    KIDNEYS/BLADDER: Multiple bilateral renal cysts. No follow-up is indicated. Bilateral multifocal renal cortical scarring. Multiple bilateral nonobstructing renal calculi with a dominant 11 mm lower right renal stone and dominant 10 mm lower left renal   stone. No hydronephrosis or hydroureter.    BOWEL: No active contrast extravasation within  the stomach, small bowel, colon or rectum. Normal caliber small bowel and colon. Mild diffuse small bowel and colonic wall thickening. Moderate to large volume simple ascites. Diffuse mesenteric and body   wall edema. No free air.    LYMPH NODES: Stable prominent likely reactive upper abdominal and retroperitoneal lymph nodes    PELVIC ORGANS: Normal.    MUSCULOSKELETAL: Spinal and pelvic degenerative changes.      Impression    IMPRESSION:  1.  No evidence for active gastrointestinal tract bleeding.  2.  Nonspecific diffuse small bowel wall thickening which may reflect portal hypertensive enterocolopathy.  3.  Cirrhosis with sequelae of portal venous hypertension including mild splenomegaly, moderate to large volume ascites and a recanalized umbilical vein.  4.  Bilateral nonobstructing renal stones.  5.  Small bilateral pleural effusions with adjacent atelectasis.

## 2023-11-16 NOTE — ED PROVIDER NOTES
EMERGENCY DEPARTMENT ENCOUNTER      NAME: Vito Sy  AGE: 73 year old male  YOB: 1950  MRN: [id number]  EVALUATION DATE & TIME: 11/16/2023 11:03 AM    PCP: Amrik Mejia    ED PROVIDER: Antonia Schmid M.D.       Chief Complaint   Patient presents with    Rectal Bleeding         FINAL IMPRESSION:  1. Lower GI bleed    2. Hypokalemia          ED COURSE & MEDICAL DECISION MAKING:    Pertinent Labs & Imaging studies reviewed. (See chart for details)  73 year old male presents to the Emergency Department for evaluation of Nursing note were reviewed.  Past Medical records were reviewed.    No active bleeding seen here in the ED.  Patient is hemodynamically stable.  Differential diagnosis includes diverticulosis bleeding internal hemorrhoids Meckel's Diverticulum AV malformation or rapid upper GI bleeding (esophageal varices bleeding gastric ulcers or carlie ebonie tears)    The most likely source of GI bleeding is unclear.  We could not evaluate for hemorrhoidal bleeding as there were no exam rooms available.   Regarding rapid upper GI bleeding, patient does have a history of liver disease.   Patient has no had forceful repetitive vomitting thus Carlie Ebonie tears unlikely.  Will need further work-up to differentiate between the other causes of GI bleeding.    Peripheral IV's placed.  Type and screen sent.  Initial Hgb was 8 compared with previous checks of 7.6 and 7.4.  Patient also had a leukocytosis with a WBC of 12.8 up from 9.1 previous visit. .NS given with given a good hemodynamic response.  GI bleeding in the ED was treated w/ PPI.     CMP showed potassium of 3.0, compared to his previous visit 3.6 and 4.3.  However patient has a history of hypokalemia in the past.  Patient's CO2 was low at 13, but this is baseline for patient as previous checks was 12 and 14 before.  He has mild hypocalcemia which is stable for patient, and hyperchloremia, as well as hyperglycemia which is increased at 288  this visit versus 165.  AST and ALT and bilirubin also increased from prior visit.    I do not think this is SBP as patient states there is no increase in abdominal pain, no fevers or chills, and patient is on prophylaxis for SBP after his recent hospitalization.    Patient is acute kidney injury with a creatinine 1.51 up from 1.20 previous visit.    Patient was admitted to  w/ GI consult for colonoscopy and possible endoscopy if no source found.  Consulted GI who will see patient during his stay.  Patient has no difficulties breathing at this time or any signs that he needs a paracentesis.  We discussed risks benefits of CTA of the abdomen.  CTA showed no evidence for GI bleed.  But nonspecific diffuse bowel wall thickening which may reflect portal hypertensive enterocolopathy.  It showed cirrhosis with sequela of portal venous hypertension including splenomegaly and large amount of ascites and a recannulized umbilical vein.  He also bilateral nonobstructing renal stones, and bilateral pleural effusions with adjacent atelectasis.    Given history, exam, and workup in the emergency department, patient is unsafe to be discharged to home at this time and will be admitted to the hospital for further evaluation and treatment.              11:04 AM I met with the patient, obtained history, performed an initial exam, and discussed options and plan for diagnostics and treatment here in the ED.  11:06 AM The patient is currently in a hallway bed due to ED critical capacity. Will plan to move patient to a room to perform rectal exam when a room opens up.   2:38 PM I checked on the patient and updated them on the plan.  3:00 PM I spoke with the accepting hospitalist, Dr. Dominguez. She would like me to call GI.  3:13 PM I spoke with Dr. Garcia at [facility].    At the conclusion of the encounter I discussed the results of all of the tests and the disposition. The questions were answered. The patient or family acknowledged  understanding and was agreeable with the care plan.       Medical Decision Making    History:  Supplemental history from: Family Member/Significant Other  External Record(s) reviewed: Inpatient Record: [facility]    Work Up:  Chart documentation includes differential considered and any EKGs or imaging independently interpreted by provider, where specified.  In additional to work up documented, I considered the following work up: Documented in chart, if applicable.    External consultation:  Discussion of management with another provider: Documented in chart, if applicable    Complicating factors:  Care impacted by chronic illness: Chronic Kidney Disease, Diabetes, Hypertension, and Other: End-stage liver disease  Care affected by social determinants of health: N/A    Disposition considerations: Admit.        MEDICATIONS GIVEN IN THE EMERGENCY:  Medications   pantoprazole (PROTONIX) IV push injection 40 mg (40 mg Intravenous $Given 11/16/23 1138)   iopamidol (ISOVUE-370) solution 75 mL (75 mLs Intravenous $Given 11/16/23 1258)   potassium chloride ER (KLOR-CON M) CR tablet 40 mEq (40 mEq Oral $Given 11/16/23 2037)   potassium chloride ER (KLOR-CON M) CR tablet 20 mEq (20 mEq Oral $Given 11/16/23 2237)   potassium chloride (KLOR-CON) Packet 40 mEq (40 mEq Oral or Feeding Tube $Given 11/17/23 0442)   albumin human 25 % injection 25 g (0 g Intravenous Stopped 11/17/23 1511)       NEW PRESCRIPTIONS STARTED AT TODAY'S ER VISIT  Discharge Medication List as of 11/17/2023  4:31 PM             =================================================================    HPI    Patient information was obtained from: patient's wife and patient      Use of : N/A       Vito Sy is a 73 year old male with a pertinent history of alcohol related cirrhosis complicated by esophageal varices refractory ascites status post TIPS (at the end of 2022), and subsequent hepatic encephalopathy who who was found to have  spontaneous bacterial peritonitis after paracentesis on 11/3 with 9 L off presents to this ED via wheelchair for evaluation of rectal bleeding and abdominal distention.     The patient presents with 1 episode of blood in stool, abdominal distention, weakness, pallor, chills, decreased appetite and abdominal pain. He has had 4 watery bowel movements since midnight (11 hours) and his most recent had blood in it so they came to the ED. His abdomen is distended and has 2/10 pain that gets worse with palpation. He has had intermittent abdominal pain for the last 4 months. He only had 1 Ensure nutritional shake yesterday. Of note, he was recently discharged from the hospital with instruction to start Cipro, Furosemide, Midodrine, and Spironolactone.     He denies fever, chest pain, dysuria, lightheaded, chest pain, dizziness, or any other complaints at this time.      Chart Review:  11/3-11/9/2023: The patient was admitted at [facility] for 7 days and discharged a week ago. He had a 2-3 day history of abdominal pain with chills. On admission, Zaire was afebrile and hemodynamically stable throughout. GI was consulted and he was treated with 7 days of ceftriaxone for spontaneous bacterial peritonitis. He also had bilateral lower extremity edema for which he received an echo that showed normal cardiac function. He had a repeat paracentesis on 11/6. Patient given PTA lactulose and rifaximin. During admission his diuresis was optimized with spironolactone 50 mg lasix 20 mg. He was started on midodrine 2.5 TID to promote diuresis for prior to hospitalization he started to have a diminished response to his PTA diuretics. He was found to have a gram positive blood culture which was likely a contaminant. He was discharged with prophylactic antibiotics to try and prevent further episodes of SBP. He was discharged with instruction to follow up with [facility].      REVIEW OF SYSTEMS   Review of Systems   Constitutional:  Positive  for appetite change (decreased) and chills. Negative for fever.   Cardiovascular:  Negative for chest pain.   Gastrointestinal:  Positive for abdominal distention, abdominal pain, blood in stool and diarrhea.   Genitourinary:  Negative for dysuria.   Skin:  Positive for pallor.   Neurological:  Positive for weakness. Negative for dizziness and light-headedness.        PAST MEDICAL HISTORY:  Past Medical History:   Diagnosis Date    Alcoholic cirrhosis of liver with ascites (H)     Cirrhosis of liver (H)     Diabetes mellitus (H)     Gout     Hypertension     Kidney stone        PAST SURGICAL HISTORY:  Past Surgical History:   Procedure Laterality Date    COLONOSCOPY      ESOPHAGOSCOPY, GASTROSCOPY, DUODENOSCOPY (EGD), COMBINED N/A 10/10/2023    Procedure: ESOPHAGOGASTRODUODENOSCOPY with biopsy;  Surgeon: Puneet Plunkett MD, MD;  Location: [facility] OR    IR TRANSVEN INTRAHEPATIC PORTOSYST REV  1/26/2023    IR TRANSVEN INTRAHEPATIC PORTOSYST SHUNT  11/16/2022    IR TRANSVEN INTRAHEPATIC PORTOSYST SHUNT  12/9/2022    UT ESOPHAGOGASTRODUODENOSCOPY TRANSORAL DIAGNOSTIC N/A 11/16/2020    Procedure: ESOPHAGOGASTRODUODENOSCOPY (EGD);  Surgeon: Juan Garnett MD;  Location: [facility];  Service: Gastroenterology    UT ESOPHAGOGASTRODUODENOSCOPY TRANSORAL DIAGNOSTIC N/A 11/18/2020    Procedure: ESOPHAGOGASTRODUODENOSCOPY (EGD) WITH BANDING;  Surgeon: Juan Garnett MD;  Location: [facility];  Service: Gastroenterology    URETEROSCOPY             CURRENT MEDICATIONS:    bismuth subsalicylate (PEPTO BISMOL) 262 MG chewable tablet  ciprofloxacin (CIPRO) 500 MG tablet  furosemide (LASIX) 20 MG tablet  lactulose (CHRONULAC) 10 GM/15ML solution  midodrine (PROAMATINE) 2.5 MG tablet  omeprazole (PRILOSEC) 20 MG DR capsule  rifaximin (XIFAXAN) 550 MG TABS tablet  spironolactone (ALDACTONE) 50 MG tablet        ALLERGIES:  Allergies   Allergen Reactions    Sulfa Antibiotics Shortness Of Breath and Itching    Penicillins  "Unknown     Annotation: discussed with patient, he reports he had \"itching\" with penicillin many years ago in [facility] but no hives or throat or respiratory symptoms.  he also reports having tolerated amoxicillin since coming to [place].         FAMILY HISTORY:  Family History   Problem Relation Age of Onset    Heart Disease Brother     Hypertension Mother     Hypertension Father        SOCIAL HISTORY:   Social History     Socioeconomic History    Marital status:    Tobacco Use    Smoking status: Never     Passive exposure: Never    Smokeless tobacco: Never   Vaping Use    Vaping Use: Never used   Substance and Sexual Activity    Alcohol use: Not Currently     Comment: none for the past 2 years    Drug use: No   Social History Narrative    Lives with wife - has worked as  in the past     Social Determinants of Health     Financial Resource Strain: Low Risk  (11/14/2023)    Financial Resource Strain     Within the past 12 months, have you or your family members you live with been unable to get utilities (heat, electricity) when it was really needed?: No   Food Insecurity: Low Risk  (11/14/2023)    Food Insecurity     Within the past 12 months, did you worry that your food would run out before you got money to buy more?: No     Within the past 12 months, did the food you bought just not last and you didn t have money to get more?: No   Transportation Needs: Low Risk  (11/14/2023)    Transportation Needs     Within the past 12 months, has lack of transportation kept you from medical appointments, getting your medicines, non-medical meetings or appointments, work, or from getting things that you need?: No   Interpersonal Safety: Low Risk  (11/14/2023)    Interpersonal Safety     Do you feel physically and emotionally safe where you currently live?: Yes     Within the past 12 months, have you been hit, slapped, kicked or otherwise physically hurt by someone?: No     Within the past 12 months, have you been " humiliated or emotionally abused in other ways by your partner or ex-partner?: No   Housing Stability: Low Risk  (11/14/2023)    Housing Stability     Do you have housing? : Yes     Are you worried about losing your housing?: No       VITALS:  /62 (BP Location: Right arm)   Pulse 73   Temp 97.8  F (36.6  C) (Oral)   Resp 16   Wt 75.3 kg (166 lb)   SpO2 100%   BMI 25.24 kg/m      PHYSICAL EXAM    General: Alert, lying on the bed in NAD  Neuro: Awake alert; moving extremities x 4 face symterical  Eyes: sclera nonicteric conjunctiva clear  Mouth: mmm  Heart: RRR  Lungs:  CTA bilaterally  Abdomen:  soft  distended, mild diffuse tenderness (not new per patient)   Back: No CVA tenderness  Extremities: no pedal edema  Skin:  warm and dry       LAB:  All pertinent labs reviewed and interpreted.  Results for orders placed or performed during the hospital encounter of 11/16/23   CTA Abdomen Pelvis with Contrast    Impression    IMPRESSION:  1.  No evidence for active gastrointestinal tract bleeding.  2.  Nonspecific diffuse small bowel wall thickening which may reflect portal hypertensive enterocolopathy.  3.  Cirrhosis with sequelae of portal venous hypertension including mild splenomegaly, moderate to large volume ascites and a recanalized umbilical vein.  4.  Bilateral nonobstructing renal stones.  5.  Small bilateral pleural effusions with adjacent atelectasis.   US Paracentesis with Albumin    Impression    IMPRESSION:  1.  Status post ultrasound-guided paracentesis.    Reference CPT Code: 30577   Comprehensive metabolic panel   Result Value Ref Range    Sodium 139 135 - 145 mmol/L    Potassium 3.0 (L) 3.4 - 5.3 mmol/L    Carbon Dioxide (CO2) 13 (L) 22 - 29 mmol/L    Anion Gap 11 7 - 15 mmol/L    Urea Nitrogen 39.8 (H) 8.0 - 23.0 mg/dL    Creatinine 1.51 (H) 0.67 - 1.17 mg/dL    GFR Estimate 49 (L) >60 mL/min/1.73m2    Calcium 8.6 (L) 8.8 - 10.2 mg/dL    Chloride 115 (H) 98 - 107 mmol/L    Glucose 288 (H) 70  - 99 mg/dL    Alkaline Phosphatase 201 (H) 40 - 150 U/L    AST 92 (H) 0 - 45 U/L    ALT 76 (H) 0 - 70 U/L    Protein Total 6.7 6.4 - 8.3 g/dL    Albumin 2.6 (L) 3.5 - 5.2 g/dL    Bilirubin Total 1.6 (H) <=1.2 mg/dL   CBC with platelets and differential   Result Value Ref Range    WBC Count 12.8 (H) 4.0 - 11.0 10e3/uL    RBC Count 2.96 (L) 4.40 - 5.90 10e6/uL    Hemoglobin 8.0 (L) 13.3 - 17.7 g/dL    Hematocrit 26.0 (L) 40.0 - 53.0 %    MCV 88 78 - 100 fL    MCH 27.0 26.5 - 33.0 pg    MCHC 30.8 (L) 31.5 - 36.5 g/dL    RDW 19.9 (H) 10.0 - 15.0 %    Platelet Count 151 150 - 450 10e3/uL    % Neutrophils 88 %    % Lymphocytes 3 %    % Monocytes 7 %    % Eosinophils 1 %    % Basophils 0 %    % Immature Granulocytes 1 %    NRBCs per 100 WBC 0 <1 /100    Absolute Neutrophils 11.4 (H) 1.6 - 8.3 10e3/uL    Absolute Lymphocytes 0.4 (L) 0.8 - 5.3 10e3/uL    Absolute Monocytes 0.9 0.0 - 1.3 10e3/uL    Absolute Eosinophils 0.1 0.0 - 0.7 10e3/uL    Absolute Basophils 0.0 0.0 - 0.2 10e3/uL    Absolute Immature Granulocytes 0.1 <=0.4 10e3/uL    Absolute NRBCs 0.0 10e3/uL   Extra Urine Collection   Result Value Ref Range    Hold Specimen JI    Result Value Ref Range    Hemoglobin 7.8 (L) 13.3 - 17.7 g/dL   Result Value Ref Range    Magnesium 2.3 1.7 - 2.3 mg/dL   Result Value Ref Range    INR 2.04 (H) 0.85 - 1.15   pH fluid   Result Value Ref Range    pH Fluid Source Abdomen     pH Fluid 7.5 pH   Triglyceride Fluid   Result Value Ref Range    Triglyceride Fluid Source Abdomen     Triglyceride Fluid 16 mg/dL   Result Value Ref Range    Hemoglobin A1C 5.8 (H) <5.7 %   Result Value Ref Range    Procalcitonin 0.71 (H) <0.50 ng/mL   Albumin fluid   Result Value Ref Range    Albumin Fluid Source Abdomen     Albumin fluid 1.0 g/dL   Protein fluid   Result Value Ref Range    Protein Fluid Source Abdomen     Protein Total Fluid 1.9 g/dL   Lactic Acid STAT   Result Value Ref Range    Lactic Acid 1.7 0.7 - 2.0 mmol/L   Glucose by meter    Result Value Ref Range    GLUCOSE BY METER POCT 207 (H) 70 - 99 mg/dL   Result Value Ref Range    Hemoglobin 7.4 (L) 13.3 - 17.7 g/dL   Glucose by meter   Result Value Ref Range    GLUCOSE BY METER POCT 211 (H) 70 - 99 mg/dL   Basic metabolic panel   Result Value Ref Range    Sodium 144 135 - 145 mmol/L    Potassium 3.1 (L) 3.4 - 5.3 mmol/L    Chloride 120 (H) 98 - 107 mmol/L    Carbon Dioxide (CO2) 13 (L) 22 - 29 mmol/L    Anion Gap 11 7 - 15 mmol/L    Urea Nitrogen 36.0 (H) 8.0 - 23.0 mg/dL    Creatinine 1.41 (H) 0.67 - 1.17 mg/dL    GFR Estimate 53 (L) >60 mL/min/1.73m2    Calcium 8.0 (L) 8.8 - 10.2 mg/dL    Glucose 178 (H) 70 - 99 mg/dL   Hepatic panel   Result Value Ref Range    Protein Total 6.0 (L) 6.4 - 8.3 g/dL    Albumin 2.2 (L) 3.5 - 5.2 g/dL    Bilirubin Total 1.4 (H) <=1.2 mg/dL    Alkaline Phosphatase 155 (H) 40 - 150 U/L    AST 47 (H) 0 - 45 U/L    ALT 54 0 - 70 U/L    Bilirubin Direct 0.83 (H) 0.00 - 0.30 mg/dL   CBC with platelets   Result Value Ref Range    WBC Count 10.0 4.0 - 11.0 10e3/uL    RBC Count 2.84 (L) 4.40 - 5.90 10e6/uL    Hemoglobin 7.7 (L) 13.3 - 17.7 g/dL    Hematocrit 24.7 (L) 40.0 - 53.0 %    MCV 87 78 - 100 fL    MCH 27.1 26.5 - 33.0 pg    MCHC 31.2 (L) 31.5 - 36.5 g/dL    RDW 20.0 (H) 10.0 - 15.0 %    Platelet Count 128 (L) 150 - 450 10e3/uL   Glucose by meter   Result Value Ref Range    GLUCOSE BY METER POCT 187 (H) 70 - 99 mg/dL   Result Value Ref Range    Hemoglobin 8.1 (L) 13.3 - 17.7 g/dL   Glucose by meter   Result Value Ref Range    GLUCOSE BY METER POCT 164 (H) 70 - 99 mg/dL   Result Value Ref Range    Potassium 4.0 3.4 - 5.3 mmol/L   Glucose by meter   Result Value Ref Range    GLUCOSE BY METER POCT 151 (H) 70 - 99 mg/dL   Cell Count Body Fluid   Result Value Ref Range    Color Yellow Colorless, Yellow    Clarity Hazy (A) Clear    Cell Count Fluid Source Abdomen    Glucose by meter   Result Value Ref Range    GLUCOSE BY METER POCT 161 (H) 70 - 99 mg/dL   Glucose by  meter   Result Value Ref Range    GLUCOSE BY METER POCT 192 (H) 70 - 99 mg/dL   Adult Type and Screen   Result Value Ref Range    ABO/RH(D) B POS     Antibody Screen Negative Negative    SPECIMEN EXPIRATION DATE [id number]    Anaerobic Bacterial Culture Routine    Specimen: Abdomen; Ascites Fluid   Result Value Ref Range    Culture No anaerobic organisms isolated after 2 days    Ascites Fluid Aerobic Bacterial Culture Routine with Gram Stain    Specimen: Abdomen; Ascites Fluid   Result Value Ref Range    Culture No growth after 2 days     Gram Stain Result No organisms seen     Gram Stain Result 3+ WBC seen    Blood Culture Peripheral Blood    Specimen: Peripheral Blood   Result Value Ref Range    Culture No growth after 3 days    Blood Culture Peripheral Blood    Specimen: Peripheral Blood   Result Value Ref Range    Culture No growth after 3 days        RADIOLOGY:  Reviewed all pertinent imaging. Please see official radiology report.  US Paracentesis with Albumin   Final Result   IMPRESSION:   1.  Status post ultrasound-guided paracentesis.      Reference CPT Code: 58527      CTA Abdomen Pelvis with Contrast   Final Result   IMPRESSION:   1.  No evidence for active gastrointestinal tract bleeding.   2.  Nonspecific diffuse small bowel wall thickening which may reflect portal hypertensive enterocolopathy.   3.  Cirrhosis with sequelae of portal venous hypertension including mild splenomegaly, moderate to large volume ascites and a recanalized umbilical vein.   4.  Bilateral nonobstructing renal stones.   5.  Small bilateral pleural effusions with adjacent atelectasis.          EKG:    Performed at: November 16, 2023, 11:13 AM, [facility] EMERGENCY DEPARTMENT    Impression: Sinus rhythm. No ST or T wave changes to suggest ischemia or infarction. When compared with ECG from 18-SEP-2023, no changes. QTC normal.    Rate: 82 BPM  Rhythm: Sinus rhythm  Axis: 28 70 -13  LA Interval: 154  ms  QRS Interval: 114 ms  QTc Interval: 378/441 ms  ST Changes: None  Comparison: When compared with ECG from 18-SEP-2023, no changes.     I have independently reviewed and interpreted the EKG(s) documented above.          I, Guanaco Leonard, am serving as a scribe to document services personally performed by Antonia Schmid M.D. based on my observation and the provider's statements to me. I, Antonia Schmid M.D., attest that Guanaco Leonard is acting in a scribe capacity, has observed my performance of the services and has documented them in accordance with my direction.    Antonia Schmid M.D.  [facility] EMERGENCY DEPARTMENT  [address] [place] [id number] [phone]       Antonia Schmid MD  11/20/23 5942

## 2023-11-16 NOTE — PROGRESS NOTES
Clinic Care Coordination Contact  Community Health Worker Initial Outreach    CHW Initial Information Gathering:  Referral Source: PCP  Preferred Hospital: [facility]  [phone]  Preferred Urgent Care: [facility], [phone]  Current living arrangement:: I live in a private home with family  Type of residence:: Private home - no stairs  Community Resources: None  Supplies Currently Used at Home: Incontinence Supplies  Equipment Currently Used at Home: walker, rolling, commode chair  Informal Support system:: Spouse, Children, Family  No PCP office visit in Past Year: No  Transportation means:: Accessible car, Family, Regular car  CHW Additional Questions  If ED/Hospital discharge, follow-up appointment scheduled as recommended?: N/A  Medication changes made following ED/Hospital discharge?: N/A  MyChart active?: Yes  Patient sent Social Determinants of Health questionnaire?: No  Patient agreeable to assistance with activating MyChart?: Yes    Chart Review: Referral from PCP  Reason for Referral: Complex Medical Concerns/Education  Complex Medical Concerns: Chron Diagnosis - end stage liver disease  Note: See 11/14/23 visit notes.     Patient accepts CC: Yes. Patient scheduled for assessment with MADELIN Geiger on 11/27/23 at 11AM. Patient noted desire to discuss CCC, support with applying for secondary health insurance [place] and Medicare Savings Program. Support with checking eligibility for PCA services given high needs and ADLs.     Naomi prefers to work with their niece on ordering briefs and finding home care services. She will notify CCC team if any support is needed.   11:05AM: Nicole (Niece) who's an RN sent message stating she's sending patient back to hospital.     Cori Guillen  Clinic Care Coordination  [facility]    Phone: [phone]

## 2023-11-16 NOTE — TELEPHONE ENCOUNTER
General Call    Contacts         Type Contact Phone/Fax    11/16/2023 03:16 PM CST Phone (Incoming) Naomi Sy (Emergency Contact) [phone]          Reason for Call:  Unknown    What are your questions or concerns:      Patient's spouse, Naomi (CTC on file) regarding patient. Spouse states she is calling to speak with Dr. Mejia. Writer did let spouse know that Dr. Mejia left for the day, as he was only here this morning. Before writer was able to gather more information, it appears a doctor came to speak with spouse (writer can hear it over the telephone).     Spouse states, she will call clinic back. Call was disconnected. Chart review, it appears pt was admitted to [facility] for Lower GI bleed today.    Date of last appointment with provider: 11/14/2023    Could [facility] or would you prefer to receive a phone call?:   Patient would prefer a phone call   Okay to leave a detailed message?: Yes at Other phone number:  [phone].    If patient's spouse calls back, please add-on to TE, thanks!    RAYMOND BourneN, RN   [facility]

## 2023-11-17 PROBLEM — I95.9 HYPOTENSION: Status: ACTIVE | Noted: 2020-11-16

## 2023-11-17 PROBLEM — R18.8 CIRRHOSIS OF LIVER WITH ASCITES (H): Status: ACTIVE | Noted: 2022-11-16

## 2023-11-17 NOTE — PROCEDURES
Radiology Procedure Note    Procedure:  US  guided paracentesis    Findings: 4.1 liters of clear yellow fluid sent to lab    Complications:  None    EBL:  Minimal

## 2023-11-17 NOTE — PLAN OF CARE
Problem: Fluid Volume Excess  Goal: Fluid Balance  Outcome: Progressing   Goal Outcome Evaluation:         Patient had paracentesis in AM. 4.1 L removed. Abdomen distended, tender with palpation. Patient denied having pain. Hgb at 12:00 was 8.1. Potassium 4.0. IV albumin given. A1 with gait belt for pivot transfers. A&Ox4. Family at bedside. IVF and cardiac telemetry discontinued.  and 161. Tolerating room air well.

## 2023-11-17 NOTE — PLAN OF CARE
Problem: Adult Inpatient Plan of Care  Goal: Absence of Hospital-Acquired Illness or Injury  Outcome: Progressing  Intervention: Identify and Manage Fall Risk  Recent Flowsheet Documentation  Taken 11/17/2023 0124 by Giovanna Solorzano RN  Safety Promotion/Fall Prevention:   activity supervised   lighting adjusted   mobility aid in reach   nonskid shoes/slippers when out of bed   patient and family education  Taken 11/16/2023 2057 by Giovanna Solorzano RN  Safety Promotion/Fall Prevention:   activity supervised   lighting adjusted   mobility aid in reach   nonskid shoes/slippers when out of bed   patient and family education  Intervention: Prevent Skin Injury  Recent Flowsheet Documentation  Taken 11/17/2023 0448 by Giovanna Solorzano RN  Body Position:   turned   supine   legs elevated  Taken 11/17/2023 0239 by Giovanna Solorzano RN  Body Position:   left   turned    Problem: Pain Acute  Goal: Optimal Pain Control and Function  Outcome: Progressing  Intervention: Prevent or Manage Pain  Recent Flowsheet Documentation  Taken 11/17/2023 0124 by Giovanna Solorzano RN  Medication Review/Management: medications reviewed  Taken 11/16/2023 2057 by Giovanna Solorzano RN  Medication Review/Management: medications reviewed      Goal Outcome Evaluation:       Pt A&Ox4, denies pain throughout shift. Wife at bedside and helps w/ cares. Pt uses urinal at bedside. Pt up w/ assist x1, up to BA, tolerated well. Pt had 1 small BM this shift, no blood noted in stool. Abdomen distended, firm and tender. BS active. Hgb check q4h. Pt has generalized weakness. Pt's shifts weight in bed and was turn/repositioned q2h. Pt has mild jaundice. Pt has R and L PIV, NS infusing at 75ml/hr. K: 3.1, replaced and will recheck at 0900. Pt is on remote tele. BG checks q4h w/ sliding scale novolog. Pt and family was frustrated regarding planned paracentesis that was suppose to be done 11/16, writer informed and confirmed w/ provider at  regarding plan. Radiologist  was not present to do paracentesis 11/16. Schedule shows that paracentesis will be done 11/17 at 0900. Pt is NPO. Up in chair at this time. Call light within reach.

## 2023-11-17 NOTE — PROGRESS NOTES
Clinic Care Coordination Contact  Program:  County:  [facility] Case #:  County Worker:   Mnsure #:   Subscriber #:   Renewal:  Date Applied:     FRW Outreach:   11/17/23 FRW will follow up next week due to patient is in the hospital   Helga Benjamin   Financial Resource Worker  [facility]  Clinic Care Coordination  [phone]      Health Insurance:      Referral/Screening:   [facility] Screening    Row Name 11/16/23 0918       [facility] Benefits   Is patient requesting help applying for [employer] benefits? No       Insurance:   [place]? Yes       Is this an insurance renewal? No       Is this a new insurance application request? Yes  Patient would like to check eligibility for secondary health insurance Medical Assistance and Medicare Savings Program.       Have you recently applied for insurance? No       How many people in your household? 2  Patient and wife.       Do you file taxes? Yes       How many dependents do you claim? 0       What is the monthly gross income for the household (wages, social security, workers comp, and pension)? 2225  Patient recevies Social Security alf of 725.00 monthly. Wife recieves 1,500 monthly.       OTHER   Is this a jackie care application? No       Any other information for the FR? Patient wants to apply for [employer] Savings Program and Medical Assistance. Naomi (Wife) has Medical Assistance and does not need to apply. Please call Naomi at [phone] or [phone].

## 2023-11-17 NOTE — PROGRESS NOTES
[facility]    Medicine Progress Note - Hospitalist Service    Date of Admission:  11/16/2023    Assessment & Plan      Vito Sy is a 73 year old male admitted on 11/16/2023. He has hx of liver cirrhosis associated JENNINGS, refractory ascites, TIPS, esophageal varices, recently was at  for peritonitis, now here with BRBPR and recurrent ascites    Lower GI bleeding:   Presents with one episode of BRBPR. No melena.  Hemoglobin 8.0 on admission, close to his baseline.   CTA showed no active GI bleed.  Colonoscopy 1/25/23 was incomplete due to redundant colon. It showed angiectasias throughout the visualized colon without bleeding. Previous EGD showed small esophageal varices.   - GI consulted and input appreciated  - PPI IV  - Continue to monitor hemoglobin and transfuse as indicated. Transfusion consent in chart.     Spontaneous bacterial peritonitis:   Presents with abdominal pain. He normally gets paracentesis every 2 weeks.   CT scan revealed moderate to large volume ascites. Received paracentesis and 4L hazy ascites was removed. The ascites has large clot so that cell count was not done.  - Give 25 g of albumin today  - Continue Ceftriaxone  - Follow up ascites fluid culture  - On ciprofloxacin for prophylaxis. On hold now  - Continue PTA lasix and spirolactone     Liver cirrhosis associated with JENNINGS: s/p TIPS. Has hepatic coagulopathy and recent INR 1.6-2.0.  MELD 20  - Continue PTA rifaximin and lactulose  - Family interested in more info on liver transplant. Outpatient hepatology clinic follow up.    Hypokalemia: potassium 3.0 on admission. Will replace and recheck.     DM, type II: not on medication at home. HbA1C checked this admission and it is 5.8.   - Novolog sliding scale    CKD stage 3a: Creatinine baseline 1.2-1.5. Currently close to his baseline.     Chronic hypotension: Continue PTA midodrine.      Plan of care was discussed with his son by the bedside.          Diet: 2  "Gram Sodium Diet    DVT Prophylaxis: Pneumatic Compression Devices  Cronin Catheter: Not present  Lines: None     Cardiac Monitoring: ACTIVE order. Indication: gib  Code Status: Full Code      Clinically Significant Risk Factors Present on Admission        # Hypokalemia: Lowest K = 3 mmol/L in last 2 days, will replace as needed       # Hypoalbuminemia: Lowest albumin = 2.2 g/dL at 11/17/2023  3:52 AM, will monitor as appropriate  # Coagulation Defect: INR = 2.04 (Ref range: 0.85 - 1.15) and/or PTT = 41 Seconds (Ref range: 22 - 38 Seconds), will monitor for bleeding  # Thrombocytopenia: Lowest platelets = 128 in last 2 days, will monitor for bleeding   # Hypertension: Noted on problem list      # Overweight: Estimated body mass index is 25.24 kg/m  as calculated from the following:    Height as of 11/4/23: 1.727 m (5' 8\").    Weight as of this encounter: 75.3 kg (166 lb).       # Financial/Environmental Concerns:           Disposition Plan      Expected Discharge Date: 11/18/2023      Destination: home with family              Bela Soto MD  Hospitalist Service  [facility]  Securely message with DocumentCloud (more info)  Text page via [employer] Paging/Directory   ______________________________________________________________________    Interval History   Patient reports that she feels better overall. His abdominal pain improved. He has no further rectal bleeding and melena since admission.     Physical Exam   Vital Signs: Temp: 98.2  F (36.8  C) Temp src: Oral BP: 98/55 Pulse: 82   Resp: 26 SpO2: 100 % O2 Device: None (Room air)    Weight: 166 lbs 0 oz    General appearance: not in acute distress  HEENT: PERRL, EOMI  Lungs: Clear breath sounds in bilateral lung fields  Cardiovascular: Regular rate and rhythm, normal S1-S2  Abdomen: Soft, non tender, distended  Musculoskeletal: No joint swelling  Skin: No rash and no edema  Neurology: AAO ×3.  Cranial nerves II - XII normal.  Normal muscle strength " in all four extremities.     Medical Decision Making       47 MINUTES SPENT BY ME on the date of service doing chart review, history, exam, documentation & further activities per the note.      Data     I have personally reviewed the following data over the past 24 hrs:    10.0  \   8.1 (L)   / 128 (L)     144 120 (H) 36.0 (H) /  192 (H)   4.0 13 (L) 1.41 (H) \     ALT: 54 AST: 47 (H) AP: 155 (H) TBILI: 1.4 (H)   ALB: 2.2 (L) TOT PROTEIN: 6.0 (L) LIPASE: N/A     TSH: N/A T4: N/A A1C: N/A     Procal: N/A CRP: N/A Lactic Acid: 1.7       INR:  2.04 (H) PTT:  N/A   D-dimer:  N/A Fibrinogen:  N/A       Imaging results reviewed over the past 24 hrs:   Recent Results (from the past 24 hour(s))   US Paracentesis with Albumin    Narrative    EXAM:  1. PARACENTESIS  2. ULTRASOUND GUIDANCE  LOCATION: [facility]  DATE: 11/17/2023    INDICATION: Ascites.    PROCEDURE: Informed consent obtained. Time out performed. The abdomen was prepped and draped in a sterile fashion. 10 mL of 1% lidocaine was infused into local soft tissues. A 5 Tunisian catheter system was introduced into the abdominal ascites under   ultrasound guidance.    4.1 liters of clear fluid were removed and sent to lab .    Patient tolerated procedure well.    Ultrasound imaging was obtained and placed in the patient's permanent medical record.      Impression    IMPRESSION:  1.  Status post ultrasound-guided paracentesis.    Reference CPT Code: 05667

## 2023-11-17 NOTE — CONSULTS
"Care Management Initial Consult    General Information  Assessment completed with: Patient, Spouse or significant other, Zaire and wife Naomi  Type of CM/SW Visit: Initial Assessment    Primary Care Provider verified and updated as needed: Yes   Readmission within the last 30 days: previous discharge plan unsuccessful (11/3/23 - 11/9/23 at [facility])   Return Category: Progression of disease  Reason for Consult: discharge planning  Advance Care Planning: Advance Care Planning Reviewed: no concerns identified          Communication Assessment  Patient's communication style: spoken language (English or Bilingual)                           Living Environment:   People in home: spouse, child(manav), adult     Current living Arrangements: house      Able to return to prior arrangements: yes       Family/Social Support:  Care provided by: self, spouse/significant other  Provides care for: no one, unable/limited ability to care for self  Marital Status:   Wife, Children  Naomi       Description of Support System: Supportive, Involved    Support Assessment: Adequate family and caregiver support, Adequate social supports, Patient communicates needs well met    Current Resources:   Patient receiving home care services: No     Community Resources: DME, Other (see comment) (\"Outpatient Paracentesis. We are trying to get PCA help from the [facility], but for now family is caring for him\".)  Equipment currently used at home: walker, rolling, wheelchair, manual, commode chair, hospital bed (\"He uses a FWW. We have a wheelchair ordered and a shower chair ordered. The commode he has is small. Family purchased a hospital bed that is adjustable\".)  Supplies currently used at home: Incontinence Supplies    Employment/Financial:  Employment Status: retired     Employment/ Comments: \"no  history\"  Financial Concerns:     Referral to Financial Worker: No       Does the patient's insurance plan have a 3 day " qualifying hospital stay waiver?  No    Lifestyle & Psychosocial Needs:  Social Determinants of Health     Food Insecurity: Low Risk  (11/14/2023)    Food Insecurity     Within the past 12 months, did you worry that your food would run out before you got money to buy more?: No     Within the past 12 months, did the food you bought just not last and you didn t have money to get more?: No   Depression: Not at risk (7/17/2023)    PHQ-2     PHQ-2 Score: 0   Housing Stability: Low Risk  (11/14/2023)    Housing Stability     Do you have housing? : Yes     Are you worried about losing your housing?: No   Tobacco Use: Low Risk  (11/14/2023)    Patient History     Smoking Tobacco Use: Never     Smokeless Tobacco Use: Never     Passive Exposure: Never   Financial Resource Strain: Low Risk  (11/14/2023)    Financial Resource Strain     Within the past 12 months, have you or your family members you live with been unable to get utilities (heat, electricity) when it was really needed?: No   Alcohol Use: Not on file   Transportation Needs: Low Risk  (11/14/2023)    Transportation Needs     Within the past 12 months, has lack of transportation kept you from medical appointments, getting your medicines, non-medical meetings or appointments, work, or from getting things that you need?: No   Physical Activity: Not on file   Interpersonal Safety: Low Risk  (11/14/2023)    Interpersonal Safety     Do you feel physically and emotionally safe where you currently live?: Yes     Within the past 12 months, have you been hit, slapped, kicked or otherwise physically hurt by someone?: No     Within the past 12 months, have you been humiliated or emotionally abused in other ways by your partner or ex-partner?: No   Stress: Not on file   Social Connections: Not on file       Functional Status:  Prior to admission patient needed assistance:   Dependent ADLs:: Ambulation-walker, Dressing, Grooming, Positioning, Transfers, Toileting  Dependent  "IADLs:: Cleaning, Cooking, Laundry, Shopping, Meal Preparation, Medication Management, Transportation       Mental Health Status:          Chemical Dependency Status:                Values/Beliefs:  Spiritual, Cultural Beliefs, Advent Practices, Values that affect care:                 Additional Information:  Zaire lives in a house with family. Family is helping him with all ADLs and IADLs, he doesn't have PCA help at this time. \"We are trying to get PCA help from the [facility], but for now we are caring for him\".    \"He uses a FWW. We have a wheelchair ordered and a shower chair ordered. The commode he has is small. Family purchased a hospital bed that is adjustable\".    Unknown discharge needs at this time.     Family to transport at discharge.    CM to follow for medical progression of care, discharge recommendations, and final discharge plan.    Reta Tripathi RN    "

## 2023-11-17 NOTE — UTILIZATION REVIEW
Admission Status; Secondary Review Determination       Under the authority of the Utilization Management Committee, the utilization review process indicated a secondary review on the above patient. The review outcome is based on review of the medical records, discussions with staff, and applying clinical experience noted on the date of the review.     (x) Inpatient Status Appropriate - This patient's medical care is consistent with medical management for inpatient care and reasonable inpatient medical practice.     RATIONALE FOR DETERMINATION     Mr. Sy is a 74 yo male with a PMH of liver cirrhosis, refractory ascites and recent admission for peritonitis who presents to the ED with BRBPR and abd pain.  CTA with no active GI bleeding.  GI consulted and continues on IV PPI.  Noted to have NANO and moderate large volume ascites on imaging; underwent paracentesis with removal of 4L of hazy ascites.  Ascitic culture pending; remains on IV Abx.  Still hypotensive today and is being given IV albumin with close  monitoring of renal function.    At this time, he is requiring ongoing medical evaluation, treatment and clinical monitoring.     At the time of admission with the information available to the attending physician more than 2 nights Hospital complex care was anticipated, based on patient risk of adverse outcome if treated as outpatient and complex care required. Inpatient admission is appropriate based on the Medicare guidelines.       The information on this document is developed by the utilization review team in order for the business office to ensure compliance. This only denotes the appropriateness of proper admission status and does not reflect the quality of care rendered.   The definitions of Inpatient Status and Observation Status used in making the determination above are those provided in the CMS Coverage Manual, Chapter 1 and Chapter 6, section 70.4.         Sincerely,     Lisbeth Pittman,  DO  Utilization Review  Physician Advisor  [facility].

## 2023-11-17 NOTE — PROGRESS NOTES
Care Management Discharge Note    Discharge Date: 11/17/2023     Discharge Disposition:  Home    Discharge Services:  None anticipated    Discharge DME:  NA    Discharge Transportation: family will provide    Private pay costs discussed: Not applicable    Does the patient's insurance plan have a 3 day qualifying hospital stay waiver?  Yes     Which insurance plan 3 day waiver is available? Alternative insurance waiver    Will the waiver be used for post-acute placement? No    PAS Confirmation Code:  NA  Patient/family educated on Medicare website which has current facility and service quality ratings:  NA    Education Provided on the Discharge Plan:  Per team  Persons Notified of Discharge Plans: Nursing;   Patient/Family in Agreement with the Plan:  Yes    Handoff Referral Completed: No    Additional Information:  Discharge orders noted and reviewed. No CM needs identified at this time.   3:48 PM:  BPA fired and completed.     Olivia Mayen RN

## 2023-11-17 NOTE — DISCHARGE SUMMARY
"[facility]  Hospitalist Discharge Summary      Date of Admission:  11/16/2023  Date of Discharge:  11/17/2023  Discharging Provider: Bela Soto MD  Discharge Service: Hospitalist Service    Discharge Diagnoses     Principal Problem:    Lower GI bleed  Active Problems:    Type 2 diabetes mellitus with microalbuminuria, without long-term current use of insulin (H)    Hypotension    Chronic kidney disease, stage 3a (H)    Cirrhosis of liver with ascites (H)    SBP (spontaneous bacterial peritonitis) (H)    Hypokalemia       Clinically Significant Risk Factors     # Overweight: Estimated body mass index is 25.24 kg/m  as calculated from the following:    Height as of 11/4/23: 1.727 m (5' 8\").    Weight as of this encounter: 75.3 kg (166 lb).       Follow-ups Needed After Discharge   Follow-up Appointments     Follow-up and recommended labs and tests       * Follow up with primary care provider, Amrik Mejia, within 7 days for   hospital follow- up.  No follow up labs or test are needed. Continue   paracentesis as indicated.          Discharge Disposition   Discharged to home  Condition at discharge: Stable    Hospital Course     Vito Sy is a 73 year old male admitted on 11/16/2023. He has history of liver cirrhosis associated JENNINGS, refractory ascites, TIPS, and esophageal varices. He was recently admitted at [facility] for peritonitis. He presented to [facility] ER with BRBPR and recurrent ascites.     Lower GI bleeding:   Patient presented with one episode of BRBPR. No melena. Hemoglobin was 8.0 on admission, close to his baseline. CTA of the abdomen showed no active GI bleed. Previous colonoscopy on 1/25/23 was incomplete due to redundant colon. It showed angiectasias throughout the visualized colon without bleeding. Previous EGD showed small esophageal varices. GI was consulted and recommended not to repeat colonoscopy. Patient received PPI IV. He did not have recurrent " episode of GI bleed.      Spontaneous bacterial peritonitis:   Patient presents with abdominal pain. He normally gets paracentesis every 2 weeks. CT scan revealed moderate to large volume ascites. He received paracentesis and 4L of hazy ascites was removed. The ascites has large clot so that cell count was not done. Patient received Ceftriaxone and albumin infusion. After discharge, he resumed his PTA Ciprofloxacin for prophylaxis.      Liver cirrhosis associated with JENNINGS: s/p TIPS. Has hepatic coagulopathy and recent INR 1.6-2.0.  MELD 20     Hypokalemia: Potassium was 3.0 on admission. It returned to normal after replacement.     DM, type II: not on medication at home. HbA1C checked this admission and it is 5.8.      CKD stage 3a: Creatinine baseline 1.2-1.5. Currently close to his baseline.      Chronic hypotension: Continue PTA midodrine.      Consultations This Hospital Stay   GASTROENTEROLOGY IP CONSULT  CARE MANAGEMENT / SOCIAL WORK IP CONSULT    Code Status   Full Code    Time Spent on this Encounter   I, Bela Soto MD, personally saw the patient today and spent greater than 30 minutes discharging this patient.       Bela Soto MD  [facility] EMERGENCY DEPARTMENT  [facility] [facility] [address]  Phone: [phone]  ______________________________________________________________________    Physical Exam   Vital Signs: Temp: 98.2  F (36.8  C) Temp src: Oral BP: 98/55 Pulse: 82   Resp: 26 SpO2: 100 % O2 Device: None (Room air)    Weight: 166 lbs 0 oz    General appearance: not in acute distress  HEENT: PERRL, EOMI  Lungs: Clear breath sounds in bilateral lung fields  Cardiovascular: Regular rate and rhythm, normal S1-S2  Abdomen: Soft, non tender, no distension  Musculoskeletal: No joint swelling  Skin: No rash and no edema  Neurology: AAO ×3.  Cranial nerves II - XII normal.  Normal muscle strength in all four extremities.        Primary Care Physician   Amrik CARREON  Roberto    Discharge Orders      Reason for your hospital stay    Abdominal pain and rectal bleeding     Activity    Your activity upon discharge: activity as tolerated     Follow-up and recommended labs and tests     * Follow up with primary care provider, Amrik Mejia, within 7 days for hospital follow- up.  No follow up labs or test are needed. Continue paracentesis as indicated.     Diet    Follow this diet upon discharge: 2 Gram Sodium Diet       EXAM: CTA ABDOMEN PELVIS WITH CONTRAST  LOCATION: [facility]  DATE: 11/16/2023     INDICATION: Gastrointestinal bleeding, rectal bleeding, history of acute on chronic duodenitis  COMPARISON: CT 12/18/2022  TECHNIQUE: CT angiogram abdomen pelvis during arterial phase of injection of IV contrast. 2D and 3D MIP reconstructions were performed by the CT technologist. Dose reduction techniques were used.  CONTRAST: IsoVue 370 75mL     FINDINGS:  ANGIOGRAM ABDOMEN/PELVIS: No active contrast extravasation within the stomach, small bowel, colon or rectum. Normal caliber abdominal aorta without evidence of a dissection. Mild to moderate atherosclerosis. Patent celiac artery and major branch vessels,   patent SMA, renal arteries and BLANK. Common origin of the common hepatic and celiac arteries. Grossly patent right sided TIPS. Patent portal, splenic and superior mesenteric veins.     LOWER CHEST: Small bilateral pleural effusions with adjacent atelectasis, left greater the right. Mitral annulus calcifications. Bilateral gynecomastia.     HEPATOBILIARY: Cirrhosis with diffuse atrophy and a nodular surface contour. No focal liver lesion. Cholelithiasis. No biliary ductal dilatation. Right-sided TIPS appears grossly patent.     PANCREAS: Normal.     SPLEEN: Enlarged, measuring 14.7 cm craniocaudal dimension. Stable 1.1 cm central hypodense lesion, likely a cyst.     ADRENAL GLANDS: Normal.     KIDNEYS/BLADDER: Multiple bilateral renal cysts. No follow-up is  indicated. Bilateral multifocal renal cortical scarring. Multiple bilateral nonobstructing renal calculi with a dominant 11 mm lower right renal stone and dominant 10 mm lower left renal   stone. No hydronephrosis or hydroureter.     BOWEL: No active contrast extravasation within the stomach, small bowel, colon or rectum. Normal caliber small bowel and colon. Mild diffuse small bowel and colonic wall thickening. Moderate to large volume simple ascites. Diffuse mesenteric and body   wall edema. No free air.     LYMPH NODES: Stable prominent likely reactive upper abdominal and retroperitoneal lymph nodes     PELVIC ORGANS: Normal.     MUSCULOSKELETAL: Spinal and pelvic degenerative changes.                                                                      IMPRESSION:  1.  No evidence for active gastrointestinal tract bleeding.  2.  Nonspecific diffuse small bowel wall thickening which may reflect portal hypertensive enterocolopathy.  3.  Cirrhosis with sequelae of portal venous hypertension including mild splenomegaly, moderate to large volume ascites and a recanalized umbilical vein.  4.  Bilateral nonobstructing renal stones.  5.  Small bilateral pleural effusions with adjacent atelectasis.        Significant Results and Procedures   Most Recent 3 CBC's:  Recent Labs   Lab Test 11/17/23  1208 11/17/23  0352 11/17/23  0225 11/16/23  1620 11/16/23  1132 11/09/23  0803   WBC  --  10.0  --   --  12.8* 9.1   HGB 8.1* 7.7* 7.4*   < > 8.0* 7.6*   MCV  --  87  --   --  88 86   PLT  --  128*  --   --  151 162    < > = values in this interval not displayed.     Most Recent 3 BMP's:  Recent Labs   Lab Test 11/17/23  1241 11/17/23  1208 11/17/23  1107 11/17/23  0756 11/17/23  0422 11/17/23  0352 11/16/23  1725 11/16/23  1132 11/09/23  0803   NA  --   --   --   --   --  144  --  139 138   POTASSIUM  --  4.0  --   --   --  3.1*  --  3.0* 3.6   CHLORIDE  --   --   --   --   --  120*  --  115* 116*   CO2  --   --   --   --   --   13*  --  13* 12*   BUN  --   --   --   --   --  36.0*  --  39.8* 27.1*   CR  --   --   --   --   --  1.41*  --  1.51* 1.20*   ANIONGAP  --   --   --   --   --  11  --  11 10   SILVIA  --   --   --   --   --  8.0*  --  8.6* 8.4*   *  --  161* 151*   < > 178*   < > 288* 165*    < > = values in this interval not displayed.     Most Recent 2 LFT's:  Recent Labs   Lab Test 11/17/23  0352 11/16/23  1132   AST 47* 92*   ALT 54 76*   ALKPHOS 155* 201*   BILITOTAL 1.4* 1.6*     Most Recent 3 INR's:  Recent Labs   Lab Test 11/16/23  1620 11/07/23  0426 11/03/23  1511   INR 2.04* 1.60* 1.63*       Discharge Medications   Current Discharge Medication List        CONTINUE these medications which have NOT CHANGED    Details   bismuth subsalicylate (PEPTO BISMOL) 262 MG chewable tablet Take 1 tablet by mouth every 6 hours as needed for diarrhea      ciprofloxacin (CIPRO) 500 MG tablet Take 1 tablet (500 mg) by mouth daily  Qty: 60 tablet, Refills: 0    Associated Diagnoses: SBP (spontaneous bacterial peritonitis) (H)      furosemide (LASIX) 20 MG tablet Take 1 tablet (20 mg) by mouth daily  Qty: 60 tablet, Refills: 0    Associated Diagnoses: Other cirrhosis of liver (H)      lactulose (CHRONULAC) 10 GM/15ML solution Take 30 g by mouth daily      midodrine (PROAMATINE) 2.5 MG tablet Take 1 tablet (2.5 mg) by mouth 3 times daily (with meals)  Qty: 180 tablet, Refills: 0    Associated Diagnoses: Other cirrhosis of liver (H)      omeprazole (PRILOSEC) 20 MG DR capsule TAKE 1 CAPSULE BY MOUTH TWICE DAILY BEFORE MEAL(S)  Qty: 180 capsule, Refills: 3    Associated Diagnoses: Encounter for medication refill      rifaximin (XIFAXAN) 550 MG TABS tablet Take 1 tablet (550 mg) by mouth 2 times daily  Qty: 60 tablet, Refills: 4    Associated Diagnoses: Alcoholic cirrhosis, unspecified whether ascites present (H)      spironolactone (ALDACTONE) 50 MG tablet Take 1 tablet (50 mg) by mouth daily  Qty: 60 tablet, Refills: 0    Associated  "Diagnoses: Other cirrhosis of liver (H)           Allergies   Allergies   Allergen Reactions    Sulfa Antibiotics Shortness Of Breath and Itching    Penicillins Unknown     Annotation: discussed with patient, he reports he had \"itching\" with penicillin many years ago in [facility] but no hives or throat or respiratory symptoms.  he also reports having tolerated amoxicillin since coming to [place].       "

## 2023-11-17 NOTE — PROGRESS NOTES
GI PROGRESS NOTE  11/17/2023  Vito Sy  1950  [id number]    Subjective:   Patient had stool this morning and yesterday that was soft and brown with no signs of hematochezia or melena. States feeling improved after paracentesis this morning.     Patients wife at bedside and serves as history giver as well.      Objective:     Blood pressure 98/55, pulse 82, temperature 98.2  F (36.8  C), temperature source Oral, resp. rate 26, weight 75.3 kg (166 lb), SpO2 100%.    Body mass index is 25.24 kg/m .  Gen: NAD  GI: Slightly distended, BS positive, soft, non-tender. No low extremity edema.  Skin/eyes: jaundice present. No asterixis.     Laboratory:    BMP  Recent Labs   Lab 11/17/23  0756 11/17/23  0422 11/17/23 0352 11/16/23  1725 11/16/23 1132   NA  --   --  144  --  139   POTASSIUM  --   --  3.1*  --  3.0*   CHLORIDE  --   --  120*  --  115*   SILVIA  --   --  8.0*  --  8.6*   CO2  --   --  13*  --  13*   BUN  --   --  36.0*  --  39.8*   CR  --   --  1.41*  --  1.51*   * 164* 178*   < > 288*    < > = values in this interval not displayed.     CBC  Recent Labs   Lab 11/17/23  0352 11/17/23  0225 11/16/23  1620 11/16/23  1132   WBC 10.0  --   --  12.8*   RBC 2.84*  --   --  2.96*   HGB 7.7* 7.4* 7.8* 8.0*   HCT 24.7*  --   --  26.0*   MCV 87  --   --  88   MCH 27.1  --   --  27.0   MCHC 31.2*  --   --  30.8*   RDW 20.0*  --   --  19.9*   *  --   --  151     INR  Recent Labs   Lab 11/16/23  1620   INR 2.04*      LFTs  Recent Labs   Lab Test 11/17/23  0352 11/16/23  1132 11/07/23  0426 11/04/23  0540 11/04/23  0337 01/27/23  0649 01/24/23  0845 01/24/23  0743 01/23/23  1744 12/19/22  0411 12/18/22  2119 12/18/22  1912   ALBUMIN 2.2* 2.6* 2.7*   < >  --    < >  --    < > 3.6   < >  --  3.5   BILITOTAL 1.4* 1.6* 1.1   < >  --    < >  --    < > 1.8*   < >  --  1.7*   ALT 54 76* 23   < >  --    < >  --    < > 35   < >  --  30   AST 47* 92* 50*   < >  --    < >  --    < > 51*   < >  --  42   PROTEIN   --   --   --   --  30*  --  10*  --   --   --  20*  --    LIPASE  --   --   --   --   --   --   --   --  156*  --   --  195*    < > = values in this interval not displayed.     Imaging:  EXAM:  1. PARACENTESIS  2. ULTRASOUND GUIDANCE  LOCATION: [facility]  DATE: 11/17/2023     INDICATION: Ascites.     PROCEDURE: Informed consent obtained. Time out performed. The abdomen was prepped and draped in a sterile fashion. 10 mL of 1% lidocaine was infused into local soft tissues. A 5 Greek catheter system was introduced into the abdominal ascites under ultrasound guidance.     4.1 liters of clear fluid were removed and sent to lab.   Patient tolerated procedure well     Assessment:   JENNINGS Cirrhosis   73 year old male with history of SBP, JENNINGS cirrhosis with complication, HTN, gout, T2DM who presents with rectal bleeding. CTA with no evidence for active GI bleeding.   MELD: 20  Anemia  Hgb 8.1 and stable. No further signs of GI bleeding.   Coagulopathy  INR 2.04  Hepatic Encephalopathy  Lactulose and Rifaximin at home, takes consistently.   Currently no asterixis and oriented.   SBP  On daily ciprofloxacin for prophylaxis, per family patient has been taking consistently at home. Ceftriaxone since admit.   WBC normal today, afebrile.  cell count not obtained due to large clot  Ascites  Present despite TIPS.  Renal insufficiency limiting diuretic use.       Plan:   1. Continue daily ciprofloxacin for SBP prophylaxis, continue on discharge  2. Continue to monitor hgb, transfuse per primary   3. Outpatient colonoscopy, will order through [facility]  4. Outpatient Hepatology clinic visit as already scheduled  5. PTA lasix and spirolactone   6. Follow up ascites fluid culture  GI will continue to follow, please call with questions or status changes.     Discussed with Dr. Jose Gill, CNP  Thank you for the opportunity to  participate in the care of this patient.   Please feel free to call me with any questions or concerns.  Phone number [phone].

## 2023-11-17 NOTE — PROGRESS NOTES
"Care Management Follow Up    Length of Stay (days): 1    Expected Discharge Date: 11/18/2023    Concerns to be Addressed:   Workup in progress; monitoring labs; IV Rocephin;   Patient plan of care discussed at interdisciplinary rounds: Yes    Anticipated Discharge Disposition:  Discharge goal is home      Anticipated Discharge Services:  To be determined.   Anticipated Discharge DME:  To be determined. Has a walker, hospital bed and commode. Wheelchair and shower chair ordered.     Patient/family educated on Medicare website which has current facility and service quality ratings:   NA  Education Provided on the Discharge Plan:   Per team  Patient/Family in Agreement with the Plan:   Yes    Referrals Placed by CM/SW:   None  Private pay costs discussed: Not applicable     Additional Information:  Patient lives in a house with family who provide assist with activities of daily living and instrumental activities of daily living (IADLs) as needed.  He doesn't have PCA help at this time. Family is trying to get PCA help from the [facility] but for now we are caring for him. He uses a FWW, has a (\"small\") commode and a hospital bed that is adjustable. Family states that they have a wheelchair and a shower chair ordered.      Family plans to transport at discharge.     Unknown discharge needs at this time. CM to follow for medical progression of care, discharge recommendations, and final discharge plan.    Olivia Mayen, RN3  "

## 2023-11-18 NOTE — PLAN OF CARE
Goal Outcome Evaluation:       Pt discharge home after antibiotic. Discharge paper work given. Son took patient home.

## 2023-11-20 NOTE — PROGRESS NOTES
Clinic Care Coordination Contact  Program: Medicare savings program   County:[facility] Case #:  [facility] Worker:   Abhilash #:   Subscriber #:   Renewal:  Date Applied:     FRW Outreach:   11/20/23 FRW called and wife was confused on why I was caling but she did fill out most of the Certain population application with the FRW and she will fill out the rest FRW mailed it out and will follow up in 30 days   Helga Benjamin   Financial Resource Worker  [facility]  Clinic Care Coordination  [phone]    11/17/23 FRW will follow up next week due to patient is in the hospital   Helga Benajmin   Financial Resource Worker  [facility]  Clinic Care Coordination  [phone]      Health Insurance:      Referral/Screening:   FRW Screening    Row Name 11/16/23 0918       [facility] Benefits   Is patient requesting help applying for [facility] benefits? No       Insurance:   [place]? Yes       Is this an insurance renewal? No       Is this a new insurance application request? Yes  Patient would like to check eligibility for secondary health insurance Medical Assistance and Medicare Savings Program.       Have you recently applied for insurance? No       How many people in your household? 2  Patient and wife.       Do you file taxes? Yes       How many dependents do you claim? 0       What is the monthly gross income for the household (wages, social security, workers comp, and pension)? 2225  Patient recevies Social Security [place] of 725.00 monthly. Wife recieves 1,500 monthly.       OTHER   Is this a jackie care application? No       Any other information for the FRW? Patient wants to apply for [place] Savings Program and Medical Assistance. Naomi (Wife) has Medical Assistance and does not need to apply. Please call Naomi at [phone] or [phone].

## 2023-11-21 NOTE — PROGRESS NOTES
Clinic Care Coordination Contact  Program: Medicare savings program   County:[facility] Case #:  [facility] Worker:   Abhilash #:   Subscriber #:   Renewal:  Date Applied:     FRW Outreach:   11/20/23 FRW called and wife was confused on why I was calling but she did fill out most of the Certain population application with the FRW and she will fill out the rest FRW mailed it out and will follow up in 30 days   Helga Benjamin   Financial Resource Worker  [facility]  Clinic Care Coordination  [phone]    11/17/23 FRW will follow up next week due to patient is in the hospital   Helga Benjamin   Financial Resource Worker  [facility]  Clinic Care Coordination  [phone]      Health Insurance:      Referral/Screening:   FRW Screening    Row Name 11/16/23 0918       [facility] Benefits   Is patient requesting help applying for [facility] benefits? No       Insurance:   [place]? Yes       Is this an insurance renewal? No       Is this a new insurance application request? Yes  Patient would like to check eligibility for secondary health insurance Medical Assistance and Medicare Savings Program.       Have you recently applied for insurance? No       How many people in your household? 2  Patient and wife.       Do you file taxes? Yes       How many dependents do you claim? 0       What is the monthly gross income for the household (wages, social security, workers comp, and pension)? 2225  Patient recevies Social Security [employer] of 725.00 monthly. Wife recieves 1,500 monthly.       OTHER   Is this a jackie care application? No       Any other information for the FRW? Patient wants to apply for [place] Savings Program and Medical Assistance. Naomi (Wife) has Medical Assistance and does not need to apply. Please call Naomi at [phone] or [phone].

## 2023-11-27 NOTE — LETTER
[facility]  Patient Centered Plan of Care  About Me:        Patient Name:  Vito Lowery    YOB: 1950  Age:         73 year old   Ammy MRN:    [id number] Telephone Information:  Home Phone [phone]   Mobile [phone]       Address:  [address]  [place] [address] Email address:  [email or web address]      Emergency Contact(s)    Name Relationship Lgl Grd Work Phone Home Phone Mobile Phone   1. RAHEL LOWERY Spouse   [phone]    2. EDWAR HERNANDEZ Son   [phone]   3. JUVENTINO ORTIZ Sister   [phone]           Primary language:  English     needed? No   [facility]:  [phone] op. 1  Other communication barriers:None    Preferred Method of Communication:     Current living arrangement: I live in a private home with family    Mobility Status/ Medical Equipment: Independent w/Device        Health Maintenance  Health Maintenance Reviewed: Due/Overdue   Health Maintenance Due   Topic Date Due    DIABETIC FOOT EXAM  Never done    EYE EXAM  Never done    HEPATITIS B IMMUNIZATION (1 of 3 - Risk 3-dose series) Never done    RSV VACCINE (Pregnancy & 60+) (1 - 1-dose 60+ series) Never done    MEDICARE ANNUAL WELLNESS VISIT  01/26/2023    MICROALBUMIN  04/27/2023    COVID-19 Vaccine (7 - 2023-24 season) 09/11/2023           My Access Plan  Medical Emergency 911   Primary Clinic Line [facility] [phone]   24 Hour Appointment Line [phone] or  [phone]) (toll-free)   24 Hour Nurse Line [phone] (toll-free)   Preferred Urgent Care [facility] [phone]     [facility]  [phone]     Preferred Pharmacy [id number] - [facility] [address] [place] [address]     Behavioral Health Crisis Line The National Suicide Prevention Lifeline at 1-190.686.9973 or Text/Call 888           My Care  Team Members  Patient Care Team         Relationship Specialty Notifications Start End    Amrik Mejia MD PCP - General Family Practice  5/2/07     Phone: [phone] Fax: [phone]         [address] [place] [address]    Shanika Tripp, PharmD Pharmacist Pharmacist  11/13/19     Phone: [phone]          [facility] [address] [place] [address]    Amrik Mejia MD Assigned PCP   6/16/21     Phone: [phone] Fax: [phone]         [address] [place] [address]    Cori Guillen CHW Community Health Worker Primary Care - CC Admissions 11/14/23     Irene Orozco LGSW Lead Care Coordinator  Admissions 11/16/23     Helga Benjamin MA Financial Resource Worker   11/16/23     Phone: [phone]                     My Care Plans  Self Management and Treatment Plan    Care Plan  Care Plan: [employer]       Problem: Secondary Health Insurance       Goal: I will apply for secondary health insurance through [employer] within the next 90 days.       Start Date: 11/16/2023 Expected End Date: 2/16/2024    This Visit's Progress: 10%    Priority: High    Note:     Barriers: Language   Strengths: Accepting of support.   Patient expressed understanding of goal: Yes     Action steps to achieve this goal:   1.  I will answer my phone when I am contacted by the [facility] FRW to schedule an initial appointment.   2.  I will provide all necessary documentation and information to complete a [employer] application.   3.  I will call my FRW if I have questions or concerns regarding my county benefits application.   Note: Medicare Savings Program and Medical Assistance.                               Care Plan: PCA services       Problem: HP GENERAL PROBLEM       Goal: PCA services       Start Date: 11/27/2023    This Visit's Progress: 10%    Priority: High    Note:     Barriers: navigating eligibility  Strengths: motivated to seek support  Patient expressed understanding of goal:  yes  Action steps to achieve this goal:  1. I will answer phone when [facility] calls  2. I will be available to schedule assessment  3. I will follow up with [facility] with further supports if needed.                                Action Plans on File:                       Advance Care Plans/Directives:             My Medical and Care Information  Problem List   Patient Active Problem List   Diagnosis    Anemia    Hematuria    Hypercholesterolemia    Hypertension    Type 2 diabetes mellitus with microalbuminuria, without long-term current use of insulin (H)    Nephrolithiasis    Benign Adenomatous Polyp Of The Large Intestine    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    GI bleeding    Anemia due to blood loss, acute    Hypotension    Hyperkalemia    Gastrointestinal hemorrhage associated with acute gastritis    Popliteal cyst, left    Popliteal cyst, right    Incontinence of feces with fecal urgency    Other cirrhosis of liver (H) with ascites    Chronic kidney disease, stage 3a (H)    Portal hypertension (H)    Cirrhosis of liver with ascites (H)    Hepatic encephalopathy (H)    Pleural effusion    Anemia, unspecified type    Mitral valve insufficiency, unspecified etiology    SBP (spontaneous bacterial peritonitis) (H)    Bilateral lower extremity edema    End-stage liver disease (H)    Incontinence of feces, unspecified fecal incontinence type    Ascites due to alcoholic cirrhosis (H)    Ascites    Disorder of function of stomach    Elevated lipase    Esophageal varices without bleeding (H)    Generalized abdominal pain    Hypomagnesemia    Ureteral stone    Hypokalemia    Lower GI bleed      Current Medications and Allergies:  See printed Medication Report.    Care Coordination Start Date: 11/14/2023   Frequency of Care Coordination: No data recorded   Form Last Updated: 11/27/2023

## 2023-11-27 NOTE — LETTER
[facility] CARE COORDINATION  [facility]   November 27, 2023    Vito CABRERA Yossi  [address]  [place] [address]      Dear Vito,    I am a clinic care coordinator who works with Amrik Mejia MD with the [facility]. I wanted to thank you for spending the time to talk with me.  Below is a description of clinic care coordination and how I can further assist you.       The clinic care coordination team is made up of a registered nurse, , financial resource worker and community health worker who understand the health care system. The goal of clinic care coordination is to help you manage your health and improve access to the health care system. Our team works alongside your provider to assist you in determining your health and social needs. We can help you obtain health care and community resources, providing you with necessary information and education. We can work with you through any barriers and develop a care plan that helps coordinate and strengthen the communication between you and your care team.  Our services are voluntary and are offered without charge to you personally.    Please feel free to contact me with any questions or concerns regarding care coordination and what we can offer.      We are focused on providing you with the highest-quality healthcare experience possible.    Sincerely,     Irene Orozco, JEWELL, [facility]  Social Work Care Coordinator

## 2023-11-27 NOTE — PROGRESS NOTES
Clinic Care Coordination Contact  Clinic Care Coordination Contact  OUTREACH    Referral Information:  Referral Source: PCP         Chief Complaint   Patient presents with    Clinic Care Coordination - Initial        Universal Utilization: appropriate  Clinic Utilization  Difficulty keeping appointments:: No  Compliance Concerns: No  No-Show Concerns: No  No PCP office visit in Past Year: No  Utilization      No Show Count (past year)  3             ED Visits  4             Hospital Admissions  6                    Current as of: 11/27/2023  5:24 AM                Clinical Concerns:  Current Medical Concerns:  none    Current Behavioral Concerns: none    Education Provided to patient: CCC support, resources and education   Pain  Pain (GOAL):: No  Health Maintenance Reviewed: Due/Overdue   Health Maintenance Due   Topic Date Due    DIABETIC FOOT EXAM  Never done    EYE EXAM  Never done    HEPATITIS B IMMUNIZATION (1 of 3 - Risk 3-dose series) Never done    RSV VACCINE (Pregnancy & 60+) (1 - 1-dose 60+ series) Never done    MEDICARE ANNUAL WELLNESS VISIT  01/26/2023    MICROALBUMIN  04/27/2023    COVID-19 Vaccine (7 - 2023-24 season) 09/11/2023           Medication Management:  Medication review status: Medications reviewed and no changes reported per patient.             Functional Status:  Mobility Status: Independent w/Device    Living Situation:  Current living arrangement:: I live in a private home with family  Type of residence:: Private home - no stairs    Lifestyle & Psychosocial Needs:    Social Determinants of Health     Food Insecurity: Low Risk  (11/14/2023)    Food Insecurity     Within the past 12 months, did you worry that your food would run out before you got money to buy more?: No     Within the past 12 months, did the food you bought just not last and you didn t have money to get more?: No   Depression: Not at risk (7/17/2023)    PHQ-2     PHQ-2 Score: 0   Housing Stability: Low Risk  (11/14/2023)     Housing Stability     Do you have housing? : Yes     Are you worried about losing your housing?: No   Tobacco Use: Low Risk  (11/14/2023)    Patient History     Smoking Tobacco Use: Never     Smokeless Tobacco Use: Never     Passive Exposure: Never   Financial Resource Strain: Low Risk  (11/14/2023)    Financial Resource Strain     Within the past 12 months, have you or your family members you live with been unable to get utilities (heat, electricity) when it was really needed?: No   Alcohol Use: Not on file   Transportation Needs: Low Risk  (11/14/2023)    Transportation Needs     Within the past 12 months, has lack of transportation kept you from medical appointments, getting your medicines, non-medical meetings or appointments, work, or from getting things that you need?: No   Physical Activity: Not on file   Interpersonal Safety: Low Risk  (11/14/2023)    Interpersonal Safety     Do you feel physically and emotionally safe where you currently live?: Yes     Within the past 12 months, have you been hit, slapped, kicked or otherwise physically hurt by someone?: No     Within the past 12 months, have you been humiliated or emotionally abused in other ways by your partner or ex-partner?: No   Stress: Not on file   Social Connections: Not on file     Diet:: Regular  Inadequate nutrition (GOAL):: No  Tube Feeding: No  Inadequate activity/exercise (GOAL):: No  Significant changes in sleep pattern (GOAL): No  Transportation means:: Accessible car, Family, Regular car     Hindu or spiritual beliefs that impact treatment:: No  Mental health DX:: No  Mental health management concern (GOAL):: No  Informal Support system:: Spouse, Children, Family             Resources and Interventions:  Current Resources:      Community Resources: None  Supplies Currently Used at Home: Incontinence Supplies  Equipment Currently Used at Home: walker, rolling, commode chair         Care Plan:  Care Plan: M[employer]ure       Problem:  Secondary Health Insurance       Goal: I will apply for secondary health insurance through [facility] within the next 90 days.       Start Date: 11/16/2023 Expected End Date: 2/16/2024    This Visit's Progress: 10%    Priority: High    Note:     Barriers: Language   Strengths: Accepting of support.   Patient expressed understanding of goal: Yes     Action steps to achieve this goal:   1.  I will answer my phone when I am contacted by the [facility] FRW to schedule an initial appointment.   2.  I will provide all necessary documentation and information to complete a [facility] application.   3.  I will call my FRW if I have questions or concerns regarding my [facility] benefits application.   Note: Medicare Savings Program and Medical Assistance.                               Care Plan: PCA services       Problem: HP GENERAL PROBLEM       Goal: PCA services       Start Date: 11/27/2023    This Visit's Progress: 10%    Priority: High    Note:     Barriers: navigating eligibility  Strengths: motivated to seek support  Patient expressed understanding of goal: yes  Action steps to achieve this goal:  1. I will answer phone when [facility] calls  2. I will be available to schedule assessment  3. I will follow up with [facility] with further supports if needed.                                Patient/Caregiver understanding: yes       Future Appointments                In 1 month Amrik Mejia MD [facility] [place] [facility] SPRO    In 1 month Amrik Mejia MD M [facility] [place] [facility] FABBY            Plan:CCSW spoke with pt's spouse, Naomi. Naomi stated pt is needling assistance with all ADL's. Pt uses a walker to ambulate and family is working on getting a wheelchair. Pt resides with family in single family home. Naomi is working with W on changing insurance plans. She also asked if pt would be eligible for PCA services. CCSW completed online [employer] referral request. CCSW advised Naomi that the  change in insurance may have an affect on eligibility, but she wished to put referral through now.       CCSW to follow up in 30 days.     JEWELL Geiger, [facility]  Social Work Care Coordinator

## 2023-12-14 NOTE — PROGRESS NOTES
Clinic Care Coordination Contact  Program: Medicare savings program   County:[facility] Case #:  [facility] Worker:   Abhilash #:   Subscriber #:   Renewal:  Date Applied:     FRW Outreach:   12/14/23 FRW called and talked to wife she stated that she mailed it to the [facility] last week sometime but she could not remember when. FRW will follow up in 30 days and we will call the [facility] to make sure they received the application   Helga Benjamin   Financial Resource Worker  [facility] Care Coordination  [phone]    11/20/23 FRW called and wife was confused on why I was calling but she did fill out most of the Certain population application with the FRW and she will fill out the rest FRW mailed it out and will follow up in 30 days   Helga Benjamin   Financial Resource Worker  [facility] Care Coordination  [phone]    11/17/23 FRW will follow up next week due to patient is in the hospital   Helga Benjamin   Financial Resource Worker  [facility] Care Coordination  [phone]      Health Insurance:      Referral/Screening:   FRW Screening    Row Name 11/16/23 0918       [facility] Benefits   Is patient requesting help applying for [facility] benefits? No       Insurance:   [place]? Yes       Is this an insurance renewal? No       Is this a new insurance application request? Yes  Patient would like to check eligibility for secondary health insurance Medical Assistance and Medicare Savings Program.       Have you recently applied for insurance? No       How many people in your household? 2  Patient and wife.       Do you file taxes? Yes       How many dependents do you claim? 0       What is the monthly gross income for the household (wages, social security, workers comp, and pension)? 2225  Patient recevies Social Security long term of 725.00 monthly. Wife recieves 1,500 monthly.       OTHER   Is this a jackie care  application? No       Any other information for the FRW? Patient wants to apply for [employer] Savings Program and Medical Assistance. Naomi (Wife) has Medical Assistance and does not need to apply. Please call Naomi at [phone] or [phone].

## 2023-12-18 NOTE — PROGRESS NOTES
Clinic Care Coordination Contact  [facility]/Voicemail       Clinical Data: CHW Outreach    Outreach attempted x 1.  Left message on patient's voicemail with call back information and requested return call.    Plan: CHW will make another attempt to reach the patient via phone or MyChart.    CHW outreach in the next 2 weeks.      ROYA Perez  Clinic Care Coordination   [facility]   Phone: [phone]  [email or web address]

## 2023-12-27 NOTE — PROGRESS NOTES
ASSESMENT AND PLAN:  Diagnoses and all orders for this visit:  End-stage liver disease (H)  The last that I had heard from his gastroenterologist the patient was not considered to be a good candidate for liver transplant.  This is likely based on his age.  Counseling done today with the patient and his wife, I did encourage them to continue this discussion with his gastroenterologist at the next appointment coming up in February.  I think the patient would likely benefit from PCA services as well given his worsening weakness.  -     CBC with platelets; Future  -     Comprehensive metabolic panel (BMP + Alb, Alk Phos, ALT, AST, Total. Bili, TP); Future  Gastrointestinal hemorrhage associated with acute gastritis  CBC done today shows a hemoglobin has improved significantly from 8.1 last time.  -     omeprazole (PRILOSEC) 20 MG DR capsule; Take 1 capsule (20 mg) by mouth 2 times daily  -     CBC with platelets; Future  Unsteady gait  I think the patient would benefit from a wheelchair for longer distance ambulation safely.  Medically necessary because of his weakness related to his end-stage liver disease and his unsteady gait.  Disability parking sticker forms completed with the patient as well.    Incontinence of feces with fecal urgency  Written prescription given to the patient to take to a medical supply store for adult XL pull-up diapers 3 times daily.  Type 2 diabetes mellitus with microalbuminuria, without long-term current use of insulin (H)  Very well-controlled.  Not due for labs, his last A1c was 5.8 last month.  Need for vaccination  -     COVID-19 12+ (2023-24) (PFIZER)  Screening for tuberculosis  -     Quantiferon TB Gold Plus; Future      Reviewed the risks and benefits of the treatment plan with the patient and/or caregiver and we discussed indications for routine and emergent follow-up.        SUBJECTIVE: Patient has end-stage liver disease.  He gets ascites and has to have regular paracentesis.He  and his family have noticed increasing generalized weakness with some unsteadiness of his gait and he had a fall a few days ago.  They are wondering about getting a wheelchair, they are going to check with [employer] to start and will let me know if they cannot get anything and want to go through the insurance process.  He is using about 3 adult diapers per day because of the fecal urgency with incontinence issues that he has been having worsening issues with.  Wondering if insurance will cover this.  His wife is with him today and the family is had some increasing difficulty taking care of him as he has gotten weaker.  They are wondering about additional care support such as PCA services.  Patient also has questions about whether he would be a candidate for liver transplant.  Patient works as a  and needs TB screening done for his employer.    Past Medical History:   Diagnosis Date    Alcoholic cirrhosis of liver with ascites (H)     Cirrhosis of liver (H)     Diabetes mellitus (H)     Gout     Hypertension     Kidney stone      Patient Active Problem List   Diagnosis    Anemia    Hematuria    Hypercholesterolemia    Hypertension    Type 2 diabetes mellitus with microalbuminuria, without long-term current use of insulin (H)    Nephrolithiasis    Benign Adenomatous Polyp Of The Large Intestine    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    GI bleeding    Anemia due to blood loss, acute    Hypotension    Hyperkalemia    Gastrointestinal hemorrhage associated with acute gastritis    Popliteal cyst, left    Popliteal cyst, right    Incontinence of feces with fecal urgency    Other cirrhosis of liver (H) with ascites    Chronic kidney disease, stage 3a (H)    Portal hypertension (H)    Cirrhosis of liver with ascites (H)    Hepatic encephalopathy (H)    Pleural effusion    Anemia, unspecified type    Mitral valve insufficiency, unspecified etiology    SBP (spontaneous  "bacterial peritonitis) (H)    Bilateral lower extremity edema    End-stage liver disease (H)    Incontinence of feces, unspecified fecal incontinence type    Ascites due to alcoholic cirrhosis (H)    Ascites    Disorder of function of stomach    Elevated lipase    Esophageal varices without bleeding (H)    Generalized abdominal pain    Hypomagnesemia    Ureteral stone    Hypokalemia    Lower GI bleed     Current Outpatient Medications   Medication Sig Dispense Refill    bismuth subsalicylate (PEPTO BISMOL) 262 MG chewable tablet Take 1 tablet by mouth every 6 hours as needed for diarrhea      ciprofloxacin (CIPRO) 500 MG tablet Take 1 tablet (500 mg) by mouth daily 60 tablet 0    furosemide (LASIX) 20 MG tablet Take 1 tablet (20 mg) by mouth daily 60 tablet 0    lactulose (CHRONULAC) 10 GM/15ML solution Take 30 g by mouth daily      midodrine (PROAMATINE) 2.5 MG tablet Take 1 tablet (2.5 mg) by mouth 3 times daily (with meals) 180 tablet 0    omeprazole (PRILOSEC) 20 MG DR capsule Take 1 capsule (20 mg) by mouth 2 times daily 180 capsule 3    rifaximin (XIFAXAN) 550 MG TABS tablet Take 1 tablet (550 mg) by mouth 2 times daily 60 tablet 4    spironolactone (ALDACTONE) 50 MG tablet Take 1 tablet (50 mg) by mouth daily 60 tablet 0     History   Smoking Status    Never   Smokeless Tobacco    Never       OBJECTICE: /58   Pulse 82   Temp 98.6  F (37  C) (Oral)   Resp 20   Ht 1.717 m (5' 7.6\")   Wt 67.5 kg (148 lb 12.8 oz)   SpO2 99%   BMI 22.89 kg/m       Recent Results (from the past 24 hour(s))   CBC with platelets    Collection Time: 12/27/23  3:14 PM   Result Value Ref Range    WBC Count 6.9 4.0 - 11.0 10e3/uL    RBC Count 3.34 (L) 4.40 - 5.90 10e6/uL    Hemoglobin 9.3 (L) 13.3 - 17.7 g/dL    Hematocrit 30.8 (L) 40.0 - 53.0 %    MCV 92 78 - 100 fL    MCH 27.8 26.5 - 33.0 pg    MCHC 30.2 (L) 31.5 - 36.5 g/dL    RDW 19.3 (H) 10.0 - 15.0 %    Platelet Count 121 (L) 150 - 450 10e3/uL        GEN-alert, " appropriate, in no apparent distress   CV-regular rate and rhythm    RESP-lungs clear to auscultation   ABDOMINAL-ascites is present, mildly tender to palpation      Amrik Mejia MD

## 2023-12-27 NOTE — LETTER
January 2, 2024      Zaire Butlerg  [address] [place] [address]        Dear Zaire,    We are writing to inform you of your test results.  Good improvement in your hemoglobin and also good improvement in your kidney test.  The tuberculosis screening test QuantiFERON came back indeterminant which means that it should be repeated again in 3 months.      Resulted Orders   CBC with platelets   Result Value Ref Range    WBC Count 6.9 4.0 - 11.0 10e3/uL    RBC Count 3.34 (L) 4.40 - 5.90 10e6/uL    Hemoglobin 9.3 (L) 13.3 - 17.7 g/dL    Hematocrit 30.8 (L) 40.0 - 53.0 %    MCV 92 78 - 100 fL    MCH 27.8 26.5 - 33.0 pg    MCHC 30.2 (L) 31.5 - 36.5 g/dL    RDW 19.3 (H) 10.0 - 15.0 %    Platelet Count 121 (L) 150 - 450 10e3/uL   Quantiferon TB Gold Plus Grey Tube   Result Value Ref Range    Quantiferon Nil Tube 0.35 IU/mL   Quantiferon TB Gold Plus Green Tube   Result Value Ref Range    Quantiferon TB1 Tube 0.16 IU/mL   Quantiferon TB Gold Plus Yellow Tube   Result Value Ref Range    Quantiferon TB2 Tube 0.19    Quantiferon TB Gold Plus Purple Tube   Result Value Ref Range    Quantiferon Mitogen 0.21 IU/mL   Comprehensive metabolic panel (BMP + Alb, Alk Phos, ALT, AST, Total. Bili, TP)   Result Value Ref Range    Sodium 140 135 - 145 mmol/L      Comment:      Reference intervals for this test were updated on 09/26/2023 to more accurately reflect our healthy population. There may be differences in the flagging of prior results with similar values performed with this method. Interpretation of those prior results can be made in the context of the updated reference intervals.     Potassium 4.6 3.4 - 5.3 mmol/L    Carbon Dioxide (CO2) 21 (L) 22 - 29 mmol/L    Anion Gap 8 7 - 15 mmol/L    Urea Nitrogen 36.7 (H) 8.0 - 23.0 mg/dL    Creatinine 1.06 0.67 - 1.17 mg/dL    GFR Estimate 75 >60 mL/min/1.73m2    Calcium 10.3 (H) 8.8 - 10.2 mg/dL    Chloride 111 (H) 98 - 107 mmol/L    Glucose 216 (H) 70 - 99 mg/dL    Alkaline  Phosphatase 197 (H) 40 - 150 U/L      Comment:      Reference intervals for this test were updated on 11/14/2023 to more accurately reflect our healthy population. There may be differences in the flagging of prior results with similar values performed with this method. Interpretation of those prior results can be made in the context of the updated reference intervals.    AST 68 (H) 0 - 45 U/L      Comment:      Reference intervals for this test were updated on 6/12/2023 to more accurately reflect our healthy population. There may be differences in the flagging of prior results with similar values performed with this method. Interpretation of those prior results can be made in the context of the updated reference intervals.    ALT 51 0 - 70 U/L      Comment:      Reference intervals for this test were updated on 6/12/2023 to more accurately reflect our healthy population. There may be differences in the flagging of prior results with similar values performed with this method. Interpretation of those prior results can be made in the context of the updated reference intervals.      Protein Total 8.2 6.4 - 8.3 g/dL    Albumin 2.8 (L) 3.5 - 5.2 g/dL    Bilirubin Total 0.9 <=1.2 mg/dL   Quantiferon TB Gold Plus   Result Value Ref Range    Quantiferon-TB Gold Plus Indeterminate (A) Negative      Comment:      Unable to evaluate interferon gamma response due to a low   mitogen value, which may be the result of insufficient lymphocytes, reduced   lymphocyte activity due to improper specimen handling, or inability of the   patient's lymphocytes to generate interferon gamma.    TB1 Ag minus Nil Value -0.19 IU/mL    TB2 Ag minus Nil Value -0.16 IU/mL    Mitogen minus Nil Result -0.14 IU/mL    Nil Result 0.35 IU/mL       If you have any questions or concerns, please call the clinic at the number listed above.       Sincerely,      Amrik Mejia MD

## 2024-01-01 ENCOUNTER — APPOINTMENT (OUTPATIENT)
Dept: OCCUPATIONAL THERAPY | Facility: HOSPITAL | Age: 74
DRG: 441 | End: 2024-01-01
Attending: STUDENT IN AN ORGANIZED HEALTH CARE EDUCATION/TRAINING PROGRAM
Payer: COMMERCIAL

## 2024-01-01 ENCOUNTER — PATIENT OUTREACH (OUTPATIENT)
Dept: CARE COORDINATION | Facility: CLINIC | Age: 74
End: 2024-01-01
Payer: COMMERCIAL

## 2024-01-01 ENCOUNTER — MEDICAL CORRESPONDENCE (OUTPATIENT)
Dept: SCHEDULING | Facility: CLINIC | Age: 74
End: 2024-01-01
Payer: COMMERCIAL

## 2024-01-01 ENCOUNTER — TRANSFERRED RECORDS (OUTPATIENT)
Dept: HEALTH INFORMATION MANAGEMENT | Facility: CLINIC | Age: 74
End: 2024-01-01

## 2024-01-01 ENCOUNTER — HOSPITAL ENCOUNTER (OUTPATIENT)
Dept: ULTRASOUND IMAGING | Facility: CLINIC | Age: 74
Discharge: HOME | End: 2024-03-18
Attending: INTERNAL MEDICINE | Admitting: STUDENT IN AN ORGANIZED HEALTH CARE EDUCATION/TRAINING PROGRAM
Payer: COMMERCIAL

## 2024-01-01 ENCOUNTER — APPOINTMENT (OUTPATIENT)
Dept: RADIOLOGY | Facility: HOSPITAL | Age: 74
DRG: 441 | End: 2024-01-01
Attending: STUDENT IN AN ORGANIZED HEALTH CARE EDUCATION/TRAINING PROGRAM
Payer: COMMERCIAL

## 2024-01-01 ENCOUNTER — APPOINTMENT (OUTPATIENT)
Dept: PHYSICAL THERAPY | Facility: HOSPITAL | Age: 74
DRG: 433 | End: 2024-01-01
Attending: INTERNAL MEDICINE
Payer: COMMERCIAL

## 2024-01-01 ENCOUNTER — PATIENT OUTREACH (OUTPATIENT)
Dept: GERIATRIC MEDICINE | Facility: CLINIC | Age: 74
End: 2024-01-01
Payer: COMMERCIAL

## 2024-01-01 ENCOUNTER — PATIENT OUTREACH (OUTPATIENT)
Dept: CARE COORDINATION | Facility: CLINIC | Age: 74
End: 2024-01-01

## 2024-01-01 ENCOUNTER — TELEPHONE (OUTPATIENT)
Dept: FAMILY MEDICINE | Facility: CLINIC | Age: 74
End: 2024-01-01
Payer: COMMERCIAL

## 2024-01-01 ENCOUNTER — NURSE TRIAGE (OUTPATIENT)
Dept: NURSING | Facility: CLINIC | Age: 74
End: 2024-01-01
Payer: COMMERCIAL

## 2024-01-01 ENCOUNTER — APPOINTMENT (OUTPATIENT)
Dept: ULTRASOUND IMAGING | Facility: HOSPITAL | Age: 74
DRG: 441 | End: 2024-01-01
Attending: PHYSICIAN ASSISTANT
Payer: COMMERCIAL

## 2024-01-01 ENCOUNTER — TELEPHONE (OUTPATIENT)
Dept: FAMILY MEDICINE | Facility: CLINIC | Age: 74
End: 2024-01-01
Payer: OTHER MISCELLANEOUS

## 2024-01-01 ENCOUNTER — TRANSFERRED RECORDS (OUTPATIENT)
Dept: HEALTH INFORMATION MANAGEMENT | Facility: CLINIC | Age: 74
End: 2024-01-01
Payer: COMMERCIAL

## 2024-01-01 ENCOUNTER — ANESTHESIA (OUTPATIENT)
Dept: SURGERY | Facility: CLINIC | Age: 74
End: 2024-01-01
Payer: COMMERCIAL

## 2024-01-01 ENCOUNTER — ANESTHESIA EVENT (OUTPATIENT)
Dept: SURGERY | Facility: CLINIC | Age: 74
End: 2024-01-01
Payer: COMMERCIAL

## 2024-01-01 ENCOUNTER — MEDICAL CORRESPONDENCE (OUTPATIENT)
Dept: HEALTH INFORMATION MANAGEMENT | Facility: CLINIC | Age: 74
End: 2024-01-01
Payer: OTHER MISCELLANEOUS

## 2024-01-01 ENCOUNTER — TELEPHONE (OUTPATIENT)
Dept: FAMILY MEDICINE | Facility: CLINIC | Age: 74
End: 2024-01-01

## 2024-01-01 ENCOUNTER — APPOINTMENT (OUTPATIENT)
Dept: PHYSICAL THERAPY | Facility: HOSPITAL | Age: 74
DRG: 441 | End: 2024-01-01
Payer: COMMERCIAL

## 2024-01-01 ENCOUNTER — HOSPITAL ENCOUNTER (EMERGENCY)
Facility: CLINIC | Age: 74
Discharge: HOME | End: 2024-03-03
Attending: EMERGENCY MEDICINE | Admitting: EMERGENCY MEDICINE
Payer: COMMERCIAL

## 2024-01-01 ENCOUNTER — HOSPITAL ENCOUNTER (OUTPATIENT)
Dept: ULTRASOUND IMAGING | Facility: HOSPITAL | Age: 74
Discharge: HOME | End: 2024-02-24
Attending: RADIOLOGY | Admitting: RADIOLOGY
Payer: COMMERCIAL

## 2024-01-01 ENCOUNTER — APPOINTMENT (OUTPATIENT)
Dept: CT IMAGING | Facility: CLINIC | Age: 74
End: 2024-01-01
Attending: EMERGENCY MEDICINE
Payer: COMMERCIAL

## 2024-01-01 ENCOUNTER — HOSPITAL ENCOUNTER (INPATIENT)
Facility: HOSPITAL | Age: 74
LOS: 2 days | Discharge: HOME HEALTH | DRG: 433 | End: 2024-04-29
Attending: EMERGENCY MEDICINE | Admitting: HOSPITALIST
Payer: COMMERCIAL

## 2024-01-01 ENCOUNTER — REFERRAL (OUTPATIENT)
Dept: TRANSPLANT | Facility: CLINIC | Age: 74
End: 2024-01-01
Payer: OTHER MISCELLANEOUS

## 2024-01-01 ENCOUNTER — MEDICAL CORRESPONDENCE (OUTPATIENT)
Dept: HEALTH INFORMATION MANAGEMENT | Facility: CLINIC | Age: 74
End: 2024-01-01

## 2024-01-01 ENCOUNTER — HOSPITAL ENCOUNTER (EMERGENCY)
Facility: HOSPITAL | Age: 74
Discharge: HOME | End: 2024-06-07
Attending: EMERGENCY MEDICINE | Admitting: EMERGENCY MEDICINE
Payer: OTHER MISCELLANEOUS

## 2024-01-01 ENCOUNTER — VIRTUAL VISIT (OUTPATIENT)
Dept: UROLOGY | Facility: CLINIC | Age: 74
End: 2024-01-01
Attending: FAMILY MEDICINE
Payer: COMMERCIAL

## 2024-01-01 ENCOUNTER — OFFICE VISIT (OUTPATIENT)
Dept: FAMILY MEDICINE | Facility: CLINIC | Age: 74
End: 2024-01-01
Payer: COMMERCIAL

## 2024-01-01 ENCOUNTER — VIRTUAL VISIT (OUTPATIENT)
Dept: PHARMACY | Facility: CLINIC | Age: 74
End: 2024-01-01
Payer: COMMERCIAL

## 2024-01-01 ENCOUNTER — APPOINTMENT (OUTPATIENT)
Dept: OCCUPATIONAL THERAPY | Facility: HOSPITAL | Age: 74
DRG: 441 | End: 2024-01-01
Payer: COMMERCIAL

## 2024-01-01 ENCOUNTER — HOSPITAL ENCOUNTER (INPATIENT)
Facility: HOSPITAL | Age: 74
LOS: 4 days | Discharge: HOME HEALTH | DRG: 441 | End: 2024-05-06
Attending: EMERGENCY MEDICINE | Admitting: HOSPITALIST
Payer: COMMERCIAL

## 2024-01-01 ENCOUNTER — MEDICAL CORRESPONDENCE (OUTPATIENT)
Dept: HEALTH INFORMATION MANAGEMENT | Facility: CLINIC | Age: 74
End: 2024-01-01
Payer: COMMERCIAL

## 2024-01-01 ENCOUNTER — APPOINTMENT (OUTPATIENT)
Dept: ULTRASOUND IMAGING | Facility: HOSPITAL | Age: 74
DRG: 433 | End: 2024-01-01
Attending: INTERNAL MEDICINE
Payer: COMMERCIAL

## 2024-01-01 ENCOUNTER — HOSPITAL ENCOUNTER (INPATIENT)
Facility: HOSPITAL | Age: 74
LOS: 6 days | Discharge: HOME HEALTH | DRG: 441 | End: 2024-05-15
Attending: EMERGENCY MEDICINE | Admitting: STUDENT IN AN ORGANIZED HEALTH CARE EDUCATION/TRAINING PROGRAM
Payer: COMMERCIAL

## 2024-01-01 ENCOUNTER — HOSPITAL ENCOUNTER (OUTPATIENT)
Dept: ULTRASOUND IMAGING | Facility: CLINIC | Age: 74
Discharge: HOME | End: 2024-02-14
Attending: INTERNAL MEDICINE | Admitting: STUDENT IN AN ORGANIZED HEALTH CARE EDUCATION/TRAINING PROGRAM
Payer: COMMERCIAL

## 2024-01-01 ENCOUNTER — VIRTUAL VISIT (OUTPATIENT)
Dept: UROLOGY | Facility: CLINIC | Age: 74
End: 2024-01-01
Payer: COMMERCIAL

## 2024-01-01 ENCOUNTER — APPOINTMENT (OUTPATIENT)
Dept: CT IMAGING | Facility: HOSPITAL | Age: 74
DRG: 433 | End: 2024-01-01
Attending: EMERGENCY MEDICINE
Payer: COMMERCIAL

## 2024-01-01 ENCOUNTER — APPOINTMENT (OUTPATIENT)
Dept: CT IMAGING | Facility: HOSPITAL | Age: 74
End: 2024-01-01
Attending: EMERGENCY MEDICINE
Payer: OTHER MISCELLANEOUS

## 2024-01-01 ENCOUNTER — HEALTH MAINTENANCE LETTER (OUTPATIENT)
Age: 74
End: 2024-01-01

## 2024-01-01 ENCOUNTER — HOSPITAL ENCOUNTER (OUTPATIENT)
Dept: ULTRASOUND IMAGING | Facility: CLINIC | Age: 74
Discharge: HOME | End: 2024-04-15
Attending: INTERNAL MEDICINE | Admitting: INTERNAL MEDICINE
Payer: COMMERCIAL

## 2024-01-01 ENCOUNTER — APPOINTMENT (OUTPATIENT)
Dept: CT IMAGING | Facility: HOSPITAL | Age: 74
DRG: 441 | End: 2024-01-01
Attending: STUDENT IN AN ORGANIZED HEALTH CARE EDUCATION/TRAINING PROGRAM
Payer: COMMERCIAL

## 2024-01-01 ENCOUNTER — APPOINTMENT (OUTPATIENT)
Dept: PHYSICAL THERAPY | Facility: HOSPITAL | Age: 74
DRG: 441 | End: 2024-01-01
Attending: STUDENT IN AN ORGANIZED HEALTH CARE EDUCATION/TRAINING PROGRAM
Payer: COMMERCIAL

## 2024-01-01 ENCOUNTER — APPOINTMENT (OUTPATIENT)
Dept: OCCUPATIONAL THERAPY | Facility: HOSPITAL | Age: 74
DRG: 441 | End: 2024-01-01
Attending: HOSPITALIST
Payer: COMMERCIAL

## 2024-01-01 ENCOUNTER — HOSPITAL ENCOUNTER (EMERGENCY)
Facility: CLINIC | Age: 74
Discharge: HOME | End: 2024-02-27
Attending: EMERGENCY MEDICINE | Admitting: EMERGENCY MEDICINE
Payer: COMMERCIAL

## 2024-01-01 ENCOUNTER — HOSPITAL ENCOUNTER (OUTPATIENT)
Facility: CLINIC | Age: 74
Discharge: STILL ADMITTED OR ADMINISTRATIVE | End: 2024-02-27
Attending: INTERNAL MEDICINE | Admitting: INTERNAL MEDICINE
Payer: COMMERCIAL

## 2024-01-01 ENCOUNTER — VIRTUAL VISIT (OUTPATIENT)
Dept: FAMILY MEDICINE | Facility: CLINIC | Age: 74
End: 2024-01-01
Payer: OTHER MISCELLANEOUS

## 2024-01-01 ENCOUNTER — PATIENT OUTREACH (OUTPATIENT)
Dept: CARE COORDINATION | Facility: CLINIC | Age: 74
End: 2024-01-01
Payer: OTHER MISCELLANEOUS

## 2024-01-01 ENCOUNTER — APPOINTMENT (OUTPATIENT)
Dept: OCCUPATIONAL THERAPY | Facility: HOSPITAL | Age: 74
DRG: 433 | End: 2024-01-01
Attending: INTERNAL MEDICINE
Payer: COMMERCIAL

## 2024-01-01 ENCOUNTER — APPOINTMENT (OUTPATIENT)
Dept: SPEECH THERAPY | Facility: HOSPITAL | Age: 74
DRG: 441 | End: 2024-01-01
Attending: STUDENT IN AN ORGANIZED HEALTH CARE EDUCATION/TRAINING PROGRAM
Payer: COMMERCIAL

## 2024-01-01 ENCOUNTER — APPOINTMENT (OUTPATIENT)
Dept: PHYSICAL THERAPY | Facility: HOSPITAL | Age: 74
DRG: 441 | End: 2024-01-01
Attending: HOSPITALIST
Payer: COMMERCIAL

## 2024-01-01 ENCOUNTER — APPOINTMENT (OUTPATIENT)
Dept: PHYSICAL THERAPY | Facility: HOSPITAL | Age: 74
DRG: 433 | End: 2024-01-01
Payer: COMMERCIAL

## 2024-01-01 VITALS
RESPIRATION RATE: 18 BRPM | SYSTOLIC BLOOD PRESSURE: 104 MMHG | DIASTOLIC BLOOD PRESSURE: 44 MMHG | OXYGEN SATURATION: 100 % | HEART RATE: 58 BPM | BODY MASS INDEX: 20.21 KG/M2 | TEMPERATURE: 97.6 F | WEIGHT: 132.94 LBS

## 2024-01-01 VITALS
WEIGHT: 142 LBS | HEART RATE: 74 BPM | OXYGEN SATURATION: 100 % | DIASTOLIC BLOOD PRESSURE: 57 MMHG | RESPIRATION RATE: 20 BRPM | BODY MASS INDEX: 21.59 KG/M2 | TEMPERATURE: 98.5 F | SYSTOLIC BLOOD PRESSURE: 113 MMHG

## 2024-01-01 VITALS
HEART RATE: 59 BPM | TEMPERATURE: 97.8 F | DIASTOLIC BLOOD PRESSURE: 62 MMHG | SYSTOLIC BLOOD PRESSURE: 120 MMHG | RESPIRATION RATE: 16 BRPM | OXYGEN SATURATION: 100 %

## 2024-01-01 VITALS
HEIGHT: 68 IN | SYSTOLIC BLOOD PRESSURE: 114 MMHG | TEMPERATURE: 97.9 F | OXYGEN SATURATION: 100 % | RESPIRATION RATE: 18 BRPM | BODY MASS INDEX: 22.81 KG/M2 | HEART RATE: 60 BPM | WEIGHT: 150.5 LBS | DIASTOLIC BLOOD PRESSURE: 64 MMHG

## 2024-01-01 VITALS
OXYGEN SATURATION: 100 % | TEMPERATURE: 97.8 F | RESPIRATION RATE: 18 BRPM | BODY MASS INDEX: 27.57 KG/M2 | SYSTOLIC BLOOD PRESSURE: 143 MMHG | WEIGHT: 181.3 LBS | HEART RATE: 71 BPM | DIASTOLIC BLOOD PRESSURE: 69 MMHG

## 2024-01-01 VITALS
BODY MASS INDEX: 22.73 KG/M2 | RESPIRATION RATE: 20 BRPM | OXYGEN SATURATION: 97 % | SYSTOLIC BLOOD PRESSURE: 103 MMHG | HEART RATE: 69 BPM | HEIGHT: 68 IN | WEIGHT: 150 LBS | TEMPERATURE: 98.3 F | DIASTOLIC BLOOD PRESSURE: 59 MMHG

## 2024-01-01 VITALS
RESPIRATION RATE: 16 BRPM | OXYGEN SATURATION: 100 % | BODY MASS INDEX: 21.29 KG/M2 | DIASTOLIC BLOOD PRESSURE: 55 MMHG | WEIGHT: 140 LBS | TEMPERATURE: 98.3 F | HEART RATE: 61 BPM | SYSTOLIC BLOOD PRESSURE: 112 MMHG

## 2024-01-01 VITALS
BODY MASS INDEX: 27.83 KG/M2 | OXYGEN SATURATION: 100 % | SYSTOLIC BLOOD PRESSURE: 137 MMHG | TEMPERATURE: 97.9 F | DIASTOLIC BLOOD PRESSURE: 76 MMHG | HEART RATE: 81 BPM | WEIGHT: 183 LBS | RESPIRATION RATE: 16 BRPM

## 2024-01-01 VITALS
OXYGEN SATURATION: 100 % | HEIGHT: 68 IN | HEART RATE: 85 BPM | TEMPERATURE: 97.8 F | WEIGHT: 183 LBS | DIASTOLIC BLOOD PRESSURE: 80 MMHG | BODY MASS INDEX: 27.74 KG/M2 | RESPIRATION RATE: 16 BRPM | SYSTOLIC BLOOD PRESSURE: 122 MMHG

## 2024-01-01 VITALS
HEIGHT: 68 IN | TEMPERATURE: 97.5 F | BODY MASS INDEX: 22.73 KG/M2 | SYSTOLIC BLOOD PRESSURE: 104 MMHG | DIASTOLIC BLOOD PRESSURE: 54 MMHG | WEIGHT: 150 LBS | OXYGEN SATURATION: 100 % | RESPIRATION RATE: 16 BRPM | HEART RATE: 60 BPM

## 2024-01-01 VITALS
TEMPERATURE: 97.3 F | SYSTOLIC BLOOD PRESSURE: 118 MMHG | RESPIRATION RATE: 20 BRPM | BODY MASS INDEX: 28.72 KG/M2 | HEIGHT: 67 IN | WEIGHT: 183 LBS | DIASTOLIC BLOOD PRESSURE: 68 MMHG | HEART RATE: 74 BPM

## 2024-01-01 VITALS — BODY MASS INDEX: 21.22 KG/M2 | WEIGHT: 140 LBS | HEIGHT: 68 IN

## 2024-01-01 DIAGNOSIS — R53.1 GENERALIZED WEAKNESS: ICD-10-CM

## 2024-01-01 DIAGNOSIS — K76.82 HEPATIC ENCEPHALOPATHY (H): Primary | ICD-10-CM

## 2024-01-01 DIAGNOSIS — Z09 HOSPITAL DISCHARGE FOLLOW-UP: ICD-10-CM

## 2024-01-01 DIAGNOSIS — K74.60 CIRRHOSIS OF LIVER (H): ICD-10-CM

## 2024-01-01 DIAGNOSIS — K74.60 LIVER CIRRHOSIS SECONDARY TO NASH (H): ICD-10-CM

## 2024-01-01 DIAGNOSIS — K74.69 OTHER CIRRHOSIS OF LIVER (H): ICD-10-CM

## 2024-01-01 DIAGNOSIS — R26.81 UNSTABLE GAIT: ICD-10-CM

## 2024-01-01 DIAGNOSIS — K75.81 NASH (NONALCOHOLIC STEATOHEPATITIS): Primary | ICD-10-CM

## 2024-01-01 DIAGNOSIS — K74.60 CIRRHOSIS OF LIVER WITH ASCITES, UNSPECIFIED HEPATIC CIRRHOSIS TYPE (H): Primary | ICD-10-CM
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
## 2024-01-01 DIAGNOSIS — R74.8 ELEVATED LIPASE: ICD-10-CM

## 2024-01-01 DIAGNOSIS — K65.2 SBP (SPONTANEOUS BACTERIAL PERITONITIS) (H): ICD-10-CM

## 2024-01-01 DIAGNOSIS — N18.31 CHRONIC KIDNEY DISEASE, STAGE 3A (H): ICD-10-CM

## 2024-01-01 DIAGNOSIS — N30.00 ACUTE CYSTITIS WITHOUT HEMATURIA: ICD-10-CM

## 2024-01-01 DIAGNOSIS — N20.0 NEPHROLITHIASIS: Primary | ICD-10-CM

## 2024-01-01 DIAGNOSIS — E11.29 TYPE 2 DIABETES MELLITUS WITH MICROALBUMINURIA, WITHOUT LONG-TERM CURRENT USE OF INSULIN (H): ICD-10-CM

## 2024-01-01 DIAGNOSIS — I89.0 LYMPHEDEMA: ICD-10-CM

## 2024-01-01 DIAGNOSIS — R18.8 CIRRHOSIS OF LIVER WITH ASCITES, UNSPECIFIED HEPATIC CIRRHOSIS TYPE (H): ICD-10-CM

## 2024-01-01 DIAGNOSIS — K76.82 HEPATIC ENCEPHALOPATHY (H): ICD-10-CM

## 2024-01-01 DIAGNOSIS — R18.8 OTHER ASCITES: ICD-10-CM

## 2024-01-01 DIAGNOSIS — N39.0 COMPLICATED UTI (URINARY TRACT INFECTION): ICD-10-CM

## 2024-01-01 DIAGNOSIS — Z53.9 DIAGNOSIS NOT YET DEFINED: Primary | ICD-10-CM

## 2024-01-01 DIAGNOSIS — G93.40 ACUTE ENCEPHALOPATHY: ICD-10-CM

## 2024-01-01 DIAGNOSIS — R80.9 TYPE 2 DIABETES MELLITUS WITH MICROALBUMINURIA, WITHOUT LONG-TERM CURRENT USE OF INSULIN (H): ICD-10-CM

## 2024-01-01 DIAGNOSIS — R31.29 MICROSCOPIC HEMATURIA: ICD-10-CM

## 2024-01-01 DIAGNOSIS — K29.01 GASTROINTESTINAL HEMORRHAGE ASSOCIATED WITH ACUTE GASTRITIS: Primary | ICD-10-CM

## 2024-01-01 DIAGNOSIS — K74.69 OTHER CIRRHOSIS OF LIVER (H): Primary | ICD-10-CM

## 2024-01-01 DIAGNOSIS — K74.60 CIRRHOSIS OF LIVER WITH ASCITES, UNSPECIFIED HEPATIC CIRRHOSIS TYPE (H): ICD-10-CM

## 2024-01-01 DIAGNOSIS — Z78.9 TAKES DIETARY SUPPLEMENTS: ICD-10-CM

## 2024-01-01 DIAGNOSIS — K76.6 PORTAL HYPERTENSION (H): ICD-10-CM

## 2024-01-01 DIAGNOSIS — K72.10 END-STAGE LIVER DISEASE (H): ICD-10-CM

## 2024-01-01 DIAGNOSIS — Z01.818 PREOPERATIVE EXAMINATION: Primary | ICD-10-CM

## 2024-01-01 DIAGNOSIS — E78.00 PURE HYPERCHOLESTEROLEMIA: ICD-10-CM

## 2024-01-01 DIAGNOSIS — E87.20 METABOLIC ACIDOSIS: ICD-10-CM

## 2024-01-01 DIAGNOSIS — R41.0 CONFUSION: ICD-10-CM

## 2024-01-01 DIAGNOSIS — K76.9 LIVER DISEASE: ICD-10-CM

## 2024-01-01 DIAGNOSIS — I85.10 SECONDARY ESOPHAGEAL VARICES WITHOUT BLEEDING (H): Primary | ICD-10-CM

## 2024-01-01 DIAGNOSIS — I10 ESSENTIAL HYPERTENSION: ICD-10-CM

## 2024-01-01 DIAGNOSIS — K74.60 CIRRHOSIS OF LIVER (H): Primary | ICD-10-CM

## 2024-01-01 DIAGNOSIS — Z91.148 NONCOMPLIANCE WITH MEDICATION REGIMEN: ICD-10-CM

## 2024-01-01 DIAGNOSIS — S09.90XA INJURY OF HEAD, INITIAL ENCOUNTER: ICD-10-CM

## 2024-01-01 DIAGNOSIS — N20.0 NEPHROLITHIASIS: ICD-10-CM

## 2024-01-01 DIAGNOSIS — M79.89 LOCALIZED SWELLING OF BOTH LOWER EXTREMITIES: ICD-10-CM

## 2024-01-01 DIAGNOSIS — K75.81 LIVER CIRRHOSIS SECONDARY TO NASH (H): ICD-10-CM

## 2024-01-01 DIAGNOSIS — S01.01XA SCALP LACERATION, INITIAL ENCOUNTER: ICD-10-CM

## 2024-01-01 DIAGNOSIS — R18.8 CIRRHOSIS OF LIVER WITH ASCITES, UNSPECIFIED HEPATIC CIRRHOSIS TYPE (H): Primary | ICD-10-CM

## 2024-01-01 DIAGNOSIS — Z09 HOSPITAL DISCHARGE FOLLOW-UP: Primary | ICD-10-CM

## 2024-01-01 DIAGNOSIS — K74.60 UNSPECIFIED CIRRHOSIS OF LIVER (H): ICD-10-CM

## 2024-01-01 DIAGNOSIS — R19.7 DIARRHEA, UNSPECIFIED TYPE: ICD-10-CM

## 2024-01-01 DIAGNOSIS — R53.83 OTHER FATIGUE: ICD-10-CM

## 2024-01-01 DIAGNOSIS — R41.82 ALTERED MENTAL STATUS, UNSPECIFIED ALTERED MENTAL STATUS TYPE: ICD-10-CM

## 2024-01-01 DIAGNOSIS — K75.81 NASH (NONALCOHOLIC STEATOHEPATITIS): ICD-10-CM

## 2024-01-01 LAB
ABSOLUTE NEUTROPHILS, BODY FLUID: 4.5 /UL
ALBUMIN BODY FLUID SOURCE: NORMAL
ALBUMIN FLD-MCNC: 1.5 G/DL
ALBUMIN SERPL BCG-MCNC: 2.4 G/DL (ref 3.5–5.2)
ALBUMIN SERPL BCG-MCNC: 2.9 G/DL (ref 3.5–5.2)
ALBUMIN SERPL BCG-MCNC: 3 G/DL (ref 3.5–5.2)
ALBUMIN SERPL BCG-MCNC: 3.2 G/DL (ref 3.5–5.2)
ALBUMIN SERPL BCG-MCNC: 3.2 G/DL (ref 3.5–5.2)
ALBUMIN SERPL BCG-MCNC: 3.4 G/DL (ref 3.5–5.2)
ALBUMIN UR-MCNC: 10 MG/DL
ALBUMIN UR-MCNC: 10 MG/DL
ALBUMIN UR-MCNC: NEGATIVE MG/DL
ALBUMIN UR-MCNC: NEGATIVE MG/DL
ALP SERPL-CCNC: 105 U/L (ref 40–150)
ALP SERPL-CCNC: 113 U/L (ref 40–150)
ALP SERPL-CCNC: 133 U/L (ref 40–150)
ALP SERPL-CCNC: 147 U/L (ref 40–150)
ALP SERPL-CCNC: 151 U/L (ref 40–150)
ALP SERPL-CCNC: 154 U/L (ref 40–150)
ALP SERPL-CCNC: 157 U/L (ref 40–150)
ALP SERPL-CCNC: 158 U/L (ref 40–150)
ALP SERPL-CCNC: 161 U/L (ref 40–150)
ALP SERPL-CCNC: 167 U/L (ref 40–150)
ALP SERPL-CCNC: 172 U/L (ref 40–150)
ALP SERPL-CCNC: 271 U/L (ref 40–150)
ALT SERPL W P-5'-P-CCNC: 13 U/L (ref 0–70)
ALT SERPL W P-5'-P-CCNC: 15 U/L (ref 0–70)
ALT SERPL W P-5'-P-CCNC: 18 U/L (ref 0–70)
ALT SERPL W P-5'-P-CCNC: 21 U/L (ref 0–70)
ALT SERPL W P-5'-P-CCNC: 21 U/L (ref 0–70)
ALT SERPL W P-5'-P-CCNC: 22 U/L (ref 0–70)
ALT SERPL W P-5'-P-CCNC: 23 U/L (ref 0–70)
ALT SERPL W P-5'-P-CCNC: 26 U/L (ref 0–70)
ALT SERPL W P-5'-P-CCNC: 29 U/L (ref 0–70)
ALT SERPL W P-5'-P-CCNC: 74 U/L (ref 0–70)
AMMONIA PLAS-SCNC: 125 UMOL/L (ref 16–60)
AMMONIA PLAS-SCNC: 136 UMOL/L (ref 16–60)
AMMONIA PLAS-SCNC: 141 UMOL/L (ref 16–60)
AMMONIA PLAS-SCNC: 45 UMOL/L (ref 16–60)
AMMONIA PLAS-SCNC: 46 UMOL/L (ref 16–60)
AMMONIA PLAS-SCNC: 66 UMOL/L (ref 16–60)
AMMONIA PLAS-SCNC: 73 UMOL/L (ref 16–60)
AMMONIA PLAS-SCNC: 75 UMOL/L (ref 16–60)
AMMONIA PLAS-SCNC: 88 UMOL/L (ref 16–60)
AMMONIA PLAS-SCNC: 98 UMOL/L (ref 16–60)
ANION GAP SERPL CALCULATED.3IONS-SCNC: 10 MMOL/L (ref 7–15)
ANION GAP SERPL CALCULATED.3IONS-SCNC: 11 MMOL/L (ref 7–15)
ANION GAP SERPL CALCULATED.3IONS-SCNC: 12 MMOL/L (ref 7–15)
ANION GAP SERPL CALCULATED.3IONS-SCNC: 13 MMOL/L (ref 7–15)
ANION GAP SERPL CALCULATED.3IONS-SCNC: 14 MMOL/L (ref 7–15)
ANION GAP SERPL CALCULATED.3IONS-SCNC: 16 MMOL/L (ref 7–15)
ANION GAP SERPL CALCULATED.3IONS-SCNC: 8 MMOL/L (ref 7–15)
ANION GAP SERPL CALCULATED.3IONS-SCNC: 9 MMOL/L (ref 7–15)
ANION GAP SERPL CALCULATED.3IONS-SCNC: 9 MMOL/L (ref 7–15)
APPEARANCE FLD: ABNORMAL
APPEARANCE UR: CLEAR
APTT PPP: 37 SECONDS (ref 22–38)
AST SERPL W P-5'-P-CCNC: 135 U/L (ref 0–45)
AST SERPL W P-5'-P-CCNC: 33 U/L (ref 0–45)
AST SERPL W P-5'-P-CCNC: 35 U/L (ref 0–45)
AST SERPL W P-5'-P-CCNC: 36 U/L (ref 0–45)
AST SERPL W P-5'-P-CCNC: 38 U/L (ref 0–45)
AST SERPL W P-5'-P-CCNC: 45 U/L (ref 0–45)
AST SERPL W P-5'-P-CCNC: 45 U/L (ref 0–45)
AST SERPL W P-5'-P-CCNC: 48 U/L (ref 0–45)
AST SERPL W P-5'-P-CCNC: 51 U/L (ref 0–45)
AST SERPL W P-5'-P-CCNC: 55 U/L (ref 0–45)
ATRIAL RATE - MUSE: 57 BPM
ATRIAL RATE - MUSE: 77 BPM
ATRIAL RATE - MUSE: 83 BPM
BACTERIA #/AREA URNS HPF: ABNORMAL /HPF
BACTERIA FLD CULT: NO GROWTH
BACTERIA FLD CULT: NORMAL
BACTERIA UR CULT: NO GROWTH
BACTERIA UR CULT: NORMAL
BASE EXCESS BLDV CALC-SCNC: -5.4 MMOL/L (ref -3–3)
BASOPHILS # BLD AUTO: 0 10E3/UL (ref 0–0.2)
BASOPHILS # BLD AUTO: 0.1 10E3/UL (ref 0–0.2)
BASOPHILS NFR BLD AUTO: 1 %
BILIRUB DIRECT SERPL-MCNC: 0.3 MG/DL (ref 0–0.3)
BILIRUB DIRECT SERPL-MCNC: 0.35 MG/DL (ref 0–0.3)
BILIRUB DIRECT SERPL-MCNC: 0.35 MG/DL (ref 0–0.3)
BILIRUB DIRECT SERPL-MCNC: 0.38 MG/DL (ref 0–0.3)
BILIRUB DIRECT SERPL-MCNC: 0.4 MG/DL (ref 0–0.3)
BILIRUB DIRECT SERPL-MCNC: 0.4 MG/DL (ref 0–0.3)
BILIRUB DIRECT SERPL-MCNC: 0.68 MG/DL (ref 0–0.3)
BILIRUB SERPL-MCNC: 0.7 MG/DL
BILIRUB SERPL-MCNC: 0.9 MG/DL
BILIRUB SERPL-MCNC: 0.9 MG/DL
BILIRUB SERPL-MCNC: 1 MG/DL
BILIRUB SERPL-MCNC: 1.1 MG/DL
BILIRUB SERPL-MCNC: 1.2 MG/DL
BILIRUB SERPL-MCNC: 1.2 MG/DL
BILIRUB SERPL-MCNC: 1.3 MG/DL
BILIRUB SERPL-MCNC: 1.4 MG/DL
BILIRUB SERPL-MCNC: 1.5 MG/DL
BILIRUB UR QL STRIP: NEGATIVE
BUN SERPL-MCNC: 21.3 MG/DL (ref 8–23)
BUN SERPL-MCNC: 22.5 MG/DL (ref 8–23)
BUN SERPL-MCNC: 24 MG/DL (ref 8–23)
BUN SERPL-MCNC: 24.4 MG/DL (ref 8–23)
BUN SERPL-MCNC: 25.3 MG/DL (ref 8–23)
BUN SERPL-MCNC: 25.6 MG/DL (ref 8–23)
BUN SERPL-MCNC: 27.4 MG/DL (ref 8–23)
BUN SERPL-MCNC: 27.9 MG/DL (ref 8–23)
BUN SERPL-MCNC: 28.3 MG/DL (ref 8–23)
BUN SERPL-MCNC: 28.6 MG/DL (ref 8–23)
BUN SERPL-MCNC: 28.8 MG/DL (ref 8–23)
BUN SERPL-MCNC: 29.4 MG/DL (ref 8–23)
BUN SERPL-MCNC: 29.5 MG/DL (ref 8–23)
BUN SERPL-MCNC: 29.8 MG/DL (ref 8–23)
BUN SERPL-MCNC: 29.9 MG/DL (ref 8–23)
BUN SERPL-MCNC: 30.1 MG/DL (ref 8–23)
BUN SERPL-MCNC: 31.5 MG/DL (ref 8–23)
BUN SERPL-MCNC: 40.2 MG/DL (ref 8–23)
CALCIUM SERPL-MCNC: 7.9 MG/DL (ref 8.8–10.2)
CALCIUM SERPL-MCNC: 8.1 MG/DL (ref 8.8–10.2)
CALCIUM SERPL-MCNC: 8.4 MG/DL (ref 8.8–10.2)
CALCIUM SERPL-MCNC: 8.6 MG/DL (ref 8.8–10.2)
CALCIUM SERPL-MCNC: 8.7 MG/DL (ref 8.8–10.2)
CALCIUM SERPL-MCNC: 8.7 MG/DL (ref 8.8–10.2)
CALCIUM SERPL-MCNC: 8.8 MG/DL (ref 8.8–10.2)
CALCIUM SERPL-MCNC: 8.9 MG/DL (ref 8.8–10.2)
CALCIUM SERPL-MCNC: 9 MG/DL (ref 8.8–10.2)
CALCIUM SERPL-MCNC: 9.1 MG/DL (ref 8.8–10.2)
CALCIUM SERPL-MCNC: 9.1 MG/DL (ref 8.8–10.2)
CALCIUM SERPL-MCNC: 9.8 MG/DL (ref 8.8–10.2)
CELL COUNT BODY FLUID SOURCE: ABNORMAL
CHLORIDE SERPL-SCNC: 102 MMOL/L (ref 98–107)
CHLORIDE SERPL-SCNC: 106 MMOL/L (ref 98–107)
CHLORIDE SERPL-SCNC: 107 MMOL/L (ref 98–107)
CHLORIDE SERPL-SCNC: 107 MMOL/L (ref 98–107)
CHLORIDE SERPL-SCNC: 108 MMOL/L (ref 98–107)
CHLORIDE SERPL-SCNC: 109 MMOL/L (ref 98–107)
CHLORIDE SERPL-SCNC: 109 MMOL/L (ref 98–107)
CHLORIDE SERPL-SCNC: 110 MMOL/L (ref 98–107)
CHLORIDE SERPL-SCNC: 110 MMOL/L (ref 98–107)
CHLORIDE SERPL-SCNC: 111 MMOL/L (ref 98–107)
CHLORIDE SERPL-SCNC: 111 MMOL/L (ref 98–107)
CHLORIDE SERPL-SCNC: 112 MMOL/L (ref 98–107)
CHLORIDE SERPL-SCNC: 113 MMOL/L (ref 98–107)
CHLORIDE SERPL-SCNC: 114 MMOL/L (ref 98–107)
CHLORIDE SERPL-SCNC: 114 MMOL/L (ref 98–107)
CHLORIDE SERPL-SCNC: 116 MMOL/L (ref 98–107)
CHLORIDE SERPL-SCNC: 116 MMOL/L (ref 98–107)
CHLORIDE SERPL-SCNC: 120 MMOL/L (ref 98–107)
COLOR FLD: YELLOW
COLOR UR AUTO: ABNORMAL
CREAT SERPL-MCNC: 1.16 MG/DL (ref 0.67–1.17)
CREAT SERPL-MCNC: 1.22 MG/DL (ref 0.67–1.17)
CREAT SERPL-MCNC: 1.27 MG/DL (ref 0.67–1.17)
CREAT SERPL-MCNC: 1.29 MG/DL (ref 0.67–1.17)
CREAT SERPL-MCNC: 1.32 MG/DL (ref 0.67–1.17)
CREAT SERPL-MCNC: 1.33 MG/DL (ref 0.67–1.17)
CREAT SERPL-MCNC: 1.35 MG/DL (ref 0.67–1.17)
CREAT SERPL-MCNC: 1.38 MG/DL (ref 0.67–1.17)
CREAT SERPL-MCNC: 1.41 MG/DL (ref 0.67–1.17)
CREAT SERPL-MCNC: 1.41 MG/DL (ref 0.67–1.17)
CREAT SERPL-MCNC: 1.42 MG/DL (ref 0.67–1.17)
CREAT SERPL-MCNC: 1.43 MG/DL (ref 0.67–1.17)
CREAT SERPL-MCNC: 1.44 MG/DL (ref 0.67–1.17)
CREAT SERPL-MCNC: 1.47 MG/DL (ref 0.67–1.17)
CREAT SERPL-MCNC: 1.51 MG/DL (ref 0.67–1.17)
CREAT SERPL-MCNC: 1.53 MG/DL (ref 0.67–1.17)
CREAT SERPL-MCNC: 1.57 MG/DL (ref 0.67–1.17)
CREAT SERPL-MCNC: 1.6 MG/DL (ref 0.67–1.17)
CREAT SERPL-MCNC: 1.62 MG/DL (ref 0.67–1.17)
CREAT SERPL-MCNC: 1.67 MG/DL (ref 0.67–1.17)
DEPRECATED HCO3 PLAS-SCNC: 15 MMOL/L (ref 22–29)
DEPRECATED HCO3 PLAS-SCNC: 16 MMOL/L (ref 22–29)
DEPRECATED HCO3 PLAS-SCNC: 17 MMOL/L (ref 22–29)
DEPRECATED HCO3 PLAS-SCNC: 17 MMOL/L (ref 22–29)
DEPRECATED HCO3 PLAS-SCNC: 18 MMOL/L (ref 22–29)
DEPRECATED HCO3 PLAS-SCNC: 19 MMOL/L (ref 22–29)
DEPRECATED HCO3 PLAS-SCNC: 20 MMOL/L (ref 22–29)
DEPRECATED HCO3 PLAS-SCNC: 21 MMOL/L (ref 22–29)
DIASTOLIC BLOOD PRESSURE - MUSE: 82 MMHG
DIASTOLIC BLOOD PRESSURE - MUSE: NORMAL MMHG
DIASTOLIC BLOOD PRESSURE - MUSE: NORMAL MMHG
EGFRCR SERPLBLD CKD-EPI 2021: 43 ML/MIN/1.73M2
EGFRCR SERPLBLD CKD-EPI 2021: 45 ML/MIN/1.73M2
EGFRCR SERPLBLD CKD-EPI 2021: 45 ML/MIN/1.73M2
EGFRCR SERPLBLD CKD-EPI 2021: 47 ML/MIN/1.73M2
EGFRCR SERPLBLD CKD-EPI 2021: 48 ML/MIN/1.73M2
EGFRCR SERPLBLD CKD-EPI 2021: 49 ML/MIN/1.73M2
EGFRCR SERPLBLD CKD-EPI 2021: 50 ML/MIN/1.73M2
EGFRCR SERPLBLD CKD-EPI 2021: 52 ML/MIN/1.73M2
EGFRCR SERPLBLD CKD-EPI 2021: 52 ML/MIN/1.73M2
EGFRCR SERPLBLD CKD-EPI 2021: 53 ML/MIN/1.73M2
EGFRCR SERPLBLD CKD-EPI 2021: 54 ML/MIN/1.73M2
EGFRCR SERPLBLD CKD-EPI 2021: 56 ML/MIN/1.73M2
EGFRCR SERPLBLD CKD-EPI 2021: 57 ML/MIN/1.73M2
EGFRCR SERPLBLD CKD-EPI 2021: 57 ML/MIN/1.73M2
EGFRCR SERPLBLD CKD-EPI 2021: 59 ML/MIN/1.73M2
EGFRCR SERPLBLD CKD-EPI 2021: 60 ML/MIN/1.73M2
EGFRCR SERPLBLD CKD-EPI 2021: 63 ML/MIN/1.73M2
EGFRCR SERPLBLD CKD-EPI 2021: 67 ML/MIN/1.73M2
EOSINOPHIL # BLD AUTO: 0.2 10E3/UL (ref 0–0.7)
EOSINOPHIL # BLD AUTO: 0.2 10E3/UL (ref 0–0.7)
EOSINOPHIL # BLD AUTO: 0.3 10E3/UL (ref 0–0.7)
EOSINOPHIL # BLD AUTO: 0.4 10E3/UL (ref 0–0.7)
EOSINOPHIL NFR BLD AUTO: 5 %
EOSINOPHIL NFR BLD AUTO: 6 %
EOSINOPHIL NFR BLD AUTO: 8 %
EOSINOPHIL NFR BLD AUTO: 8 %
ERYTHROCYTE [DISTWIDTH] IN BLOOD BY AUTOMATED COUNT: 16.9 % (ref 10–15)
ERYTHROCYTE [DISTWIDTH] IN BLOOD BY AUTOMATED COUNT: 17.1 % (ref 10–15)
ERYTHROCYTE [DISTWIDTH] IN BLOOD BY AUTOMATED COUNT: 17.9 % (ref 10–15)
ERYTHROCYTE [DISTWIDTH] IN BLOOD BY AUTOMATED COUNT: 17.9 % (ref 10–15)
ERYTHROCYTE [DISTWIDTH] IN BLOOD BY AUTOMATED COUNT: 18.6 % (ref 10–15)
ERYTHROCYTE [DISTWIDTH] IN BLOOD BY AUTOMATED COUNT: 18.7 % (ref 10–15)
ERYTHROCYTE [DISTWIDTH] IN BLOOD BY AUTOMATED COUNT: 18.7 % (ref 10–15)
ERYTHROCYTE [DISTWIDTH] IN BLOOD BY AUTOMATED COUNT: 18.8 % (ref 10–15)
ERYTHROCYTE [DISTWIDTH] IN BLOOD BY AUTOMATED COUNT: 18.8 % (ref 10–15)
ERYTHROCYTE [DISTWIDTH] IN BLOOD BY AUTOMATED COUNT: 18.9 % (ref 10–15)
ERYTHROCYTE [DISTWIDTH] IN BLOOD BY AUTOMATED COUNT: 18.9 % (ref 10–15)
ERYTHROCYTE [DISTWIDTH] IN BLOOD BY AUTOMATED COUNT: 19.1 % (ref 10–15)
ERYTHROCYTE [DISTWIDTH] IN BLOOD BY AUTOMATED COUNT: 19.2 % (ref 10–15)
ETHANOL SERPL-MCNC: <0.01 G/DL
FLUAV RNA SPEC QL NAA+PROBE: NEGATIVE
FLUBV RNA RESP QL NAA+PROBE: NEGATIVE
GLUCOSE BLDC GLUCOMTR-MCNC: 100 MG/DL (ref 70–99)
GLUCOSE BLDC GLUCOMTR-MCNC: 102 MG/DL (ref 70–99)
GLUCOSE BLDC GLUCOMTR-MCNC: 104 MG/DL (ref 70–99)
GLUCOSE BLDC GLUCOMTR-MCNC: 113 MG/DL (ref 70–99)
GLUCOSE BLDC GLUCOMTR-MCNC: 113 MG/DL (ref 70–99)
GLUCOSE BLDC GLUCOMTR-MCNC: 114 MG/DL (ref 70–99)
GLUCOSE BLDC GLUCOMTR-MCNC: 118 MG/DL (ref 70–99)
GLUCOSE BLDC GLUCOMTR-MCNC: 119 MG/DL (ref 70–99)
GLUCOSE BLDC GLUCOMTR-MCNC: 122 MG/DL (ref 70–99)
GLUCOSE BLDC GLUCOMTR-MCNC: 123 MG/DL (ref 70–99)
GLUCOSE BLDC GLUCOMTR-MCNC: 124 MG/DL (ref 70–99)
GLUCOSE BLDC GLUCOMTR-MCNC: 126 MG/DL (ref 70–99)
GLUCOSE BLDC GLUCOMTR-MCNC: 126 MG/DL (ref 70–99)
GLUCOSE BLDC GLUCOMTR-MCNC: 127 MG/DL (ref 70–99)
GLUCOSE BLDC GLUCOMTR-MCNC: 128 MG/DL (ref 70–99)
GLUCOSE BLDC GLUCOMTR-MCNC: 131 MG/DL (ref 70–99)
GLUCOSE BLDC GLUCOMTR-MCNC: 132 MG/DL (ref 70–99)
GLUCOSE BLDC GLUCOMTR-MCNC: 133 MG/DL (ref 70–99)
GLUCOSE BLDC GLUCOMTR-MCNC: 133 MG/DL (ref 70–99)
GLUCOSE BLDC GLUCOMTR-MCNC: 135 MG/DL (ref 70–99)
GLUCOSE BLDC GLUCOMTR-MCNC: 137 MG/DL (ref 70–99)
GLUCOSE BLDC GLUCOMTR-MCNC: 137 MG/DL (ref 70–99)
GLUCOSE BLDC GLUCOMTR-MCNC: 138 MG/DL (ref 70–99)
GLUCOSE BLDC GLUCOMTR-MCNC: 140 MG/DL (ref 70–99)
GLUCOSE BLDC GLUCOMTR-MCNC: 145 MG/DL (ref 70–99)
GLUCOSE BLDC GLUCOMTR-MCNC: 146 MG/DL (ref 70–99)
GLUCOSE BLDC GLUCOMTR-MCNC: 147 MG/DL (ref 70–99)
GLUCOSE BLDC GLUCOMTR-MCNC: 149 MG/DL (ref 70–99)
GLUCOSE BLDC GLUCOMTR-MCNC: 150 MG/DL (ref 70–99)
GLUCOSE BLDC GLUCOMTR-MCNC: 150 MG/DL (ref 70–99)
GLUCOSE BLDC GLUCOMTR-MCNC: 152 MG/DL (ref 70–99)
GLUCOSE BLDC GLUCOMTR-MCNC: 153 MG/DL (ref 70–99)
GLUCOSE BLDC GLUCOMTR-MCNC: 154 MG/DL (ref 70–99)
GLUCOSE BLDC GLUCOMTR-MCNC: 158 MG/DL (ref 70–99)
GLUCOSE BLDC GLUCOMTR-MCNC: 172 MG/DL (ref 70–99)
GLUCOSE BLDC GLUCOMTR-MCNC: 173 MG/DL (ref 70–99)
GLUCOSE BLDC GLUCOMTR-MCNC: 179 MG/DL (ref 70–99)
GLUCOSE BLDC GLUCOMTR-MCNC: 198 MG/DL (ref 70–99)
GLUCOSE BLDC GLUCOMTR-MCNC: 77 MG/DL (ref 70–99)
GLUCOSE BLDC GLUCOMTR-MCNC: 83 MG/DL (ref 70–99)
GLUCOSE BLDC GLUCOMTR-MCNC: 87 MG/DL (ref 70–99)
GLUCOSE BLDC GLUCOMTR-MCNC: 89 MG/DL (ref 70–99)
GLUCOSE BLDC GLUCOMTR-MCNC: 91 MG/DL (ref 70–99)
GLUCOSE BLDC GLUCOMTR-MCNC: 94 MG/DL (ref 70–99)
GLUCOSE BLDC GLUCOMTR-MCNC: 96 MG/DL (ref 70–99)
GLUCOSE BLDC GLUCOMTR-MCNC: 97 MG/DL (ref 70–99)
GLUCOSE BLDC GLUCOMTR-MCNC: 98 MG/DL (ref 70–99)
GLUCOSE BLDC GLUCOMTR-MCNC: 98 MG/DL (ref 70–99)
GLUCOSE BLDC GLUCOMTR-MCNC: 99 MG/DL (ref 70–99)
GLUCOSE SERPL-MCNC: 105 MG/DL (ref 70–99)
GLUCOSE SERPL-MCNC: 106 MG/DL (ref 70–99)
GLUCOSE SERPL-MCNC: 112 MG/DL (ref 70–99)
GLUCOSE SERPL-MCNC: 125 MG/DL (ref 70–99)
GLUCOSE SERPL-MCNC: 125 MG/DL (ref 70–99)
GLUCOSE SERPL-MCNC: 129 MG/DL (ref 70–99)
GLUCOSE SERPL-MCNC: 135 MG/DL (ref 70–99)
GLUCOSE SERPL-MCNC: 146 MG/DL (ref 70–99)
GLUCOSE SERPL-MCNC: 148 MG/DL (ref 70–99)
GLUCOSE SERPL-MCNC: 151 MG/DL (ref 70–99)
GLUCOSE SERPL-MCNC: 155 MG/DL (ref 70–99)
GLUCOSE SERPL-MCNC: 168 MG/DL (ref 70–99)
GLUCOSE SERPL-MCNC: 171 MG/DL (ref 70–99)
GLUCOSE SERPL-MCNC: 185 MG/DL (ref 70–99)
GLUCOSE SERPL-MCNC: 199 MG/DL (ref 70–99)
GLUCOSE SERPL-MCNC: 87 MG/DL (ref 70–99)
GLUCOSE SERPL-MCNC: 89 MG/DL (ref 70–99)
GLUCOSE SERPL-MCNC: 90 MG/DL (ref 70–99)
GLUCOSE SERPL-MCNC: 90 MG/DL (ref 70–99)
GLUCOSE SERPL-MCNC: 96 MG/DL (ref 70–99)
GLUCOSE UR STRIP-MCNC: NEGATIVE MG/DL
GRAM STAIN RESULT: NORMAL
GRAM STAIN RESULT: NORMAL
HAV IGM SERPL QL IA: NONREACTIVE
HBA1C MFR BLD: 5.9 % (ref 0–5.6)
HBV CORE IGM SERPL QL IA: NONREACTIVE
HBV SURFACE AG SERPL QL IA: NONREACTIVE
HCO3 BLDV-SCNC: 20 MMOL/L (ref 21–28)
HCT VFR BLD AUTO: 25.5 % (ref 40–53)
HCT VFR BLD AUTO: 26.5 % (ref 40–53)
HCT VFR BLD AUTO: 27.2 % (ref 40–53)
HCT VFR BLD AUTO: 27.3 % (ref 40–53)
HCT VFR BLD AUTO: 27.5 % (ref 40–53)
HCT VFR BLD AUTO: 28.2 % (ref 40–53)
HCT VFR BLD AUTO: 29.4 % (ref 40–53)
HCT VFR BLD AUTO: 29.4 % (ref 40–53)
HCT VFR BLD AUTO: 30.3 % (ref 40–53)
HCT VFR BLD AUTO: 30.5 % (ref 40–53)
HCT VFR BLD AUTO: 30.6 % (ref 40–53)
HCT VFR BLD AUTO: 30.8 % (ref 40–53)
HCT VFR BLD AUTO: 30.8 % (ref 40–53)
HCT VFR BLD AUTO: 31.3 % (ref 40–53)
HCT VFR BLD AUTO: 32.4 % (ref 40–53)
HCV AB SERPL QL IA: NONREACTIVE
HGB BLD-MCNC: 10 G/DL (ref 13.3–17.7)
HGB BLD-MCNC: 10 G/DL (ref 13.3–17.7)
HGB BLD-MCNC: 10.1 G/DL (ref 13.3–17.7)
HGB BLD-MCNC: 8 G/DL (ref 13.3–17.7)
HGB BLD-MCNC: 8.3 G/DL (ref 13.3–17.7)
HGB BLD-MCNC: 8.5 G/DL (ref 13.3–17.7)
HGB BLD-MCNC: 8.8 G/DL (ref 13.3–17.7)
HGB BLD-MCNC: 8.8 G/DL (ref 13.3–17.7)
HGB BLD-MCNC: 8.9 G/DL (ref 13.3–17.7)
HGB BLD-MCNC: 9.2 G/DL (ref 13.3–17.7)
HGB BLD-MCNC: 9.2 G/DL (ref 13.3–17.7)
HGB BLD-MCNC: 9.4 G/DL (ref 13.3–17.7)
HGB BLD-MCNC: 9.6 G/DL (ref 13.3–17.7)
HGB BLD-MCNC: 9.8 G/DL (ref 13.3–17.7)
HGB BLD-MCNC: 9.9 G/DL (ref 13.3–17.7)
HGB UR QL STRIP: ABNORMAL
HOLD SPECIMEN: NORMAL
HYALINE CASTS: 2 /LPF
IMM GRANULOCYTES # BLD: 0 10E3/UL
IMM GRANULOCYTES NFR BLD: 0 %
INR PPP: 1.39 (ref 0.85–1.15)
INR PPP: 1.43 (ref 0.85–1.15)
INR PPP: 1.44 (ref 0.85–1.15)
INR PPP: 1.47 (ref 0.85–1.15)
INR PPP: 1.51 (ref 0.85–1.15)
INR PPP: 1.54 (ref 0.85–1.15)
INR PPP: 1.57 (ref 0.85–1.15)
INTERPRETATION ECG - MUSE: NORMAL
KETONES UR STRIP-MCNC: NEGATIVE MG/DL
LACTATE SERPL-SCNC: 1.4 MMOL/L (ref 0.7–2)
LEUKOCYTE ESTERASE UR QL STRIP: ABNORMAL
LEUKOCYTE ESTERASE UR QL STRIP: NEGATIVE
LIPASE SERPL-CCNC: 133 U/L (ref 13–60)
LIPASE SERPL-CCNC: 158 U/L (ref 13–60)
LIPASE SERPL-CCNC: 164 U/L (ref 13–60)
LIPASE SERPL-CCNC: 173 U/L (ref 13–60)
LIPASE SERPL-CCNC: 221 U/L (ref 13–60)
LYMPHOCYTES # BLD AUTO: 0.7 10E3/UL (ref 0.8–5.3)
LYMPHOCYTES # BLD AUTO: 0.7 10E3/UL (ref 0–5.3)
LYMPHOCYTES # BLD AUTO: 0.9 10E3/UL (ref 0.8–5.3)
LYMPHOCYTES # BLD AUTO: 0.9 10E3/UL (ref 0.8–5.3)
LYMPHOCYTES # BLD AUTO: 1 10E3/UL (ref 0.8–5.3)
LYMPHOCYTES # BLD AUTO: 1 10E3/UL (ref 0.8–5.3)
LYMPHOCYTES # BLD AUTO: 1.1 10E3/UL (ref 0.8–5.3)
LYMPHOCYTES # BLD AUTO: 1.4 10E3/UL (ref 0.8–5.3)
LYMPHOCYTES # BLD AUTO: 1.5 10E3/UL (ref 0.8–5.3)
LYMPHOCYTES NFR BLD AUTO: 13 %
LYMPHOCYTES NFR BLD AUTO: 15 %
LYMPHOCYTES NFR BLD AUTO: 18 %
LYMPHOCYTES NFR BLD AUTO: 19 %
LYMPHOCYTES NFR BLD AUTO: 19 %
LYMPHOCYTES NFR BLD AUTO: 20 %
LYMPHOCYTES NFR BLD AUTO: 21 %
LYMPHOCYTES NFR BLD AUTO: 22 %
LYMPHOCYTES NFR BLD AUTO: 28 %
LYMPHOCYTES NFR FLD MANUAL: 49 %
MAGNESIUM SERPL-MCNC: 1.7 MG/DL (ref 1.7–2.3)
MAGNESIUM SERPL-MCNC: 1.8 MG/DL (ref 1.7–2.3)
MAGNESIUM SERPL-MCNC: 1.9 MG/DL (ref 1.7–2.3)
MAGNESIUM SERPL-MCNC: 1.9 MG/DL (ref 1.7–2.3)
MCH RBC QN AUTO: 27 PG (ref 26.5–33)
MCH RBC QN AUTO: 27.1 PG (ref 26.5–33)
MCH RBC QN AUTO: 27.5 PG (ref 26.5–33)
MCH RBC QN AUTO: 27.6 PG (ref 26.5–33)
MCH RBC QN AUTO: 27.7 PG (ref 26.5–33)
MCH RBC QN AUTO: 27.8 PG (ref 26.5–33)
MCH RBC QN AUTO: 28.2 PG (ref 26.5–33)
MCH RBC QN AUTO: 28.4 PG (ref 26.5–33)
MCH RBC QN AUTO: 28.5 PG (ref 26.5–33)
MCH RBC QN AUTO: 28.5 PG (ref 26.5–33)
MCH RBC QN AUTO: 28.7 PG (ref 26.5–33)
MCHC RBC AUTO-ENTMCNC: 31 G/DL (ref 31.5–36.5)
MCHC RBC AUTO-ENTMCNC: 31.2 G/DL (ref 31.5–36.5)
MCHC RBC AUTO-ENTMCNC: 31.2 G/DL (ref 31.5–36.5)
MCHC RBC AUTO-ENTMCNC: 31.3 G/DL (ref 31.5–36.5)
MCHC RBC AUTO-ENTMCNC: 31.4 G/DL (ref 31.5–36.5)
MCHC RBC AUTO-ENTMCNC: 31.6 G/DL (ref 31.5–36.5)
MCHC RBC AUTO-ENTMCNC: 31.6 G/DL (ref 31.5–36.5)
MCHC RBC AUTO-ENTMCNC: 31.8 G/DL (ref 31.5–36.5)
MCHC RBC AUTO-ENTMCNC: 32 G/DL (ref 31.5–36.5)
MCHC RBC AUTO-ENTMCNC: 32.2 G/DL (ref 31.5–36.5)
MCHC RBC AUTO-ENTMCNC: 32.7 G/DL (ref 31.5–36.5)
MCHC RBC AUTO-ENTMCNC: 32.8 G/DL (ref 31.5–36.5)
MCV RBC AUTO: 85 FL (ref 78–100)
MCV RBC AUTO: 86 FL (ref 78–100)
MCV RBC AUTO: 87 FL (ref 78–100)
MCV RBC AUTO: 87 FL (ref 78–100)
MCV RBC AUTO: 88 FL (ref 78–100)
MCV RBC AUTO: 89 FL (ref 78–100)
MCV RBC AUTO: 90 FL (ref 78–100)
MCV RBC AUTO: 91 FL (ref 78–100)
MONOCYTES # BLD AUTO: 0.5 10E3/UL (ref 0–1.3)
MONOCYTES # BLD AUTO: 0.6 10E3/UL (ref 0–1.3)
MONOCYTES # BLD AUTO: 0.7 10E3/UL (ref 0–1.3)
MONOCYTES # BLD AUTO: 0.9 10E3/UL (ref 0–1.3)
MONOCYTES NFR BLD AUTO: 10 %
MONOCYTES NFR BLD AUTO: 11 %
MONOCYTES NFR BLD AUTO: 12 %
MONOCYTES NFR BLD AUTO: 13 %
MONOCYTES NFR BLD AUTO: 14 %
MONOCYTES NFR BLD AUTO: 15 %
MONOS+MACROS NFR FLD MANUAL: 49 %
MUCOUS THREADS #/AREA URNS LPF: PRESENT /LPF
MUCOUS THREADS #/AREA URNS LPF: PRESENT /LPF
NEUTROPHILS # BLD AUTO: 2.6 10E3/UL (ref 1.6–8.3)
NEUTROPHILS # BLD AUTO: 2.7 10E3/UL (ref 1.6–8.3)
NEUTROPHILS # BLD AUTO: 2.7 10E3/UL (ref 1.6–8.3)
NEUTROPHILS # BLD AUTO: 2.9 10E3/UL (ref 1.6–8.3)
NEUTROPHILS # BLD AUTO: 3 10E3/UL (ref 1.6–8.3)
NEUTROPHILS # BLD AUTO: 3.1 10E3/UL (ref 1.6–8.3)
NEUTROPHILS # BLD AUTO: 3.2 10E3/UL (ref 1.6–8.3)
NEUTROPHILS # BLD AUTO: 3.4 10E3/UL (ref 1.6–8.3)
NEUTROPHILS # BLD AUTO: 5 10E3/UL (ref 1.6–8.3)
NEUTROPHILS NFR BLD AUTO: 51 %
NEUTROPHILS NFR BLD AUTO: 58 %
NEUTROPHILS NFR BLD AUTO: 59 %
NEUTROPHILS NFR BLD AUTO: 61 %
NEUTROPHILS NFR BLD AUTO: 62 %
NEUTROPHILS NFR BLD AUTO: 62 %
NEUTROPHILS NFR BLD AUTO: 64 %
NEUTROPHILS NFR BLD AUTO: 65 %
NEUTROPHILS NFR BLD AUTO: 69 %
NEUTS BAND NFR FLD MANUAL: 2 %
NITRATE UR QL: NEGATIVE
NRBC # BLD AUTO: 0 10E3/UL
NRBC BLD AUTO-RTO: 0 /100
O2/TOTAL GAS SETTING VFR VENT: 21 %
OXYHGB MFR BLDV: 56 % (ref 70–75)
P AXIS - MUSE: 31 DEGREES
P AXIS - MUSE: 72 DEGREES
P AXIS - MUSE: 72 DEGREES
PCO2 BLDV: 33 MM HG (ref 40–50)
PH BLDV: 7.39 [PH] (ref 7.32–7.43)
PH UR STRIP: 6 [PH] (ref 5–7)
PH UR STRIP: 7 [PH] (ref 5–7)
PH UR STRIP: 7 [PH] (ref 5–7)
PH UR STRIP: 7.5 [PH] (ref 5–7)
PHOSPHATE SERPL-MCNC: 4.2 MG/DL (ref 2.5–4.5)
PLATELET # BLD AUTO: 101 10E3/UL (ref 150–450)
PLATELET # BLD AUTO: 106 10E3/UL (ref 150–450)
PLATELET # BLD AUTO: 106 10E3/UL (ref 150–450)
PLATELET # BLD AUTO: 107 10E3/UL (ref 150–450)
PLATELET # BLD AUTO: 111 10E3/UL (ref 150–450)
PLATELET # BLD AUTO: 115 10E3/UL (ref 150–450)
PLATELET # BLD AUTO: 115 10E3/UL (ref 150–450)
PLATELET # BLD AUTO: 116 10E3/UL (ref 150–450)
PLATELET # BLD AUTO: 120 10E3/UL (ref 150–450)
PLATELET # BLD AUTO: 120 10E3/UL (ref 150–450)
PLATELET # BLD AUTO: 124 10E3/UL (ref 150–450)
PLATELET # BLD AUTO: 126 10E3/UL (ref 150–450)
PLATELET # BLD AUTO: 131 10E3/UL (ref 150–450)
PLATELET # BLD AUTO: 144 10E3/UL (ref 150–450)
PLATELET # BLD AUTO: 91 10E3/UL (ref 150–450)
PO2 BLDV: 31 MM HG (ref 25–47)
POTASSIUM SERPL-SCNC: 3.6 MMOL/L (ref 3.4–5.3)
POTASSIUM SERPL-SCNC: 3.6 MMOL/L (ref 3.4–5.3)
POTASSIUM SERPL-SCNC: 4 MMOL/L (ref 3.4–5.3)
POTASSIUM SERPL-SCNC: 4.3 MMOL/L (ref 3.4–5.3)
POTASSIUM SERPL-SCNC: 4.4 MMOL/L (ref 3.4–5.3)
POTASSIUM SERPL-SCNC: 4.5 MMOL/L (ref 3.4–5.3)
POTASSIUM SERPL-SCNC: 4.6 MMOL/L (ref 3.4–5.3)
POTASSIUM SERPL-SCNC: 4.6 MMOL/L (ref 3.4–5.3)
POTASSIUM SERPL-SCNC: 4.9 MMOL/L (ref 3.4–5.3)
POTASSIUM SERPL-SCNC: 4.9 MMOL/L (ref 3.4–5.3)
POTASSIUM SERPL-SCNC: 5 MMOL/L (ref 3.4–5.3)
POTASSIUM SERPL-SCNC: 5 MMOL/L (ref 3.4–5.3)
POTASSIUM SERPL-SCNC: 5.1 MMOL/L (ref 3.4–5.3)
POTASSIUM SERPL-SCNC: 5.2 MMOL/L (ref 3.4–5.3)
POTASSIUM SERPL-SCNC: 5.3 MMOL/L (ref 3.4–5.3)
POTASSIUM SERPL-SCNC: 5.3 MMOL/L (ref 3.4–5.3)
POTASSIUM SERPL-SCNC: 5.7 MMOL/L (ref 3.4–5.3)
PR INTERVAL - MUSE: 142 MS
PR INTERVAL - MUSE: 148 MS
PR INTERVAL - MUSE: 154 MS
PROT FLD-MCNC: 3.3 G/DL
PROT SERPL-MCNC: 6.6 G/DL (ref 6.4–8.3)
PROT SERPL-MCNC: 6.7 G/DL (ref 6.4–8.3)
PROT SERPL-MCNC: 7.2 G/DL (ref 6.4–8.3)
PROT SERPL-MCNC: 7.3 G/DL (ref 6.4–8.3)
PROT SERPL-MCNC: 7.3 G/DL (ref 6.4–8.3)
PROT SERPL-MCNC: 7.4 G/DL (ref 6.4–8.3)
PROT SERPL-MCNC: 7.6 G/DL (ref 6.4–8.3)
PROT SERPL-MCNC: 7.6 G/DL (ref 6.4–8.3)
PROT SERPL-MCNC: 7.7 G/DL (ref 6.4–8.3)
PROT SERPL-MCNC: 8 G/DL (ref 6.4–8.3)
PROT SERPL-MCNC: 8.1 G/DL (ref 6.4–8.3)
PROT SERPL-MCNC: 8.5 G/DL (ref 6.4–8.3)
PROTEIN BODY FLUID SOURCE: NORMAL
QRS DURATION - MUSE: 68 MS
QRS DURATION - MUSE: 86 MS
QRS DURATION - MUSE: 94 MS
QT - MUSE: 406 MS
QT - MUSE: 410 MS
QT - MUSE: 474 MS
QTC - MUSE: 459 MS
QTC - MUSE: 461 MS
QTC - MUSE: 481 MS
R AXIS - MUSE: 42 DEGREES
R AXIS - MUSE: 52 DEGREES
R AXIS - MUSE: 89 DEGREES
RBC # BLD AUTO: 2.81 10E6/UL (ref 4.4–5.9)
RBC # BLD AUTO: 3.06 10E6/UL (ref 4.4–5.9)
RBC # BLD AUTO: 3.07 10E6/UL (ref 4.4–5.9)
RBC # BLD AUTO: 3.09 10E6/UL (ref 4.4–5.9)
RBC # BLD AUTO: 3.1 10E6/UL (ref 4.4–5.9)
RBC # BLD AUTO: 3.2 10E6/UL (ref 4.4–5.9)
RBC # BLD AUTO: 3.34 10E6/UL (ref 4.4–5.9)
RBC # BLD AUTO: 3.41 10E6/UL (ref 4.4–5.9)
RBC # BLD AUTO: 3.41 10E6/UL (ref 4.4–5.9)
RBC # BLD AUTO: 3.46 10E6/UL (ref 4.4–5.9)
RBC # BLD AUTO: 3.47 10E6/UL (ref 4.4–5.9)
RBC # BLD AUTO: 3.51 10E6/UL (ref 4.4–5.9)
RBC # BLD AUTO: 3.57 10E6/UL (ref 4.4–5.9)
RBC # BLD AUTO: 3.61 10E6/UL (ref 4.4–5.9)
RBC # BLD AUTO: 3.67 10E6/UL (ref 4.4–5.9)
RBC URINE: 132 /HPF
RBC URINE: 61 /HPF
RBC URINE: 87 /HPF
RBC URINE: >182 /HPF
RSV RNA SPEC NAA+PROBE: NEGATIVE
SAO2 % BLDV: 57.6 % (ref 70–75)
SARS-COV-2 RNA RESP QL NAA+PROBE: NEGATIVE
SODIUM SERPL-SCNC: 131 MMOL/L (ref 135–145)
SODIUM SERPL-SCNC: 137 MMOL/L (ref 135–145)
SODIUM SERPL-SCNC: 138 MMOL/L (ref 135–145)
SODIUM SERPL-SCNC: 139 MMOL/L (ref 135–145)
SODIUM SERPL-SCNC: 140 MMOL/L (ref 135–145)
SODIUM SERPL-SCNC: 141 MMOL/L (ref 135–145)
SODIUM SERPL-SCNC: 142 MMOL/L (ref 135–145)
SODIUM SERPL-SCNC: 142 MMOL/L (ref 135–145)
SODIUM SERPL-SCNC: 143 MMOL/L (ref 135–145)
SODIUM SERPL-SCNC: 143 MMOL/L (ref 135–145)
SODIUM SERPL-SCNC: 145 MMOL/L (ref 135–145)
SODIUM SERPL-SCNC: 149 MMOL/L (ref 135–145)
SP GR UR STRIP: 1.01 (ref 1–1.03)
SP GR UR STRIP: 1.03 (ref 1–1.03)
SQUAMOUS EPITHELIAL: 1 /HPF
SQUAMOUS EPITHELIAL: 2 /HPF
SQUAMOUS EPITHELIAL: <1 /HPF
SYSTOLIC BLOOD PRESSURE - MUSE: 130 MMHG
SYSTOLIC BLOOD PRESSURE - MUSE: NORMAL MMHG
SYSTOLIC BLOOD PRESSURE - MUSE: NORMAL MMHG
T AXIS - MUSE: 33 DEGREES
T AXIS - MUSE: 36 DEGREES
T AXIS - MUSE: 51 DEGREES
TSH SERPL DL<=0.005 MIU/L-ACNC: 1.2 UIU/ML (ref 0.3–4.2)
TSH SERPL DL<=0.005 MIU/L-ACNC: 1.9 UIU/ML (ref 0.3–4.2)
UPPER GI ENDOSCOPY: NORMAL
UROBILINOGEN UR STRIP-MCNC: <2 MG/DL
VENTRICULAR RATE- MUSE: 57 BPM
VENTRICULAR RATE- MUSE: 77 BPM
VENTRICULAR RATE- MUSE: 83 BPM
WBC # BLD AUTO: 3.7 10E3/UL (ref 4–11)
WBC # BLD AUTO: 4.2 10E3/UL (ref 4–11)
WBC # BLD AUTO: 4.5 10E3/UL (ref 4–11)
WBC # BLD AUTO: 4.6 10E3/UL (ref 4–11)
WBC # BLD AUTO: 4.6 10E3/UL (ref 4–11)
WBC # BLD AUTO: 4.9 10E3/UL (ref 4–11)
WBC # BLD AUTO: 4.9 10E3/UL (ref 4–11)
WBC # BLD AUTO: 5 10E3/UL (ref 4–11)
WBC # BLD AUTO: 5 10E3/UL (ref 4–11)
WBC # BLD AUTO: 5.1 10E3/UL (ref 4–11)
WBC # BLD AUTO: 5.5 10E3/UL (ref 4–11)
WBC # BLD AUTO: 5.7 10E3/UL (ref 4–11)
WBC # BLD AUTO: 7.7 10E3/UL (ref 4–11)
WBC # FLD AUTO: 226 /UL
WBC URINE: 10 /HPF
WBC URINE: 11 /HPF
WBC URINE: 15 /HPF
WBC URINE: 2 /HPF

## 2024-01-01 PROCEDURE — 250N000013 HC RX MED GY IP 250 OP 250 PS 637: Performed by: EMERGENCY MEDICINE

## 2024-01-01 PROCEDURE — 36415 COLL VENOUS BLD VENIPUNCTURE: CPT | Performed by: EMERGENCY MEDICINE

## 2024-01-01 PROCEDURE — 82140 ASSAY OF AMMONIA: CPT | Performed by: STUDENT IN AN ORGANIZED HEALTH CARE EDUCATION/TRAINING PROGRAM

## 2024-01-01 PROCEDURE — 85014 HEMATOCRIT: CPT | Performed by: INTERNAL MEDICINE

## 2024-01-01 PROCEDURE — 97530 THERAPEUTIC ACTIVITIES: CPT | Mod: GP

## 2024-01-01 PROCEDURE — 80053 COMPREHEN METABOLIC PANEL: CPT | Performed by: HOSPITALIST

## 2024-01-01 PROCEDURE — 82248 BILIRUBIN DIRECT: CPT | Performed by: EMERGENCY MEDICINE

## 2024-01-01 PROCEDURE — 250N000013 HC RX MED GY IP 250 OP 250 PS 637: Performed by: STUDENT IN AN ORGANIZED HEALTH CARE EDUCATION/TRAINING PROGRAM

## 2024-01-01 PROCEDURE — 97535 SELF CARE MNGMENT TRAINING: CPT | Mod: GO

## 2024-01-01 PROCEDURE — 36415 COLL VENOUS BLD VENIPUNCTURE: CPT | Performed by: FAMILY MEDICINE

## 2024-01-01 PROCEDURE — 250N000013 HC RX MED GY IP 250 OP 250 PS 637: Performed by: HOSPITALIST

## 2024-01-01 PROCEDURE — 250N000011 HC RX IP 250 OP 636: Performed by: INTERNAL MEDICINE

## 2024-01-01 PROCEDURE — 250N000011 HC RX IP 250 OP 636: Performed by: ANESTHESIOLOGY

## 2024-01-01 PROCEDURE — 83690 ASSAY OF LIPASE: CPT | Performed by: EMERGENCY MEDICINE

## 2024-01-01 PROCEDURE — 97116 GAIT TRAINING THERAPY: CPT | Mod: GP | Performed by: PHYSICAL THERAPIST

## 2024-01-01 PROCEDURE — 85730 THROMBOPLASTIN TIME PARTIAL: CPT | Performed by: FAMILY MEDICINE

## 2024-01-01 PROCEDURE — 97530 THERAPEUTIC ACTIVITIES: CPT | Mod: GO

## 2024-01-01 PROCEDURE — 80048 BASIC METABOLIC PNL TOTAL CA: CPT | Performed by: STUDENT IN AN ORGANIZED HEALTH CARE EDUCATION/TRAINING PROGRAM

## 2024-01-01 PROCEDURE — 82248 BILIRUBIN DIRECT: CPT | Performed by: HOSPITALIST

## 2024-01-01 PROCEDURE — 250N000009 HC RX 250: Performed by: HOSPITALIST

## 2024-01-01 PROCEDURE — G2211 COMPLEX E/M VISIT ADD ON: HCPCS | Performed by: FAMILY MEDICINE

## 2024-01-01 PROCEDURE — 36415 COLL VENOUS BLD VENIPUNCTURE: CPT | Performed by: STUDENT IN AN ORGANIZED HEALTH CARE EDUCATION/TRAINING PROGRAM

## 2024-01-01 PROCEDURE — 120N000001 HC R&B MED SURG/OB

## 2024-01-01 PROCEDURE — 250N000011 HC RX IP 250 OP 636: Performed by: HOSPITALIST

## 2024-01-01 PROCEDURE — 99285 EMERGENCY DEPT VISIT HI MDM: CPT | Mod: 25

## 2024-01-01 PROCEDURE — 250N000009 HC RX 250: Performed by: ANESTHESIOLOGY

## 2024-01-01 PROCEDURE — 258N000003 HC RX IP 258 OP 636: Performed by: STUDENT IN AN ORGANIZED HEALTH CARE EDUCATION/TRAINING PROGRAM

## 2024-01-01 PROCEDURE — 97116 GAIT TRAINING THERAPY: CPT | Mod: GP

## 2024-01-01 PROCEDURE — 87086 URINE CULTURE/COLONY COUNT: CPT | Performed by: EMERGENCY MEDICINE

## 2024-01-01 PROCEDURE — 97165 OT EVAL LOW COMPLEX 30 MIN: CPT | Mod: GO

## 2024-01-01 PROCEDURE — 93005 ELECTROCARDIOGRAM TRACING: CPT | Performed by: EMERGENCY MEDICINE

## 2024-01-01 PROCEDURE — 82140 ASSAY OF AMMONIA: CPT | Performed by: EMERGENCY MEDICINE

## 2024-01-01 PROCEDURE — 85610 PROTHROMBIN TIME: CPT | Performed by: FAMILY MEDICINE

## 2024-01-01 PROCEDURE — 250N000013 HC RX MED GY IP 250 OP 250 PS 637: Performed by: INTERNAL MEDICINE

## 2024-01-01 PROCEDURE — 99214 OFFICE O/P EST MOD 30 MIN: CPT | Mod: 25 | Performed by: FAMILY MEDICINE

## 2024-01-01 PROCEDURE — 85025 COMPLETE CBC W/AUTO DIFF WBC: CPT | Performed by: EMERGENCY MEDICINE

## 2024-01-01 PROCEDURE — 83735 ASSAY OF MAGNESIUM: CPT | Performed by: EMERGENCY MEDICINE

## 2024-01-01 PROCEDURE — 250N000009 HC RX 250: Performed by: INTERNAL MEDICINE

## 2024-01-01 PROCEDURE — 97162 PT EVAL MOD COMPLEX 30 MIN: CPT | Mod: GP | Performed by: PHYSICAL THERAPIST

## 2024-01-01 PROCEDURE — 258N000003 HC RX IP 258 OP 636: Performed by: EMERGENCY MEDICINE

## 2024-01-01 PROCEDURE — 84443 ASSAY THYROID STIM HORMONE: CPT | Performed by: PHYSICIAN ASSISTANT

## 2024-01-01 PROCEDURE — 82805 BLOOD GASES W/O2 SATURATION: CPT | Performed by: EMERGENCY MEDICINE

## 2024-01-01 PROCEDURE — 85610 PROTHROMBIN TIME: CPT | Performed by: EMERGENCY MEDICINE

## 2024-01-01 PROCEDURE — 82565 ASSAY OF CREATININE: CPT | Performed by: HOSPITALIST

## 2024-01-01 PROCEDURE — 82140 ASSAY OF AMMONIA: CPT | Performed by: FAMILY MEDICINE

## 2024-01-01 PROCEDURE — 82077 ASSAY SPEC XCP UR&BREATH IA: CPT | Performed by: EMERGENCY MEDICINE

## 2024-01-01 PROCEDURE — 76705 ECHO EXAM OF ABDOMEN: CPT | Mod: XU

## 2024-01-01 PROCEDURE — 99233 SBSQ HOSP IP/OBS HIGH 50: CPT | Performed by: STUDENT IN AN ORGANIZED HEALTH CARE EDUCATION/TRAINING PROGRAM

## 2024-01-01 PROCEDURE — 99232 SBSQ HOSP IP/OBS MODERATE 35: CPT | Performed by: STUDENT IN AN ORGANIZED HEALTH CARE EDUCATION/TRAINING PROGRAM

## 2024-01-01 PROCEDURE — 99442 PR PHYSICIAN TELEPHONE EVALUATION 11-20 MIN: CPT | Mod: 93 | Performed by: STUDENT IN AN ORGANIZED HEALTH CARE EDUCATION/TRAINING PROGRAM

## 2024-01-01 PROCEDURE — 96365 THER/PROPH/DIAG IV INF INIT: CPT

## 2024-01-01 PROCEDURE — 99284 EMERGENCY DEPT VISIT MOD MDM: CPT | Mod: 25

## 2024-01-01 PROCEDURE — 96361 HYDRATE IV INFUSION ADD-ON: CPT

## 2024-01-01 PROCEDURE — 99214 OFFICE O/P EST MOD 30 MIN: CPT | Performed by: FAMILY MEDICINE

## 2024-01-01 PROCEDURE — 99285 EMERGENCY DEPT VISIT HI MDM: CPT

## 2024-01-01 PROCEDURE — 99223 1ST HOSP IP/OBS HIGH 75: CPT | Performed by: HOSPITALIST

## 2024-01-01 PROCEDURE — 80048 BASIC METABOLIC PNL TOTAL CA: CPT | Performed by: FAMILY MEDICINE

## 2024-01-01 PROCEDURE — 85610 PROTHROMBIN TIME: CPT | Performed by: STUDENT IN AN ORGANIZED HEALTH CARE EDUCATION/TRAINING PROGRAM

## 2024-01-01 PROCEDURE — 258N000003 HC RX IP 258 OP 636: Performed by: ANESTHESIOLOGY

## 2024-01-01 PROCEDURE — 250N000012 HC RX MED GY IP 250 OP 636 PS 637: Performed by: STUDENT IN AN ORGANIZED HEALTH CARE EDUCATION/TRAINING PROGRAM

## 2024-01-01 PROCEDURE — 80053 COMPREHEN METABOLIC PANEL: CPT | Performed by: PHYSICIAN ASSISTANT

## 2024-01-01 PROCEDURE — 80053 COMPREHEN METABOLIC PANEL: CPT | Performed by: FAMILY MEDICINE

## 2024-01-01 PROCEDURE — 83605 ASSAY OF LACTIC ACID: CPT | Performed by: STUDENT IN AN ORGANIZED HEALTH CARE EDUCATION/TRAINING PROGRAM

## 2024-01-01 PROCEDURE — 93975 VASCULAR STUDY: CPT

## 2024-01-01 PROCEDURE — 49083 ABD PARACENTESIS W/IMAGING: CPT

## 2024-01-01 PROCEDURE — 74176 CT ABD & PELVIS W/O CONTRAST: CPT

## 2024-01-01 PROCEDURE — 85027 COMPLETE CBC AUTOMATED: CPT | Performed by: HOSPITALIST

## 2024-01-01 PROCEDURE — 99239 HOSP IP/OBS DSCHRG MGMT >30: CPT | Performed by: STUDENT IN AN ORGANIZED HEALTH CARE EDUCATION/TRAINING PROGRAM

## 2024-01-01 PROCEDURE — 74177 CT ABD & PELVIS W/CONTRAST: CPT

## 2024-01-01 PROCEDURE — 36415 COLL VENOUS BLD VENIPUNCTURE: CPT | Performed by: INTERNAL MEDICINE

## 2024-01-01 PROCEDURE — 72125 CT NECK SPINE W/O DYE: CPT

## 2024-01-01 PROCEDURE — 80053 COMPREHEN METABOLIC PANEL: CPT | Performed by: EMERGENCY MEDICINE

## 2024-01-01 PROCEDURE — 85610 PROTHROMBIN TIME: CPT | Performed by: HOSPITALIST

## 2024-01-01 PROCEDURE — 82962 GLUCOSE BLOOD TEST: CPT

## 2024-01-01 PROCEDURE — G0179 MD RECERTIFICATION HHA PT: HCPCS | Performed by: FAMILY MEDICINE

## 2024-01-01 PROCEDURE — 999N000141 HC STATISTIC PRE-PROCEDURE NURSING ASSESSMENT: Performed by: INTERNAL MEDICINE

## 2024-01-01 PROCEDURE — 97530 THERAPEUTIC ACTIVITIES: CPT | Mod: GP | Performed by: PHYSICAL THERAPIST

## 2024-01-01 PROCEDURE — P9047 ALBUMIN (HUMAN), 25%, 50ML: HCPCS | Performed by: INTERNAL MEDICINE

## 2024-01-01 PROCEDURE — 85027 COMPLETE CBC AUTOMATED: CPT | Performed by: FAMILY MEDICINE

## 2024-01-01 PROCEDURE — 36415 COLL VENOUS BLD VENIPUNCTURE: CPT | Performed by: HOSPITALIST

## 2024-01-01 PROCEDURE — 710N000012 HC RECOVERY PHASE 2, PER MINUTE: Performed by: INTERNAL MEDICINE

## 2024-01-01 PROCEDURE — 82374 ASSAY BLOOD CARBON DIOXIDE: CPT | Performed by: STUDENT IN AN ORGANIZED HEALTH CARE EDUCATION/TRAINING PROGRAM

## 2024-01-01 PROCEDURE — G0180 MD CERTIFICATION HHA PATIENT: HCPCS | Performed by: FAMILY MEDICINE

## 2024-01-01 PROCEDURE — 80053 COMPREHEN METABOLIC PANEL: CPT | Performed by: STUDENT IN AN ORGANIZED HEALTH CARE EDUCATION/TRAINING PROGRAM

## 2024-01-01 PROCEDURE — 83735 ASSAY OF MAGNESIUM: CPT | Performed by: INTERNAL MEDICINE

## 2024-01-01 PROCEDURE — 80074 ACUTE HEPATITIS PANEL: CPT | Performed by: EMERGENCY MEDICINE

## 2024-01-01 PROCEDURE — 84443 ASSAY THYROID STIM HORMONE: CPT | Performed by: EMERGENCY MEDICINE

## 2024-01-01 PROCEDURE — 84157 ASSAY OF PROTEIN OTHER: CPT | Performed by: INTERNAL MEDICINE

## 2024-01-01 PROCEDURE — 99239 HOSP IP/OBS DSCHRG MGMT >30: CPT | Performed by: INTERNAL MEDICINE

## 2024-01-01 PROCEDURE — 370N000017 HC ANESTHESIA TECHNICAL FEE, PER MIN: Performed by: INTERNAL MEDICINE

## 2024-01-01 PROCEDURE — 99214 OFFICE O/P EST MOD 30 MIN: CPT | Performed by: PHYSICIAN ASSISTANT

## 2024-01-01 PROCEDURE — 250N000011 HC RX IP 250 OP 636: Performed by: EMERGENCY MEDICINE

## 2024-01-01 PROCEDURE — 70450 CT HEAD/BRAIN W/O DYE: CPT

## 2024-01-01 PROCEDURE — 272N000001 HC OR GENERAL SUPPLY STERILE: Performed by: INTERNAL MEDICINE

## 2024-01-01 PROCEDURE — 97110 THERAPEUTIC EXERCISES: CPT | Mod: GP

## 2024-01-01 PROCEDURE — 99203 OFFICE O/P NEW LOW 30 MIN: CPT | Mod: 95 | Performed by: NURSE PRACTITIONER

## 2024-01-01 PROCEDURE — 82042 OTHER SOURCE ALBUMIN QUAN EA: CPT | Performed by: INTERNAL MEDICINE

## 2024-01-01 PROCEDURE — 83036 HEMOGLOBIN GLYCOSYLATED A1C: CPT | Performed by: FAMILY MEDICINE

## 2024-01-01 PROCEDURE — 250N000012 HC RX MED GY IP 250 OP 636 PS 637: Performed by: HOSPITALIST

## 2024-01-01 PROCEDURE — 82248 BILIRUBIN DIRECT: CPT | Performed by: FAMILY MEDICINE

## 2024-01-01 PROCEDURE — 85025 COMPLETE CBC W/AUTO DIFF WBC: CPT | Performed by: STUDENT IN AN ORGANIZED HEALTH CARE EDUCATION/TRAINING PROGRAM

## 2024-01-01 PROCEDURE — 81001 URINALYSIS AUTO W/SCOPE: CPT | Performed by: EMERGENCY MEDICINE

## 2024-01-01 PROCEDURE — 99442 PR PHYSICIAN TELEPHONE EVALUATION 11-20 MIN: CPT | Mod: 93 | Performed by: FAMILY MEDICINE

## 2024-01-01 PROCEDURE — 85730 THROMBOPLASTIN TIME PARTIAL: CPT | Performed by: EMERGENCY MEDICINE

## 2024-01-01 PROCEDURE — 99232 SBSQ HOSP IP/OBS MODERATE 35: CPT | Performed by: INTERNAL MEDICINE

## 2024-01-01 PROCEDURE — 272N000042 US PARACENTESIS WITH ALBUMIN

## 2024-01-01 PROCEDURE — 96365 THER/PROPH/DIAG IV INF INIT: CPT | Mod: 59

## 2024-01-01 PROCEDURE — 81003 URINALYSIS AUTO W/O SCOPE: CPT | Performed by: EMERGENCY MEDICINE

## 2024-01-01 PROCEDURE — 84100 ASSAY OF PHOSPHORUS: CPT | Performed by: HOSPITALIST

## 2024-01-01 PROCEDURE — 99605 MTMS BY PHARM NP 15 MIN: CPT | Mod: 95

## 2024-01-01 PROCEDURE — 83690 ASSAY OF LIPASE: CPT | Performed by: STUDENT IN AN ORGANIZED HEALTH CARE EDUCATION/TRAINING PROGRAM

## 2024-01-01 PROCEDURE — 83735 ASSAY OF MAGNESIUM: CPT | Performed by: HOSPITALIST

## 2024-01-01 PROCEDURE — 85004 AUTOMATED DIFF WBC COUNT: CPT | Performed by: EMERGENCY MEDICINE

## 2024-01-01 PROCEDURE — P9047 ALBUMIN (HUMAN), 25%, 50ML: HCPCS

## 2024-01-01 PROCEDURE — 76705 ECHO EXAM OF ABDOMEN: CPT

## 2024-01-01 PROCEDURE — 82248 BILIRUBIN DIRECT: CPT | Performed by: STUDENT IN AN ORGANIZED HEALTH CARE EDUCATION/TRAINING PROGRAM

## 2024-01-01 PROCEDURE — 96375 TX/PRO/DX INJ NEW DRUG ADDON: CPT

## 2024-01-01 PROCEDURE — 80048 BASIC METABOLIC PNL TOTAL CA: CPT | Performed by: INTERNAL MEDICINE

## 2024-01-01 PROCEDURE — 97162 PT EVAL MOD COMPLEX 30 MIN: CPT | Mod: GP

## 2024-01-01 PROCEDURE — 99223 1ST HOSP IP/OBS HIGH 75: CPT | Performed by: STUDENT IN AN ORGANIZED HEALTH CARE EDUCATION/TRAINING PROGRAM

## 2024-01-01 PROCEDURE — 271N000002 HC RX 271: Performed by: EMERGENCY MEDICINE

## 2024-01-01 PROCEDURE — 99213 OFFICE O/P EST LOW 20 MIN: CPT | Performed by: FAMILY MEDICINE

## 2024-01-01 PROCEDURE — 82140 ASSAY OF AMMONIA: CPT | Performed by: PHYSICIAN ASSISTANT

## 2024-01-01 PROCEDURE — 12004 RPR S/N/AX/GEN/TRK7.6-12.5CM: CPT

## 2024-01-01 PROCEDURE — 85027 COMPLETE CBC AUTOMATED: CPT | Performed by: STUDENT IN AN ORGANIZED HEALTH CARE EDUCATION/TRAINING PROGRAM

## 2024-01-01 PROCEDURE — 250N000009 HC RX 250: Performed by: EMERGENCY MEDICINE

## 2024-01-01 PROCEDURE — 250N000011 HC RX IP 250 OP 636

## 2024-01-01 PROCEDURE — 82140 ASSAY OF AMMONIA: CPT | Performed by: HOSPITALIST

## 2024-01-01 PROCEDURE — 87205 SMEAR GRAM STAIN: CPT | Performed by: INTERNAL MEDICINE

## 2024-01-01 PROCEDURE — 250N000009 HC RX 250: Performed by: STUDENT IN AN ORGANIZED HEALTH CARE EDUCATION/TRAINING PROGRAM

## 2024-01-01 PROCEDURE — 87075 CULTR BACTERIA EXCEPT BLOOD: CPT | Performed by: INTERNAL MEDICINE

## 2024-01-01 PROCEDURE — 360N000075 HC SURGERY LEVEL 2, PER MIN: Performed by: INTERNAL MEDICINE

## 2024-01-01 PROCEDURE — 97166 OT EVAL MOD COMPLEX 45 MIN: CPT | Mod: GO

## 2024-01-01 PROCEDURE — 93005 ELECTROCARDIOGRAM TRACING: CPT | Performed by: FAMILY MEDICINE

## 2024-01-01 PROCEDURE — 74018 RADEX ABDOMEN 1 VIEW: CPT

## 2024-01-01 PROCEDURE — 36415 COLL VENOUS BLD VENIPUNCTURE: CPT | Performed by: PHYSICIAN ASSISTANT

## 2024-01-01 PROCEDURE — 92526 ORAL FUNCTION THERAPY: CPT | Mod: GN

## 2024-01-01 PROCEDURE — 82374 ASSAY BLOOD CARBON DIOXIDE: CPT | Performed by: HOSPITALIST

## 2024-01-01 PROCEDURE — 87637 SARSCOV2&INF A&B&RSV AMP PRB: CPT | Performed by: EMERGENCY MEDICINE

## 2024-01-01 PROCEDURE — 89050 BODY FLUID CELL COUNT: CPT | Performed by: INTERNAL MEDICINE

## 2024-01-01 PROCEDURE — 82040 ASSAY OF SERUM ALBUMIN: CPT | Performed by: EMERGENCY MEDICINE

## 2024-01-01 PROCEDURE — 85025 COMPLETE CBC W/AUTO DIFF WBC: CPT | Performed by: PHYSICIAN ASSISTANT

## 2024-01-01 PROCEDURE — 92610 EVALUATE SWALLOWING FUNCTION: CPT | Mod: GN

## 2024-01-01 PROCEDURE — 83690 ASSAY OF LIPASE: CPT | Performed by: HOSPITALIST

## 2024-01-01 RX ORDER — POLYETHYLENE GLYCOL 3350 17 G/17G
1 POWDER, FOR SOLUTION ORAL DAILY
Status: CANCELLED | OUTPATIENT
Start: 2024-01-01

## 2024-01-01 RX ORDER — PANTOPRAZOLE SODIUM 40 MG/1
40 TABLET, DELAYED RELEASE ORAL 2 TIMES DAILY
Qty: 30 TABLET | Refills: 1 | Status: SHIPPED | OUTPATIENT
Start: 2024-01-01 | End: 2024-01-01

## 2024-01-01 RX ORDER — MIDODRINE HYDROCHLORIDE 2.5 MG/1
2.5 TABLET ORAL
Qty: 180 TABLET | Refills: 3 | Status: SHIPPED | OUTPATIENT
Start: 2024-01-01

## 2024-01-01 RX ORDER — PANTOPRAZOLE SODIUM 40 MG/1
40 TABLET, DELAYED RELEASE ORAL
Status: DISCONTINUED | OUTPATIENT
Start: 2024-01-01 | End: 2024-01-01 | Stop reason: HOSPADM

## 2024-01-01 RX ORDER — SODIUM BICARBONATE 650 MG/1
650 TABLET ORAL 2 TIMES DAILY
Qty: 60 TABLET | Refills: 0 | Status: SHIPPED | OUTPATIENT
Start: 2024-01-01 | End: 2024-01-01

## 2024-01-01 RX ORDER — PROPOFOL 10 MG/ML
INJECTION, EMULSION INTRAVENOUS PRN
Status: DISCONTINUED | OUTPATIENT
Start: 2024-01-01 | End: 2024-01-01

## 2024-01-01 RX ORDER — LIDOCAINE 40 MG/G
CREAM TOPICAL
Status: DISCONTINUED | OUTPATIENT
Start: 2024-01-01 | End: 2024-01-01 | Stop reason: HOSPADM

## 2024-01-01 RX ORDER — SIMETHICONE 40MG/0.6ML
133 SUSPENSION, DROPS(FINAL DOSAGE FORM)(ML) ORAL
Status: DISCONTINUED | OUTPATIENT
Start: 2024-01-01 | End: 2024-01-01 | Stop reason: HOSPADM

## 2024-01-01 RX ORDER — ONDANSETRON 4 MG/1
4 TABLET, ORALLY DISINTEGRATING ORAL EVERY 6 HOURS PRN
Status: DISCONTINUED | OUTPATIENT
Start: 2024-01-01 | End: 2024-01-01 | Stop reason: HOSPADM

## 2024-01-01 RX ORDER — FUROSEMIDE 20 MG
20 TABLET ORAL DAILY
Qty: 60 TABLET | Refills: 6 | Status: SHIPPED | OUTPATIENT
Start: 2024-01-01 | End: 2024-01-01

## 2024-01-01 RX ORDER — ALBUMIN (HUMAN) 12.5 G/50ML
25-75 SOLUTION INTRAVENOUS ONCE
Status: COMPLETED | OUTPATIENT
Start: 2024-01-01 | End: 2024-01-01

## 2024-01-01 RX ORDER — SODIUM BICARBONATE 650 MG/1
650 TABLET ORAL 2 TIMES DAILY
Status: ON HOLD | COMMUNITY
Start: 2024-01-01 | End: 2024-01-01

## 2024-01-01 RX ORDER — MULTIVITAMIN,THERAPEUTIC
1 TABLET ORAL EVERY EVENING
Status: DISCONTINUED | OUTPATIENT
Start: 2024-01-01 | End: 2024-01-01 | Stop reason: HOSPADM

## 2024-01-01 RX ORDER — RESPIRATORY SYNCYTIAL VIRUS VACCINE 120MCG/0.5
0.5 KIT INTRAMUSCULAR ONCE
Qty: 1 EACH | Refills: 0 | Status: CANCELLED | OUTPATIENT
Start: 2024-01-01 | End: 2024-01-01

## 2024-01-01 RX ORDER — MIDODRINE HYDROCHLORIDE 2.5 MG/1
2.5 TABLET ORAL
Status: DISCONTINUED | OUTPATIENT
Start: 2024-01-01 | End: 2024-01-01 | Stop reason: HOSPADM

## 2024-01-01 RX ORDER — ALBUMIN (HUMAN) 12.5 G/50ML
50 SOLUTION INTRAVENOUS ONCE
Status: COMPLETED | OUTPATIENT
Start: 2024-01-01 | End: 2024-01-01

## 2024-01-01 RX ORDER — PROCHLORPERAZINE MALEATE 5 MG
5 TABLET ORAL EVERY 6 HOURS PRN
Status: DISCONTINUED | OUTPATIENT
Start: 2024-01-01 | End: 2024-01-01 | Stop reason: HOSPADM

## 2024-01-01 RX ORDER — SPIRONOLACTONE 25 MG/1
50 TABLET ORAL DAILY
Status: DISCONTINUED | OUTPATIENT
Start: 2024-01-01 | End: 2024-01-01 | Stop reason: HOSPADM

## 2024-01-01 RX ORDER — ACETAMINOPHEN 500 MG
500 TABLET ORAL EVERY 6 HOURS PRN
Status: DISCONTINUED | OUTPATIENT
Start: 2024-01-01 | End: 2024-01-01 | Stop reason: HOSPADM

## 2024-01-01 RX ORDER — ONDANSETRON 2 MG/ML
4 INJECTION INTRAMUSCULAR; INTRAVENOUS EVERY 6 HOURS PRN
Status: DISCONTINUED | OUTPATIENT
Start: 2024-01-01 | End: 2024-01-01 | Stop reason: HOSPADM

## 2024-01-01 RX ORDER — FUROSEMIDE 20 MG
20 TABLET ORAL DAILY
Status: DISCONTINUED | OUTPATIENT
Start: 2024-01-01 | End: 2024-01-01 | Stop reason: HOSPADM

## 2024-01-01 RX ORDER — LACTULOSE 10 G/15ML
20 SOLUTION ORAL 3 TIMES DAILY
Status: SHIPPED
Start: 2024-01-01 | End: 2024-01-01

## 2024-01-01 RX ORDER — ONDANSETRON 2 MG/ML
INJECTION INTRAMUSCULAR; INTRAVENOUS PRN
Status: DISCONTINUED | OUTPATIENT
Start: 2024-01-01 | End: 2024-01-01

## 2024-01-01 RX ORDER — GUAIFENESIN 200 MG/10ML
200 LIQUID ORAL EVERY 4 HOURS PRN
Status: DISCONTINUED | OUTPATIENT
Start: 2024-01-01 | End: 2024-01-01 | Stop reason: HOSPADM

## 2024-01-01 RX ORDER — LACTULOSE 10 G/15ML
20 SOLUTION ORAL 3 TIMES DAILY
Status: SHIPPED
Start: 2024-01-01

## 2024-01-01 RX ORDER — CIPROFLOXACIN 500 MG/1
500 TABLET, FILM COATED ORAL DAILY
Qty: 60 TABLET | Refills: 1 | Status: SHIPPED | OUTPATIENT
Start: 2024-01-01 | End: 2024-01-01

## 2024-01-01 RX ORDER — CEFTRIAXONE 1 G/1
1 INJECTION, POWDER, FOR SOLUTION INTRAMUSCULAR; INTRAVENOUS ONCE
Status: COMPLETED | OUTPATIENT
Start: 2024-01-01 | End: 2024-01-01

## 2024-01-01 RX ORDER — LACTULOSE 10 G/15ML
20 SOLUTION ORAL ONCE
Status: DISCONTINUED | OUTPATIENT
Start: 2024-01-01 | End: 2024-01-01

## 2024-01-01 RX ORDER — ATROPINE SULFATE 0.1 MG/ML
1 INJECTION INTRAVENOUS
Status: DISCONTINUED | OUTPATIENT
Start: 2024-01-01 | End: 2024-01-01 | Stop reason: HOSPADM

## 2024-01-01 RX ORDER — CIPROFLOXACIN 500 MG/1
500 TABLET, FILM COATED ORAL DAILY
Status: DISCONTINUED | OUTPATIENT
Start: 2024-01-01 | End: 2024-01-01 | Stop reason: HOSPADM

## 2024-01-01 RX ORDER — CALCIUM CARBONATE 500 MG/1
1000 TABLET, CHEWABLE ORAL 4 TIMES DAILY PRN
Status: DISCONTINUED | OUTPATIENT
Start: 2024-01-01 | End: 2024-01-01 | Stop reason: HOSPADM

## 2024-01-01 RX ORDER — METHYLCELLULOSE 4000CPS 30 %
POWDER (GRAM) MISCELLANEOUS ONCE
Status: COMPLETED | OUTPATIENT
Start: 2024-01-01 | End: 2024-01-01

## 2024-01-01 RX ORDER — SPIRONOLACTONE 50 MG/1
50 TABLET, FILM COATED ORAL DAILY
Qty: 60 TABLET | Refills: 6 | Status: ON HOLD | OUTPATIENT
Start: 2024-01-01 | End: 2024-01-01

## 2024-01-01 RX ORDER — NICOTINE POLACRILEX 4 MG
15-30 LOZENGE BUCCAL
Status: DISCONTINUED | OUTPATIENT
Start: 2024-01-01 | End: 2024-01-01 | Stop reason: HOSPADM

## 2024-01-01 RX ORDER — FLUMAZENIL 0.1 MG/ML
0.2 INJECTION, SOLUTION INTRAVENOUS
Status: DISCONTINUED | OUTPATIENT
Start: 2024-01-01 | End: 2024-01-01 | Stop reason: HOSPADM

## 2024-01-01 RX ORDER — LANCETS 33 GAUGE
EACH MISCELLANEOUS
COMMUNITY
Start: 2024-01-01

## 2024-01-01 RX ORDER — FUROSEMIDE 20 MG
20 TABLET ORAL DAILY
Qty: 60 TABLET | Refills: 6 | Status: ON HOLD | OUTPATIENT
Start: 2024-01-01 | End: 2024-01-01

## 2024-01-01 RX ORDER — AMOXICILLIN 250 MG
2 CAPSULE ORAL 2 TIMES DAILY PRN
Status: DISCONTINUED | OUTPATIENT
Start: 2024-01-01 | End: 2024-01-01 | Stop reason: HOSPADM

## 2024-01-01 RX ORDER — ACETAMINOPHEN 325 MG/1
325 TABLET ORAL EVERY 8 HOURS PRN
Status: DISCONTINUED | OUTPATIENT
Start: 2024-01-01 | End: 2024-01-01 | Stop reason: HOSPADM

## 2024-01-01 RX ORDER — FENTANYL CITRATE 50 UG/ML
50-100 INJECTION, SOLUTION INTRAMUSCULAR; INTRAVENOUS EVERY 5 MIN PRN
Status: DISCONTINUED | OUTPATIENT
Start: 2024-01-01 | End: 2024-01-01 | Stop reason: HOSPADM

## 2024-01-01 RX ORDER — IOPAMIDOL 755 MG/ML
74 INJECTION, SOLUTION INTRAVASCULAR ONCE
Status: COMPLETED | OUTPATIENT
Start: 2024-01-01 | End: 2024-01-01

## 2024-01-01 RX ORDER — ONDANSETRON 2 MG/ML
4 INJECTION INTRAMUSCULAR; INTRAVENOUS
Status: DISCONTINUED | OUTPATIENT
Start: 2024-01-01 | End: 2024-01-01 | Stop reason: HOSPADM

## 2024-01-01 RX ORDER — NALOXONE HYDROCHLORIDE 0.4 MG/ML
0.1 INJECTION, SOLUTION INTRAMUSCULAR; INTRAVENOUS; SUBCUTANEOUS
Status: DISCONTINUED | OUTPATIENT
Start: 2024-01-01 | End: 2024-01-01 | Stop reason: HOSPADM

## 2024-01-01 RX ORDER — MULTIVITAMIN,THERAPEUTIC
1 TABLET ORAL DAILY
COMMUNITY
End: 2024-01-01

## 2024-01-01 RX ORDER — PANTOPRAZOLE SODIUM 40 MG/1
40 TABLET, DELAYED RELEASE ORAL DAILY
Qty: 30 TABLET | Refills: 1 | Status: SHIPPED | OUTPATIENT
Start: 2024-01-01 | End: 2024-01-01

## 2024-01-01 RX ORDER — CEFTRIAXONE 1 G/1
1 INJECTION, POWDER, FOR SOLUTION INTRAMUSCULAR; INTRAVENOUS EVERY 24 HOURS
Status: DISCONTINUED | OUTPATIENT
Start: 2024-01-01 | End: 2024-01-01

## 2024-01-01 RX ORDER — LACTULOSE 10 G/15ML
20 SOLUTION ORAL 3 TIMES DAILY
Status: ON HOLD | COMMUNITY
Start: 2024-01-01 | End: 2024-01-01

## 2024-01-01 RX ORDER — PROPOFOL 10 MG/ML
INJECTION, EMULSION INTRAVENOUS CONTINUOUS PRN
Status: DISCONTINUED | OUTPATIENT
Start: 2024-01-01 | End: 2024-01-01

## 2024-01-01 RX ORDER — DEXTROSE MONOHYDRATE 50 MG/ML
INJECTION, SOLUTION INTRAVENOUS CONTINUOUS
Status: DISCONTINUED | OUTPATIENT
Start: 2024-01-01 | End: 2024-01-01

## 2024-01-01 RX ORDER — UREA 10 %
220 LOTION (ML) TOPICAL DAILY
COMMUNITY
Start: 2024-01-01 | End: 2024-01-01

## 2024-01-01 RX ORDER — NALOXONE HYDROCHLORIDE 0.4 MG/ML
0.2 INJECTION, SOLUTION INTRAMUSCULAR; INTRAVENOUS; SUBCUTANEOUS
Status: DISCONTINUED | OUTPATIENT
Start: 2024-01-01 | End: 2024-01-01 | Stop reason: HOSPADM

## 2024-01-01 RX ORDER — CEPHALEXIN 500 MG/1
500 CAPSULE ORAL 2 TIMES DAILY
Qty: 14 CAPSULE | Refills: 0 | Status: SHIPPED | OUTPATIENT
Start: 2024-01-01 | End: 2024-01-01

## 2024-01-01 RX ORDER — EPINEPHRINE 1 MG/ML
0.1 INJECTION, SOLUTION INTRAMUSCULAR; SUBCUTANEOUS
Status: DISCONTINUED | OUTPATIENT
Start: 2024-01-01 | End: 2024-01-01 | Stop reason: HOSPADM

## 2024-01-01 RX ORDER — LACTULOSE 10 G/15ML
20 SOLUTION ORAL 3 TIMES DAILY
Status: DISCONTINUED | OUTPATIENT
Start: 2024-01-01 | End: 2024-01-01

## 2024-01-01 RX ORDER — ALBUMIN (HUMAN) 12.5 G/50ML
25 SOLUTION INTRAVENOUS ONCE
Status: COMPLETED | OUTPATIENT
Start: 2024-01-01 | End: 2024-01-01

## 2024-01-01 RX ORDER — ZINC SULFATE 50(220)MG
220 CAPSULE ORAL EVERY EVENING
Status: DISCONTINUED | OUTPATIENT
Start: 2024-01-01 | End: 2024-01-01 | Stop reason: HOSPADM

## 2024-01-01 RX ORDER — FENTANYL CITRATE 50 UG/ML
50 INJECTION, SOLUTION INTRAMUSCULAR; INTRAVENOUS ONCE
Status: COMPLETED | OUTPATIENT
Start: 2024-01-01 | End: 2024-01-01

## 2024-01-01 RX ORDER — SODIUM BICARBONATE 650 MG/1
650 TABLET ORAL 2 TIMES DAILY
Status: DISCONTINUED | OUTPATIENT
Start: 2024-01-01 | End: 2024-01-01 | Stop reason: HOSPADM

## 2024-01-01 RX ORDER — POLYETHYLENE GLYCOL 3350 17 G/17G
1 POWDER, FOR SOLUTION ORAL DAILY
COMMUNITY
End: 2024-01-01

## 2024-01-01 RX ORDER — LACTULOSE 10 G/15ML
30 SOLUTION ORAL 3 TIMES DAILY
Status: DISCONTINUED | OUTPATIENT
Start: 2024-01-01 | End: 2024-01-01 | Stop reason: HOSPADM

## 2024-01-01 RX ORDER — OXYCODONE HYDROCHLORIDE 5 MG/1
5 TABLET ORAL
Status: DISCONTINUED | OUTPATIENT
Start: 2024-01-01 | End: 2024-01-01 | Stop reason: HOSPADM

## 2024-01-01 RX ORDER — ONDANSETRON 2 MG/ML
4 INJECTION INTRAMUSCULAR; INTRAVENOUS EVERY 30 MIN PRN
Status: DISCONTINUED | OUTPATIENT
Start: 2024-01-01 | End: 2024-01-01 | Stop reason: HOSPADM

## 2024-01-01 RX ORDER — POLYETHYLENE GLYCOL 3350 17 G/17G
17 POWDER, FOR SOLUTION ORAL 2 TIMES DAILY PRN
Status: DISCONTINUED | OUTPATIENT
Start: 2024-01-01 | End: 2024-01-01 | Stop reason: HOSPADM

## 2024-01-01 RX ORDER — AMOXICILLIN 250 MG
1 CAPSULE ORAL 2 TIMES DAILY PRN
Status: DISCONTINUED | OUTPATIENT
Start: 2024-01-01 | End: 2024-01-01 | Stop reason: HOSPADM

## 2024-01-01 RX ORDER — LACTULOSE 10 G/15ML
20 SOLUTION ORAL 4 TIMES DAILY
Status: DISCONTINUED | OUTPATIENT
Start: 2024-01-01 | End: 2024-01-01 | Stop reason: HOSPADM

## 2024-01-01 RX ORDER — CHLORAL HYDRATE 500 MG
1 CAPSULE ORAL DAILY
COMMUNITY
End: 2024-01-01

## 2024-01-01 RX ORDER — DEXTROSE MONOHYDRATE 25 G/50ML
25-50 INJECTION, SOLUTION INTRAVENOUS
Status: DISCONTINUED | OUTPATIENT
Start: 2024-01-01 | End: 2024-01-01 | Stop reason: HOSPADM

## 2024-01-01 RX ORDER — LACTULOSE 10 G/15ML
20 SOLUTION ORAL 3 TIMES DAILY
Status: DISCONTINUED | OUTPATIENT
Start: 2024-01-01 | End: 2024-01-01 | Stop reason: HOSPADM

## 2024-01-01 RX ORDER — LIDOCAINE HYDROCHLORIDE 10 MG/ML
INJECTION, SOLUTION INFILTRATION; PERINEURAL PRN
Status: DISCONTINUED | OUTPATIENT
Start: 2024-01-01 | End: 2024-01-01

## 2024-01-01 RX ORDER — SPIRONOLACTONE 50 MG/1
50 TABLET, FILM COATED ORAL DAILY
Qty: 60 TABLET | Refills: 6 | Status: SHIPPED | OUTPATIENT
Start: 2024-01-01 | End: 2024-01-01

## 2024-01-01 RX ORDER — SODIUM CHLORIDE 9 MG/ML
INJECTION, SOLUTION INTRAVENOUS CONTINUOUS
Status: ACTIVE | OUTPATIENT
Start: 2024-01-01 | End: 2024-01-01

## 2024-01-01 RX ORDER — GLYCOPYRROLATE 0.2 MG/ML
INJECTION, SOLUTION INTRAMUSCULAR; INTRAVENOUS PRN
Status: DISCONTINUED | OUTPATIENT
Start: 2024-01-01 | End: 2024-01-01

## 2024-01-01 RX ORDER — SODIUM CHLORIDE 9 MG/ML
INJECTION, SOLUTION INTRAVENOUS CONTINUOUS
Status: DISCONTINUED | OUTPATIENT
Start: 2024-01-01 | End: 2024-01-01

## 2024-01-01 RX ORDER — PROCHLORPERAZINE 25 MG
12.5 SUPPOSITORY, RECTAL RECTAL EVERY 12 HOURS PRN
Status: DISCONTINUED | OUTPATIENT
Start: 2024-01-01 | End: 2024-01-01 | Stop reason: HOSPADM

## 2024-01-01 RX ORDER — BLOOD-GLUCOSE METER
EACH MISCELLANEOUS
COMMUNITY
Start: 2024-01-01

## 2024-01-01 RX ORDER — LACTULOSE 10 G/15ML
30 SOLUTION ORAL 4 TIMES DAILY
Status: DISCONTINUED | OUTPATIENT
Start: 2024-01-01 | End: 2024-01-01

## 2024-01-01 RX ORDER — GINSENG 100 MG
CAPSULE ORAL ONCE
Status: COMPLETED | OUTPATIENT
Start: 2024-01-01 | End: 2024-01-01

## 2024-01-01 RX ORDER — DIPHENHYDRAMINE HYDROCHLORIDE 50 MG/ML
25-50 INJECTION INTRAMUSCULAR; INTRAVENOUS
Status: DISCONTINUED | OUTPATIENT
Start: 2024-01-01 | End: 2024-01-01 | Stop reason: HOSPADM

## 2024-01-01 RX ORDER — ONDANSETRON 4 MG/1
4 TABLET, ORALLY DISINTEGRATING ORAL EVERY 30 MIN PRN
Status: DISCONTINUED | OUTPATIENT
Start: 2024-01-01 | End: 2024-01-01 | Stop reason: HOSPADM

## 2024-01-01 RX ORDER — BISACODYL 10 MG
10 SUPPOSITORY, RECTAL RECTAL DAILY PRN
Status: DISCONTINUED | OUTPATIENT
Start: 2024-01-01 | End: 2024-01-01 | Stop reason: HOSPADM

## 2024-01-01 RX ORDER — OXYCODONE HYDROCHLORIDE 5 MG/1
10 TABLET ORAL
Status: DISCONTINUED | OUTPATIENT
Start: 2024-01-01 | End: 2024-01-01 | Stop reason: HOSPADM

## 2024-01-01 RX ORDER — NALOXONE HYDROCHLORIDE 0.4 MG/ML
0.4 INJECTION, SOLUTION INTRAMUSCULAR; INTRAVENOUS; SUBCUTANEOUS
Status: DISCONTINUED | OUTPATIENT
Start: 2024-01-01 | End: 2024-01-01 | Stop reason: HOSPADM

## 2024-01-01 RX ORDER — LACTULOSE 10 G/15ML
20 SOLUTION ORAL ONCE
Status: COMPLETED | OUTPATIENT
Start: 2024-01-01 | End: 2024-01-01

## 2024-01-01 RX ORDER — SODIUM CHLORIDE, SODIUM LACTATE, POTASSIUM CHLORIDE, CALCIUM CHLORIDE 600; 310; 30; 20 MG/100ML; MG/100ML; MG/100ML; MG/100ML
INJECTION, SOLUTION INTRAVENOUS CONTINUOUS
Status: DISCONTINUED | OUTPATIENT
Start: 2024-01-01 | End: 2024-01-01 | Stop reason: HOSPADM

## 2024-01-01 RX ORDER — ONDANSETRON 2 MG/ML
4 INJECTION INTRAMUSCULAR; INTRAVENOUS ONCE
Status: COMPLETED | OUTPATIENT
Start: 2024-01-01 | End: 2024-01-01

## 2024-01-01 RX ORDER — SALIVA STIMULANT COMB. NO.3
1 SPRAY, NON-AEROSOL (ML) MUCOUS MEMBRANE 4 TIMES DAILY PRN
Status: DISCONTINUED | OUTPATIENT
Start: 2024-01-01 | End: 2024-01-01 | Stop reason: HOSPADM

## 2024-01-01 RX ORDER — LACTULOSE 10 G/15ML
20 SOLUTION ORAL 3 TIMES DAILY
Qty: 2700 ML | Refills: 0 | Status: ON HOLD | OUTPATIENT
Start: 2024-01-01 | End: 2024-01-01

## 2024-01-01 RX ADMIN — SODIUM BICARBONATE 650 MG TABLET 650 MG: at 09:25

## 2024-01-01 RX ADMIN — RIFAXIMIN 550 MG: 550 TABLET ORAL at 09:25

## 2024-01-01 RX ADMIN — PANTOPRAZOLE SODIUM 40 MG: 40 TABLET, DELAYED RELEASE ORAL at 08:16

## 2024-01-01 RX ADMIN — ACETAMINOPHEN 325 MG: 325 TABLET ORAL at 20:50

## 2024-01-01 RX ADMIN — LACTULOSE 20 G: 10 SOLUTION ORAL at 13:00

## 2024-01-01 RX ADMIN — PANTOPRAZOLE SODIUM 40 MG: 40 TABLET, DELAYED RELEASE ORAL at 07:00

## 2024-01-01 RX ADMIN — ALBUMIN HUMAN 25 G: 0.25 SOLUTION INTRAVENOUS at 14:46

## 2024-01-01 RX ADMIN — DEXTROSE MONOHYDRATE: 50 INJECTION, SOLUTION INTRAVENOUS at 10:00

## 2024-01-01 RX ADMIN — ZINC SULFATE 220 MG (50 MG) CAPSULE 220 MG: CAPSULE at 20:47

## 2024-01-01 RX ADMIN — FUROSEMIDE 20 MG: 20 TABLET ORAL at 08:28

## 2024-01-01 RX ADMIN — THERA TABS 1 TABLET: TAB at 20:52

## 2024-01-01 RX ADMIN — LACTULOSE 200 G: 10 SOLUTION ORAL at 13:34

## 2024-01-01 RX ADMIN — CARBIDOPA AND LEVODOPA 2.5 MG: 50; 200 TABLET, EXTENDED RELEASE ORAL at 09:04

## 2024-01-01 RX ADMIN — LACTULOSE 30 G: 10 SOLUTION ORAL at 17:39

## 2024-01-01 RX ADMIN — PANTOPRAZOLE SODIUM 40 MG: 40 TABLET, DELAYED RELEASE ORAL at 08:13

## 2024-01-01 RX ADMIN — CARBIDOPA AND LEVODOPA 2.5 MG: 50; 200 TABLET, EXTENDED RELEASE ORAL at 17:01

## 2024-01-01 RX ADMIN — LACTULOSE 30 G: 10 SOLUTION ORAL at 20:37

## 2024-01-01 RX ADMIN — SODIUM BICARBONATE 650 MG: 648 TABLET ORAL at 22:49

## 2024-01-01 RX ADMIN — RIFAXIMIN 550 MG: 550 TABLET ORAL at 08:10

## 2024-01-01 RX ADMIN — CARBIDOPA AND LEVODOPA 2.5 MG: 50; 200 TABLET, EXTENDED RELEASE ORAL at 14:46

## 2024-01-01 RX ADMIN — RIFAXIMIN 550 MG: 550 TABLET ORAL at 09:18

## 2024-01-01 RX ADMIN — SODIUM CHLORIDE, POTASSIUM CHLORIDE, SODIUM LACTATE AND CALCIUM CHLORIDE 1000 ML: 600; 310; 30; 20 INJECTION, SOLUTION INTRAVENOUS at 14:24

## 2024-01-01 RX ADMIN — SODIUM BICARBONATE 650 MG: 648 TABLET ORAL at 08:20

## 2024-01-01 RX ADMIN — ONDANSETRON 4 MG: 2 INJECTION INTRAMUSCULAR; INTRAVENOUS at 13:27

## 2024-01-01 RX ADMIN — RIFAXIMIN 550 MG: 550 TABLET ORAL at 08:44

## 2024-01-01 RX ADMIN — CIPROFLOXACIN HYDROCHLORIDE 500 MG: 500 TABLET, FILM COATED ORAL at 09:18

## 2024-01-01 RX ADMIN — ALBUMIN HUMAN 25 G: 0.25 SOLUTION INTRAVENOUS at 11:01

## 2024-01-01 RX ADMIN — SODIUM BICARBONATE 650 MG: 648 TABLET ORAL at 10:08

## 2024-01-01 RX ADMIN — CIPROFLOXACIN HYDROCHLORIDE 500 MG: 500 TABLET, FILM COATED ORAL at 08:13

## 2024-01-01 RX ADMIN — IOPAMIDOL 74 ML: 755 INJECTION, SOLUTION INTRAVENOUS at 13:22

## 2024-01-01 RX ADMIN — LACTULOSE 200 G: 10 SOLUTION ORAL at 04:52

## 2024-01-01 RX ADMIN — LACTULOSE 30 G: 10 SOLUTION ORAL at 09:34

## 2024-01-01 RX ADMIN — Medication 3 ML: at 08:09

## 2024-01-01 RX ADMIN — LIDOCAINE HYDROCHLORIDE 2 ML: 10 INJECTION, SOLUTION INFILTRATION; PERINEURAL at 08:22

## 2024-01-01 RX ADMIN — LACTULOSE 20 G: 10 SOLUTION ORAL at 20:09

## 2024-01-01 RX ADMIN — CIPROFLOXACIN HYDROCHLORIDE 500 MG: 500 TABLET, FILM COATED ORAL at 08:16

## 2024-01-01 RX ADMIN — CIPROFLOXACIN HYDROCHLORIDE 500 MG: 500 TABLET, FILM COATED ORAL at 08:44

## 2024-01-01 RX ADMIN — FUROSEMIDE 20 MG: 20 TABLET ORAL at 09:18

## 2024-01-01 RX ADMIN — CEFTRIAXONE 1 G: 1 INJECTION, POWDER, FOR SOLUTION INTRAMUSCULAR; INTRAVENOUS at 16:45

## 2024-01-01 RX ADMIN — CARBIDOPA AND LEVODOPA 2.5 MG: 50; 200 TABLET, EXTENDED RELEASE ORAL at 18:03

## 2024-01-01 RX ADMIN — PANTOPRAZOLE SODIUM 40 MG: 40 TABLET, DELAYED RELEASE ORAL at 16:19

## 2024-01-01 RX ADMIN — INSULIN ASPART 1 UNITS: 100 INJECTION, SOLUTION INTRAVENOUS; SUBCUTANEOUS at 13:12

## 2024-01-01 RX ADMIN — CARBIDOPA AND LEVODOPA 2.5 MG: 50; 200 TABLET, EXTENDED RELEASE ORAL at 09:35

## 2024-01-01 RX ADMIN — INSULIN ASPART 1 UNITS: 100 INJECTION, SOLUTION INTRAVENOUS; SUBCUTANEOUS at 17:54

## 2024-01-01 RX ADMIN — SODIUM BICARBONATE 650 MG: 648 TABLET ORAL at 08:13

## 2024-01-01 RX ADMIN — LACTULOSE 20 G: 10 SOLUTION ORAL at 13:13

## 2024-01-01 RX ADMIN — SPIRONOLACTONE 50 MG: 25 TABLET, FILM COATED ORAL at 11:05

## 2024-01-01 RX ADMIN — RIFAXIMIN 550 MG: 550 TABLET ORAL at 20:46

## 2024-01-01 RX ADMIN — CEFTRIAXONE SODIUM 1 G: 1 INJECTION, POWDER, FOR SOLUTION INTRAMUSCULAR; INTRAVENOUS at 16:31

## 2024-01-01 RX ADMIN — PROPOFOL 30 MG: 10 INJECTION, EMULSION INTRAVENOUS at 08:23

## 2024-01-01 RX ADMIN — SODIUM CHLORIDE: 9 INJECTION, SOLUTION INTRAVENOUS at 09:34

## 2024-01-01 RX ADMIN — CIPROFLOXACIN HYDROCHLORIDE 500 MG: 500 TABLET, FILM COATED ORAL at 13:12

## 2024-01-01 RX ADMIN — RIFAXIMIN 550 MG: 550 TABLET ORAL at 09:04

## 2024-01-01 RX ADMIN — RIFAXIMIN 550 MG: 550 TABLET ORAL at 08:16

## 2024-01-01 RX ADMIN — FUROSEMIDE 20 MG: 20 TABLET ORAL at 11:06

## 2024-01-01 RX ADMIN — LACTULOSE 200 G: 10 SOLUTION ORAL at 22:18

## 2024-01-01 RX ADMIN — SODIUM CHLORIDE: 9 INJECTION, SOLUTION INTRAVENOUS at 08:15

## 2024-01-01 RX ADMIN — INSULIN ASPART 1 UNITS: 100 INJECTION, SOLUTION INTRAVENOUS; SUBCUTANEOUS at 09:02

## 2024-01-01 RX ADMIN — SODIUM BICARBONATE 650 MG TABLET 650 MG: at 20:47

## 2024-01-01 RX ADMIN — SODIUM BICARBONATE 650 MG TABLET 650 MG: at 21:51

## 2024-01-01 RX ADMIN — LACTULOSE 30 G: 10 SOLUTION ORAL at 14:20

## 2024-01-01 RX ADMIN — CARBIDOPA AND LEVODOPA 2.5 MG: 50; 200 TABLET, EXTENDED RELEASE ORAL at 12:03

## 2024-01-01 RX ADMIN — SODIUM BICARBONATE 650 MG TABLET 650 MG: at 09:03

## 2024-01-01 RX ADMIN — RIFAXIMIN 550 MG: 550 TABLET ORAL at 21:14

## 2024-01-01 RX ADMIN — CARBIDOPA AND LEVODOPA 2.5 MG: 50; 200 TABLET, EXTENDED RELEASE ORAL at 09:18

## 2024-01-01 RX ADMIN — LACTULOSE 20 G: 10 SOLUTION ORAL at 09:18

## 2024-01-01 RX ADMIN — SPIRONOLACTONE 50 MG: 25 TABLET, FILM COATED ORAL at 08:16

## 2024-01-01 RX ADMIN — PANTOPRAZOLE SODIUM 40 MG: 40 TABLET, DELAYED RELEASE ORAL at 17:53

## 2024-01-01 RX ADMIN — LACTULOSE 20 G: 10 SOLUTION ORAL at 23:33

## 2024-01-01 RX ADMIN — CIPROFLOXACIN HYDROCHLORIDE 500 MG: 500 TABLET, FILM COATED ORAL at 09:34

## 2024-01-01 RX ADMIN — ALBUMIN HUMAN 25 G: 0.25 SOLUTION INTRAVENOUS at 11:09

## 2024-01-01 RX ADMIN — PANTOPRAZOLE SODIUM 40 MG: 40 TABLET, DELAYED RELEASE ORAL at 16:31

## 2024-01-01 RX ADMIN — PANTOPRAZOLE SODIUM 40 MG: 40 TABLET, DELAYED RELEASE ORAL at 09:25

## 2024-01-01 RX ADMIN — BACITRACIN: 500 OINTMENT TOPICAL at 10:25

## 2024-01-01 RX ADMIN — LACTULOSE 20 G: 10 SOLUTION ORAL at 21:51

## 2024-01-01 RX ADMIN — PANTOPRAZOLE SODIUM 40 MG: 40 TABLET, DELAYED RELEASE ORAL at 17:46

## 2024-01-01 RX ADMIN — RIFAXIMIN 550 MG: 550 TABLET ORAL at 20:52

## 2024-01-01 RX ADMIN — INSULIN ASPART 1 UNITS: 100 INJECTION, SOLUTION INTRAVENOUS; SUBCUTANEOUS at 12:25

## 2024-01-01 RX ADMIN — FUROSEMIDE 20 MG: 20 TABLET ORAL at 09:25

## 2024-01-01 RX ADMIN — SODIUM BICARBONATE 650 MG: 648 TABLET ORAL at 20:37

## 2024-01-01 RX ADMIN — INSULIN ASPART 1 UNITS: 100 INJECTION, SOLUTION INTRAVENOUS; SUBCUTANEOUS at 09:38

## 2024-01-01 RX ADMIN — ZINC SULFATE 220 MG (50 MG) CAPSULE 220 MG: CAPSULE at 20:52

## 2024-01-01 RX ADMIN — SODIUM BICARBONATE 650 MG: 648 TABLET ORAL at 23:29

## 2024-01-01 RX ADMIN — SODIUM CHLORIDE, POTASSIUM CHLORIDE, SODIUM LACTATE AND CALCIUM CHLORIDE: 600; 310; 30; 20 INJECTION, SOLUTION INTRAVENOUS at 07:59

## 2024-01-01 RX ADMIN — LACTULOSE 200 G: 10 SOLUTION ORAL at 04:27

## 2024-01-01 RX ADMIN — PANTOPRAZOLE SODIUM 40 MG: 40 TABLET, DELAYED RELEASE ORAL at 06:40

## 2024-01-01 RX ADMIN — LACTULOSE 20 G: 10 SOLUTION ORAL at 20:47

## 2024-01-01 RX ADMIN — LACTULOSE 200 G: 10 SOLUTION ORAL at 10:06

## 2024-01-01 RX ADMIN — SODIUM BICARBONATE 650 MG TABLET 650 MG: at 10:40

## 2024-01-01 RX ADMIN — CALCIUM CARBONATE (ANTACID) CHEW TAB 500 MG 1000 MG: 500 CHEW TAB at 02:05

## 2024-01-01 RX ADMIN — PROPOFOL 20 MG: 10 INJECTION, EMULSION INTRAVENOUS at 08:26

## 2024-01-01 RX ADMIN — CARBIDOPA AND LEVODOPA 2.5 MG: 50; 200 TABLET, EXTENDED RELEASE ORAL at 08:28

## 2024-01-01 RX ADMIN — RIFAXIMIN 550 MG: 550 TABLET ORAL at 22:19

## 2024-01-01 RX ADMIN — SODIUM BICARBONATE 650 MG: 648 TABLET ORAL at 09:34

## 2024-01-01 RX ADMIN — LACTULOSE 20 G: 10 SOLUTION ORAL at 09:08

## 2024-01-01 RX ADMIN — FUROSEMIDE 20 MG: 20 TABLET ORAL at 10:09

## 2024-01-01 RX ADMIN — PANTOPRAZOLE SODIUM 40 MG: 40 TABLET, DELAYED RELEASE ORAL at 17:01

## 2024-01-01 RX ADMIN — LACTULOSE 20 G: 10 SOLUTION ORAL at 08:33

## 2024-01-01 RX ADMIN — SPIRONOLACTONE 50 MG: 25 TABLET, FILM COATED ORAL at 10:09

## 2024-01-01 RX ADMIN — SPIRONOLACTONE 50 MG: 25 TABLET, FILM COATED ORAL at 09:25

## 2024-01-01 RX ADMIN — FUROSEMIDE 20 MG: 20 TABLET ORAL at 09:34

## 2024-01-01 RX ADMIN — SODIUM BICARBONATE 650 MG: 648 TABLET ORAL at 21:14

## 2024-01-01 RX ADMIN — LACTULOSE 30 G: 10 SOLUTION ORAL at 22:49

## 2024-01-01 RX ADMIN — RIFAXIMIN 550 MG: 550 TABLET ORAL at 20:37

## 2024-01-01 RX ADMIN — RIFAXIMIN 550 MG: 550 TABLET ORAL at 08:13

## 2024-01-01 RX ADMIN — RIFAXIMIN 550 MG: 550 TABLET ORAL at 09:34

## 2024-01-01 RX ADMIN — CARBIDOPA AND LEVODOPA 2.5 MG: 50; 200 TABLET, EXTENDED RELEASE ORAL at 12:25

## 2024-01-01 RX ADMIN — CARBIDOPA AND LEVODOPA 2.5 MG: 50; 200 TABLET, EXTENDED RELEASE ORAL at 12:29

## 2024-01-01 RX ADMIN — SODIUM BICARBONATE 650 MG: 648 TABLET ORAL at 22:19

## 2024-01-01 RX ADMIN — SPIRONOLACTONE 50 MG: 25 TABLET, FILM COATED ORAL at 20:09

## 2024-01-01 RX ADMIN — SODIUM BICARBONATE 650 MG: 648 TABLET ORAL at 08:09

## 2024-01-01 RX ADMIN — LACTULOSE 20 G: 10 SOLUTION ORAL at 16:19

## 2024-01-01 RX ADMIN — CARBIDOPA AND LEVODOPA 2.5 MG: 50; 200 TABLET, EXTENDED RELEASE ORAL at 12:33

## 2024-01-01 RX ADMIN — CARBIDOPA AND LEVODOPA 2.5 MG: 50; 200 TABLET, EXTENDED RELEASE ORAL at 10:07

## 2024-01-01 RX ADMIN — PANTOPRAZOLE SODIUM 40 MG: 40 TABLET, DELAYED RELEASE ORAL at 08:28

## 2024-01-01 RX ADMIN — RIFAXIMIN 550 MG: 550 TABLET ORAL at 11:06

## 2024-01-01 RX ADMIN — SODIUM BICARBONATE 650 MG TABLET 650 MG: at 20:38

## 2024-01-01 RX ADMIN — CARBIDOPA AND LEVODOPA 2.5 MG: 50; 200 TABLET, EXTENDED RELEASE ORAL at 08:44

## 2024-01-01 RX ADMIN — CARBIDOPA AND LEVODOPA 2.5 MG: 50; 200 TABLET, EXTENDED RELEASE ORAL at 16:31

## 2024-01-01 RX ADMIN — RIFAXIMIN 550 MG: 550 TABLET ORAL at 22:49

## 2024-01-01 RX ADMIN — ONDANSETRON 4 MG: 2 INJECTION INTRAMUSCULAR; INTRAVENOUS at 08:24

## 2024-01-01 RX ADMIN — LACTULOSE 20 G: 10 SOLUTION ORAL at 20:38

## 2024-01-01 RX ADMIN — LACTULOSE 20 G: 10 SOLUTION ORAL at 16:31

## 2024-01-01 RX ADMIN — SPIRONOLACTONE 50 MG: 25 TABLET, FILM COATED ORAL at 08:27

## 2024-01-01 RX ADMIN — SPIRONOLACTONE 50 MG: 25 TABLET, FILM COATED ORAL at 09:18

## 2024-01-01 RX ADMIN — PROPOFOL 180 MCG/KG/MIN: 10 INJECTION, EMULSION INTRAVENOUS at 08:22

## 2024-01-01 RX ADMIN — CARBIDOPA AND LEVODOPA 2.5 MG: 50; 200 TABLET, EXTENDED RELEASE ORAL at 08:13

## 2024-01-01 RX ADMIN — RIFAXIMIN 550 MG: 550 TABLET ORAL at 22:18

## 2024-01-01 RX ADMIN — THERA TABS 1 TABLET: TAB at 20:47

## 2024-01-01 RX ADMIN — CEFTRIAXONE SODIUM 1 G: 1 INJECTION, POWDER, FOR SOLUTION INTRAMUSCULAR; INTRAVENOUS at 16:28

## 2024-01-01 RX ADMIN — PANTOPRAZOLE SODIUM 40 MG: 40 TABLET, DELAYED RELEASE ORAL at 06:27

## 2024-01-01 RX ADMIN — SODIUM BICARBONATE 650 MG TABLET 650 MG: at 20:52

## 2024-01-01 RX ADMIN — PANTOPRAZOLE SODIUM 40 MG: 40 TABLET, DELAYED RELEASE ORAL at 17:39

## 2024-01-01 RX ADMIN — CARBIDOPA AND LEVODOPA 2.5 MG: 50; 200 TABLET, EXTENDED RELEASE ORAL at 13:06

## 2024-01-01 RX ADMIN — PANTOPRAZOLE SODIUM 40 MG: 40 TABLET, DELAYED RELEASE ORAL at 08:09

## 2024-01-01 RX ADMIN — INSULIN ASPART 1 UNITS: 100 INJECTION, SOLUTION INTRAVENOUS; SUBCUTANEOUS at 13:07

## 2024-01-01 RX ADMIN — INSULIN ASPART 1 UNITS: 100 INJECTION, SOLUTION INTRAVENOUS; SUBCUTANEOUS at 17:00

## 2024-01-01 RX ADMIN — PANTOPRAZOLE SODIUM 40 MG: 40 TABLET, DELAYED RELEASE ORAL at 09:18

## 2024-01-01 RX ADMIN — INSULIN ASPART 1 UNITS: 100 INJECTION, SOLUTION INTRAVENOUS; SUBCUTANEOUS at 13:00

## 2024-01-01 RX ADMIN — LACTULOSE 20 G: 10 SOLUTION ORAL at 14:55

## 2024-01-01 RX ADMIN — LACTULOSE 200 G: 10 SOLUTION ORAL at 09:32

## 2024-01-01 RX ADMIN — FUROSEMIDE 20 MG: 20 TABLET ORAL at 20:09

## 2024-01-01 RX ADMIN — CIPROFLOXACIN HYDROCHLORIDE 500 MG: 500 TABLET, FILM COATED ORAL at 08:09

## 2024-01-01 RX ADMIN — LACTULOSE 200 G: 10 SOLUTION ORAL at 21:03

## 2024-01-01 RX ADMIN — RIFAXIMIN 550 MG: 550 TABLET ORAL at 20:38

## 2024-01-01 RX ADMIN — LACTULOSE 30 G: 10 SOLUTION ORAL at 21:14

## 2024-01-01 RX ADMIN — CARBIDOPA AND LEVODOPA 2.5 MG: 50; 200 TABLET, EXTENDED RELEASE ORAL at 16:19

## 2024-01-01 RX ADMIN — RIFAXIMIN 550 MG: 550 TABLET ORAL at 21:51

## 2024-01-01 RX ADMIN — PANTOPRAZOLE SODIUM 40 MG: 40 TABLET, DELAYED RELEASE ORAL at 06:45

## 2024-01-01 RX ADMIN — CIPROFLOXACIN HYDROCHLORIDE 500 MG: 500 TABLET, FILM COATED ORAL at 10:09

## 2024-01-01 RX ADMIN — METHYLCELLULOSE: 2 POWDER, FOR SOLUTION ORAL at 08:11

## 2024-01-01 RX ADMIN — SODIUM CHLORIDE, PRESERVATIVE FREE: 5 INJECTION INTRAVENOUS at 13:38

## 2024-01-01 RX ADMIN — LACTULOSE 20 G: 10 SOLUTION ORAL at 09:22

## 2024-01-01 RX ADMIN — PANTOPRAZOLE SODIUM 40 MG: 40 TABLET, DELAYED RELEASE ORAL at 20:09

## 2024-01-01 RX ADMIN — LACTULOSE 200 G: 10 SOLUTION ORAL at 13:29

## 2024-01-01 RX ADMIN — LACTULOSE 200 G: 10 SOLUTION ORAL at 00:33

## 2024-01-01 RX ADMIN — LACTULOSE 30 G: 10 SOLUTION ORAL at 08:44

## 2024-01-01 RX ADMIN — SODIUM BICARBONATE 650 MG: 648 TABLET ORAL at 08:43

## 2024-01-01 RX ADMIN — CARBIDOPA AND LEVODOPA 2.5 MG: 50; 200 TABLET, EXTENDED RELEASE ORAL at 11:12

## 2024-01-01 RX ADMIN — PANTOPRAZOLE SODIUM 40 MG: 40 TABLET, DELAYED RELEASE ORAL at 15:48

## 2024-01-01 RX ADMIN — LACTULOSE 200 G: 10 SOLUTION ORAL at 17:15

## 2024-01-01 RX ADMIN — FUROSEMIDE 20 MG: 20 TABLET ORAL at 08:16

## 2024-01-01 RX ADMIN — GLYCOPYRROLATE 0.2 MG: 0.2 INJECTION INTRAMUSCULAR; INTRAVENOUS at 08:22

## 2024-01-01 RX ADMIN — SODIUM BICARBONATE 650 MG TABLET 650 MG: at 11:05

## 2024-01-01 RX ADMIN — LACTULOSE 20 G: 10 SOLUTION ORAL at 12:29

## 2024-01-01 RX ADMIN — SODIUM BICARBONATE 650 MG TABLET 650 MG: at 09:18

## 2024-01-01 RX ADMIN — ALBUMIN HUMAN 50 G: 0.25 SOLUTION INTRAVENOUS at 01:52

## 2024-01-01 RX ADMIN — LACTULOSE 30 G: 10 SOLUTION ORAL at 08:16

## 2024-01-01 RX ADMIN — RIFAXIMIN 550 MG: 550 TABLET ORAL at 10:08

## 2024-01-01 RX ADMIN — RIFAXIMIN 550 MG: 550 TABLET ORAL at 08:27

## 2024-01-01 RX ADMIN — LACTULOSE 200 G: 10 SOLUTION ORAL at 02:08

## 2024-01-01 RX ADMIN — RIFAXIMIN 550 MG: 550 TABLET ORAL at 23:29

## 2024-01-01 RX ADMIN — LACTULOSE 20 G: 10 SOLUTION ORAL at 10:06

## 2024-01-01 RX ADMIN — FENTANYL CITRATE 50 MCG: 50 INJECTION, SOLUTION INTRAMUSCULAR; INTRAVENOUS at 13:27

## 2024-01-01 RX ADMIN — CIPROFLOXACIN HYDROCHLORIDE 500 MG: 500 TABLET, FILM COATED ORAL at 08:27

## 2024-01-01 RX ADMIN — SODIUM BICARBONATE 650 MG: 648 TABLET ORAL at 22:18

## 2024-01-01 ASSESSMENT — ACTIVITIES OF DAILY LIVING (ADL)
ADLS_ACUITY_SCORE: 67
ADLS_ACUITY_SCORE: 58
ADLS_ACUITY_SCORE: 57
ADLS_ACUITY_SCORE: 56
ADLS_ACUITY_SCORE: 52
ADLS_ACUITY_SCORE: 59
ADLS_ACUITY_SCORE: 63
ADLS_ACUITY_SCORE: 60
ADLS_ACUITY_SCORE: 63
ADLS_ACUITY_SCORE: 38
ADLS_ACUITY_SCORE: 55
ADLS_ACUITY_SCORE: 57
ADLS_ACUITY_SCORE: 74
ADLS_ACUITY_SCORE: 38
ADLS_ACUITY_SCORE: 40
ADLS_ACUITY_SCORE: 52
DRESSING/BATHING: BATHING DIFFICULTY, DEPENDENT;DRESSING DIFFICULTY, DEPENDENT
ADLS_ACUITY_SCORE: 37
ADLS_ACUITY_SCORE: 55
ADLS_ACUITY_SCORE: 61
ADLS_ACUITY_SCORE: 39
ADLS_ACUITY_SCORE: 38
ADLS_ACUITY_SCORE: 55
WALKING_OR_CLIMBING_STAIRS_DIFFICULTY: YES
ADLS_ACUITY_SCORE: 63
DEPENDENT_IADLS:: CLEANING;COOKING;LAUNDRY;SHOPPING;MEAL PREPARATION;MEDICATION MANAGEMENT;MONEY MANAGEMENT;TRANSPORTATION;INCONTINENCE
ADLS_ACUITY_SCORE: 70
ADLS_ACUITY_SCORE: 70
ADLS_ACUITY_SCORE: 63
ADLS_ACUITY_SCORE: 59
ADLS_ACUITY_SCORE: 59
ADLS_ACUITY_SCORE: 52
ADLS_ACUITY_SCORE: 38
ADLS_ACUITY_SCORE: 40
ADLS_ACUITY_SCORE: 55
ADLS_ACUITY_SCORE: 38
IADL_COMMENTS: ASSIST FOR ALL IADLS
ADLS_ACUITY_SCORE: 60
ADLS_ACUITY_SCORE: 52
ADLS_ACUITY_SCORE: 52
ADLS_ACUITY_SCORE: 39
TOILETING_ASSISTANCE: TOILETING DIFFICULTY, ASSISTANCE 1 PERSON
WEAR_GLASSES_OR_BLIND: NO
ADLS_ACUITY_SCORE: 57
NUMBER_OF_TIMES_PATIENT_HAS_FALLEN_WITHIN_LAST_SIX_MONTHS: 1
ADLS_ACUITY_SCORE: 63
ADLS_ACUITY_SCORE: 38
ADLS_ACUITY_SCORE: 55
ADLS_ACUITY_SCORE: 38
ADLS_ACUITY_SCORE: 48
ADLS_ACUITY_SCORE: 42
ADLS_ACUITY_SCORE: 39
ADLS_ACUITY_SCORE: 52
ADLS_ACUITY_SCORE: 42
ADLS_ACUITY_SCORE: 70
ADLS_ACUITY_SCORE: 58
ADLS_ACUITY_SCORE: 40
ADLS_ACUITY_SCORE: 42
ADLS_ACUITY_SCORE: 70
ADLS_ACUITY_SCORE: 39
ADLS_ACUITY_SCORE: 70
ADLS_ACUITY_SCORE: 60
ADLS_ACUITY_SCORE: 42
ADLS_ACUITY_SCORE: 63
ADLS_ACUITY_SCORE: 52
ADLS_ACUITY_SCORE: 55
ADLS_ACUITY_SCORE: 64
ADLS_ACUITY_SCORE: 60
ADLS_ACUITY_SCORE: 52
ADLS_ACUITY_SCORE: 42
ADLS_ACUITY_SCORE: 60
ADLS_ACUITY_SCORE: 38
ADLS_ACUITY_SCORE: 48
ADLS_ACUITY_SCORE: 63
DRESSING/BATHING_MANAGEMENT: FAMILY
ADLS_ACUITY_SCORE: 38
ADLS_ACUITY_SCORE: 59
ADLS_ACUITY_SCORE: 52
ADLS_ACUITY_SCORE: 51
ADLS_ACUITY_SCORE: 42
ADLS_ACUITY_SCORE: 51
ADLS_ACUITY_SCORE: 55
ADLS_ACUITY_SCORE: 52
ADLS_ACUITY_SCORE: 69
ADLS_ACUITY_SCORE: 59
ADLS_ACUITY_SCORE: 63
ADLS_ACUITY_SCORE: 63
ADLS_ACUITY_SCORE: 59
ADLS_ACUITY_SCORE: 64
ADLS_ACUITY_SCORE: 52
ADLS_ACUITY_SCORE: 61
WALKING_OR_CLIMBING_STAIRS: AMBULATION DIFFICULTY, ASSISTANCE 1 PERSON
ADLS_ACUITY_SCORE: 42
ADLS_ACUITY_SCORE: 55
ADLS_ACUITY_SCORE: 38
ADLS_ACUITY_SCORE: 52
ADLS_ACUITY_SCORE: 70
ADLS_ACUITY_SCORE: 58
ADLS_ACUITY_SCORE: 38
ADLS_ACUITY_SCORE: 55
ADLS_ACUITY_SCORE: 38
TOILETING_ISSUES: YES
ADLS_ACUITY_SCORE: 38
ADLS_ACUITY_SCORE: 42
ADLS_ACUITY_SCORE: 57
ADLS_ACUITY_SCORE: 38
ADLS_ACUITY_SCORE: 63
ADLS_ACUITY_SCORE: 52
ADLS_ACUITY_SCORE: 38
ADLS_ACUITY_SCORE: 52
ADLS_ACUITY_SCORE: 52
ADLS_ACUITY_SCORE: 61
ADLS_ACUITY_SCORE: 70
ADLS_ACUITY_SCORE: 59
ADLS_ACUITY_SCORE: 61
ADLS_ACUITY_SCORE: 74
ADLS_ACUITY_SCORE: 61
ADLS_ACUITY_SCORE: 64
CHANGE_IN_FUNCTIONAL_STATUS_SINCE_ONSET_OF_CURRENT_ILLNESS/INJURY: YES
ADLS_ACUITY_SCORE: 60
ADLS_ACUITY_SCORE: 38
ADLS_ACUITY_SCORE: 63
ADLS_ACUITY_SCORE: 55
ADLS_ACUITY_SCORE: 38
ADLS_ACUITY_SCORE: 59
ADLS_ACUITY_SCORE: 59
ADLS_ACUITY_SCORE: 64
ADLS_ACUITY_SCORE: 38
ADLS_ACUITY_SCORE: 40
ADLS_ACUITY_SCORE: 60
ADLS_ACUITY_SCORE: 38
ADLS_ACUITY_SCORE: 63
ADLS_ACUITY_SCORE: 64
ADLS_ACUITY_SCORE: 42
ADLS_ACUITY_SCORE: 47
ADLS_ACUITY_SCORE: 55
ADLS_ACUITY_SCORE: 37
ADLS_ACUITY_SCORE: 59
ADLS_ACUITY_SCORE: 68
ADLS_ACUITY_SCORE: 40
ADLS_ACUITY_SCORE: 39
ADLS_ACUITY_SCORE: 52
ADLS_ACUITY_SCORE: 42
ADLS_ACUITY_SCORE: 52
ADLS_ACUITY_SCORE: 42
ADLS_ACUITY_SCORE: 58
ADLS_ACUITY_SCORE: 63
DEPENDENT_IADLS:: CLEANING;COOKING;LAUNDRY;SHOPPING;MEAL PREPARATION;MEDICATION MANAGEMENT;MONEY MANAGEMENT;TRANSPORTATION;INCONTINENCE
ADLS_ACUITY_SCORE: 52
ADLS_ACUITY_SCORE: 57
ADLS_ACUITY_SCORE: 59
ADLS_ACUITY_SCORE: 43
ADLS_ACUITY_SCORE: 59
ADLS_ACUITY_SCORE: 41
ADLS_ACUITY_SCORE: 68
ADLS_ACUITY_SCORE: 38
ADLS_ACUITY_SCORE: 39
ADLS_ACUITY_SCORE: 40
ADLS_ACUITY_SCORE: 56
ADLS_ACUITY_SCORE: 68
ADLS_ACUITY_SCORE: 47
ADLS_ACUITY_SCORE: 58
ADLS_ACUITY_SCORE: 63
ADLS_ACUITY_SCORE: 42
ADLS_ACUITY_SCORE: 38
ADLS_ACUITY_SCORE: 63
ADLS_ACUITY_SCORE: 42
ADLS_ACUITY_SCORE: 52
ADLS_ACUITY_SCORE: 63
ADLS_ACUITY_SCORE: 53
ADLS_ACUITY_SCORE: 63
ADLS_ACUITY_SCORE: 38
ADLS_ACUITY_SCORE: 38
ADLS_ACUITY_SCORE: 59
ADLS_ACUITY_SCORE: 52
ADLS_ACUITY_SCORE: 51
ADLS_ACUITY_SCORE: 38
ADLS_ACUITY_SCORE: 63
ADLS_ACUITY_SCORE: 63
ADLS_ACUITY_SCORE: 41
ADLS_ACUITY_SCORE: 67
ADLS_ACUITY_SCORE: 61
ADLS_ACUITY_SCORE: 58
ADLS_ACUITY_SCORE: 55
DRESSING/BATHING_DIFFICULTY: YES
ADLS_ACUITY_SCORE: 38
ADLS_ACUITY_SCORE: 57
FALL_HISTORY_WITHIN_LAST_SIX_MONTHS: YES
ADLS_ACUITY_SCORE: 39
ADLS_ACUITY_SCORE: 52
ADLS_ACUITY_SCORE: 38
ADLS_ACUITY_SCORE: 38
ADLS_ACUITY_SCORE: 39
ADLS_ACUITY_SCORE: 38
ADLS_ACUITY_SCORE: 39
ADLS_ACUITY_SCORE: 55
CONCENTRATING,_REMEMBERING_OR_MAKING_DECISIONS_DIFFICULTY: YES
ADLS_ACUITY_SCORE: 55
ADLS_ACUITY_SCORE: 60
ADLS_ACUITY_SCORE: 40
ADLS_ACUITY_SCORE: 51
ADLS_ACUITY_SCORE: 52
ADLS_ACUITY_SCORE: 63
ADLS_ACUITY_SCORE: 39
ADLS_ACUITY_SCORE: 47
ADLS_ACUITY_SCORE: 52
ADLS_ACUITY_SCORE: 63
ADLS_ACUITY_SCORE: 41
ADLS_ACUITY_SCORE: 55
ADLS_ACUITY_SCORE: 68
ADLS_ACUITY_SCORE: 70
ADLS_ACUITY_SCORE: 64
ADLS_ACUITY_SCORE: 63
ADLS_ACUITY_SCORE: 52
DOING_ERRANDS_INDEPENDENTLY_DIFFICULTY: YES
ADLS_ACUITY_SCORE: 39
ADLS_ACUITY_SCORE: 39
ADLS_ACUITY_SCORE: 42
ADLS_ACUITY_SCORE: 51
ADLS_ACUITY_SCORE: 52
ADLS_ACUITY_SCORE: 52
ADLS_ACUITY_SCORE: 39
ADLS_ACUITY_SCORE: 38
ADLS_ACUITY_SCORE: 39
ADLS_ACUITY_SCORE: 67
ADLS_ACUITY_SCORE: 38
ADLS_ACUITY_SCORE: 38
ADLS_ACUITY_SCORE: 63
ADLS_ACUITY_SCORE: 42
ADLS_ACUITY_SCORE: 63
ADLS_ACUITY_SCORE: 59
ADLS_ACUITY_SCORE: 42
ADLS_ACUITY_SCORE: 52
ADLS_ACUITY_SCORE: 63
ADLS_ACUITY_SCORE: 42
ADLS_ACUITY_SCORE: 52
DIFFICULTY_COMMUNICATING: YES
ADLS_ACUITY_SCORE: 59
ADLS_ACUITY_SCORE: 60
ADLS_ACUITY_SCORE: 61
ADLS_ACUITY_SCORE: 55
ADLS_ACUITY_SCORE: 58
ADLS_ACUITY_SCORE: 38
ADLS_ACUITY_SCORE: 52
ADLS_ACUITY_SCORE: 61
ADLS_ACUITY_SCORE: 58
ADLS_ACUITY_SCORE: 38
ADLS_ACUITY_SCORE: 55
ADLS_ACUITY_SCORE: 62
ADLS_ACUITY_SCORE: 42
ADLS_ACUITY_SCORE: 60
ADLS_ACUITY_SCORE: 63
ADLS_ACUITY_SCORE: 55
ADLS_ACUITY_SCORE: 52
ADLS_ACUITY_SCORE: 70
ADLS_ACUITY_SCORE: 51
ADLS_ACUITY_SCORE: 38
ADLS_ACUITY_SCORE: 55
ADLS_ACUITY_SCORE: 38
ADLS_ACUITY_SCORE: 42
COMMUNICATION: OTHER (SEE COMMENTS)
ADLS_ACUITY_SCORE: 60
ADLS_ACUITY_SCORE: 63
ADLS_ACUITY_SCORE: 40
ADLS_ACUITY_SCORE: 52
ADLS_ACUITY_SCORE: 39
ADLS_ACUITY_SCORE: 64
ADLS_ACUITY_SCORE: 42
ADLS_ACUITY_SCORE: 61
ADLS_ACUITY_SCORE: 59
ADLS_ACUITY_SCORE: 52
ADLS_ACUITY_SCORE: 52
ADLS_ACUITY_SCORE: 61
ADLS_ACUITY_SCORE: 38
DEPENDENT_IADLS:: CLEANING;COOKING;LAUNDRY;SHOPPING;MEAL PREPARATION;MEDICATION MANAGEMENT;MONEY MANAGEMENT;TRANSPORTATION;INCONTINENCE
COMMUNICATION: OTHER (SEE COMMENTS)
ADLS_ACUITY_SCORE: 63
ADLS_ACUITY_SCORE: 68
ADLS_ACUITY_SCORE: 43
ADLS_ACUITY_SCORE: 48
ADLS_ACUITY_SCORE: 39
ADLS_ACUITY_SCORE: 56
ADLS_ACUITY_SCORE: 52
ADLS_ACUITY_SCORE: 43
ADLS_ACUITY_SCORE: 42
ADLS_ACUITY_SCORE: 64
DIFFICULTY_EATING/SWALLOWING: NO
ADLS_ACUITY_SCORE: 39
ADLS_ACUITY_SCORE: 63
ADLS_ACUITY_SCORE: 58
COMMUNICATION_MANAGEMENT: FAMILY
ADLS_ACUITY_SCORE: 63
ADLS_ACUITY_SCORE: 55
ADLS_ACUITY_SCORE: 55

## 2024-01-01 ASSESSMENT — COLUMBIA-SUICIDE SEVERITY RATING SCALE - C-SSRS
6. HAVE YOU EVER DONE ANYTHING, STARTED TO DO ANYTHING, OR PREPARED TO DO ANYTHING TO END YOUR LIFE?: NO
IS THE PATIENT NOT ABLE TO COMPLETE C-SSRS: UNABLE TO VERBALIZE
1. IN THE PAST MONTH, HAVE YOU WISHED YOU WERE DEAD OR WISHED YOU COULD GO TO SLEEP AND NOT WAKE UP?: NO
2. HAVE YOU ACTUALLY HAD ANY THOUGHTS OF KILLING YOURSELF IN THE PAST MONTH?: NO
2. HAVE YOU ACTUALLY HAD ANY THOUGHTS OF KILLING YOURSELF IN THE PAST MONTH?: NO
1. IN THE PAST MONTH, HAVE YOU WISHED YOU WERE DEAD OR WISHED YOU COULD GO TO SLEEP AND NOT WAKE UP?: NO
1. IN THE PAST MONTH, HAVE YOU WISHED YOU WERE DEAD OR WISHED YOU COULD GO TO SLEEP AND NOT WAKE UP?: NO
6. HAVE YOU EVER DONE ANYTHING, STARTED TO DO ANYTHING, OR PREPARED TO DO ANYTHING TO END YOUR LIFE?: NO
2. HAVE YOU ACTUALLY HAD ANY THOUGHTS OF KILLING YOURSELF IN THE PAST MONTH?: NO
6. HAVE YOU EVER DONE ANYTHING, STARTED TO DO ANYTHING, OR PREPARED TO DO ANYTHING TO END YOUR LIFE?: NO
1. IN THE PAST MONTH, HAVE YOU WISHED YOU WERE DEAD OR WISHED YOU COULD GO TO SLEEP AND NOT WAKE UP?: NO
2. HAVE YOU ACTUALLY HAD ANY THOUGHTS OF KILLING YOURSELF IN THE PAST MONTH?: NO
6. HAVE YOU EVER DONE ANYTHING, STARTED TO DO ANYTHING, OR PREPARED TO DO ANYTHING TO END YOUR LIFE?: NO

## 2024-01-01 ASSESSMENT — PAIN SCALES - GENERAL: PAINLEVEL: EXTREME PAIN (8)

## 2024-01-01 ASSESSMENT — ENCOUNTER SYMPTOMS
BACK PAIN: 1
LIGHT-HEADEDNESS: 0
FATIGUE: 0
NECK PAIN: 0
CONFUSION: 0
DIZZINESS: 0
FEVER: 0
VOMITING: 0

## 2024-01-01 NOTE — LETTER
May 11, 2022      Vito Sy  [address] [place] [address]        Dear ,    We are writing to inform you of your test results.  Please see below.  Things look good except for the urine test showing some progression of the protein in the urine, therefore I think you should be taking a medication called losartan 1 pill every day to help protect the kidneys from any damage from diabetes.  I tried to reach you by phone a couple of times but was unable to reach you and could not leave a message, please give me a call back here at the clinic to discuss losartan further or we can also discuss it next time I see her here in the clinic.  Thanks.    Resulted Orders   Comprehensive metabolic panel (BMP + Alb, Alk Phos, ALT, AST, Total. Bili, TP)   Result Value Ref Range    Sodium 142 136 - 145 mmol/L    Potassium 3.9 3.5 - 5.0 mmol/L    Chloride 111 (H) 98 - 107 mmol/L    Carbon Dioxide (CO2) 22 22 - 31 mmol/L    Anion Gap 9 5 - 18 mmol/L    Urea Nitrogen 15 8 - 28 mg/dL    Creatinine 1.32 (H) 0.70 - 1.30 mg/dL    Calcium 8.2 (L) 8.5 - 10.5 mg/dL    Glucose 111 70 - 125 mg/dL    Alkaline Phosphatase 109 45 - 120 U/L    AST 34 0 - 40 U/L    ALT 17 0 - 45 U/L    Protein Total 6.7 6.0 - 8.0 g/dL    Albumin 2.6 (L) 3.5 - 5.0 g/dL    Bilirubin Total 1.1 (H) 0.0 - 1.0 mg/dL    GFR Estimate 58 (L) >60 mL/min/1.73m2      Comment:      Effective December 21, 2021 eGFRcr in adults is calculated using the 2021 CKD-EPI creatinine equation which includes age and gender (Rodrigo vázquez al., NEJM, DOI: 10.1056/KHPSpg1111336)   PSA, screen   Result Value Ref Range    Prostate Specific Antigen Screen 0.37 0.00 - 6.50 ug/L    Narrative    Assay Method is Abbott Prostate-Specific Antigen (PSA)  Standard-WHO 1st International (90:10)   Lipid panel reflex to direct LDL Fasting   Result Value Ref Range    Cholesterol 161 <=199 mg/dL    Triglycerides 63 <=149 mg/dL    Direct Measure HDL 47 >=40 mg/dL      Comment:      HDL  Cholesterol Reference Range:     0-2 years:   No reference ranges established for patients under 2 years old  at "[employer] for lipid analytes.    2-8 years:  Greater than 45 mg/dL     18 years and older:   Female: Greater than or equal to 50 mg/dL   Male:   Greater than or equal to 40 mg/dL    LDL Cholesterol Calculated 101 <=129 mg/dL    Patient Fasting > 8hrs? Yes    HEMOGLOBIN A1C   Result Value Ref Range    Hemoglobin A1C 6.2 (H) 0.0 - 5.6 %      Comment:      Normal <5.7%   Prediabetes 5.7-6.4%    Diabetes 6.5% or higher     Note: Adopted from ADA consensus guidelines.   Albumin Random Urine Quantitative with Creat Ratio   Result Value Ref Range    Microalbumin Urine mg/dL 8.80 (H) 0.00 - 1.99 mg/dL    Creatinine Urine mg/dL 195 mg/dL    Microalbumin Urine mg/g Cr 45.1 (H) <=19.9 mg/g Cr    Narrative    Microalbumin, Random Urine   <2.0 mg/dL . . . . . . . . Normal   3.0-30.0 mg/dL . . . . . . Microalbuminuria   >30.0 mg/dL . . . . . .  . Clinical Proteinuria     Microalbumin/Creatinine Ratio, Random Urine   <20 mg/g . . . . .. . . . Normal    mg/g . . . . . . . Microalbuminuria   >300 mg/g . . . . . . . . Clinical Proteinuria       If you have any questions or concerns, please call the clinic at the number listed above.       Sincerely,      Amrik Mejia MD                             DISPLAY PLAN FREE TEXT

## 2024-01-08 NOTE — TELEPHONE ENCOUNTER
Medication Question or Refill        What medication are you calling about (include dose and sig)?: ciprofloxacin (CIPRO) 500 MG tablet     Preferred Pharmacy:[facility] [address] [place] [address] [address] [facility]  [address] [facility] [address]  Phone: [phone] Fax: [phone]      Controlled Substance Agreement on file:   CSA -- Patient Level:    CSA: None found at the patient level.       Who prescribed the medication?: PCP    Do you need a refill? Yes    When did you use the medication last? N/A    Patient offered an appointment? No    Do you have any questions or concerns?  No      Could we send this information to you in [facility] or would you prefer to receive a phone call?:   Patient would prefer a phone call   Okay to leave a detailed message?: Yes at Home number on file [phone] (home)

## 2024-01-09 NOTE — PROGRESS NOTES
Clinic Care Coordination Contact  [facility]/Voicemail       Clinical Data: CHW Outreach    Outreach attempted x 2. Left message on patient's voicemail with call back information and requested return call.    Plan: CHW will send message to Lead CC ([facility]) to determine whether a disenrollment letter should be sent to Patient or if additional attempt should be made to Patient. No further outreach attempts will be made. Should they return my call, I will discuss clinic care coordination per standard work.    ROYA Perez  Clinic Care Coordination   [facility]   Phone: [phone]  [email or web address]

## 2024-01-09 NOTE — PROGRESS NOTES
Clinic Care Coordination Contact  Program: Medicare savings program   [facility] Case #:  [facility] Worker:   Abhilash #:   Subscriber #:   Renewal:  Date Applied:     FRW Outreach: 1/9/24  FRW called patient and left a vm with call back information. FRW will make outreach in one week.  Helga Benjamin   Financial Resource Worker  [facility] Care Coordination  [phone]   12/14/23 FRW called and talked to wife she stated that she mailed it to the [facility] last week sometime but she could not remember when. FRW will follow up in 30 days and we will call the [facility] to make sure they received the application   Helga Benjamin   Financial Resource Worker  [facility] Care Coordination  [phone]    11/20/23 FRW called and wife was confused on why I was calling but she did fill out most of the Certain population application with the FRW and she will fill out the rest FRW mailed it out and will follow up in 30 days   Helga Benjamin   Financial Resource Worker  [facility] Care Coordination  [phone]    11/17/23 FRW will follow up next week due to patient is in the hospital   Helga Benjamin   Financial Resource Worker  [facility] Care Coordination  [phone]      Health Insurance:      Referral/Screening:   FRW Screening    Row Name 11/16/23 0918       [facility] Benefits   Is patient requesting help applying for [facility] benefits? No       Insurance:   [place] Yes       Is this an insurance renewal? No       Is this a new insurance application request? Yes  Patient would like to check eligibility for secondary health insurance Medical Assistance and Medicare Savings Program.       Have you recently applied for insurance? No       How many people in your household? 2  Patient and wife.       Do you file taxes? Yes       How many dependents do you claim? 0       What is the monthly gross  income for the household (wages, social security, workers comp, and pension)? 2225  Patient recevies Social Security intermediate of 725.00 monthly. Wife recieves 1,500 monthly.       OTHER   Is this a jackie care application? No       Any other information for the [facility]? Patient wants to apply for [employer] Savings Program and Medical Assistance. Naomi (Wife) has Medical Assistance and does not need to apply. Please call Naomi at [phone] or [phone].

## 2024-01-10 NOTE — PROGRESS NOTES
Clinic Care Coordination Contact  Care Coordination Clinician Chart Review    Situation: Patient chart reviewed by Care Coordinator.       Background: Care Coordination Program started: 11/14/2023. Initial assessment completed and patient-centered care plan(s) were developed with participation from patient. Lead CC handed patient off to CHW for continued outreaches.       Assessment: Per chart review, patient outreach completed by CC CHW on 1/09/24.  Patient is not actively working to accomplish goal(s). Patient's goal(s) appropriate and relevant at this time. Patient is not due for updated Plan of Care.  Assessments will be completed annually or as needed/with change of patient status.      Care Plan: MNSure       Problem: Secondary Health Insurance       Goal: I will apply for secondary health insurance through [facility] within the next 90 days.       Start Date: 11/16/2023 Expected End Date: 2/16/2024    This Visit's Progress: 10%    Priority: High    Note:     Barriers: Language   Strengths: Accepting of support.   Patient expressed understanding of goal: Yes     Action steps to achieve this goal:   1.  I will answer my phone when I am contacted by the [facility] FRW to schedule an initial appointment.   2.  I will provide all necessary documentation and information to complete a [place]ure application.   3.  I will call my FRW if I have questions or concerns regarding my [facility] benefits application.   Note: Medicare Savings Program and Medical Assistance.                               Care Plan: PCA services       Problem: HP GENERAL PROBLEM       Goal: PCA services       Start Date: 11/27/2023    This Visit's Progress: 10%    Priority: High    Note:     Barriers: navigating eligibility  Strengths: motivated to seek support  Patient expressed understanding of goal: yes  Action steps to achieve this goal:  1. I will answer phone when [facility] calls  2. I will be available to schedule assessment  3. I will follow up with  CCC with further supports if needed.                                   Plan/Recommendations: The patient will continue working with Care Coordination to achieve goal(s) as above. CHW will continue outreaches at minimum every 30 days and will involve Lead CC as needed or if patient is ready to move to Maintenance. Lead CC will continue to monitor CHW outreaches and patient's progress to goal(s) every 6 weeks.     Plan of Care updated and sent to patient: No      CCSW will wait for FRW to make 2nd attempt at reaching pt before closing CCC episode.    JEWELL Geiger, [facility]  Social Work Care Coordinator

## 2024-01-12 NOTE — TELEPHONE ENCOUNTER
Medication Question or Refill    Contacts         Type Contact Phone/Fax    01/12/2024 10:23 AM CST Phone (Incoming) [facility] [address] [place] [address] (Pharmacy) [phone]            What medication are you calling about (include dose and sig)?: furosemide (LASIX) 20 MG tablet   midodrine (PROAMATINE) 2.5 MG tablet   spironolactone (ALDACTONE) 50 MG tablet     Preferred Pharmacy:   [facility] [address] [place] [id number]  Phone: [phone] Fax: [phone]      Controlled Substance Agreement on file:   CSA -- Patient Level:    CSA: None found at the patient level.       Who prescribed the medication?: PCP    Do you need a refill? Yes    When did you use the medication last?     Patient offered an appointment? No    Do you have any questions or concerns?  No      Could we send this information to you in [facility] or would you prefer to receive a phone call?:   Patient would prefer a phone call   Okay to leave a detailed message?: Yes at Home number on file [phone] (home)

## 2024-01-16 NOTE — PROGRESS NOTES
Clinic Care Coordination Contact  Program: Medicare savings program   County:[facility] Case #:  [facility] Worker:   Abhilash #:   Subscriber #:   Renewal:  Date Applied:     FRW Outreach:   1/16/24 FRW called patient and left a final vm with call back information as attempt x2 with no answer or return phone calls. FRW closed the FRW program and remove patient from panel. Patient can be referred back to FRW if needed.      Helga Benjamin   Financial Resource Worker  [facility] Care Coordination  [phone]    1/9/24  FRW called patient and left a vm with call back information. FRW will make outreach in one week.  Helga Benjamin   Financial Resource Worker  [facility] Care Coordination  [phone]   12/14/23 FRW called and talked to wife she stated that she mailed it to the [facility] last week sometime but she could not remember when. FRW will follow up in 30 days and we will call the [facility] to make sure they received the application   Helga Benjamin   Financial Resource Worker  [facility] Care Coordination  [phone]    11/20/23 FRW called and wife was confused on why I was calling but she did fill out most of the Certain population application with the FRW and she will fill out the rest FRW mailed it out and will follow up in 30 days   Helga Benjamin   Financial Resource Worker  [facility] Care Coordination  [phone]    11/17/23 FRW will follow up next week due to patient is in the hospital   Helga Benjamin   Financial Resource Worker  [facility] Care Coordination  [phone]      Health Insurance:      Referral/Screening:   FRW Screening    Row Name 11/16/23 0918       [facility] Benefits   Is patient requesting help applying for [facility] benefits? No       Insurance:   [place] Yes       Is this an insurance renewal? No       Is this a new insurance application  request? Yes  Patient would like to check eligibility for secondary health insurance Medical Assistance and Medicare Savings Program.       Have you recently applied for insurance? No       How many people in your household? 2  Patient and wife.       Do you file taxes? Yes       How many dependents do you claim? 0       What is the monthly gross income for the household (wages, social security, workers comp, and pension)? 2225  Patient recevies Social Security skilled nursing of 725.00 monthly. Wife recieves 1,500 monthly.       OTHER   Is this a jackie care application? No       Any other information for the [facility]? Patient wants to apply for [employer] Savings Program and Medical Assistance. Naomi (Wife) has Medical Assistance and does not need to apply. Please call Naomi at [phone] or [phone].

## 2024-02-08 NOTE — PROGRESS NOTES
Clinic Care Coordination Contact  [facility]/Voicemail    Clinical Data: Care Coordinator Outreach    Outreach Documentation Number of Outreach Attempt   2/8/2024   1:21 PM 3       Left message on patient's voicemail with call back information and requested return call.    Plan: Care Coordinator will route patient to [facility] to determine if further outreaches should be made.     Johanna Gray CHW, B.A. [facility] Care Coordination  [facility]:   [facility]  [phone]

## 2024-02-09 NOTE — PROGRESS NOTES
Clinic Care Coordination Contact    Assessment: Care Coordinator has attempted multiple outreaches. Unable to reach pt.    Enrollment status: closed     Plan: No further outreaches at this time.  Patient will continue to follow the plan of care.  If new needs arise a new Care Coordination referral may be placed.  FYI to PCP    JEWELL Geiger, [facility]  Social Work Care Coordinator

## 2024-02-14 NOTE — PROGRESS NOTES
Patient tolerated paracentesis with albumin with no concerns expressed or observed.    4.2 L removed with 25 grams albumin 25% given per order.

## 2024-02-19 NOTE — PROGRESS NOTES
Preoperative Evaluation  [address]  [place] [address]  Phone: [phone]  Fax: [phone]  Primary Provider: Farrukh Mejia  Pre-op Performing Provider: FARRUKH MEJIA  Feb 19, 2024       Zaire is a 73 year old, presenting for the following:  No chief complaint on file.      Surgical Information  Surgery/Procedure: esophagogastroduodenoscopy  Surgery Location: [facility]  Surgeon: Beatrice Christie MD  Surgery Date: 2/27/2024  Time of Surgery: TBD  Where patient plans to recover: At home with family  Fax number for surgical facility: [phone]    Assessment & Plan     The proposed surgical procedure is considered LOW risk.    Preoperative examination -cleared to proceed pending labs.  - Comprehensive metabolic panel (BMP + Alb, Alk Phos, ALT, AST, Total. Bili, TP); Future  - INR; Future  - CBC with platelets; Future  - Partial thromboplastin time; Future  - Hemoglobin A1c; Future  - EKG 12-lead, tracing only  - Comprehensive metabolic panel (BMP + Alb, Alk Phos, ALT, AST, Total. Bili, TP)  - INR  - CBC with platelets  - Partial thromboplastin time  - Hemoglobin A1c    End-stage liver disease (H)  Continue follow-up with gastroenterology.  - Lymphedema Therapy Referral; Future  - Comprehensive metabolic panel (BMP + Alb, Alk Phos, ALT, AST, Total. Bili, TP); Future  - INR; Future  - CBC with platelets; Future  - Partial thromboplastin time; Future  - EKG 12-lead, tracing only  - Comprehensive metabolic panel (BMP + Alb, Alk Phos, ALT, AST, Total. Bili, TP)  - INR  - CBC with platelets  - Partial thromboplastin time    Lymphedema, severe, involving the bilateral lower extremities all the way up to the groin with ascites of the abdomen  Continue furosemide and spironolactone.  - Lymphedema Therapy Referral; Future    Type 2 diabetes mellitus with microalbuminuria, without long-term current use of insulin (H)  - EKG 12-lead, tracing only  - Hemoglobin A1c shows  excellent control, continue current plan.    Chronic kidney disease, stage 3a (H)  Labs as detailed above.        Antiplatelet or Anticoagulation Medication Instructions   - Patient is on no antiplatelet or anticoagulation medications.    Additional Medication Instructions  Patient is to take all scheduled medications on the day of surgery with the exception of the duration of n.p.o. that he needs to be per instructions from the upper endoscopy team.    Recommendation  APPROVAL GIVEN to proceed with proposed procedure, without further diagnostic evaluation pending his lab results.        Subjective       HPI related to upcoming procedure: Patient has known end-stage liver disease, recent TIPS revision which has resulted in good improvement in his hepatic encephalopathy-there has been no recurrence of confusion.  Patient is having worsening edema with abdominal ascites and edema that is tight from the feet extending all the way up into the genitourinary area.  This is limiting his range of motion in both legs.  Patient has follow-up upper endoscopy scheduled and needs preoperative clearance.  He has not had any new or out of the ordinary symptoms with his health recently except for the worsening edema.  He has a known history of anemia with hemoglobin ranging from 7.4-9.3 over the last 3 months.        2/19/2024    12:39 PM   Preop Questions   1. Have you ever had a heart attack or stroke? No   2. Have you ever had surgery on your heart or blood vessels, such as a stent placement, a coronary artery bypass, or surgery on an artery in your head, neck, heart, or legs? No   3. Do you have chest pain with activity? No   4. Do you have a history of  heart failure? No   5. Do you currently have a cold, bronchitis or symptoms of other infection? No   6. Do you have a cough, shortness of breath, or wheezing? No   7. Do you or anyone in your family have previous history of blood clots? No   8. Do you or does anyone in your  family have a serious bleeding problem such as prolonged bleeding following surgeries or cuts? No   9. Have you ever had problems with anemia or been told to take iron pills? No   10. Have you had any abnormal blood loss such as black, tarry or bloody stools? No   11. Have you ever had a blood transfusion? YES   11a. Have you ever had a transfusion reaction? No   12. Are you willing to have a blood transfusion if it is medically needed before, during, or after your surgery? Yes   13. Have you or any of your relatives ever had problems with anesthesia? No   14. Do you have sleep apnea, excessive snoring or daytime drowsiness? UNKNOWN   15. Do you have any artifical heart valves or other implanted medical devices like a pacemaker, defibrillator, or continuous glucose monitor? No   16. Do you have artificial joints? No   17. Are you allergic to latex? No       Patient Active Problem List    Diagnosis Date Noted    Ascites 11/16/2023     Priority: Medium    Hypokalemia 11/16/2023     Priority: Medium    Lower GI bleed 11/16/2023     Priority: Medium    End-stage liver disease (H) 11/14/2023     Priority: Medium    Incontinence of feces, unspecified fecal incontinence type 11/14/2023     Priority: Medium    Bilateral lower extremity edema 11/04/2023     Priority: Medium     Worsening over the past few months      SBP (spontaneous bacterial peritonitis) (H) 11/03/2023     Priority: Medium    Mitral valve insufficiency, unspecified etiology 09/18/2023     Priority: Medium    Ascites due to alcoholic cirrhosis (H) 01/06/2023     Priority: Medium    Elevated lipase 01/06/2023     Priority: Medium    Generalized abdominal pain 01/06/2023     Priority: Medium    Hypomagnesemia 01/06/2023     Priority: Medium    Hepatic encephalopathy (H) 12/18/2022     Priority: Medium    Pleural effusion 12/18/2022     Priority: Medium    Anemia, unspecified type 12/18/2022     Priority: Medium    Cirrhosis of liver with ascites (H)  11/16/2022     Priority: Medium    Chronic kidney disease, stage 3a (H) 11/09/2022     Priority: Medium    Portal hypertension (H) 11/09/2022     Priority: Medium    Other cirrhosis of liver (H) with ascites 04/27/2022     Priority: Medium    Incontinence of feces with fecal urgency 01/26/2022     Priority: Medium    Ureteral stone 11/07/2021     Priority: Medium     Formatting of this note might be different from the original. Added automatically from request for surgery [id number]      Disorder of function of stomach 02/22/2021     Priority: Medium    Type 2 diabetes mellitus with microalbuminuria, without long-term current use of insulin (H)      Priority: Medium     Created by Conversion        Esophageal varices without bleeding (H) 11/24/2020     Priority: Medium    Popliteal cyst, left 11/17/2020     Priority: Medium    Popliteal cyst, right 11/17/2020     Priority: Medium    GI bleeding 11/16/2020     Priority: Medium    Anemia due to blood loss, acute 11/16/2020     Priority: Medium    Hypotension 11/16/2020     Priority: Medium    Hyperkalemia 11/16/2020     Priority: Medium    Gastrointestinal hemorrhage associated with acute gastritis 11/15/2020     Priority: Medium     Added automatically from request for surgery [id number]        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 11/14/2016     Priority: Medium    Hypertension      Priority: Medium     Created by Conversion  Replacement Utility updated for latest IMO load        Hematuria      Priority: Medium     Created by Conversion        Nephrolithiasis      Priority: Medium     Created by Conversion        Anemia      Priority: Medium     Created by Conversion        Hypercholesterolemia      Priority: Medium     Created by Conversion        Benign Adenomatous Polyp Of The Large Intestine      Priority: Medium     Created by Conversion          Past Medical History:   Diagnosis Date    Alcoholic cirrhosis of liver with ascites  (H)     Cirrhosis of liver (H)     Diabetes mellitus (H)     Gout     Hypertension     Kidney stone      Past Surgical History:   Procedure Laterality Date    COLONOSCOPY      ESOPHAGOSCOPY, GASTROSCOPY, DUODENOSCOPY (EGD), COMBINED N/A 10/10/2023    Procedure: ESOPHAGOGASTRODUODENOSCOPY with biopsy;  Surgeon: Puneet Plunkett MD, MD;  Location: [facility] Main OR    IR PARACENTESIS  11/6/2021    IR TRANSVEN INTRAHEPATIC PORTOSYST REV  1/26/2023    IR TRANSVEN INTRAHEPATIC PORTOSYST SHUNT  11/16/2022    IR TRANSVEN INTRAHEPATIC PORTOSYST SHUNT  12/9/2022    CT ESOPHAGOGASTRODUODENOSCOPY TRANSORAL DIAGNOSTIC N/A 11/16/2020    Procedure: ESOPHAGOGASTRODUODENOSCOPY (EGD);  Surgeon: Juan Garnett MD;  Location: [facility];  Service: Gastroenterology    CT ESOPHAGOGASTRODUODENOSCOPY TRANSORAL DIAGNOSTIC N/A 11/18/2020    Procedure: ESOPHAGOGASTRODUODENOSCOPY (EGD) WITH BANDING;  Surgeon: Juan Garnett MD;  Location: [facility];  Service: Gastroenterology    URETEROSCOPY       Current Outpatient Medications   Medication Sig Dispense Refill    bismuth subsalicylate (PEPTO BISMOL) 262 MG chewable tablet Take 1 tablet by mouth every 6 hours as needed for diarrhea      ciprofloxacin (CIPRO) 500 MG tablet Take 1 tablet (500 mg) by mouth daily 60 tablet 1    furosemide (LASIX) 20 MG tablet Take 1 tablet (20 mg) by mouth daily 60 tablet 6    lactulose (CHRONULAC) 10 GM/15ML solution Take 30 g by mouth daily      midodrine (PROAMATINE) 2.5 MG tablet Take 1 tablet (2.5 mg) by mouth 3 times daily (with meals) 180 tablet 3    omeprazole (PRILOSEC) 20 MG DR capsule Take 1 capsule (20 mg) by mouth 2 times daily 180 capsule 3    rifaximin (XIFAXAN) 550 MG TABS tablet Take 1 tablet (550 mg) by mouth 2 times daily 60 tablet 4    spironolactone (ALDACTONE) 50 MG tablet Take 1 tablet (50 mg) by mouth daily 60 tablet 6       Allergies   Allergen Reactions    Sulfa Antibiotics Shortness Of Breath and Itching    Penicillins Unknown     " Annotation: discussed with patient, he reports he had \"itching\" with penicillin many years ago in [facility] but no hives or throat or respiratory symptoms.  he also reports having tolerated amoxicillin since coming to [place].          Social History     Tobacco Use    Smoking status: Never     Passive exposure: Never    Smokeless tobacco: Never   Substance Use Topics    Alcohol use: Not Currently     Comment: none for the past 2 years       History   Drug Use No         Review of Systems  No fever, no chest pain, no out of the ordinary shortness of breath, no blood in the urine, no blood in the stool, remainder of review of systems is as above or negative.  Objective    /68   Pulse 74   Temp 97.3  F (36.3  C) (Oral)   Resp 20   Ht 1.702 m (5' 7\")   Wt 83 kg (183 lb)   BMI 28.66 kg/m    Gen - alert, orientated, NAD  Eyes - fundascopic exam limited by the undialated pupil but looks symmetric  ENT - oropharynx clear  Neck - supple, no palpable mass or lymphadenopathy  CV - RRR  Resp - lungs CTA  Ab - soft, nontender, severe ascites   - edema of penis and scrotum  Extrem - warm, no edema  Neuro - CN II-XII intact, strength, sensation intact and symmetric  Skin - no rash, no atypical appearing lesions seen.       Diagnostics  Recent Results (from the past 24 hour(s))   CBC with platelets    Collection Time: 02/19/24  2:23 PM   Result Value Ref Range    WBC Count 5.7 4.0 - 11.0 10e3/uL    RBC Count 3.07 (L) 4.40 - 5.90 10e6/uL    Hemoglobin 8.8 (L) 13.3 - 17.7 g/dL    Hematocrit 27.5 (L) 40.0 - 53.0 %    MCV 90 78 - 100 fL    MCH 28.7 26.5 - 33.0 pg    MCHC 32.0 31.5 - 36.5 g/dL    RDW 17.9 (H) 10.0 - 15.0 %    Platelet Count 115 (L) 150 - 450 10e3/uL   Hemoglobin A1c    Collection Time: 02/19/24  2:23 PM   Result Value Ref Range    Hemoglobin A1C 5.9 (H) 0.0 - 5.6 %   EKG 12-lead, tracing only    Collection Time: 02/19/24  3:21 PM   Result Value Ref Range    Systolic Blood Pressure  mmHg    Diastolic Blood " Pressure  mmHg    Ventricular Rate 77 BPM    Atrial Rate 77 BPM    WI Interval 154 ms    QRS Duration 86 ms     ms    QTc 459 ms    P Axis 31 degrees    R AXIS 42 degrees    T Axis 33 degrees    Interpretation ECG       Sinus rhythm  Low voltage QRS  Borderline ECG  When compared with ECG of 16-NOV-2023 11:13,  Premature supraventricular complexes are no longer Present  QRS duration has decreased  Minimal criteria for Anterior infarct are no longer Present  Minimal criteria for Inferior infarct are no longer Present  Nonspecific T wave abnormality has replaced inverted T waves in Inferior leads  T wave inversion no longer evident in Lateral leads               Signed Electronically by: Amrik Mejia MD  Copy of this evaluation report is provided to requesting physician.

## 2024-02-20 NOTE — TELEPHONE ENCOUNTER
----- Message from Amrik Mejia MD sent at 2/20/2024  3:37 PM CST -----  Team - please call patient with results.  Labs are in acceptable range for him to proceed with the procedure.

## 2024-02-21 NOTE — TELEPHONE ENCOUNTER
Called pt and relayed lab results. Pt verbalized understanding.      Troy Perez, BSN RN  [facility]

## 2024-02-27 NOTE — H&P
PRE-PROCEDURE NOTE     Reason for procedure: Variceal screening    History and Physical Reviewed: Reviewed, no changes.    Pre-sedation assessment:    General: alert, appears stated age, and cooperative  Airway: normal  Heart: regular rate and rhythm  Lungs: clear to auscultation bilaterally    Sedation Plan based on assessment: Monitored anesthesia care    Mallampati score: Class II (visualization of the soft palate, fauces, and uvula)          ASA Classification: ASA 3 - Patient with moderate systemic disease with functional limitations    Impression: Patient deemed adequate candidate for MAC sedation    Risks, benefits and alternatives were discussed with the patient and informed consent was obtained.    Plan: esophagogastroduodenoscopy      Beatrice Christie MD 2/27/2024 7:49 AM      Marco Antonio Christie MD, FACP   [facility]  [email or web address]       Thank you for the opportunity to participate in the care of this patient.   Please feel free to call with any questions or concerns.  [phone].       CC: [facility], Amrik Mejia

## 2024-02-27 NOTE — ANESTHESIA POSTPROCEDURE EVALUATION
Patient: Vito Sy    Procedure: Procedure(s):  ESOPHAGOGASTRODUODENOSCOPY       Anesthesia Type:  MAC    Note:  Disposition: Outpatient   Postop Pain Control: Uneventful            Sign Out: Well controlled pain   PONV: No   Neuro/Psych: Uneventful            Sign Out: Acceptable/Baseline neuro status   Airway/Respiratory: Uneventful            Sign Out: Acceptable/Baseline resp. status   CV/Hemodynamics: Uneventful            Sign Out: Acceptable CV status; No obvious hypovolemia; No obvious fluid overload   Other NRE:    DID A NON-ROUTINE EVENT OCCUR?     Event details/Postop Comments:  Patient fell while getting dressed before discharge.  Was brought to ED for further evaluation.           Last vitals:  Vitals Value Taken Time   /76 02/27/24 1231   Temp 36.6  C (97.9  F) 02/27/24 0945   Pulse 81 02/27/24 1231   Resp 16 02/27/24 1231   SpO2 100 % 02/27/24 1231       Electronically Signed By: Barber Chino MD  February 27, 2024  1:50 PM

## 2024-02-27 NOTE — ANESTHESIA PREPROCEDURE EVALUATION
"Anesthesia Pre-Procedure Evaluation    Patient: Vito Sy   MRN: [id number] : 1950        Procedure : Procedure(s):  ESOPHAGOGASTRODUODENOSCOPY          Past Medical History:   Diagnosis Date    Alcoholic cirrhosis of liver with ascites (H)     Cirrhosis of liver (H)     Diabetes mellitus (H)     Gout     Hypertension     Kidney stone       Past Surgical History:   Procedure Laterality Date    COLONOSCOPY      ESOPHAGOSCOPY, GASTROSCOPY, DUODENOSCOPY (EGD), COMBINED N/A 10/10/2023    Procedure: ESOPHAGOGASTRODUODENOSCOPY with biopsy;  Surgeon: Puneet Plunkett MD, MD;  Location: [facility] OR    IR PARACENTESIS  2021    IR TRANSVEN INTRAHEPATIC PORTOSYST REV  2023    IR TRANSVEN INTRAHEPATIC PORTOSYST SHUNT  2022    IR TRANSVEN INTRAHEPATIC PORTOSYST SHUNT  2022    [place] ESOPHAGOGASTRODUODENOSCOPY TRANSORAL DIAGNOSTIC N/A 2020    Procedure: ESOPHAGOGASTRODUODENOSCOPY (EGD);  Surgeon: Juan Garnett MD;  Location: [facility];  Service: Gastroenterology    [place] ESOPHAGOGASTRODUODENOSCOPY TRANSORAL DIAGNOSTIC N/A 2020    Procedure: ESOPHAGOGASTRODUODENOSCOPY (EGD) WITH BANDING;  Surgeon: Juan Garnett MD;  Location: [facility];  Service: Gastroenterology    URETEROSCOPY        Allergies   Allergen Reactions    Sulfa Antibiotics Shortness Of Breath and Itching    Penicillins Unknown     Annotation: discussed with patient, he reports he had \"itching\" with penicillin many years ago in [facility] but no hives or throat or respiratory symptoms.  he also reports having tolerated amoxicillin since coming to [place].        Social History     Tobacco Use    Smoking status: Never     Passive exposure: Never    Smokeless tobacco: Never   Substance Use Topics    Alcohol use: Not Currently     Comment: none for the past 2 years      Wt Readings from Last 1 Encounters:   24 83 kg (183 lb)        Anesthesia Evaluation            ROS/MED HX  ENT/Pulmonary:  - neg pulmonary ROS   " "  Neurologic:  - neg neurologic ROS     Cardiovascular:  - neg cardiovascular ROS   (+)  hypertension- -   -  - -                                      METS/Exercise Tolerance: >4 METS    Hematologic:  - neg hematologic  ROS     Musculoskeletal:  - neg musculoskeletal ROS     GI/Hepatic:  - neg GI/hepatic ROS   (+)             liver disease,       Renal/Genitourinary:  - neg Renal ROS   (+) renal disease,             Endo:  - neg endo ROS   (+) type I DM,                     Psychiatric/Substance Use:  - neg psychiatric ROS     Infectious Disease:  - neg infectious disease ROS     Malignancy:  - neg malignancy ROS     Other:  - neg other ROS          Physical Exam    Airway        Mallampati: II    Neck ROM: full     Respiratory Devices and Support         Dental           Cardiovascular   cardiovascular exam normal          Pulmonary   pulmonary exam normal                OUTSIDE LABS:  CBC:   Lab Results   Component Value Date    WBC 5.7 02/19/2024    WBC 6.9 12/27/2023    HGB 8.8 (L) 02/19/2024    HGB 9.3 (L) 12/27/2023    HCT 27.5 (L) 02/19/2024    HCT 30.8 (L) 12/27/2023     (L) 02/19/2024     (L) 12/27/2023     BMP:   Lab Results   Component Value Date     02/19/2024     12/27/2023    POTASSIUM 5.3 02/19/2024    POTASSIUM 4.6 12/27/2023    CHLORIDE 116 (H) 02/19/2024    CHLORIDE 111 (H) 12/27/2023    CO2 18 (L) 02/19/2024    CO2 21 (L) 12/27/2023    BUN 28.3 (H) 02/19/2024    BUN 36.7 (H) 12/27/2023    CR 1.29 (H) 02/19/2024    CR 1.06 12/27/2023     (H) 02/19/2024     (H) 12/27/2023     COAGS:   Lab Results   Component Value Date    PTT 37 02/19/2024    INR 1.57 (H) 02/19/2024     POC: No results found for: \"BGM\", \"HCG\", \"HCGS\"  HEPATIC:   Lab Results   Component Value Date    ALBUMIN 2.9 (L) 02/19/2024    PROTTOTAL 7.2 02/19/2024    ALT 29 02/19/2024    AST 48 (H) 02/19/2024    ALKPHOS 113 02/19/2024    BILITOTAL 0.7 02/19/2024    EUGENE 66 (H) 01/27/2023     OTHER: " "  Lab Results   Component Value Date    PH 7.34 (L) 04/27/2022    LACT 1.7 11/16/2023    A1C 5.9 (H) 02/19/2024    SILVIA 8.7 (L) 02/19/2024    MAG 2.3 11/16/2023    LIPASE 156 (H) 01/23/2023    TSH 0.92 01/24/2023       Anesthesia Plan    ASA Status:  3       Anesthesia Type: MAC.              Consents    Anesthesia Plan(s) and associated risks, benefits, and realistic alternatives discussed. Questions answered and patient/representative(s) expressed understanding.     - Discussed:     - Discussed with:  Patient            Postoperative Care            Comments:               Barber Chino MD    I have reviewed the pertinent notes and labs in the chart from the past 30 days and (re)examined the patient.  Any updates or changes from those notes are reflected in this note.          # Hypoalbuminemia: Lowest albumin = 2.9 g/dL in the past 30 days , will monitor as appropriate   # Coagulation Defect: INR = 1.57 (Ref range: 0.85 - 1.15) and/or PTT = 37 Seconds (Ref range: 22 - 38 Seconds), will monitor for bleeding  # Thrombocytopenia: Lowest platelets = 115 in last 30 days, will monitor for bleeding  # Overweight: Estimated body mass index is 27.83 kg/m  as calculated from the following:    Height as of this encounter: 1.727 m (5' 8\").    Weight as of this encounter: 83 kg (183 lb).      "

## 2024-02-27 NOTE — ANESTHESIA CARE TRANSFER NOTE
Patient: Vito Sy    Procedure: Procedure(s):  ESOPHAGOGASTRODUODENOSCOPY       Diagnosis: Liver cirrhosis secondary to nonalcoholic steatohepatitis (JENNINGS) (H) [K75.81, K74.60]  Ascites [R18.8]  Varices, gastric [I86.4]  Diagnosis Additional Information: No value filed.    Anesthesia Type:   MAC     Note:    Oropharynx: oropharynx clear of all foreign objects  Level of Consciousness: drowsy  Oxygen Supplementation: face mask  Level of Supplemental Oxygen (L/min / FiO2): 8  Independent Airway: airway patency satisfactory and stable  Dentition: dentition unchanged  Vital Signs Stable: post-procedure vital signs reviewed and stable  Report to RN Given: handoff report given  Patient transferred to: Phase II    Handoff Report: Identifed the Patient, Identified the Reponsible Provider, Reviewed the pertinent medical history, Discussed the surgical course, Reviewed Intra-OP anesthesia mangement and issues during anesthesia, Set expectations for post-procedure period and Allowed opportunity for questions and acknowledgement of understanding      Vitals:  Vitals Value Taken Time   /64 02/27/24 0836   Temp 36.2  C (97.2  F) 02/27/24 0836   Pulse 84 02/27/24 0837   Resp 16 02/27/24 0836   SpO2 100 % 02/27/24 0837   Vitals shown include unfiled device data.    Electronically Signed By: PEDRO Bertrand CRNA  February 27, 2024  8:38 AM

## 2024-02-27 NOTE — ED PROVIDER NOTES
EMERGENCY DEPARTMENT ENCOUNTER      NAME: Vito Sy  AGE: 73 year old male  YOB: 1950  MRN: [id number]  EVALUATION DATE & TIME: 2024  9:33 AM    PCP: Amrik Mejia    ED PROVIDER: Freddy Estrella MD      Chief Complaint   Patient presents with    Fall         FINAL IMPRESSION:  1. Fall, initial encounter          ED COURSE & MEDICAL DECISION MAKIN:47 AM Met with the patient and performed my initial exam.  11:59 AM RN reports that patient is ambulating with a steady gait with a walker.  12:00 PM Rechecked and updated patient. Patient is comfortable with plan for discharge.  12:01 PM Spoke to patient's son and he notes there is no concerns of family at home recently. Patient will be discharged home.    Pertinent Labs & Imaging studies reviewed. (See chart for details)  73 year old male presents to the Emergency Department for evaluation of a fall.  Per report this was a mechanical fall.  Patient reports he tripped on a blanket will try to get dressed after his endoscopy        Patient presents after mechanical fall after having endoscopy done that did not show any esophageal varices.  End-stage liver disease.  I reviewed his office visit notes from 2024 was noted he has chronic anemia with his hemoglobin running from 7-9.    Here hemoglobin is in that range.  Patient denies any active bleeding.  He does a history of hepatic encephalopathy but no asterixis on exam.  Ammonia level    EKG shows a sinus rhythm.  Denies any chest pain    Because he got a small hematoma to his left scalp head CT and neck CT were ordered that were negative for fractures or intracranial hemorrhage    Patient denies any confusion or wooziness.  Concussion unlikely    Slightly increased INR not surprising since he is end-stage liver disease    Slightly decreased platelets not surprising    Creatinine slightly worse expected after fasting for endoscopy and being n.p.o.    Will have patient follow-up  with primary care doctor.  Does a history of chronic kidney disease    Ambulated with walker here with steady gait.  Ablates with a walker at home    Per family he is at his baseline mentation.  GCS is 15    Plan for discharge home and follow-up with primary care doctor    ED Course as of 02/27/24 1218   Tue Feb 27, 2024   1110 Ammonia: 45       Medical Decision Making  Obtained supplemental history:Supplemental history obtained?: Documented in chart  Reviewed external records: External records reviewed?: Documented in chart  Care impacted by chronic illness:Diabetes, Hypertension, and Other: ESLD and liver cirrhosis with ascites  Care significantly affected by social determinants of health:N/A  Did you consider but not order tests?: Work up considered but not performed and documented in chart, if applicable  Did you interpret images independently?: Independent interpretation of ECG and images noted in documentation, when applicable.  Consultation discussion with other provider:Did you involve another provider (consultant, MH, pharmacy, etc.)?: No  Discharge. No recommendations on prescription strength medication(s). See documentation for any additional details.    At the conclusion of the encounter I discussed the results of all of the tests and the disposition. The questions were answered. The patient or family acknowledged understanding and was agreeable with the care plan.       MEDICATIONS GIVEN IN THE EMERGENCY:  Medications - No data to display    NEW PRESCRIPTIONS STARTED AT TODAY'S ER VISIT  New Prescriptions    No medications on file          =================================================================    HPI    Patient information was obtained from: PACU staff and patient    Use of : N/A        Vito Sy is a 73 year old male with a pertinent history of HTN, T2DM, ESLD, liver cirrhosis with ascites, alcohol use, and esophageal varices without bleeding, who presents to this ED via   for evaluation of fall.    Per PACU staff, patient arrives from PACU following EGD to check for esophageal varices. Patient was getting dressed at PACU, fell, hit his head, and now has a bump on his left forehead. No LOC. He is not on blood thinners. He has a history of alcohol use. Per RN, patient was c/o of itchiness and slight redness on neck.    Per patient, states he was getting dressed, a blanket fell on the ground, he stepped on the blanket, then slipped and fell. Denies neck or head pain. Patient has been fasting for EGD, noting his last meal or drink was last night. He has been taking his medications regularly, but didn't take his AM medications. Denies fevers, vomiting, feeling off, confused, chest pain, dizziness, fatigue, lightheadedness, or near-syncope now or prior to fall. Mentions having back pain a few days ago after lifting stuff. He uses a walker to ambulate at baseline and arrived to the surgery center via wheelchair this morning. 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    Patient's son phone number is [phone].      REVIEW OF SYSTEMS   Review of Systems   Constitutional:  Negative for fatigue and fever.        Negative for feeling off   Cardiovascular:  Negative for chest pain.   Gastrointestinal:  Negative for vomiting.   Musculoskeletal:  Positive for back pain (a few days ago). Negative for neck pain.        Negative for head pain   Skin:         Positive for itchiness and slight redness on neck   Neurological:  Negative for dizziness (now or prior to fall), syncope (or near-syncope) and light-headedness (now or prior to fall).   Psychiatric/Behavioral:  Negative for confusion.        PAST MEDICAL HISTORY:  Past Medical History:   Diagnosis Date    Alcoholic cirrhosis of liver with ascites (H)     Cirrhosis of liver (H)     Diabetes mellitus (H)     Gout     Hypertension     Kidney stone        PAST SURGICAL HISTORY:  Past Surgical History:   Procedure Laterality Date     "COLONOSCOPY      ESOPHAGOSCOPY, GASTROSCOPY, DUODENOSCOPY (EGD), COMBINED N/A 10/10/2023    Procedure: ESOPHAGOGASTRODUODENOSCOPY with biopsy;  Surgeon: Puneet Plunkett MD, MD;  Location: [facility] Main OR    IR PARACENTESIS  11/6/2021    IR TRANSVEN INTRAHEPATIC PORTOSYST REV  1/26/2023    IR TRANSVEN INTRAHEPATIC PORTOSYST SHUNT  11/16/2022    IR TRANSVEN INTRAHEPATIC PORTOSYST SHUNT  12/9/2022    SC ESOPHAGOGASTRODUODENOSCOPY TRANSORAL DIAGNOSTIC N/A 11/16/2020    Procedure: ESOPHAGOGASTRODUODENOSCOPY (EGD);  Surgeon: Juan Garnett MD;  Location: [facility];  Service: Gastroenterology    SC ESOPHAGOGASTRODUODENOSCOPY TRANSORAL DIAGNOSTIC N/A 11/18/2020    Procedure: ESOPHAGOGASTRODUODENOSCOPY (EGD) WITH BANDING;  Surgeon: Juan Garnett MD;  Location: [facility];  Service: Gastroenterology    URETEROSCOPY             CURRENT MEDICATIONS:    bismuth subsalicylate (PEPTO BISMOL) 262 MG chewable tablet  ciprofloxacin (CIPRO) 500 MG tablet  furosemide (LASIX) 20 MG tablet  lactulose (CHRONULAC) 10 GM/15ML solution  midodrine (PROAMATINE) 2.5 MG tablet  omeprazole (PRILOSEC) 20 MG DR capsule  rifaximin (XIFAXAN) 550 MG TABS tablet  spironolactone (ALDACTONE) 50 MG tablet        ALLERGIES:  Allergies   Allergen Reactions    Sulfa Antibiotics Shortness Of Breath and Itching    Penicillins Unknown     Annotation: discussed with patient, he reports he had \"[facility]\" [facility] but no hives or throat or respiratory symptoms.  he also reports having tolerated amoxicillin since coming to [place].         FAMILY HISTORY:  Family History   Problem Relation Age of Onset    Heart Disease Brother     Hypertension Mother     Hypertension Father        SOCIAL HISTORY:   Social History     Socioeconomic History    Marital status:    Tobacco Use    Smoking status: Never     Passive exposure: Never    Smokeless tobacco: Never   Vaping Use    Vaping Use: Never used   Substance and Sexual Activity "    Alcohol use: Not Currently     Comment: none for the past 2 years    Drug use: No   Social History Narrative    Lives with wife - has worked as  in the past     Social Determinants of Health     Financial Resource Strain: Low Risk  (12/27/2023)    Financial Resource Strain     Within the past 12 months, have you or your family members you live with been unable to get utilities (heat, electricity) when it was really needed?: No   Food Insecurity: Low Risk  (12/27/2023)    Food Insecurity     Within the past 12 months, did you worry that your food would run out before you got money to buy more?: No     Within the past 12 months, did the food you bought just not last and you didn t have money to get more?: Patient declined   Transportation Needs: Low Risk  (12/27/2023)    Transportation Needs     Within the past 12 months, has lack of transportation kept you from medical appointments, getting your medicines, non-medical meetings or appointments, work, or from getting things that you need?: No   Interpersonal Safety: Low Risk  (11/14/2023)    Interpersonal Safety     Do you feel physically and emotionally safe where you currently live?: Yes     Within the past 12 months, have you been hit, slapped, kicked or otherwise physically hurt by someone?: No     Within the past 12 months, have you been humiliated or emotionally abused in other ways by your partner or ex-partner?: No   Housing Stability: Low Risk  (12/27/2023)    Housing Stability     Do you have housing? : Yes     Are you worried about losing your housing?: No       VITALS:  /72   Pulse 72   Temp 97.9  F (36.6  C) (Temporal)   Resp 16   Wt 83 kg (183 lb)   SpO2 100%   BMI 27.83 kg/m      PHYSICAL EXAM    Vitals: /72   Pulse 72   Temp 97.9  F (36.6  C) (Temporal)   Resp 16   Wt 83 kg (183 lb)   SpO2 100%   BMI 27.83 kg/m    General: Appears in no acute distress, awake, alert, interactive. GCS 15.  Overall generalized weakness  but able to sit up without assistance  Eyes: Conjunctivae non-injected. Sclera anicteric.  HENT: Atraumatic.  Neck: Supple.  Respiratory/Chest: Respiration unlabored.  Bilateral breath sounds  Heart: Regular rate and rhythm  Abdomen: Distended abdomen but nontender  Musculoskeletal: Normal extremities. No edema or erythema.  No midline C-spine or thoracic or lumbar tenderness  Skin: Normal color. No rash or diaphoresis.  Neurologic: Face symmetric, moves all extremities spontaneously. Speech clear. No tremors. No asterixis.   Psychiatric: Oriented to person, place, and time. Affect appropriate.    LAB:  All pertinent labs reviewed and interpreted.  Results for orders placed or performed during the hospital encounter of 02/27/24   Head CT w/o contrast    Impression    IMPRESSION:  1.  No acute intracranial process.   CT Cervical Spine w/o Contrast    Impression    IMPRESSION:  1.  No fracture or posttraumatic subluxation.  2.  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   Ammonia (on ice)   Result Value Ref Range    Ammonia 45 16 - 60 umol/L   Result Value Ref Range    INR 1.54 (H) 0.85 - 1.15   Hepatic function panel   Result Value Ref Range    Protein Total 6.6 6.4 - 8.3 g/dL    Albumin 2.4 (L) 3.5 - 5.2 g/dL    Bilirubin Total 0.9 <=1.2 mg/dL    Alkaline Phosphatase 105 40 - 150 U/L    AST 35 0 - 45 U/L    ALT 13 0 - 70 U/L    Bilirubin Direct 0.40 (H) 0.00 - 0.30 mg/dL   Basic metabolic panel   Result Value Ref Range    Sodium 141 135 - 145 mmol/L    Potassium 4.6 3.4 - 5.3 mmol/L    Chloride 112 (H) 98 - 107 mmol/L    Carbon Dioxide (CO2) 19 (L) 22 - 29 mmol/L    Anion Gap 10 7 - 15 mmol/L    Urea Nitrogen 27.4 (H) 8.0 - 23.0 mg/dL    Creatinine 1.42 (H) 0.67 - 1.17 mg/dL    GFR Estimate 53 (L) >60 mL/min/1.73m2    Calcium 7.9 (L) 8.8 - 10.2 mg/dL    Glucose 96 70 - 99 mg/dL   CBC with platelets and differential   Result Value Ref  Range    WBC Count 5.1 4.0 - 11.0 10e3/uL    RBC Count 3.06 (L) 4.40 - 5.90 10e6/uL    Hemoglobin 8.3 (L) 13.3 - 17.7 g/dL    Hematocrit 26.5 (L) 40.0 - 53.0 %    MCV 87 78 - 100 fL    MCH 27.1 26.5 - 33.0 pg    MCHC 31.3 (L) 31.5 - 36.5 g/dL    RDW 16.9 (H) 10.0 - 15.0 %    Platelet Count 126 (L) 150 - 450 10e3/uL    % Neutrophils 61 %    % Lymphocytes 21 %    % Monocytes 11 %    % Eosinophils 6 %    % Basophils 1 %    % Immature Granulocytes 0 %    NRBCs per 100 WBC 0 <1 /100    Absolute Neutrophils 3.1 1.6 - 8.3 10e3/uL    Absolute Lymphocytes 1.1 0.8 - 5.3 10e3/uL    Absolute Monocytes 0.6 0.0 - 1.3 10e3/uL    Absolute Eosinophils 0.3 0.0 - 0.7 10e3/uL    Absolute Basophils 0.0 0.0 - 0.2 10e3/uL    Absolute Immature Granulocytes 0.0 <=0.4 10e3/uL    Absolute NRBCs 0.0 10e3/uL       RADIOLOGY:  Reviewed all pertinent imaging. Please see official radiology report.  CT Cervical Spine w/o Contrast   Final Result   IMPRESSION:   1.  No fracture or posttraumatic subluxation.   2.  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      Head CT w/o contrast   Final Result   IMPRESSION:   1.  No acute intracranial process.          EKG:    Performed at: 2/27/2024 10:05:05    Impression: Poor quality EKG. Sinus rhythm. Low voltage QRS. Junctional ST depression, probably normal. Prolonged QT.  Rate: 83 BPM  Rhythm: Sinus  Axis: 89  NE Interval: 142 ms  QRS Interval: 68 ms  QTc Interval: 481 ms  ST Changes: Junctional ST depression.  Comparison: When compared with ECG of 2/19/2024 15:21, no significant change was found.    I have independently reviewed and interpreted the EKG(s) documented above.      I, Yessy Huddleston, am serving as a scribe to document services personally performed by Freddy Estrella MD based on my observation and the provider's statements to me. I, Freddy Estrella MD, attest that Yessy Huddleston is acting in a scribe capacity, has observed  my performance of the services and has documented them in accordance with my direction.    Freddy Estrella MD  [facility] EMERGENCY ROOM  [address] [id number]  [phone]      Freddy Estrella MD  02/27/24 5537

## 2024-02-27 NOTE — ED TRIAGE NOTES
Pt was in PACU from EGD and getting out of bed to get dressed when he fell and hit head. Denies LOC, blood thinners or neck pain. Dexi 94. Son who was going to  patient already notified that pt going to ER for eval of fall. EGD varices stable and unchanged with recommendations for no NSAIDS or alcohol. Pt answering questions appropriately. Pt states he uses wheelchair. Moving all extremities, no facial droop. Pt c/o itching to whole body.      Triage Assessment (Adult)       Row Name 02/27/24 0938          Triage Assessment    Airway WDL WDL        Respiratory WDL    Respiratory WDL WDL        Skin Circulation/Temperature WDL    Skin Circulation/Temperature WDL X  bump to L side of forehead        Cardiac WDL    Cardiac WDL WDL        Peripheral/Neurovascular WDL    Peripheral Neurovascular WDL WDL        Cognitive/Neuro/Behavioral WDL    Cognitive/Neuro/Behavioral WDL X;arousability     Level of Consciousness other (see comments)  drowsy     Arousal Level opens eyes spontaneously;arouses to voice     Orientation oriented x 4     Speech clear;spontaneous;other (see comments)  soft     Mood/Behavior calm;cooperative        Pupils (CN II)    Pupil PERRLA yes     Pupil Size Left 4 mm     Pupil Size Right 4 mm        Claribel Coma Scale    Best Eye Response 4-->(E4) spontaneous     Best Motor Response 6-->(M6) obeys commands     Best Verbal Response 5-->(V5) oriented     Claribel Coma Scale Score 15     Assessment Qualifiers patient chemically sedated or paralyzed

## 2024-02-27 NOTE — DISCHARGE INSTRUCTIONS
As we discussed recommend recheck with your primary care doctor since you were in the ER.  Your labs appear to be fairly stable. Your CT scan of your head and neck did not show any fractures or bleeding.

## 2024-02-27 NOTE — PROGRESS NOTES
"Patient was in the process of discharging home and this nurse had cleared him from a procedure stand point. Patient was meeting criterias to be discharged home. This nurse had giving him his clothes in his bed and was asked if he needed help to change out of his gown. Patient said no for his shirt, but stated needing help with his shoes. This nurse told him to change and then she would be right back to help him with his shoes after grabbing his discharge paperwork out of the printer. Patient stood out of his bed, stepped on the blanket that just had fell on the ground when he stood up and slipped on it. Patient fell head first on the ground without being able to stop his fall with his arms. Nurses were on the ground helping him after the fall. MDA was called at bedside. No LOC. PERRLA. Patient was alert and oriented and knew he had fell. VS stable, Ice applied to left side of head where he started to have a \"bump\". Patient stating not having a head ache yet. Follows commands. Dr. Christie and Dr. Chino MDA at bedside to assess. Patient was helped from the floor back to his bed. He stated that \"he falls at the time at home\". Patient was then brought to the ER for further evaluation of head injury. Son and Wife were updated over the phone. Handoff giving to ER nurse and patient was discharged from phase 2.  "

## 2024-02-27 NOTE — ED NOTES
C-collar is applied per nursing judgement and tolerated pretty well. CT-scan results are pending. We will continue monitoring the pt.

## 2024-02-27 NOTE — ED NOTES
Bed: [facility]  Expected date: 2/27/24  Expected time: 9:19 AM  Means of arrival: Ambulance  Comments:  PACU

## 2024-03-03 NOTE — DISCHARGE INSTRUCTIONS
Your symptoms today appear to be due to a urinary tract infection.  Take the prescribed antibiotics as directed and follow-up closely with your primary care doctor.  Return to the emergency department if you develop any worsening symptoms or if you have any other concerns.

## 2024-03-03 NOTE — PHARMACY-ADMISSION MEDICATION HISTORY
Pharmacist Admission Medication History    Admission medication history is complete. The information provided in this note is only as accurate as the sources available at the time of the update.    Information Source(s): Patient, Family member, and CareEverywhere/SureScripts via in-person    Pertinent Information: Patient somnolent at time of interview but able to answer most questions. Somewhat unclear when meds were last taken - patient reports not taking any today but family member affirmed that he did take some meds this AM. He also stated that the patient had a bottle of CBD gummies at home, which is not something he has utilized in the past. Patient also reporting that he doesn't take his Lasix/water pill but that he has been taking the spironolactone. Clinic note from 2/19/24 states to continue both Lasix and spironolactone.    Changes made to PTA medication list:  Added: Miralax, multivit, fish oil, CBD gummies  Deleted: None  Changed: None    Allergies reviewed with patient and updates made in EHR: yes    Medication History Completed By: Rafia Okeefe formerly [facility] 3/3/2024 2:54 PM    PTA Med List   Medication Sig Last Dose    bismuth subsalicylate (PEPTO BISMOL) 262 MG chewable tablet Take 1 tablet by mouth every 6 hours as needed for diarrhea Past Week    ciprofloxacin (CIPRO) 500 MG tablet Take 1 tablet (500 mg) by mouth daily Past Week    fish oil-omega-3 fatty acids 1000 MG capsule Take 1 g by mouth 2 times daily Past Week    furosemide (LASIX) 20 MG tablet Take 1 tablet (20 mg) by mouth daily Past Month    HEMP OIL OR EXTRACT OR OTHER CBD CANNABINOID, NOT MEDICAL CANNABIS, CBD gummy Unknown    midodrine (PROAMATINE) 2.5 MG tablet Take 1 tablet (2.5 mg) by mouth 3 times daily (with meals) Past Week    multivitamin, therapeutic (THERA-VIT) TABS tablet Take 1 tablet by mouth daily Past Week    omeprazole (PRILOSEC) 20 MG DR capsule Take 1 capsule (20 mg) by mouth 2 times daily Past Week    polyethylene glycol  (MIRALAX) 17 GM/Dose powder Take 1 Capful by mouth daily Past Week    rifaximin (XIFAXAN) 550 MG TABS tablet Take 1 tablet (550 mg) by mouth 2 times daily Past Week    spironolactone (ALDACTONE) 50 MG tablet Take 1 tablet (50 mg) by mouth daily Past Week

## 2024-03-03 NOTE — TELEPHONE ENCOUNTER
Triage call:    Son calling on behalf of patient, patient gave son verbal consent.    Son reports that the patient is confused.    Son states that, per patient's wife, patient was unable to sleep & acting strange last night.    Son states he was asking the patient orientation questions & patient was having a hard time comprehending some questions.    Of note, son reports patient has liver disease.  Also, son reports patient is a diabetic, however, the patient does not check his BG daily.    Per protocol, it is advised that the patient call 911 now.    Hallie Vu RN on 3/3/2024 at 12:47 PM       Reason for Disposition   [1] Difficult to awaken or acting confused (e.g., disoriented, slurred speech) AND [2] present now AND [3] new-onset    Additional Information   Negative: [1] Difficult to awaken or acting confused (e.g., disoriented, slurred speech) AND [2] present now AND [3] diabetes mellitus    Protocols used: Confusion - Delirium-A-

## 2024-03-03 NOTE — ED TRIAGE NOTES
Pt arrives with c/o altered mental status per family report. At 2200 on 2/2 family reports pt was very confused and walked into the kitchen thinking it was the bathroom. Belly is distended and taught. Denies pain. Hx of fatty liver and DM Type 2. . Denies weakness.      Triage Assessment (Adult)       Row Name 03/03/24 1340          Triage Assessment    Airway WDL WDL        Respiratory WDL    Respiratory WDL WDL        Skin Circulation/Temperature WDL    Skin Circulation/Temperature WDL X  looks jaundice        Cognitive/Neuro/Behavioral WDL    Cognitive/Neuro/Behavioral WDL X;orientation     Mood/Behavior calm;cooperative        Pupils (CN II)    Pupil PERRLA yes     Pupil Size Left 4 mm     Pupil Size Right 4 mm        [place] Coma Scale    Best Eye Response 4-->(E4) spontaneous     Best Motor Response 6-->(M6) obeys commands     Best Verbal Response 5-->(V5) oriented     Claribel Coma Scale Score 15

## 2024-03-03 NOTE — ED PROVIDER NOTES
EMERGENCY DEPARTMENT ENCOUnter      NAME: Vito Sy  AGE: 73 year old male  YOB: 1950  MRN: [id number]  EVALUATION DATE & TIME: 3/3/2024  1:37 PM    PCP: Amrik Mejia    ED PROVIDER: Wesley Mondragon DO      Chief Complaint   Patient presents with    Altered Mental Status         FINAL IMPRESSION:  1. Acute cystitis without hematuria    2. Confusion          ED COURSE & MEDICAL DECISION MAKIN:15 PM I met with the patient, obtained history, performed an initial exam, and discussed options and plan for diagnostics and treatment here in the ED Room 5.  3:34 PM The ED tech reports that the patient had confusion using his belt, putting on his pants, and walking from the bathroom. He accidentally urinated on himself while giving the urine sample as well.  4:47 PM We discussed the plan for discharge and the patient is agreeable. Reviewed supportive cares, symptomatic treatment, outpatient follow up, and reasons to return to the Emergency Department. All questions and concerns were addressed. Patient to be discharged by ED RN.        The patient was brought to the emergency department today due to concerns about confusion.  He has had similar symptoms in the past due to elevated ammonia levels from his underlying liver disease.  Here in the emergency department, he has been answering questions appropriately.  Liver tests today are not significantly changed from his baseline.  His urinalysis is concerning for possible UTI which is more likely the cause of his confusion today.  We discussed disposition options but both the patient and family feel safe going home with oral antibiotics.  He was given an initial IV dose here and will be discharged with a prescription for further antibiotics.  They have been advised to return right away for any worsening symptoms or other concerns.  They are comfortable with the plan for discharge.        Medical Decision Making  Obtained supplemental  history:Supplemental history obtained?: Family Member/Significant Other  Reviewed external records: External records reviewed?: No  Care impacted by chronic illness:Chronic Kidney Disease, Diabetes, and Hypertension  Care significantly affected by social determinants of health:N/A  Did you consider but not order tests?: Work up considered but not performed and documented in chart, if applicable  Did you interpret images independently?: Independent interpretation of ECG and images noted in documentation, when applicable.  Consultation discussion with other provider:Did you involve another provider (consultant, , pharmacy, etc.)?: No  Admit.    At the conclusion of the encounter I discussed the results of all of the tests and the disposition. The questions were answered. The patient or family acknowledged understanding and was agreeable with the care plan.         MEDICATIONS GIVEN IN THE EMERGENCY:  Medications   cefTRIAXone (ROCEPHIN) 1 g vial to attach to  mL bag for ADULTS or NS 50 mL bag for PEDS (0 g Intravenous Stopped 3/3/24 1715)       NEW PRESCRIPTIONS STARTED AT TODAY'S ER VISIT  Discharge Medication List as of 3/3/2024  5:17 PM        START taking these medications    Details   cephALEXin (KEFLEX) 500 MG capsule Take 1 capsule (500 mg) by mouth 2 times daily for 7 days, Disp-14 capsule, R-0, E-Prescribe                =================================================================    HPI        Vito CABRERA Yossi is a 73 year old male with a pertinent history of fatty liver disease, hypertension, DM2, SBP, CKD stage 3a, and anemia who presents to this ED via walk-in for evaluation of confusion.    Per son,  The patient's wife noticed increased confusion last night at 10:00 PM. He also had increased urination beginning at that time. This morning, the patient's son FaceTimed him and asked him questions to gauge the confusion. He was able to answer most questions correctly besides the year. However, he  was calling the kitchen the bathroom so his son brought him in for evaluation. No incontinence or urine or feces in the kitchen.     He also has abdominal distension with associated pain. He has a history of fatty liver disease and gets biweekly paracenteses (most recent was 2/14/2024). He has had ammonia build up in the past and had confusion worse than today from that. He takes lactulose and Xifaxan. He has bilateral lower extremity swelling and his wife reports that his testicles have been swollen.     He denies fever, cough, shortness of breath, chest pain, or any other complaints at this time.       PAST MEDICAL HISTORY:  Past Medical History:   Diagnosis Date    Alcoholic cirrhosis of liver with ascites (H)     Cirrhosis of liver (H)     Diabetes mellitus (H)     Gout     Hypertension     Kidney stone        PAST SURGICAL HISTORY:  Past Surgical History:   Procedure Laterality Date    COLONOSCOPY      ESOPHAGOSCOPY, GASTROSCOPY, DUODENOSCOPY (EGD), COMBINED N/A 10/10/2023    Procedure: ESOPHAGOGASTRODUODENOSCOPY with biopsy;  Surgeon: Puneet Plunkett MD, MD;  Location: [facility] OR    ESOPHAGOSCOPY, GASTROSCOPY, DUODENOSCOPY (EGD), COMBINED N/A 2/27/2024    Procedure: ESOPHAGOGASTRODUODENOSCOPY;  Surgeon: Beatrice Christie MD;  Location: [facility] OR    IR PARACENTESIS  11/6/2021    IR TRANSVEN INTRAHEPATIC PORTOSYST REV  1/26/2023    IR TRANSVEN INTRAHEPATIC PORTOSYST SHUNT  11/16/2022    IR TRANSVEN INTRAHEPATIC PORTOSYST SHUNT  12/9/2022    [place] ESOPHAGOGASTRODUODENOSCOPY TRANSORAL DIAGNOSTIC N/A 11/16/2020    Procedure: ESOPHAGOGASTRODUODENOSCOPY (EGD);  Surgeon: Juan Garnett MD;  Location: [facility];  Service: Gastroenterology    [place] ESOPHAGOGASTRODUODENOSCOPY TRANSORAL DIAGNOSTIC N/A 11/18/2020    Procedure: ESOPHAGOGASTRODUODENOSCOPY (EGD) WITH BANDING;  Surgeon: Juan Garnett MD;  Location: [facility];  Service: Gastroenterology    URETEROSCOPY             CURRENT MEDICATIONS:   "  bismuth subsalicylate (PEPTO BISMOL) 262 MG chewable tablet  cephALEXin (KEFLEX) 500 MG capsule  ciprofloxacin (CIPRO) 500 MG tablet  fish oil-omega-3 fatty acids 1000 MG capsule  furosemide (LASIX) 20 MG tablet  HEMP OIL OR EXTRACT OR OTHER CBD CANNABINOID, NOT MEDICAL CANNABIS,  midodrine (PROAMATINE) 2.5 MG tablet  multivitamin, therapeutic (THERA-VIT) TABS tablet  omeprazole (PRILOSEC) 20 MG DR capsule  polyethylene glycol (MIRALAX) 17 GM/Dose powder  rifaximin (XIFAXAN) 550 MG TABS tablet  spironolactone (ALDACTONE) 50 MG tablet        ALLERGIES:  Allergies   Allergen Reactions    Sulfa Antibiotics Shortness Of Breath and Itching    Penicillins Unknown     Annotation: discussed with patient, he reports he had \"itching\" with penicillin many years ago in [facility] but no hives or throat or respiratory symptoms.  he also reports having tolerated amoxicillin since coming to [place].         FAMILY HISTORY:  Family History   Problem Relation Age of Onset    Heart Disease Brother     Hypertension Mother     Hypertension Father        SOCIAL HISTORY:   Social History     Socioeconomic History    Marital status:    Tobacco Use    Smoking status: Never     Passive exposure: Never    Smokeless tobacco: Never   Vaping Use    Vaping Use: Never used   Substance and Sexual Activity    Alcohol use: Not Currently     Comment: none for the past 2 years    Drug use: No   Social History Narrative    Lives with wife - has worked as  in the past     Social Determinants of Health     Financial Resource Strain: Low Risk  (12/27/2023)    Financial Resource Strain     Within the past 12 months, have you or your family members you live with been unable to get utilities (heat, electricity) when it was really needed?: No   Food Insecurity: Low Risk  (12/27/2023)    Food Insecurity     Within the past 12 months, did you worry that your food would run out before you got money to buy more?: No     Within the past 12 months, did " the food you bought just not last and you didn t have money to get more?: Patient declined   Transportation Needs: Low Risk  (12/27/2023)    Transportation Needs     Within the past 12 months, has lack of transportation kept you from medical appointments, getting your medicines, non-medical meetings or appointments, work, or from getting things that you need?: No   Interpersonal Safety: Low Risk  (11/14/2023)    Interpersonal Safety     Do you feel physically and emotionally safe where you currently live?: Yes     Within the past 12 months, have you been hit, slapped, kicked or otherwise physically hurt by someone?: No     Within the past 12 months, have you been humiliated or emotionally abused in other ways by your partner or ex-partner?: No   Housing Stability: Low Risk  (12/27/2023)    Housing Stability     Do you have housing? : Yes     Are you worried about losing your housing?: No       VITALS:  Patient Vitals for the past 24 hrs:   BP Temp Temp src Pulse Resp SpO2 Weight   03/03/24 1701 (!) 143/69 -- -- 71 -- 100 % --   03/03/24 1646 (!) 142/80 -- -- 74 -- 100 % --   03/03/24 1631 (!) 143/78 -- -- 73 -- 100 % --   03/03/24 1618 (!) 152/75 -- -- 73 -- 100 % --   03/03/24 1401 138/73 -- -- 65 -- 100 % --   03/03/24 1346 139/69 -- -- 69 -- 100 % --   03/03/24 1338 (!) 141/73 97.8  F (36.6  C) Oral 71 18 100 % 82.2 kg (181 lb 4.8 oz)       PHYSICAL EXAM    Constitutional:  Well developed, Well nourished,  HENT:  Normocephalic, Atraumatic, Oropharynx moist, Nose normal.   Eyes:  EOMI, Conjunctiva normal, No discharge.   Respiratory:  Normal breath sounds, No respiratory distress, No wheezing, No chest tenderness.   Cardiovascular:  Normal heart rate, Normal rhythm, No murmurs  GI:  Soft, No tenderness, No guarding, No CVA tenderness. Significant abdominal distension.   Musculoskeletal:  No tenderness to palpation or major deformities noted.   Extremities: Edema throughout bilateral lower extremities.    Neurologic:  Alert & oriented x 3, No focal deficits noted. Able to answer questions appropriately.   Psychiatric:  Affect normal, Judgment normal, Mood normal.        LAB:  All pertinent labs reviewed and interpreted.  Results for orders placed or performed during the hospital encounter of 03/03/24              Glucose by meter   Result Value Ref Range    GLUCOSE BY METER POCT 137 (H) 70 - 99 mg/dL   Basic metabolic panel   Result Value Ref Range    Sodium 141 135 - 145 mmol/L    Potassium 5.1 3.4 - 5.3 mmol/L    Chloride 113 (H) 98 - 107 mmol/L    Carbon Dioxide (CO2) 18 (L) 22 - 29 mmol/L    Anion Gap 10 7 - 15 mmol/L    Urea Nitrogen 30.1 (H) 8.0 - 23.0 mg/dL    Creatinine 1.38 (H) 0.67 - 1.17 mg/dL    GFR Estimate 54 (L) >60 mL/min/1.73m2    Calcium 8.6 (L) 8.8 - 10.2 mg/dL    Glucose 151 (H) 70 - 99 mg/dL   Hepatic function panel   Result Value Ref Range    Protein Total 7.7 6.4 - 8.3 g/dL    Albumin 2.9 (L) 3.5 - 5.2 g/dL    Bilirubin Total 1.1 <=1.2 mg/dL    Alkaline Phosphatase 133 40 - 150 U/L    AST 51 (H) 0 - 45 U/L    ALT 15 0 - 70 U/L    Bilirubin Direct 0.38 (H) 0.00 - 0.30 mg/dL   Result Value Ref Range    Lipase 133 (H) 13 - 60 U/L   Result Value Ref Range    INR 1.44 (H) 0.85 - 1.15   Result Value Ref Range    Ammonia 88 (H) 16 - 60 umol/L   Result Value Ref Range    Magnesium 1.9 1.7 - 2.3 mg/dL   TSH with free T4 reflex   Result Value Ref Range    TSH 1.90 0.30 - 4.20 uIU/mL   UA with Microscopic reflex to Culture    Specimen: Urine, Midstream   Result Value Ref Range    Color Urine Light Yellow Colorless, Straw, Light Yellow, Yellow    Appearance Urine Clear Clear    Glucose Urine Negative Negative mg/dL    Bilirubin Urine Negative Negative    Ketones Urine Negative Negative mg/dL    Specific Gravity Urine 1.015 1.001 - 1.030    Blood Urine 0.5 mg/dL (A) Negative    pH Urine 7.5 (H) 5.0 - 7.0    Protein Albumin Urine 10 (A) Negative mg/dL    Urobilinogen Urine <2.0 <2.0 mg/dL    Nitrite Urine  Negative Negative    Leukocyte Esterase Urine 25 Watson/uL (A) Negative    Mucus Urine Present (A) None Seen /LPF    RBC Urine 132 (H) <=2 /HPF    WBC Urine 10 (H) <=5 /HPF    Squamous Epithelials Urine 2 (H) <=1 /HPF   Alcohol level blood   Result Value Ref Range    Alcohol ethyl <0.01 <=0.01 g/dL   Symptomatic Influenza A/B, RSV, & SARS-CoV2 PCR (COVID-19) Nasopharyngeal    Specimen: Nasopharyngeal; Swab   Result Value Ref Range    Influenza A PCR Negative Negative    Influenza B PCR Negative Negative    RSV PCR Negative Negative    SARS CoV2 PCR Negative Negative   CBC with platelets and differential   Result Value Ref Range    WBC Count 4.5 4.0 - 11.0 10e3/uL    RBC Count 3.41 (L) 4.40 - 5.90 10e6/uL    Hemoglobin 9.2 (L) 13.3 - 17.7 g/dL    Hematocrit 29.4 (L) 40.0 - 53.0 %    MCV 86 78 - 100 fL    MCH 27.0 26.5 - 33.0 pg    MCHC 31.3 (L) 31.5 - 36.5 g/dL    RDW 17.1 (H) 10.0 - 15.0 %    Platelet Count 131 (L) 150 - 450 10e3/uL    % Neutrophils 65 %    % Lymphocytes 15 %    % Monocytes 14 %    % Eosinophils 5 %    % Basophils 1 %    % Immature Granulocytes 0 %    NRBCs per 100 WBC 0 <1 /100    Absolute Neutrophils 2.9 1.6 - 8.3 10e3/uL    Absolute Lymphocytes 0.7 0.0 - 5.3 10e3/uL    Absolute Monocytes 0.6 0.0 - 1.3 10e3/uL    Absolute Eosinophils 0.2 0.0 - 0.7 10e3/uL    Absolute Basophils 0.1 0.0 - 0.2 10e3/uL    Absolute Immature Granulocytes 0.0 <=0.4 10e3/uL    Absolute NRBCs 0.0 10e3/uL       RADIOLOGY:  I have independently reviewed and interpreted the above imaging, pending the final radiology read.  CT Head w/o Contrast   Final Result   IMPRESSION:   1.  No acute intracranial process.            I, Guanaco Leonard, am serving as a scribe to document services personally performed by Dr. Mondragon based on my observation and the provider's statements to me. I, Wesley Mondragon, DO attest that Guanaco Leonard is acting in a scribe capacity, has observed my performance of the services and has documented them in  accordance with my direction.    Wesley Mondragon DO  Emergency Medicine  [facility] EMERGENCY ROOM  [address] [facility] [id number] [phone]  Dept: [phone]     Wesley Mondragon DO  03/03/24 1905

## 2024-04-02 NOTE — TELEPHONE ENCOUNTER
Home Health Care    Reason for call:  Verbal order request.    Orders are needed for this patient.   Readmission performed.  PT: 1x a wk for 1 wk, 2x a wk for 3 wk, 1x a wk for   OT: 2x a wk for 2 week, 1x a wk for 3 wk.  SN: 1 x a wk for 5 wk.    Pt Provider: Amrik Mejia    Phone Number Homecare Nurse can be reached at: [phone]      Could we send this information to you in [employer] or would you prefer to receive a phone call?:   No preference   Okay to leave a detailed message?: Yes at [phone]

## 2024-04-02 NOTE — TELEPHONE ENCOUNTER
Forms/Letter Request    Type of form/letter: DME (wheelchair, hospital bed)    Type of DME requested:   Walker seat Attachment  4 Wheeled Steel Collapsible Rollator up to 300lbs  Rollator Walker Brakes Attachment    Do we have the form/letter: Yes: JL    Who is the form from? [employer] (if other please explain)    Where did/will the form come from? form was faxed in    When is form/letter needed by: asap    How would you like the form/letter returned: Fax : [phone]

## 2024-04-05 NOTE — TELEPHONE ENCOUNTER
Form reviewed, completed, and signed.     Returned via fax as requested.     Copied to EHR scanning.

## 2024-04-10 PROBLEM — M79.89 LOCALIZED SWELLING OF BOTH LOWER EXTREMITIES: Status: ACTIVE | Noted: 2024-01-01

## 2024-04-10 NOTE — PATIENT INSTRUCTIONS
-Thank you for choosing the [facility].  -It was a pleasure to see you today.  -Please take a look at the information below for more specific details regarding the treatment plan and recommendations.  -In this after visit summary is a list of your medications and specific instructions.  Please review this carefully as there may be changes made to your medication list.  -If there are any particular questions or concerns, please feel free to reach out to Dr. Ugarte.  -If any labs have been completed, we will reach out to you about results.  If the results are normal or not concerning, a letter or [employer] message will be sent to you.  If any follow-up is needed, either Dr. Ugarte or the nurse will give you a call.  If you have not heard regarding results after 2 weeks, please reach out to the clinic.    Patient Instructions:    -Take the medications as prescribed  -Take the lactulose as prescribed.  -Use the urinal next to the bed so that you can limit your risk of falling.   -Continue to see PT and OT.   -Please follow-up with the gastroenterologist ([facility]) and urologist as recommended from the hospital.    -Find ways to stay active.   -Connect with [employer] to discuss about coverage for gym membership.       Please seek immediate medical attention (go to the emergency room or urgent care) for the following reasons: worsening symptoms, more confusion, abdominal pain, fevers, chills, nausea, vomiting, bloody/black tarry stools, or any concerning changes.      --------------------------------------------------------------------------------------------------------------------    -We are always looking for ways to improve.  You may be selected to receive a survey regarding your visit today.  We encourage you to complete the survey and provide specific, constructive feedback to help us improve our processes.  Thank you for your time!  -Please review the contact information listed on the after visit summary  and in the electronic chart.  Below is the phone number that we have on file.  If there are any changes that are needed to be made, please reach out to the clinic.  [phone] (home)

## 2024-04-10 NOTE — PROGRESS NOTES
{PROVIDER CHARTING PREFERENCE:640452}    Subjective   Zaire is a 73 year old, presenting for the following health issues:  Hospital F/U (Admitted 03/23-03/28)      4/10/2024    12:32 PM   Additional Questions   Roomed by Alaina MORALES   Accompanied by spouse     HPI     {MA/LPN/RN Pre-Provider Visit Orders- hCG/UA/Strep (Optional):841785}  {SUPERLIST (Optional):531248}  {additonal problems for provider to add (Optional):950262}    {ROS Picklists (Optional):972180}      Objective    There were no vitals taken for this visit.  There is no height or weight on file to calculate BMI.  Physical Exam   {Exam List (Optional):966669}    {Diagnostic Test Results (Optional):248763}        Signed Electronically by: Crescencio Ugarte MD  {Email feedback regarding this note to [email or web address]   :523625}

## 2024-04-10 NOTE — PROGRESS NOTES
OFFICE VISIT    Assessment/Plan:     Patient Instructions:    -Take the medications as prescribed  -Take the lactulose as prescribed.  -Use the urinal next to the bed so that you can limit your risk of falling.   -Continue to see PT and OT.   -Please follow-up with the gastroenterologist ([facility]) and urologist as recommended from the hospital.    -Find ways to stay active.   -Connect with [facility] to discuss about coverage for gym membership.       Please seek immediate medical attention (go to the emergency room or urgent care) for the following reasons: worsening symptoms, more confusion, abdominal pain, fevers, chills, nausea, vomiting, bloody/black tarry stools, or any concerning changes.        Zaire was seen today for hospital f/u.  Diagnoses and all orders for this visit:    Hospital discharge follow-up  Hepatic encephalopathy (H)  Cirrhosis of liver with ascites, unspecified hepatic cirrhosis type (H)  Portal hypertension (H)  Hypertension  Hypercholesterolemia  Chronic kidney disease, stage 3a (H)  Type 2 diabetes mellitus with microalbuminuria, without long-term current use of insulin (H)  Localized swelling of both lower extremities: Improving, though only slightly.  Patient reports not taking the lactulose because of the taste.  MiraLAX has not been helpful, however, and getting patient to have 3-4 bowel movements per day.  Reviewed importance of lactulose and the reasoning why lactulose has been chosen.  Plan to check labs as below.  Further recommendations pending results.  Is also emphasized for patient to follow-up with gastroenterology for further evaluation and recommendations in regards to medication management and the cirrhosis.  Patient patient and family were brought to scheduling personnel in clinic for assistance with this process.  -     Basic metabolic panel; Future  -     Ammonia; Future  -     Adult Urology  Referral; Future  -     Compression Sleeve/Stocking Order for DME - ONLY FOR  "DME  -     Hepatic function panel; Future  -     Adult GI  Referral - Consult Only; Future    Nephrolithiasis  Microscopic hematuria: Stable.  Referral placed as below.  -     Adult Urology  Referral; Future        Return in 1 month (on 5/10/2024) for Hospital follow up.    The diagnoses, treatment options, risk, benefits, and recommendations were reviewed with patient/guardian.  Questions were answered to patient's/guardian satisfaction.  Red flag signs were reviewed.  Patient/guardian is in agreement with above plan.      Subjective: 73 year old male with history of diabetes mellitus type 2, end-stage liver disease with cirrhosis of liver and ascites, portal hypertension, CKD stage IIIa, history of GI bleeding, anemia, hypertension/hypotension, incontinence (fecal), ureteral stone, history of spontaneous bacterial peritonitis, history of hepatic encephalopathy who presents to clinic for the following complaints:   Patient presents with:  Hospital F/U: Admitted 03/23-03/28    From the discharge summary (italicized):   From H&P: \" Vito Sy is a(n) 73 y.o. old male with idiopathic cirrhosis with history of hepatic encephalopathy, history of spontaneous bacterial peritonitis, TIPS, esophageal varices, ascites who presents with severe lethargy. Patient typically wakes up multiple times a night to urinate. He didn't wake up again after 3am this morning and soiled the bed. Family let him sleep most of the day, then realized that he wasn't improving so brought him into the emergency department. His labs are mostly benign except an ammonia of 89.     On exam, patient will say \"yes yes\" but not in response to any question. Will open his eyes with significant amount of encouragement. Otherwise, sleeping and not interactive. Does not follow commands to squeeze fingers even when awakened.    Gets a therapeutic paracentesis every other week, last one last week.     His son thinks that he switched from " "lactulose to Miralax; is not sure when this change took place or of the current dose. \"     Final Diagnoses   Primary:  Hepatic encephelopathy  Acute kidney injury  Acute on chronic non-anion gap metabolic acidosis    Secondary:   Chronic kidney disease stage 3a  Cirrhosis  Esophageal varices  Coagulopathy of liver disease  Chronic thrombocytopenia  Chronic normocytic anemia  Nephrolithiasis  Type 2 diabetes     Other Issues for Outpatient Follow-up:   1. Follow up with [facility] hepatologist for outpatient hepatic encephalopathy treatment plan. It is important for you to find a medication regimen that ensures 3-4 bowel movements in a 24 hour period. Additionally, we recommend discussion on resumption of diuretics (spironolactone, furosemide) at this time.  2. Recommend lab visit 4/2/24 for basic metabolic panel to follow up on kidney function.  3. Recommend continuing every-other-week therapeutic paracentesis. Last paracentesis was 3/18, per patient.   4. Consider follow-up with urology for known nonobstructive nephrolithiasis.       Hepatic encephalopathy  Pt presented with somnolence, AMS, elevated ammonia on admit. Has history of cirrhosis, esophageal varices s/p TIPS, ascites, SBP. Given TIPS history, has elevated risk for HE. Head CT on admission unremarkable. No WBC or other infectious signs to explain presentation. Has hepatologist with [facility] who recently switched him from lactulose to Miralax (pt doesn't like taste, has incontinence issues). Was given lactulose here with appropriate bowel movements. Patient began to decline lactulose and was switched to BID Miralax dosing to trial this. This did not result in sufficient frequency of bowel movements. Lactulose was thus resumed and patient subsequently met bowel movement goal (3-4x/day). Throughout this time, patient mental status steadily improved with great strides in communication and alertness observed on last day. PTA rifaximin dosing was continued during " hospitalization. Extensive discussions had with patient and his wife regarding compliance with lactulose, with goal to titrate to 3-4 BMs daily. They did express understanding. Discharged with new supply. Zinc supplementation added to regimen as well.     NANO on CKD 3a  Baseline creatnine 1.2-1.4 Creatinine bumped on 3/25 after aggressive lactulose and resumption of PTA diuretics. Furosemide was held but creatinine continued to increase. PO intake had also been poor at this time, so it was thought that patient may have been intravascularly deplete. Given patient's GI history, albumin challenge was pursued to delineate between hepatorenal syndrome and NANO. Creatinine 1.63 before albumin administered. 1.56 after day 1 and 1.42 on day 2. As patient's creatinine was near baseline levels on discharge, etiology for transient increase was determined to be NANO. Discharged with Cr 1.42. Will HOLD PTA diuretics on discharge, defer to PCP or [facility] when to restart pending repeat BMP on 4/2.     Cirrhosis  Ascites   History of spontaneous bacterial peritonitis   MELD 16. Patient has cirrhosis with unknown etiology (Per son, [facility] thinks it may be fatty liver disease). Patient also has ascites necessitating biweekly therapeutic paracentesis at [place]. Diagnostic paracentesis was performed in ED on 3/24, negative for SBP. Patient remained on his PTA SBP prophylaxis, and received PTA PPI and midodrine.     Acute on chronic non-anion gap metabolic acidosis  Chronically low bicarbonate values, likely secondary to CKD. Levels were acutely lower during admission, likely due to loose stools from lactulose. He was started on sodium bicarbonate supplementation. Bicarbonate low but stable on discharge.  - Continue bicarb supplement 650mg BID     Microscopic hematuria  Nephrolithiasis   Patient has history of nonobstructive kidney stones. These stones are unchanged on admission CT abdomen pelvis. Can consider outpatient follow-up with  urology, referral placed on discharge.    Severe malnutrition: Patient with severe malnutrition in the context of acute illness/injury during hospital stay and remains with severe malnutrition in the context of acute illness/injury. Patient requiring additional resources due to degree of malnutrition during hospital stay.       Discharge Medications:  Current Discharge Medication List     START taking these medications   Details   sodium bicarbonate 650 MG tablet Take 1 Tablet (650 mg) by mouth two times a day.  Qty: 60 Tablet, Refills: 0     zinc sulfate (ORAZINC) 220 (50 Zn) MG tablet Take 1 Tablet (220 mg) by mouth daily. Indications: Zinc Deficiency  Qty: 30 Each, Refills: 1       CONTINUE these medications which have CHANGED   Details   furosemide (LASIX) 20 MG tablet Take 1 Tablet (20 mg) by mouth daily. HOLD ON DISCHARGE UNTIL FOLLOW UP WITH DOCTOR     lactulose (CEPHULAC) 10 GM/15ML solution Take 30 mL (20 g) by mouth three times a day. Goal of 3-4 bowel movements daily. Can hold further doses if meets goal.  Qty: 2700 mL, Refills: 0     spironolactone (ALDACTONE) 50 MG tablet Take 1 Tablet (50 mg) by mouth daily. HOLD ON DISCHARGE UNTIL FOLLOW UP WITH DOCTOR       CONTINUE these medications which have NOT CHANGED   Details   bismuth subsalicylate (PEPTO-BISMOL) 262 MG chewable tablet Chew and swallow 1 Tablet (262 mg) by mouth every 6 hours as needed.     ciprofloxacin (CIPRO) 500 MG tablet Take 1 Tablet (500 mg) by mouth daily.     midodrine (PROAMATINE) 2.5 MG tablet Take 1 Tablet (2.5 mg) by mouth three times a day.     Multiple Vitamin (THERA) Take 1 Tablet by mouth daily.     Omega-3 Fatty Acids (FISH OIL) 1000 MG capsule Take 1 Capsule (1,000 mg) by mouth two times a day.     omeprazole (PRILOSEC) 20 MG capsule Take 1 Capsule (20 mg) by mouth two times a day.     rifAXIMin (XIFAXAN) 550 MG tablet Take 1 Tablet (550 mg) by mouth two times a day.         STOP taking these medications     polyethylene  glycol 3350 (GLYCOLAX) 17 GM/SCOOP powder Comments:   Reason for Stopping:       Since discharge, patient has been a bit better, though still not back to normal. The confusion is still there, thought just a little bit now. He still has less energy and feels weak, though improve a little bit. He is eating about 50-60% of nomal. Drinking is okay. He always feels tired and not moving much. He would wake up at night and needed to go to restroom, though he would not want to use the urinal in bed.  He does have a bedside urinal and sometimes will use this.  Reviewed precautions and recommendations.    He doesn't like the taste of the lactualose, so he drinks miralax. He has BM once every other day, maximum is 2 times per day. Reviewed reasoning and recommendations to get 3-4 stools per day. The lactulose worked well when he takes the medication. Patient reported that he wanted to change to miralax and the GI doctor said okay, thogh patient was not having enough BM while on the miralax.     Denies fevers, chills, nausea, vomiting, abdominal pain, bloody/black tarry stools, dizziness, lightheadedness, or other changes from baseline.    It does appear that patient had been referred to the gastroenterologist as well as the urologist from the hospital discharge.  They have not scheduled an appointment for follow-up    His wife states that even at baseline, he is not able to do much.    Leg swelling: Has been present for a while and is noted on both sides.  Improved from the hospital visit.  Patient lays down a lot during the day.  Reviewed compression stocking.  Patient is on furosemide 20 mg once daily.      Presents with wife.     The 10 point review of system is negative except as stated in the HPI.    Allergies were reviewed and updated.    CT AP W IV CONT 3/23/2024  IMPRESSION:   1. Cirrhotic configuration of the liver with a TIPS stent, patent. Dependent calcified gallstones, unchanged. Moderate splenomegaly. Moderate  "abdominopelvic ascites.    2. Nonobstructive stones in both kidneys, unchanged. Patchy parenchymal loss involving the kidneys likely attributable to remote infection and/or obstruction. Cortical simple cysts in the kidneys which require no specific follow-up. Mild prostatomegaly.    3. Normal cardiac size. Coronary artery and mitral annulus calcifications. Aortoiliac atherosclerotic changes without aneurysm. Resolved small bilateral pleural effusions seen previously.    4. Degenerative spine and joints of the pelvis. Mild left convex thoracolumbar curve. Mild diffuse body wall edema. Small ventral umbilical hernia containing ascites, unchanged.    CT HEAD WO IV CONT 3/23/2024  IMPRESSION:  1. No CT evidence for acute intracranial process.  2. Brain atrophy and presumed chronic microvascular ischemic changes as above.      Objective:   /64   Pulse 60   Temp 97.9  F (36.6  C) (Oral)   Resp 18   Ht 1.727 m (5' 8\")   Wt 68.3 kg (150 lb 8 oz)   SpO2 100%   BMI 22.88 kg/m    General: Active, alert, no acute distress.  He does seem to doze off a bit during conversation, though when prompted, wakes back up and seems to answer questions and asks questions appropriately.  HEENT: Normocephalic, atraumatic.  Pupils are equal and round.  Sclera is clear.  Normal external ears. Nares patent.  Moist mucous membranes.    Cardiac: RRR.  S1, S2 present.  No murmurs, rubs, or gallops.  Respiratory/chest: Clear to auscultation bilaterally.  No wheezes, rales, rhonchi.  Breathing is not labored.  No accessory muscle usage.  Abdomen: Abdomen is distended with abdominal fluid/ascites noted.  Soft, nondistended, nontender.  No masses or organomegaly noted.  No guarding or rebound tenderness appreciated.  Extremities: Voluntary movements intact.  Noted to have +1/+4 pitting edema up to the tibial plateau bilaterally.  Integumentary: No concerning rash or skin changes appreciated.        Crescencio Ugarte MD  [facility] " "[facility] [place] [address]  Phone: [phone]  Fax: [phone]    4/10/2024  5:08 PM          Current Outpatient Medications   Medication Sig Dispense Refill    bismuth subsalicylate (PEPTO BISMOL) 262 MG chewable tablet Take 1 tablet by mouth every 6 hours as needed for diarrhea      ciprofloxacin (CIPRO) 500 MG tablet Take 1 tablet (500 mg) by mouth daily 60 tablet 1    fish oil-omega-3 fatty acids 1000 MG capsule Take 1 g by mouth 2 times daily      furosemide (LASIX) 20 MG tablet Take 1 tablet (20 mg) by mouth daily 60 tablet 6    HEMP OIL OR EXTRACT OR OTHER CBD CANNABINOID, NOT MEDICAL CANNABIS, CBD gummy      lactulose (CHRONULAC) 10 GM/15ML solution Take 20 g by mouth 3 times daily      midodrine (PROAMATINE) 2.5 MG tablet Take 1 tablet (2.5 mg) by mouth 3 times daily (with meals) 180 tablet 3    multivitamin, therapeutic (THERA-VIT) TABS tablet Take 1 tablet by mouth daily      omeprazole (PRILOSEC) 20 MG DR capsule Take 1 capsule (20 mg) by mouth 2 times daily 180 capsule 3    rifaximin (XIFAXAN) 550 MG TABS tablet Take 1 tablet (550 mg) by mouth 2 times daily 60 tablet 4    sodium bicarbonate 650 MG tablet Take 650 mg by mouth 2 times daily      spironolactone (ALDACTONE) 50 MG tablet Take 1 tablet (50 mg) by mouth daily 60 tablet 6    Zinc Sulfate 220 (50 Zn) MG TABS Take 220 mg by mouth daily       No current facility-administered medications for this visit.       Allergies   Allergen Reactions    Sulfa Antibiotics Shortness Of Breath and Itching    Penicillins Unknown     Annotation: discussed with patient, he reports he had \"itching\" with penicillin many years ago in [facility] but no hives or throat or respiratory symptoms.  he also reports having tolerated amoxicillin since coming to [place].         Patient Active Problem List    Diagnosis Date Noted    Ascites 11/16/2023     Priority: Medium    Hypokalemia 11/16/2023     Priority: Medium    Lower GI bleed 11/16/2023     Priority: Medium "    End-stage liver disease (H) 11/14/2023     Priority: Medium    Incontinence of feces, unspecified fecal incontinence type 11/14/2023     Priority: Medium    Bilateral lower extremity edema 11/04/2023     Priority: Medium     Worsening over the past few months      SBP (spontaneous bacterial peritonitis) (H) 11/03/2023     Priority: Medium    Mitral valve insufficiency, unspecified etiology 09/18/2023     Priority: Medium    Ascites due to alcoholic cirrhosis (H) 01/06/2023     Priority: Medium    Elevated lipase 01/06/2023     Priority: Medium    Generalized abdominal pain 01/06/2023     Priority: Medium    Hypomagnesemia 01/06/2023     Priority: Medium    Hepatic encephalopathy (H) 12/18/2022     Priority: Medium    Pleural effusion 12/18/2022     Priority: Medium    Anemia, unspecified type 12/18/2022     Priority: Medium    Cirrhosis of liver with ascites (H) 11/16/2022     Priority: Medium    Chronic kidney disease, stage 3a (H) 11/09/2022     Priority: Medium    Portal hypertension (H) 11/09/2022     Priority: Medium    Other cirrhosis of liver (H) with ascites 04/27/2022     Priority: Medium    Incontinence of feces with fecal urgency 01/26/2022     Priority: Medium    Ureteral stone 11/07/2021     Priority: Medium     Formatting of this note might be different from the original. Added automatically from request for surgery [id number]      Disorder of function of stomach 02/22/2021     Priority: Medium    Type 2 diabetes mellitus with microalbuminuria, without long-term current use of insulin (H)      Priority: Medium     Created by Conversion        Esophageal varices without bleeding (H) 11/24/2020     Priority: Medium    Popliteal cyst, left 11/17/2020     Priority: Medium    Popliteal cyst, right 11/17/2020     Priority: Medium    GI bleeding 11/16/2020     Priority: Medium    Anemia due to blood loss, acute 11/16/2020     Priority: Medium    Hypotension 11/16/2020     Priority: Medium    Hyperkalemia  11/16/2020     Priority: Medium    Gastrointestinal hemorrhage associated with acute gastritis 11/15/2020     Priority: Medium     Added automatically from request for surgery [id number]        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 11/14/2016     Priority: Medium    Hypertension      Priority: Medium     Created by Conversion  Replacement Utility updated for latest IMO load        Hematuria      Priority: Medium     Created by Conversion        Nephrolithiasis      Priority: Medium     Created by Conversion        Anemia      Priority: Medium     Created by Conversion        Hypercholesterolemia      Priority: Medium     Created by Conversion        Benign Adenomatous Polyp Of The Large Intestine      Priority: Medium     Created by Conversion           Family History   Problem Relation Age of Onset    Heart Disease Brother     Hypertension Mother     Hypertension Father        Past Surgical History:   Procedure Laterality Date    COLONOSCOPY      ESOPHAGOSCOPY, GASTROSCOPY, DUODENOSCOPY (EGD), COMBINED N/A 10/10/2023    Procedure: ESOPHAGOGASTRODUODENOSCOPY with biopsy;  Surgeon: Puneet Plunkett MD, MD;  Location: [facility] OR    ESOPHAGOSCOPY, GASTROSCOPY, DUODENOSCOPY (EGD), COMBINED N/A 2/27/2024    Procedure: ESOPHAGOGASTRODUODENOSCOPY;  Surgeon: Beatrice Christie MD;  Location: [facility] OR    IR PARACENTESIS  11/6/2021    IR TRANSVEN INTRAHEPATIC PORTOSYST REV  1/26/2023    IR TRANSVEN INTRAHEPATIC PORTOSYST SHUNT  11/16/2022    IR TRANSVEN INTRAHEPATIC PORTOSYST SHUNT  12/9/2022    NV ESOPHAGOGASTRODUODENOSCOPY TRANSORAL DIAGNOSTIC N/A 11/16/2020    Procedure: ESOPHAGOGASTRODUODENOSCOPY (EGD);  Surgeon: Juan Garnett MD;  Location: [facility];  Service: Gastroenterology    NV ESOPHAGOGASTRODUODENOSCOPY TRANSORAL DIAGNOSTIC N/A 11/18/2020    Procedure: ESOPHAGOGASTRODUODENOSCOPY (EGD) WITH BANDING;  Surgeon: Juan Garnett MD;  Location: [facility];  Service:  Gastroenterology    URETEROSCOPY          Social History     Socioeconomic History    Marital status:      Spouse name: Not on file    Number of children: Not on file    Years of education: Not on file    Highest education level: Not on file   Occupational History    Not on file   Tobacco Use    Smoking status: Never     Passive exposure: Never    Smokeless tobacco: Never   Vaping Use    Vaping status: Never Used   Substance and Sexual Activity    Alcohol use: Not Currently     Comment: none for the past 2 years    Drug use: No    Sexual activity: Not on file   Other Topics Concern    Not on file   Social History Narrative    Lives with wife - has worked as  in the past     Social Determinants of Health     Financial Resource Strain: Low Risk  (12/27/2023)    Financial Resource Strain     Within the past 12 months, have you or your family members you live with been unable to get utilities (heat, electricity) when it was really needed?: No   Food Insecurity: Low Risk  (12/27/2023)    Food Insecurity     Within the past 12 months, did you worry that your food would run out before you got money to buy more?: No     Within the past 12 months, did the food you bought just not last and you didn t have money to get more?: Patient declined   Transportation Needs: Low Risk  (12/27/2023)    Transportation Needs     Within the past 12 months, has lack of transportation kept you from medical appointments, getting your medicines, non-medical meetings or appointments, work, or from getting things that you need?: No   Physical Activity: Not on file   Stress: Not on file   Social Connections: Not on file   Interpersonal Safety: Unknown (3/24/2024)    [facility]    Humiliation, Afraid, Rape, and Kick questionnaire     Fear of Current or Ex-Partner: Not on file     Emotionally Abused: Not on file     Physically Abused: Patient unable to answer     Sexually Abused: Patient unable to  answer   Housing Stability: Low Risk  (3/24/2024)    [facility]    Housing Stability Vital Sign     Unable to Pay for Housing in the Last Year: No     Number of Places Lived in the Last Year: 1     In the last 12 months, was there a time when you did not have a steady place to sleep or slept in a shelter (including now)?: No

## 2024-04-11 NOTE — TELEPHONE ENCOUNTER
Writer called [employer] to notify them that faxes received for patient regarding PT Visit notes were not all faxed or clear.  [facility] stated that information was really not necessary to have in patients chart.  He only needs the order signed by Dr. Mejia.  He was going to re-fax the form but writer noted that there were 3 orders in patients chart without signature.  Writer printed and placed in Dr. Mejia blue folder along with the incomplete faxes as reference.    Carey Hathaway  4/11/2024

## 2024-04-11 NOTE — TELEPHONE ENCOUNTER
Forms received in today's blue folder.  Nothing needs to be prepped.  Placed back  in providers blue folder to review and sign.

## 2024-04-16 NOTE — TELEPHONE ENCOUNTER
Called Jose and advised received verbal ok from covering provider on 04/02 for all the orders.  No further action needed.

## 2024-04-16 NOTE — TELEPHONE ENCOUNTER
Elana calling back about the status of the verbal orders. She stated that she has not received any call back about the orders.     Elana would like a call back: [phone]

## 2024-04-17 NOTE — PROGRESS NOTES
Urology Video Office Visit    Video-Visit Details    Type of service:  Video Visit    Video Start Time:1440    Video End Time: 1450    Originating Location (pt. Location): Home    Distant Location (provider location):  On-site     Platform used for Video Visit: Decoholic           Assessment and Plan:     Assessment: 73 year old male with large bilateral nonobstructing stone burden.    Plan:  -Reviewed CT scan with patient. Discussed that large bilateral stone burden has been present for several years and concerns a definitive stone procedure given his medical co morbidities.  -Pt would like a definitive stone procedure to remove stones. Will refer to Dr. Andrea, Dr. Rangel, or Dr. Lucero for evaluation.  -If having severe flank pain, fevers, chills, nausea, or vomiting please notify Urology clinic or be seen in the ER.  Pt verbalized understanding.     Yelena Gutierrez CNP  Department of Urology  April 17, 2024    I spent a total of 20 minutes spent on the date of the encounter doing chart review, history and exam, documentation, and further activities as noted above.          Chief Complaint:   Nephrolithiasis         History of Present Illness:    Vito Sy is a pleasant 73 year old male who presents with concerns of large bilateral stone burden.    Mr. Sy was seen in the ER on 3/3/24 for altered mental status. Likely UTI based off urinalysis, no urine culture completed. He was treated with a course of cephalexin.     Noted bilateral flank pain.     He was then admitted from 3/23-3/28/24 for hepatic encephalopathy and NANO.    CT scan on 3/23/24 : KIDNEYS/BLADDER: Nonobstructive stones in both kidneys, unchanged. Patchy parenchymal loss involving the kidneys, likely attributable to remote infection and/or obstruction. Cortical simple cysts in the kidneys which require no specific follow-up. Both kidneys are well-perfused without hydronephrosis or hydroureter. Normal urinary bladder. Mild  prostatomegaly.     Bilateral stone burden appears unchanged in comparison to CT scan in Nov 2023.     He notes he is having bilateral flank pain. Denies any f/c/n/v, gross hematuria, or dysuria at this time.     Mr. Sy was last seen in clinic with Urology in January of 2022 with Dr. Martinez. He was known to have a large stone burden at that time and opted to hold off on definitive stone surgery at that time as he was asymptomatic.     History of hospitalization in Nov 2021 for viral gastroenteritis, dehydration, acute encephalopathy, and ascites. Imaging found obstructing right ureteral stone, bladder stone, and bilateral nephrolithiasis. He had a right ureteral stent place at that time. In Dec 2021, he had a right URS/LL and cystolitholapaxy with known untreated renal stones. It was recommended to follow up with possible PCNL for clearance of residual stone burden.     History of CaOx/CaP stones.          Past Medical History:     Past Medical History:   Diagnosis Date    Alcoholic cirrhosis of liver with ascites (H)     Cirrhosis of liver (H)     Diabetes mellitus (H)     Gout     Hypertension     Kidney stone             Past Surgical History:     Past Surgical History:   Procedure Laterality Date    COLONOSCOPY      ESOPHAGOSCOPY, GASTROSCOPY, DUODENOSCOPY (EGD), COMBINED N/A 10/10/2023    Procedure: ESOPHAGOGASTRODUODENOSCOPY with biopsy;  Surgeon: Puneet Plunkett MD, MD;  Location: [facility] OR    ESOPHAGOSCOPY, GASTROSCOPY, DUODENOSCOPY (EGD), COMBINED N/A 2/27/2024    Procedure: ESOPHAGOGASTRODUODENOSCOPY;  Surgeon: Beatrice Christie MD;  Location: [facility] OR    IR PARACENTESIS  11/6/2021    IR TRANSVEN INTRAHEPATIC PORTOSYST REV  1/26/2023    IR TRANSVEN INTRAHEPATIC PORTOSYST SHUNT  11/16/2022    IR TRANSVEN INTRAHEPATIC PORTOSYST SHUNT  12/9/2022    MO ESOPHAGOGASTRODUODENOSCOPY TRANSORAL DIAGNOSTIC N/A 11/16/2020    Procedure: ESOPHAGOGASTRODUODENOSCOPY (EGD);  Surgeon: Juan Garnett MD;   Location: [facility];  Service: Gastroenterology    CO ESOPHAGOGASTRODUODENOSCOPY TRANSORAL DIAGNOSTIC N/A 11/18/2020    Procedure: ESOPHAGOGASTRODUODENOSCOPY (EGD) WITH BANDING;  Surgeon: Juan Garnett MD;  Location: [facility];  Service: Gastroenterology    URETEROSCOPY              Medications     Current Outpatient Medications   Medication Sig Dispense Refill    bismuth subsalicylate (PEPTO BISMOL) 262 MG chewable tablet Take 1 tablet by mouth every 6 hours as needed for diarrhea      ciprofloxacin (CIPRO) 500 MG tablet Take 1 tablet (500 mg) by mouth daily 60 tablet 1    fish oil-omega-3 fatty acids 1000 MG capsule Take 1 g by mouth 2 times daily      furosemide (LASIX) 20 MG tablet Take 1 tablet (20 mg) by mouth daily 60 tablet 6    HEMP OIL OR EXTRACT OR OTHER CBD CANNABINOID, NOT MEDICAL CANNABIS, CBD gummy      lactulose (CHRONULAC) 10 GM/15ML solution Take 20 g by mouth 3 times daily      midodrine (PROAMATINE) 2.5 MG tablet Take 1 tablet (2.5 mg) by mouth 3 times daily (with meals) 180 tablet 3    multivitamin, therapeutic (THERA-VIT) TABS tablet Take 1 tablet by mouth daily      omeprazole (PRILOSEC) 20 MG DR capsule Take 1 capsule (20 mg) by mouth 2 times daily 180 capsule 3    rifaximin (XIFAXAN) 550 MG TABS tablet Take 1 tablet (550 mg) by mouth 2 times daily 60 tablet 4    sodium bicarbonate 650 MG tablet Take 650 mg by mouth 2 times daily      spironolactone (ALDACTONE) 50 MG tablet Take 1 tablet (50 mg) by mouth daily 60 tablet 6    Zinc Sulfate 220 (50 Zn) MG TABS Take 220 mg by mouth daily       No current facility-administered medications for this visit.            Family History:     Family History   Problem Relation Age of Onset    Heart Disease Brother     Hypertension Mother     Hypertension Father             Social History:     Social History     Socioeconomic History    Marital status:      Spouse name: Not on file    Number of children: Not on file    Years of education:  Not on file    Highest education level: Not on file   Occupational History    Not on file   Tobacco Use    Smoking status: Never     Passive exposure: Never    Smokeless tobacco: Never   Vaping Use    Vaping status: Never Used   Substance and Sexual Activity    Alcohol use: Not Currently     Comment: none for the past 2 years    Drug use: No    Sexual activity: Not on file   Other Topics Concern    Not on file   Social History Narrative    Lives with wife - has worked as  in the past     Social Determinants of Health     Financial Resource Strain: Low Risk  (12/27/2023)    Financial Resource Strain     Within the past 12 months, have you or your family members you live with been unable to get utilities (heat, electricity) when it was really needed?: No   Food Insecurity: Low Risk  (12/27/2023)    Food Insecurity     Within the past 12 months, did you worry that your food would run out before you got money to buy more?: No     Within the past 12 months, did the food you bought just not last and you didn t have money to get more?: Patient declined   Transportation Needs: Low Risk  (12/27/2023)    Transportation Needs     Within the past 12 months, has lack of transportation kept you from medical appointments, getting your medicines, non-medical meetings or appointments, work, or from getting things that you need?: No   Physical Activity: Not on file   Stress: Not on file   Social Connections: Not on file   Interpersonal Safety: Unknown (3/24/2024)    [facility]    Humiliation, Afraid, Rape, and Kick questionnaire     Fear of Current or Ex-Partner: Not on file     Emotionally Abused: Not on file     Physically Abused: Patient unable to answer     Sexually Abused: Patient unable to answer   Housing Stability: Low Risk  (3/24/2024)    [facility] from [employer] [employer]    Housing Stability Vital Sign     Unable to Pay for Housing in the Last Year: No     Number of  Places Lived in the Last Year: 1     In the last 12 months, was there a time when you did not have a steady place to sleep or slept in a shelter (including now)?: No            Allergies:   Sulfa antibiotics and Penicillins         Review of Systems:  From intake questionnaire   Negative 14 system review except as noted on HPI, nurse's note.         Physical Exam:     The rest of a comprehensive physical examination is deferred due to telephonic visit restrictions        Labs:    I personally reviewed all applicable laboratory data and went over findings with patient  Significant for:    CBC RESULTS:  Recent Labs   Lab Test 03/03/24  1428 02/27/24  1043 02/19/24  1423 12/27/23  1514   WBC 4.5 5.1 5.7 6.9   HGB 9.2* 8.3* 8.8* 9.3*   * 126* 115* 121*        BMP RESULTS:  Recent Labs   Lab Test 04/10/24  1329 03/03/24  1428 03/03/24  1341 02/27/24  1043 02/27/24  0750 02/19/24  1423 12/02/21  0911 05/17/21  0000 11/19/20  0820 11/19/20  0607 11/18/20  1122 11/18/20  0536 11/17/20  0804 11/17/20  0544    141  --  141  --  143   < >  --   --  140  --  141  --  146*   POTASSIUM 5.0 5.1  --  4.6  --  5.3   < >  --   --  3.9  --  3.9  --  3.9   CHLORIDE 112* 113*  --  112*  --  116*   < >  --   --  111*  --  113*  --  117*   CO2 19* 18*  --  19*  --  18*   < >  --   --  21*  --  20*  --  21*   ANIONGAP 11 10  --  10  --  9   < >  --   --  8  --  8  --  8   * 151* 137* 96   < > 148*   < >  --    < > 130*   < > 125   < > 178*   BUN 29.4* 30.1*  --  27.4*  --  28.3*   < >  --   --  17  --  24*  --  42*   CR 1.41* 1.38*  --  1.42*  --  1.29*   < > 1.44*  --  1.18  --  1.23  --  1.35*   GFRESTIMATED 53* 54*  --  53*  --  59*   < > 52  --  >60  --  58*  --  52*   GFRESTBLACK  --   --   --   --   --   --   --  >60  --  >60  --  >60  --  >60   SILVIA 8.8 8.6*  --  7.9*  --  8.7*   < >  --   --  7.4*  --  7.2*  --  7.5*    < > = values in this interval not displayed.       UA RESULTS:   Recent Labs   Lab Test  03/03/24  1508 11/04/23  0337 01/24/23  0845   SG 1.015 1.016 1.018   URINEPH 7.5* 5.5 6.0   NITRITE Negative Negative Negative   RBCU 132* >182* >182*   WBCU 10* 3 25*       CALCIUM RESULTS  Lab Results   Component Value Date    SILVIA 8.8 04/10/2024    SILVIA 8.6 03/03/2024    SILVIA 7.9 02/27/2024           Imaging:    I personally reviewed all applicable imaging and went over the below findings with patient.    EXAM: CT ABD PELVIS W IV CONT   LOCATION: [facility]   DATE: 3/23/2024     INDICATION: Sepsis, abdominal pain.   COMPARISON: CTA abdomen and pelvis with IV contrast 11/16/2023.   TECHNIQUE: Helical enhanced thin-section CT scan of the abdomen and pelvis was performed following injection of IV contrast. Multiplanar reformats were obtained. Dose reduction techniques were used.   CONTRAST: IOHEXOL 350 MG/ML IV SOLN 100 mL.     FINDINGS:   LOWER CHEST: Motion artifact degrades numerous images. Lung bases are otherwise clear. Resolved small bilateral pleural effusions seen previously. Normal cardiac size. Coronary artery and mitral annulus calcifications. No pericardial effusion.     HEPATOBILIARY: Cirrhotic configuration of the liver with nodular contour and TIPS stent, patent. No discrete hepatic lesion. Dependent calcified gallstones.     PANCREAS: Normal.     SPLEEN: Enlarged measuring 16.1 cm craniocaudal (image 68, series 5). Small splenic cyst, no specific follow-up required.     ADRENAL GLANDS: Normal.     KIDNEYS/BLADDER: Nonobstructive stones in both kidneys, unchanged. Patchy parenchymal loss involving the kidneys, likely attributable to remote infection and/or obstruction. Cortical simple cysts in the kidneys which require no specific follow-up. Both kidneys are well-perfused without hydronephrosis or hydroureter. Normal urinary bladder. Mild prostatomegaly.     BOWEL: Formed stool material throughout normal caliber redundant colon without mechanical obstruction or free gas. No ongoing  gastrointestinal extravasation.     LYMPH NODES: No suspicious abdominopelvic adenopathy.     VASCULATURE: Atherosclerotic normal caliber distal abdominal aorta measuring 1.9 x 1.9 cm (image 82, series 3). Normal caliber IVC.     PELVIC ORGANS: Stool-filled redundant rectosigmoid. Mild prostatomegaly. Phleboliths. Moderate abdominopelvic ascites. Vascular calcifications.     MUSCULOSKELETAL: Degenerative spine and joints of the pelvis. Mild left convex thoracolumbar curve. Mild diffuse body wall edema. Small ventral umbilical hernia containing ascites, unchanged.     IMPRESSION:   1.  Cirrhotic configuration of the liver with a TIPS stent, patent. Dependent calcified gallstones, unchanged. Moderate splenomegaly. Moderate abdominopelvic ascites.     2.  Nonobstructive stones in both kidneys, unchanged. Patchy parenchymal loss involving the kidneys likely attributable to remote infection and/or obstruction. Cortical simple cysts in the kidneys which require no specific follow-up. Mild prostatomegaly.     3.  Normal cardiac size. Coronary artery and mitral annulus calcifications. Aortoiliac atherosclerotic changes without aneurysm. Resolved small bilateral pleural effusions seen previously.     4.  Degenerative spine and joints of the pelvis. Mild left convex thoracolumbar curve. Mild diffuse body wall edema. Small ventral umbilical hernia containing ascites, unchanged.

## 2024-04-19 NOTE — NURSING NOTE
Is the patient currently in the state of [place]? YES    Visit mode:VIDEO    If the visit is dropped, the patient can be reconnected by: VIDEO VISIT: Text to cell phone:   Telephone Information:   Mobile [phone]        Will anyone else be joining the visit? NO  (If patient encounters technical issues they should call [phone]    How would you like to obtain your AVS? MyChart    Are changes needed to the allergy or medication list? No    Are refills needed on medications prescribed by this physician? NO    Reason for visit: No chief complaint on file.    Zaira DAVENPORT

## 2024-04-23 NOTE — TELEPHONE ENCOUNTER
"Spoke with wife and relayed ER recommendation. She states that EMS just evaluated the patient and they told her, \"even if you go in, they will just send him home and have you follow up with your own clinic.\" So the wife declined ER disposition. Writer scheduled patient for in-clinic appt tomorrow at 1040 with Rosangela Gibbons PA-C, but re-iterated that if patients sx worsen, he is not able to be woken, confusion worsens, etc., he really should be seen tonight in the ER. Wife verbalized understanding.    RAYMOND BeasleyN, RN (she/her)  [facility] RN       "

## 2024-04-23 NOTE — TELEPHONE ENCOUNTER
"Pt's wife Naomi reports pt is \"confused past couple of days, doesn't want to take medication, less eating, all the time closing his eyes, doesn't want to move around, drooling on left side\".     Writer advised Naomi to hang up and dial 911 now per protocol.     Naomi verbalizes understanding and agrees to plan.       Reason for Disposition   [1] Difficult to awaken or acting confused (e.g., disoriented, slurred speech) AND [2] present now AND [3] diabetes mellitus    Protocols used: Confusion - Delirium-A-AH    "

## 2024-04-23 NOTE — TELEPHONE ENCOUNTER
Patient was triaged earlier today and advised to call 911.  Patient's wife calling back and she called 911 as directed.  Paramedics took patient's vital signs which were normal and BG was 220.   They refused to transport patient to ER.  Patient's wife and daughter calling back wondering what to do?  They will transport patient by car to [facility] or [facility] for evaluations.    Message routed to Dr Webb's for advisement.    Yojana Davidson RN  [facility]

## 2024-04-24 NOTE — PROGRESS NOTES
"Subjective:    Vito Sy is a 73 year old male who presents with chief complaint of health concerns.  Patient has history of cirrhosis of the liver with ascites, history of hepatic encephalopathy.    He was hospitalized at the end of March for severe lethargy.  Found to have elevated ammonia level.  Gets every other week therapeutic paracentesis.  He saw provider on 4/10/2024 for hospital discharge follow-up.  It sounds like patient is sometimes medication noncompliant.  At follow-up office visit patient said that he did not take lactulose regularly.  Provider stressed the importance of taking this as directed, along with all of his regular medications and treatments, and follow-up with [facility].  Provider noted that after discharge, patient has been a little more alert, but still has confusion less energy, feeling tired.    His wife is with him today who gives us history.  She says that he has been more confused recently, does not want to take his medicines, closing his eyes all the time, does not want to move his arms or legs, drooling on the left side.  She called our nurse line yesterday with that information.  She notes he does not like to take the lactulose.  Phone note in chart says that his wife called 911 paramedics said his vital signs were normal and they \"refused to transport patient to ER.\"  After discussion with our nurses, patient's wife declined to bring him into the ER because she was not sure what they would do to help him.  They schedule an appointment with me today.    Had a normal bowel movement yesterday.  No fevers at home.  Eating less than normal but still eating.  Does not have any trouble swallowing.    Had urology appointment 4/19/2024 to follow-up on his kidney stones.  Has virtual GI visit on 4/27/2024 to follow-up on his cirrhosis.    Patient Active Problem List   Diagnosis    Anemia    Hematuria    Hypercholesterolemia    Hypertension    Type 2 diabetes mellitus with " microalbuminuria, without long-term current use of insulin (H)    Nephrolithiasis    Benign Adenomatous Polyp Of The Large Intestine    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    GI bleeding    Anemia due to blood loss, acute    Hypotension    Hyperkalemia    Gastrointestinal hemorrhage associated with acute gastritis    Popliteal cyst, left    Popliteal cyst, right    Incontinence of feces with fecal urgency    Other cirrhosis of liver (H) with ascites    Chronic kidney disease, stage 3a (H)    Portal hypertension (H)    Cirrhosis of liver with ascites (H)    Hepatic encephalopathy (H)    Pleural effusion    Anemia, unspecified type    Mitral valve insufficiency, unspecified etiology    SBP (spontaneous bacterial peritonitis) (H)    Bilateral lower extremity edema    End-stage liver disease (H)    Incontinence of feces, unspecified fecal incontinence type    Ascites due to alcoholic cirrhosis (H)    Ascites    Disorder of function of stomach    Elevated lipase    Esophageal varices without bleeding (H)    Generalized abdominal pain    Hypomagnesemia    Ureteral stone    Hypokalemia    Lower GI bleed    Localized swelling of both lower extremities       Current Outpatient Medications:     bismuth subsalicylate (PEPTO BISMOL) 262 MG chewable tablet, Take 1 tablet by mouth every 6 hours as needed for diarrhea, Disp: , Rfl:     ciprofloxacin (CIPRO) 500 MG tablet, Take 1 tablet by mouth once daily, Disp: 60 tablet, Rfl: 0    fish oil-omega-3 fatty acids 1000 MG capsule, Take 1 g by mouth 2 times daily, Disp: , Rfl:     furosemide (LASIX) 20 MG tablet, Take 1 tablet (20 mg) by mouth daily, Disp: 60 tablet, Rfl: 6    HEMP OIL OR EXTRACT OR OTHER CBD CANNABINOID, NOT MEDICAL CANNABIS,, CBD gummy, Disp: , Rfl:     lactulose (CHRONULAC) 10 GM/15ML solution, Take 20 g by mouth 3 times daily, Disp: , Rfl:     midodrine (PROAMATINE) 2.5 MG tablet, Take 1 tablet (2.5 mg) by mouth 3 times daily  (with meals), Disp: 180 tablet, Rfl: 3    multivitamin, therapeutic (THERA-VIT) TABS tablet, Take 1 tablet by mouth daily, Disp: , Rfl:     omeprazole (PRILOSEC) 20 MG DR capsule, Take 1 capsule (20 mg) by mouth 2 times daily, Disp: 180 capsule, Rfl: 3    rifaximin (XIFAXAN) 550 MG TABS tablet, Take 1 tablet (550 mg) by mouth 2 times daily, Disp: 60 tablet, Rfl: 4    sodium bicarbonate 650 MG tablet, Take 650 mg by mouth 2 times daily, Disp: , Rfl:     spironolactone (ALDACTONE) 50 MG tablet, Take 1 tablet (50 mg) by mouth daily, Disp: 60 tablet, Rfl: 6    Zinc Sulfate 220 (50 Zn) MG TABS, Take 220 mg by mouth daily, Disp: , Rfl:       Objective:   Allergies:  Sulfa antibiotics and Penicillins    Vitals:  Vitals:    04/24/24 1026   BP: 120/62   BP Location: Left arm   Patient Position: Sitting   Cuff Size: Adult Regular   Pulse: 59   Resp: 16   Temp: 97.8  F (36.6  C)   TempSrc: Temporal   SpO2: 100%       There is no height or weight on file to calculate BMI.    Vital signs reviewed.  General: Patient is sitting in a wheelchair today, eyes closed most of the time, not making eye contact, not really responding to questions or commands, drooling out of the left side of his mouth   Cardiac: regular rate and rhythm, no murmurs  Pulmonary: lungs clear to auscultation bilaterally, no crackles, rales, rhonchi, or wheezing noted  Abdomen: Exam somewhat difficult due to patient being in wheelchair, fairly soft, no pain with palpation       Results for orders placed or performed in visit on 04/24/24   CBC with platelets and differential     Status: Abnormal   Result Value Ref Range    WBC Count 4.6 4.0 - 11.0 10e3/uL    RBC Count 3.34 (L) 4.40 - 5.90 10e6/uL    Hemoglobin 9.2 (L) 13.3 - 17.7 g/dL    Hematocrit 29.4 (L) 40.0 - 53.0 %    MCV 88 78 - 100 fL    MCH 27.5 26.5 - 33.0 pg    MCHC 31.3 (L) 31.5 - 36.5 g/dL    RDW 18.8 (H) 10.0 - 15.0 %    Platelet Count 116 (L) 150 - 450 10e3/uL    % Neutrophils 59 %    % Lymphocytes  20 %    % Monocytes 15 %    % Eosinophils 5 %    % Basophils 1 %    % Immature Granulocytes 0 %    Absolute Neutrophils 2.7 1.6 - 8.3 10e3/uL    Absolute Lymphocytes 0.9 0.8 - 5.3 10e3/uL    Absolute Monocytes 0.7 0.0 - 1.3 10e3/uL    Absolute Eosinophils 0.2 0.0 - 0.7 10e3/uL    Absolute Basophils 0.0 0.0 - 0.2 10e3/uL    Absolute Immature Granulocytes 0.0 <=0.4 10e3/uL   CBC with Platelets & Differential     Status: Abnormal    Narrative    The following orders were created for panel order CBC with Platelets & Differential.  Procedure                               Abnormality         Status                     ---------                               -----------         ------                     CBC with platelets and d...[[id number]]  Abnormal            Final result                 Please view results for these tests on the individual orders.     Other labs pending.      Assessment and Plan:   1. Cirrhosis of liver with ascites, unspecified hepatic cirrhosis type (H)  2. Hepatic encephalopathy (H)  Chronic, suspect current flare.  Patient was hospitalized for these concerns at the end of March.  Wife notes that since he has been home symptoms have improved a little, but not a lot.  He had a hospital follow-up visit on 4/10/2024 at another clinic.  Ammonia was found to be elevated at that visit.  Provider stressed the importance of taking his lactulose and following up with [facility].  Today, wife says that he does not like to take the lactulose.  He also sometimes refuses his other medicines.  They have GI follow-up next week.  He was fairly nonresponsive today during our visit.  Did not want to move his arms or legs, eyes closed, sometimes drooling out of the left side of his mouth.  This has apparently been going on for several days, though history is a little unclear.  Cardiopulmonary exam today normal.  Vital signs stable, no fevers.  He is eating and has had normal bowel movements.  He gets scheduled  therapeutic paracentesis every other week.  I discussed with wife that she should consider bringing him in to the emergency room now.  She does not necessarily want to do that.  She is not sure what they would do to help.  Hard to say if this was a (newer) stroke, or continuation of his other issues.  Screening labs ordered today and I will follow-up with results.  I strongly encouraged wife to bring him in to the emergency room.  She will think about this.  RN will also check in with her once labs are back.  - Comprehensive metabolic panel  - Ammonia; Future  - CBC with Platelets & Differential  - Ammonia    3. Chronic kidney disease, stage 3a (H)  Chronic, stable.  Has a baseline creatinine of 1.2-1.4.  GFR around 53.  Screening labs ordered today and I will follow-up with results.    4. Type 2 diabetes mellitus with microalbuminuria, without long-term current use of insulin (H)  Chronic, stable.  Last A1c 5.9% in February 2024.    5. Other fatigue  Most likely related to liver issues.  However thyroid checked today and I will follow-up with results.  - TSH with free T4 reflex       This dictation uses voice recognition software, which may contain typographical errors.

## 2024-04-25 NOTE — TELEPHONE ENCOUNTER
Called pt and left a message to call clinic back. Please relay Rosangela's message if pt calls back.      Troy Núñez Cem Say, BSN RN  [facility]        ----- Message from Rosangela Gibbons PA-C sent at 4/25/2024  8:03 AM CDT -----  I don't have all of his labs back yet, but his Ammonia level is very high again.  Higher than it was when he was in the hospital last time.  I think this is one of the main reasons for his symptoms.  I would recommend he go back to the ER now for treatment.  If family wants to review this with his PCP Dr. Mejia, they can -- but that may take a while to set up, and I don't think they should wait.

## 2024-04-26 NOTE — TELEPHONE ENCOUNTER
Called patient's son, talked to son. Relayed provider message to son about high ammonia level and bringing patient to ER today. Patient's son says will contact rest of family and take dad into ER.       Parish Mccracken, MSN, RN   [facility] [place]

## 2024-04-27 PROBLEM — N18.31 CHRONIC KIDNEY DISEASE, STAGE 3A (H): Status: ACTIVE | Noted: 2022-11-09

## 2024-04-27 PROBLEM — G93.40 ACUTE ENCEPHALOPATHY: Status: ACTIVE | Noted: 2024-01-01

## 2024-04-27 PROBLEM — N39.0 COMPLICATED UTI (URINARY TRACT INFECTION): Status: ACTIVE | Noted: 2024-01-01

## 2024-04-27 PROBLEM — K76.6 PORTAL HYPERTENSION (H): Status: ACTIVE | Noted: 2022-11-09

## 2024-04-27 NOTE — H&P
[facility]    History and Physical - Hospitalist Service       Date of Admission:  2024  Vito Sy,  1950, MRN [id number]  PCP: Amrik Mejia    Assessment & Plan      Vito Sy is a 73 year old male admitted on 2024. He has history of JENNINGS cirrhosis, portal hypertension status post TIPS, hepatic encephalopathy, kidney stones, DM, CKD 3a presents for evaluation of altered mental status having recently been refusing to take lactulose    # Altered mental status, likely uncontrolled hepatic encephalopathy  -Resume home lactulose.  Patient is quite drowsy right now I am not sure if we may need to transition to lactulose enema.  Family think he can take it by mouth  -GI consultation  -Unlikely SBP with no abd tenderness and just small ascites. No sepsis. Continue home rifaximin.  -Possible UTI. Awaiting culture. Can start Rocephin while waiting.    #?Facial droop/drooling  -Family noted this multiple days ago  -CT head here ok. Would expect stroke to show up given chronicity. Suspect drooling is due to decreased alertness.  -monitor    #JENNINGS cirrhosis  # Portal hypertension status post TIPS  -GI consult as above  -patient follows up regularly  -Can hold home Cipro while he is on Rocephin  -Continue home Lasix, spironolactone    #CKD 3a  -Cr at baseline  -Avoid nephrotoxins  -Continue home bicarbonate    # Normocytic anemia  -Hemoglobin 9.4 similar to recent baseline    #DM, not on home meds. Accuchecks and sliding scale.  # GERD, home PPI  # Chronic hypotension, home midodrine with hold parameters         DVT Prophylaxis: Moderate risk. SCDs  Diet: Combination Diet 2 gm NA Diet  Cronin Catheter: Not present  Lines: None     Cardiac Monitoring: None  Code Status: Full Code    Clinically Significant Risk Factors Present on Admission              # Hypoalbuminemia: Lowest albumin = 3.4 g/dL at 2024 11:43 AM, will monitor as appropriate    # Coagulation Defect: INR =  1.39 (Ref range: 0.85 - 1.15) and/or PTT = 37 Seconds (Ref range: 22 - 38 Seconds), will monitor for bleeding    # Hypertension: Noted on problem list          # Financial/Environmental Concerns:           Disposition Plan      Expected Discharge Date: 04/29/2024                The patient's care was discussed with the Bedside Nurse and Patient's Family.    Anne Guerrero MD  Hospitalist Service  [facility]  Securely message with [facility] (more info)  Text page via [employer] Paging/Directory     ______________________________________________________________________    Chief Complaint   AMS    History is obtained from the patient's son at bedside    History of Present Illness   Vito CABRERA Yossi is a 73 year old male who is here for aforementioned symptoms.   Son reports patient is intermittently sleepy but this fluctuates a lot throughout the day.  Patient does not like the taste of lactulose and so he has refused to drink it.  Son is under the impression that GI doctor has cleared patient to utilize MiraLAX instead of lactulose, patient prefers the taste of MiraLAX.  Patient has not been complaining of any pain.  He has not had a fever.  He has a chronic cough which has been attributed to his GERD.      Past Medical History    Past Medical History:   Diagnosis Date    Alcoholic cirrhosis of liver with ascites (H)     Cirrhosis of liver (H)     Diabetes mellitus (H)     Gout     Hypertension     Kidney stone        Past Surgical History   Past Surgical History:   Procedure Laterality Date    COLONOSCOPY      ESOPHAGOSCOPY, GASTROSCOPY, DUODENOSCOPY (EGD), COMBINED N/A 10/10/2023    Procedure: ESOPHAGOGASTRODUODENOSCOPY with biopsy;  Surgeon: Puneet Plunkett MD, MD;  Location: [facility] OR    ESOPHAGOSCOPY, GASTROSCOPY, DUODENOSCOPY (EGD), COMBINED N/A 2/27/2024    Procedure: ESOPHAGOGASTRODUODENOSCOPY;  Surgeon: Beatrice Christie MD;  Location: [facility] OR    IR PARACENTESIS  11/6/2021     IR TRANSVEN INTRAHEPATIC PORTOSYST REV  1/26/2023    IR TRANSVEN INTRAHEPATIC PORTOSYST SHUNT  11/16/2022    IR TRANSVEN INTRAHEPATIC PORTOSYST SHUNT  12/9/2022    TN ESOPHAGOGASTRODUODENOSCOPY TRANSORAL DIAGNOSTIC N/A 11/16/2020    Procedure: ESOPHAGOGASTRODUODENOSCOPY (EGD);  Surgeon: Juan Garnett MD;  Location: [facility];  Service: Gastroenterology    TN ESOPHAGOGASTRODUODENOSCOPY TRANSORAL DIAGNOSTIC N/A 11/18/2020    Procedure: ESOPHAGOGASTRODUODENOSCOPY (EGD) WITH BANDING;  Surgeon: Juan Garnett MD;  Location: [facility];  Service: Gastroenterology    URETEROSCOPY         Prior to Admission Medications   Prior to Admission Medications   Prescriptions Last Dose Informant Patient Reported? Taking?   HEMP OIL OR EXTRACT OR OTHER CBD CANNABINOID, NOT MEDICAL CANNABIS,   Yes Yes   Sig: CBD gummy   Zinc Sulfate 220 (50 Zn) MG TABS 4/26/2024 at am  Yes Yes   Sig: Take 220 mg by mouth daily   bismuth subsalicylate (PEPTO BISMOL) 262 MG chewable tablet   Yes Yes   Sig: Take 1 tablet by mouth every 6 hours as needed for diarrhea   ciprofloxacin (CIPRO) 500 MG tablet 4/26/2024 at am  No Yes   Sig: Take 1 tablet by mouth once daily   fish oil-omega-3 fatty acids 1000 MG capsule 4/26/2024 at pm  Yes Yes   Sig: Take 1 g by mouth 2 times daily   furosemide (LASIX) 20 MG tablet 4/26/2024 at am  No Yes   Sig: Take 1 tablet (20 mg) by mouth daily   lactulose (CHRONULAC) 10 GM/15ML solution Past Week  Yes Yes   Sig: Take 20 g by mouth 3 times daily   midodrine (PROAMATINE) 2.5 MG tablet 4/26/2024 at pm  No Yes   Sig: Take 1 tablet (2.5 mg) by mouth 3 times daily (with meals)   multivitamin, therapeutic (THERA-VIT) TABS tablet 4/27/2024 at am  Yes Yes   Sig: Take 1 tablet by mouth daily   omeprazole (PRILOSEC) 20 MG DR capsule 4/26/2024 at pm  No Yes   Sig: Take 1 capsule (20 mg) by mouth 2 times daily   rifaximin (XIFAXAN) 550 MG TABS tablet 4/26/2024 at pm  No Yes   Sig: Take 1 tablet (550 mg) by mouth 2 times  daily   sodium bicarbonate 650 MG tablet 4/26/2024 at pm  Yes Yes   Sig: Take 650 mg by mouth 2 times daily   spironolactone (ALDACTONE) 50 MG tablet 4/26/2024 at am  No Yes   Sig: Take 1 tablet (50 mg) by mouth daily      Facility-Administered Medications: None        Social History   I have reviewed this patient's social history and updated it with pertinent information if needed.  Social History     Tobacco Use    Smoking status: Never     Passive exposure: Never    Smokeless tobacco: Never   Vaping Use    Vaping status: Never Used   Substance Use Topics    Alcohol use: Not Currently     Comment: none for the past 2 years    Drug use: No        Physical Exam   Vital Signs: Temp: 98  F (36.7  C) Temp src: Oral BP: 134/62 Pulse: 60   Resp: 16 SpO2: 100 % O2 Device: None (Room air)    Weight: 150 lbs 0 oz  General: Cachectic elderly man sitting up in a chair, chin slumped on chest and he is difficult to arouse.  Can give a one-word response to questions when the son asks him loudly right in his face.  HEENT: Head normocephalic atraumatic, oral mucosa moist. Sclerae anicteric  CV: Regular rhythm, normal rate, no murmurs  Resp: No wheezes, no rales or rhonchi, no focal consolidations  GI: Belly soft, nondistended, nontender, bowel sounds present  Skin:  tan chronically ill appearing  Extremities: No peripheral edema  Psych: minimally arousable  Neuro: minimally arousable    Medical Decision Making                 Data   Imaging results reviewed over the past 24 hrs:   Recent Results (from the past 24 hour(s))   Head CT w/o contrast    Narrative    EXAM: CT HEAD W/O CONTRAST  LOCATION: [facility]  DATE: 4/27/2024    INDICATION: ams  COMPARISON: 03/23/2024  TECHNIQUE: Routine CT Head without IV contrast. Multiplanar reformats. Dose reduction techniques were used.    FINDINGS:  INTRACRANIAL CONTENTS: No intracranial hemorrhage, extraaxial collection, or mass effect.  No CT evidence of acute  infarct. Mild presumed chronic small vessel ischemic changes. Mild generalized volume loss. No hydrocephalus.     VISUALIZED ORBITS/SINUSES/MASTOIDS: No intraorbital abnormality. No paranasal sinus mucosal disease. No middle ear or mastoid effusion.    BONES/SOFT TISSUES: No acute abnormality.      Impression    IMPRESSION:  1.  No CT evidence for acute intracranial process.  2.  Brain atrophy and presumed chronic microvascular ischemic changes as above.   CT Abdomen Pelvis w Contrast    Narrative    EXAM: CT ABDOMEN PELVIS W CONTRAST  LOCATION: [facility]  DATE: 4/27/2024    INDICATION: ab pain  COMPARISON: Abdominal ultrasound 04/15/2024 and CT abdomen/pelvis 03/23/2024.  TECHNIQUE: CT scan of the abdomen and pelvis was performed following injection of IV contrast. Multiplanar reformats were obtained. Dose reduction techniques were used.  CONTRAST: isovue 370 74ml    FINDINGS:   LOWER CHEST: Mild bibasilar atelectasis and/or scarring.    HEPATOBILIARY: Cirrhosis. No focal hepatic lesion on single phase examination. Grossly patent TIPS. Patent major portal veins. Cholelithiasis.    PANCREAS: Normal.    SPLEEN: Enlarged, measuring 14.4 cm craniocaudal. Stable 1.1 cm indeterminate splenic hypodensity.    ADRENAL GLANDS: Normal.    KIDNEYS/BLADDER: No hydronephrosis. Nonobstructing bilateral renal calculi. Bilateral renal cysts and smaller hypodensities which are too small to characterize, for which no dedicated imaging follow-up is required. Mild circumferential bladder wall   thickening.    BOWEL: No bowel obstruction. Large amount of colonic stool. Normal appendix. Mild mural thickening of the stomach. Small volume ascites. Diffuse mesenteric congestion and edema. No pneumoperitoneum.    LYMPH NODES: Normal.    VASCULATURE: Normal caliber abdominal aorta with mild calcified aortoiliac atherosclerotic plaque.    PELVIC ORGANS: Normal.    MUSCULOSKELETAL: Thoracolumbar spondylosis. No  destructive osseous lesion. Gynecomastia. Mild body wall edema.      Impression    IMPRESSION:   1.  Cirrhosis with findings of portal hypertension including splenomegaly and small volume ascites. Patent TIPS.  2.  Mild bladder wall thickening could be seen with cystitis. Correlate with urinalysis.  3.  Mild gastric mural thickening could be due to portal gastropathy and/or gastritis.  4.  Cholelithiasis.  5.  Nonobstructing bilateral renal calculi.     Recent Results (from the past 12 hour(s))   Extra Blue Top Tube    Collection Time: 04/27/24 11:43 AM   Result Value Ref Range    Hold Specimen JIC    Extra Red Top Tube    Collection Time: 04/27/24 11:43 AM   Result Value Ref Range    Hold Specimen JIC    Extra Green Top (Lithium Heparin) Tube    Collection Time: 04/27/24 11:43 AM   Result Value Ref Range    Hold Specimen JIC    Extra Purple Top Tube    Collection Time: 04/27/24 11:43 AM   Result Value Ref Range    Hold Specimen JIC    Extra Green Top (Lithium Heparin) ON ICE    Collection Time: 04/27/24 11:43 AM   Result Value Ref Range    Hold Specimen JIC    Comprehensive metabolic panel    Collection Time: 04/27/24 11:43 AM   Result Value Ref Range    Sodium 141 135 - 145 mmol/L    Potassium 4.9 3.4 - 5.3 mmol/L    Carbon Dioxide (CO2) 17 (L) 22 - 29 mmol/L    Anion Gap 12 7 - 15 mmol/L    Urea Nitrogen 28.8 (H) 8.0 - 23.0 mg/dL    Creatinine 1.44 (H) 0.67 - 1.17 mg/dL    GFR Estimate 52 (L) >60 mL/min/1.73m2    Calcium 8.8 8.8 - 10.2 mg/dL    Chloride 112 (H) 98 - 107 mmol/L    Glucose 135 (H) 70 - 99 mg/dL    Alkaline Phosphatase 172 (H) 40 - 150 U/L    AST 55 (H) 0 - 45 U/L    ALT 26 0 - 70 U/L    Protein Total 7.6 6.4 - 8.3 g/dL    Albumin 3.4 (L) 3.5 - 5.2 g/dL    Bilirubin Total 1.2 <=1.2 mg/dL   INR    Collection Time: 04/27/24 11:43 AM   Result Value Ref Range    INR 1.39 (H) 0.85 - 1.15   PTT    Collection Time: 04/27/24 11:43 AM   Result Value Ref Range    aPTT 37 22 - 38 Seconds   Bilirubin direct     Collection Time: 04/27/24 11:43 AM   Result Value Ref Range    Bilirubin Direct 0.35 (H) 0.00 - 0.30 mg/dL   CBC with platelets and differential    Collection Time: 04/27/24 11:43 AM   Result Value Ref Range    WBC Count 4.6 4.0 - 11.0 10e3/uL    RBC Count 3.41 (L) 4.40 - 5.90 10e6/uL    Hemoglobin 9.4 (L) 13.3 - 17.7 g/dL    Hematocrit 30.3 (L) 40.0 - 53.0 %    MCV 89 78 - 100 fL    MCH 27.6 26.5 - 33.0 pg    MCHC 31.0 (L) 31.5 - 36.5 g/dL    RDW 19.2 (H) 10.0 - 15.0 %    Platelet Count 144 (L) 150 - 450 10e3/uL    % Neutrophils 58 %    % Lymphocytes 22 %    % Monocytes 13 %    % Eosinophils 6 %    % Basophils 1 %    % Immature Granulocytes 0 %    NRBCs per 100 WBC 0 <1 /100    Absolute Neutrophils 2.7 1.6 - 8.3 10e3/uL    Absolute Lymphocytes 1.0 0.8 - 5.3 10e3/uL    Absolute Monocytes 0.6 0.0 - 1.3 10e3/uL    Absolute Eosinophils 0.3 0.0 - 0.7 10e3/uL    Absolute Basophils 0.1 0.0 - 0.2 10e3/uL    Absolute Immature Granulocytes 0.0 <=0.4 10e3/uL    Absolute NRBCs 0.0 10e3/uL   Acute hepatitis panel    Collection Time: 04/27/24 11:43 AM   Result Value Ref Range    Hepatitis A Antibody IgM Nonreactive Nonreactive    Hepatitis B Core Antibody IgM Nonreactive Nonreactive    Hepatitis C Antibody Nonreactive Nonreactive    Hepatitis B Surface Antigen Nonreactive Nonreactive   Ammonia (on ice)    Collection Time: 04/27/24 12:41 PM   Result Value Ref Range    Ammonia 66 (H) 16 - 60 umol/L   Blood gas venous    Collection Time: 04/27/24 12:41 PM   Result Value Ref Range    pH Venous 7.39 7.32 - 7.43    pCO2 Venous 33 (L) 40 - 50 mm Hg    pO2 Venous 31 25 - 47 mm Hg    Bicarbonate Venous 20 (L) 21 - 28 mmol/L    Base Excess/Deficit Venous -5.4 (L) -3.0 - 3.0 mmol/L    FIO2 21     Oxyhemoglobin Venous 56 (L) 70 - 75 %    O2 Sat, Venous 57.6 (L) 70.0 - 75.0 %   Extra Green Top (Lithium Heparin) ON ICE    Collection Time: 04/27/24 12:41 PM   Result Value Ref Range    Hold Specimen JIC    UA with Microscopic reflex to Culture     Collection Time: 04/27/24  2:11 PM    Specimen: Urine, Clean Catch   Result Value Ref Range    Color Urine Light Yellow Colorless, Straw, Light Yellow, Yellow    Appearance Urine Clear Clear    Glucose Urine Negative Negative mg/dL    Bilirubin Urine Negative Negative    Ketones Urine Negative Negative mg/dL    Specific Gravity Urine 1.026 1.001 - 1.030    Blood Urine 1.0 mg/dL (A) Negative    pH Urine 6.0 5.0 - 7.0    Protein Albumin Urine 10 (A) Negative mg/dL    Urobilinogen Urine <2.0 <2.0 mg/dL    Nitrite Urine Negative Negative    Leukocyte Esterase Urine 75 Watson/uL (A) Negative    Mucus Urine Present (A) None Seen /LPF    RBC Urine >182 (H) <=2 /HPF    WBC Urine 15 (H) <=5 /HPF    Squamous Epithelials Urine <1 <=1 /HPF    Hyaline Casts Urine 2 <=2 /LPF   Lactic acid whole blood    Collection Time: 04/27/24  2:11 PM   Result Value Ref Range    Lactic Acid 1.4 0.7 - 2.0 mmol/L

## 2024-04-27 NOTE — ED NOTES
"[facility] ED Handoff Report    ED Chief Complaint: Fatigue, Altered Mental Status     ED Diagnosis:  (G93.40) Acute encephalopathy  Comment: pt is lethargic and sleep. Awakens and answers questions when needed.       (N39.0) Complicated UTI (urinary tract infection)  Comment: Antibiotic given          PMH:    Past Medical History:   Diagnosis Date    Alcoholic cirrhosis of liver with ascites (H)     Cirrhosis of liver (H)     Diabetes mellitus (H)     Gout     Hypertension     Kidney stone         Code Status:  Prior     Falls Risk: Yes Band: Applied    Current Living Situation/Residence: lives in a house     Elimination Status: Continent: Yes     Activity Level: SBA w/ walker. Pt walks with son holding him under armpits.     Patients Preferred Language:  English     Needed: No    Vital Signs:  /60   Pulse 52   Temp 98.4  F (36.9  C) (Oral)   Resp 10   Ht 1.727 m (5' 8\")   Wt 68 kg (150 lb)   SpO2 100%   BMI 22.81 kg/m       Cardiac Rhythm: Sinus Amos with Sinus Arrhythmia    Pain Score: 0/10    Is the Patient Confused:  Yes    Last Food or Drink: 04/27/24    Focused Assessment:  pt comes from home with son with c/o fatigue and increased confusion for the past few days. Pt is sleepy and lethargic. Is able to be awaken and can answer questions. Pt has history of alcohol use disorder and liver failure due to alcohol use. 30ml of Lactulose mixed with orange juice given in ER. A dose of antibiotics for UT, fentanyl, and Zofran given as well. Son at bedside. Helps wake dad up when needed. CT Abdomen Pelvis positive for Cirrhosis, small volume ascites, cystitis, Cholelithiasis, and non obstructing bilateral renal calculi.     Tests Performed: Done: Labs and imagine    Treatments Provided:  Meds and IV fluids    Family Dynamics/Concerns: No    Family Updated On Visitor Policy: Yes    Plan of Care Communicated to Family: Yes    Who Was Updated about Plan of Care: Son who is at bedside. "     Belongings Checklist Done and Signed by Patient: Yes    Medications sent with patient: None     Covid: asymptomatic , not tested    Additional Information: TRACEY    RN: Dulce Vanegas RN   4/27/2024 4:47 PM

## 2024-04-27 NOTE — PHARMACY-ADMISSION MEDICATION HISTORY
Pharmacist Admission Medication History    Admission medication history is complete. The information provided in this note is only as accurate as the sources available at the time of the update.    Information Source(s): Patient, Family member, Caregiver, Clinic records, Hospital records, and CareEverywhere/SureScripts via in-person    Pertinent Information: he is becoming very weak from the frequent stools on lactulose and has not been taking for past week, he also hates the taste. He has been substituting with Miralax instead. Explained ion trapping to family and they will try to get creative in administration of lactulose    Changes made to PTA medication list:  Added: None  Deleted: None  Changed: None    Allergies reviewed with patient and updates made in EHR: yes    Medication History Completed By: James Guerrier [facility] 4/27/2024 4:14 PM    PTA Med List   Medication Sig Last Dose    bismuth subsalicylate (PEPTO BISMOL) 262 MG chewable tablet Take 1 tablet by mouth every 6 hours as needed for diarrhea     ciprofloxacin (CIPRO) 500 MG tablet Take 1 tablet by mouth once daily 4/26/2024 at am    fish oil-omega-3 fatty acids 1000 MG capsule Take 1 g by mouth 2 times daily 4/26/2024 at pm    furosemide (LASIX) 20 MG tablet Take 1 tablet (20 mg) by mouth daily 4/26/2024 at am    HEMP OIL OR EXTRACT OR OTHER CBD CANNABINOID, NOT MEDICAL CANNABIS, CBD gummy     lactulose (CHRONULAC) 10 GM/15ML solution Take 20 g by mouth 3 times daily Past Week    midodrine (PROAMATINE) 2.5 MG tablet Take 1 tablet (2.5 mg) by mouth 3 times daily (with meals) 4/26/2024 at pm    multivitamin, therapeutic (THERA-VIT) TABS tablet Take 1 tablet by mouth daily 4/27/2024 at am    omeprazole (PRILOSEC) 20 MG DR capsule Take 1 capsule (20 mg) by mouth 2 times daily 4/26/2024 at pm    rifaximin (XIFAXAN) 550 MG TABS tablet Take 1 tablet (550 mg) by mouth 2 times daily 4/26/2024 at pm    sodium bicarbonate 650 MG tablet Take 650 mg by mouth 2  times daily 4/26/2024 at pm    spironolactone (ALDACTONE) 50 MG tablet Take 1 tablet (50 mg) by mouth daily 4/26/2024 at am    Zinc Sulfate 220 (50 Zn) MG TABS Take 220 mg by mouth daily 4/26/2024 at am

## 2024-04-27 NOTE — ED TRIAGE NOTES
Patient presents here with his son for evaluation increasing weakness, fatigue and confusion that has gotten progressively worse over the past four days. Patient is very sleepy and tired in triage, but does answer questions.      Triage Assessment (Adult)       Row Name 04/27/24 1130 04/27/24 1127       Triage Assessment    Airway WDL WDL WDL       Respiratory WDL    Respiratory WDL WDL WDL       Skin Circulation/Temperature WDL    Skin Circulation/Temperature WDL WDL WDL       Cardiac WDL    Cardiac WDL WDL WDL       Peripheral/Neurovascular WDL    Peripheral Neurovascular WDL WDL WDL       Cognitive/Neuro/Behavioral WDL    Cognitive/Neuro/Behavioral WDL WDL WDL

## 2024-04-27 NOTE — ED PROVIDER NOTES
EMERGENCY DEPARTMENT ENCOUNTER      NAME: Vito Sy  AGE: 73 year old male  YOB: 1950  MRN: [id number]  EVALUATION DATE & TIME: 2024 11:35 AM    PCP: Amrik Mejia    ED PROVIDER: Olivia Sheth M.D.      Chief Complaint   Patient presents with    Fatigue    Altered Mental Status       FINAL IMPRESSION:  1. Other cirrhosis of liver (H) with ascites    2. Acute encephalopathy    3. Complicated UTI (urinary tract infection)    4. Hepatic encephalopathy (H)    5. Elevated lipase        ED COURSE & MEDICAL DECISION MAKIN. Altered mental status.  No known head trauma, falls.  No change to medications, no suspicion for overdose or misuse.  Differentials considered include infection such as UTI, SBP, pneumonia, gastroenteritis, viral illness,, electrolyte abnormality, dehydration, cognitive decline.   I ordered blood work including ammonia level, cbc, cmp, lactic acid, acute hepatitis panel, vbg, cmp, coag. I ordered covid, infuenza, rsv swab, urinalysis, CT head, and CT A/P to evaluate for intraabdominal process  given hx of hepatic encephalopathy and recent TIPS revision 24.  I reviewed blood work which was significant for ammonia level of 66, stable hemoglobin, similar liver enzymes and bilirubin level, similarly slightly elevated INR to previous levels. Stable VBG and lactic acid. Urinalysis showing 75 LE, >182 RBC, 15 WBC on straight cath sample. Possible UTI present. Ceftriaxone ordered to cover for UTI as well as possible SBP.   CT head read and reviewed by myself and neg for acute process.  CT A/P read and reviewed by myself and patient has no significant ascites seen.  I suspect encephalopathy secondary to noncompliance with lactulose.   I ordered PO lactulose.  I admitted patient to hospitalist.      11:49 AM I met with the patient to gather history and to perform my initial exam. I discussed the plan for care while in the Emergency Department.  1553: I spoke to GI who  suspects noncompliance causing encephalopathy based on H&P, recc'd covering for SBP with ceftriaxone.     Pertinent Labs & Imaging studies reviewed. (See chart for details).    Medical Decision Making  Obtained supplemental history:Supplemental history obtained?: Documented in chart and Family Member/Significant Other  Reviewed external records: External records reviewed?: Documented in chart and Other: office note 4/27/24  from [facility]  Care impacted by chronic illness:Other: hx of JENNINGS, hepatic encephalopathy, hx of SBP, esophageal varices, TIPS and TIPS revision   Care significantly affected by social determinants of health:Access to Medical Care  Did you consider but not order tests?: In addition to work-up documented, I considered the following work up: paracentesis for SBP evaluation  Did you interpret images independently?: My independent interpretation of imaging: no significant ascites seen on CT A/P  Consultation discussion with other provider:Did you involve another provider (consultant, MH, pharmacy, etc.)?: I discussed the care with another health care provider, see documentation for details.  Admit.    At the conclusion of the encounter I discussed the results of all of the tests and the disposition. The questions were answered. The patient or family acknowledged understanding and was agreeable with the care plan.      CRITICAL CARE:  N/A    HPI    Patient information was obtained from: Son, patient    Use of : N/A.       Vito CABRERA Yossi is a 73 year old male who presents for increased confusion over the past 3 days.  Patient has similar presentation with hepatic encephalopathy, history of alcoholic cirrhosis, sober.  Patient was sent after telehealth visit with GI doctor earlier today, was sleepy and appeared encephalopathic.  Son states patient's wife typically helps him with his medication, ultimately patient administers his own medication.  Uncertain if he is compliant, last lactulose was  given yesterday, did not take any today.    Per Chart Review: 1/18/2024 TIPS revision  1/26/2023 TIPS constrainment procedure  12/9/2022 Initial TIPS    Patient underwent last paracentesis 3/18/24, repeat attempt 4/15/24 but only trace ascites and no paracentesis done. Recent EGD for variceal screening 2/27/24 that showed grade 1 varices in lower esophagus and otherwise normal stomach and duodenum.        REVIEW OF SYSTEMS  All other systems negative unless noted in HPI.    PAST MEDICAL HISTORY:  Past Medical History:   Diagnosis Date    Alcoholic cirrhosis of liver with ascites (H)     Cirrhosis of liver (H)     Diabetes mellitus (H)     Gout     Hypertension     Kidney stone        PAST SURGICAL HISTORY:  Past Surgical History:   Procedure Laterality Date    COLONOSCOPY      ESOPHAGOSCOPY, GASTROSCOPY, DUODENOSCOPY (EGD), COMBINED N/A 10/10/2023    Procedure: ESOPHAGOGASTRODUODENOSCOPY with biopsy;  Surgeon: Puneet Plunkett MD, MD;  Location: [facility] Main OR    ESOPHAGOSCOPY, GASTROSCOPY, DUODENOSCOPY (EGD), COMBINED N/A 2/27/2024    Procedure: ESOPHAGOGASTRODUODENOSCOPY;  Surgeon: Beatrice Christie MD;  Location: [facility] Main OR    IR PARACENTESIS  11/6/2021    IR TRANSVEN INTRAHEPATIC PORTOSYST REV  1/26/2023    IR TRANSVEN INTRAHEPATIC PORTOSYST SHUNT  11/16/2022    IR TRANSVEN INTRAHEPATIC PORTOSYST SHUNT  12/9/2022    SD ESOPHAGOGASTRODUODENOSCOPY TRANSORAL DIAGNOSTIC N/A 11/16/2020    Procedure: ESOPHAGOGASTRODUODENOSCOPY (EGD);  Surgeon: Juan Garnett MD;  Location: [facility];  Service: Gastroenterology    SD ESOPHAGOGASTRODUODENOSCOPY TRANSORAL DIAGNOSTIC N/A 11/18/2020    Procedure: ESOPHAGOGASTRODUODENOSCOPY (EGD) WITH BANDING;  Surgeon: Juan Garnett MD;  Location: [facility];  Service: Gastroenterology    URETEROSCOPY           CURRENT MEDICATIONS:    No current facility-administered medications for this encounter.     Current Outpatient Medications   Medication Sig Dispense Refill     "bismuth subsalicylate (PEPTO BISMOL) 262 MG chewable tablet Take 1 tablet by mouth every 6 hours as needed for diarrhea      ciprofloxacin (CIPRO) 500 MG tablet Take 1 tablet by mouth once daily 60 tablet 0    fish oil-omega-3 fatty acids 1000 MG capsule Take 1 g by mouth daily      furosemide (LASIX) 20 MG tablet Take 1 tablet (20 mg) by mouth daily 60 tablet 6    lactulose (CHRONULAC) 10 GM/15ML solution Take 30 mLs (20 g) by mouth 3 times daily 2700 mL 0    midodrine (PROAMATINE) 2.5 MG tablet Take 1 tablet (2.5 mg) by mouth 3 times daily (with meals) 180 tablet 3    multivitamin, therapeutic (THERA-VIT) TABS tablet Take 1 tablet by mouth daily      omeprazole (PRILOSEC) 20 MG DR capsule Take 1 capsule (20 mg) by mouth 2 times daily 180 capsule 3    rifaximin (XIFAXAN) 550 MG TABS tablet Take 1 tablet (550 mg) by mouth 2 times daily 60 tablet 4    sodium bicarbonate 650 MG tablet Take 1 tablet (650 mg) by mouth 2 times daily 60 tablet 0    spironolactone (ALDACTONE) 50 MG tablet Take 1 tablet (50 mg) by mouth daily 60 tablet 6    Zinc Sulfate 220 (50 Zn) MG TABS Take 220 mg by mouth daily      blood glucose (NO BRAND SPECIFIED) lancets standard Use to test blood sugar one time daily or as directed. 100 each 5    blood glucose (NO BRAND SPECIFIED) test strip Use to test blood sugar one time daily or as directed. 100 strip 5    blood glucose monitoring (NO BRAND SPECIFIED) meter device kit Use to test blood sugar one time daily or as directed. 1 kit 0         ALLERGIES:  Allergies   Allergen Reactions    Sulfa Antibiotics Shortness Of Breath and Itching    Penicillins Unknown     Annotation: discussed with patient, he reports he had \"itching\" with penicillin many years ago in [facility] but no hives or throat or respiratory symptoms.  he also reports having tolerated amoxicillin since coming to [place].         FAMILY HISTORY:  Family History   Problem Relation Age of Onset    Heart Disease Brother     Hypertension Mother  "    Hypertension Father        SOCIAL HISTORY:  Social History     Socioeconomic History    Marital status:    Tobacco Use    Smoking status: Never     Passive exposure: Never    Smokeless tobacco: Never   Vaping Use    Vaping status: Never Used   Substance and Sexual Activity    Alcohol use: Not Currently     Comment: none for the past 2 years    Drug use: No   Social History Narrative    Lives with wife - has worked as  in the past     Social Determinants of Health     Financial Resource Strain: Low Risk  (12/27/2023)    Financial Resource Strain     Within the past 12 months, have you or your family members you live with been unable to get utilities (heat, electricity) when it was really needed?: No   Food Insecurity: Low Risk  (12/27/2023)    Food Insecurity     Within the past 12 months, did you worry that your food would run out before you got money to buy more?: No     Within the past 12 months, did the food you bought just not last and you didn t have money to get more?: Patient declined   Transportation Needs: Low Risk  (12/27/2023)    Transportation Needs     Within the past 12 months, has lack of transportation kept you from medical appointments, getting your medicines, non-medical meetings or appointments, work, or from getting things that you need?: No   Interpersonal Safety: Unknown (3/24/2024)    Received from [employer]    Humiliation, Afraid, Rape, and Kick questionnaire     Physically Abused: Patient unable to answer     Sexually Abused: Patient unable to answer   Housing Stability: Low Risk  (3/24/2024)    [facility] from [employer]    Housing Stability Vital Sign     Unable to Pay for Housing in the Last Year: No     Number of Places Lived in the Last Year: 1     In the last 12 months, was there a time when you did not have a steady place to sleep or slept in a shelter (including now)?: No       VITALS:  No data found.      PHYSICAL EXAM   "  VITAL SIGNS: /59   Pulse 69   Temp 98.3  F (36.8  C) (Oral)   Resp 20   Ht 1.727 m (5' 8\")   Wt 68 kg (150 lb)   SpO2 97%   BMI 22.81 kg/m    Physical Exam  Vitals and nursing note reviewed.   HENT:      Head: Normocephalic and atraumatic.      Nose: No congestion.      Mouth/Throat:      Pharynx: No oropharyngeal exudate or posterior oropharyngeal erythema.   Eyes:      General: No scleral icterus.        Right eye: No discharge.         Left eye: No discharge.      Pupils: Pupils are equal, round, and reactive to light.   Cardiovascular:      Rate and Rhythm: Normal rate and regular rhythm.      Pulses: Normal pulses.   Pulmonary:      Effort: Pulmonary effort is normal. No respiratory distress.   Abdominal:      Palpations: Abdomen is soft.      Tenderness: There is abdominal tenderness (Mild upper abdominal tenderness and epigastric tenderness.). There is no guarding or rebound.      Comments: No significant fluid wave or ascites noted on my exam.  No distention   Musculoskeletal:         General: No signs of injury.      Cervical back: Normal range of motion. No rigidity.   Skin:     General: Skin is warm and dry.      Findings: No rash.   Neurological:      Mental Status: Mental status is at baseline.      Cranial Nerves: No cranial nerve deficit.      Comments: Opens eyes to voice, no asterixis or clonus.  Sleepy.          LABS  Labs Ordered and Resulted from Time of ED Arrival to Time of ED Departure   AMMONIA - Abnormal       Result Value    Ammonia 66 (*)    BLOOD GAS VENOUS - Abnormal    pH Venous 7.39      pCO2 Venous 33 (*)     pO2 Venous 31      Bicarbonate Venous 20 (*)     Base Excess/Deficit Venous -5.4 (*)     FIO2 21      Oxyhemoglobin Venous 56 (*)     O2 Sat, Venous 57.6 (*)    COMPREHENSIVE METABOLIC PANEL - Abnormal    Sodium 141      Potassium 4.9      Carbon Dioxide (CO2) 17 (*)     Anion Gap 12      Urea Nitrogen 28.8 (*)     Creatinine 1.44 (*)     GFR Estimate 52 (*)     " Calcium 8.8      Chloride 112 (*)     Glucose 135 (*)     Alkaline Phosphatase 172 (*)     AST 55 (*)     ALT 26      Protein Total 7.6      Albumin 3.4 (*)     Bilirubin Total 1.2     ROUTINE UA WITH MICROSCOPIC REFLEX TO CULTURE - Abnormal    Color Urine Light Yellow      Appearance Urine Clear      Glucose Urine Negative      Bilirubin Urine Negative      Ketones Urine Negative      Specific Gravity Urine 1.026      Blood Urine 1.0 mg/dL (*)     pH Urine 6.0      Protein Albumin Urine 10 (*)     Urobilinogen Urine <2.0      Nitrite Urine Negative      Leukocyte Esterase Urine 75 Watson/uL (*)     Mucus Urine Present (*)     RBC Urine >182 (*)     WBC Urine 15 (*)     Squamous Epithelials Urine <1      Hyaline Casts Urine 2     INR - Abnormal    INR 1.39 (*)    BILIRUBIN DIRECT - Abnormal    Bilirubin Direct 0.35 (*)    CBC WITH PLATELETS AND DIFFERENTIAL - Abnormal    WBC Count 4.6      RBC Count 3.41 (*)     Hemoglobin 9.4 (*)     Hematocrit 30.3 (*)     MCV 89      MCH 27.6      MCHC 31.0 (*)     RDW 19.2 (*)     Platelet Count 144 (*)     % Neutrophils 58      % Lymphocytes 22      % Monocytes 13      % Eosinophils 6      % Basophils 1      % Immature Granulocytes 0      NRBCs per 100 WBC 0      Absolute Neutrophils 2.7      Absolute Lymphocytes 1.0      Absolute Monocytes 0.6      Absolute Eosinophils 0.3      Absolute Basophils 0.1      Absolute Immature Granulocytes 0.0      Absolute NRBCs 0.0     PARTIAL THROMBOPLASTIN TIME - Normal    aPTT 37     ACUTE HEPATITIS PANEL - Normal    Hepatitis A Antibody IgM Nonreactive      Hepatitis B Core Antibody IgM Nonreactive      Hepatitis C Antibody Nonreactive      Hepatitis B Surface Antigen Nonreactive     LACTIC ACID WHOLE BLOOD - Normal    Lactic Acid 1.4           RADIOLOGY  US Paracentesis without Albumin initial: low volume   Final Result   IMPRESSION:   1.  Status post ultrasound-guided paracentesis.      Reference CPT Code: 09431      CT Abdomen Pelvis w  Contrast   Final Result   IMPRESSION:    1.  Cirrhosis with findings of portal hypertension including splenomegaly and small volume ascites. Patent TIPS.   2.  Mild bladder wall thickening could be seen with cystitis. Correlate with urinalysis.   3.  Mild gastric mural thickening could be due to portal gastropathy and/or gastritis.   4.  Cholelithiasis.   5.  Nonobstructing bilateral renal calculi.      Head CT w/o contrast   Final Result   IMPRESSION:   1.  No CT evidence for acute intracranial process.   2.  Brain atrophy and presumed chronic microvascular ischemic changes as above.         I have independently reviewed the above image. See radiology report for detail.      EKG:    EKG obtained at 1143 and shows sinus rhythm rate 57, Qtc 461, compared to previous EKG on 2/27/24. Appears similar to previous EKG. I have independently reviewed and interpreted today's EKG, pending Cardiologist read.       PROCEDURES:  N/A      MEDICATIONS GIVEN IN THE EMERGENCY:  Medications   fentaNYL (PF) (SUBLIMAZE) injection 50 mcg (50 mcg Intravenous $Given 4/27/24 1327)   ondansetron (ZOFRAN) injection 4 mg (4 mg Intravenous $Given 4/27/24 1327)   iopamidol (ISOVUE-370) solution 74 mL (74 mLs Intravenous $Given 4/27/24 1322)   lactated ringers BOLUS 1,000 mL (0 mLs Intravenous Stopped 4/27/24 1741)   cefTRIAXone (ROCEPHIN) 1 g vial to attach to  mL bag for ADULTS or NS 50 mL bag for PEDS (0 g Intravenous Stopped 4/27/24 1712)   lactulose (CHRONULAC) solution 20 g (20 g Oral $Given 4/27/24 1631)   lactulose (CHRONULAC) solution 20 g (20 g Oral $Given 4/27/24 2009)       NEW PRESCRIPTIONS STARTED AT TODAY'S ER VISIT  Discharge Medication List as of 4/29/2024 12:44 PM               Olivia Sheth MD  Emergency Medicine  [facility] EMERGENCY DEPARTMENT  [address] [place] [id number]  [phone]  Dept: [phone]             Olivia Sheth MD  05/07/24 3957

## 2024-04-27 NOTE — ED NOTES
Expected Patient Referral to ED  10:31 AM    Referring Clinic/Provider:  Dr Currie     Reason for referral/Clinical facts:  Hx of tips and revision, getting regular paracentesis, hx of alcoholism and JENNINGS, last attempted paracentesis 4/16 and dry, hx of hepatic encephalopathy, rifaximin, miralax, lactulose.  Feeling weak and confused 1 week, likely encephalopathy present, seen on video conference today.  Lives with wife, daughter was there also.    Recommendations provided:  Send to ED for further evaluation    Caller was informed that this institution does possess the capabilities and/or resources to provide for patient and should be transferred to our facility.    Discussed that if direct admit is sought and any hurdles are encountered, this ED would be happy to see the patient and evaluate.    Informed caller that recommendations provided are recommendations based only on the facts provided and that they responsible to accept or reject the advice, or to seek a formal in person consultation as needed and that this ED will see/treat patient should they arrive.      OLIVIA JOHNSON MD  [facility] EMERGENCY DEPARTMENT  [address] [place] [id number] [phone]       Olivia Johnson MD  04/27/24 [id number]

## 2024-04-28 NOTE — PLAN OF CARE
Problem: Adult Inpatient Plan of Care  Goal: Plan of Care Review  Description: The Plan of Care Review/Shift note should be completed every shift.  The Outcome Evaluation is a brief statement about your assessment that the patient is improving, declining, or no change.  This information will be displayed automatically on your shift  note.  Outcome: Progressing  Flowsheets (Taken 4/28/2024 1323)  Plan of Care Reviewed With: patient   Goal Outcome Evaluation:      Plan of Care Reviewed With: patient      Patient oriented to person and situation only.  Bed and chair alarm on.  Patient appropriate, easily directed.  Utilizing call light.  Intermittently sleeping throughout the shift.    Patient has steady gait when up with walker and gait belt.  SBA only.  Patient ambulated hallway and up to chair.  Tolerated well.  PT/OT working with patient.    Lactulose scheduled.  GI saw patient.  Paracentesis completed.  Only 1 stool this shift reported.  Recheck labs in am.

## 2024-04-28 NOTE — PROGRESS NOTES
[facility]    PROGRESS NOTE - Hospitalist Service    Assessment and Plan    Principal Problem:    Hepatic encephalopathy (H)  Active Problems:    Type 2 diabetes mellitus with microalbuminuria, without long-term current use of insulin (H)    Other cirrhosis of liver (H) with ascites    Chronic kidney disease, stage 3a (H)    Portal hypertension (H)    Acute encephalopathy    Complicated UTI (urinary tract infection)    Vito Butlerg is a 73 year old male with h/o  JENNINGS cirrhosis, portal hypertension status post TIPS, hepatic encephalopathy, kidney stones, DM, CKD 3a presents for evaluation of altered mental status having recently been refusing to take lactulose    Hepatic encephalopathy  - continue home lactulose.   - GI consultation  - CT of abd  Cirrhosis with findings of portal hypertension including splenomegaly and small volume ascites. Patent TIPS.  Mild bladder wall thickening could be seen with cystitis. Correlate with urinalysis.  Mild gastric mural thickening could be due to portal gastropathy and/or gastritis.  - US paracentesis today per patient abd feels more distended  - send for studies if able to perform     Possible UTI.  - UA dirty  - continue Rocephin until culture returns    Facial droop/drooling per note Family noted this multiple days ago  -CT head  Brain atrophy and presumed chronic microvascular ischemic changes  - likely from encephalopathy  - PT/OT    JENNINGS cirrhosis   Portal hypertension status post TIPS  -GI consult as above  -patient follows up regularly  -Can hold home Cipro while he is on Rocephin  -Continue home Lasix, spironolactone     CKD 3a  -Cr at baseline  -Avoid nephrotoxins  -Continue home bicarbonate      Normocytic anemia  -Hemoglobin 9.4 similar to recent baseline     DM, not on home meds. Accuchecks and sliding scale. While in hospital for now    GERD, home PPI   Chronic hypotension,   - continue  midodrine with hold parameters        Clinically  "Significant Risk Factors      # Overweight: Estimated body mass index is 28.31 kg/m  as calculated from the following:  Height as of this encounter: 1.626 m (5' 4\").  Weight as of this encounter: 74.8 kg (164 lb 14.4 oz)., PRESENT ON ADMISSION    COVID-19 PCR Results          11/14/2022    09:16 1/24/2023    02:09 3/3/2024    15:10   COVID-19 PCR Results   SARS CoV2 PCR Negative  Negative  Negative      COVID-19 Antibody Results, Testing for Immunity           No data to display               Code Status: Full Code  VTE prophylaxis:  Pneumatic Compression Devices  DIET: Orders Placed This Encounter      Combination Diet 2 gm NA Diet    Drains/Lines: Patient has No line.    Cronin Catheter: Not present  Weight bearing status: WBAT    Expected Discharge Date: 04/29/2024      Destination: home with family        Subjective:  Still feels weak today, also feels his abd is more distended and last paracentesis was 2 weeks ago.     PHYSICAL EXAM  Temp:  [97.7  F (36.5  C)-98.6  F (37  C)] 97.7  F (36.5  C)  Pulse:  [51-67] 67  Resp:  [10-18] 18  BP: (106-156)/(51-77) 156/77  SpO2:  [96 %-100 %] 96 %  Wt Readings from Last 1 Encounters:   04/27/24 68 kg (150 lb)       Intake/Output Summary (Last 24 hours) at 4/28/2024 0908  Last data filed at 4/27/2024 2058  Gross per 24 hour   Intake 1100 ml   Output 500 ml   Net 600 ml      Body mass index is 22.81 kg/m .    Physical Exam  Vitals and nursing note reviewed.   Constitutional:       Comments: Sitting up in chair groggy but arousable and follows commands.   HENT:      Head: Normocephalic and atraumatic.   Cardiovascular:      Rate and Rhythm: Normal rate and regular rhythm.      Pulses: Normal pulses.   Pulmonary:      Effort: Pulmonary effort is normal. No respiratory distress.      Breath sounds: Normal breath sounds. No wheezing or rales.   Abdominal:      General: Bowel sounds are normal.      Palpations: Abdomen is soft.      Comments: +Distention,+BS, NT, periumbilical " hernia   Musculoskeletal:         General: Normal range of motion.   Skin:     General: Skin is warm and dry.      Capillary Refill: Capillary refill takes less than 2 seconds.   Neurological:      Mental Status: He is oriented to person, place, and time. Mental status is at baseline.      Comments: No asterexis       PERTINENT LABS/IMAGING:  Results for orders placed or performed during the hospital encounter of 04/27/24   Head CT w/o contrast    Impression    IMPRESSION:  1.  No CT evidence for acute intracranial process.  2.  Brain atrophy and presumed chronic microvascular ischemic changes as above.   CT Abdomen Pelvis w Contrast    Impression    IMPRESSION:   1.  Cirrhosis with findings of portal hypertension including splenomegaly and small volume ascites. Patent TIPS.  2.  Mild bladder wall thickening could be seen with cystitis. Correlate with urinalysis.  3.  Mild gastric mural thickening could be due to portal gastropathy and/or gastritis.  4.  Cholelithiasis.  5.  Nonobstructing bilateral renal calculi.       Imaging results reviewed over the past 24 hrs:   Recent Results (from the past 24 hour(s))   Head CT w/o contrast    Narrative    EXAM: CT HEAD W/O CONTRAST  LOCATION: [facility]  DATE: 4/27/2024    INDICATION: ams  COMPARISON: 03/23/2024  TECHNIQUE: Routine CT Head without IV contrast. Multiplanar reformats. Dose reduction techniques were used.    FINDINGS:  INTRACRANIAL CONTENTS: No intracranial hemorrhage, extraaxial collection, or mass effect.  No CT evidence of acute infarct. Mild presumed chronic small vessel ischemic changes. Mild generalized volume loss. No hydrocephalus.     VISUALIZED ORBITS/SINUSES/MASTOIDS: No intraorbital abnormality. No paranasal sinus mucosal disease. No middle ear or mastoid effusion.    BONES/SOFT TISSUES: No acute abnormality.      Impression    IMPRESSION:  1.  No CT evidence for acute intracranial process.  2.  Brain atrophy and presumed  chronic microvascular ischemic changes as above.   CT Abdomen Pelvis w Contrast    Narrative    EXAM: CT ABDOMEN PELVIS W CONTRAST  LOCATION: [facility]  DATE: 4/27/2024    INDICATION: ab pain  COMPARISON: Abdominal ultrasound 04/15/2024 and CT abdomen/pelvis 03/23/2024.  TECHNIQUE: CT scan of the abdomen and pelvis was performed following injection of IV contrast. Multiplanar reformats were obtained. Dose reduction techniques were used.  CONTRAST: isovue 370 74ml    FINDINGS:   LOWER CHEST: Mild bibasilar atelectasis and/or scarring.    HEPATOBILIARY: Cirrhosis. No focal hepatic lesion on single phase examination. Grossly patent TIPS. Patent major portal veins. Cholelithiasis.    PANCREAS: Normal.    SPLEEN: Enlarged, measuring 14.4 cm craniocaudal. Stable 1.1 cm indeterminate splenic hypodensity.    ADRENAL GLANDS: Normal.    KIDNEYS/BLADDER: No hydronephrosis. Nonobstructing bilateral renal calculi. Bilateral renal cysts and smaller hypodensities which are too small to characterize, for which no dedicated imaging follow-up is required. Mild circumferential bladder wall   thickening.    BOWEL: No bowel obstruction. Large amount of colonic stool. Normal appendix. Mild mural thickening of the stomach. Small volume ascites. Diffuse mesenteric congestion and edema. No pneumoperitoneum.    LYMPH NODES: Normal.    VASCULATURE: Normal caliber abdominal aorta with mild calcified aortoiliac atherosclerotic plaque.    PELVIC ORGANS: Normal.    MUSCULOSKELETAL: Thoracolumbar spondylosis. No destructive osseous lesion. Gynecomastia. Mild body wall edema.      Impression    IMPRESSION:   1.  Cirrhosis with findings of portal hypertension including splenomegaly and small volume ascites. Patent TIPS.  2.  Mild bladder wall thickening could be seen with cystitis. Correlate with urinalysis.  3.  Mild gastric mural thickening could be due to portal gastropathy and/or gastritis.  4.  Cholelithiasis.  5.   Nonobstructing bilateral renal calculi.     Most Recent 3 CBC's:  Recent Labs   Lab Test 04/28/24  0634 04/27/24  1143 04/24/24  1052   WBC 3.7* 4.6 4.6   HGB 8.9* 9.4* 9.2*   MCV 88 89 88   * 144* 116*     Most Recent 3 BMP's:  Recent Labs   Lab Test 04/28/24  0730 04/28/24  0634 04/27/24 2131 04/27/24  1143 04/24/24  1052   NA  --  141  --  141 137   POTASSIUM  --  4.5  --  4.9 4.5   CHLORIDE  --  110*  --  112* 109*   CO2  --  18*  --  17* 16*   BUN  --  25.6*  --  28.8* 28.3*   CR  --  1.33*  --  1.44* 1.43*   ANIONGAP  --  13  --  12 12   SILVIA  --  8.8  --  8.8 8.8   * 129* 153* 135* 185*     Most Recent 2 LFT's:  Recent Labs   Lab Test 04/28/24  0634 04/27/24  1143   AST 33 55*   ALT 18 26   ALKPHOS 147 172*   BILITOTAL 0.9 1.2     Most Recent 3 INR's:  Recent Labs   Lab Test 04/28/24  0635 04/27/24  1143 03/03/24  1428   INR 1.51* 1.39* 1.44*     Most Recent 3 BNP's:  Recent Labs   Lab Test 11/04/23  0540   NTBNP 1,641*     Most Recent D-dimer:No lab results found.  Most Recent 6 glucoses:  Recent Labs   Lab Test 04/28/24  0730 04/28/24  0634 04/27/24 2131 04/27/24  1143 04/24/24  1052 04/10/24  1329   * 129* 153* 135* 185* 125*     Most Recent Urinalysis:  Recent Labs   Lab Test 04/27/24  1411   COLOR Light Yellow   APPEARANCE Clear   URINEGLC Negative   URINEBILI Negative   URINEKETONE Negative   SG 1.026   UBLD 1.0 mg/dL*   URINEPH 6.0   PROTEIN 10*   NITRITE Negative   LEUKEST 75 [place]/uL*   RBCU >182*   WBCU 15*     Recent Labs   Lab Test 07/17/23  1600   CHOL 128   HDL 60   LDL 54   TRIG 71     Recent Labs   Lab Test 04/28/24  0730 04/28/24  0634   NA  --  141   POTASSIUM  --  4.5   CHLORIDE  --  110*   CO2  --  18*   * 129*   BUN  --  25.6*   CR  --  1.33*   GFRESTIMATED  --  57*   SILVIA  --  8.8     Recent Labs   Lab Test 02/19/24  1423 11/16/23  1132 07/17/23  1600   A1C 5.9* 5.8* 5.8*     Recent Labs   Lab Test 04/28/24  0634 04/27/24  1143 04/24/24  1052   HGB 8.9* 9.4*  "9.2*     No results for input(s): \"TROPONINI\" in the last 59576 hours.  Recent Labs   Lab Test 11/04/23  0540   NTBNP 1,641*     Recent Labs   Lab Test 04/24/24  1052   TSH 1.20     Recent Labs   Lab Test 04/28/24  0635 04/27/24  1143 03/03/24  1428   INR 1.51* 1.39* 1.44*       40 MINUTES SPENT BY ME on the date of service doing chart review, history, exam, documentation, discussion with nursing staff and specialist, & further activities per the note.  Gale Thakur MD  [facility] Medicine Service  [phone]   "

## 2024-04-28 NOTE — PROGRESS NOTES
"   04/28/24 1130   Appointment Info   Signing Clinician's Name / Credentials (OT) Shira Ortega OTD OTR/L   Living Environment   People in Home spouse;child(manav), adult;grandchild(manav)   Current Living Arrangements house   Home Accessibility stairs to enter home;stairs within home   Number of Stairs, Main Entrance 5   Number of Stairs, Within Home, Primary seven   Living Environment Comments Reports tub shower at home, difficulty stating other home set up aspects   Self-Care   Equipment Currently Used at Home walker, rolling  (reports using \"sometimes\")   Activity/Exercise/Self-Care Comment Typically ind with most ADLs, gets assist with dressing intermittently and bathing - per previous admission family assists with all ADLs/IADLs as needed   General Information   Onset of Illness/Injury or Date of Surgery 04/27/24   Referring Physician Ofe Beasley MD   Patient/Family Therapy Goal Statement (OT) To return home, to \"feel better\"   Additional Occupational Profile Info/Pertinent History of Current Problem Vito CABRERA Yossi is a 73 year old male admitted on 4/27/2024. He has history of JENNINGS cirrhosis, portal hypertension status post TIPS, hepatic encephalopathy, kidney stones, DM, CKD 3a presents for evaluation of altered mental status having recently been refusing to take lactulose   Existing Precautions/Restrictions fall   Limitations/Impairments safety/cognitive   Cognitive Status Examination   Orientation Status person;place;time   Affect/Mental Status (Cognitive) flat/blunted affect   Follows Commands WFL;repetition of directions required;verbal cues/prompting required   Visual Perception   Visual Impairment/Limitations WFL   Posture   Posture forward head position   Range of Motion Comprehensive   General Range of Motion bilateral upper extremity ROM WFL   Strength Comprehensive (MMT)   General Manual Muscle Testing (MMT) Assessment upper extremity strength deficits identified   Comment, General Manual Muscle " Testing (MMT) Assessment Min generalized weakness   Bed Mobility   Bed Mobility supine-sit   Supine-Sit [place] (Bed Mobility) minimum assist (75% patient effort)   Transfers   Transfers sit-stand transfer;toilet transfer   Sit-Stand Transfer   Sit-Stand [place] (Transfers) contact guard   Assistive Device (Sit-Stand Transfers) walker, front-wheeled   Toilet Transfer   [place] Level (Toilet Transfer) contact guard   Assistive Device (Toilet Transfer) walker, front-wheeled   Balance   Balance Assessment standing dynamic balance   Standing Balance: Dynamic contact guard   Activities of Daily Living   BADL Assessment/Intervention lower body dressing;toileting;grooming   Lower Body Dressing Assessment/Training   [place] Level (Lower Body Dressing) minimum assist (75% patient effort)   Grooming Assessment/Training   [place] Level (Grooming) minimum assist (75% patient effort)   Toileting   [place] Level (Toileting) minimum assist (75% patient effort)   Clinical Impression   Criteria for Skilled Therapeutic Interventions Met (OT) Yes, treatment indicated   OT Diagnosis Decreased ind with ADLs and safety   Influenced by the following impairments Hepatic encephalopathy   OT Problem List-Impairments impacting ADL activity tolerance impaired;cognition;balance;strength;mobility   Assessment of Occupational Performance 1-3 Performance Deficits   Identified Performance Deficits toileting, fxl transfers, activity tolerance   Planned Therapy Interventions (OT) ADL retraining;cognition;progressive activity/exercise;risk factor education;transfer training;strengthening   Clinical Decision Making Complexity (OT) problem focused assessment/low complexity   Risk & Benefits of therapy have been explained evaluation/treatment results reviewed;care plan/treatment goals reviewed;risks/benefits reviewed;current/potential barriers reviewed;patient   OT Total Evaluation Time   OT Eval, Low Complexity Minutes  (58809) 8   OT Goals   Therapy Frequency (OT) One time eval and treatment   OT Predicted Duration/Target Date for Goal Attainment 04/28/24   OT Goals Hygiene/Grooming;Toilet Transfer/Toileting;Lower Body Dressing   OT: Hygiene/Grooming modified independent;while standing;Goal Met;Completed   OT: Toilet Transfer/Toileting Supervision/stand-by assist;toilet transfer;cleaning and garment management;Goal Met;Completed   Self-Care/Home Management   Self-Care/Home Mgmt/ADL, Compensatory, Meal Prep Minutes (59577) 23   Symptoms Noted During/After Treatment (Meal Preparation/Planning Training) none   Treatment Detail/Skilled Intervention Eval completed, treatment initiated. Fxl mob within room and hallway SBA with FWW, no LOB, steady pace. Pt answering questions appropriately, does not demo confusion. Completed seated toileting mod (I) with set up for urine collection, min cueing for safe hand placement and set up with material for pericares. All STS transfers CGA-SBA, no LOB noted. Stand g/h at sink 10 minutes mod (I), no LOB noted, good safety awareness. Returned to bedside chair SBA with no AD. Left in chair with alarm on and call light in reach.   OT Discharge Planning   OT Plan D/c OT - defer to PT for mobility and activity tolerance   OT Discharge Recommendation (DC Rec) home with assist   OT Rationale for DC Rec Pt has assist for all ADLs/IADLs at home, pt near fxl baseline for mobility/cognition/ADLs   OT Brief overview of current status SBA fxl mob, mod (I) g/h, SBA toileting   Total Session Time   Timed Code Treatment Minutes 23   Total Session Time (sum of timed and untimed services) 31

## 2024-04-28 NOTE — CONSULTS
GASTROENTEROLOGY CONSULTATION     Vito Sy   [address] [place] [address]   73 year old male   Admission Date/Time: 4/27/2024   Encounter Date: 4/28/2024  Primary Care Provider: Amrik Mejia     Referring / Attending Physician: Gale Thakur    We were asked to see the patient in consultation by Dr. Gale Thakur for evaluation of AMS .     HPI: Vtio Sy is a 73 year old male who has been sent to the ER by our team, after being seen in our clinic yesterday with increased confusion. He is found to have an abnormal urinalysis and started on Rocephin. There has also been some concerns of lactulose non compliance which was restarted upon admission. Mentation is better today but still with slowed speech.   He is a patient of Dr. Barber Montenegro. He has alcoholic cirrhosis (off alcohol 4 years), ascites on every 2 weeks taps until this last month ( remains on aldactone 50 and furosemide 20), CKD with stable creatinine on midodrine, DM, hepatic encephalopathy on lactulose 20 g three times daily and rifaximin two times daily, varices of both gastric and esophageal with last EGD 2/27/2024 showed small grade 1 esophageal varix, anemia with stable HGB, recurrent SBP o daily Cipro, s/p TIPS and revision of tips.   He presented last evening.  His labs all look stable. CTA showed small volume ascites and patent TIPS. He was noted to have constipation as well. He takes miralax as needed. Mild gastric mural thickening likely due to portal gastropathy. Head CT unrevealing.   TIPS 11/16/2022, revision 12/9/2022. TIPS currently patient. Last paracentesis 3/18/024 4.2 liters removed, now with US 4/15/2024 trace ascites and CTA with minimal ascites.   EGD 3/2022 showed small varix , portal hypertension and duodenitis. Colonoscopy 1/25/2022 with poor prep showed angioectasia of the entire colon. EGD 2/27/2024 grade 1 varices of the lower esophagus, normal stomach and duodenum.     Past Medical History  Past  Medical History:   Diagnosis Date    Alcoholic cirrhosis of liver with ascites (H)     Cirrhosis of liver (H)     Diabetes mellitus (H)     Gout     Hypertension     Kidney stone        Past Surgical History  Past Surgical History:   Procedure Laterality Date    COLONOSCOPY      ESOPHAGOSCOPY, GASTROSCOPY, DUODENOSCOPY (EGD), COMBINED N/A 10/10/2023    Procedure: ESOPHAGOGASTRODUODENOSCOPY with biopsy;  Surgeon: Puneet Plunkett MD, MD;  Location: [facility] Main OR    ESOPHAGOSCOPY, GASTROSCOPY, DUODENOSCOPY (EGD), COMBINED N/A 2/27/2024    Procedure: ESOPHAGOGASTRODUODENOSCOPY;  Surgeon: Beatrice Christie MD;  Location: [facility] Main OR    IR PARACENTESIS  11/6/2021    IR TRANSVEN INTRAHEPATIC PORTOSYST REV  1/26/2023    IR TRANSVEN INTRAHEPATIC PORTOSYST SHUNT  11/16/2022    IR TRANSVEN INTRAHEPATIC PORTOSYST SHUNT  12/9/2022    CA ESOPHAGOGASTRODUODENOSCOPY TRANSORAL DIAGNOSTIC N/A 11/16/2020    Procedure: ESOPHAGOGASTRODUODENOSCOPY (EGD);  Surgeon: Juan Garnett MD;  Location: [facility];  Service: Gastroenterology    CA ESOPHAGOGASTRODUODENOSCOPY TRANSORAL DIAGNOSTIC N/A 11/18/2020    Procedure: ESOPHAGOGASTRODUODENOSCOPY (EGD) WITH BANDING;  Surgeon: Juan Garnett MD;  Location: [facility];  Service: Gastroenterology    URETEROSCOPY         Family History  Family History   Problem Relation Age of Onset    Heart Disease Brother     Hypertension Mother     Hypertension Father        Social History  Social History     Socioeconomic History    Marital status:      Spouse name: Not on file    Number of children: Not on file    Years of education: Not on file    Highest education level: Not on file   Occupational History    Not on file   Tobacco Use    Smoking status: Never     Passive exposure: Never    Smokeless tobacco: Never   Vaping Use    Vaping status: Never Used   Substance and Sexual Activity    Alcohol use: Not Currently     Comment: none for the past 2 years    Drug use: No    Sexual  activity: Not on file   Other Topics Concern    Not on file   Social History Narrative    Lives with wife - has worked as  in the past     Social Determinants of Health     Financial Resource Strain: Low Risk  (12/27/2023)    Financial Resource Strain     Within the past 12 months, have you or your family members you live with been unable to get utilities (heat, electricity) when it was really needed?: No   Food Insecurity: Low Risk  (12/27/2023)    Food Insecurity     Within the past 12 months, did you worry that your food would run out before you got money to buy more?: No     Within the past 12 months, did the food you bought just not last and you didn t have money to get more?: Patient declined   Transportation Needs: Low Risk  (12/27/2023)    Transportation Needs     Within the past 12 months, has lack of transportation kept you from medical appointments, getting your medicines, non-medical meetings or appointments, work, or from getting things that you need?: No   Physical Activity: Not on file   Stress: Not on file   Social Connections: Not on file   Interpersonal Safety: Unknown (3/24/2024)    Received from [email or web address]    Humiliation, Afraid, Rape, and Kick questionnaire     Fear of Current or Ex-Partner: Not on file     Emotionally Abused: Not on file     Physically Abused: Patient unable to answer     Sexually Abused: Patient unable to answer   Housing Stability: Low Risk  (3/24/2024)    Received from [email or web address]    Housing Stability Vital Sign     Unable to Pay for Housing in the Last Year: No     Number of Places Lived in the Last Year: 1     In the last 12 months, was there a time when you did not have a steady place to sleep or slept in a shelter (including now)?: No       Medications  Prior to Admission medications    Medication Sig Start Date End Date Taking? Authorizing Provider   bismuth subsalicylate (PEPTO BISMOL) 262 MG chewable tablet Take 1  "tablet by mouth every 6 hours as needed for diarrhea   Yes Unknown, Entered By History   ciprofloxacin (CIPRO) 500 MG tablet Take 1 tablet by mouth once daily 4/19/24  Yes Gaye Sparks MD   fish oil-omega-3 fatty acids 1000 MG capsule Take 1 g by mouth 2 times daily   Yes Unknown, Entered By History   furosemide (LASIX) 20 MG tablet Take 1 tablet (20 mg) by mouth daily 1/15/24  Yes Amrik Mejia MD   HEMP OIL OR EXTRACT OR OTHER CBD CANNABINOID, NOT MEDICAL CANNABIS, CBD gummy   Yes Unknown, Entered By History   lactulose (CHRONULAC) 10 GM/15ML solution Take 20 g by mouth 3 times daily 3/28/24 4/27/24 Yes Reported, Patient   midodrine (PROAMATINE) 2.5 MG tablet Take 1 tablet (2.5 mg) by mouth 3 times daily (with meals) 1/15/24  Yes Amrik Mejia MD   multivitamin, therapeutic (THERA-VIT) TABS tablet Take 1 tablet by mouth daily   Yes Unknown, Entered By History   omeprazole (PRILOSEC) 20 MG DR capsule Take 1 capsule (20 mg) by mouth 2 times daily 12/27/23  Yes Amrik Mejia MD   rifaximin (XIFAXAN) 550 MG TABS tablet Take 1 tablet (550 mg) by mouth 2 times daily 12/22/22  Yes Stiven Peters DO   sodium bicarbonate 650 MG tablet Take 650 mg by mouth 2 times daily 3/28/24 4/27/24 Yes Reported, Patient   spironolactone (ALDACTONE) 50 MG tablet Take 1 tablet (50 mg) by mouth daily 1/15/24  Yes Amrik Mejia MD   Zinc Sulfate 220 (50 Zn) MG TABS Take 220 mg by mouth daily 3/28/24  Yes Reported, Patient       Allergies:  Sulfa antibiotics and Penicillins    ROS: A ten point review of systems was obtained and negative other than the symptoms noted above in the HPI. Weakness, slowed speech, fatigue.     Physical Exam:   BP (!) 156/77 (BP Location: Right arm)   Pulse 67   Temp 97.7  F (36.5  C) (Oral)   Resp 18   Ht 1.727 m (5' 8\")   Wt 68 kg (150 lb)   SpO2 96%   BMI 22.81 kg/m     Constitutional: ill appearing male, slowed speech.  oriented x 3,  no acute distress  Cardiovascular: regular, rate and " rhythm  Respiratory: clear to auscultation bilaterally, respirations non labored.   Psychiatric: normal pleasant affect  Head: atraumatic, normocephalic  ENT: mucous membranes are moist  Abdomen: soft, non-tender  Neuro: Neurologically slow movement and weakness of extremities.   Skin: warm, dry,    ADDITIONAL COMMENTS:   I reviewed the patient's new clinical lab test results.   Recent Labs   Lab Test 04/28/24  0635 04/28/24  0634 04/27/24  1143 04/24/24  1052 03/03/24  1428   WBC  --  3.7* 4.6 4.6 4.5   HGB  --  8.9* 9.4* 9.2* 9.2*   MCV  --  88 89 88 86   PLT  --  107* 144* 116* 131*   INR 1.51*  --  1.39*  --  1.44*      Recent Labs   Lab Test 04/28/24  0634 04/27/24  1143 04/24/24  1052    141 137   POTASSIUM 4.5 4.9 4.5   CHLORIDE 110* 112* 109*   CO2 18* 17* 16*   BUN 25.6* 28.8* 28.3*   CR 1.33* 1.44* 1.43*   ANIONGAP 13 12 12   SILVIA 8.8 8.8 8.8      Recent Labs   Lab Test 04/28/24  0634 04/27/24  1411 04/27/24  1143 04/24/24  1052 04/10/24  1329 03/03/24  1508 03/03/24  1428 11/04/23  0540 11/04/23  0337 01/24/23  0743 01/23/23  1744 12/18/22  2119 12/18/22  1912   ALBUMIN 2.9*  2.9*  --  3.4* 3.4*   < >  --  2.9*   < >  --    < > 3.6   < > 3.5   BILITOTAL 0.9  --  1.2 1.0   < >  --  1.1   < >  --    < > 1.8*   < > 1.7*   ALT 18  --  26 21   < >  --  15   < >  --    < > 35   < > 30   AST 33  --  55* 38   < >  --  51*   < >  --    < > 51*   < > 42   ALKPHOS 147  --  172* 161*   < >  --  133   < >  --    < > 183*   < > 130*   PROTEIN  --  10*  --   --   --  10*  --   --  30*   < >  --    < >  --    LIPASE  --   --   --   --   --   --  133*  --   --   --  156*  --  195*    < > = values in this interval not displayed.     I reviewed the patient's new imaging results.   CT of head                                                               IMPRESSION:  1.  No CT evidence for acute intracranial process.  2.  Brain atrophy and presumed chronic microvascular ischemic changes as above.    CT of abdomen/pelvis  with contrast                                                                IMPRESSION:   1.  Cirrhosis with findings of portal hypertension including splenomegaly and small volume ascites. Patent TIPS.  2.  Mild bladder wall thickening could be seen with cystitis. Correlate with urinalysis.  3.  Mild gastric mural thickening could be due to portal gastropathy and/or gastritis.  4.  Cholelithiasis.  5.  Nonobstructing bilateral renal calculi.  Impression:   73 year old male who is  a patient of Dr. Barber Montenegro. He has alcoholic cirrhosis (off alcohol 4 years), ascites on every 2 weeks taps until this last month ( remains on aldactone 50 and furosemide 20), CKD with stable creatinine on midodrine, DM, hepatic encephalopathy on lactulose 20 g three times daily and rifaximin two times daily, varices of both gastric and esophageal with last EGD 2/27/2024 showed small grade 1 esophageal varix, anemias with stable HGB, recurrent SBP on daily Cipro, s/p TIPS and revision of tips. NA MELD today of 14.   Hepatic encephalopathy - likely due to underlying infection. Likely due to UTI with culture pending. Mentation is improving. NO signs of worsening liver function. NO accumulation of ascites to suggest exacerbation of SBP, No fevers to suggest bacteremia. He does have a component of lactulose non compliance due to intolerance of the taste. He notes he can tolerated better if mixed with apple juice  Constipation as seen on CT scan - will continue lactulose and add senna two tablets daily as needed for management  Alcoholic liver disease - stable with NA MELD of 14. Previously refractory ascites now with no need for taps since 318, esophageal varices stable noted on EGD 2/2024, hepatoma screening has been negative.   Constipation. Difficulty with miralax - consider senna as planned.     Plan:   Continue lactulose 20 gm three times daily, spironolactone 50 mg daily, lasix 20 mg daily, midodrine, pantoprazole and rifaximin two  times daily  Continue rochepin, resume Cipro upon discharge.   Follow up with Dr. Montenegro as planned.   I discussed the patient's findings and plan with Dr. Marroquin who agrees with the above assessment and plan   60 minutes of total time was spent providing patient care, including patient evaluation, reviewing documentation/test result, and .  ________________________________________________________________________      Elizabeth Santiago CNP   [facility]   Office number     [facility] MD Note  Patient interviewed, examined, chart reviewed.  I discussed the further management of this patient with Elizabeth Santiago CNP, and I agree with her assessment and plan.  Please see her note for further details.  Briefly, Vito Sy is a 73-year-old gentleman with a history of alcoholic cirrhosis, portal hypertension with ascites, esophageal and gastric varices [last EGD February 27, 2024 with a small, grade 1 esophageal varix], ascites requiring regular paracentesis in the past, s/p TIPS for recurrent ascites.  He has also been on diuretics with Aldactone 50 mg and furosemide 20 mg daily.  He also has underlying CKD on midodrine.  He also has a history of recurrent SBP, and is currently on daily Cipro as well as type 2 diabetes mellitus, and prior hepatic encephalopathy, on lactulose and rifaximin.  He is a patient of her outpatient liver clinic, but presented to this hospital with altered mental status, with constipation due to noncompliance with lactulose.  Also documented to have a urinary tract infection, and has not been started on antibiotics.  No evidence for GI bleeding.  His mental status is cleared up now, after going back on the lactulose as well as rifaximin as well as with treatment of his UTI with antibiotics.  Currently feels much better, and is tolerating a regular diet without any issues.  He will continue with the lactulose at present dose, along with rifaximin twice a day.  Also agree  with diuretic use and daily PPI  He will complete the course of antibiotics for his UTI, and then resume Cipro upon discharge for SBP prophylaxis.  He will follow-up in our liver clinic with Dr. Barber Montenegro as previously planned.  Thank you for this consultation, we will follow along with you.  Alfredo Marroquin MD on 4/28/2024 at 4:13 PM  [facility]

## 2024-04-28 NOTE — PLAN OF CARE
Problem: Adult Inpatient Plan of Care  Goal: Plan of Care Review  Description: The Plan of Care Review/Shift note should be completed every shift.  The Outcome Evaluation is a brief statement about your assessment that the patient is improving, declining, or no change.  This information will be displayed automatically on your shift  note.  Outcome: Progressing   Goal Outcome Evaluation:         Pt alert to self, family at bedside, denies pain, slept between cares

## 2024-04-28 NOTE — PROGRESS NOTES
Occupational Therapy Discharge Summary    Reason for therapy discharge:    All goals and outcomes met, no further needs identified.    Progress towards therapy goal(s). See goals on Care Plan in [facility] electronic health record for goal details.  Goals met    Therapy recommendation(s):    No further therapy is recommended. Continued assist from family for ADLs/IADLs as needed

## 2024-04-28 NOTE — PLAN OF CARE
Problem: Adult Inpatient Plan of Care  Goal: Absence of Hospital-Acquired Illness or Injury  Outcome: Progressing  Goal: Optimal Comfort and Wellbeing  Outcome: Progressing     Problem: Risk for Delirium  Goal: Improved Behavioral Control  Intervention: Minimize Safety Risk  Recent Flowsheet Documentation  Taken 4/27/2024 2000 by Irene López, RN  Communication Enhancement Strategies:   communication board used   family involved in communication plan  Taken 4/27/2024 1757 by Irene López RN  Communication Enhancement Strategies:   communication board used   family involved in communication plan  Goal: Improved Attention and Thought Clarity  Intervention: Maximize Cognitive Function  Recent Flowsheet Documentation  Taken 4/27/2024 2000 by Irene López, RN  Reorientation Measures: clock in view  Taken 4/27/2024 1757 by Irene López RN  Reorientation Measures: clock in view   Goal Outcome Evaluation:       Up in chair but refused food. Orientated to month and year.   Denies pain.    Irene López RN on 4/27/2024 at 8:19 PM

## 2024-04-28 NOTE — PROGRESS NOTES
"   04/28/24 1400   Appointment Info   Signing Clinician's Name / Credentials (PT) Hailee Hong PT, DPT   Living Environment   People in Home spouse;grandchild(manav);child(manav), adult   Current Living Arrangements house   Home Accessibility stairs within home   Number of Stairs, Main Entrance 4   Number of Stairs, Within Home, Primary five   Living Environment Comments Pt goes back and forth btwn stairs having railings vs not having railings - unclear answer. Reports grab bars in restroom.   Self-Care   Equipment Currently Used at Home other (see comments)  (FWW)   Fall history within last six months yes   Number of times patient has fallen within last six months 1   Activity/Exercise/Self-Care Comment Typically ind with most ADLs, gets assist with dressing intermittently and bathing - per previous admission and pt confirms today that wife and daughter in law assists with all ADLs/IADLs as needed. Pt reports someone typically home all the time.   General Information   Onset of Illness/Injury or Date of Surgery 04/27/24   Referring Physician Gale Thakur MD   Patient/Family Therapy Goals Statement (PT) none stated.   Pertinent History of Current Problem (include personal factors and/or comorbidities that impact the POC) Per H&P: \"Vito Sy is a 73 year old male admitted on 4/27/2024. He has history of JENNINGS cirrhosis, portal hypertension status post TIPS, hepatic encephalopathy, kidney stones, DM, CKD 3a presents for evaluation of altered mental status having recently been refusing to take lactulose\"   Existing Precautions/Restrictions fall   Cognition   Affect/Mental Status (Cognition) confused   Orientation Status (Cognition) person;place;time;oriented x 3;verbal cues/prompts needed for orientation   Follows Commands (Cognition) follows one-step commands;50-74% accuracy;delayed response/completion;repetition of directions required;verbal cues/prompting required   Safety Deficit (Cognition) awareness of " need for assistance;impulsivity;insight into deficits/self-awareness   Pain Assessment   Patient Currently in Pain No   Range of Motion (ROM)   Range of Motion ROM is WNL   Strength (Manual Muscle Testing)   Strength (Manual Muscle Testing) Deficits observed during functional mobility   Bed Mobility   Comment, (Bed Mobility) Pt seated up in chair at beginning of session.   Transfers   Transfers sit-stand transfer   Impairments Contributing to Impaired Transfers impaired balance;decreased strength   Sit-Stand Transfer   Sit-Stand [place] (Transfers) contact guard   Assistive Device (Sit-Stand Transfers) walker, front-wheeled   Gait/Stairs (Locomotion)   [place] Level (Gait) contact guard   Assistive Device (Gait) walker, front-wheeled   Distance in Feet (Gait) 10   Pattern (Gait) step-to   Deviations/Abnormal Patterns (Gait) festinating/shuffling;stride length decreased;weight shifting decreased   Negotiation (Stairs) handrail location;number of steps   Handrail Location (Stairs) both sides   Number of Steps (Stairs) 1   Comment, (Gait/Stairs) CGA stairs with repeated demonstration and verbal cueing   Balance   Balance Comments pt unsteady on feet without AD CGA, more steady with use of FWW and holding on to grab bars for transfer   Clinical Impression   Criteria for Skilled Therapeutic Intervention Yes, treatment indicated   PT Diagnosis (PT) impaired functional mobility   Influenced by the following impairments functional strength, balance, activity tolerance   Functional limitations due to impairments endurance, transfers, gait   Clinical Presentation (PT Evaluation Complexity) evolving   Clinical Presentation Rationale clinical judgment   Clinical Decision Making (Complexity) moderate complexity   Planned Therapy Interventions (PT) balance training;bed mobility training;gait training;home exercise program;neuromuscular re-education;patient/family education;ROM (range of motion);stair  training;strengthening;stretching;transfer training;progressive activity/exercise;home program guidelines   Risk & Benefits of therapy have been explained evaluation/treatment results reviewed;care plan/treatment goals reviewed;risks/benefits reviewed   PT Total Evaluation Time   PT Eval, Moderate Complexity Minutes (75615) 7   Physical Therapy Goals   PT Frequency Daily   PT Predicted Duration/Target Date for Goal Attainment 05/05/24   PT Goals Gait;Stairs;Transfers   PT: Transfers Modified independent;Sit to/from stand   PT: Gait Supervision/stand-by assist;Standard walker   PT: Stairs Supervision/stand-by assist;6 stairs   Interventions   Interventions Quick Adds Gait Training;Therapeutic Activity   Therapeutic Activity   Therapeutic Activities: dynamic activities to improve functional performance Minutes (16778) 8   Symptoms Noted During/After Treatment Fatigue   Treatment Detail/Skilled Intervention Pt requires verbal cueing for safety and use of grab bars during toilet transfers SBA. SBA for pericares and CGA while standing at sink for washing of hands, pt demos wide BRUNA in standing for balance. Extended time spent in standing SBA to improve standing tolerance, pt becoming increasingly fatigued with further time in standing. Pt left seated in recliner at end of session, chair alarm engaged, call light & all other needs within MD silke entering room.   Gait Training   Gait Training Minutes (56997) 24   Symptoms Noted During/After Treatment (Gait Training) fatigue   Treatment Detail/Skilled Intervention With FWW CGA, cues for safe management of FWW and environmental awareness, pt demonstrates leaning path toward L, requires cues to stay on straight path in hallway. Within room, pt requires reminders for use of FWW to and from restroom CGA. Pt reports fatigue after longer bout of gait.   Distance in Feet 15', 15', 160', 15'   [place] Level (Gait Training) contact guard   Physical Assistance Level (Gait  Training) 1 person assist;verbal cues   Assistive Device (Gait Training) standard walker   Pattern Analysis (Gait Training)   (step to)   Gait Analysis Deviations decreased toe-to-floor clearance;decreased weight-shifting ability;decreased step length;decreased stride length;decreased samm   Impairments (Gait Analysis/Training) balance impaired;strength decreased   Stair Railings present at both sides   Level of [place] (Stairs) contact guard   PT Discharge Planning   PT Plan Review stairs & check in with pt again regarding handrails, balance, gait with FWW   PT Discharge Recommendation (DC Rec) home with assist;home with home care physical therapy   PT Rationale for DC Rec Pt overall very mobile, at this point in time needing Ax1 for mobility per unsteadiness on feet, pt not appearing to be fully aware of deficits. Recommend home with assist as pt reports family member home at all times, in addition to home PT to help improve pt's functional mobility/safety in the home.   PT Brief overview of current status CGA gait with & without FWW, CGA stairs, impulsive   PT Equipment Needed at Discharge other (see comments)  (pt already has walker at home)   Total Session Time   Timed Code Treatment Minutes 32   Total Session Time (sum of timed and untimed services) 39

## 2024-04-28 NOTE — PLAN OF CARE
Problem: Adult Inpatient Plan of Care  Goal: Absence of Hospital-Acquired Illness or Injury  Intervention: Identify and Manage Fall Risk  Recent Flowsheet Documentation  Taken 4/27/2024 2018 by Awa Ma RN  Safety Promotion/Fall Prevention: activity supervised     Problem: Adult Inpatient Plan of Care  Goal: Absence of Hospital-Acquired Illness or Injury  Intervention: Prevent Skin Injury  Recent Flowsheet Documentation  Taken 4/27/2024 2018 by Awa Ma RN  Body Position: position changed independently     Problem: Risk for Delirium  Goal: Optimal Coping  Intervention: Optimize Psychosocial Adjustment to Delirium  Recent Flowsheet Documentation  Taken 4/27/2024 2018 by Awa Ma RN  Supportive Measures:   self-care encouraged   verbalization of feelings encouraged     Problem: Risk for Delirium  Goal: Improved Behavioral Control  Intervention: Prevent and Manage Agitation  Recent Flowsheet Documentation  Taken 4/27/2024 2018 by Awa Ma RN  Environment Familiarity/Consistency: daily routine followed     Problem: Risk for Delirium  Goal: Improved Behavioral Control  Intervention: Minimize Safety Risk  Recent Flowsheet Documentation  Taken 4/27/2024 2018 by Awa Ma RN  Enhanced Safety Measures: room near unit station     Problem: Risk for Delirium  Goal: Improved Attention and Thought Clarity  Intervention: Maximize Cognitive Function  Recent Flowsheet Documentation  Taken 4/27/2024 2018 by Awa Ma, RN  Sensory Stimulation Regulation:   care clustered   quiet environment promoted   Goal Outcome Evaluation:       Patient denied pain. Sleepy, but arises to voice call. Had [place] late for dinner. Blood sugar 153. Took medications without difficulty. IV saline locked.

## 2024-04-28 NOTE — CONSULTS
Care Management Initial Consult    General Information  Assessment completed with: Patient, Spouse or significant other, wife-Naomi  Type of CM/SW Visit: Initial Assessment    Primary Care Provider verified and updated as needed: Yes   Readmission within the last 30 days: no previous admission in last 30 days      Reason for Consult: discharge planning  Advance Care Planning: Advance Care Planning Reviewed: no concerns identified          Communication Assessment  Patient's communication style: spoken language (English or Bilingual)    Hearing Difficulty or Deaf: no   Wear Glasses or Blind: no    Cognitive  Cognitive/Neuro/Behavioral: .WDL except, orientation  Level of Consciousness: alert  Arousal Level: opens eyes spontaneously  Orientation: disoriented to, place, time  Mood/Behavior: calm, cooperative  Best Language: 0 - No aphasia  Speech: clear    Living Environment:   People in home: child(manav), adult, grandchild(manav), spouse  Wife-Carla and Son/family  Current living Arrangements: house      Able to return to prior arrangements: other (see comments)  Living Arrangement Comments: Goal per Famiy to return    Family/Social Support:  Care provided by: spouse/significant other, child(manav)  Provides care for: no one  Marital Status:   Wife, Children  Carla       Description of Support System: Involved, Supportive         Current Resources:   Patient receiving home care services: Yes  Skilled Home Care Services: Skilled Nursing, Physical Therapy, Occupational Therapy  Community Resources: Other (see comment) ([facility]  [facility])  Equipment currently used at home: cane, straight, walker, rolling, wheelchair, manual, other (see comments) (Home care arranging for shower chair and HH shower prior to hospitalization that has not been delivered yet.)  Supplies currently used at home:      Employment/Financial:  Employment Status: disabled        Financial Concerns: none           Does the  patient's insurance plan have a 3 day qualifying hospital stay waiver?  Yes     Which insurance plan 3 day waiver is available? Alternative insurance waiver    Will the waiver be used for post-acute placement? Undetermined at this time    Lifestyle & Psychosocial Needs:  Social Determinants of Health     Food Insecurity: Low Risk  (12/27/2023)    Food Insecurity     Within the past 12 months, did you worry that your food would run out before you got money to buy more?: No     Within the past 12 months, did the food you bought just not last and you didn t have money to get more?: Patient declined   Depression: Not at risk (2/19/2024)    PHQ-2     PHQ-2 Score: 0   Housing Stability: Low Risk  (3/24/2024)    Received from [facility] [employer]    Housing Stability Vital Sign     Unable to Pay for Housing in the Last Year: No     Number of Places Lived in the Last Year: 1     In the last 12 months, was there a time when you did not have a steady place to sleep or slept in a shelter (including now)?: No   Tobacco Use: Low Risk  (4/24/2024)    Patient History     Smoking Tobacco Use: Never     Smokeless Tobacco Use: Never     Passive Exposure: Never   Financial Resource Strain: Low Risk  (12/27/2023)    Financial Resource Strain     Within the past 12 months, have you or your family members you live with been unable to get utilities (heat, electricity) when it was really needed?: No   Alcohol Use: Not on file   Transportation Needs: Low Risk  (12/27/2023)    Transportation Needs     Within the past 12 months, has lack of transportation kept you from medical appointments, getting your medicines, non-medical meetings or appointments, work, or from getting things that you need?: No   Physical Activity: Not on file   Interpersonal Safety: Unknown (3/24/2024)    Received from [facility] [email or web address]    Humiliation, Afraid, Rape, and Kick questionnaire     Fear of Current or Ex-Partner: Not on file      Emotionally Abused: Not on file     Physically Abused: Patient unable to answer     Sexually Abused: Patient unable to answer   Stress: Not on file   Social Connections: Not on file   Health Literacy: Not on file       Functional Status:  Prior to admission patient needed assistance:   Dependent ADLs:: Ambulation-no assistive device, Ambulation-walker, Bathing, Dressing, Eating, Grooming, Ambulation-cane, Incontinence, Positioning, Transfers, Wheelchair-with assist, Toileting  Dependent IADLs:: Cleaning, Cooking, Laundry, Shopping, Meal Preparation, Medication Management, Money Management, Transportation, Incontinence  Assesssment of Functional Status: Not at baseline with ADL Functioning    Mental Health Status:          Chemical Dependency Status:                Values/Beliefs:  Spiritual, Cultural Beliefs, Mormon Practices, Values that affect care:                 Additional Information:  Assessed. PCP verified, no ACP docs, Introduced self and provided education re: CM role.  Pt deferred to his wife, Carla or Son, Beto to provide information.  Information for assessment provided per wife, Carla who reports she is ill and hence is not in the room. She chose to provide information, but difficult to understand at times.   Wife reports Pt lives in private home with 2 steps to enter from exterior and once inside there are 5 steps up to bed and bath.   Wife reports, Pt needing assistance with all ADLs and IADLs.  Previously, Pt ambulated with SE cane or ww with seat. Pt also had a manual w/c and home care arranging for shower chair and HH shower which did not arrive prior to hospitalization.  Pt on the  waiver program with [facility] and did have Home Health Care set up following a hospital stay at [facility] but wife unsure of the name of the agency.  She will locate it and inform CM.  Discharge plan per wife is for Pt to return home and resume home care and EW  as needed.      CM will continue to follow care progression and aide in discharge planning as needed.      Bozena Chatman RN

## 2024-04-29 NOTE — PLAN OF CARE
Problem: Adult Inpatient Plan of Care  Goal: Absence of Hospital-Acquired Illness or Injury  Intervention: Prevent Skin Injury  Recent Flowsheet Documentation  Taken 4/29/2024 1000 by Kenny Beth RN  Body Position: position changed independently  Intervention: Prevent and Manage VTE (Venous Thromboembolism) Risk  Recent Flowsheet Documentation  Taken 4/29/2024 1000 by Kenny Beth RN  VTE Prevention/Management: SCDs (sequential compression devices) off   Goal Outcome Evaluation:       Patient A/O x4. VSS. Patient denies pain. Discharge orders discussed with patient at bedside. All questions answered. Patient left unit via wheelchair and was transported home via son.

## 2024-04-29 NOTE — PLAN OF CARE
Problem: Confusion Acute  Goal: Optimal Cognitive Function  Outcome: Progressing  Intervention: Minimize Contributing Factors  Recent Flowsheet Documentation  Taken 4/29/2024 0145 by Rafia Hernandes, RN  Sensory Stimulation Regulation: care clustered  Reorientation Measures: clock in view  Communication Support Strategies: active listening utilized  Environment Familiarity/Consistency: daily routine followed     Problem: Risk for Delirium  Goal: Improved Behavioral Control  Intervention: Minimize Safety Risk  Recent Flowsheet Documentation  Taken 4/29/2024 0145 by Rafia Hernandes, RN  Communication Enhancement Strategies: call light answered in person  Enhanced Safety Measures: room near unit station   Goal Outcome Evaluation:    Patient alert, oriented x 4. Denied pain. Loose stool x 1 overnight. Daughter in-law stayed overnight for support. Assist x 1 with cares. Will continue to monitor the patient.

## 2024-04-29 NOTE — PLAN OF CARE
Physical Therapy Discharge Summary    Reason for therapy discharge:    Discharged to home with home therapy.    Progress towards therapy goal(s). [facility] for goal details.  Goals partially met.  Barriers to achieving goals:   discharge from facility.    Therapy recommendation(s):    Continued therapy is recommended.  Rationale/Recommendations:  Recommend home therapy to help patient return to functional baseline.

## 2024-04-29 NOTE — CONSULTS
Care Management Discharge Note    Discharge Date: 04/30/2024       Discharge Disposition: Home Care    Discharge Services: [facility] Worker ([facility] )    Discharge DME:  per rehab    Discharge Transportation: family or friend will provide    Private pay costs discussed: Not applicable    Does the patient's insurance plan have a 3 day qualifying hospital stay waiver?  No      Education Provided on the Discharge Plan:  Yes  Persons Notified of Discharge Plans:   Patient/Family in Agreement with the Plan:      Handoff Referral Completed: Yes    Additional Information:    Pt discharging home with home care, NICHOLAS confirmed with [facility] that pt is open to agency and will be able to resume RN PT OT with physician orders.     NICHOLAS spoke with MD during interdisciplinary consultation, MD will add home care in orders.     Student met w pt at bedside, spoke w family over phone, agreeable and oriented to discharge plan.     CM to follow for any further needs.     11:33 AM  Spoke with son Ramírez on phone. Ramírez to transport at 1610 today.     Ramírez expressed concern for unsustainability of recent discharge planning, thinks his dad needs a higher level of care long-term in order for discharge to home to be safe. SW submitted clinic care coordination referral, student submitted SLL referral [id number].     Student to terry SNYDER re: discharge timeline.     Chay Gupta

## 2024-04-29 NOTE — DISCHARGE SUMMARY
[facility]  Hospitalist Discharge Summary      Date of Admission:  4/27/2024  Date of Discharge:  4/29/2024  Discharging Provider: Adriano Wyatt MD  Discharge Service: Hospitalist Service    Discharge Diagnoses   Alcoholic cirrhosis  Portal hypertension  Esophageal varices  Recurrent SBP  Hepatic encephalopathy    Clinically Significant Risk Factors        Follow-ups Needed After Discharge   Follow-up Appointments     Follow-up and recommended labs and tests       Follow up with primary care provider, Amrik Mejia, within 7 days for   hospital follow- up and to follow up on results.  The following labs/tests   are recommended:  BMP on 5/1/24  Recheck BMP on 5/1/2024  Consider resuming spironolactone and furosemide if BMP on 5/1/2029 suggest   stable renal function  Outpatient follow-up with nephrologist in the next 1 week  Outpatient follow-up with hepatologist, Dr. Barber Montenegro as arranged.          Unresulted Labs Ordered in the Past 30 Days of this Admission       Date and Time Order Name Status Description    4/28/2024 10:27 AM Anaerobic bacterial culture In process     4/28/2024 10:27 AM Ascites Fluid Aerobic Bacterial Culture Routine With Gram Stain Preliminary         These results will be followed up by Amrik Mejia      Discharge Disposition   Discharged to home  Condition at discharge: Stable    Hospital Course   Vito Sy is a 73-year-old male with history of alcoholic cirrhosis, portal hypertension with ascites, esophageal and gastric varices, s/p TIPS, recurrent spontaneous bacterial peritonitis on chronic ciprofloxacin prophylaxis, and type 2 diabetes admitted on 4/27/2024 with altered mental status likely secondary to hepatic encephalopathy in the setting of poor compliance with lactulose.    During his hospitalization, the patient was administered lactulose and rifaximin, resulting in improved mental status. Gastroenterology team advised ongoing therapy with  spironolactone and furosemide, and prophylactic ciprofloxacin for spontaneous bacterial peritonitis with continuation of lactulose and rifaximin for management of hepatic encephalopathy. Creatinine was found to be slightly elevated on 4/29/24 compared to baseline suggesting mild NANO on stage III CKD in the setting of diuretic therapy. Follow-up BMP was scheduled at discharge to reassess renal function before resuming spironolactone and furosemide. Outpatient appointment with the nephrologist was also arranged to further assess the chronic kidney disease- possibly related hepatorenal syndrome.    He received ceftriaxone briefly due to concern for UTI, however, urine culture revealed normal urogenital van, therefore IV ceftriaxone was discontinued.  Symptoms have improved and patient insist on being discharged today.      Consultations This Hospital Stay   GASTROENTEROLOGY IP CONSULT  CARE MANAGEMENT / SOCIAL WORK IP CONSULT  OCCUPATIONAL THERAPY ADULT IP CONSULT  PHYSICAL THERAPY ADULT IP CONSULT    Code Status   Full Code    Time Spent on this Encounter   IAdriano MD, personally saw the patient today and spent greater than 30 minutes discharging this patient.       Adriano Wyatt MD  [place] [id number]  Phone: [phone]  Fax: [phone]  ______________________________________________________________________    Physical Exam   Vital Signs: Temp: 98.3  F (36.8  C) Temp src: Oral BP: 100/51 Pulse: 82   Resp: 20 SpO2: 97 % O2 Device: None (Room air)    Weight: 150 lbs 0 oz      General appearance: Chronically ill looking, awake, Alert, Cooperative, not in any obvious distress and appears stated age   HEENT: Normocephalic, atraumatic, conjunctiva clear without icterus and ears without discharge  Lungs: Clear to auscultation bilaterally, no wheezing, good air exchange, normal work of breathing  Cardiovascular: Regular Rate and  Rythm, normal apical impulse, normal S1 and S2, no lower extremity edema bilaterally  Abdomen: Soft, non-tender, distended, active bowel sounds  Skin: Skin color, texture normal and bruising or bleeding. No rashes or lesions over face, neck, arms and legs, turgor normal.  Musculoskeletal: No bony deformities or joint tenderness. Normal ROM upon flexion & extension.   Neurologic: Alert & Oriented X 3, Facial symmetry preserved and upper & lower extremities moving well with symmetry  Psychiatric: Calm, normal eye contact and normal affect         Primary Care Physician   Amrik Mejia    Discharge Orders      Basic metabolic panel  (Ca, Cl, CO2, Creat, Gluc, K, Na, BUN)     Primary Care - Care Coordination Referral      Adult Nephrology  Referral      Home Care Referral      Reason for your hospital stay    Alcoholic cirrhosis  Portal hypertension  Esophageal varices  Recurrent SBP  Hepatic encephalopathy     Follow-up and recommended labs and tests     Follow up with primary care provider, Amrik Mejia, within 7 days for hospital follow- up and to follow up on results.  The following labs/tests are recommended:  BMP on 5/1/24  Recheck BMP on 5/1/2024  Consider resuming spironolactone and furosemide if BMP on 5/1/2029 suggest stable renal function  Outpatient follow-up with nephrologist in the next 1 week  Outpatient follow-up with hepatologist, Dr. Barber Montenegro as arranged.     Activity    Your activity upon discharge: activity as tolerated     Diet    Follow this diet upon discharge: Orders Placed This Encounter      Combination Diet 2 gm NA Diet       Significant Results and Procedures   Results for orders placed or performed during the hospital encounter of 04/27/24   Head CT w/o contrast    Narrative    EXAM: CT HEAD W/O CONTRAST  LOCATION: [facility]  DATE: 4/27/2024    INDICATION: ams  COMPARISON: 03/23/2024  TECHNIQUE: Routine CT Head without IV contrast. Multiplanar reformats.  Dose reduction techniques were used.    FINDINGS:  INTRACRANIAL CONTENTS: No intracranial hemorrhage, extraaxial collection, or mass effect.  No CT evidence of acute infarct. Mild presumed chronic small vessel ischemic changes. Mild generalized volume loss. No hydrocephalus.     VISUALIZED ORBITS/SINUSES/MASTOIDS: No intraorbital abnormality. No paranasal sinus mucosal disease. No middle ear or mastoid effusion.    BONES/SOFT TISSUES: No acute abnormality.      Impression    IMPRESSION:  1.  No CT evidence for acute intracranial process.  2.  Brain atrophy and presumed chronic microvascular ischemic changes as above.   CT Abdomen Pelvis w Contrast    Narrative    EXAM: CT ABDOMEN PELVIS W CONTRAST  LOCATION: [facility]  DATE: 4/27/2024    INDICATION: ab pain  COMPARISON: Abdominal ultrasound 04/15/2024 and CT abdomen/pelvis 03/23/2024.  TECHNIQUE: CT scan of the abdomen and pelvis was performed following injection of IV contrast. Multiplanar reformats were obtained. Dose reduction techniques were used.  CONTRAST: isovue 370 74ml    FINDINGS:   LOWER CHEST: Mild bibasilar atelectasis and/or scarring.    HEPATOBILIARY: Cirrhosis. No focal hepatic lesion on single phase examination. Grossly patent TIPS. Patent major portal veins. Cholelithiasis.    PANCREAS: Normal.    SPLEEN: Enlarged, measuring 14.4 cm craniocaudal. Stable 1.1 cm indeterminate splenic hypodensity.    ADRENAL GLANDS: Normal.    KIDNEYS/BLADDER: No hydronephrosis. Nonobstructing bilateral renal calculi. Bilateral renal cysts and smaller hypodensities which are too small to characterize, for which no dedicated imaging follow-up is required. Mild circumferential bladder wall   thickening.    BOWEL: No bowel obstruction. Large amount of colonic stool. Normal appendix. Mild mural thickening of the stomach. Small volume ascites. Diffuse mesenteric congestion and edema. No pneumoperitoneum.    LYMPH NODES: Normal.    VASCULATURE:  Normal caliber abdominal aorta with mild calcified aortoiliac atherosclerotic plaque.    PELVIC ORGANS: Normal.    MUSCULOSKELETAL: Thoracolumbar spondylosis. No destructive osseous lesion. Gynecomastia. Mild body wall edema.      Impression    IMPRESSION:   1.  Cirrhosis with findings of portal hypertension including splenomegaly and small volume ascites. Patent TIPS.  2.  Mild bladder wall thickening could be seen with cystitis. Correlate with urinalysis.  3.  Mild gastric mural thickening could be due to portal gastropathy and/or gastritis.  4.  Cholelithiasis.  5.  Nonobstructing bilateral renal calculi.   US Paracentesis without Albumin initial: low volume    Narrative    EXAM:  1. PARACENTESIS  2. ULTRASOUND GUIDANCE  LOCATION: [facility]  DATE: 4/28/2024    INDICATION: Ascites.    PROCEDURE: Informed consent obtained. Time out performed. The abdomen was prepped and draped in a sterile fashion. 6 mL of 1% lidocaine was infused into local soft tissues. A 5 Egyptian catheter system was introduced into the abdominal ascites under   ultrasound guidance.    15 mL of clear fluid were removed and sent to lab if requested.    Patient tolerated procedure well.    Ultrasound imaging was obtained and placed in the patient's permanent medical record.      Impression    IMPRESSION:  1.  Status post ultrasound-guided paracentesis.    Reference CPT Code: 61516       Discharge Medications   Current Discharge Medication List        CONTINUE these medications which have CHANGED    Details   furosemide (LASIX) 20 MG tablet Take 1 tablet (20 mg) by mouth daily  Qty: 60 tablet, Refills: 6    Comments: [facility] scheduled for 5/1/24. Resume spirolactone if renal function is not worsening after lab test on 5/1/24.  Associated Diagnoses: Other cirrhosis of liver (H)      lactulose (CHRONULAC) 10 GM/15ML solution Take 30 mLs (20 g) by mouth 3 times daily  Qty: 2700 mL, Refills: 0    Associated Diagnoses: Other  "cirrhosis of liver (H)      sodium bicarbonate 650 MG tablet Take 1 tablet (650 mg) by mouth 2 times daily  Qty: 60 tablet, Refills: 0    Associated Diagnoses: Other cirrhosis of liver (H)      spironolactone (ALDACTONE) 50 MG tablet Take 1 tablet (50 mg) by mouth daily  Qty: 60 tablet, Refills: 6    Comments: BMP scheduled for 5/1/24. Resume spirolactone if renal function is not worsening after lab test on 5/1/24.  Associated Diagnoses: Other cirrhosis of liver (H)           CONTINUE these medications which have NOT CHANGED    Details   bismuth subsalicylate (PEPTO BISMOL) 262 MG chewable tablet Take 1 tablet by mouth every 6 hours as needed for diarrhea      ciprofloxacin (CIPRO) 500 MG tablet Take 1 tablet by mouth once daily  Qty: 60 tablet, Refills: 0    Associated Diagnoses: SBP (spontaneous bacterial peritonitis) (H)      fish oil-omega-3 fatty acids 1000 MG capsule Take 1 g by mouth 2 times daily      HEMP OIL OR EXTRACT OR OTHER CBD CANNABINOID, NOT MEDICAL CANNABIS, CBD gummy      midodrine (PROAMATINE) 2.5 MG tablet Take 1 tablet (2.5 mg) by mouth 3 times daily (with meals)  Qty: 180 tablet, Refills: 3    Associated Diagnoses: Other cirrhosis of liver (H)      multivitamin, therapeutic (THERA-VIT) TABS tablet Take 1 tablet by mouth daily      omeprazole (PRILOSEC) 20 MG DR capsule Take 1 capsule (20 mg) by mouth 2 times daily  Qty: 180 capsule, Refills: 3    Associated Diagnoses: Gastrointestinal hemorrhage associated with acute gastritis      rifaximin (XIFAXAN) 550 MG TABS tablet Take 1 tablet (550 mg) by mouth 2 times daily  Qty: 60 tablet, Refills: 4    Associated Diagnoses: Alcoholic cirrhosis, unspecified whether ascites present (H)      Zinc Sulfate 220 (50 Zn) MG TABS Take 220 mg by mouth daily           Allergies   Allergies   Allergen Reactions    Sulfa Antibiotics Shortness Of Breath and Itching    Penicillins Unknown     Annotation: discussed with patient, he reports he had \"itching\" with " penicillin many years ago in [facility] but no hives or throat or respiratory symptoms.  he also reports having tolerated amoxicillin since coming to [place].

## 2024-04-29 NOTE — PROGRESS NOTES
[facility] - Digestive [facility] Progress Note     IMPRESSION:  73-year-old gentleman with a history of alcoholic cirrhosis, portal hypertension with ascites, esophageal and gastric varices [last EGD February 27, 2024 with a small, grade 1 esophageal varix], ascites requiring regular paracentesis in the past, s/p TIPS, history of recurrent SBP on Cipro for ppx, HE on lactulose/rifaximin, and DM2 who was admitted on 4/27/24 for AMS.    # Altered Mental status  Likely multifactorial with history of alcoholic cirrhosis and HE with poor compliance with lactulose and UTI. Patient is doing better at this time.  He is on antibiotics for UTI and continues with lactulose/rifaximin for HE.    # Alcoholic cirrhosis  Followed by Dr. Barber Montenegro (Hepatology). Sober 4+ years. S/p TIPS in 2022 and revision of TIPS.  MELD-Na 15  Remains on diuretics (aldactone 50mg and furosemide 20mg daily), Cipro for SBP ppx, and lactulose/rifaximin for HE.   Last EGD 2/27/24 - small grade 1 esophageal varix.            RECOMMENDATIONS:  - Continue lactulose titrated to 3 BM's daily and rifaximin 550mg BID    - Continue antibiotics for UTI then resume Cipro daily for SBP ppx    - Continue with home diuretics and PPI    - Follow up with Hepatology as an outpatient.      GI will sign off at this time. Thank you for consulting us on this pleasant patient. Please call if questions arise or the patient's status changes.       15 minutes of total time was spent providing patient care, including patient evaluation, reviewing documentation/test results, and     Cruzito Ugarte PA-C  [facility]  [phone]  ________________________________________________________________________      SUBJECTIVE:    Alert and Oriented x3. Patient was getting dressed and calling son for pickup. He was quite distracted but appears to be doing fine without any concerns. He is happy to go home.     OBJECTIVE:  /51 (BP Location: Right arm)   Pulse 82    "Temp 98.3  F (36.8  C) (Oral)   Resp 20   Ht 1.727 m (5' 8\")   Wt 68 kg (150 lb)   SpO2 97%   BMI 22.81 kg/m    Temp (24hrs), Av  F (36.7  C), Min:97.8  F (36.6  C), Max:98.3  F (36.8  C)    Patient Vitals for the past 72 hrs:   Weight   24 1125 68 kg (150 lb)       Intake/Output Summary (Last 24 hours) at 2024 1113  Last data filed at 2024 0700  Gross per 24 hour   Intake 1240 ml   Output 1050 ml   Net 190 ml        PHYSICAL EXAM  GEN: Alert, oriented x3, communicative and in NAD.    LYMPH: No LAD noted.  HRT: RRR  LUNGS: CTA  ABD:  ND, +BS, no guarding or pain to palpation, no rebound, no HSM.  SKIN: No rash, jaundice or spider angiomata      LABS:  I have reviewed the patient's new clinical lab results:     Recent Labs   Lab Test 24  0544 24  0635 24  0634 24  1143 24  1052 24  1428   WBC 5.5  --  3.7* 4.6   < > 4.5   HGB 8.8*  --  8.9* 9.4*   < > 9.2*   MCV 88  --  88 89   < > 86   *  --  107* 144*   < > 131*   INR  --  1.51*  --  1.39*  --  1.44*    < > = values in this interval not displayed.     Recent Labs   Lab Test 24  0544 24  0634 24  1143   POTASSIUM 4.9 4.5 4.9   CHLORIDE 108* 110* 112*   CO2 19* 18* 17*   BUN 24.4* 25.6* 28.8*   CR 1.57* 1.33* 1.44*   ANIONGAP 11 13 12     Recent Labs   Lab Test 24  0634 24  1411 24  1143 24  1052 04/10/24  1329 24  1508 24  1428 23  0540 23  0337 23  0743 23  1744 22  2119 22  1912   ALBUMIN 2.9*  2.9*  --  3.4* 3.4*   < >  --  2.9*   < >  --    < > 3.6   < > 3.5   BILITOTAL 0.9  --  1.2 1.0   < >  --  1.1   < >  --    < > 1.8*   < > 1.7*   ALT 18  --  26 21   < >  --  15   < >  --    < > 35   < > 30   AST 33  --  55* 38   < >  --  51*   < >  --    < > 51*   < > 42   PROTEIN  --  10*  --   --   --  10*  --   --  30*   < >  --    < >  --    LIPASE  --   --   --   --   --   --  133*  --   --   --  156*  --  195* "    < > = values in this interval not displayed.         IMAGING:  Reviewed

## 2024-04-30 NOTE — LETTER
Edwar, It was a pleasure to speak with you.  I would like to provide you with the enclosed information for your records.  As part of care coordination, we are developing care plans to assist in accomplishing your health care goals.  When we speak next, please feel free to let me know if you want to add or change any information on your care plans.     As always, feel free to contact Irene Orozco at [phone],  if you have any questions or concerns.  I look forward to working with you in the effort to achieve your health care and wellness goals .     Sincerely,      TASHIA Lund / for Irene Orozco, [facility]   Care Coordination  [facility]  4/30/2024 10:58 AM                                [facility]  Patient Centered Plan of Care  About Me:        Patient Name:  Vito Sy    YOB: 1950  Age:         73 year old   [place] MRN:    [id number] Telephone Information:  Home Phone [phone]   Mobile [phone]       Address:  [address] [place] [address] Email address:  [email or web address]      Emergency Contact(s)    Name Relationship Lgl Grd Work Phone Home Phone Mobile Phone   1. RAHEL SY Spouse   [phone]    2. EDWAR HERNANDEZ Son   [phone]   3. JUVENTINO ORTIZ Sister   [phone]   4. JAKE Son   [phone]            Primary language:  English     needed? No   [place] [employer] [facility]:  [phone] op. 1  Other communication barriers:None    Preferred Method of Communication:     Current living arrangement: I live in a private home with family    Mobility Status/ Medical Equipment: Independent w/Device        Health Maintenance  Health Maintenance Reviewed: Due/Overdue  (foot exam, Immunizations/vaccines,Medicare annual wellness visit)      My Access Plan  Medical Emergency 911   Primary Clinic Line [facility] - [phone]   24  Hour Appointment Line [phone] or  [phone] (toll-free)   24 Hour Nurse Line [phone] (toll-free)   Preferred Urgent Care [facility], [phone]     [facility]  [phone]     Preferred Pharmacy [facility] [address] [place] [address]     Behavioral Health Crisis Line The National Suicide Prevention Lifeline at 1-509.377.2683 or Text/Call 358           My Care Team Members  Patient Care Team         Relationship Specialty Notifications Start End    Amrik Mejia MD PCP - General Family Practice  5/2/07     Phone: [phone] Fax: [phone]         [address] [facility] [address] [place] [address]    Shanika Tripp, PharmD Pharmacist Pharmacist  11/13/19     Phone: [phone]          [facility] [address] [facility] [address] [place] [address]    Amrik Mejia MD Assigned PCP   6/16/21     Phone: [phone] Fax: [phone]         [address] [facility] [address] [place] [address]    Yelena Gutierrez APRN CNP Nurse Practitioner Urology  4/10/24     Phone: [phone] Fax: [phone]         [address] [place] [address]    Yelena Gutierrez APRN CNP Assigned Surgical Provider   4/23/24     Phone: [phone] Fax: [phone]         [address] [place] [address]    Betty Chatman LSW Lead Care Coordinator Primary Care - CC Admissions 4/30/24     Irene Orozco LGSW Lead Care Coordinator  Admissions 4/30/24     Irene Orozco LGSW Lead Care Coordinator   4/30/24                 My Care Plans  Self Management and Treatment Plan    Care Plan  Care Plan: General Help at home / poss PCA       Problem: [place] GENERAL PROBLEM needing help at home       Priority: High Onset Date: 4/30/2024    Note:     Son expressed need for help at home l? PCA  Barriers: diagnosis   Strengths: family in agreement  Patient expressed understanding of goal:    Action steps to achieve this goal:  1. I will / Son will contact [employer] to explore PCA  2. I will /Son will provide necessary information   3. I will stay in contact with [facility]         Goal: General Goal - please update text                             Action Plans on File:                       Advance Care Plans/Directives:             My Medical and Care Information  Problem List   Patient Active Problem List   Diagnosis    Anemia    Hematuria    Hypercholesterolemia    Hypertension    Type 2 diabetes mellitus with microalbuminuria, without long-term current use of insulin (H)    Nephrolithiasis    Benign Adenomatous Polyp Of The Large Intestine    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    GI bleeding    Anemia due to blood loss, acute    Hypotension    Hyperkalemia    Gastrointestinal hemorrhage associated with acute gastritis    Popliteal cyst, left    Popliteal cyst, right    Incontinence of feces with fecal urgency    Other cirrhosis of liver (H) with ascites    Chronic kidney disease, stage 3a (H)    Portal hypertension (H)    Cirrhosis of liver with ascites (H)    Hepatic encephalopathy (H)    Pleural effusion    Anemia, unspecified type    Mitral valve insufficiency, unspecified etiology    SBP (spontaneous bacterial peritonitis) (H)    Bilateral lower extremity edema    End-stage liver disease (H)    Incontinence of feces, unspecified fecal incontinence type    Ascites due to alcoholic cirrhosis (H)    Ascites    Disorder of function of stomach    Elevated lipase    Esophageal varices without bleeding (H)    Generalized abdominal pain    Hypomagnesemia    Ureteral stone    Hypokalemia    Lower GI bleed    Localized swelling of both lower extremities    Acute encephalopathy    Complicated UTI (urinary tract infection)      Current Medications and Allergies:  See printed Medication Report.    Care Coordination Start Date: 4/30/2024   Frequency of Care Coordination:  monthly, more frequently as needed     Form Last Updated: 04/30/2024

## 2024-04-30 NOTE — LETTER
Edwar, It was a pleasure to speak with you.  I would like to provide you with the enclosed information for your records.  As part of care coordination, we are developing care plans to assist in accomplishing your health care goals.  When we speak next, please feel free to let me know if you want to add or change any information on your care plans.     As always, feel free to contact me if you have any questions or concerns.  I look forward to working with you in the effort to achieve your health care and wellness goals .     Sincerely,                   TASHIA Lund / NORMA Slater  [facility]   Care Coordination  [facility]  4/30/2024 11:28 AM                                [facility]  Patient Centered Plan of Care  About Me:        Patient Name:  Vito Sy    YOB: 1950  Age:         73 year old   [place] MRN:    [id number] Telephone Information:  Home Phone [phone]   Mobile [phone]       Address:  [address] [place] [address] Email address:  [email or web address]      Emergency Contact(s)    Name Relationship Lgl Grd Work Phone Home Phone Mobile Phone   1. RAHEL SY Spouse   [phone]    2. EDWAR HERNANDEZ Son   [phone]   3. JUVENTINO ORTIZ Sister   [phone]   4. JAKE Son   [phone]            Primary language:  English     needed? No   [place] [employer] [facility]:  [phone] op. 1  Other communication barriers:None    Preferred Method of Communication:     Current living arrangement: I live in a private home with family    Mobility Status/ Medical Equipment: Independent w/Device        Health Maintenance  Health Maintenance Reviewed: Due/Overdue  (foot exam, Immunizations/vaccines,Medicare annual wellness visit)      My Access Plan  Medical Emergency 911   [facility]  [phone]   24 Hour Appointment Line  [phone] or  [phone] (toll-free)   24 Hour Nurse Line [phone] (toll-free)   Preferred Urgent Care [facility] [phone]     [facility]  [phone]     Preferred Pharmacy [facility] [address]     Behavioral Health Crisis Line The National Suicide Prevention Lifeline at 1-604.394.3430 or Text/Call 458           My Care Team Members  Patient Care Team         Relationship Specialty Notifications Start End    Amrik Mejia MD PCP - General Family Practice  5/2/07     Phone: [phone] Fax: [phone]         [address] [place] [address]    Shanika Tripp, PharmD Pharmacist Pharmacist  11/13/19     Phone: [phone]          [facility] [address] [place] [address]    Amrik Mejia MD Assigned PCP   6/16/21     Phone: [phone] Fax: [phone]         [address] [place] [address]    Yelena Gutierrez APRN CNP Nurse Practitioner Urology  4/10/24     Phone: [phone] Fax: [phone]         [address] [place] [address], [address] [address]    Yelena Gutierrez APRN CNP Assigned Surgical Provider   4/23/24     Phone: [phone] Fax: [phone]         [address] [place] [address]    Betty Chatman LSW Lead Care Coordinator Primary Care - CC Admissions 4/30/24     Irene Orozco LGSW Lead Care Coordinator  Admissions 4/30/24     Irene Orozco LGSW Lead Care Coordinator   4/30/24                 My Care Plans  Self Management and Treatment Plan    Care Plan  Care Plan: General Help at home / poss PCA       Problem: HP GENERAL PROBLEM needing help at home       Priority: High Onset Date: 4/30/2024    Note:     Son expressed need for help at home l? PCA  Barriers: diagnosis   Strengths: family in agreement  Patient expressed understanding of goal:   Action steps to  achieve this goal:  1. I will / Son will contact call [facility] Elderly Waiver  regarding a PCA  2. I will /Son will provide necessary information   3. I will stay in contact with  [facility]         Goal: General Goal - please update text                             Action Plans on File:                       Advance Care Plans/Directives:             My Medical and Care Information  Problem List   Patient Active Problem List   Diagnosis    Anemia    Hematuria    Hypercholesterolemia    Hypertension    Type 2 diabetes mellitus with microalbuminuria, without long-term current use of insulin (H)    Nephrolithiasis    Benign Adenomatous Polyp Of The Large Intestine    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    GI bleeding    Anemia due to blood loss, acute    Hypotension    Hyperkalemia    Gastrointestinal hemorrhage associated with acute gastritis    Popliteal cyst, left    Popliteal cyst, right    Incontinence of feces with fecal urgency    Other cirrhosis of liver (H) with ascites    Chronic kidney disease, stage 3a (H)    Portal hypertension (H)    Cirrhosis of liver with ascites (H)    Hepatic encephalopathy (H)    Pleural effusion    Anemia, unspecified type    Mitral valve insufficiency, unspecified etiology    SBP (spontaneous bacterial peritonitis) (H)    Bilateral lower extremity edema    End-stage liver disease (H)    Incontinence of feces, unspecified fecal incontinence type    Ascites due to alcoholic cirrhosis (H)    Ascites    Disorder of function of stomach    Elevated lipase    Esophageal varices without bleeding (H)    Generalized abdominal pain    Hypomagnesemia    Ureteral stone    Hypokalemia    Lower GI bleed    Localized swelling of both lower extremities    Acute encephalopathy    Complicated UTI (urinary tract infection)      Current Medications and Allergies:  See printed Medication Report.    Care Coordination Start Date: 4/30/2024   Frequency of  Care Coordination: monthly, more frequently as needed     Form Last Updated: 04/30/2024

## 2024-04-30 NOTE — PROGRESS NOTES
Clinic Care Coordination Contact  Clinic SW Care Coordination Contact  OUTREACH with Post Discharge Assessment -TCM call and initial assessment     Referral Information:  admitted to [facility] on 4/27/24 with altered mental status likely secondary to hepatic encephalopathy in the setting of poor compliance with lactulos Discharged from [facility] on 4/29/24 to home with family . Note to PCP indicates family interested in PCA help at home   NICHOLAS CC spoke with son Niranjan LARRY on file.  Interested in Care Coordination, Help at home.  Goals set, NICHOLAS CC to follow.        Primary Diagnosis: Psychosocial    Chief Complaint   Patient presents with    Clinic Care Coordination - Post Hospital      TCM and Initial .             Utilization      No Show Count (past year)  6             ED Visits  5             Hospital Admissions  5                    Current as of: 4/30/2024  8:35 AM                Clinical Concerns:  Current Medical Concerns:     Patient Active Problem List   Diagnosis    Anemia    Hematuria    Hypercholesterolemia    Hypertension    Type 2 diabetes mellitus with microalbuminuria, without long-term current use of insulin (H)    Nephrolithiasis    Benign Adenomatous Polyp Of The Large Intestine    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    GI bleeding    Anemia due to blood loss, acute    Hypotension    Hyperkalemia    Gastrointestinal hemorrhage associated with acute gastritis    Popliteal cyst, left    Popliteal cyst, right    Incontinence of feces with fecal urgency    Other cirrhosis of liver (H) with ascites    Chronic kidney disease, stage 3a (H)    Portal hypertension (H)    Cirrhosis of liver with ascites (H)    Hepatic encephalopathy (H)    Pleural effusion    Anemia, unspecified type    Mitral valve insufficiency, unspecified etiology    SBP (spontaneous bacterial peritonitis) (H)    Bilateral lower extremity edema    End-stage liver disease (H)    Incontinence  of feces, unspecified fecal incontinence type    Ascites due to alcoholic cirrhosis (H)    Ascites    Disorder of function of stomach    Elevated lipase    Esophageal varices without bleeding (H)    Generalized abdominal pain    Hypomagnesemia    Ureteral stone    Hypokalemia    Lower GI bleed    Localized swelling of both lower extremities    Acute encephalopathy    Complicated UTI (urinary tract infection)       Current Behavioral Concerns:    Education Provided to patient: introduction to self and CC, discuss help at home      Health Maintenance Reviewed: Due/Overdue (foot exam, Immunizations/vaccines,Medicare annual wellness visit)      Admission:    Admission Date: 04/27/24   Reason for Admission: altered mental status likely secondary to hepatic encephalopathy in the setting of poor compliance with lactulose  Discharge:   Discharge Date: 04/29/24  Discharge Diagnosis: Alcoholic cirrhosis  Portal hypertension  Esophageal varices  Recurrent SBP  Hepatic encephalopathy         Discharge Assessment  How are you doing now that you are home?: resting, tired per osn  How are your symptoms? (Red Flag symptoms escalate to triage hotline per guidelines): Improved  Do you feel your condition is stable enough to be safe at home until your provider visit?: Yes (could use help; at home will explore a PCA)  Does the patient have their discharge instructions? : Yes  Does the patient have questions regarding their discharge instructions? : No  Were you started on any new medications or were there changes to any of your previous medications? : No  Does the patient have all of their medications?: Yes  Do you have questions regarding any of your medications? : No  Do you have all of your needed medical supplies or equipment (DME)?  (i.e. oxygen tank, CPAP, cane, etc.): Yes (walker)  Discharge follow-up appointment scheduled within 14 calendar days? : Yes  Discharge Follow Up Appointment Date: 05/01/24  Discharge Follow Up  Appointment Scheduled with?: Primary Care Provider              Medication Management:  Medication review status: Will see PCP tomorrow       Functional Status:  Dependent ADLs:: Ambulation-no assistive device, Ambulation-walker, Bathing, Dressing, Eating, Grooming, Ambulation-cane, Incontinence, Positioning, Transfers, Wheelchair-with assist, Toileting  Dependent IADLs:: Cleaning, Cooking, Laundry, Shopping, Meal Preparation, Medication Management, Money Management, Transportation, Incontinence  Mobility Status: Independent w/Device     Requiring more assistance with taking medication,  and all ADLS. Family interested in PCA. Will contact Elderly Waiver      Living Situation:  Current living arrangement:: I live in a private home with family: wife, son , DTR in law and 2 grandchildren.  Wife and DTR I law work outside of the home,.    Lifestyle & Psychosocial Needs: Disease process making pt very weak. For getting to take lactulose on a regular basis. Requiring more assistance with cares. Family wanting to explore PCA, Family living with him,Not interested in being the PCA.    Social Determinants of Health     Food Insecurity: Low Risk  (12/27/2023)    Food Insecurity     Within the past 12 months, did you worry that your food would run out before you got money to buy more?: No     Within the past 12 months, did the food you bought just not last and you didn t have money to get more?: Patient declined   Depression: Not at risk (2/19/2024)    PHQ-2     PHQ-2 Score: 0   Housing Stability: Low Risk  (3/24/2024)    [facility]    Housing Stability Vital Sign     Unable to Pay for Housing in the Last Year: No     Number of Places Lived in the Last Year: 1     In the last 12 months, was there a time when you did not have a steady place to sleep or slept in a shelter (including now)?: No   Tobacco Use: Low Risk  (4/24/2024)    Patient History     Smoking Tobacco Use: Never      Smokeless Tobacco Use: Never     Passive Exposure: Never   Financial Resource Strain: Low Risk  (12/27/2023)    Financial Resource Strain     Within the past 12 months, have you or your family members you live with been unable to get utilities (heat, electricity) when it was really needed?: No   Alcohol Use: Not on file   Transportation Needs: Low Risk  (12/27/2023)    Transportation Needs     Within the past 12 months, has lack of transportation kept you from medical appointments, getting your medicines, non-medical meetings or appointments, work, or from getting things that you need?: No   Physical Activity: Not on file   Interpersonal Safety: Unknown (3/24/2024)    [facility]    Humiliation, Afraid, Rape, and Kick questionnaire     Fear of Current or Ex-Partner: Not on file     Emotionally Abused: Not on file     Physically Abused: Patient unable to answer     Sexually Abused: Patient unable to answer   Stress: Not on file   Social Connections: Not on file   Health Literacy: Not on file     Tube Feeding: No  Inadequate activity/exercise (GOAL):: Yes  Transportation means:: Family     Informal Support system:: Family        Resources and Interventions:  Current Resources:   Skilled Home Care Services: Skilled Nursing, Physical Therapy, Occupational Therapy  Community Resources: Other (see comment), Home Care ([facility])  Supplies Currently Used at Home: Incontinence Supplies  Equipment Currently Used at Home: cane, straight, walker, rolling, wheelchair, manual, other (see comments) (Home care arranging for shower chair and HH shower prior to hospitalization that has not been delivered yet.)  Employment Status: disabled          Care Plan:   Care Plan: General Help at home / poss PCA       Problem: HP GENERAL PROBLEM needing help at home       Priority: High Onset Date: 4/30/2024    Note:     Son expressed need for help at home l? PCA  Barriers: diagnosis    Strengths: family in agreement  Patient expressed understanding of goal:   Action steps to achieve this goal:  1. I will / Son will contact [facility] to explore PCA  2. I will /Son will provide necessary information   3. I will stay in contact with NICHOLAS CC         Goal: General Goal - please update text                         Correction. son will call [facility]  about PCA    Patient/Caregiver understanding:  son, good       Future Appointments                In 2 days Mikael Lucero MD [facility]    In 2 weeks Amrik Mejia MD [facility]    In 1 month Amrik Mejia MD [facility]            Plan: Son will contact  [facility] CM  regarding PCA , See PCP 5/1/24. Communicate with Lead NICHLOAS CC  NICHOLAS CC will reach out in 2 weeks.       TASHIA Lund / NORMA Slater  [facility] Primary [facility]   Care Coordination  [facility]  4/30/2024 11:14 AM

## 2024-04-30 NOTE — LETTER
[facility] CARE COORDINATION  [address]  [place] [address]     April 30, 2024    Vito CABRERA Yossi  [address]  [place] [address]      Dear Vito,C/O Ramírez    I am a clinic care coordinator who works with Amrik Mejia MD with the [facility]. Ramírez, I wanted to thank you for spending the time to talk with me.  Below is a description of clinic care coordination and how I can further assist you.  We reach out after someone has been discharged from the hospital       The clinic care coordination team is made up of a registered nurse, , financial resource worker and community health worker who understand the health care system. The goal of clinic care coordination is to help you manage your health and improve access to the health care system. Our team works alongside your provider to assist you in determining your health and social needs. We can help you obtain health care and community resources, providing you with necessary information and education. We can work with you through any barriers and develop a care plan that helps coordinate and strengthen the communication between you and your care team.  Our services are voluntary and are offered without charge to you personally.    As we discussed, you should call  his Elderly Waiver  at [facility]  and ask about your dad getting a PCA(Personal Care Attendant), to provide help at home for your dad. If they can start the process, that is great, They may advise you to contact the county he lives in and request a [place] Choice Needs Assessment.     Please feel free to contact Irene GREEN at [phone],with any questions or concerns regarding care coordination and what we can offer.      We are focused on providing you with the highest-quality healthcare experience possible.    Sincerely,       TASHIA Lund / for NORMA Geiger  [facility]   Care  Coordination  [facility]  4/30/2024 10:56 AM     Enclosed: I have enclosed a copy of the Patient Centered Plan of Care. This has helpful information and goals that we have talked about. Please keep this in an easy to access place to use as needed.

## 2024-04-30 NOTE — PLAN OF CARE
Physical Therapy Discharge Summary    Reason for therapy discharge:    Discharged to home with home therapy.    Progress towards therapy goal(s). See goals on Care Plan in [facility] electronic health record for goal details.  Goals partially met.  Barriers to achieving goals:   discharge from facility.    Therapy recommendation(s):    Continued therapy is recommended.  Rationale/Recommendations:  PT goals not fully met.           PGY-1 Progress Note discussed with attending    PAGER #: [] TILL 5:00 PM  PLEASE CONTACT ON CALL TEAM:  - On Call Team (Please refer to Ava) FROM 5:00 PM - 8:30PM  - Nightfloat Team FROM 8:30 -7:30 AM    CHIEF COMPLAINT & BRIEF HOSPITAL COURSE:    INTERVAL HPI/OVERNIGHT EVENTS:   no acute events. On bedside exam pt mentioned that she had random episodes of dizziness      REVIEW OF SYSTEMS:  CONSTITUTIONAL: No fever, weight loss, or fatigue  RESPIRATORY: No cough, wheezing, chills or hemoptysis; No shortness of breath  CARDIOVASCULAR: No chest pain, palpitations, dizziness, or leg swelling  GASTROINTESTINAL: No abdominal pain. No nausea, vomiting, or hematemesis; No diarrhea or constipation. No melena or hematochezia.  GENITOURINARY: No dysuria or hematuria, urinary frequency  NEUROLOGICAL: No headaches, memory loss, loss of strength, numbness, or tremors  SKIN: No itching, burning, rashes, or lesions     Vital Signs Last 24 Hrs  T(C): 37.1 (03 Jul 2023 08:04), Max: 37.1 (03 Jul 2023 08:04)  T(F): 98.8 (03 Jul 2023 08:04), Max: 98.8 (03 Jul 2023 08:04)  HR: 63 (03 Jul 2023 08:04) (63 - 79)  BP: 105/63 (03 Jul 2023 08:04) (105/63 - 142/81)  BP(mean): 83 (03 Jul 2023 04:42) (83 - 83)  RR: 18 (03 Jul 2023 08:04) (18 - 20)  SpO2: 98% (03 Jul 2023 08:04) (98% - 100%)    Parameters below as of 03 Jul 2023 08:04  Patient On (Oxygen Delivery Method): room air        PHYSICAL EXAMINATION:  GENERAL: NAD, cachectic  HEAD:  Atraumatic, Normocephalic  EYES:  conjunctiva and sclera clear  NECK: Supple, No JVD, Normal thyroid  CHEST/LUNG: Clear to auscultation. Clear to percussion bilaterally; No rales, rhonchi, wheezing, or rubs  HEART: Regular rate and rhythm; No murmurs, rubs, or gallops  ABDOMEN: Soft, Nontender, Nondistended; Bowel sounds present  NERVOUS SYSTEM:  Alert & Oriented X3,    EXTREMITIES:  2+ Peripheral Pulses, No clubbing, cyanosis, or edema  SKIN: warm dry                          10.3   9.27  )-----------( 277      ( 03 Jul 2023 08:59 )             31.7     07-03    137  |  112<H>  |  18  ----------------------------<  141<H>  3.6   |  22  |  0.59    Ca    8.7      03 Jul 2023 08:59  Phos  3.0     07-03  Mg     1.7     07-03    TPro  6.9  /  Alb  2.5<L>  /  TBili  0.6  /  DBili  x   /  AST  38  /  ALT  31  /  AlkPhos  82  07-03    LIVER FUNCTIONS - ( 03 Jul 2023 08:59 )  Alb: 2.5 g/dL / Pro: 6.9 g/dL / ALK PHOS: 82 U/L / ALT: 31 U/L DA / AST: 38 U/L / GGT: x                   CAPILLARY BLOOD GLUCOSE      RADIOLOGY & ADDITIONAL TESTS:                   PGY-1 Progress Note discussed with attending    PAGER #: [] TILL 5:00 PM  PLEASE CONTACT ON CALL TEAM:  - On Call Team (Please refer to Ava) FROM 5:00 PM - 8:30PM  - Nightfloat Team FROM 8:30 -7:30 AM    CHIEF COMPLAINT & BRIEF HOSPITAL COURSE:    INTERVAL HPI/OVERNIGHT EVENTS:   no acute events. On bedside exam pt mentioned that she had random episodes of dizziness      REVIEW OF SYSTEMS:  CONSTITUTIONAL: Feels cold.  RESPIRATORY: Pain w/ deep breath. Produced sputum collected in a cup, no observed cough. no SOB.  CARDIOVASCULAR: No chest pain, palpitations, dizziness, or leg swelling  GASTROINTESTINAL: No abdominal pain. No nausea, vomiting, or hematemesis; No diarrhea or constipation. No melena or hematochezia.  GENITOURINARY: No dysuria or hematuria, urinary frequency  NEUROLOGICAL: No headaches, memory loss, loss of strength, numbness, or tremors  SKIN: No itching, burning, rashes, or lesions     Vital Signs Last 24 Hrs  T(C): 37.1 (03 Jul 2023 08:04), Max: 37.1 (03 Jul 2023 08:04)  T(F): 98.8 (03 Jul 2023 08:04), Max: 98.8 (03 Jul 2023 08:04)  HR: 63 (03 Jul 2023 08:04) (63 - 79)  BP: 105/63 (03 Jul 2023 08:04) (105/63 - 142/81)  BP(mean): 83 (03 Jul 2023 04:42) (83 - 83)  RR: 18 (03 Jul 2023 08:04) (18 - 20)  SpO2: 98% (03 Jul 2023 08:04) (98% - 100%)    Parameters below as of 03 Jul 2023 08:04  Patient On (Oxygen Delivery Method): room air        PHYSICAL EXAMINATION:  GENERAL: NAD, cachectic  HEAD:  Atraumatic, Normocephalic  EYES:  conjunctiva and sclera clear  NECK: Supple, No JVD, Normal thyroid  CHEST/LUNG: Clear to auscultation. Clear to percussion bilaterally; No rales, rhonchi, wheezing, or rubs  HEART: Regular rate and rhythm; No murmurs, rubs, or gallops  ABDOMEN: Soft, Nontender, Nondistended; Bowel sounds present  NERVOUS SYSTEM:  Alert & Oriented X3,    EXTREMITIES:  2+ Peripheral Pulses, No clubbing, cyanosis, or edema  SKIN: warm dry                          10.3   9.27  )-----------( 277      ( 03 Jul 2023 08:59 )             31.7     07-03    137  |  112<H>  |  18  ----------------------------<  141<H>  3.6   |  22  |  0.59    Ca    8.7      03 Jul 2023 08:59  Phos  3.0     07-03  Mg     1.7     07-03    TPro  6.9  /  Alb  2.5<L>  /  TBili  0.6  /  DBili  x   /  AST  38  /  ALT  31  /  AlkPhos  82  07-03    LIVER FUNCTIONS - ( 03 Jul 2023 08:59 )  Alb: 2.5 g/dL / Pro: 6.9 g/dL / ALK PHOS: 82 U/L / ALT: 31 U/L DA / AST: 38 U/L / GGT: x                   CAPILLARY BLOOD GLUCOSE      RADIOLOGY & ADDITIONAL TESTS:                   PGY-1 Progress Note discussed with attending    PAGER #: [] TILL 5:00 PM  PLEASE CONTACT ON CALL TEAM:  - On Call Team (Please refer to Ava) FROM 5:00 PM - 8:30PM  - Nightfloat Team FROM 8:30 -7:30 AM    CHIEF COMPLAINT & BRIEF HOSPITAL COURSE:    INTERVAL HPI/OVERNIGHT EVENTS:   no acute events. On bedside exam pt mentioned that she had random episodes of dizziness, and felt weak. She also mentioned she had pain with inspiration.       REVIEW OF SYSTEMS:  CONSTITUTIONAL: Feels cold.  RESPIRATORY: Pain w/ deep breath. Produced sputum collected in a cup, no observed cough. no SOB.  CARDIOVASCULAR: No chest pain, palpitations, dizziness, or leg swelling  GASTROINTESTINAL: No abdominal pain. No nausea, vomiting, or hematemesis; No diarrhea or constipation. No melena or hematochezia.  GENITOURINARY: No dysuria or hematuria, urinary frequency  NEUROLOGICAL: No headaches, memory loss, loss of strength, numbness, or tremors  SKIN: No itching, burning, rashes, or lesions     Vital Signs Last 24 Hrs  T(C): 37.1 (03 Jul 2023 08:04), Max: 37.1 (03 Jul 2023 08:04)  T(F): 98.8 (03 Jul 2023 08:04), Max: 98.8 (03 Jul 2023 08:04)  HR: 63 (03 Jul 2023 08:04) (63 - 79)  BP: 105/63 (03 Jul 2023 08:04) (105/63 - 142/81)  BP(mean): 83 (03 Jul 2023 04:42) (83 - 83)  RR: 18 (03 Jul 2023 08:04) (18 - 20)  SpO2: 98% (03 Jul 2023 08:04) (98% - 100%)    Parameters below as of 03 Jul 2023 08:04  Patient On (Oxygen Delivery Method): room air        PHYSICAL EXAMINATION:  GENERAL: NAD, cachectic  HEAD:  Atraumatic, Normocephalic  EYES:  conjunctiva and sclera clear  NECK: Supple, No JVD, Normal thyroid  CHEST/LUNG: Clear to auscultation. Clear to percussion bilaterally; No rales, rhonchi, wheezing, or rubs  HEART: Regular rate and rhythm; No murmurs, rubs, or gallops  ABDOMEN: Soft, Nontender, Nondistended; Bowel sounds present  NERVOUS SYSTEM:  Alert & Oriented X3,    EXTREMITIES:  2+ Peripheral Pulses, No clubbing, cyanosis, or edema  SKIN: warm dry                          10.3   9.27  )-----------( 277      ( 03 Jul 2023 08:59 )             31.7     07-03    137  |  112<H>  |  18  ----------------------------<  141<H>  3.6   |  22  |  0.59    Ca    8.7      03 Jul 2023 08:59  Phos  3.0     07-03  Mg     1.7     07-03    TPro  6.9  /  Alb  2.5<L>  /  TBili  0.6  /  DBili  x   /  AST  38  /  ALT  31  /  AlkPhos  82  07-03    LIVER FUNCTIONS - ( 03 Jul 2023 08:59 )  Alb: 2.5 g/dL / Pro: 6.9 g/dL / ALK PHOS: 82 U/L / ALT: 31 U/L DA / AST: 38 U/L / GGT: x                   CAPILLARY BLOOD GLUCOSE      RADIOLOGY & ADDITIONAL TESTS:

## 2024-05-01 PROBLEM — K75.81 NASH (NONALCOHOLIC STEATOHEPATITIS): Status: ACTIVE | Noted: 2024-01-01

## 2024-05-01 NOTE — PROGRESS NOTES
4/30/2024    10:39 AM   Post Discharge Outreach   Admission Date 4/27/2024   Reason for Admission altered mental status likely secondary to hepatic encephalopathy in the setting of poor compliance with lactulose   Discharge Date 4/29/2024   Discharge Diagnosis Alcoholic cirrhosis  Portal hypertension  Esophageal varices  Recurrent SBP  Hepatic encephalopathy   How are you doing now that you are home? resting, tired per osn   How are your symptoms? (Red Flag symptoms escalate to triage hotline per guidelines) Improved   Do you feel your condition is stable enough to be safe at home until your provider visit? Yes   Does the patient have their discharge instructions?  Yes   Does the patient have questions regarding their discharge instructions?  No   Were you started on any new medications or were there changes to any of your previous medications?  No   Does the patient have all of their medications? Yes   Do you have all of your needed medical supplies or equipment (DME)?  (i.e. oxygen tank, CPAP, cane, etc.) Yes   Discharge follow-up appointment scheduled within 14 calendar days?  Yes   Discharge Follow Up Appointment Scheduled with? Primary Care Provider     Hospital Follow-up Visit:    Hospital/Nursing Home/ Rehab Facility: [facility]  Date of Admission: April 27, 2024  Date of Discharge: April 29, 2024  Reason(s) for Admission: Hepatic encephalopathy ( pneumonia?)   Was the patient in the ICU or did the patient experience delirium during hospitalization?  No, but does have hepatic encephalopathy  Do you have any other stressors you would like to discuss with your provider? No    Problems taking medications regularly: Yes, as detailed below  Medication changes since discharge: None  Problems adhering to non-medication therapy: Yes, as detailed below    Summary of hospitalization:  [facility] discharge summary reviewed  Diagnostic Tests/Treatments reviewed.  Follow up  needed: BMP due today  Other Healthcare Providers Involved in Patient s Care: Gastroenterology,         Care Coordination  Update since discharge: worsened.     MED REC REQUIRED  Post Medication Reconciliation Status:  Discharge medications reconciled, continue medications without change     Plan of care communicated with patient and family    ASSESMENT AND PLAN:  Diagnoses and all orders for this visit:  JENNINGS (nonalcoholic steatohepatitis)  Reviewed hospital discharge summary and plan with the patient and his wife today.  -     Basic metabolic panel  (Ca, Cl, CO2, Creat, Gluc, K, Na, BUN); Future  Hepatic encephalopathy (H)  Patient's wife is struggling to take care of him at home.  Poor adherence to his medication regiment.  Worsening confusion.  Developed a plan today with the patient and his wife to reinitiate lactulose and polyethylene glycol.  Care coordination team is involved in helping the patient and family on resources that may be available to increase his level of care at home.  If he has to be rehospitalized again then discharged to a transitional care may be needed.  Counseling done today and all of this with the patient's wife.  -     Basic metabolic panel  (Ca, Cl, CO2, Creat, Gluc, K, Na, BUN); Future  Portal hypertension (H) with ascites  Since his last TIPS revision there has not been the severe and frequent recurrence of ascites like he was having before it, but there has been the worsening hepatic encephalopathy as detailed above.  Per discharge summary instructions, if his lab results from his basic metabolic panel looked good then patient wife will be contacted to reinitiate furosemide and spironolactone.  I will call the patient's wife tomorrow when the results are available.  Continue to follow-up with gastroenterology.  Type 2 diabetes mellitus with microalbuminuria, without long-term current use of insulin (H)  Stable.  Chronic kidney disease, stage 3a (H)  Discharge summary recommends  follow-up labs:  -     Basic metabolic panel  (Ca, Cl, CO2, Creat, Gluc, K, Na, BUN); Future  Generalized weakness, Unstable gait  A push wheelchair is necessary to increase safety of movement within and outside of the home.  The home does have space to allow for the use of a push wheelchair.  Family members are able to provide the pushing of the wheelchair.  Medically necessary because of the patient's above complex medical conditions to improve mobility and safety.  Written prescription given today for the patient to take to a medical supply store.    Reviewed the risks and benefits of the treatment plan with the patient and/or caregiver and we discussed indications for routine and emergent follow-up.        SUBJECTIVE: Hospital follow-up visit.  Unfortunately, since discharge, the patient has not been taking his lactulose as prescribed, his wife reports that he frequently declines to take it and as he becomes more somnolent and confused he becomes less likely to take this and his other medications.  So far today he has not had any bowel movements.  Patient is having a difficult time staying awake today but does answer questions appropriately most of the time.  His wife acknowledges that she is struggling to take care of him at home but she does not want him to go into a care center.  She has been contacted by our social work team to try to help get improved care in the home setting.  Since discharge there has not been any worsening ascites or abdominal pain or swelling.    Past Medical History:   Diagnosis Date    Alcoholic cirrhosis of liver with ascites (H)     Cirrhosis of liver (H)     Diabetes mellitus (H)     Gout     Hypertension     Kidney stone      Patient Active Problem List   Diagnosis    Anemia    Hematuria    Hypercholesterolemia    Hypertension    Type 2 diabetes mellitus with microalbuminuria, without long-term current use of insulin (H)    Nephrolithiasis    Benign Adenomatous Polyp Of The Large  Intestine    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    GI bleeding    Anemia due to blood loss, acute    Hypotension    Hyperkalemia    Gastrointestinal hemorrhage associated with acute gastritis    Popliteal cyst, left    Popliteal cyst, right    Incontinence of feces with fecal urgency    Other cirrhosis of liver (H) with ascites    Chronic kidney disease, stage 3a (H)    Portal hypertension (H)    Cirrhosis of liver with ascites (H)    Hepatic encephalopathy (H)    Pleural effusion    Anemia, unspecified type    Mitral valve insufficiency, unspecified etiology    SBP (spontaneous bacterial peritonitis) (H)    Bilateral lower extremity edema    End-stage liver disease (H)    Incontinence of feces, unspecified fecal incontinence type    Ascites    Disorder of function of stomach    Elevated lipase    Esophageal varices without bleeding (H)    Generalized abdominal pain    Hypomagnesemia    Ureteral stone    Hypokalemia    Lower GI bleed    Localized swelling of both lower extremities    Acute encephalopathy    Complicated UTI (urinary tract infection)    JENNINGS (nonalcoholic steatohepatitis)     Current Outpatient Medications   Medication Sig Dispense Refill    bismuth subsalicylate (PEPTO BISMOL) 262 MG chewable tablet Take 1 tablet by mouth every 6 hours as needed for diarrhea      ciprofloxacin (CIPRO) 500 MG tablet Take 1 tablet by mouth once daily 60 tablet 0    fish oil-omega-3 fatty acids 1000 MG capsule Take 1 g by mouth 2 times daily      [START ON 5/2/2024] furosemide (LASIX) 20 MG tablet Take 1 tablet (20 mg) by mouth daily 60 tablet 6    HEMP OIL OR EXTRACT OR OTHER CBD CANNABINOID, NOT MEDICAL CANNABIS, CBD gummy      lactulose (CHRONULAC) 10 GM/15ML solution Take 30 mLs (20 g) by mouth 3 times daily 2700 mL 0    midodrine (PROAMATINE) 2.5 MG tablet Take 1 tablet (2.5 mg) by mouth 3 times daily (with meals) 180 tablet 3    multivitamin, therapeutic (THERA-VIT) TABS  "tablet Take 1 tablet by mouth daily      omeprazole (PRILOSEC) 20 MG DR capsule Take 1 capsule (20 mg) by mouth 2 times daily 180 capsule 3    rifaximin (XIFAXAN) 550 MG TABS tablet Take 1 tablet (550 mg) by mouth 2 times daily 60 tablet 4    sodium bicarbonate 650 MG tablet Take 1 tablet (650 mg) by mouth 2 times daily 60 tablet 0    [START ON 5/2/2024] spironolactone (ALDACTONE) 50 MG tablet Take 1 tablet (50 mg) by mouth daily 60 tablet 6    Zinc Sulfate 220 (50 Zn) MG TABS Take 220 mg by mouth daily       History   Smoking Status    Never   Smokeless Tobacco    Never       OBJECTICE: /54 (BP Location: Right arm, Patient Position: Sitting, Cuff Size: Adult Regular)   Pulse 60   Temp 97.5  F (36.4  C) (Oral)   Resp 16   Ht 1.727 m (5' 8\")   Wt 68 kg (150 lb)   SpO2 100%   BMI 22.81 kg/m       No results found for this or any previous visit (from the past 24 hour(s)).     GEN-somnolent, falls asleep intermittently, in no acute distress   CV-regular rate and rhythm   RESP-lungs clear to auscultation   ABDOMINAL-soft, nontender,  minimal ascites   EXTREM-trace bilateral ankle edema       Amrik Mejia MD   "

## 2024-05-02 PROBLEM — Z91.148 NONCOMPLIANCE WITH MEDICATION REGIMEN: Status: ACTIVE | Noted: 2024-01-01

## 2024-05-02 NOTE — NURSING NOTE
Is the patient currently in the state of [place]? YES    Visit mode:TELEPHONE    If the visit is dropped, the patient can be reconnected by: TELEPHONE VISIT: Phone number:   Telephone Information:   Mobile [phone]       Will anyone else be joining the visit? NO  (If patient encounters technical issues they should call [phone]    How would you like to obtain your AVS? MyChart    Are changes needed to the allergy or medication list? No    Are refills needed on medications prescribed by this physician? NO    Reason for visit: RECHECK    Zaira DAVENPORT

## 2024-05-02 NOTE — PROGRESS NOTES
Virtual Visit Details    Type of service:  Telephone Visit   Phone call duration: 12 minutes   Originating Location (pt. Location): Home    Distant Location (provider location):  On-site

## 2024-05-02 NOTE — TELEPHONE ENCOUNTER
MTM referral from: Transitions of Care (recent hospital discharge or ED visit)    MTM referral outreach attempt #2 on May 2, 2024 at 8:44 AM      Outcome: Patient not reachable after several attempts, sent [email or web address] message    Use brandon wiggins (greg) for the carrier/Plan on the flowsheet      [employer] Message Sent    Birdie England CPhT  MTM

## 2024-05-02 NOTE — PROGRESS NOTES
"  Phone visit      Synopsis   Vito Sy is a very pleasant AGE: 73 year old year old person  He has JENNINGS  Hepatic encephalopathy  Worsening confusion  Portal hypertension with ASCITES, TIPS procedure  Diabetes  CKD3      He is a recurrent calcium oxalate stone former who has required stone clearance procedures.     2012 when he had significant left-sided stone burden cleared ureteroscopically.     2014 - On the right-hand side there is an 11 x 7 mm stone in the mid pole 4 mm in the lower pole and a branching stone or series of stones partially 15 x 14 mm. On the left-hand side there is a 9 x 6 mm midpole stone 2 mm midpole stone and a pair of 6 mm lower pole calculi     11/18/2021 [facility] urology: CT scan actually revealed 2 large ureteral stones along with a 1.2 cm bladder stone and large bilateral renal stones. Per note \"Given his stone burden I would favor starting with a cystolitholapaxy and right ureteroscopy though ultimately given renal stone burden require PCNL as well future. \"    12/20/2021 Underwent bladder stone removal and right ureteroscopy   He underwent extensive ureteroscopy on 12/20/2021. He had a large bladder stone along with a conglomeration of multiple ureteral stones. I dusted 1 of the patient's large renal stones but his right renal burden is quite large. Stone analysis revealed calcium oxalate 90% with some 10% calcium phosphate.    Saw one of my partners 4/19/2024   INR 1.5 now, was as high as 2.04 before. Was interested in treatment of stone so set up with me     Today I called for telephone visit, I was able to reach his spouse but she said he was unable to talk because of the confusion and that they were trying to get him to take the lactulose to help with that.     CT from 4/27 reviewed    Large amount of lower calyx partial staghorn calculi bilaterally  No hydronephrosis    Medical Comorbidities      Past Medical History:   Diagnosis Date    Alcoholic cirrhosis of liver " with ascites (H)     Cirrhosis of liver (H)     Diabetes mellitus (H)     Gout     Hypertension     Kidney stone                Medications     Current Outpatient Medications   Medication Sig Dispense Refill    bismuth subsalicylate (PEPTO BISMOL) 262 MG chewable tablet Take 1 tablet by mouth every 6 hours as needed for diarrhea      ciprofloxacin (CIPRO) 500 MG tablet Take 1 tablet by mouth once daily 60 tablet 0    fish oil-omega-3 fatty acids 1000 MG capsule Take 1 g by mouth 2 times daily      furosemide (LASIX) 20 MG tablet Take 1 tablet (20 mg) by mouth daily 60 tablet 6    HEMP OIL OR EXTRACT OR OTHER CBD CANNABINOID, NOT MEDICAL CANNABIS, CBD gummy      lactulose (CHRONULAC) 10 GM/15ML solution Take 30 mLs (20 g) by mouth 3 times daily 2700 mL 0    midodrine (PROAMATINE) 2.5 MG tablet Take 1 tablet (2.5 mg) by mouth 3 times daily (with meals) 180 tablet 3    multivitamin, therapeutic (THERA-VIT) TABS tablet Take 1 tablet by mouth daily      omeprazole (PRILOSEC) 20 MG DR capsule Take 1 capsule (20 mg) by mouth 2 times daily 180 capsule 3    rifaximin (XIFAXAN) 550 MG TABS tablet Take 1 tablet (550 mg) by mouth 2 times daily 60 tablet 4    sodium bicarbonate 650 MG tablet Take 1 tablet (650 mg) by mouth 2 times daily 60 tablet 0    spironolactone (ALDACTONE) 50 MG tablet Take 1 tablet (50 mg) by mouth daily 60 tablet 6    Zinc Sulfate 220 (50 Zn) MG TABS Take 220 mg by mouth daily       No current facility-administered medications for this visit.            Assessment/Plan   73 year old year old person with   He has JENNINGS  Hepatic encephalopathy  Portal hypertension with ASCITES, TIPS procedure  Diabetes  CKD3    Bilateral nephrolithiasis. Recurrent stone former, Last surgery was 12/2021 when he underwent bladder stone removal and right ureteroscopy at [facility]    I think to clear his stones would need PCNL    With his worsening liver failure, hepatic encephalopathy and elevated INR I think this is very  high risk procedure and given stones not obstructing and stable in size I would favor observation    I was not able to speak to patient due to confusion but did speak to his wife about this plan and she is agreement.     Will plan for repeat visit in 6 months with CT to monitor stone stability.     CC:  Amrik Mejia      We did a 12 minutes telephone call

## 2024-05-02 NOTE — NURSING NOTE
Is the patient currently in the state of [place]? {YES OR NO:[id number]}    Visit mode:TELEPHONE    If the visit is dropped, the patient can be reconnected by: TELEPHONE VISIT: Phone number:   Telephone Information:   Mobile [phone]       Will anyone else be joining the visit? NO  (If patient encounters technical issues they should call [phone])    How would you like to obtain your AVS? MyChart    Are changes needed to the allergy or medication list? No    Are refills needed on medications prescribed by this physician? NO    Reason for visit: RECHSABI FLOWERSF

## 2024-05-03 NOTE — ED NOTES
[facility] ED Handoff Report    ED Chief Complaint: Altered mental status    ED Diagnosis:  (K76.82) Hepatic encephalopathy (H)  Comment:   Plan: Monitor VS, ammonia levels    (Z91.148) Noncompliance with medication regimen  Comment:   Plan: Monitor       PMH:    Past Medical History:   Diagnosis Date    Alcoholic cirrhosis of liver with ascites (H)     Cirrhosis of liver (H)     Diabetes mellitus (H)     Gout     Hypertension     Kidney stone         Code Status:  Full Code     Falls Risk: Yes Band: Applied    Current Living Situation/Residence: lives with a significant other     Elimination Status: Continent: man [facility]    Activity Level: 2 assist    Patients Preferred Language:  Other: Joya     Needed: Yes    Vital Signs:  BP (!) 142/65   Pulse 70   Temp 97.3  F (36.3  C) (Tympanic)   Resp 14   Wt 64.4 kg (142 lb)   SpO2 100%   BMI 21.59 kg/m       Cardiac Rhythm: NSR    Pain Score: Patient is unable to quantify.    Is the Patient Confused:  Yes    Last Food or Drink: 05/02/24 at 0800    Focused Assessment:  Mentation, VS    Tests Performed: Done: Labs    Treatments Provided:  NA    Family Dynamics/Concerns: No    Family Updated On Visitor Policy: Yes    Plan of Care Communicated to Family: Yes    Who Was Updated about Plan of Care: Son    Belongings Checklist Done and Signed by Patient: Yes    Medications sent with patient: NA    Covid: asymptomatic , NA    Additional Information:     RN: Salud Hardwick RN   5/2/2024 11:40 PM

## 2024-05-03 NOTE — PLAN OF CARE
Problem: Adult Inpatient Plan of Care  Goal: Plan of Care Review  Description: The Plan of Care Review/Shift note should be completed every shift.  The Outcome Evaluation is a brief statement about your assessment that the patient is improving, declining, or no change.  This information will be displayed automatically on your shift  note.  Outcome: Progressing     Problem: Adult Inpatient Plan of Care  Goal: Optimal Comfort and Wellbeing  Outcome: Progressing   Goal Outcome Evaluation:             Pt is alert but disoriented to time and situation. Pt is Joya speaking. On room air. Denies pain. PIV saline locked. U/A obtained. Had Lactulose enema, large bowel out. NPO. Has external male catheter.

## 2024-05-03 NOTE — PLAN OF CARE
Goal Outcome Evaluation:  Lethargic. Speech garbled and difficult to understand at times.  Denies pain.  NPO. Scheduled lactulose enemas every 4 hours. 3 BMs already today.   Primofit in place. Minimal output. IVF hydration started.  BG 96 and 122. No insulin orders.  Turn and reposition. Up in the chair late afternoon with OT.

## 2024-05-03 NOTE — ED PROVIDER NOTES
EMERGENCY DEPARTMENT ENCOUNTER     NAME: Vito Sy   AGE: 73 year old male   YOB: 1950   MRN: [id number]   EVALUATION DATE & TIME: No admission date for patient encounter.   PCP: Amrik Mejia     Chief Complaint   Patient presents with    Altered Mental Status   :    FINAL IMPRESSION       1. Hepatic encephalopathy (H)    2. Noncompliance with medication regimen           ED COURSE & MEDICAL DECISION MAKING    9:05 PM I met with the patient, obtained history, performed an initial exam, and discussed options and plan for diagnostics and treatment here in the ED.  10:15 PM I spoke with Dr. Oleary, Hospitalist. They accepted the patient for admission.   10:18 PM I updated the patient and his family about the recommendation for admission.       Pertinent Labs & Imaging studies reviewed. (See chart for details)   73 year old male  presents to the Emergency Department for evaluation of worsening mental status similar to when he was admitted with hepatic encephalopathy.  His son states that he lives with his wife and he has not been taking his lactulose or having bowel movements since discharge.  On last admission, his ammonia was in the 60s and it was thought that his mental status change was secondary to hepatic encephalopathy. Initial Vitals Reviewed. Initial exam notable for patient who does look encephalopathic, is fatigued appearing.  He does also have some jaundice.  I repeated his labs with a strong suspicion of hyperammonemia and hepatic encephalopathy given the clinical story.  His ammonia tonight is actually over double what it was previously which fits with the suspicion.  I actually saw his wife today in the emergency department as well after she accidentally took his afternoon medications instead of her own.  After both of these incidents in the same day with her accidentally taking the wrong medication and him not even remembering to take his medication and only making it out of the  hospital for a few days before needing readmission, I had a conversation with their son and he does admit that they likely need help with medication management and are not otherwise managing.  I will start some lactulose for him in the ED here and I discussed the case with hospitalist Dr. Hui for admission.         At the conclusion of the encounter I discussed the results of all of the tests and the disposition. The questions were answered. The patient or family acknowledged understanding and was agreeable with the care plan.     0 minutes critical care time, see procedure note below for details if relevant    Medical Decision Making    History:  Supplemental history from: Family Member/Significant Other  External Record(s) reviewed: Inpatient Record: Hospital admission on 04/27 for hepatic encephalopathy    Work Up:  Chart documentation includes differential considered and any EKGs or imaging independently interpreted by provider, where specified.  In additional to work up documented, I considered the following work up: Documented in chart, if applicable.    External consultation:  Discussion of management with another provider: Documented in chart, if applicable    Complicating factors:  Care impacted by chronic illness: Chronic Kidney Disease and Diabetes  Care affected by social determinants of health: Medication Noncompliance    Disposition considerations: Admit.        MEDICATIONS GIVEN IN THE EMERGENCY:   Medications   lactulose (CHRONULAC) solution 20 g (has no administration in time range)      NEW PRESCRIPTIONS STARTED AT TODAY'S ER VISIT   New Prescriptions    No medications on file     ================================================================   HISTORY OF PRESENT ILLNESS       Patient information was obtained from: Patient's son   Use of Intrepreter: N/A    Vito CABRERA Yossi is a 73 year old male with history of CKD III, DM II, end stage liver disease, and cirrhosis of the liver who presents  altered mental status.    Patient's son states the patient has not been regularly taking his lactulose and has not been having regular bowel movements. Today he has been slow to answer questions and talking gibberish. Patient has also had little appetite and has primarily been eating soup and pureed foods.     ================================================================        PAST HISTORY     PAST MEDICAL HISTORY:   Past Medical History:   Diagnosis Date    Alcoholic cirrhosis of liver with ascites (H)     Cirrhosis of liver (H)     Diabetes mellitus (H)     Gout     Hypertension     Kidney stone       PAST SURGICAL HISTORY:   Past Surgical History:   Procedure Laterality Date    COLONOSCOPY      ESOPHAGOSCOPY, GASTROSCOPY, DUODENOSCOPY (EGD), COMBINED N/A 10/10/2023    Procedure: ESOPHAGOGASTRODUODENOSCOPY with biopsy;  Surgeon: Puneet Plunkett MD, MD;  Location: [facility] Main OR    ESOPHAGOSCOPY, GASTROSCOPY, DUODENOSCOPY (EGD), COMBINED N/A 2/27/2024    Procedure: ESOPHAGOGASTRODUODENOSCOPY;  Surgeon: Beatrice Christie MD;  Location: [facility] Main OR    IR PARACENTESIS  11/6/2021    IR TRANSVEN INTRAHEPATIC PORTOSYST REV  1/26/2023    IR TRANSVEN INTRAHEPATIC PORTOSYST SHUNT  11/16/2022    IR TRANSVEN INTRAHEPATIC PORTOSYST SHUNT  12/9/2022    [place] ESOPHAGOGASTRODUODENOSCOPY TRANSORAL DIAGNOSTIC N/A 11/16/2020    Procedure: ESOPHAGOGASTRODUODENOSCOPY (EGD);  Surgeon: Juan Garnett MD;  Location: [facility];  Service: Gastroenterology    [place] ESOPHAGOGASTRODUODENOSCOPY TRANSORAL DIAGNOSTIC N/A 11/18/2020    Procedure: ESOPHAGOGASTRODUODENOSCOPY (EGD) WITH BANDING;  Surgeon: Juan Garnett MD;  Location: [facility];  Service: Gastroenterology    URETEROSCOPY        CURRENT MEDICATIONS:   bismuth subsalicylate (PEPTO BISMOL) 262 MG chewable tablet  ciprofloxacin (CIPRO) 500 MG tablet  fish oil-omega-3 fatty acids 1000 MG capsule  furosemide (LASIX) 20 MG tablet  HEMP OIL OR EXTRACT OR OTHER CBD  "CANNABINOID, NOT MEDICAL CANNABIS,  lactulose (CHRONULAC) 10 GM/15ML solution  midodrine (PROAMATINE) 2.5 MG tablet  omeprazole (PRILOSEC) 20 MG DR capsule  rifaximin (XIFAXAN) 550 MG TABS tablet  sodium bicarbonate 650 MG tablet  spironolactone (ALDACTONE) 50 MG tablet  Zinc Sulfate 220 (50 Zn) MG TABS  multivitamin, therapeutic (THERA-VIT) TABS tablet      ALLERGIES:   Allergies   Allergen Reactions    Sulfa Antibiotics Shortness Of Breath and Itching    Penicillins Unknown     Annotation: discussed with patient, he reports he had \"itching\" with penicillin many years ago in [facility] but no hives or throat or respiratory symptoms.  he also reports having tolerated amoxicillin since coming to [place].        FAMILY HISTORY:   Family History   Problem Relation Age of Onset    Heart Disease Brother     Hypertension Mother     Hypertension Father       SOCIAL HISTORY:   Social History     Socioeconomic History    Marital status:    Tobacco Use    Smoking status: Never     Passive exposure: Never    Smokeless tobacco: Never   Vaping Use    Vaping status: Never Used   Substance and Sexual Activity    Alcohol use: Not Currently     Comment: none for the past 2 years    Drug use: No   Social History Narrative    Lives with wife - has worked as  in the past     Social Determinants of Health     Financial Resource Strain: Low Risk  (12/27/2023)    Financial Resource Strain     Within the past 12 months, have you or your family members you live with been unable to get utilities (heat, electricity) when it was really needed?: No   Food Insecurity: Low Risk  (12/27/2023)    Food Insecurity     Within the past 12 months, did you worry that your food would run out before you got money to buy more?: No     Within the past 12 months, did the food you bought just not last and you didn t have money to get more?: Patient declined   Transportation Needs: Low Risk  (12/27/2023)    Transportation Needs     Within the past 12 " months, has lack of transportation kept you from medical appointments, getting your medicines, non-medical meetings or appointments, work, or from getting things that you need?: No   Interpersonal Safety: Unknown (3/24/2024)    Received from [employer]    Humiliation, Afraid, Rape, and Kick questionnaire     Physically Abused: Patient unable to answer     Sexually Abused: Patient unable to answer   Housing Stability: Low Risk  (3/24/2024)    Received from [employer], [employer]    Housing Stability Vital Sign     Unable to Pay for Housing in the Last Year: No     Number of Places Lived in the Last Year: 1     In the last 12 months, was there a time when you did not have a steady place to sleep or slept in a shelter (including now)?: No        VITALS  Patient Vitals for the past 24 hrs:   BP Temp Temp src Pulse Resp SpO2 Weight   05/02/24 2053 (!) 142/65 97.3  F (36.3  C) Tympanic 70 14 100 % 64.4 kg (142 lb)        ================================================================    PHYSICAL EXAM     VITAL SIGNS: BP (!) 142/65   Pulse 70   Temp 97.3  F (36.3  C) (Tympanic)   Resp 14   Wt 64.4 kg (142 lb)   SpO2 100%   BMI 21.59 kg/m     Constitutional:  Awake, no acute distress, fatigued appearing, sitting in a wheelchair.   HENT:  Atraumatic, oropharynx without exudate or erythema, membranes moist  Lymph:  No adenopathy  Eyes: EOM intact, PERRL, no injection  Neck: Supple  Respiratory:  Clear to auscultation bilaterally, no wheezes or crackles   Cardiovascular:  Regular rate and rhythm, single S1 and S2   GI:  Soft, nontender, nondistended, no rebound or guarding   Musculoskeletal:  Moves all extremities, no lower extremity edema, no deformities    Skin:  Warm, dry  Neurologic:  Alert and oriented x3, no focal deficits noted       ================================================================  LAB       All pertinent labs reviewed and interpreted.   Labs Ordered and Resulted from  Time of ED Arrival to Time of ED Departure   COMPREHENSIVE METABOLIC PANEL - Abnormal       Result Value    Sodium 142      Potassium 5.2      Carbon Dioxide (CO2) 18 (*)     Anion Gap 13      Urea Nitrogen 29.8 (*)     Creatinine 1.22 (*)     GFR Estimate 63      Calcium 9.8      Chloride 111 (*)     Glucose 171 (*)     Alkaline Phosphatase 158 (*)     AST 45      ALT 26      Protein Total 8.5 (*)     Albumin 3.4 (*)     Bilirubin Total 1.1     LIPASE - Abnormal    Lipase 221 (*)    AMMONIA - Abnormal    Ammonia 141 (*)    CBC WITH PLATELETS AND DIFFERENTIAL - Abnormal    WBC Count 5.0      RBC Count 3.57 (*)     Hemoglobin 9.9 (*)     Hematocrit 31.3 (*)     MCV 88      MCH 27.7      MCHC 31.6      RDW 18.9 (*)     Platelet Count 124 (*)     % Neutrophils 69      % Lymphocytes 13      % Monocytes 12      % Eosinophils 5      % Basophils 1      % Immature Granulocytes 0      NRBCs per 100 WBC 0      Absolute Neutrophils 3.4      Absolute Lymphocytes 0.7 (*)     Absolute Monocytes 0.6      Absolute Eosinophils 0.3      Absolute Basophils 0.1      Absolute Immature Granulocytes 0.0      Absolute NRBCs 0.0     ROUTINE UA WITH MICROSCOPIC REFLEX TO CULTURE        ===============================================================  RADIOLOGY       Reviewed all pertinent imaging. Please see official radiology report.   No orders to display         ================================================================  EKG       EKG reviewed interpreted by me shows sinus rhythm with a rate of 67, normal axis, QTc 456 with no acute ST or T wave changes since 27 April  I have independently reviewed and interpreted the EKG(s) documented above.     ================================================================  PROCEDURES         DOMINIK, Thelma C Blayne, am serving as a scribe to document services personally performed by Dr. Mccartney based on my observation and the provider's statements to me. I, Brittany Mccartney MD attest that Thelma ELY  Blayne is acting in a scribe capacity, has observed my performance of the services and has documented them in accordance with my direction.   Brittany Mccartney M.D.   Emergency Medicine   [facility] EMERGENCY DEPARTMENT  [address] [place] [id number] [phone]  Dept: [phone]        Brittany Mccartney MD  05/02/24 6423

## 2024-05-03 NOTE — H&P
[facility]    History and Physical - Hospitalist Service       Date of Admission:  5/2/2024    Assessment & Plan      Vito Sy is a 73 year old male admitted on 5/2/2024. He was brought to the ED for evaluation of increased confusion and lethargy    #Hepatic encephalopathy, recurrent  -Ammonia 141  -Poor compliance with lactulose and rifaximin, continue with goal of 3 bowel movements daily  -SW/PT/OT for placement    #Cirrhosis with portal hypertension, varices, splenomegaly  -History of TIPS and recurrent SBP  -Continue PTA ciprofloxacin for SBP prophylaxis  -Continue PTA furosemide, hold PTA spironolactone    #Chronic kidney disease stage IIIa  #Non-anion gap metabolic acidosis  -Avoid nephrotoxins  -Continue PTA furosemide and sodium bicarbonate    #Elevated lipase  -No epigastric abdominal pain  -Repeat level in a.m.    #Chronic anemia  #Chronic thrombocytopenia  -Stable cell counts    #Prediabetes  -Monitor for hypoglycemia while on ciprofloxacin        Diet: Combination Diet Moderate Consistent Carb (60 g CHO per Meal) Diet; Low Saturated Fat Na <2400mg Diet    DVT Prophylaxis: Pneumatic Compression Devices  Cronin Catheter: Not present  Lines: None     Cardiac Monitoring: None  Code Status: Full Code          Disposition Plan     Medically Ready for Discharge: Anticipated in 2-4 Days           Young Oleary MD  Hospitalist Service  [facility]  Securely message with [employer] (more info)  Text page via [employer] Paging/Directory     ______________________________________________________________________    Chief Complaint   Increased confusion and lethargy    History is obtained from the electronic health record, emergency department physician, and patient's son    History of Present Illness   Vito Sy is a 73 year old male who was brought to the ED by family for evaluation of increased confusion and lethargy.  The information is obtained from patient's  son at bedside secondary to lethargy and encephalopathy.  Past medical history of cirrhosis, portal hypertension, varices, recurrent SBP, status post TIPS, CKD, gout, hypertension.  Patient was admitted here from 4/27 through 4/29/2024 and treated for hepatic encephalopathy.  He lives at home with his wife, son, daughter-in-law and grandchildren.  His wife sets up medication, however, has not been taking lactulose consistently since discharged.  Patient has not had a bowel movement recently.  Patient's son reported some increased confusion and speaking gibberish.      Past Medical History    Past Medical History:   Diagnosis Date    Alcoholic cirrhosis of liver with ascites (H)     Cirrhosis of liver (H)     Diabetes mellitus (H)     Gout     Hypertension     Kidney stone        Past Surgical History   Past Surgical History:   Procedure Laterality Date    COLONOSCOPY      ESOPHAGOSCOPY, GASTROSCOPY, DUODENOSCOPY (EGD), COMBINED N/A 10/10/2023    Procedure: ESOPHAGOGASTRODUODENOSCOPY with biopsy;  Surgeon: Puneet Plunkett MD, MD;  Location: [facility] Main OR    ESOPHAGOSCOPY, GASTROSCOPY, DUODENOSCOPY (EGD), COMBINED N/A 2/27/2024    Procedure: ESOPHAGOGASTRODUODENOSCOPY;  Surgeon: Beatrice Christie MD;  Location: [facility] Main OR    IR PARACENTESIS  11/6/2021    IR TRANSVEN INTRAHEPATIC PORTOSYST REV  1/26/2023    IR TRANSVEN INTRAHEPATIC PORTOSYST SHUNT  11/16/2022    IR TRANSVEN INTRAHEPATIC PORTOSYST SHUNT  12/9/2022    [place] ESOPHAGOGASTRODUODENOSCOPY TRANSORAL DIAGNOSTIC N/A 11/16/2020    Procedure: ESOPHAGOGASTRODUODENOSCOPY (EGD);  Surgeon: Juan Garnett MD;  Location: [facility];  Service: Gastroenterology    [place] ESOPHAGOGASTRODUODENOSCOPY TRANSORAL DIAGNOSTIC N/A 11/18/2020    Procedure: ESOPHAGOGASTRODUODENOSCOPY (EGD) WITH BANDING;  Surgeon: Juan Garnett MD;  Location: [facility];  Service: Gastroenterology    URETEROSCOPY         Prior to Admission Medications   Prior to Admission Medications    Prescriptions Last Dose Informant Patient Reported? Taking?   HEMP OIL OR EXTRACT OR OTHER CBD CANNABINOID, NOT MEDICAL CANNABIS, Unknown  Yes Yes   Sig: CBD gummy   Zinc Sulfate 220 (50 Zn) MG TABS Past Week  Yes Yes   Sig: Take 220 mg by mouth daily   bismuth subsalicylate (PEPTO BISMOL) 262 MG chewable tablet Unknown  Yes Yes   Sig: Take 1 tablet by mouth every 6 hours as needed for diarrhea   ciprofloxacin (CIPRO) 500 MG tablet Past Week  No Yes   Sig: Take 1 tablet by mouth once daily   fish oil-omega-3 fatty acids 1000 MG capsule Past Week  Yes Yes   Sig: Take 1 g by mouth 2 times daily   furosemide (LASIX) 20 MG tablet Past Week  No Yes   Sig: Take 1 tablet (20 mg) by mouth daily   lactulose (CHRONULAC) 10 GM/15ML solution 5/2/2024 at x3  No Yes   Sig: Take 30 mLs (20 g) by mouth 3 times daily   midodrine (PROAMATINE) 2.5 MG tablet Past Week  No Yes   Sig: Take 1 tablet (2.5 mg) by mouth 3 times daily (with meals)   multivitamin, therapeutic (THERA-VIT) TABS tablet   Yes No   Sig: Take 1 tablet by mouth daily   omeprazole (PRILOSEC) 20 MG DR capsule Past Week  No Yes   Sig: Take 1 capsule (20 mg) by mouth 2 times daily   rifaximin (XIFAXAN) 550 MG TABS tablet Past Week  No Yes   Sig: Take 1 tablet (550 mg) by mouth 2 times daily   sodium bicarbonate 650 MG tablet Past Week  No Yes   Sig: Take 1 tablet (650 mg) by mouth 2 times daily   spironolactone (ALDACTONE) 50 MG tablet 4/28/2024  No Yes   Sig: Take 1 tablet (50 mg) by mouth daily      Facility-Administered Medications: None           Physical Exam   Vital Signs: Temp: 97.3  F (36.3  C) Temp src: Tympanic BP: (!) 142/65 Pulse: 70   Resp: 14 SpO2: 100 % O2 Device: None (Room air)    Weight: 142 lbs 0 oz    Constitutional: no apparent distress  Respiratory: no increased work of breathing and occasional rhonchi bilaterally  Cardiovascular: regular rate and rhythm  GI: normal bowel sounds, nontender  Musculoskeletal: no lower extremity pitting edema  present  Neurologic: Mental Status Exam:  Level of Alertness:   lethargic    Medical Decision Making       MANAGEMENT DISCUSSED with the following over the past 24 hours: Patient's son at bedside       Data     I have personally reviewed the following data over the past 24 hrs:    5.0  \   9.9 (L)   / 124 (L)     142 111 (H) 29.8 (H) /  171 (H)   5.2 18 (L) 1.22 (H) \     ALT: 26 AST: 45 AP: 158 (H) TBILI: 1.1   ALB: 3.4 (L) TOT PROTEIN: 8.5 (H) LIPASE: 221 (H)       Imaging results reviewed over the past 24 hrs:   No results found for this or any previous visit (from the past 24 hour(s)).

## 2024-05-03 NOTE — CONSULTS
Care Management Initial Consult    General Information  Assessment completed with: Children, son Ramírez  Type of CM/SW Visit: Initial Assessment    Primary Care Provider verified and updated as needed:     Readmission within the last 30 days: previous discharge plan unsuccessful   Return Category: Progression of disease     Advance Care Planning:            Communication Assessment  Patient's communication style: spoken language (English or Bilingual)             Cognitive  Cognitive/Neuro/Behavioral: orientation  Level of Consciousness: alert  Arousal Level: opens eyes spontaneously  Orientation: disoriented to, situation, time, place  Mood/Behavior: calm, cooperative     Speech: garbled    Living Environment:   People in home: child(manav), adult, spouse  wife, son Beto  Current living Arrangements: house      Able to return to prior arrangements: other (see comments)  Living Arrangement Comments:  (TBD)    Family/Social Support:  Care provided by: spouse/significant other, child(manav), homecare agency  Provides care for: no one  Marital Status:   Wife, Children  Naomi       Description of Support System: Supportive    Support Assessment: Lacks adequate physical care, Caregiver difficulty providing physical care/safety    Current Resources:   Patient receiving home care services: Yes  Skilled Home Care Services: Skilled Nursing, Physical Therapy, Occupational Therapy  Community Resources: None  Equipment currently used at home:    Supplies currently used at home: None    Employment/Financial:  Employment Status: retired        Financial Concerns:             Does the patient's insurance plan have a 3 day qualifying hospital stay waiver?  No    Lifestyle & Psychosocial Needs:  Social Determinants of Health     Food Insecurity: Low Risk  (12/27/2023)    Food Insecurity     Within the past 12 months, did you worry that your food would run out before you got money to buy more?: No     Within the past 12  months, did the food you bought just not last and you didn t have money to get more?: Patient declined   Depression: Not at risk (2/19/2024)    PHQ-2     PHQ-2 Score: 0   Housing Stability: Low Risk  (3/24/2024)    Received from [facility]    Housing Stability Vital Sign     Unable to Pay for Housing in the Last Year: No     Number of Places Lived in the Last Year: 1     In the last 12 months, was there a time when you did not have a steady place to sleep or slept in a shelter (including now)?: No   Tobacco Use: Low Risk  (5/2/2024)    Patient History     Smoking Tobacco Use: Never     Smokeless Tobacco Use: Never     Passive Exposure: Never   Financial Resource Strain: Low Risk  (12/27/2023)    Financial Resource Strain     Within the past 12 months, have you or your family members you live with been unable to get utilities (heat, electricity) when it was really needed?: No   Alcohol Use: Not on file   Transportation Needs: Low Risk  (12/27/2023)    Transportation Needs     Within the past 12 months, has lack of transportation kept you from medical appointments, getting your medicines, non-medical meetings or appointments, work, or from getting things that you need?: No   Physical Activity: Not on file   Interpersonal Safety: Unknown (3/24/2024)    Received from [facility]    Humiliation, Afraid, Rape, and Kick questionnaire     Fear of Current or Ex-Partner: Not on file     Emotionally Abused: Not on file     Physically Abused: Patient unable to answer     Sexually Abused: Patient unable to answer   Stress: Not on file   Social Connections: Not on file   Health Literacy: Not on file       Functional Status:  Prior to admission patient needed assistance:   Dependent ADLs:: Toileting, Transfers, Positioning, Incontinence, Grooming, Eating, Dressing, Bathing  Dependent IADLs:: Cleaning, Cooking, Laundry, Shopping, Meal Preparation, Medication Management, Money Management,  "Transportation, Incontinence  Assesssment of Functional Status: At functional baseline    Mental Health Status:  Mental Health Status: No Current Concerns       Chemical Dependency Status:  Chemical Dependency Status: No Current Concerns             Values/Beliefs:  Spiritual, Cultural Beliefs, Faith Practices, Values that affect care:                 Additional Information:  Chart reviewed.    Pt readmitted, was discharged from [facility] four days ago (4/29), admitted through ED last night (5/2). Reason for readmission--elderly wife unable to manage medications correctly, inadequate supports at home, pt presenting for increased confusion and lethargy.     Pt confused and lethargic, asleep when NICHOLAS student visited at bedside.     Student made call to pt's son Ramírez who she had spoken with during prior admission. Pt lives in house with wife and adult son & family, has home care RN PT OT 1x per week, agency unknown. No DME used but is needing Ax1-Ax2 for all transfers.     Son reported that both his parents (pt and wife) are switching back and forth between Ramírez's house and brother Beto's house. Both sons, Ramírez and Beto, work full time jobs and are unable to provide support to the level that is needed by their parents. Ramírez has stated multiple times that a higher level of in-home care is needed in order to make living at home sustainable. Pt's wife Naomi is extremely resistant to any type of facility, Ramírez states that, culturally, the sons ayla SMITH feels to parents like \"trying to get rid of them.\"    Per call to [facility] by NICHOLAS student, pt is on elderly waiver and has a . Planning for post-discharge is halted until  responds (multiple vm's left by student). See below for contact. Son Ramírez understands this, is awaiting communication. Per Ramírez, the ideal scenario is that pt and wife stay at home with a high level of PCA services.     Anticipated discharge disp home with home care RN PT " OT PCA.     [facility] - [facility]: [phone]      Chay Gupta

## 2024-05-03 NOTE — UTILIZATION REVIEW
Admission Status; Secondary Review Determination   Under the authority of the Utilization Management Committee, the utilization review process indicated a secondary review on the above patient. The review outcome is based on review of the medical records, discussions with staff, and applying clinical experience noted on the date of the review.     (x) Inpatient Status Appropriate - This patient's medical care is consistent with medical management for inpatient care and reasonable inpatient medical practice.   RATIONALE FOR DETERMINATION   Mr. Sy is a 74 yo male pt with a PMH of liver cirrhosis, h/o TIPS and recurrent SBP who presents to the ED with increased lethargy and confusion.  Elevated ammonia noted; lactulose dose increased.  Trialing clears this afternoon as mentation showing slight improvement.    D/W Dr. Arriola.  He continues to be significantly encephlopathic and not near to his baseline requiring further medical evaluation, inpatient treatment and close clinical monitoring.  If mentation does not improve with improvement of ammonia levels then will be considering diagnostic paracentesis and further work-up for AMS.  At the time of admission with the information available to the attending physician more than 2 nights Hospital complex care was anticipated, based on patient risk of adverse outcome if treated as outpatient and complex care required. Inpatient admission is appropriate based on the Medicare guidelines.   The information on this document is developed by the utilization review team in order for the business office to ensure compliance. This only denotes the appropriateness of proper admission status and does not reflect the quality of care rendered.   The definitions of Inpatient Status and Observation Status used in making the determination above are those provided in the CMS Coverage Manual, Chapter 1 and Chapter 6, section 70.4.     Sincerely,     Lisbeth Pittman,  DO  Utilization Review  Physician Advisor

## 2024-05-03 NOTE — PROGRESS NOTES
05/03/24 1415   Appointment Info   Signing Clinician's Name / Credentials (OT) Yamile Jenkins OTR/L       Present no   Language RN reports pt was an  - pt confirms no need for .   Living Environment   People in Home spouse;grandchild(manav);child(manav), adult   Current Living Arrangements house   Living Environment Comments lethargic through interview limiting ability to gather details - details from CM note   Self-Care   Activity/Exercise/Self-Care Comment recently needing assist for all ADLs and transfers. prior to hospitalization ~1 week ago, generally IND with PRN assist. chart indicates that pt and spouse have been needing increasing assist from sons who both work out of the home during the day   Instrumental Activities of Daily Living (IADL)   IADL Comments assist for all IADLs   General Information   Onset of Illness/Injury or Date of Surgery 05/02/24   Referring Physician Young Oleary MD   Patient/Family Therapy Goal Statement (OT) feel better   Additional Occupational Profile Info/Pertinent History of Current Problem admit with hepatic encephalopathy related to poor compliance with lactulose   Existing Precautions/Restrictions fall   Limitations/Impairments safety/cognitive   Cognitive Status Examination   Orientation Status   (oriented to city and place type, unable to recall place name, states april 2024)   Affect/Mental Status (Cognitive) flat/blunted affect;low arousal/lethargic   Follows Commands follows one-step commands;repetition of directions required   Range of Motion Comprehensive   General Range of Motion bilateral upper extremity ROM WFL   Strength Comprehensive (MMT)   Comment, General Manual Muscle Testing (MMT) Assessment weakness BUE   Bed Mobility   Bed Mobility supine-sit   Supine-Sit [place] (Bed Mobility) minimum assist (75% patient effort)   Sit-Stand Transfer   Sit-Stand [place] (Transfers) moderate assist (50% patient  effort)   Sit/Stand Transfer Comments arm in arm   Toilet Transfer   [place] Level (Toilet Transfer) moderate assist (50% patient effort)   Balance   Balance Comments min A for static standing with arm in arm   Activities of Daily Living   BADL Assessment/Intervention lower body dressing;toileting   Lower Body Dressing Assessment/Training   [place] Level (Lower Body Dressing) maximum assist (25% patient effort)   Toileting   [place] Level (Toileting) maximum assist (25% patient effort)   Clinical Impression   Criteria for Skilled Therapeutic Interventions Met (OT) Yes, treatment indicated   OT Diagnosis dec BADL indep   OT Problem List-Impairments impacting ADL problems related to;activity tolerance impaired;balance;cognition;mobility;strength   Assessment of Occupational Performance 3-5 Performance Deficits   Identified Performance Deficits dressing, toileting, bathing, household mobility, functional transfers   Planned Therapy Interventions (OT) ADL retraining;cognition;strengthening;transfer training;home program guidelines;progressive activity/exercise   Clinical Decision Making Complexity (OT) problem focused assessment/low complexity   Risk & Benefits of therapy have been explained evaluation/treatment results reviewed;participants included;patient   OT Total Evaluation Time   OT Eval, Low Complexity Minutes (23026) 10   OT Goals   Therapy Frequency (OT) Daily   OT Predicted Duration/Target Date for Goal Attainment 05/10/24   OT Goals Lower Body Dressing;Cognition   OT: Lower Body Dressing Supervision/stand-by assist   OT: Toilet Transfer/Toileting Supervision/stand-by assist;toilet transfer;cleaning and garment management   OT: Cognitive Patient/caregiver will verbalize understanding of cognitive assessment results/recommendations as needed for safe discharge planning   Self-Care/Home Management   Self-Care/Home Mgmt/ADL, Compensatory, Meal Prep Minutes (50062) 16   Symptoms Noted During/After  Treatment (Meal Preparation/Planning Training) fatigue   Treatment Detail/Skilled Intervention Step-by-step cues for pivot from bed to commode with mod Ax1, arm in arm. Total A for clothing management and pericares after extended time seated on toilet. Mod Ax1 sit > stand .Min Ax1 with FWW for static standing for 2nd assist to complete cares. Min Ax1 with FWW for pivot to recliner. oriented to call light. chair alarm on.   OT Discharge Planning   OT Plan track cog, LB dressing, toileting, progress mob in room   OT Discharge Recommendation (DC Rec) Transitional Care Facility   OT Rationale for DC Rec requires heavy assist for all IADLs and sons work during day. chart review indicates sons having difficulty providing care for pt and pt's spouse   OT Brief overview of current status mod A

## 2024-05-03 NOTE — PROGRESS NOTES
[facility]    Medicine Progress Note - Hospitalist Service    Date of Admission:  5/2/2024    Assessment & Plan   Vito Sy is a 73 year old male admitted on 5/2/2024. He was brought to the ED for evaluation of increased confusion and lethargy     Hepatic encephalopathy, recurrent  -Ammonia 141 -> 73  -Poor compliance with lactulose and rifaximin, continue with goal of 3 bowel movements daily  -SW/PT/OT for placement  -dysphagia screen and ADAT     Cirrhosis with portal hypertension, varices, splenomegaly  -History of TIPS and recurrent SBP  -Continue PTA ciprofloxacin for SBP prophylaxis  -Continue PTA furosemide, hold PTA spironolactone     Chronic kidney disease stage IIIa  -Non-anion gap metabolic acidosis  -Avoid nephrotoxins  -Continue PTA furosemide and sodium bicarbonate     Elevated lipase  -No epigastric abdominal pain     Chronic anemia  -Chronic thrombocytopenia  -Stable cell counts     Prediabetes  -Monitor for hypoglycemia while on ciprofloxacin         Diet: NPO for Medical/Clinical Reasons Except for: No Exceptions    DVT Prophylaxis: Pneumatic Compression Devices  Cronin Catheter: Not present  Lines: None     Cardiac Monitoring: None  Code Status: Full Code      Clinically Significant Risk Factors Present on Admission        # Hyperkalemia: Highest K = 5.7 mmol/L in last 2 days, will monitor as appropriate       # Hypoalbuminemia: Lowest albumin = 3.4 g/dL at 5/2/2024  9:28 PM, will monitor as appropriate   # Thrombocytopenia: Lowest platelets = 111 in last 2 days, will monitor for bleeding   # Hypertension: Noted on problem list          # Financial/Environmental Concerns:           Disposition Plan     Medically Ready for Discharge: Anticipated in 2-4 Days       Karsten Arriola MD  Hospitalist Service  [facility]  Securely message with [employer] (more info)  Text page via [employer] Paging/Directory    ______________________________________________________________________    Interval History   He appears to be confused.  He denies having pain or discomfort.  Management plan discussed with his son over the phone.    Physical Exam   Vital Signs: Temp: 97.6  F (36.4  C) Temp src: Axillary BP: (!) 148/70 Pulse: 71   Resp: 18 SpO2: 100 % O2 Device: None (Room air)    Weight: 142 lbs 0 oz    General Appearance: No distress noted.  Confused  Respiratory: Good air entry bilaterally  Cardiovascular: S1 and S2 well heard, no murmur or gallop  GI: Soft abdomen, no tenderness, normoactive bowel sounds  Skin: Intact and warm     Medical Decision Making       50 MINUTES SPENT BY ME on the date of service doing chart review, history, exam, documentation & further activities per the note.      Data

## 2024-05-03 NOTE — ED TRIAGE NOTES
"Pt here with son who describes the patient as being less alert, more confused, and \"speaking jibberish\" for past 4 days since his discharge from here for hepatic encephalopathy. Pt was prescribed lactulose and has been taking it as prescribed. He took three doses today but he has no had a bowel movement since yesterday. Pt is confused at baseline but he is typically able to have a bit of a back and forth conversation         Triage Assessment (Adult)       Row Name 05/02/24 2055          Triage Assessment    Airway WDL WDL        Respiratory WDL    Respiratory WDL WDL        Skin Circulation/Temperature WDL    Skin Circulation/Temperature WDL WDL        Cardiac WDL    Cardiac WDL WDL        Peripheral/Neurovascular WDL    Peripheral Neurovascular WDL WDL                     "

## 2024-05-03 NOTE — PHARMACY-ADMISSION MEDICATION HISTORY
Pharmacist Admission Medication History    Admission medication history is complete. The information provided in this note is only as accurate as the sources available at the time of the update.    Information Source(s): Family member and CareEverywhere/SureScripts via in-person    Pertinent Information:   Son reports that his mother helps with medications but she is unavailable tonight. He unsure when he last took medications besides lactulose. He states she struggles to get the patient to lactulose. The son reports he was able to convince the patient to take it 3 times today. He mixes it with lemonade and ice. He is not aware of any follow up regarding the BMP/spironolactone plan from yesterday.     Changes made to PTA medication list:  Added: None  Deleted: None  Changed: None    Allergies reviewed with patient and updates made in EHR: unable to assess    Medication History Completed By: Cassidy Rodriguez [facility] 5/2/2024 10:10 PM    PTA Med List   Medication Sig Note Last Dose    bismuth subsalicylate (PEPTO BISMOL) 262 MG chewable tablet Take 1 tablet by mouth every 6 hours as needed for diarrhea  Unknown    ciprofloxacin (CIPRO) 500 MG tablet Take 1 tablet by mouth once daily  Past Week    fish oil-omega-3 fatty acids 1000 MG capsule Take 1 g by mouth 2 times daily  Past Week    furosemide (LASIX) 20 MG tablet Take 1 tablet (20 mg) by mouth daily  Past Week    HEMP OIL OR EXTRACT OR OTHER CBD CANNABINOID, NOT MEDICAL CANNABIS, CBD gummy  Unknown    lactulose (CHRONULAC) 10 GM/15ML solution Take 30 mLs (20 g) by mouth 3 times daily  5/2/2024 at x3    midodrine (PROAMATINE) 2.5 MG tablet Take 1 tablet (2.5 mg) by mouth 3 times daily (with meals)  Past Week    omeprazole (PRILOSEC) 20 MG DR capsule Take 1 capsule (20 mg) by mouth 2 times daily  Past Week    rifaximin (XIFAXAN) 550 MG TABS tablet Take 1 tablet (550 mg) by mouth 2 times daily  Past Week    sodium bicarbonate 650 MG tablet Take 1 tablet (650 mg) by mouth 2  times daily  Past Week    spironolactone (ALDACTONE) 50 MG tablet Take 1 tablet (50 mg) by mouth daily 5/2/2024: MD note says he would call 5/2 after BMP resulted. Son is not aware if his mother has received the call.  4/28/2024    Zinc Sulfate 220 (50 Zn) MG TABS Take 220 mg by mouth daily  Past Week

## 2024-05-04 NOTE — PROGRESS NOTES
[facility]    Medicine Progress Note - Hospitalist Service    Date of Admission:  5/2/2024    Assessment & Plan   Vito Sy is a 73 year old male admitted on 5/2/2024. He was brought to the ED for evaluation of increased confusion and lethargy     Hepatic encephalopathy, recurrent  -Ammonia 141 -> 73  -Is having more than 3 bowel movements now.  -Is getting more alert and participates in conversation  -SW/PT/OT for placement  -SLP evaluation performed.  Recommending soft and bite sized diet     Cirrhosis with portal hypertension, varices, splenomegaly  -History of TIPS and recurrent SBP  -Continue PTA ciprofloxacin for SBP prophylaxis  -Continue PTA furosemide     Chronic kidney disease stage IIIa  -Non-anion gap metabolic acidosis  -Avoid nephrotoxins  -Continue PTA furosemide and sodium bicarbonate     Elevated lipase  -No epigastric abdominal pain     Chronic anemia  -Chronic thrombocytopenia  -Stable cell counts     Prediabetes  -Monitor for hypoglycemia while on ciprofloxacin       Diet: Combination Diet Soft and Bite Sized Diet (level 6); Mildly Thick (level 2)  Snacks/Supplements Adult: Ensure Enlive; Between Meals    DVT Prophylaxis: Pneumatic Compression Devices  Cronin Catheter: Not present  Lines: None     Cardiac Monitoring: None  Code Status: Full Code      Clinically Significant Risk Factors         # Hypernatremia: Highest Na = 149 mmol/L in last 2 days, will monitor as appropriate      # Hypoalbuminemia: Lowest albumin = 3.4 g/dL at 5/2/2024  9:28 PM, will monitor as appropriate  # Coagulation Defect: INR = 1.47 (Ref range: 0.85 - 1.15) and/or PTT = 37 Seconds (Ref range: 22 - 38 Seconds), will monitor for bleeding  # Thrombocytopenia: Lowest platelets = 111 in last 2 days, will monitor for bleeding   # Hypertension: Noted on problem list         # Severe Malnutrition: based on nutrition assessment, PRESENT ON ADMISSION   # Financial/Environmental Concerns:            Disposition Plan     Medically Ready for Discharge: Anticipated Tomorrow       Karsten Arriola MD  Hospitalist Service  [facility]  Securely message with [employer] (more info)  Text page via [email or web address] Paging/Directory   ______________________________________________________________________    Interval History   Patient is more alert and conversant today.  Speech evaluation performed today.  Management plan discussed with the patient and he expressed understanding. Also discussed with his son over the phone     Physical Exam   Vital Signs: Temp: 98  F (36.7  C) Temp src: Oral BP: 137/61 Pulse: 66   Resp: 16 SpO2: 100 % O2 Device: None (Room air)    Weight: 142 lbs 0 oz    General Appearance: No distress noted  Respiratory: Good air entry bilaterally  Cardiovascular: S1 and S2 are heard, no murmur  GI: Soft abdomen, no tenderness, normoactive bowel sound  Skin: Intact and warm    Medical Decision Making       52 MINUTES SPENT BY ME on the date of service doing chart review, history, exam, documentation & further activities per the note.      Data

## 2024-05-04 NOTE — PROGRESS NOTES
"CLINICAL NUTRITION SERVICES - ASSESSMENT NOTE     Nutrition Prescription    RECOMMENDATIONS FOR MDs/PROVIDERS TO ORDER:  None at this time    Malnutrition Status:    Severe in chronic illness    Recommendations already ordered by Registered Dietitian (RD):  Mildly thick ensure enlive daily at 2 pm ( will monitor BG)    Future/Additional Recommendations:  Monitor po/supplement intake, weight, labs, GOC     REASON FOR ASSESSMENT  Vito Sy is a/an 73 year old male assessed by the dietitian for Admission Nutrition Risk Screen for positive weight loss PTA and decreased appetite    Pt admitted on 5/2/2024.  He was brought to the ED for evaluation of increased confusion and lethargy. Hepatic encephalopathy, recurrent.  Poor compliance with lactulose and rifaximin.  He has cirrhosis with poral hypertension, varices, splenomegaly, CKD stage IIIa, elevated lipase, chronic anemia, prediabetes    NUTRITION HISTORY  Pt states he has no idea how much weight he's lost and states he was eating fine PTA (?) He did not want an  so spoke in english. Pt was very tired and kept eyes closed.  He states he did not follow any special diet at home ( some confusion noted)    CURRENT NUTRITION ORDERS  Diet: Soft and bite sized diet (level 6) with mildly thick liquids (level 2)  Intake/Tolerance: Ate 100% lunch today ( 391 Calories and 24 g protein)    LABS  Labs reviewed: Na 149 (H), K 3.6, urea nitrogen 27.9 (H), Cr 1.27 (H), Glu 89, lipase 164 (H) 5/3/2024, ammonia 73 (H) on 5/3/2024    MEDICATIONS  Medications reviewed: cipro, lasix, lactulose, multi vitamin, pantoprazole, rifaximin, zinc sulfate    ANTHROPOMETRICS  Height: 5'8\"  Most Recent Weight: 64.4 kg (142 lb)    IBW: 70 kg ( 154 lb)  BMI: Normal BMI  Weight History:   Wt Readings from Last 10 Encounters:   05/02/24 64.4 kg (142 lb)   05/01/24 68 kg (150 lb)   04/27/24 68 kg (150 lb)   04/10/24 68.3 kg (150 lb 8 oz)   03/03/24 82.2 kg (181 lb 4.8 oz)   02/27/24 83 " kg (183 lb)   02/27/24 83 kg (183 lb)   02/19/24 83 kg (183 lb)   12/27/23 67.5 kg (148 lb 12.8 oz)   11/16/23 75.3 kg (166 lb)   39 lb weight loss in 2 months ( 21.5%)     Dosing Weight: 64.4 kg    ASSESSED NUTRITION NEEDS  Estimated Energy Needs: 1932+ kcals/day (30+ Sancho/Kg)  Justification: Repletion and Underweight  Estimated Protein Needs: 51+ grams protein/day (0.8 g/Kg)  Justification: Increased ammonia and CKD-increasing protein as ammonia decreases   Estimated Fluid Needs: 6978-1156 mL/day (25 - 30 mL/kg)   Justification: Maintenance and Per provider pending fluid status    PHYSICAL FINDINGS  See malnutrition section below.  Skin: Scab right and left shin  Fritz score=19, nutrition noted as adequate  GI: Abdominal discomfort  Last BM 5/3/2024 ( watery, brown, light)    MALNUTRITION:  % Weight Loss:  > 7.5% in 3 months (severe malnutrition)  % Intake:  </= 75% for >/= 1 month (severe malnutrition)-predicted  Subcutaneous Fat Loss:  buccal-severe and Orbital region moderate depletion  Muscle Loss:  Temporal region moderate depletion, Clavicle bone region severe depletion, Patellar region moderate depletion, Anterior thigh region moderate depletion, and Posterior calf region moderate depletion  Fluid Retention:  None noted    Malnutrition Diagnosis: Severe malnutrition  In Context of:  Chronic illness or disease    NUTRITION DIAGNOSIS  Malnutrition related to chronic illness as evidenced by 21.5% weight loss in 2 months and inadequate oral intake < 75%>/= 1 month, and moderate to severe fat and muscle loss      INTERVENTIONS  Implementation  Supplement: ensure enlive mildly thick daily at 2 pm     Goals  Patient to consume % of nutritionally adequate meals three times per day, or the equivalent with supplements/snacks.  Prevent further weight loss  Normal electrolytes  BG >70 and < 150     Monitoring/Evaluation  Progress toward goals will be monitored and evaluated per protocol.

## 2024-05-04 NOTE — PROGRESS NOTES
"Speech-Language Pathology: Clinical Swallow Evaluation     05/04/24 0900   Appointment Info   Signing Clinician's Name / Credentials (SLP) Moraima Jimenez MS, [facility]-SLP   General Information   Onset of Illness/Injury or Date of Surgery 05/02/24   Referring Physician Karsten Arriola MD   Pertinent History of Current Problem Per provider note, \"73 year old male who was brought to the ED by family for evaluation of increased confusion and lethargy.  The information is obtained from patient's son at bedside secondary to lethargy and encephalopathy.  Past medical history of cirrhosis, portal hypertension, varices, recurrent SBP, status post TIPS, CKD, gout, hypertension.  Patient was admitted here from 4/27 through 4/29/2024 and treated for hepatic encephalopathy.  He lives at home with his wife, son, daughter-in-law and grandchildren.  His wife sets up medication, however, has not been taking lactulose consistently since discharged.  Patient has not had a bowel movement recently.  Patient's son reported some increased confusion and speaking gibberish.\" Clinical swallow evaluation completed per MD orders.   General Observations Lethargic, sitting upright in bedside chair. Pt just completed PT session.       Present yes   Language Joya via Varioptic initially, pt was responding in English. Pt reported that he speaks more Fijian, a little English, and a little Joya.   Type of Evaluation   Type of Evaluation Swallow Evaluation   Oral Motor   Oral Musculature generally intact   Structural Abnormalities none present   Mucosal Quality sticky   Dentition (Oral Motor)   Comment, Dentition (Oral Motor) Pt initially reported he had dentures, but not present. When asking again with RN, pt reported no dentures.   Dentition (Oral Motor) natural dentition;significant number of missing teeth   Facial Symmetry (Oral Motor)   Facial Symmetry (Oral Motor) right side impairment   Right Side Facial Asymmetry " "minimal impairment   Lip Function (Oral Motor)   Lip Range of Motion (Oral Motor) protrusion impairment;retraction impairment   Protrusion, Lip Range of Motion minimal impairment   Retraction, Lip Range of Motion minimal impairment   Tongue Function (Oral Motor)   Tongue Strength (Oral Motor) strength decreased;bilateral   Tongue Coordination/Speed (Oral Motor) WNL   Tongue ROM (Oral Motor) depression is impaired;protrusion is impaired   Protrusion, Tongue ROM Impairment (Oral Motor) minimal impairment   Depression, Tongue ROM Impairment (Oral Motor) moderate impairment   Jaw Function (Oral Motor)   Jaw Function (Oral Motor) WNL   Cough/Swallow/Gag Reflex (Oral Motor)   Volitional Throat Clear/Cough (Oral Motor) reduced strength   Volitional Swallow (Oral Motor) WNL   Vocal Quality/Secretion Management (Oral Motor)   Vocal Quality (Oral Motor) WNL   Secretion Management (Oral Motor) WNL   Comment, Vocal Quality/Secretion Management (Oral Motor) Note sticky secretions throughout oral cavity and especially on velum.   General Swallowing Observations   Past History of Dysphagia Per EMR review & pt report, none. Per family report yesterday, coughing on water has been occurring at home intermittently. None present at bedside.   Comment, General Swallowing Observations Per RN note, \"Dysphagia screening-coughing noted following water intake. Family at bedside and states patient has been doing this for a while at home too.\"   Respiratory Support room air   Current Diet/Method of Nutritional Intake (General Swallowing Observations, NIS)   (ADAT to [facility]; per RN, they have been keeping him NPO until SLP eval)   Swallowing Evaluation Clinical swallow evaluation   Clinical Swallow Evaluation   Feeding Assistance minimal assistance required   Additional evaluation(s) completed today No   Clinical Swallow Evaluation Textures Trialed thin liquids;mildly thick liquids;pureed;solid foods   Clinical Swallow Eval: Thin Liquid Texture " Trial   Mode of Presentation, Thin Liquids spoon;cup;straw;self-fed;fed by clinician   Volume of Liquid or Food Presented 8 oz   Oral Phase of Swallow premature pharyngeal entry  (suspected)   Pharyngeal Phase of Swallow repeated swallows;throat clearing;wet vocal quality after swallow;coughing/choking   Successful Strategies Trialed During Procedure   (reduce bolus size)   Diagnostic Statement Overt s/s of aspiration of subtle throat clearing to coughing. Pt impulsive and taking large sequential sips and needed SLP to stop straw or cup to control bolus size.   Clinical Swallow Eval: Mildly Thick Liquids   Mode of Presentation cup;straw;self-fed   Volume Presented 4 oz   Oral Phase WFL   Pharyngeal Phase intact   Diagnostic Statement No overt s/s of aspiration across large consecutive straw sips.   Clinical Swallow Evaluation: Puree Solid Texture Trial   Mode of Presentation, Puree spoon;self-fed   Volume of Puree Presented 3 oz   Oral Phase, Puree WFL   Pharyngeal Phase, Puree intact   Diagnostic Statement No overt s/s of aspiration. No oral residue. Pt denied globus sensation.   Clinical Swallow Evaluation: Solid Food Texture Trial   Mode of Presentation self-fed   Volume Presented 2 elise crackers   Oral Phase residue in oral cavity   Oral Residue soft palate   Pharyngeal Phase intact   Diagnostic Statement No overt s/s of aspiration. Oral residue on velum, which cleared with liquid wash. Pt denied globus sensation. Timely and complete mastication. Pt very impulsive needing SLP cues and support for intake.   Esophageal Phase of Swallow   Patient reports or presents with symptoms of esophageal dysphagia Yes   Esophageal comments hx of GERD on PPI   Swallowing Recommendations   Diet Consistency Recommendations mildly thick liquids (level 2);soft & bite-sized (level 6)   Supervision Level for Intake 1:1 supervision needed   Mode of Delivery Recommendations bolus size, small;slow rate of intake   Postural  Recommendations none   Swallowing Maneuver Recommendations alternate food and liquid intake   Monitoring/Assistance Required (Eating/Swallowing) check mouth frequently for oral residue/pocketing;stop eating activities when fatigue is present;monitor for cough or change in vocal quality with intake   Recommended Feeding/Eating Techniques (Swallow Eval) maintain upright sitting position for eating;maintain upright posture during/after eating for 30 minutes;minimize distractions during oral intake   Medication Administration Recommendations, Swallowing (SLP) one at a time in [facility] [place]   General Therapy Interventions   Planned Therapy Interventions Dysphagia Treatment   Dysphagia treatment Modified diet education;Instruction of safe swallow strategies;Compensatory strategies for swallowing   Clinical Impression   Criteria for Skilled Therapeutic Interventions Met (SLP Eval) Yes, treatment indicated   SLP Diagnosis Oropharyngeal dysphagia   Risks & Benefits of therapy have been explained evaluation/treatment results reviewed;care plan/treatment goals reviewed;risks/benefits reviewed;current/potential barriers reviewed;participants voiced agreement with care plan;participants included;patient  (Suspect pt will need ongoing reinforcement)   Clinical Impression Comments Clinical Swallow Evaluation completed per MD orders. Oral motor function was notable for generalized weakness, sticky secretions on velum, and significant number of teeth missing. Pt reported consuming a regular diet and thin liquids at baseline. Patient's voice was clear. Patient had overt s/s aspiration with thin liquids by spoon, cup, and straw marked by coughing and subtle throat clearing. No overt s/s of aspiration with mildly thickened liquids by sequential straw sips, puree, or regular solid. Mastication was timely and complete. Mild oral residue on velum noted with regular solid, which cleared with liquid wash. Pt impulsive and taking very large bites,  many very large sequential straw sips (2 oz before SLP had pt pause). Recommend soft & bite size diet (IDDSI 6) and mildly thickened liquids (IDDSI 2) with 1:1 supervision. Patient should be sitting fully upright and alert, take single small bites/sips (with verbal cues), alternate solids/liquids, and remain sitting upright for 30+ minutes after PO intake. Suspect some aspect of acute on chronic oropharyngeal dysphagia per family report. SLP to follow for dysphagia management.   SLP Total Evaluation Time   Eval: oral/pharyngeal swallow function, clinical swallow Minutes (38573) 18   SLP Discharge Planning   SLP Plan 5x; diet f/u, trial reg, train strats (small bites/sips, slow, alternate) - Monitor need for VFSS   SLP Discharge Recommendation Transitional Care Facility   SLP Rationale for DC Rec Below baseline diet, suspect some aspect of acute on chronic oropharyngeal dysphagia per family report.   SLP Brief overview of current status  Recommend soft & bite size diet (IDDSI 6) and mildly thickened liquids (IDDSI 2) with 1:1 supervision. Patient should be sitting fully upright and alert, take single small bites/sips (with verbal cues), alternate solids/liquids, and remain sitting upright for 30+ minutes after PO intake. Suspect some aspect of acute on chronic oropharyngeal dysphagia per family report. SLP to follow for dysphagia management.

## 2024-05-04 NOTE — PROGRESS NOTES
05/04/24 0830   Appointment Info   Signing Clinician's Name / Credentials (PT) SHERRIE Lucas   Living Environment   People in Home spouse;grandchild(manav);child(manav), adult   Current Living Arrangements house   Home Accessibility stairs within home   Number of Stairs, Main Entrance 4   Number of Stairs, Within Home, Primary five   Transportation Anticipated family or friend will provide   Living Environment Comments Low arousal through out session.  Not able to gather all data.   Self-Care   Usual Activity Tolerance moderate   Current Activity Tolerance fair   Equipment Currently Used at Home commode chair;hospital bed;walker, standard   Fall history within last six months yes   Number of times patient has fallen within last six months 1   Activity/Exercise/Self-Care Comment Pt lives with sons and needs increasing assistance for ADLs   General Information   Onset of Illness/Injury or Date of Surgery 05/02/24   Referring Physician Young Oleary MD   Patient/Family Therapy Goals Statement (PT) none stated   Pertinent History of Current Problem (include personal factors and/or comorbidities that impact the POC) 73 year old male admitted on 5/2/2024. He was brought to the ED for evaluation of increased confusion and lethargy   Existing Precautions/Restrictions fall   Cognition   Affect/Mental Status (Cognition) confused;low arousal/lethargic   Orientation Status (Cognition) oriented to;person;place   Follows Commands (Cognition) follows one-step commands;75-90% accuracy   Behavioral Issues withdrawn   Posture    Posture Forward head position   Range of Motion (ROM)   Range of Motion ROM deficits secondary to weakness   Strength (Manual Muscle Testing)   Strength (Manual Muscle Testing) Deficits observed during functional mobility   Bed Mobility   Bed Mobility rolling left;supine-sit   Rolling Left [place] (Bed Mobility) minimum assist (75% patient effort);verbal cues   Supine-Sit [place] (Bed  Mobility) minimum assist (75% patient effort);verbal cues   Bed Mobility Limitations decreased ability to use legs for bridging/pushing;decreased ability to use arms for pushing/pulling;impaired ability to control trunk for mobility   Impairments Contributing to Impaired Bed Mobility decreased strength   Assistive Device (Bed Mobility) bed rails   Transfers   Transfers sit-stand transfer;bed-chair transfer   Impairments Contributing to Impaired Transfers decreased strength;impaired balance   Bed-Chair Transfer   Assistive Device (Bed-Chair Transfers) walker, front-wheeled   Bed-Chair [place] (Transfers) contact guard;supervision;set up   Sit-Stand Transfer   Sit-Stand [place] (Transfers) contact guard   Assistive Device (Sit-Stand Transfers) walker, front-wheeled   Gait/Stairs (Locomotion)   [place] Level (Gait) contact guard   Assistive Device (Gait) walker, front-wheeled   Distance in Feet (Gait) 5   Pattern (Gait) swing-through   Deviations/Abnormal Patterns (Gait) samm decreased   Clinical Impression   Criteria for Skilled Therapeutic Intervention Yes, treatment indicated   PT Diagnosis (PT) impaired functional mobility, difficulty with gait   Influenced by the following impairments weakness, fatigue   Functional limitations due to impairments transfers, bed mobility, gait   Clinical Presentation (PT Evaluation Complexity) evolving   Clinical Presentation Rationale Pt presents as clinically diagnosed.   Clinical Decision Making (Complexity) moderate complexity   Planned Therapy Interventions (PT) balance training;bed mobility training;neuromuscular re-education;patient/family education;stair training;strengthening;transfer training;gait training   Risk & Benefits of therapy have been explained evaluation/treatment results reviewed;patient   PT Total Evaluation Time   PT Eval, Moderate Complexity Minutes (10176) 15   Physical Therapy Goals   PT Frequency Daily   PT Predicted Duration/Target Date  for Goal Attainment 05/11/24   PT Goals Gait;Stairs;Transfers;Bed Mobility   PT: Bed Mobility Modified independent;Supine to/from sit;Rolling;Bridging   PT: Transfers Modified independent;Sit to/from stand;Bed to/from chair;Assistive device   PT: Gait Supervision/stand-by assist;Assistive device;Rolling walker;Greater than 200 feet   PT: Stairs Supervision/stand-by assist;5 stairs   Interventions   Interventions Quick Adds Gait Training   Gait Training   Gait Training Minutes (04213) 15   Symptoms Noted During/After Treatment (Gait Training) fatigue   Treatment Detail/Skilled Intervention Pt amb 175 ft with FWW and CGA.  Gait is slow but no LOB.   Distance in Feet 175   [place] Level (Gait Training) contact guard   Physical Assistance Level (Gait Training) supervision;verbal cues;1 person assist   Assistive Device (Gait Training) rolling walker   Pattern Analysis (Gait Training) swing-through gait   Gait Analysis Deviations decreased samm;decreased toe-to-floor clearance   Impairments (Gait Analysis/Training) balance impaired;strength decreased   PT Discharge Planning   PT Plan Gait, stairs, bed mobility, transfers   PT Discharge Recommendation (DC Rec) home with assist;home with home care physical therapy   PT Rationale for DC Rec Pt is PLOF.  If family can provide enough care/supervision, he could return home.  If not, TCU or LTC will be needed.   PT Brief overview of current status CGA all tranfers and gait 175ft with FWW

## 2024-05-04 NOTE — PLAN OF CARE
"Goal Outcome Evaluation:  Pt has been alert and oriented x4 today. Family reports that he still seems slightly \"off\" and he has been napping off/on during the day. Lactulose enema given this AM with large, watery results. Pt advanced to thickened liquids and soft diet and tolerated lunch well. Oral lactulose given and pt had one large, watery stool afterwards. Pt has requested to stay in chair most of the day, assisted to stand and reposition.                         " [facility]   Operative Note    Pre-operative diagnosis: open abdomen   Post-operative diagnosis Small bowel perforation with bowel in discontinuity/open abdomen   Procedure: Procedure(s):  EXPLORATORY LAPAROTOMY, SMALL BOWEL RESECTION-JEJUNUM, SMALL BOWEL ANASTOMOSIS, ABDOMINAL WASH OUT, FASCIAL CLOSURE   Jejunal to jejunal anastomosis   Surgeon(s): Surgeon(s) and Role:     * Pastor Vilchis MD - Primary   Estimated blood loss: 10 mL    Specimens: ID Type Source Tests Collected by Time Destination   A : small bowel- jejunum Tissue Other SURGICAL PATHOLOGY EXAM Pastor Vilchis MD 6/2/2019  9:38 AM       Findings: Intra-abdominally no evidence of further infection, abscess, enteric contents. Small bowel anastomosis performed and fascial closure.         Description of procedure:   The patient was transferred to the operating room and placed on the operating room table in supine position.  With induction of anesthesia with inhalational anesthetic the area was then prepped and draped in sterile fashion.  A timeout was then done indicating patient, procedure, and antibiotics given.  Began with removing the intra-abdominal packs and bowel dressing.  The Omni retractor was then placed.  The abdomen was then thoroughly explored in all 4 quadrants.  There is no further evidence of intra-abdominal abscess or purulent fluid.  With this then the decision was made to reanastomose the small bowel.  The 2 ends of the small bowel, jejunum to jejunum,  that were in discontinuity were brought into close approximation.  Antimesenteric borders were then sutured together at the staple line and then one at the crotch using 3-0 silk pop-off sutures.  The antimesenteric ends at the staple line were then opened with electrocautery and then spread with hemostat.  A 100 mm stapler was then used to create a side-to-side anastomosis.  Once the stapler was removed it was identified that this was a green load stapler thus  decision was made to resect this portion of bowel as a concern for possible leak with a green load stapler.  At the crotch stitch the antimesenteric border was then opened with electrocautery.  A new 75 mm blue load stapler was then used to create a side-to-side stapled anastomosis.  At the site of our new common enterotomy a window within the mesentery was created with electrocautery.  The mesentery was then taken down to the distal aspect of the small bowel on both limbs.  Our new common enterotomy was then closed in the previously stapled end of the small bowel was removed with a new 75 blue load stapler.  Small bowel was then passed off the field.  The common enterotomy staple line was then oversewn with 3-0 silk Lembert style sutures.  The mesenteric defect was then closed with 3-0 Vicryl in a running fashion.  A new stitch was placed at the crotch of the anastomosis using a 3-0 silk suture.  The abdomen was once again irrigated with normal saline and aspirated.  All the effluent from the irrigation was clear.  Once again ran the small bowel proximally from the ligament of Treitz distally to the terminal ileum.  There is no further evidence of any injury or perforations.  The colon appeared normal in the colonic anastomosis appeared normal as well.  With this then the decision was made to place drains and closed the abdomen.  To flat 10 Prydeinig drains were brought into the field.  They were placed intra-abdominally and through the abdominal wall in the left and right lower quadrants.  One drain was placed going from the left side draping across the pelvis to the right and one from the right side going up over the small bowel near the anastomosis.  The omentum was then draped over the anastomosis in the small bowel.  The fascia was then closed with a running looped #1 PDS.  Interrupted #1 Ethibond horizontal mattress sutures were used approximately every 3 cm for additional closure strength.  Once the fascia was  closed completely the midline wound was irrigated.  Midline wound was then packed and a dressing applied.  JOSE bulbs were applied to the drains.  Patient was extubated and transferred to the SICU in stable condition.  Postoperative debrief was performed with staff in the operating room.

## 2024-05-04 NOTE — PLAN OF CARE
Problem: Adult Inpatient Plan of Care  Goal: Absence of Hospital-Acquired Illness or Injury  Intervention: Identify and Manage Fall Risk  Recent Flowsheet Documentation  Taken 2024 0000 by Yany Lott RN  Safety Promotion/Fall Prevention: increased rounding and observation     Problem: Adult Inpatient Plan of Care  Goal: Absence of Hospital-Acquired Illness or Injury  Intervention: Prevent Skin Injury  Recent Flowsheet Documentation  Taken 2024 0349 by Yany Lott RN  Body Position: turned  Taken 2024 0149 by Yany Lott RN  Body Position:   turned   left  Taken 5/3/2024 2349 by Yany Lott RN  Body Position:   right   turned   Goal Outcome Evaluation:    Pt is lethargic between cares. Denies abdominal discomfort after 2x enema. Pt can only tolerate 350ml of lactulose. Both  had watery stool output.  Assist of 1 with gaitbelt to bedside commode. Denies SOB/. Turned & Repositioned as scheduled. Speech Language Path consult today.

## 2024-05-04 NOTE — PLAN OF CARE
Goal Outcome Evaluation:  Some abdominal discomfort with enemas but subsides shortly after BM and patient resumes sleeping. Patient still quite lethargic between cares but seems more oriented according to family. Speech remains garbled at times and family reports he was this way at home too but seems to be improving.  Family is concerned about getting patient home and him refusing lactulose by mouth again because he doesn't like the taste. Left sticky note for MD to address valid concern.  Patient has been getting the lactulose enemas every 4 hours but not tolerating full dose. Nursing has been administering to a point of no longer tolerating. Patient has had 4-5 Bms today starting off creamy this morning and now more watery.  Primofit in place for urine. Was incontinent of stool this morning but has been using BSC since later in the day. Up with assist of 1-2 with gait belt and walker.  Dysphagia screening-coughing noted following water intake. Family at bedside and states patient has been doing this for a while at home too. Speech eval ordered.

## 2024-05-05 NOTE — PLAN OF CARE
Problem: Risk for Delirium  Goal: Improved Attention and Thought Clarity  Outcome: Progressing     Goal Outcome Evaluation:  Pt A/O x4. Tolerated lactulose solution, no BMs this shift. Wife at bedside.    Donald Reed RN

## 2024-05-05 NOTE — PLAN OF CARE
Problem: Adult Inpatient Plan of Care  Goal: Absence of Hospital-Acquired Illness or Injury  Intervention: Prevent Infection  Recent Flowsheet Documentation  Taken 5/5/2024 0100 by Yany Lott RN  Infection Prevention: rest/sleep promoted   Goal Outcome Evaluation:      Plan of Care Reviewed With: patient    Pt is A&O. VSS in RA. Denies pain or discomfort. No BM noted overnight. Wife stay at bedside overnight.

## 2024-05-05 NOTE — PLAN OF CARE
[id number]    Patient's wife in room and attentive to cares. Assist x1 with walker and gait belt - patient walked with wife, refused PT in AM but they visualized patient ambulating on the unit. Up in chair with frequent weight shifting.     L PIV SL and patent.     Mildly thickened liquids and soft/bite sized regular diet. Family bringing in food from home for patient - carb counting difficult - education provided on requested carb counts. Pre-meal BG checks monitored per orders.     Patient likes lactulose with thickened orange juice - clustered cares. Call light in reach and patient/family able to make needs known.     No Bms this shift.       Problem: Adult Inpatient Plan of Care  Goal: Plan of Care Review  Outcome: Progressing     Problem: Risk for Delirium  Goal: Optimal Coping  Outcome: Progressing

## 2024-05-05 NOTE — PLAN OF CARE
Goal Outcome Evaluation:      Plan of Care Reviewed With: patient    Overall Patient Progress: improvingOverall Patient Progress: improving    Outcome Evaluation: tolerating regular diet- thickened liquids. took medications per orders. Up in chair. A/O. No stools reported [id number].      Problem: Risk for Delirium  Goal: Improved Attention and Thought Clarity  Outcome: Progressing

## 2024-05-05 NOTE — PROGRESS NOTES
"Care Management Follow Up    Length of Stay (days): 3    Expected Discharge Date: 05/06/2024     Concerns to be Addressed: discharge planning     Patient plan of care discussed at interdisciplinary rounds: Yes    Anticipated Discharge Disposition:  home with homecare     Anticipated Discharge Services:  homecare RN PT OT  Anticipated Discharge DME:  per homecare    Patient/family educated on Medicare website which has current facility and service quality ratings:  yes  Education Provided on the Discharge Plan: yes   Patient/Family in Agreement with the Plan: yes    Referrals Placed by CM/SW:  unsure of which agency pt currently utilizes for homecare  -family does not know  Private pay costs discussed: Not applicable    Additional Information:  Spoke with son Ramírez, family is unsure of which agency pt receives homecare through-    Discharge Pending 5/6. CM to contact [facility] worker prior to discharge. Several messages left between 5/3 and 5/6 with no response.    [facility] Worker - Anaya Thompson: [phone]     Social hx:   Pt lives in house with wife and adult son & family, has home care RN PT OT 1x per week, agency unknown. No DME used but is needing Ax1-Ax2 for all transfers.      Son reported that both his parents (pt and wife) are switching back and forth between Ramírez's house and brother Beto's house. Both sons, Ramírez and Beto, work full time jobs and are unable to provide support to the level that is needed by their parents. Ramírez has stated multiple times that a higher level of in-home care is needed in order to make living at home sustainable. Pt's wife Naomi is extremely resistant to any type of facility, Ramírez states that, culturally, the sons ayla SMITH feels to parents like \"trying to get rid of them.\"     Darling Deleon, RN      "

## 2024-05-05 NOTE — PROGRESS NOTES
[facility]    Medicine Progress Note - Hospitalist Service    Date of Admission:  5/2/2024    Assessment & Plan   Vito Sy is a 73 year old male admitted on 5/2/2024. He was brought to the ED for evaluation of increased confusion and lethargy     Hepatic encephalopathy, recurrent  -Ammonia 141 -> 73  -Is having more than 3 bowel movements now.  -Is getting more alert and participates in conversation  -SW/PT/OT for placement  -SLP evaluation performed.  Recommending soft and bite sized diet     Cirrhosis with portal hypertension, varices, splenomegaly  -History of TIPS and recurrent SBP  -Continue PTA ciprofloxacin for SBP prophylaxis  -Continue PTA furosemide     Chronic kidney disease stage IIIa  -Non-anion gap metabolic acidosis  -Avoid nephrotoxins  -Continue PTA furosemide and sodium bicarbonate     Elevated lipase  -No epigastric abdominal pain     Chronic anemia  -Chronic thrombocytopenia  -Stable cell counts     Prediabetes  -Monitor for hypoglycemia while on ciprofloxacin           Diet: Combination Diet Soft and Bite Sized Diet (level 6); Mildly Thick (level 2)  Snacks/Supplements Adult: Ensure Enlive; Between Meals    DVT Prophylaxis: Pneumatic Compression Devices  Cronin Catheter: Not present  Lines: None     Cardiac Monitoring: None  Code Status: Full Code      Clinically Significant Risk Factors         # Hypernatremia: Highest Na = 149 mmol/L in last 2 days, will monitor as appropriate      # Hypoalbuminemia: Lowest albumin = 3.4 g/dL at 5/2/2024  9:28 PM, will monitor as appropriate  # Coagulation Defect: INR = 1.47 (Ref range: 0.85 - 1.15) and/or PTT = 37 Seconds (Ref range: 22 - 38 Seconds), will monitor for bleeding  # Thrombocytopenia: Lowest platelets = 101 in last 2 days, will monitor for bleeding   # Hypertension: Noted on problem list         # Severe Malnutrition: based on nutrition assessment, PRESENT ON ADMISSION   # Financial/Environmental Concerns:            Disposition Plan     Medically Ready for Discharge: Anticipated Tomorrow       Karsten Arriola MD  Hospitalist Service  [facility]  Securely message with [employer] (more info)  Text page via [facility] Paging/Directory   ______________________________________________________________________    Interval History   Patient sitting in chair at the bedside.  He is more alert and present today.  He is having good oral intake and adequate bowel movement.  Management plan discussed with the patient and his spouse who was present at the bedside.  Possible discharge tomorrow a.m. to home with home care.    Physical Exam   Vital Signs: Temp: 98.6  F (37  C) Temp src: Oral BP: (!) 89/53 Pulse: 71   Resp: 18 SpO2: 100 % O2 Device: None (Room air)    Weight: 142 lbs 0 oz    General Appearance: No distress noted  Respiratory: Good air entry bilaterally  Cardiovascular: S1 and S2 are heard, no murmur  GI: Soft abdomen, no tenderness, normoactive bowel sound  Skin: Intact and warm      Medical Decision Making       50 MINUTES SPENT BY ME on the date of service doing chart review, history, exam, documentation & further activities per the note.      Data

## 2024-05-06 NOTE — PROGRESS NOTES
Care Management Discharge Note    Discharge Date: 05/06/2024       Discharge Disposition: Home, Home Care    Discharge Services: None    Discharge DME: None    Discharge Transportation: family or friend will provide    Private pay costs discussed: Not applicable    Does the patient's insurance plan have a 3 day qualifying hospital stay waiver?  No    PAS Confirmation Code:    Patient/family educated on Medicare website which has current facility and service quality ratings:      Education Provided on the Discharge Plan: Yes  Persons Notified of Discharge Plans: Patient/ Family - Per Team   Patient/Family in Agreement with the Plan: yes    Handoff Referral Completed: Yes    Additional Information:  Patient to discharge home with family and [facility] - RN [facility] PT OT SW. Family transport.     Yessenia Clemons RN

## 2024-05-06 NOTE — PLAN OF CARE
Occupational Therapy Discharge Summary    Reason for therapy discharge:    Discharged to home with home therapy.    Progress towards therapy goal(s). See goals on Care Plan in [facility] electronic health record for goal details.  Goals partially met.  Barriers to achieving goals:   discharge from facility.    Therapy recommendation(s):    Continued therapy is recommended.  Rationale/Recommendations:  to maximize safety and I'ence with ADL's.

## 2024-05-06 NOTE — DISCHARGE SUMMARY
[facility]  Hospitalist Discharge Summary      Date of Admission:  5/2/2024  Date of Discharge:  5/6/2024 11:26 AM  Discharging Provider: Karsten Arriola MD  Discharge Service: Hospitalist Service    Discharge Diagnoses   Hepatic encephalopathy, recurrent  Cirrhosis with portal hypertension, varices, splenomegaly  Chronic kidney disease stage IIIa  Elevated lipase  Chronic anemia  Prediabetes    Clinically Significant Risk Factors     # Severe Malnutrition: based on nutrition assessment      Follow-ups Needed After Discharge   Follow-up Appointments     Follow-up and recommended labs and tests       Follow up with primary care provider, Amrik Mejia, within 7 days for   hospital follow- up.  The following labs/tests are recommended: ammonia   karlo week.          Unresulted Labs Ordered in the Past 30 Days of this Admission       No orders found from 4/2/2024 to 5/3/2024.        These results will be followed up by   Discharge Disposition   Discharged to home with home care   Condition at discharge: Guarded    Hospital Course   Vito Sy is a 73 year old male admitted on 5/2/2024. He was brought to the ED for evaluation of increased confusion and lethargy.   Hepatic encephalopathy, recurrent  -Ammonia 141 -> 73  -Is having more than 3 bowel movements now.  -Is getting more alert and participates in conversation  -SLP evaluation performed.  Recommending soft and bite sized diet   Cirrhosis with portal hypertension, varices, splenomegaly  -History of TIPS and recurrent SBP  -Continue PTA ciprofloxacin for SBP prophylaxis  -Continue PTA furosemide   Chronic kidney disease stage IIIa  -Non-anion gap metabolic acidosis  -Avoid nephrotoxins  -Continue PTA furosemide and sodium bicarbonate   Elevated lipase  -No epigastric abdominal pain   Chronic anemia  -Chronic thrombocytopenia  -Stable cell counts   Prediabetes  -Monitor for hypoglycemia while on ciprofloxacin     Patient is  clinically stable enough to discharge home with home care.  Medication reconciliation has been done.  Family has been educated about the need for medication compliance otherwise patient is likely to come back to the ER.        Consultations This Hospital Stay   CARE MANAGEMENT / SOCIAL WORK IP CONSULT  PHYSICAL THERAPY ADULT IP CONSULT  OCCUPATIONAL THERAPY ADULT IP CONSULT  SPEECH LANGUAGE PATH ADULT IP CONSULT  SPEECH LANGUAGE PATH ADULT IP CONSULT  PHYSICAL THERAPY ADULT IP CONSULT  OCCUPATIONAL THERAPY ADULT IP CONSULT    Code Status   Full Code    Time Spent on this Encounter   I, Karsten Arriola MD, personally saw the patient today and spent greater than 30 minutes discharging this patient.       Karsten Arriola MD  [address] [address]  Phone: [phone]  Fax: [phone]  ______________________________________________________________________    Physical Exam   Vital Signs: Temp: 98.5  F (36.9  C) Temp src: Oral BP: 113/57 Pulse: 74   Resp: 20 SpO2: 100 % O2 Device: None (Room air)    Weight: 142 lbs 0 oz    General Appearance:  No distress noted  Respiratory: Good air entry bilaterally  Cardiovascular: S1 and S2 are heard, no murmur  GI: Soft abdomen, no tenderness, normoactive bowel sound  Skin: Intact and warm       Primary Care Physician   Amrik Mejia    Discharge Orders      Home Care Referral      Reason for your hospital stay    Hepatic encephalopathy     Follow-up and recommended labs and tests     Follow up with primary care provider, Amrik Mejia, within 7 days for hospital follow- up.  The following labs/tests are recommended: ammonia karlo week.     Activity    Your activity upon discharge: activity as tolerated     Diet    Follow this diet upon discharge: Orders Placed This Encounter      Snacks/Supplements Adult: Ensure Enlive; Between Meals      Combination Diet Soft and Bite Sized Diet (level 6); Mildly Thick  (level 2)       Significant Results and Procedures     Discharge Medications   Current Discharge Medication List        START taking these medications    Details   pantoprazole (PROTONIX) 40 MG EC tablet Take 1 tablet (40 mg) by mouth 2 times daily  Qty: 30 tablet, Refills: 1    Associated Diagnoses: Secondary esophageal varices without bleeding (H)           CONTINUE these medications which have NOT CHANGED    Details   bismuth subsalicylate (PEPTO BISMOL) 262 MG chewable tablet Take 1 tablet by mouth every 6 hours as needed for diarrhea      ciprofloxacin (CIPRO) 500 MG tablet Take 1 tablet by mouth once daily  Qty: 60 tablet, Refills: 0    Associated Diagnoses: SBP (spontaneous bacterial peritonitis) (H)      fish oil-omega-3 fatty acids 1000 MG capsule Take 1 g by mouth 2 times daily      furosemide (LASIX) 20 MG tablet Take 1 tablet (20 mg) by mouth daily  Qty: 60 tablet, Refills: 6    Comments: BMP scheduled for 5/1/24. Resume spirolactone if renal function is not worsening after lab test on 5/1/24.  Associated Diagnoses: Other cirrhosis of liver (H)      HEMP OIL OR EXTRACT OR OTHER CBD CANNABINOID, NOT MEDICAL CANNABIS, CBD gummy      lactulose (CHRONULAC) 10 GM/15ML solution Take 30 mLs (20 g) by mouth 3 times daily  Qty: 2700 mL, Refills: 0    Associated Diagnoses: Other cirrhosis of liver (H)      midodrine (PROAMATINE) 2.5 MG tablet Take 1 tablet (2.5 mg) by mouth 3 times daily (with meals)  Qty: 180 tablet, Refills: 3    Associated Diagnoses: Other cirrhosis of liver (H)      omeprazole (PRILOSEC) 20 MG DR capsule Take 1 capsule (20 mg) by mouth 2 times daily  Qty: 180 capsule, Refills: 3    Associated Diagnoses: Gastrointestinal hemorrhage associated with acute gastritis      rifaximin (XIFAXAN) 550 MG TABS tablet Take 1 tablet (550 mg) by mouth 2 times daily  Qty: 60 tablet, Refills: 4    Associated Diagnoses: Alcoholic cirrhosis, unspecified whether ascites present (H)      sodium bicarbonate 650 MG  "tablet Take 1 tablet (650 mg) by mouth 2 times daily  Qty: 60 tablet, Refills: 0    Associated Diagnoses: Other cirrhosis of liver (H)      spironolactone (ALDACTONE) 50 MG tablet Take 1 tablet (50 mg) by mouth daily  Qty: 60 tablet, Refills: 6    Comments: BMP scheduled for 5/1/24. Resume spirolactone if renal function is not worsening after lab test on 5/1/24.  Associated Diagnoses: Other cirrhosis of liver (H)      Zinc Sulfate 220 (50 Zn) MG TABS Take 220 mg by mouth daily      multivitamin, therapeutic (THERA-VIT) TABS tablet Take 1 tablet by mouth daily           Allergies   Allergies   Allergen Reactions    Sulfa Antibiotics Shortness Of Breath and Itching    Penicillins Unknown     Annotation: discussed with patient, he reports he had \"itching\" with penicillin many years ago in [facility] but no hives or throat or respiratory symptoms.  he also reports having tolerated amoxicillin since coming to [place].       "

## 2024-05-06 NOTE — PLAN OF CARE
Pain: denies pain  Neuro: Alert and oriented. Response times slightly delayed.  Resp: Lung sounds clear  Cardio: No edema noted.  GI/: Patient having consistent bowl movements on scheduled lactulose.  Skin: Intact  Activity: stand by assist  Nutrition/IV: IV removed for discharge    Patient discharging home with significant other. AVS reviewed with patient and significant other, stressing importance of oral lactulose.

## 2024-05-06 NOTE — PLAN OF CARE
Problem: Risk for Delirium  Goal: Improved Attention and Thought Clarity  Outcome: Progressing    Goal Outcome Evaluation:  Pt A/O x4, answering questions appropriately. 4x BMs per wife's report. Refused lactulose despite education. Tolerated PO meds whole with applesauce. Denies pain.  and 126. Wife helpful with cares.    Donald Reed RN

## 2024-05-06 NOTE — PLAN OF CARE
Physical Therapy Discharge Summary    Reason for therapy discharge:    Discharged to home with home therapy.    Progress towards therapy goal(s). See goals on Care Plan in [facility] electronic health record for goal details.  Goals partially met.  Barriers to achieving goals:   discharge from facility.    Therapy recommendation(s):    Continued therapy is recommended.  Rationale/Recommendations:  to improve overall strength.

## 2024-05-06 NOTE — PLAN OF CARE
Speech Language Therapy Discharge Summary    Reason for therapy discharge:    Discharged to home with home therapy.    Progress towards therapy goal(s). See goals on Care Plan in [facility] electronic health record for goal details.  Goals partially met.  Barriers to achieving goals:   discharge from facility.    Therapy recommendation(s):    Continued therapy is recommended.  Rationale/Recommendations:  dysphagia management and further assessment as needed.   Discharge diet: Soft & bite size diet (IDDSI 6) and mildly thickened liquids (IDDSI 2) with 1:1 supervision. Patient should be sitting fully upright and alert, take single small bites/sips (with verbal cues), alternate solids/liquids, and remain sitting upright for 30+ minutes after PO intake. Suspect some aspect of acute on chronic oropharyngeal dysphagia per family report.

## 2024-05-07 NOTE — LETTER
_  Medication List        Prepared on: 05/17/2024     Bring your Medication List when you go to the doctor, hospital, or   emergency room. And, share it with your family or caregivers.     Note any changes to how you take your medications.  Cross out medications when you no longer use them.    Medication How I take it Why I use it Prescriber   bismuth subsalicylate (PEPTO BISMOL) 262 MG chewable tablet Take 1 tablet by mouth every 6 hours as needed for diarrhea  Diarrhea  Entered By History Unknown   blood glucose (NO BRAND SPECIFIED) lancets standard Use to test blood sugar one time daily or as directed.  Diabetes supplies  Amrik Mejia MD   blood glucose (NO BRAND SPECIFIED) test strip Use to test blood sugar one time daily or as directed.  Diabetes supplies Amrik Mejia MD   blood glucose monitoring (NO BRAND SPECIFIED) meter device kit Use to test blood sugar one time daily or as directed.  Diabetes supplies Amrik Mejia MD   ciprofloxacin (CIPRO) 500 MG tablet Take 1 tablet by mouth once daily SBP (Spontaneous Bacterial Peritonitis) (H) Gaye Sparks MD   fish oil-omega-3 fatty acids 1000 MG capsule Take 1 g by mouth daily  General Health Entered By History Unknown   furosemide (LASIX) 20 MG tablet Take 1 tablet (20 mg) by mouth daily Other cirrhosis of liver (H) Adriano Wyatt MD   lactulose (CHRONULAC) 10 GM/15ML solution Take 30 mLs (20 g) by mouth 3 times daily Goal to have 2-3 good bowel movements/day. Other cirrhosis of liver (H) Glenn Mccormick MD   midodrine (PROAMATINE) 2.5 MG tablet Take 1 tablet (2.5 mg) by mouth 3 times daily (with meals) Other cirrhosis of liver (H) Amrik Mejia MD   multivitamin, therapeutic (THERA-VIT) TABS tablet Take 1 tablet by mouth daily  General Health Entered By History Unknown   omeprazole (PRILOSEC) 20 MG DR capsule Take 1 capsule (20 mg) by mouth 2 times daily Gastrointestinal hemorrhage associated with acute gastritis Amrik Mejia MD   rifaximin (XIFAXAN)  550 MG TABS tablet Take 1 tablet (550 mg) by mouth 2 times daily Alcoholic cirrhosis, unspecified whether ascites present (H) Stiven Peters DO   sodium bicarbonate 650 MG tablet Take 1 tablet (650 mg) by mouth 2 times daily Other cirrhosis of liver (H) Adriano Wyatt MD   spironolactone (ALDACTONE) 50 MG tablet [START ON 5/21/2024] Take 1 tablet (50 mg) by mouth daily Other cirrhosis of liver (H) Glenn Mccormick MD   Zinc Sulfate 220 (50 Zn) MG TABS Take 220 mg by mouth daily  General Health Patient Reported         Add new medications, over-the-counter drugs, herbals, vitamins, or  minerals in the blank rows below.    Medication How I take it Why I use it Prescriber                                      Allergies:      - Sulfa Antibiotics - Shortness Of Breath, Itching  - Penicillins - Unknown        Side effects I have had:      Not on File        Other Information:              My notes and questions:

## 2024-05-07 NOTE — PROGRESS NOTES
Medication Therapy Management (MTM) Encounter    ASSESSMENT:                            Medication Adherence/Access: Sometimes missed taking medications. Wife reminds him.     GERD: Stable on omeprazole      Hypertension /Hypotension/CHF: In clinic BP is controlled, and no swelling at this time. Continue current medication as prescribed     Non-anion gap metabolic acidosis: Stable on sodium bicarbonate        Cirrhosis of liver with ascites: Patient has been hospitalized multiple times due to liver issues. Patient has follow up [facility]. Does not like to take lactulose, and that might be affecting ammonia levels that is causing lethargy and then hospitalization.    Diarrhea:Stable on current medication     Supplements:Continue current dietary supplements        PLAN:                            PharmD to order glucometer and its supplies  Continue current medications as prescribed     Follow-up: Due when needed    SUBJECTIVE/OBJECTIVE:                          Zaire Sy is a 73 year old male called for a transitions of care visit. He was discharged from [facility] on 05/06/2024 for Hepatic encephalopathy.  Spoke with patient's wife Naomi while patient was on the background.      Reason for visit: LAZARUS    Allergies/ADRs: Reviewed in chart  Past Medical History: Reviewed in chart  Tobacco: He reports that he has never smoked. He has never been exposed to tobacco smoke. He has never used smokeless tobacco.  Alcohol: none  Caffeine: not much   Medication Adherence/Access: Patient uses a pillbox and refills it every week. Sometimes patient forgets to take his medication. Specially he does not like to take lactulose.     GERD     Omeprazole 20 mg po BID   Patient feels that current regimen is effective.       Hypertension /Hypotension/CHF: Denies any swelling   Spironolactone 50 mg daily    Midodrine 2.5 mg 3 times daily with meals   Furosemide 20 mg daily   Patient reports no current medication side effects  Patient  self monitors blood pressure.  Home BP monitoring Wife checks BP sometimes .       BP Readings from Last 3 Encounters:   05/06/24 113/57   05/01/24 104/54   04/29/24 103/59     Pulse Readings from Last 3 Encounters:   05/06/24 74   05/01/24 60   04/29/24 69     Non-anion gap metabolic acidosis:    Sodium bicarbonate 650 mg BID      Cirrhosis of liver with ascites:   Rifaximin (xifxan) 550 mg  BID. Denies any side effects. His stomach bloating is less with this medication. This medication is helping.    Lactulose 10 mg/15 mg and takes 30 mg (20 mg) three times  daily    Ciprofloxacin  500 mg     Diarrhea: Denies any side effects    Bismuth subsalicylate (Pepto mismol) 262 mg 1 tablet every 6 as needed     Supplements    Zinc sulfate 220 mg (50 mg elemental) PO daily    Multivitamin (Theravit) tablet PO daily    Fish oil - omega 3 fatty acids 1000 mg po daily      Today's Vitals: There were no vitals taken for this visit.  ----------------  Post Discharge Medication Reconciliation Status: discharge medications reconciled, continue medications without change.    I spent 35 minutes with this patient today. All changes were made via collaborative practice agreement with Amrik Mejia MD. A copy of the visit note was provided to the patient's provider(s).    A summary of these recommendations was sent via [employer].      Telemedicine Visit Details  Type of service:  Telephone visit  Start Time:  11:00 AM   End Time: 11:35 AM     Medication Therapy Recommendations  No medication therapy recommendations to display     Domenic Rodriguez, PharmD     Medication Therapy Management (MTM) Pharmacist     [phone]     [email or web address]

## 2024-05-07 NOTE — PATIENT INSTRUCTIONS
"Recommendations from today's MTM visit:                                                      MTM (medication therapy management) is a service provided by a clinical pharmacist designed to help you get the most of out of your medicines.   Today we reviewed what your medicines are for, how to know if they are working, that your medicines are safe and how to make your medicine regimen as easy as possible.      PharmD to order glucometer and its supplies  Continue current medications as prescribed     Follow-up: Due when needed    It was great speaking with you today.  I value your experience and would be very thankful for your time in providing feedback in our clinic survey. In the next few days, you may receive an email or text message from [facility] with a link to a survey related to your  clinical pharmacist.\"     To schedule another MTM appointment, please call the clinic directly or you may call the MTM scheduling line at [phone].    My Clinical Pharmacist's contact information:                                                      Please feel free to contact me with any questions or concerns you have.        Domenic Rodriguez, Luda     Medication Therapy Management (MTM) Pharmacist     [phone]     [email or web address]     [facility]    "

## 2024-05-07 NOTE — LETTER
May 10, 2024  Vito Sy  [address] [place] [address]    Dear Mr. SyANDREW [facility]     Thank you for talking with me on May 7, 2024 about your health and medications. As a follow-up to our conversation, I have included two documents:      Your Recommended To-Do List has steps you should take to get the best results from your medications.  Your Medication List will help you keep track of your medications and how to take them.    If you want to talk about these documents, please call MANJEET BINGHAM RPH at phone: [phone], Monday-Friday 8-4:30pm.    I look forward to working with you and your doctors to make sure your medications work well for you.    Sincerely,  MANJEET BINGHAM RPH  [facility] Pharmacist, [facility]

## 2024-05-08 NOTE — TELEPHONE ENCOUNTER
Home Health Care    Reason for call:  Verbal order request.    Orders are needed for this patient.  PT: 1x a week for 1 week, 2x a week for 2 week, once a week for 1 week. Patient declined OT, SN, Home health aid.    - Will Dr. Mejia follow Home Care episode?    Pt Provider: Dr. Mejia    Phone Number Homecare Nurse can be reached at: [phone], secure line ok to [facility].      Could we send this information to you in [facility] or would you prefer to receive a phone call?:   No preference   Okay to leave a detailed message?: No at Home number on file [phone] (home) or Cell number on file:    Telephone Information:   Mobile [phone]

## 2024-05-09 PROBLEM — R41.82 ALTERED MENTAL STATUS, UNSPECIFIED ALTERED MENTAL STATUS TYPE: Status: ACTIVE | Noted: 2024-01-01

## 2024-05-09 NOTE — PROGRESS NOTES
Clinic Care Coordination Contact  [facility]/Voicemail    Clinical Data: Care Coordinator Outreach         No data to display              Pt does have follow up with PCP schedule for next week   Left message on patient's voicemail with call back information and requested return call.    Plan: . Care Coordinator will try to reach patient again in 3-5 business days.    RN CC will keep primary clinic SW CC on care team if needed   Clinic Care Coordination Contact  Care Team Conversations    Primary care team request support with patient care coordination due to conflict of interest.     RN CC will reassign to self and outreach to pt for intro

## 2024-05-09 NOTE — TELEPHONE ENCOUNTER
MTM referral from: Transitions of Care (recent hospital discharge or ED visit)    MTM referral outreach attempt #2 on May 9, 2024 at 8:56 AM      Outcome: Patient not reachable after several attempts, sent [employer] message    Use [facility] part d map (greg) for the carrier/Plan on the flowsheet      [employer] Message Sent    Birdie England CPhT  MTM

## 2024-05-10 NOTE — PLAN OF CARE
Problem: Adult Inpatient Plan of Care  Goal: Absence of Hospital-Acquired Illness or Injury  Outcome: Progressing  Intervention: Identify and Manage Fall Risk  Recent Flowsheet Documentation  Taken 5/10/2024 1000 by Adriana Michael, RN  Safety Promotion/Fall Prevention:   activity supervised   assistive device/personal items within reach   clutter free environment maintained   nonskid shoes/slippers when out of bed   patient and family education   room door open   room near nurse's station  Intervention: Prevent Infection  Recent Flowsheet Documentation  Taken 5/10/2024 1000 by Adriana Michael, RN  Infection Prevention:   cohorting utilized   environmental surveillance performed   hand hygiene promoted   rest/sleep promoted   single patient room provided   Goal Outcome Evaluation:      Plan of Care Reviewed With: spouse

## 2024-05-10 NOTE — PROGRESS NOTES
"   05/10/24 1430   Appointment Info   Signing Clinician's Name / Credentials (PT) Shraddha Yen, PT, DPT   Living Environment   People in Home spouse;grandchild(manav);child(manav), adult   Current Living Arrangements house   Home Accessibility stairs within home   Number of Stairs, Main Entrance 5   Number of Stairs, Within Home, Primary seven   Living Environment Comments Spouse present.   Self-Care   Equipment Currently Used at Home commode chair;hospital bed;walker, standard   Fall history within last six months yes   Number of times patient has fallen within last six months 1   Activity/Exercise/Self-Care Comment Patient receives assist from family for ADLs/IADLs as needed. Spouse reports patient is able to ambulate to bathroom at baseline.   General Information   Onset of Illness/Injury or Date of Surgery 05/09/24   Referring Physician Miquel Potts DO   Patient/Family Therapy Goals Statement (PT) None stated.   Pertinent History of Current Problem (include personal factors and/or comorbidities that impact the POC) Per H&P: \"73 year old male admitted on 5/9/2024 with AMS. He has a recurrent history of hepatic encephalopathy with recent admission here, discharged only a few days ago.  Patient's recurrent hepatic encephalopathy is likely due to lactulose noncompliance, as patient does not like the taste.  Son at bedside states that patient did not take lactulose over the past 2 to 3 days, similar to prior admissions.  \"   Existing Precautions/Restrictions fall   Cognition   Affect/Mental Status (Cognition) confused;low arousal/lethargic   Follows Commands (Cognition) does not follow one-step commands   Range of Motion (ROM)   ROM Comment Hypertonic throughout UEs and LEs.   Strength (Manual Muscle Testing)   Strength (Manual Muscle Testing) Deficits observed during functional mobility   Bed Mobility   Bed Mobility supine-sit;sit-supine   Supine-Sit [place] (Bed Mobility) maximum assist (25% patient " effort);2 person assist   Sit-Supine [place] (Bed Mobility) maximum assist (25% patient effort);2 person assist   Bed Mobility Limitations decreased ability to use arms for pushing/pulling;decreased ability to use legs for bridging/pushing;cognitive deficits   Impairments Contributing to Impaired Bed Mobility impaired balance;decreased strength;abnormal muscle tone   Assistive Device (Bed Mobility) draw sheet   Transfers   Comment, (Transfers) Unable to attempt due to increased tone in sitting and patient pushing into extension.   Balance   Balance Comments Requires max assist to maintain sitting balance due to high tone and pushing into extension.   Muscle Tone   Muscle Tone   (hypertonic)   Clinical Impression   Criteria for Skilled Therapeutic Intervention Yes, treatment indicated   PT Diagnosis (PT) impaired functional mobility   Influenced by the following impairments decreased strength, impaired balance, decreased cognition   Functional limitations due to impairments transfers, ambulation   Clinical Presentation (PT Evaluation Complexity) evolving   Clinical Presentation Rationale Clinical judgment.   Clinical Decision Making (Complexity) moderate complexity   Planned Therapy Interventions (PT) balance training;bed mobility training;gait training;home exercise program;neuromuscular re-education;patient/family education;stair training;strengthening;ROM (range of motion);transfer training   Risk & Benefits of therapy have been explained evaluation/treatment results reviewed;participants voiced agreement with care plan;participants included;patient   PT Total Evaluation Time   PT Eval, Moderate Complexity Minutes (98648) 15   Physical Therapy Goals   PT Frequency 5x/week   PT Predicted Duration/Target Date for Goal Attainment 05/17/24   PT: Bed Mobility Minimal assist;Supine to/from sit   PT: Transfers Minimal assist;Sit to/from stand;Assistive device   PT: Gait Minimal assist;Assistive device;Rolling  walker;100 feet   Therapeutic Activity   Therapeutic Activities: dynamic activities to improve functional performance Minutes (25672) 8   Symptoms Noted During/After Treatment Fatigue   Treatment Detail/Skilled Intervention Patient requires total assist to maintain sitting balance at EOB. AAROM completed with gentle rotation to reduce tone in UEs and LEs.  Pt supine in bed at end of session, call light in reach, bed alarm engaged.   PT Discharge Planning   PT Plan progress mobility as able   PT Discharge Recommendation (DC Rec) Transitional Care Facility   PT Rationale for DC Rec Pt requiring assist of 2 for bed mobility. Unable to stand or ambulate this session. Recommend TCU at discharge.   PT Brief overview of current status Supine <> sit, max assist of 2.   Total Session Time   Timed Code Treatment Minutes 8   Total Session Time (sum of timed and untimed services) 23

## 2024-05-10 NOTE — ED PROVIDER NOTES
EMERGENCY DEPARTMENT ENCOUNTER      NAME: Vito Sy  AGE: 73 year old male  YOB: 1950  MRN: [id number]  EVALUATION DATE & TIME: No admission date for patient encounter.    PCP: Amrik Mejia    ED PROVIDER: Jazmin Krause MD    Chief Complaint   Patient presents with    Altered Mental Status         FINAL IMPRESSION:  1. Hepatic encephalopathy (H)    2. Altered mental status, unspecified altered mental status type          ED COURSE & MEDICAL DECISION MAKING:    Pertinent Labs & Imaging studies reviewed. (See chart for details)  73 year old male with history of cirrhosis with previous/recent admission for hepatic encephalopathy, DM who presents to the Emergency Department for evaluation of altered mental status after recent hospital discharge 4 days ago, stopped taking lactulose, stopped having bowel movements and progressively more altered.  Altered here, neuroexam however is grossly nonfocal.  This seems like it is very similar to his previous admissions x 2 for hepatic encephalopathy.  Differential includes dehydration, electrolyte abnormality.  No signs of infectious pathology.  Abdomen is benign.  Mycins concern for SBP is low.  Neuro is nonfocal, and my concern for CVA, ICH, mass lesion is low.    Patient placed on monitor, IV established and blood obtained.  CBC, BMP, LFTs, lipase, coags, ammonia level notable for chronic anemia, renal insufficiency and baseline LFT abnormalities.  Ammonia elevated at 125.  Given dose of lactulose and will be admitted for further management.      ED Course as of 05/10/24 0054   Thu May 09, 2024   2101 I met with the patient for an initial encounter and evaluation.   2212 Hemoglobin(!): 10.0  chronic   2213 Creatinine(!): 1.41  baseline   2213 Lipase(!): 173  baseline   2214 Albumin(!): 3.4  chronic   2214 Bilirubin Total(!): 1.5  Up from 1.1   2220 INR(!): 1.43  chronic   2226 Ammonia(!!): 125   2232 I spoke with the hospitalist, Dr. Blanc, who accepts  the patient for admission.   2235 Admitted to Dr. Potts       Medical Decision Making    History:  Supplemental history from: Family Member/Significant Other  External Record(s) reviewed: Inpatient Record: Recent hospital admission    Work Up:  Chart documentation includes differential considered and any EKGs or imaging independently interpreted by provider, see MDM  In additional to work up documented, I considered the following work up: see MDM    External consultation:  Discussion of management with another provider: Hospitalist    Complicating factors:  Care impacted by chronic illness: Other: Cirrhosis  Care affected by social determinants of health: Access to Medical Care weekend no access to PCP    Disposition considerations: Admit.        At the conclusion of the encounter I discussed the results of all of the tests and the disposition. The questions were answered. The patient or family acknowledged understanding and was agreeable with the care plan.      MEDICATIONS GIVEN IN THE EMERGENCY:  Medications   lidocaine 1 % 0.1-1 mL (has no administration in time range)   lidocaine (LMX4) cream (has no administration in time range)   sodium chloride (PF) 0.9% PF flush 3 mL (3 mLs Intracatheter $Given 5/9/24 4589)   sodium chloride (PF) 0.9% PF flush 3 mL (has no administration in time range)   senna-docusate (SENOKOT-S/PERICOLACE) 8.6-50 MG per tablet 1 tablet (has no administration in time range)     Or   senna-docusate (SENOKOT-S/PERICOLACE) 8.6-50 MG per tablet 2 tablet (has no administration in time range)   calcium carbonate (TUMS) chewable tablet 1,000 mg (has no administration in time range)   acetaminophen (TYLENOL) tablet 325 mg (has no administration in time range)   polyethylene glycol (MIRALAX) Packet 17 g (has no administration in time range)   ciprofloxacin (CIPRO) tablet 500 mg (has no administration in time range)   furosemide (LASIX) tablet 20 mg (has no administration in time range)   midodrine  (PROAMATINE) tablet 2.5 mg (has no administration in time range)   rifaximin (XIFAXAN) tablet 550 mg (550 mg Oral $Given 5/9/24 2329)   sodium bicarbonate tablet 650 mg (650 mg Oral $Given 5/9/24 2329)   spironolactone (ALDACTONE) tablet 50 mg (has no administration in time range)   lactulose (CHRONULAC) solution 20 g (has no administration in time range)   glucose gel 15-30 g (has no administration in time range)     Or   dextrose 50 % injection 25-50 mL (has no administration in time range)     Or   glucagon injection 1 mg (has no administration in time range)   insulin aspart (NovoLOG) injection (RAPID ACTING) (has no administration in time range)   insulin aspart (NovoLOG) injection (RAPID ACTING) ( Subcutaneous Not Given 5/9/24 2320)   pantoprazole (PROTONIX) EC tablet 40 mg (has no administration in time range)   lactulose (CHRONULAC) solution 20 g (20 g Oral $Given 5/9/24 2333)       NEW PRESCRIPTIONS STARTED AT TODAY'S ER VISIT  Current Discharge Medication List             =================================================================    HPI    Patient information was obtained from: Patient's Son    Use of Intrepreter: N/A      Vito Sy is a 73 year old male with pertinent medical history of hypertension, diabetes, JENNINGS, esophageal varices, and CKD, who presents to the ED with a concern of an altered mental status which worsened since his last admission discharge. He was recently admitted twice due to hepatic encephalopathy. He hasn't taken his lactulose and medications for the past 1-2 days, after his admission discharge on 5/6/24 (~3 days ago). He tends to have loose stools with this and doesn't like it. Patient endorses constipation since 5/7/24 (~2 days ago). He hasn't ate or drink anything. He doesn't live with his son. He speaks english but only speaks to family in [facility], in the ED. No other medical concerns are expressed at this time.     Per chart review, patient was admitted to [facility] from 4/27/24 to 4/29/24, for an altered mental status, secondary to hepatic encephalopathy. Administered lactulose and rifaximin, improved. GI consulted, advised therapy with spironolactone and furosemide and prophylactic ciprofloxacin for bacterial peritonitis and hepatic encephalopathy. Slightly elevated creatinine on 4/29/24. Normal urogenital van on urine culture. Encouraged follow-up BMP, outpatient appointment with nephrologist.     Per chart review, patient was admitted to [facility] from 5/2/24-5/6/24, for increased altered mental status. Ammonia decreased from 141 to 73, more bowel movements, SLP evaluation performed: recommended soft and bite sized diet. Continue PTA ciprofloxacin for SBP prophylaxis, furosemide. Avoid nephrotoxins, continue sodium bicarbonate. Stable cell counts.      PAST MEDICAL HISTORY:  Past Medical History:   Diagnosis Date    Alcoholic cirrhosis of liver with ascites (H)     Cirrhosis of liver (H)     Diabetes mellitus (H)     Gout     Hypertension     Kidney stone        PAST SURGICAL HISTORY:  Past Surgical History:   Procedure Laterality Date    COLONOSCOPY      ESOPHAGOSCOPY, GASTROSCOPY, DUODENOSCOPY (EGD), COMBINED N/A 10/10/2023    Procedure: ESOPHAGOGASTRODUODENOSCOPY with biopsy;  Surgeon: Puneet Plunkett MD, MD;  Location: [facility] OR    ESOPHAGOSCOPY, GASTROSCOPY, DUODENOSCOPY (EGD), COMBINED N/A 2/27/2024    Procedure: ESOPHAGOGASTRODUODENOSCOPY;  Surgeon: Beatrice Christie MD;  Location: [facility] OR    IR PARACENTESIS  11/6/2021    IR TRANSVEN INTRAHEPATIC PORTOSYST REV  1/26/2023    IR TRANSVEN INTRAHEPATIC PORTOSYST SHUNT  11/16/2022    IR TRANSVEN INTRAHEPATIC PORTOSYST SHUNT  12/9/2022    AR ESOPHAGOGASTRODUODENOSCOPY TRANSORAL DIAGNOSTIC N/A 11/16/2020    Procedure: ESOPHAGOGASTRODUODENOSCOPY (EGD);  Surgeon: Juan Garnett MD;  Location: [facility];  Service: Gastroenterology    AR  ESOPHAGOGASTRODUODENOSCOPY TRANSORAL DIAGNOSTIC N/A 11/18/2020    Procedure: ESOPHAGOGASTRODUODENOSCOPY (EGD) WITH BANDING;  Surgeon: Juan Garnett MD;  Location: [facility];  Service: Gastroenterology    URETEROSCOPY         CURRENT MEDICATIONS:    Prior to Admission Medications   Prescriptions Last Dose Informant Patient Reported? Taking?   Zinc Sulfate 220 (50 Zn) MG TABS 5/9/2024 at AM  Yes Yes   Sig: Take 220 mg by mouth daily   bismuth subsalicylate (PEPTO BISMOL) 262 MG chewable tablet  at PRN  Yes Yes   Sig: Take 1 tablet by mouth every 6 hours as needed for diarrhea   blood glucose (NO BRAND SPECIFIED) lancets standard   No No   Sig: Use to test blood sugar one time daily or as directed.   blood glucose (NO BRAND SPECIFIED) test strip   No No   Sig: Use to test blood sugar one time daily or as directed.   blood glucose monitoring (NO BRAND SPECIFIED) meter device kit   No No   Sig: Use to test blood sugar one time daily or as directed.   ciprofloxacin (CIPRO) 500 MG tablet 5/9/2024 at AM  No Yes   Sig: Take 1 tablet by mouth once daily   fish oil-omega-3 fatty acids 1000 MG capsule 5/9/2024 at AM  Yes Yes   Sig: Take 1 g by mouth daily   furosemide (LASIX) 20 MG tablet 5/9/2024 at AM  No Yes   Sig: Take 1 tablet (20 mg) by mouth daily   lactulose (CHRONULAC) 10 GM/15ML solution 5/9/2024 at x3 doses (son gave, but patient spit out medication)  No Yes   Sig: Take 30 mLs (20 g) by mouth 3 times daily   midodrine (PROAMATINE) 2.5 MG tablet 5/9/2024 at PM  No Yes   Sig: Take 1 tablet (2.5 mg) by mouth 3 times daily (with meals)   multivitamin, therapeutic (THERA-VIT) TABS tablet 5/9/2024 at AM  Yes Yes   Sig: Take 1 tablet by mouth daily   omeprazole (PRILOSEC) 20 MG DR capsule 5/9/2024 at PM  No Yes   Sig: Take 1 capsule (20 mg) by mouth 2 times daily   rifaximin (XIFAXAN) 550 MG TABS tablet 5/9/2024 at PM  No Yes   Sig: Take 1 tablet (550 mg) by mouth 2 times daily   sodium bicarbonate 650 MG tablet  "5/9/2024 at PM  No Yes   Sig: Take 1 tablet (650 mg) by mouth 2 times daily   spironolactone (ALDACTONE) 50 MG tablet 5/9/2024  No Yes   Sig: Take 1 tablet (50 mg) by mouth daily      Facility-Administered Medications: None       ALLERGIES:  Allergies   Allergen Reactions    Sulfa Antibiotics Shortness Of Breath and Itching    Penicillins Unknown     Annotation: discussed with patient, he reports he had \"itching\" with penicillin many years ago in [facility] but no hives or throat or respiratory symptoms.  he also reports having tolerated amoxicillin since coming to [place].         FAMILY HISTORY:  Family History   Problem Relation Age of Onset    Heart Disease Brother     Hypertension Mother     Hypertension Father        SOCIAL HISTORY:  Social History     Tobacco Use    Smoking status: Never     Passive exposure: Never    Smokeless tobacco: Never   Vaping Use    Vaping status: Never Used   Substance Use Topics    Alcohol use: Not Currently     Comment: none for the past 2 years    Drug use: No        VITALS:  Patient Vitals for the past 24 hrs:   BP Temp Temp src Pulse Resp SpO2 Weight   05/10/24 0015 -- -- -- -- -- -- 60.3 kg (132 lb 15 oz)   05/10/24 0006 133/63 97.7  F (36.5  C) Axillary 71 18 100 % --   05/09/24 2339 139/60 -- -- 69 16 100 % --   05/09/24 2053 131/57 98.4  F (36.9  C) Temporal 76 18 100 % 64.4 kg (142 lb)       PHYSICAL EXAM    General Appearance: Well-appearing, well-nourished, no acute distress, sitting in wheelchair with eyes closed but will open to basic commands, squeeze hands. Mumbles in [place], not intelligible per son.  Head:  Normocephalic, atraumatic  Eyes:  PERRL, conjunctiva/corneas clear  ENT:  Lips, mucosa, and tongue normal; mucous membranes are dry without pallor  Neck:  Supple, no nuchal rigidity or meningismus  Cardio:  Regular rate and rhythm, no murmur/gallop/rub  Pulm:  No respiratory distress, clear to auscultation bilaterally  Back:  No CVA tenderness, normal ROM  Abdomen:  Soft, " non distended,no rebound or guarding. Rotund abdomen, but not tender  Extremities: Extremities atraumatic.  No peripheral edema  Skin:  Skin warm, dry, no rashes  Neuro: Sitting in wheelchair with eyes closed but will open eyes and follow basic commands to squeeze hands.  Spontaneously moves all extremities, they appear to be equal in strength.  Mumbles in [place], not intelligible per son.     RADIOLOGY/LABS:  Reviewed all pertinent imaging. Please see official radiology report. All pertinent labs reviewed and interpreted.    Results for orders placed or performed during the hospital encounter of 05/09/24   Result Value Ref Range    INR 1.43 (H) 0.85 - 1.15   Partial thromboplastin time   Result Value Ref Range    aPTT 37 22 - 38 Seconds   Basic metabolic panel   Result Value Ref Range    Sodium 139 135 - 145 mmol/L    Potassium 5.3 3.4 - 5.3 mmol/L    Chloride 107 98 - 107 mmol/L    Carbon Dioxide (CO2) 19 (L) 22 - 29 mmol/L    Anion Gap 13 7 - 15 mmol/L    Urea Nitrogen 24.0 (H) 8.0 - 23.0 mg/dL    Creatinine 1.41 (H) 0.67 - 1.17 mg/dL    GFR Estimate 53 (L) >60 mL/min/1.73m2    Calcium 9.0 8.8 - 10.2 mg/dL    Glucose 155 (H) 70 - 99 mg/dL   Hepatic panel   Result Value Ref Range    Protein Total 8.1 6.4 - 8.3 g/dL    Albumin 3.4 (L) 3.5 - 5.2 g/dL    Bilirubin Total 1.5 (H) <=1.2 mg/dL    Alkaline Phosphatase 167 (H) 40 - 150 U/L    AST 45 0 - 45 U/L    ALT 26 0 - 70 U/L    Bilirubin Direct 0.40 (H) 0.00 - 0.30 mg/dL   Result Value Ref Range    Ammonia 125 (HH) 16 - 60 umol/L   Result Value Ref Range    Lipase 173 (H) 13 - 60 U/L   UA with Microscopic reflex to Culture    Specimen: Urine, Clean Catch   Result Value Ref Range    Color Urine Light Yellow Colorless, Straw, Light Yellow, Yellow    Appearance Urine Clear Clear    Glucose Urine Negative Negative mg/dL    Bilirubin Urine Negative Negative    Ketones Urine Negative Negative mg/dL    Specific Gravity Urine 1.009 1.001 - 1.030    Blood Urine 1.0 mg/dL (A)  Negative    pH Urine 7.0 5.0 - 7.0    Protein Albumin Urine Negative Negative mg/dL    Urobilinogen Urine <2.0 <2.0 mg/dL    Nitrite Urine Negative Negative    Leukocyte Esterase Urine 250 Watson/uL (A) Negative    Bacteria Urine Few (A) None Seen /HPF    RBC Urine 87 (H) <=2 /HPF    WBC Urine 11 (H) <=5 /HPF    Squamous Epithelials Urine 1 <=1 /HPF   CBC with platelets and differential   Result Value Ref Range    WBC Count 4.9 4.0 - 11.0 10e3/uL    RBC Count 3.51 (L) 4.40 - 5.90 10e6/uL    Hemoglobin 10.0 (L) 13.3 - 17.7 g/dL    Hematocrit 30.6 (L) 40.0 - 53.0 %    MCV 87 78 - 100 fL    MCH 28.5 26.5 - 33.0 pg    MCHC 32.7 31.5 - 36.5 g/dL    RDW 19.1 (H) 10.0 - 15.0 %    Platelet Count 120 (L) 150 - 450 10e3/uL    % Neutrophils 62 %    % Lymphocytes 19 %    % Monocytes 10 %    % Eosinophils 8 %    % Basophils 1 %    % Immature Granulocytes 0 %    NRBCs per 100 WBC 0 <1 /100    Absolute Neutrophils 3.0 1.6 - 8.3 10e3/uL    Absolute Lymphocytes 0.9 0.8 - 5.3 10e3/uL    Absolute Monocytes 0.5 0.0 - 1.3 10e3/uL    Absolute Eosinophils 0.4 0.0 - 0.7 10e3/uL    Absolute Basophils 0.1 0.0 - 0.2 10e3/uL    Absolute Immature Granulocytes 0.0 <=0.4 10e3/uL    Absolute NRBCs 0.0 10e3/uL   Glucose by meter   Result Value Ref Range    GLUCOSE BY METER POCT 131 (H) 70 - 99 mg/dL   Glucose by meter   Result Value Ref Range    GLUCOSE BY METER POCT 132 (H) 70 - 99 mg/dL         The creation of this record is based on the scribe s observations of the work being performed by Jazmin Krause MD and the provider s statements to them. It was created on her behalf by Car Badillo, a trained medical scribe. This document has been checked and approved by the attending provider.    Jazimn Krause MD  Emergency Medicine  [facility] EMERGENCY DEPARTMENT  [address] [place] [id number]  [phone]  Dept: [phone]     Jazmin Krause MD  05/10/24 0057

## 2024-05-10 NOTE — H&P
[facility]    History and Physical - Hospitalist Service       Date of Admission:  5/9/2024    Assessment & Plan      Vito Sy is a 73 year old male admitted on 5/9/2024 with AMS. He has a recurrent history of hepatic encephalopathy with recent admission here, discharged only a few days ago.  Patient's recurrent hepatic encephalopathy is likely due to lactulose noncompliance, as patient does not like the taste.  Son at bedside states that patient did not take lactulose over the past 2 to 3 days, similar to prior admissions.      AMS 2/2 hepatic encephalopathy  -defer CT head, no focal deficits on exam  -no infectious s/s, has been on SBP ppx   -no s/s to suggest GIB or additional etiology exacerbating encephalopathy  -agree with extra dose of lactulose in ER  -will start 20mg TID lactulose with goal of 3 loose BM daily  -titrate lactulose accordingly  -continue home rifaximin  -check a UA  -consider GI consult  -discussed importance of lactulose and keeping pt out of hospital, discussed pathophysiology of disease and management to son however pt too encephalopathic to participate. Would suggest discussing the above with Joya alarconer when pt is improved from encephalopathic standpoint   -PT/OT    Elevated ALKP, lipase  Hyperbilirubinemia  -elevation in alkp and lipase is chronic  -monitor CMP and consider RUQ U/S if worsening     DM  -not on home meds  -will initiate low dose sliding scale for now  -glucose checks per protocol     CKD  -per chart review baseline creatinine about 1.2-1.5  -current creat 1.41  -continue to monitor     Anemia, chronic  Thrombocytopenia, chronic  -labs actually appear improved from baseline   -CTM    Hx alcoholic cirrhosis with ascites   Elevated INR  -management as noted above   -watch for bleeding   -continue PTA furosemide, spironolactone, SBP ppx with ciprofloxacin   -PTA midodrine           Diet: Moderate Consistent Carb (60 g CHO per Meal) Diet     DVT Prophylaxis: SCDs  Cronin Catheter: Not present  Lines: None     Cardiac Monitoring: None  Code Status: Full Code    Clinically Significant Risk Factors Present on Admission              # Hypoalbuminemia: Lowest albumin = 3.4 g/dL at 5/9/2024  9:34 PM, will monitor as appropriate    # Coagulation Defect: INR = 1.43 (Ref range: 0.85 - 1.15) and/or PTT = 37 Seconds (Ref range: 22 - 38 Seconds), will monitor for bleeding  # Thrombocytopenia: Lowest platelets = 120 in last 2 days, will monitor for bleeding   # Hypertension: Noted on problem list          # Financial/Environmental Concerns:           Disposition Plan     Medically Ready for Discharge: Anticipated in 2-4 Days           Miquel Potts DO  Hospitalist Service  [facility]  Securely message with [email or web address] (more info)  Text page via [employer] Paging/Directory     ______________________________________________________________________    Chief Complaint   AMS    History obtained from the son    History of Present Illness   Vito CABRERA Yossi is a 73 year old male who is presenting with 1 day of progressive confusion.  Patient is encephalopathic during my encounter, thus history was gathered via son at bedside.  Patient was recently discharged for hepatic encephalopathy.  He went home in a stable condition however did not take his lactulose for about 2 days after discharge.  He does this because he does not like the taste and states it makes him have multiple bowel movements.  Since this time, family has noticed worsening confusion and mumbling, same as prior episodes.  There has not been any focal deficits that have been noted, no fevers, no chills that patient has been complaining about.  No abdominal pain, no nausea or vomiting that patient has demonstrated.  Stool frequency has decreased only because patient has stopped taking lactulose, no blood in stool noted.      Past Medical History    Past Medical History:   Diagnosis Date     Alcoholic cirrhosis of liver with ascites (H)     Cirrhosis of liver (H)     Diabetes mellitus (H)     Gout     Hypertension     Kidney stone        Past Surgical History   Past Surgical History:   Procedure Laterality Date    COLONOSCOPY      ESOPHAGOSCOPY, GASTROSCOPY, DUODENOSCOPY (EGD), COMBINED N/A 10/10/2023    Procedure: ESOPHAGOGASTRODUODENOSCOPY with biopsy;  Surgeon: Puneet Plunkett MD, MD;  Location: [facility] Main OR    ESOPHAGOSCOPY, GASTROSCOPY, DUODENOSCOPY (EGD), COMBINED N/A 2/27/2024    Procedure: ESOPHAGOGASTRODUODENOSCOPY;  Surgeon: Beatrice Christie MD;  Location: [facility] Main OR    IR PARACENTESIS  11/6/2021    IR TRANSVEN INTRAHEPATIC PORTOSYST REV  1/26/2023    IR TRANSVEN INTRAHEPATIC PORTOSYST SHUNT  11/16/2022    IR TRANSVEN INTRAHEPATIC PORTOSYST SHUNT  12/9/2022    HI ESOPHAGOGASTRODUODENOSCOPY TRANSORAL DIAGNOSTIC N/A 11/16/2020    Procedure: ESOPHAGOGASTRODUODENOSCOPY (EGD);  Surgeon: Juan Garnett MD;  Location: [facility];  Service: Gastroenterology    HI ESOPHAGOGASTRODUODENOSCOPY TRANSORAL DIAGNOSTIC N/A 11/18/2020    Procedure: ESOPHAGOGASTRODUODENOSCOPY (EGD) WITH BANDING;  Surgeon: Juan Garnett MD;  Location: [facility];  Service: Gastroenterology    URETEROSCOPY         Prior to Admission Medications   Prior to Admission Medications   Prescriptions Last Dose Informant Patient Reported? Taking?   Zinc Sulfate 220 (50 Zn) MG TABS 5/9/2024 at AM  Yes Yes   Sig: Take 220 mg by mouth daily   bismuth subsalicylate (PEPTO BISMOL) 262 MG chewable tablet  at PRN  Yes Yes   Sig: Take 1 tablet by mouth every 6 hours as needed for diarrhea   blood glucose (NO BRAND SPECIFIED) lancets standard   No No   Sig: Use to test blood sugar one time daily or as directed.   blood glucose (NO BRAND SPECIFIED) test strip   No No   Sig: Use to test blood sugar one time daily or as directed.   blood glucose monitoring (NO BRAND SPECIFIED) meter device kit   No No   Sig: Use to test blood  sugar one time daily or as directed.   ciprofloxacin (CIPRO) 500 MG tablet 5/9/2024 at AM  No Yes   Sig: Take 1 tablet by mouth once daily   fish oil-omega-3 fatty acids 1000 MG capsule 5/9/2024 at AM  Yes Yes   Sig: Take 1 g by mouth daily   furosemide (LASIX) 20 MG tablet 5/9/2024 at AM  No Yes   Sig: Take 1 tablet (20 mg) by mouth daily   lactulose (CHRONULAC) 10 GM/15ML solution 5/9/2024 at x3 doses (son gave, but patient spit out medication)  No Yes   Sig: Take 30 mLs (20 g) by mouth 3 times daily   midodrine (PROAMATINE) 2.5 MG tablet 5/9/2024 at PM  No Yes   Sig: Take 1 tablet (2.5 mg) by mouth 3 times daily (with meals)   multivitamin, therapeutic (THERA-VIT) TABS tablet 5/9/2024 at AM  Yes Yes   Sig: Take 1 tablet by mouth daily   omeprazole (PRILOSEC) 20 MG DR capsule 5/9/2024 at PM  No Yes   Sig: Take 1 capsule (20 mg) by mouth 2 times daily   rifaximin (XIFAXAN) 550 MG TABS tablet 5/9/2024 at PM  No Yes   Sig: Take 1 tablet (550 mg) by mouth 2 times daily   sodium bicarbonate 650 MG tablet 5/9/2024 at PM  No Yes   Sig: Take 1 tablet (650 mg) by mouth 2 times daily   spironolactone (ALDACTONE) 50 MG tablet 5/9/2024  No Yes   Sig: Take 1 tablet (50 mg) by mouth daily      Facility-Administered Medications: None        Social History   I have reviewed this patient's social history and updated it with pertinent information if needed.  Social History     Tobacco Use    Smoking status: Never     Passive exposure: Never    Smokeless tobacco: Never   Vaping Use    Vaping status: Never Used   Substance Use Topics    Alcohol use: Not Currently     Comment: none for the past 2 years    Drug use: No         Family History   I have reviewed this patient's family history and updated it with pertinent information if needed.  Family History   Problem Relation Age of Onset    Heart Disease Brother     Hypertension Mother     Hypertension Father          Allergies   Allergies   Allergen Reactions    Sulfa Antibiotics  "Shortness Of Breath and Itching    Penicillins Unknown     Annotation: discussed with patient, he reports he had \"itching\" with penicillin many years ago in [facility] but no hives or throat or respiratory symptoms.  he also reports having tolerated amoxicillin since coming to [place].          Physical Exam   Vital Signs: Temp: 97.7  F (36.5  C) Temp src: Axillary BP: 133/63 Pulse: 71   Resp: 18 SpO2: 100 % O2 Device: None (Room air)    Weight: 132 lbs 15 oz    General Appearance: Patient responsive to noxious stimuli, eyes closed in mild distress, no diaphoresis  Respiratory: Thin comfortably on room air  Cardiovascular: RRR, no murmurs  GI: Mild distention throughout without pain or wincing, no right upper quadrant pain  Skin: No rash  Other: Clonus noted bilaterally, patient to encephalopathic to participate in asterixis exam, no focal deficits, pulls equal and reactive bilaterally, no uvular deviation    Medical Decision Making       80 MINUTES SPENT BY ME on the date of service doing chart review, history, exam, documentation & further activities per the note.      Data     I have personally reviewed the following data over the past 24 hrs:    4.9  \   10.0 (L)   / 120 (L)     139 107 24.0 (H) /  132 (H)   5.3 19 (L) 1.41 (H) \     ALT: 26 AST: 45 AP: 167 (H) TBILI: 1.5 (H)   ALB: 3.4 (L) TOT PROTEIN: 8.1 LIPASE: 173 (H)     INR:  1.43 (H) PTT:  37   D-dimer:  N/A Fibrinogen:  N/A       Imaging results reviewed over the past 24 hrs:   No results found for this or any previous visit (from the past 24 hour(s)).  "

## 2024-05-10 NOTE — CONSULTS
Care Management Initial Consult    General Information  Assessment completed with: Spouse or significant otherNaomi  Type of CM/SW Visit: Initial Assessment    Primary Care Provider verified and updated as needed: Yes   Readmission within the last 30 days: no previous admission in last 30 days         Advance Care Planning: Advance Care Planning Reviewed: other (see comments) (No ACP documents)          Communication Assessment  Patient's communication style: spoken language (non-English)    Hearing Difficulty or Deaf: no   Wear Glasses or Blind: no    Cognitive  Cognitive/Neuro/Behavioral: .WDL except, all  Level of Consciousness: confused  Arousal Level: arouses to voice  Orientation: disoriented x 4  Mood/Behavior: calm     Speech: garbled, incoherent, illogical    Living Environment:   People in home: child(manav), adult, spouse, grandchild(manav)     Current living Arrangements: house      Able to return to prior arrangements: other (see comments) (At this time recommending TCU, wife wants to see how pt medically progresses.)       Family/Social Support:  Care provided by: child(manav), spouse/significant other  Provides care for: no one, unable/limited ability to care for self  Marital Status:   Children, Wife  Naomi       Description of Support System: Supportive         Current Resources:   Patient receiving home care services: Yes  Skilled Home Care Services: Physical Therapy, Occupational Therapy, Skilled Nursing (Family cannot remember name of agency)  Community Resources: None  Equipment currently used at home: commode chair, hospital bed, walker, standard (They have order for w/c already, wife plans to go to [employer] supply store.)  Supplies currently used at home:      Employment/Financial:  Employment Status: retired        Financial Concerns:             Does the patient's insurance plan have a 3 day qualifying hospital stay waiver?  No    Lifestyle & Psychosocial Needs:  Social Determinants of  Health     Food Insecurity: Low Risk  (12/27/2023)    Food Insecurity     Within the past 12 months, did you worry that your food would run out before you got money to buy more?: No     Within the past 12 months, did the food you bought just not last and you didn t have money to get more?: Patient declined   Depression: Not at risk (2/19/2024)    PHQ-2     PHQ-2 Score: 0   Housing Stability: Low Risk  (3/24/2024)    Received from [employer]    Housing Stability Vital Sign     Unable to Pay for Housing in the Last Year: No     Number of Places Lived in the Last Year: 1     In the last 12 months, was there a time when you did not have a steady place to sleep or slept in a shelter (including now)?: No   Tobacco Use: Low Risk  (5/9/2024)    Patient History     Smoking Tobacco Use: Never     Smokeless Tobacco Use: Never     Passive Exposure: Never   Financial Resource Strain: Low Risk  (12/27/2023)    Financial Resource Strain     Within the past 12 months, have you or your family members you live with been unable to get utilities (heat, electricity) when it was really needed?: No   Alcohol Use: Not on file   Transportation Needs: Low Risk  (12/27/2023)    Transportation Needs     Within the past 12 months, has lack of transportation kept you from medical appointments, getting your medicines, non-medical meetings or appointments, work, or from getting things that you need?: No   Physical Activity: Not on file   Interpersonal Safety: Unknown (3/24/2024)    Received from [employer], [employer]    Humiliation, Afraid, Rape, and Kick questionnaire     Fear of Current or Ex-Partner: Not on file     Emotionally Abused: Not on file     Physically Abused: Patient unable to answer     Sexually Abused: Patient unable to answer   Stress: Not on file   Social Connections: Not on file   Health Literacy: Not on file       Functional Status:  Prior to admission patient needed assistance:   Dependent ADLs::  Ambulation-walker, Bathing, Dressing, Eating, Grooming, Positioning, Incontinence, Transfers, Toileting  Dependent IADLs:: Cleaning, Cooking, Shopping, Laundry, Meal Preparation, Medication Management, Transportation, Money Management, Incontinence       Mental Health Status:          Chemical Dependency Status:                Values/Beliefs:  Spiritual, Cultural Beliefs, Evangelical Practices, Values that affect care:                 Additional Information:  Assessed. RNCM introduced self and CM role to pts  and pt's wife. Pt is confused at this time. Pt and pts wife Naomi switch between sons Ade houe and son Beto's house. Pt is dependent with ADLS and IADLS, pt used to be more A-1 with all of these, but lately has been needing more A-2. Pt's sons work full time , so wife is the only one able to be with pt 24/7. Pt had home care prior, family cannot remember which company for RN/PT/OT, referral sent to [facility] to see pending. Pt has established PCP. Is retired. Does not drive, wife and children drive. Pt has a commode, hospital bed, and 4WW at home. Wife received order for w/c and plans to go to DME company to pick that up. Therapy recs TCU, wife wants some time to talk with sons regarding. CM to follow up with home care vs TCU. Transport TBD on pts medical progression .           CM will continue to follow plan of care,review recommendations, and assist with any discharge needs anticipated.       Lauren Valdez RN

## 2024-05-10 NOTE — ED TRIAGE NOTES
The pt arrives with son who states the pt is more confused then normal. Has a hx of liver failure and ammonia build up. He was discharge on Monday for the similar presentation. Pt unable to answer questions in triage. Stopped taking lactulose two.      Triage Assessment (Adult)       Row Name 05/09/24 2053          Triage Assessment    Airway WDL WDL        Cognitive/Neuro/Behavioral WDL    Cognitive/Neuro/Behavioral WDL WDL

## 2024-05-10 NOTE — PROGRESS NOTES
05/10/24 0840   Appointment Info   Signing Clinician's Name / Credentials (OT) Mary Reyes,OTR/L       Present yes   Language Joya   Living Environment   People in Home spouse;grandchild(manav);child(manav), adult   Current Living Arrangements house   Home Accessibility stairs within home   Number of Stairs, Main Entrance 4   Number of Stairs, Within Home, Primary five   Living Environment Comments lethargic throughout session, unable to gather data   Self-Care   Usual Activity Tolerance moderate   Current Activity Tolerance poor   Equipment Currently Used at Home commode chair;hospital bed;walker, standard   Fall history within last six months yes   Number of times patient has fallen within last six months 1   Activity/Exercise/Self-Care Comment per chart review ptlives iwth sons and needing increasing assist w/ ADLs   Instrumental Activities of Daily Living (IADL)   IADL Comments assist for all IADLs per chart review   General Information   Onset of Illness/Injury or Date of Surgery 05/09/24   Patient/Family Therapy Goal Statement (OT) none stated   Additional Occupational Profile Info/Pertinent History of Current Problem Vito Sy is a 73 year old male admitted on 5/9/2024 with AMS. He has a recurrent history of hepatic encephalopathy with recent admission here, discharged only a few days ago.  Patient's recurrent hepatic encephalopathy is likely due to lactulose noncompliance,   Existing Precautions/Restrictions fall   Limitations/Impairments safety/cognitive   Cognitive Status Examination   Orientation Status person  (not oriented to place or time)   Affect/Mental Status (Cognitive) low arousal/lethargic   Visual Perception   Visual Impairment/Limitations unable/difficult to assess   Range of Motion Comprehensive   General Range of Motion   (limited AROM ROM B UE's, mm stiffness)   Strength Comprehensive (MMT)   General Manual Muscle Testing (MMT) Assessment   (generalized weakness)    Coordination   Upper Extremity Coordination No deficits were identified   Bed Mobility   Bed Mobility supine-sit   Supine-Sit [place] (Bed Mobility) maximum assist (25% patient effort)   Transfers   Transfers bed-chair transfer   Transfer Skill: Bed to Chair/Chair to Bed   Bed-Chair [place] (Transfers) moderate assist (50% patient effort)   Sit-Stand Transfer   Sit-Stand [place] (Transfers) moderate assist (50% patient effort)   Balance   Balance Assessment standing dynamic balance   Balance Comments poor balance   Activities of Daily Living   BADL Assessment/Intervention toileting   Lower Body Dressing Assessment/Training   [place] Level (Lower Body Dressing) maximum assist (25% patient effort)   Toileting   [place] Level (Toileting) maximum assist (25% patient effort)   Clinical Impression   Criteria for Skilled Therapeutic Interventions Met (OT) Yes, treatment indicated   OT Diagnosis decreased ADLs   OT Problem List-Impairments impacting ADL problems related to;activity tolerance impaired;balance;cognition;mobility;strength   Assessment of Occupational Performance 3-5 Performance Deficits   Identified Performance Deficits toileting, drsg, functional transfers   Planned Therapy Interventions (OT) ADL retraining;strengthening;transfer training;cognition;progressive activity/exercise   Clinical Decision Making Complexity (OT) detailed assessment/moderate complexity   Risk & Benefits of therapy have been explained evaluation/treatment results reviewed;care plan/treatment goals reviewed;participants included   OT Total Evaluation Time   OT Eval, Moderate Complexity Minutes (10403) 8   OT Goals   Therapy Frequency (OT) Daily   OT Predicted Duration/Target Date for Goal Attainment 05/17/24   OT Goals Transfers   OT: Lower Body Dressing Minimal assist   OT: Transfer Minimal assist   OT: Toilet Transfer/Toileting Minimal assist   OT: Cognitive Patient/caregiver will verbalize understanding of  cognitive assessment results/recommendations as needed for safe discharge planning   Interventions   Interventions Quick Adds Self-Care/Home Management   Self-Care/Home Management   Self-Care/Home Mgmt/ADL, Compensatory, Meal Prep Minutes (64836) 10   Symptoms Noted During/After Treatment (Meal Preparation/Planning Training) fatigue   Treatment Detail/Skilled Intervention vocera  used, pt mumbling during session, demo supine >sit maxA w/ tactile cueing, EOB sit x5+ minutes w/ min A w/ tactile A for B rail use,w/ height of bed elevated trsf bed>chair mod Ax2 w/ tactile and vc for LE advancement and hand placement, in chair w/ LE's elevated and seat alarm,   Lower Body Dressing Training Assistance maximum assist (25% patient effort)   Lower Body Dressing Training Assistance 2 persons;set-up required;supervision;verbal cues   [place] Level (Toilet Training) maximum assist (25% patient effort)   Assistance (Toilet Training) 2 person assist;set-up required;supervision;verbal cues   OT Discharge Planning   OT Plan track cog, LB dressing, toileting, trsfs, bed mobility   OT Discharge Recommendation (DC Rec) Transitional Care Facility   OT Rationale for DC Rec pt requires Ax2 for ADLs and mobility,   OT Brief overview of current status maxAx2 trsf, maxA bed mobility, drsg and toileting   Total Session Time   Timed Code Treatment Minutes 10   Total Session Time (sum of timed and untimed services) 18

## 2024-05-10 NOTE — PROGRESS NOTES
[facility]    Medicine Progress Note - Hospitalist Service    Date of Admission:  5/9/2024    Assessment & Plan      Vito Sy is a 73 year old male admitted on 5/9/2024 with AMS. He has a recurrent history of hepatic encephalopathy with recent admission here, discharged only a few days ago.  Patient's recurrent hepatic encephalopathy is likely due to lactulose noncompliance, as patient does not like the taste.  Son at bedside states that patient did not take lactulose over the past 2 to 3 days, similar to prior admissions.      Acute metabolic encephalopathy 2/2 hepatic encephalopathy  -- Pt was not consistently taking lactulose at home per family.  -- defer CT head, no focal deficits on exam  -- no infectious s/s, has been on SBP ppx   -- no s/s to suggest GIB or additional etiology exacerbating encephalopathy  -- increase lactulose to 30 mg qid with goal of 3 loose BM daily  -- titrate lactulose accordingly  -- c/w PTA rifaximin  -- Discussed with spouse regarding adherence to lactulose.  -- PT/OT- TCU     Abnormal UA  -- no fever or dysuria  -- on ciproflaxcin for sbp px  -- await UCx    Elevated ALKP, lipase  Hyperbilirubinemia  Elevated INR  -- elevation in alkp and lipase is chronic  -- monitor CMP and consider RUQ U/S if worsening     Cirrhosis of liver with portal hypertension, varices, splenomegaly   -- h/o TIPS and recurrent SBP  -- watch for bleeding   -- continue PTA furosemide, spironolactone, SBP ppx with ciprofloxacin   -- PTA midodrine     Prediabetes  -- accu-checks,  low dose sliding scale for now  -- monitor for hypoglycemia while on cipro    CKD-IIIa  Non anion gap Metabolic acidosis  -- per chart review baseline creatinine about 1.2-1.5  -- Avoid nephrotoxins  -- c/w PTA lasix and Sodium bicarb    Anemia, chronic  Thrombocytopenia, chronic  -- labs actually appear improved from baseline               Diet: Minced & Moist Diet (level 5) Thin Liquids (level 0)     DVT Prophylaxis: Pneumatic Compression Devices  Cronin Catheter: Not present  Lines: None     Cardiac Monitoring: None  Code Status: Full Code      Clinically Significant Risk Factors Present on Admission              # Hypoalbuminemia: Lowest albumin = 3 g/dL at 5/10/2024  5:47 AM, will monitor as appropriate    # Coagulation Defect: INR = 1.43 (Ref range: 0.85 - 1.15) and/or PTT = 37 Seconds (Ref range: 22 - 38 Seconds), will monitor for bleeding  # Thrombocytopenia: Lowest platelets = 120 in last 2 days, will monitor for bleeding   # Hypertension: Noted on problem list          # Financial/Environmental Concerns:           Disposition Plan     Medically Ready for Discharge: Anticipated in 2-4 Days             Maddy Bran MD  Hospitalist Service  [facility]  Securely message with [employer] (more info)  Text page via [employer] Paging/Directory   ______________________________________________________________________    Interval History   Patient is new to me today. Chart reviewed.  Patient is seen and examined at bedside.  Spouse helped with interpretation (pt was not able to participate due to confusion). Pt was not taking lactulose consistently. Per RN, pt was able to take his meds without any issue    Physical Exam   Vital Signs: Temp: 98.4  F (36.9  C) Temp src: Oral BP: (!) 150/70 Pulse: 70   Resp: 18 SpO2: 100 % O2 Device: None (Room air)    Weight: 132 lbs 15 oz    GEN: Lethargic. Not in acute distress.  HEENT: Atraumatic, mucous membrane- moist and pink.  Chest: Bilateral air entry.  CVS: S1S2 regular.   Abdomen: Soft. Non-tender, non-distended. No organomegaly. No guarding or rigidity. Bowel sounds active.   Extremities: No pedal edema.  CNS: No involuntary movements.  Skin: no cyanosis or clubbing.     Medical Decision Making       58 MINUTES SPENT BY ME on the date of service doing chart review, history, exam, documentation & further activities per the note.      Data

## 2024-05-10 NOTE — ED NOTES
[facility] ED Handoff Report    Pt here d/t increased confusion. Per Pt son, Pt saying correct words at times, but putting them together incorrectly. Recently discharged after similar presentation. Had three doses of lactulose today. PMH: liver failure.    ED Diagnosis:  (K76.82) Hepatic encephalopathy (H)    (R41.82) Altered mental status, unspecified altered mental status type     PMH:    Past Medical History:   Diagnosis Date    Alcoholic cirrhosis of liver with ascites (H)     Cirrhosis of liver (H)     Diabetes mellitus (H)     Gout     Hypertension     Kidney stone         Code Status:  Full Code     Falls Risk: Yes Band: Applied    Current Living Situation/Residence: lives with a significant other     Elimination Status: Continent: No. Primofit in place. Diaper on    Activity Level: Total assist/lift    Patients Preferred Language:  Other: Joya     Needed: Yes, but Pt may not be able to respond appropriately per son    Vital Signs:  /57   Pulse 76   Temp 98.4  F (36.9  C) (Temporal)   Resp 18   Wt 64.4 kg (142 lb)   SpO2 100%   BMI 21.59 kg/m       Cardiac Rhythm: No EKG completed in ED.    Pain Score: Patient is unable to quantify.    Is the Patient Confused:  Yes    Last Food or Drink: 05/09/24    Tests Performed: Done: Labs    Family Dynamics/Concerns: No    Additional Information: Pt has been resting comfortably in bed. Minimally restless. Pt was able to take oral meds. Will not use a straw, but will sip out of a cup.    RN: Ray Azar RN 5/9/2024 10:51 PM

## 2024-05-10 NOTE — PLAN OF CARE
Problem: Adult Inpatient Plan of Care  Goal: Optimal Comfort and Wellbeing  Outcome: Progressing     Problem: Comorbidity Management  Goal: Blood Glucose Levels Within Targeted Range  Outcome: Progressing     Problem: Liver Failure  Goal: Optimal Neurologic Function  Outcome: Progressing   Goal Outcome Evaluation:       Pt arrived to unit from ED at 2330. Disoriented x4. Bed alarm in place for pt safety. VSS, on RA. No verbal/nonverbal s/s of pain noted. Primofit in place for urinary incontinence. No BM overnight. Resting comfortably between cares.  services utilized for communication, but  unable to understand pt d/t mumbling. Tolerating PO meds. Able to drink water without issue, no straws.

## 2024-05-10 NOTE — PHARMACY-ADMISSION MEDICATION HISTORY
Pharmacist Admission Medication History    Admission medication history is complete. The information provided in this note is only as accurate as the sources available at the time of the update.    Information Source(s): Family member, Hospital records, and CareEverywhere/SureScripts via in-person    Pertinent Information: patient's son reports his brother administers patient's medications. Brother gave all meds today, but patient spit them out, so brother re-attempted to administer medications.    Changes made to PTA medication list:  Added: None  Deleted: None  Changed: None    Allergies reviewed with patient and updates made in EHR: yes    Medication History Completed By: Nicollette McMann, [facility] 5/9/2024 10:42 PM    PTA Med List   Medication Sig Last Dose    bismuth subsalicylate (PEPTO BISMOL) 262 MG chewable tablet Take 1 tablet by mouth every 6 hours as needed for diarrhea  at PRN    ciprofloxacin (CIPRO) 500 MG tablet Take 1 tablet by mouth once daily 5/9/2024 at AM    fish oil-omega-3 fatty acids 1000 MG capsule Take 1 g by mouth daily 5/9/2024 at AM    furosemide (LASIX) 20 MG tablet Take 1 tablet (20 mg) by mouth daily 5/9/2024 at AM    lactulose (CHRONULAC) 10 GM/15ML solution Take 30 mLs (20 g) by mouth 3 times daily 5/9/2024 at x3 doses (son gave, but patient spit out medication)    midodrine (PROAMATINE) 2.5 MG tablet Take 1 tablet (2.5 mg) by mouth 3 times daily (with meals) 5/9/2024 at PM    multivitamin, therapeutic (THERA-VIT) TABS tablet Take 1 tablet by mouth daily 5/9/2024 at AM    omeprazole (PRILOSEC) 20 MG DR capsule Take 1 capsule (20 mg) by mouth 2 times daily 5/9/2024 at PM    rifaximin (XIFAXAN) 550 MG TABS tablet Take 1 tablet (550 mg) by mouth 2 times daily 5/9/2024 at PM    sodium bicarbonate 650 MG tablet Take 1 tablet (650 mg) by mouth 2 times daily 5/9/2024 at PM    spironolactone (ALDACTONE) 50 MG tablet Take 1 tablet (50 mg) by mouth daily 5/9/2024    Zinc Sulfate 220 (50 Zn) MG  TABS Take 220 mg by mouth daily 5/9/2024 at AM

## 2024-05-11 NOTE — PLAN OF CARE
"  Problem: Adult Inpatient Plan of Care  Goal: Plan of Care Review  Description: The Plan of Care Review/Shift note should be completed every shift.  The Outcome Evaluation is a brief statement about your assessment that the patient is improving, declining, or no change.  This information will be displayed automatically on your shift  note.  Outcome: Progressing     Problem: Adult Inpatient Plan of Care  Goal: Patient-Specific Goal (Individualized)  Description: You can add care plan individualizations to a care plan. Examples of Individualization might be:  \"Parent requests to be called daily at 9am for status\", \"I have a hard time hearing out of my right ear\", or \"Do not touch me to wake me up as it startles  me\".  Outcome: Progressing     Problem: Adult Inpatient Plan of Care  Goal: Absence of Hospital-Acquired Illness or Injury  Outcome: Progressing     Problem: Adult Inpatient Plan of Care  Goal: Absence of Hospital-Acquired Illness or Injury  Intervention: Prevent Skin Injury  Recent Flowsheet Documentation  Taken 5/11/2024 1008 by Wesley Quinones, RN  Body Position: supine  Taken 5/11/2024 0800 by Wesley Quinones, RN  Body Position: legs elevated   Goal Outcome Evaluation:       No verbal or nonverbal expressions of pain, disoriented x 4, talks to self, creatine 1.47, Ammonia 136, ate 25%, BG 83, cooperative taking scheduled medications, no BM at this time. Lactulose enema given.                 "

## 2024-05-11 NOTE — PLAN OF CARE
Goal Outcome Evaluation:       Per family and rounding patient has been sleeping since 0100 and he is snoring.Observed patient eyes closed and no respiratoy distress and no patient RR WNL.

## 2024-05-11 NOTE — PLAN OF CARE
Problem: Adult Inpatient Plan of Care  Goal: Absence of Hospital-Acquired Illness or Injury  Intervention: Prevent Skin Injury  Recent Flowsheet Documentation  Taken 5/11/2024 0200 by Angelica Barrios RN  Body Position: (patient repositioned himself per family andcurrently he is sleeping.) other (see comments)  Taken 5/11/2024 0000 by Angelica Barrios RN  Body Position: (family asked and patient turned himself back to supine position.) refuses positioning  Intervention: Prevent Infection  Recent Flowsheet Documentation  Taken 5/11/2024 0045 by Angelica Barrios RN  Infection Prevention: hand hygiene promoted     Problem: Risk for Delirium  Goal: Optimal Coping  Intervention: Optimize Psychosocial Adjustment to Delirium  Recent Flowsheet Documentation  Taken 5/11/2024 0045 by Angelica Barrios RN  Supportive Measures: positive reinforcement provided  Goal: Improved Behavioral Control  Intervention: Prevent and Manage Agitation  Recent Flowsheet Documentation  Taken 5/11/2024 0045 by Angelica Barrios RN  Complementary Therapy:   aromatherapy utilized   essential oils utilized  Intervention: Minimize Safety Risk  Recent Flowsheet Documentation  Taken 5/11/2024 0045 by Angelica Barrios RN  Communication Enhancement Strategies:   call light answered in person   communication board used   family/caregiver assisted with communication  Goal: Improved Sleep  Intervention: Promote Sleep  Recent Flowsheet Documentation  Taken 5/11/2024 0045 by Angelica Barrios RN  Sleep/Rest Enhancement:   comfort measures   consistent schedule promoted   family presence promoted   room darkened     Problem: Liver Failure  Goal: Optimal Coping with Liver Failure  Intervention: Support Response to Liver Disease  Recent Flowsheet Documentation  Taken 5/11/2024 0045 by Angelica Barrios RN  Supportive Measures: positive reinforcement provided  Goal: Optimal Gastrointestinal Function  Intervention: Monitor and Support Gastrointestinal  Function  Recent Flowsheet Documentation  Taken 5/11/2024 0200 by Angelica Barrios RN  Body Position: (patient repositioned himself per family andcurrently he is sleeping.) other (see comments)  Taken 5/11/2024 0000 by Angelica Barrios RN  Body Position: (family asked and patient turned himself back to supine position.) refuses positioning  Goal: Optimal Pain Control, Comfort and Function  Intervention: Prevent or Manage Pain  Recent Flowsheet Documentation  Taken 5/11/2024 0045 by Angelica Barrios RN  Sleep/Rest Enhancement:   comfort measures   consistent schedule promoted   family presence promoted   room darkened  Complementary Therapy:   aromatherapy utilized   essential oils utilized  Goal: Effective Oxygenation and Ventilation  Intervention: Promote Airway Secretion Clearance  Recent Flowsheet Documentation  Taken 5/11/2024 0045 by Angelica Barrios RN  Cough And Deep Breathing: (due to confusion) unable to perform  Activity Management: activity adjusted per tolerance     Problem: Chronic Kidney Disease  Goal: Optimal Coping with Chronic Illness  Intervention: Support Psychosocial Response  Recent Flowsheet Documentation  Taken 5/11/2024 0045 by Angelica Barrios RN  Supportive Measures: positive reinforcement provided  Goal: Optimal Functional Ability  Intervention: Optimize Functional Ability  Recent Flowsheet Documentation  Taken 5/11/2024 0045 by Angelica Barrios RN  Activity Management: activity adjusted per tolerance  Goal: Absence of Anemia Signs and Symptoms  Intervention: Manage Signs of Anemia and Bleeding  Recent Flowsheet Documentation  Taken 5/11/2024 0045 by Angelica Barrios RN  Environmental Support: caregiver consistency promoted  Goal: Acceptable Pain Control  Intervention: Prevent or Manage Pain  Recent Flowsheet Documentation  Taken 5/11/2024 0045 by Angelica Barrios RN  Sleep/Rest Enhancement:   comfort measures   consistent schedule promoted   family presence promoted   room  darkened  Complementary Therapy:   aromatherapy utilized   essential oils utilized   Goal Outcome Evaluation:  Patient VSS and afebrile. He disoriented times four. He talks to himself and unable to follow instructions and verbalizing needs. Daughter in the room and reports patient being awake all night. BS positive but, no bowel movement.

## 2024-05-11 NOTE — PLAN OF CARE
Problem: Adult Inpatient Plan of Care  Goal: Absence of Hospital-Acquired Illness or Injury  Intervention: Prevent Skin Injury  Recent Flowsheet Documentation  Taken 5/10/2024 1808 by Lenora Kelly RN  Body Position:   turned   right   heels elevated  Intervention: Prevent Infection  Recent Flowsheet Documentation  Taken 5/10/2024 1545 by Lenora Kelly, RN  Infection Prevention: hand hygiene promoted  Goal: Readiness for Transition of Care  Intervention: Mutually Develop Transition Plan  Recent Flowsheet Documentation  Taken 5/10/2024 1600 by Lenora Kelly, RN  Equipment Currently Used at Home:   commode chair   hospital bed   walker, standard     Problem: Liver Failure  Goal: Optimal Coping with Liver Failure  Outcome: Not Progressing  Goal: Optimal Gastrointestinal Function  Intervention: Monitor and Support Gastrointestinal Function  Recent Flowsheet Documentation  Taken 5/10/2024 1808 by Lenora Kelly, RN  Body Position:   turned   right   heels elevated  Goal: Blood Glucose Level Within Target Range  Outcome: Progressing  Goal: Effective Oxygenation and Ventilation  Outcome: Progressing  Intervention: Optimize Oxygenation and Ventilation  Recent Flowsheet Documentation  Taken 5/10/2024 1808 by Lenora Kelly, RN  Head of Bed (HOB) Positioning: HOB at 20-30 degrees     Goal Outcome Evaluation:  Patient is is disoriented x 4 and remains lethargic throughout shift. Patient able to answer yes or no questions, intermittently. Patient denies pain. Attempted to use primofit, patient pulled off x 2. Patient is incontinent of bladder, no bowel movements this writer shifts. Family at bedside.

## 2024-05-11 NOTE — PROGRESS NOTES
[facility]    Medicine Progress Note - Hospitalist Service    Date of Admission:  5/9/2024    Assessment & Plan      Vito Sy is a 73 year old male admitted on 5/9/2024 with AMS. He has a recurrent history of hepatic encephalopathy with recent admission here, discharged only a few days ago.  Patient's recurrent hepatic encephalopathy is likely due to lactulose noncompliance, as patient does not like the taste.  Son at bedside states that patient did not take lactulose over the past 2 to 3 days, similar to prior admissions.      Acute metabolic encephalopathy 2/2 hepatic encephalopathy  -- Pt was not consistently taking lactulose at home per family.  -- defer CT head, no focal deficits on exam  -- no infectious s/s, has been on SBP ppx   -- no s/s to suggest GIB or additional etiology exacerbating encephalopathy  -- titrate lactulose accordingly  -- c/w PTA rifaximin  -- 05/11: no Bowel movement on increased dose of po lactulose. Pt remained lethargic with elevated ammonia. Lactulose enema ordered. Discussed with bedside RN.  -- GI consult appreciated.      Abnormal UA  -- no fever or dysuria  -- Ucx: no growth  -- on ciproflaxcin for sbp px    Elevated ALKP, lipase  Hyperbilirubinemia  Elevated INR  -- elevation in alkp and lipase is chronic  -- stable    Cirrhosis of liver with portal hypertension, varices, splenomegaly   -- h/o TIPS and recurrent SBP  -- watch for bleeding   -- continue PTA furosemide, spironolactone, SBP ppx with ciprofloxacin   -- PTA midodrine     Prediabetes  -- accu-checks,  low dose sliding scale for now  -- monitor for hypoglycemia while on cipro    CKD-IIIa  Non anion gap Metabolic acidosis  -- per chart review baseline creatinine about 1.2-1.5  -- Avoid nephrotoxins  -- c/w PTA lasix and Sodium bicarb    Anemia, chronic  Thrombocytopenia, chronic  -- labs actually appear improved from baseline     Generalized weakness  -- PT/OT: TCU            Diet: Minced  & Moist Diet (level 5) Thin Liquids (level 0)    DVT Prophylaxis: Pneumatic Compression Devices  Cronin Catheter: Not present  Lines: None     Cardiac Monitoring: None  Code Status: Full Code      Clinically Significant Risk Factors              # Hypoalbuminemia: Lowest albumin = 3 g/dL at 5/10/2024  5:47 AM, will monitor as appropriate    # Coagulation Defect: INR = 1.43 (Ref range: 0.85 - 1.15) and/or PTT = 37 Seconds (Ref range: 22 - 38 Seconds), will monitor for bleeding  # Thrombocytopenia: Lowest platelets = 106 in last 2 days, will monitor for bleeding   # Hypertension: Noted on problem list            # Financial/Environmental Concerns:           Disposition Plan     Medically Ready for Discharge: Anticipated in 2-4 Days             Maddy Bran MD  Hospitalist Service  [facility]  Securely message with [employer] (more info)  Text page via [facility] Paging/Directory   ______________________________________________________________________    Interval History   Patient is seen and examined at bedside.  Spouse helped with interpretation (pt was not able to participate due to confusion). No bowel movement. Able to take meds. No vomiting. Passing gas.  Discussed with son over the phone as well.  Physical Exam   Vital Signs: Temp: 97.3  F (36.3  C) Temp src: Oral BP: 124/69 Pulse: 70   Resp: 18 SpO2: 100 % O2 Device: None (Room air)    Weight: 132 lbs 15 oz    GEN: Lethargic. Not in acute distress.  HEENT: Atraumatic, mucous membrane- moist and pink.  Chest: Bilateral air entry.  CVS: S1S2 regular.   Abdomen: Soft. Non-tender, non-distended. No organomegaly. No guarding or rigidity. Bowel sounds active.   Extremities: No pedal edema.  CNS: No involuntary movements.  Skin: no cyanosis or clubbing.     Medical Decision Making       51 MINUTES SPENT BY ME on the date of service doing chart review, history, exam, documentation & further activities per the note.      Data

## 2024-05-11 NOTE — CONSULTS
Gastroenterology Consultation     Consulting Physician   Maddy Bran MD     Reason For Consultation   Hepatic encephalopathy recurrent     Chief Complaint   Vito Sy came to the hospital for evaluation of hepatic encephalopathy.     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  The patient cirrhosis is complicated by hepatic encephalopathy that is recurrent in nature and the patient does not like taking his lactulose.    The patient presented to the hospital after 24 hours of progressive confusion.  He has previously been in the hospital for hepatic encephalopathy and apparently does not like taking his lactulose which resulted in worsening confusion.  According to the patient's son the patient has been more confused and was mumbling just like he had been in previous episodes when he got very confused.  The patient has not had had any head injury and there is no noted focal deficits.  The patient has not had abdominal pain and has not had nausea or vomiting and there is not been any evidence of black or bloody stools.  The patient has not had frequent bowel movements as he has not been taking the lactulose.  The GI service is asked see the patient for help in the management of his cirrhosis and acute recurrent hepatic encephalopathy.    I had a long discussion with the patient and 2 of his sons as well as his wife.  The patient is on rifaximin and lactulose but at times does not want to take his lactulose because it is too sweet and because he has diarrhea with it.  At times he will skip several doses for several days and then be more constipated.  At that point even if he takes the medication he does not have bowel movements and then becomes confused and ends up in the hospital.  The patient's family understands that it is imperative that  somebody manage his lactulose very carefully and titrated to 2-3 soft before bowel movements daily.  Is important not to have so many bowel movements as that he would have severe diarrhea and become dehydrated but also that he keeps on taking it so that he has regular bowel movements.     Past History   Past Medical History:   Diagnosis Date    Alcoholic cirrhosis of liver with ascites (H)     Cirrhosis of liver (H)     Diabetes mellitus (H)     Gout     Hypertension     Kidney stone       Past Surgical History:   Procedure Laterality Date    COLONOSCOPY      ESOPHAGOSCOPY, GASTROSCOPY, DUODENOSCOPY (EGD), COMBINED N/A 10/10/2023    Procedure: ESOPHAGOGASTRODUODENOSCOPY with biopsy;  Surgeon: Puneet Plunkett MD, MD;  Location: [facility] OR    ESOPHAGOSCOPY, GASTROSCOPY, DUODENOSCOPY (EGD), COMBINED N/A 2/27/2024    Procedure: ESOPHAGOGASTRODUODENOSCOPY;  Surgeon: Beatrice Christie MD;  Location: [facility] OR    IR PARACENTESIS  11/6/2021    IR TRANSVEN INTRAHEPATIC PORTOSYST REV  1/26/2023    IR TRANSVEN INTRAHEPATIC PORTOSYST SHUNT  11/16/2022    IR TRANSVEN INTRAHEPATIC PORTOSYST SHUNT  12/9/2022    IL ESOPHAGOGASTRODUODENOSCOPY TRANSORAL DIAGNOSTIC N/A 11/16/2020    Procedure: ESOPHAGOGASTRODUODENOSCOPY (EGD);  Surgeon: Juan Garnett MD;  Location: [facility];  Service: Gastroenterology    IL ESOPHAGOGASTRODUODENOSCOPY TRANSORAL DIAGNOSTIC N/A 11/18/2020    Procedure: ESOPHAGOGASTRODUODENOSCOPY (EGD) WITH BANDING;  Surgeon: Juan Garnett MD;  Location: [facility];  Service: Gastroenterology    URETEROSCOPY          Family History Social History   Family History   Problem Relation Age of Onset    Heart Disease Brother     Hypertension Mother     Hypertension Father      Occupation: Retired   Marital Status:  lives with son   Tobacco: None   Alcohol: None   Recreational Drugs: None     Medications    Medications Prior to Admission   Medication Sig Dispense Refill Last Dose    bismuth  "subsalicylate (PEPTO BISMOL) 262 MG chewable tablet Take 1 tablet by mouth every 6 hours as needed for diarrhea    at PRN    ciprofloxacin (CIPRO) 500 MG tablet Take 1 tablet by mouth once daily 60 tablet 0 5/9/2024 at AM    fish oil-omega-3 fatty acids 1000 MG capsule Take 1 g by mouth daily   5/9/2024 at AM    furosemide (LASIX) 20 MG tablet Take 1 tablet (20 mg) by mouth daily 60 tablet 6 5/9/2024 at AM    lactulose (CHRONULAC) 10 GM/15ML solution Take 30 mLs (20 g) by mouth 3 times daily 2700 mL 0 5/9/2024 at x3 doses (son gave, but patient spit out medication)    midodrine (PROAMATINE) 2.5 MG tablet Take 1 tablet (2.5 mg) by mouth 3 times daily (with meals) 180 tablet 3 5/9/2024 at PM    multivitamin, therapeutic (THERA-VIT) TABS tablet Take 1 tablet by mouth daily   5/9/2024 at AM    omeprazole (PRILOSEC) 20 MG DR capsule Take 1 capsule (20 mg) by mouth 2 times daily 180 capsule 3 5/9/2024 at PM    rifaximin (XIFAXAN) 550 MG TABS tablet Take 1 tablet (550 mg) by mouth 2 times daily 60 tablet 4 5/9/2024 at PM    sodium bicarbonate 650 MG tablet Take 1 tablet (650 mg) by mouth 2 times daily 60 tablet 0 5/9/2024 at PM    spironolactone (ALDACTONE) 50 MG tablet Take 1 tablet (50 mg) by mouth daily 60 tablet 6 5/9/2024    Zinc Sulfate 220 (50 Zn) MG TABS Take 220 mg by mouth daily   5/9/2024 at AM    blood glucose (NO BRAND SPECIFIED) lancets standard Use to test blood sugar one time daily or as directed. 100 each 5     blood glucose (NO BRAND SPECIFIED) test strip Use to test blood sugar one time daily or as directed. 100 strip 5     blood glucose monitoring (NO BRAND SPECIFIED) meter device kit Use to test blood sugar one time daily or as directed. 1 kit 0         Allergies   Allergies   Allergen Reactions    Sulfa Antibiotics Shortness Of Breath and Itching    Penicillins Unknown     Annotation: discussed with patient, he reports he had \"itching\" with penicillin many years ago in [facility] but no hives or throat or " respiratory symptoms.  he also reports having tolerated amoxicillin since coming to [place].           Review of Systems    no chest pain   no SOB   no fevers/sweats/chills   no weight gain or loss   no nausea or vomiting   no abdominal pain   no constipation or diarrhea   no black or bloody stools   no numbness or weakness   no jaundice or dark urine  A comprehensive review of systems was performed and was otherwise noncontributory.     Objective     Vitals Blood pressure 124/69, pulse 70, temperature 97.3  F (36.3  C), temperature source Oral, resp. rate 18, weight 60.3 kg (132 lb 15 oz), SpO2 100%.          Physical   Exam  GENERAL: Confused and mumbling   SKIN: warm and dry, no rashes   PULMONARY: normal resp effort, breath sounds clear to auscultation bilateral   CARDIOVASCULAR: normal rate and rhythm, systolic murmur, mild lower extremity edema   ABDOMEN: no tenderness, no distention, bowel sounds normal   NEUROLOGICAL: Moves all 4 extremities   PSYCHIATRIC: Unable to assess given change in mental status        Laboratory     Electrolytes    Recent Labs   Lab 05/11/24  1207 05/11/24  0752 05/11/24  0554 05/10/24  0740 05/10/24  0547 05/09/24  2313 05/09/24  2134   NA  --   --  140  --  140  --  139   POTASSIUM  --   --  4.4  --  4.5  --  5.3   CHLORIDE  --   --  107  --  109*  --  107   CO2  --   --  17*  --  19*  --  19*   * 83 87   < > 90   < > 155*   CR  --   --  1.47*  --  1.32*  --  1.41*   BUN  --   --  21.3  --  22.5  --  24.0*    < > = values in this interval not displayed.      Hematology    Recent Labs   Lab 05/11/24  0554 05/09/24 2134 05/05/24  0750   HGB 10.0* 10.0* 8.5*   MCV 85 87 88   WBC 5.1 4.9 5.0   * 120* 101*   INR  --  1.43*  --       LFTs & Lipase    Recent Labs   Lab 05/11/24  0554 05/10/24  0547 05/09/24  2134   AST 33 36 45   ALT 21 22 26   ALKPHOS 151* 154* 167*   BILITOTAL 1.4* 1.2 1.5*   LIPASE  --  158* 173*     Status post paracentesis with removal of 15 cc of clear  fluid this was done on April 28, 2024    Imaging Studies     Abdominal CT scan from April 27, 2024  1.  Cirrhosis with findings of portal hypertension including splenomegaly and small volume ascites. Patent TIPS.  2.  Mild bladder wall thickening could be seen with cystitis. Correlate with urinalysis.  3.  Mild gastric mural thickening could be due to portal gastropathy and/or gastritis.  4.  Cholelithiasis.  5.  Nonobstructing bilateral renal calculi.    I have reviewed the current diagnostic and laboratory tests.           Impression    Vito Sy is a pleasant 73 year old male with known cirrhosis and recurrent episodes of hepatic encephalopathy.     Recommendations     Hepatic encephalopathy   Rifaximin 550 twice daily.  Lactulose titrated to 2-3 soft formed bowel movements daily.  I had a long discussion with family about management of his hepatic encephalopathy in the future and what the exacerbating factors might be.  It is imperative that the patient titrate his lactulose to 2-3 soft formed bowel movements daily and the avoid infection, dehydration, and medications that may alter his mental status.      Disposition   Ongoing management of his encephalopathy here in the hospital once the patient is clinically stable from a mental status standpoint he could be discharged to home with careful management of his lactulose at home.      CKD   Creatinine is close to his baseline level continue to monitor.      LFT elevation   Likely related to cirrhosis and fairly stable.  Continue to monitor.     Approximately 55 minutes of total time was spent providing patient care including patient evaluation, reviewing documentation/test results, and .           Juan Garnett MD  Thank you for the opportunity to participate in the care of this patient.   Please feel free to call me with any questions or concerns.  Phone number [phone].

## 2024-05-12 NOTE — PLAN OF CARE
Problem: Adult Inpatient Plan of Care  Goal: Absence of Hospital-Acquired Illness or Injury  Intervention: Prevent Skin Injury  Recent Flowsheet Documentation  Taken 5/11/2024 1808 by Lenora Kelly RN  Body Position:   right   turned  Intervention: Prevent Infection  Recent Flowsheet Documentation  Taken 5/11/2024 1545 by Lenora Kelly, RN  Infection Prevention: hand hygiene promoted     Problem: Risk for Delirium  Goal: Improved Attention and Thought Clarity  Outcome: Not Progressing  Goal: Improved Sleep  Outcome: Progressing     Problem: Liver Failure  Goal: Optimal Coping with Liver Failure  Outcome: Not Progressing  Goal: Optimal Gastrointestinal Function  Intervention: Monitor and Support Gastrointestinal Function  Recent Flowsheet Documentation  Taken 5/11/2024 1808 by Lenora Kelly, RN  Body Position:   right   turned  Goal: Optimal Pain Control, Comfort and Function  Outcome: Progressing  Goal: Effective Oxygenation and Ventilation  Intervention: Optimize Oxygenation and Ventilation  Recent Flowsheet Documentation  Taken 5/11/2024 1808 by Lenora Kelly, RN  Head of Bed (HOB) Positioning: HOB at 20-30 degrees     Problem: Chronic Kidney Disease  Goal: Acceptable Pain Control  Outcome: Progressing     Goal Outcome Evaluation:  Patient is disoriented x 4, remains lethargic throughout shift with random moment of mumbling. Patient able to answer yes or no questions intermittently. Patient denies pain. Patient received 1 lactulose enema on this writers shift. One extra large bowel movement. Patient incontinent of bowel and bladder.

## 2024-05-12 NOTE — PROGRESS NOTES
CLINICAL NUTRITION SERVICES - ASSESSMENT NOTE     Nutrition Prescription    RECOMMENDATIONS FOR MDs/PROVIDERS TO ORDER:  Consider 100 mg thiamine/day with malnutrition and liver disease.    Malnutrition Status:    Severe in acute on chronic    Recommendations already ordered by Registered Dietitian (RD):  Glucerna daily    Future/Additional Recommendations:       REASON FOR ASSESSMENT  Vito Sy is a/an 73 year old male assessed by the dietitian for Admission Nutrition Risk Screen for positive    NUTRITION HISTORY  Patient admitted with AMS and recurrent hepatic encephalopathy.  Patient just discharged on 5/6 and returns.  History of DM, CKD, anemia, cirrhosis with ascites, lymphedema.    Patient noted with severe malnutrition in chronic illness on 5/4/24.    Patient sleeping.  Met patient's spouse, she declines need for  and states that she is an .  Patient has food available at home and wants to eat but when he starts eating he only eats a small amount.  Patient has been eating soft food like soup, porridge, soft cooked vegetables, protein shakes.      CURRENT NUTRITION ORDERS  Diet: Level 5: Minced & Moist Dysphagia Diet   Eating 25-50% at meals.     LABS  CMP  Recent Labs   Lab 05/12/24  1138 05/12/24  0801 05/12/24  0643 05/11/24  2051 05/11/24  0752 05/11/24  0554 05/10/24  0740 05/10/24  0547 05/09/24  2313 05/09/24  2134   NA  --   --  145  --   --  140  --  140  --  139   POTASSIUM  --   --  3.6  --   --  4.4  --  4.5  --  5.3   * 133* 146* 114*   < > 87   < > 90   < > 155*   BUN  --   --  25.3*  --   --  21.3  --  22.5  --  24.0*   CR  --   --  1.60*  --   --  1.47*  --  1.32*  --  1.41*   SILVIA  --   --  9.1  --   --  8.7*  --  8.9  --  9.0   ALBUMIN  --   --  3.4*  --   --  3.2*  --  3.0*  --  3.4*   BILITOTAL  --   --  1.1  --   --  1.4*  --  1.2  --  1.5*   ALKPHOS  --   --  157*  --   --  151*  --  154*  --  167*   AST  --   --  33  --   --  33  --  36  --  45   ALT   --   --  23  --   --  21  --  22  --  26    < > = values in this interval not displayed.     Labs reviewed    MEDICATIONS  Current Facility-Administered Medications   Medication Dose Route Frequency Provider Last Rate Last Admin    ciprofloxacin (CIPRO) tablet 500 mg  500 mg Oral Daily Potts, Miquel, DO   500 mg at 05/12/24 0809    [Held by provider] furosemide (LASIX) tablet 20 mg  20 mg Oral Daily Potts, Miquel, DO   20 mg at 05/11/24 0816    insulin aspart (NovoLOG) injection (RAPID ACTING)  1-3 Units Subcutaneous TID AC Potts, Miquel, DO   1 Units at 05/10/24 1225    insulin aspart (NovoLOG) injection (RAPID ACTING)  1-3 Units Subcutaneous At Bedtime Potts, Miquel, DO        [Held by provider] lactulose (CHRONULAC) rectal enema 200 g  200 g Rectal Q4H Rahul Christensen, MBBS   200 g at 05/12/24 0208    [Held by provider] lactulose (CHRONULAC) solution 30 g  30 g Oral 4x Daily Maddy Bran MD   30 g at 05/11/24 0816    midodrine (PROAMATINE) tablet 2.5 mg  2.5 mg Oral TID w/meals Maddy Bran MD   2.5 mg at 05/10/24 1225    pantoprazole (PROTONIX) EC tablet 40 mg  40 mg Oral BID AC Potts, Miquel, DO   40 mg at 05/12/24 0809    rifaximin (XIFAXAN) tablet 550 mg  550 mg Oral BID Potts, Miquel, DO   550 mg at 05/12/24 0810    sodium bicarbonate tablet 650 mg  650 mg Oral BID Potts, Miquel, DO   650 mg at 05/12/24 0809    sodium chloride (PF) 0.9% PF flush 3 mL  3 mL Intracatheter Q8H Potts, Miquel, DO   3 mL at 05/12/24 0810    [Held by provider] spironolactone (ALDACTONE) tablet 50 mg  50 mg Oral Daily Potts, Miquel, DO   50 mg at 05/11/24 0816      Current Facility-Administered Medications   Medication Dose Route Frequency Provider Last Rate Last Admin    sodium chloride 0.9 % infusion   Intravenous Continuous Maddy Bran MD 50 mL/hr at 05/12/24 0934 New Bag at 05/12/24 0934      Current Facility-Administered Medications   Medication Dose Route Frequency Provider Last Rate  Last Admin    acetaminophen (TYLENOL) tablet 325 mg  325 mg Oral Q8H PRN Potts, Miquel, DO        bisacodyl (DULCOLAX) suppository 10 mg  10 mg Rectal Daily PRN Maddy Bran MD        calcium carbonate (TUMS) chewable tablet 1,000 mg  1,000 mg Oral 4x Daily PRN Potts, Miquel, DO        glucose gel 15-30 g  15-30 g Oral Q15 Min PRN Potts, Miquel, DO        Or    dextrose 50 % injection 25-50 mL  25-50 mL Intravenous Q15 Min PRN Potts, Miquel, DO        Or    glucagon injection 1 mg  1 mg Subcutaneous Q15 Min PRN Potts, Miquel, DO        lidocaine (LMX4) cream   Topical Q1H PRN Potts, Miquel, DO        lidocaine 1 % 0.1-1 mL  0.1-1 mL Other Q1H PRN Potts, Miquel, DO        polyethylene glycol (MIRALAX) Packet 17 g  17 g Oral BID PRN Potts, Miquel, DO        senna-docusate (SENOKOT-S/PERICOLACE) 8.6-50 MG per tablet 1 tablet  1 tablet Oral BID PRN Potts, Miquel, DO        Or    senna-docusate (SENOKOT-S/PERICOLACE) 8.6-50 MG per tablet 2 tablet  2 tablet Oral BID PRN Potts, Miquel, DO        sodium chloride (PF) 0.9% PF flush 3 mL  3 mL Intracatheter q1 min prn Potts, Miquel, DO          Medications reviewed    ANTHROPOMETRICS  Height: 0 cm (Data Unavailable)  Most Recent Weight: 60.3 kg (132 lb 15 oz)    BMI: Normal BMI  Weight History:   05/02/24 : 64.4 kg (142 lb)   05/01/24 : 68 kg (150 lb)   04/27/24 : 68 kg (150 lb)   04/10/24 : 68.3 kg (150 lb 8 oz)   03/03/24 : 82.2 kg (181 lb 4.8 oz) 27% loss in <3 months.  02/27/24 : 83 kg (183 lb)   02/27/24 : 83 kg (183 lb)   02/19/24 : 83 kg (183 lb)   12/27/23 : 67.5 kg (148 lb 12.8 oz)   11/16/23 : 75.3 kg (166 lb)   11/09/23 : 75.4 kg (166 lb 4.8 oz)   10/10/23 : 81.6 kg (180 lb)   09/18/23 : 89.4 kg (197 lb)   07/17/23 : 83.9 kg (185 lb)  Some wt variation with fluid shifts.  Note patient had 9L off with paracentesis on 11/3/23.     Dosing Weight: 60.3 kg    ASSESSED NUTRITION NEEDS  Estimated Energy Needs: 1800+ kcals/day (30+ kcals/kg  )  Justification: Repletion  Estimated Protein Needs: 60+ grams protein/day (1+ grams of pro/kg)  Justification: CKD and liver disease  Estimated Fluid Needs: 1500+ mL/day (25+ mL/kg)   Justification: Maintenance and Per provider pending fluid status    PHYSICAL FINDINGS  See malnutrition section below.  Missing some teeth.  Last BM 5/12/24  Rounded abdomen-per chart.    MALNUTRITION:  % Weight Loss:  > 7.5% in 3 months (severe malnutrition)  % Intake:  </= 75% for >/= 1 month (severe malnutrition)  Subcutaneous Fat Loss:  Orbital region moderate to severe depletion  Muscle Loss:  unable today.  Fluid Retention:  None noted per chart.    Malnutrition Diagnosis: Severe malnutrition  In Context of:  Acute illness or injury  Chronic illness or disease    NUTRITION DIAGNOSIS  Malnutrition related to acute on chronic illness as evidenced by wt loss, decreased intake.      INTERVENTIONS  Implementation  Nutrition Education: No education needs assessed at this time   Medical food supplement therapy -glucerna daily  Consider 100 mg thiamine/day with malnutrition and liver disease.    Goals  Maintain dry wt  Patient to consume % of nutritionally adequate meals three times per day, or the equivalent with supplements/snacks.     Monitoring/Evaluation  Progress toward goals will be monitored and evaluated per protocol.

## 2024-05-12 NOTE — PROGRESS NOTES
Gastroenterology Progress Note     Subjective   The patient remains encephalopathic but is much more alert today and able to answer some questions.  He has some mild abdominal discomfort to palpation but otherwise seems to be doing well.  He had several bowel movements since yesterday evening.     Objective     Vitals Blood pressure 116/60, pulse 88, temperature 97.8  F (36.6  C), temperature source Oral, resp. rate 20, weight 60.3 kg (132 lb 15 oz), SpO2 99%.          Physical Exam  General: Lethargic but markedly more alert than yesterday   Cardiovascular: RRR, lower extremity edema   Chest: Mild crackles at bases otherwise clear to auscultation   Abdomen: soft, mild tenderness to deep palpation, non-distended, bowel sounds present   Neurologic: Moves all 4 extremities able to answer simple questions and follow commands.        Laboratory     Electrolytes    Recent Labs   Lab 05/12/24  0643 05/11/24 2051 05/11/24  1909 05/11/24  0752 05/11/24  0554 05/10/24  0740 05/10/24  0547     --   --   --  140  --  140   POTASSIUM 3.6  --   --   --  4.4  --  4.5   CHLORIDE 116*  --   --   --  107  --  109*   CO2 16*  --   --   --  17*  --  19*   * 114* 138*   < > 87   < > 90   CR 1.60*  --   --   --  1.47*  --  1.32*   BUN 25.3*  --   --   --  21.3  --  22.5    < > = values in this interval not displayed.      Hematology    Recent Labs   Lab 05/11/24 0554 05/09/24 2134   HGB 10.0* 10.0*   MCV 85 87   WBC 5.1 4.9   * 120*   INR  --  1.43*      LFTs & Lipase    Recent Labs   Lab 05/12/24  0643 05/11/24  0554 05/10/24  0547 05/09/24 2134   AST 33 33 36 45   ALT 23 21 22 26   ALKPHOS 157* 151* 154* 167*   BILITOTAL 1.1 1.4* 1.2 1.5*   LIPASE  --   --  158* 173*       Imaging Studies     Abdominal CT scan from April 27, 2024  1.  Cirrhosis with findings of portal hypertension including splenomegaly and small volume ascites. Patent TIPS.  2.  Mild bladder wall thickening could be seen with cystitis.  Correlate with urinalysis.  3.  Mild gastric mural thickening could be due to portal gastropathy and/or gastritis.  4.  Cholelithiasis.  5.  Nonobstructing bilateral renal calculi.    I have reviewed the current diagnostic and laboratory tests.           Impression    Vito Sy is a pleasant 73 year old male with known cirrhosis and recurrent episodes of hepatic encephalopathy.      Recommendations     Hepatic encephalopathy   Rifaximin 550 twice daily.  Lactulose titrated to 2-3 soft formed bowel movements daily.  I had a long discussion with family about management of his hepatic encephalopathy in the future and what the exacerbating factors might be.  It is imperative that the patient titrate his lactulose to 2-3 soft formed bowel movements daily and the avoid infection, dehydration, and medications that may alter his mental status.  Agree with enemas as needed to promote stooling.      CKD   Monitor creatinine very carefully and address as needed.  Creatinine is increased to 1.60.      Slight increase in LFTs   This seems to be normalizing.      Disposition   Continue to treat encephalopathy and monitor for clinical improvement.  Hopefully able to discharge to home in the next 1 to 2 days.     Approximately 30 minutes of total time was spent providing patient care including patient evaluation, reviewing documentation/test results, and .           Juan Garnett MD  Thank you for the opportunity to participate in the care of this patient.   Please feel free to call me with any questions or concerns.  Phone number [phone].

## 2024-05-12 NOTE — PLAN OF CARE
Problem: Adult Inpatient Plan of Care  Goal: Plan of Care Review  Description: The Plan of Care Review/Shift note should be completed every shift.  The Outcome Evaluation is a brief statement about your assessment that the patient is improving, declining, or no change.  This information will be displayed automatically on your shift  note.  Outcome: Progressing  Goal: Absence of Hospital-Acquired Illness or Injury  Intervention: Identify and Manage Fall Risk  Recent Flowsheet Documentation  Taken 5/12/2024 1629 by Lenora Kelly, RN  Safety Promotion/Fall Prevention:   activity supervised   clutter free environment maintained   lighting adjusted   mobility aid in reach   nonskid shoes/slippers when out of bed   patient and family education   supervised activity   toileting scheduled  Intervention: Prevent Infection  Recent Flowsheet Documentation  Taken 5/12/2024 1629 by Lenora Kelly, RN  Infection Prevention: hand hygiene promoted     Problem: Risk for Delirium  Goal: Improved Behavioral Control  Intervention: Minimize Safety Risk  Recent Flowsheet Documentation  Taken 5/12/2024 1629 by Lenora Kelly, RN  Enhanced Safety Measures:   pain management   patient/family teach back on injury risk     Problem: Fall Injury Risk  Goal: Absence of Fall and Fall-Related Injury  Outcome: Progressing  Intervention: Identify and Manage Contributors  Recent Flowsheet Documentation  Taken 5/12/2024 1629 by Lenora Kelly, RN  Medication Review/Management: medications reviewed  Intervention: Promote Injury-Free Environment  Recent Flowsheet Documentation  Taken 5/12/2024 1629 by Lenora Kelly, RN  Safety Promotion/Fall Prevention:   activity supervised   clutter free environment maintained   lighting adjusted   mobility aid in reach   nonskid shoes/slippers when out of bed   patient and family education   supervised activity   toileting scheduled     Problem: Liver Failure  Goal: Improved Oral  Intake  Outcome: Progressing     Goal Outcome Evaluation:  Patient's level of consciousness has improved, remains disoriented x 4. Patient denies pain. Patient up in chair for meals. Walking to bathroom with assist x 1, with gait belt and FWW. Family remains at bedside throughout shift.

## 2024-05-12 NOTE — PLAN OF CARE
"  Problem: Adult Inpatient Plan of Care  Goal: Plan of Care Review  Description: The Plan of Care Review/Shift note should be completed every shift.  The Outcome Evaluation is a brief statement about your assessment that the patient is improving, declining, or no change.  This information will be displayed automatically on your shift  note.  Outcome: Progressing     Problem: Adult Inpatient Plan of Care  Goal: Patient-Specific Goal (Individualized)  Description: You can add care plan individualizations to a care plan. Examples of Individualization might be:  \"Parent requests to be called daily at 9am for status\", \"I have a hard time hearing out of my right ear\", or \"Do not touch me to wake me up as it startles  me\".  Outcome: Progressing     Problem: Adult Inpatient Plan of Care  Goal: Absence of Hospital-Acquired Illness or Injury  Intervention: Prevent Skin Injury  Recent Flowsheet Documentation  Taken 5/12/2024 0800 by Wesley Quinones, RN  Body Position: legs elevated     Problem: Liver Failure  Goal: Optimal Coping with Liver Failure  Outcome: Progressing  Intervention: Support Response to Liver Disease  Recent Flowsheet Documentation  Taken 5/12/2024 0800 by Wesley Quinones, RN  Supportive Measures: active listening utilized   Goal Outcome Evaluation:  No verbal or nonverbal expressions of pain, more alert today, Lactulose held, NS 50cc/hr continuous, pils taken crushed in applesauce, family at bedside. Ammonia 46                      "

## 2024-05-12 NOTE — PLAN OF CARE
Problem: Adult Inpatient Plan of Care  Goal: Absence of Hospital-Acquired Illness or Injury  Intervention: Prevent Skin Injury  Recent Flowsheet Documentation  Taken 5/12/2024 0200 by Angelica Barrios RN  Body Position: (patient is independent with repositioning.) other (see comments)  Taken 5/12/2024 0000 by Angelica Barrios RN  Body Position: (patient is independent with repositioning.) other (see comments)  Skin Protection: adhesive use limited  Device Skin Pressure Protection: absorbent pad utilized/changed  Intervention: Prevent Infection  Recent Flowsheet Documentation  Taken 5/12/2024 0000 by Angelica Barrios RN  Infection Prevention: hand hygiene promoted     Problem: Risk for Delirium  Goal: Improved Sleep  Intervention: Promote Sleep  Recent Flowsheet Documentation  Taken 5/12/2024 0000 by Angelica Barrios RN  Sleep/Rest Enhancement:   comfort measures   consistent schedule promoted   noise level reduced   room darkened     Problem: Liver Failure  Goal: Optimal Gastrointestinal Function  Intervention: Monitor and Support Gastrointestinal Function  Recent Flowsheet Documentation  Taken 5/12/2024 0200 by Angelica Barrios RN  Body Position: (patient is independent with repositioning.) other (see comments)  Taken 5/12/2024 0000 by Angelica Barrios RN  Body Position: (patient is independent with repositioning.) other (see comments)  Goal: Optimal Pain Control, Comfort and Function  Intervention: Prevent or Manage Pain  Recent Flowsheet Documentation  Taken 5/12/2024 0000 by Angelica Barrios RN  Sleep/Rest Enhancement:   comfort measures   consistent schedule promoted   noise level reduced   room darkened  Goal: Effective Oxygenation and Ventilation  Intervention: Promote Airway Secretion Clearance  Recent Flowsheet Documentation  Taken 5/12/2024 0000 by Angelica Barrios RN  Cough And Deep Breathing: done independently per patient  Intervention: Optimize Oxygenation and Ventilation  Recent Flowsheet  Documentation  Taken 5/12/2024 0200 by Angelica Barrios RN  Head of Bed (HOB) Positioning: HOB at 20-30 degrees  Taken 5/12/2024 0000 by Angelica Barrios RN  Head of Bed (HOB) Positioning: HOB at 20-30 degrees     Problem: Chronic Kidney Disease  Goal: Fluid Balance  Intervention: Monitor and Manage Hypervolemia  Recent Flowsheet Documentation  Taken 5/12/2024 0000 by Angelica Barrios RN  Skin Protection: adhesive use limited  Goal: Acceptable Pain Control  Intervention: Prevent or Manage Pain  Recent Flowsheet Documentation  Taken 5/12/2024 0000 by Angelica Barrios RN  Sleep/Rest Enhancement:   comfort measures   consistent schedule promoted   noise level reduced   room darkened   Goal Outcome Evaluation:  Patient alert and cooperate with cares. Patient had multiple loose stools after lactolose enema. Patient was itching beginning of the shift and now is not. Daughter in law in the room and supportve

## 2024-05-12 NOTE — PROGRESS NOTES
[facility]    Medicine Progress Note - Hospitalist Service    Date of Admission:  5/9/2024    Assessment & Plan      Vito Sy is a 73 year old male admitted on 5/9/2024 with AMS. He has a recurrent history of hepatic encephalopathy with recent admission here, discharged only a few days ago.  Patient's recurrent hepatic encephalopathy is likely due to lactulose noncompliance, as patient does not like the taste.  Son at bedside states that patient did not take lactulose over the past 2 to 3 days, similar to prior admissions.      Acute metabolic encephalopathy 2/2 hepatic encephalopathy  -- Pt was not consistently taking lactulose at home per family.  -- defer CT head, no focal deficits on exam  -- no infectious s/s, has been on SBP ppx   -- no s/s to suggest GIB or additional etiology exacerbating encephalopathy  -- titrate lactulose accordingly  -- c/w PTA rifaximin  -- 05/11: no Bowel movement on increased dose of po lactulose. Pt remained lethargic with elevated ammonia. Lactulose enema ordered. Discussed with bedside RN.  -- GI consult appreciated.  -- 05/12: pt had multiple bowel movements overnight. More alert today. Ammonia 46. Will hold further lactulose for now. Aiming 2-3 soft formed b.m a day.     Abnormal UA  -- no fever or dysuria  -- Ucx: no growth  -- on ciproflaxcin for sbp px    Elevated ALKP, lipase  Hyperbilirubinemia  Elevated INR  -- elevation in alkp and lipase is chronic  -- stable    Cirrhosis of liver with portal hypertension, varices, splenomegaly   -- h/o TIPS and recurrent SBP  -- watch for bleeding   -- continue PTA furosemide, spironolactone, SBP ppx with ciprofloxacin   -- PTA midodrine     Prediabetes  -- accu-checks,  low dose sliding scale for now  -- monitor for hypoglycemia while on cipro    NANO on CKD-IIIa  Non anion gap Metabolic acidosis  -- per chart review baseline creatinine about 1.2-1.5  -- Avoid nephrotoxins  -- c/w PTA Sodium bicarb  --  05/12: Cr: 1.6. lasix and spironolactone held. Gentle IVF NS at 50 ml for 6 hours. Encourage po intake.     Anemia, chronic  Thrombocytopenia, chronic  -- labs actually appear improved from baseline     Generalized weakness  -- PT/OT: TCU            Diet: Minced & Moist Diet (level 5) Thin Liquids (level 0)    DVT Prophylaxis: Pneumatic Compression Devices  Cronin Catheter: Not present  Lines: None     Cardiac Monitoring: None  Code Status: Full Code      Clinically Significant Risk Factors              # Hypoalbuminemia: Lowest albumin = 3 g/dL at 5/10/2024  5:47 AM, will monitor as appropriate     # Thrombocytopenia: Lowest platelets = 106 in last 2 days, will monitor for bleeding   # Hypertension: Noted on problem list            # Financial/Environmental Concerns:           Disposition Plan     Medically Ready for Discharge: Anticipated in 2-4 Days             Maddy Bran MD  Hospitalist Service  [facility]  Securely message with [employer] (more info)  Text page via [employer] Paging/Directory   ______________________________________________________________________    Interval History   Patient is seen and examined at bedside.  Daughter in law helped with interpretation. More alert today. Had several bowel movements overnight.    Physical Exam   Vital Signs: Temp: 97.8  F (36.6  C) Temp src: Oral BP: 116/60 Pulse: 88   Resp: 20 SpO2: 99 % O2 Device: None (Room air)    Weight: 132 lbs 15 oz    GEN: much more alert. Not in acute distress.  HEENT: Atraumatic, mucous membrane- moist and pink.  Chest: Bilateral air entry.  CVS: S1S2 regular.   Abdomen: Soft. Non-tender, non-distended. No organomegaly. No guarding or rigidity. Bowel sounds active.   Extremities: No pedal edema.  CNS: No involuntary movements.  Skin: no cyanosis or clubbing.     Medical Decision Making       45 MINUTES SPENT BY ME on the date of service doing chart review, history, exam, documentation & further activities per the  note.      Data

## 2024-05-13 NOTE — PROGRESS NOTES
Care Management Follow Up    Length of Stay (days): 4    Expected Discharge Date: 05/14/2024    Anticipated Discharge Plan:   TCU    Transportation: TBD    PT Recommendations: home with assist; home with home care physical therapy    OT Recommendations:  Transitional Care Facility     Barriers to Discharge: medical stability, placement    Prior Living Situation: house with child(manav), adult, spouse, grandchild(manav)    Advanced Directive on File: other (see comments) (No ACP documents)     Patient/Spokesperson Updated: Yes. Who? Pt and wife at bedside    Additional Information:  CM met with pt and wife in room. Therapy rec change to home care. PT and wife are agreeable and state they do not have agency preference. Referral sent to [facility].    1:08 PM  Patient accepted by [facility] for RN, PT, OT.    RAYMOND PritchardW

## 2024-05-13 NOTE — PROGRESS NOTES
Clinic Care Coordination Contact    Situation: Patient chart reviewed by care coordinator.    Background: Pt identified for primary care coordination     Assessment: Pt was readmitted to hospital and is currently still in patient    Plan/Recommendations: RN CC will monitor for discharge and reach out when pt returns to community

## 2024-05-13 NOTE — PROGRESS NOTES
[facility]    Medicine Progress Note - Hospitalist Service    Date of Admission:  5/9/2024    Assessment & Plan      Vito Sy is a 73 year old male admitted on 5/9/2024 with AMS. He has a recurrent history of hepatic encephalopathy with recent admission here, discharged only a few days ago.  Patient's recurrent hepatic encephalopathy is likely due to lactulose noncompliance, as patient does not like the taste.  Son at bedside states that patient did not take lactulose regularly over the past 2 to 3 days, similar to prior admissions.    -- GI following  -- Lactulose held for past 2 days due to diarrhea and NANO.  -- Mental status improved. Diarrhea resolved today. Po lactulose resumed.   -- Dispo: 1-2 days pending resolution of NANO and titration of lactulose.  Acute metabolic encephalopathy 2/2 hepatic encephalopathy- resolved  -- Pt was not consistently taking lactulose at home per family.  -- defer CT head, no focal deficits on exam  -- no infectious s/s, has been on SBP ppx   -- no s/s to suggest GIB or additional etiology exacerbating encephalopathy  -- titrate lactulose accordingly  -- c/w PTA rifaximin  -- 05/11: no Bowel movement on increased dose of po lactulose. Pt remained lethargic with elevated ammonia. Lactulose enema ordered. Discussed with bedside RN.  -- GI consult appreciated.  -- 05/12: pt had multiple bowel movements overnight. More alert today. Ammonia 46. Will hold further lactulose for now. Aiming 2-3 soft formed b.m a day.   -- 05/13: Confusion resolved. Pt had watery stool last night. No stool till the afternoon. Will resume po lactulose. Discussed with RN.  -- US-Paracentesis before discharge pending    Abnormal UA  -- no fever or dysuria  -- Ucx: no growth  -- on ciproflaxcin for sbp px    Elevated ALKP, lipase  Hyperbilirubinemia  Elevated INR  -- elevation in alkp and lipase is chronic  -- stable    Cirrhosis of liver with portal hypertension, varices,  splenomegaly   -- h/o TIPS and recurrent SBP  -- watch for bleeding   -- continue PTA furosemide, spironolactone, SBP ppx with ciprofloxacin   -- PTA midodrine     Prediabetes  -- accu-checks,  low dose sliding scale for now  -- monitor for hypoglycemia while on cipro    NANO on CKD-IIIa  Non anion gap Metabolic acidosis  -- per chart review baseline creatinine about 1.2-1.5  -- Avoid nephrotoxins  -- c/w PTA Sodium bicarb  -- 05/12: Cr: 1.6. lasix and spironolactone held. Gentle IVF NS at 50 ml for 6 hours. Encourage po intake.   -- 05/13: Cr: 1.67. Discussed with pt and family to increase po intake. Will give NS at 50 ml for 6 hours. C/w to hold diuretics. Monitor renal function    Anemia, chronic  Thrombocytopenia, chronic  -- labs actually appear improved from baseline     Generalized weakness  -- PT/OT: TCU            Diet: Minced & Moist Diet (level 5) Thin Liquids (level 0)  Snacks/Supplements Adult: Glucerna; With Meals    DVT Prophylaxis: Pneumatic Compression Devices  Cronin Catheter: Not present  Lines: None     Cardiac Monitoring: None  Code Status: Full Code      Clinically Significant Risk Factors              # Hypoalbuminemia: Lowest albumin = 3 g/dL at 5/10/2024  5:47 AM, will monitor as appropriate     # Thrombocytopenia: Lowest platelets = 120 in last 2 days, will monitor for bleeding   # Hypertension: Noted on problem list         # Severe Malnutrition: based on nutrition assessment, PRESENT ON ADMISSION   # Financial/Environmental Concerns:           Disposition Plan     Medically Ready for Discharge: 1-2 days             Maddy Bran MD  Hospitalist Service  [facility]  Securely message with [employer] (more info)  Text page via [employer] Paging/Directory   ______________________________________________________________________    Interval History   Patient is seen and examined at bedside.  Spouse present during encounter.  Had one loose stool last night.     Physical Exam    Vital Signs: Temp: 97.8  F (36.6  C) Temp src: Oral BP: 109/59 Pulse: 92   Resp: 18 SpO2: 100 % O2 Device: None (Room air)    Weight: 132 lbs 15 oz    GEN: much more alert. Not in acute distress.  HEENT: Atraumatic, mucous membrane- moist and pink.  Chest: Bilateral air entry.  CVS: S1S2 regular.   Abdomen: Soft. Non-tender, non-distended. No organomegaly. No guarding or rigidity. Bowel sounds active.   Extremities: No pedal edema.  CNS: No involuntary movements.  Skin: no cyanosis or clubbing.     Medical Decision Making       42 MINUTES SPENT BY ME on the date of service doing chart review, history, exam, documentation & further activities per the note.      Data

## 2024-05-13 NOTE — PLAN OF CARE
Problem: Adult Inpatient Plan of Care  Goal: Optimal Comfort and Wellbeing  Outcome: Progressing  Intervention: Provide Person-Centered Care  Recent Flowsheet Documentation  Taken 5/13/2024 0900 by Anayeli Ulloa RN  Trust Relationship/Rapport:   thoughts/feelings acknowledged   emotional support provided     Problem: Risk for Delirium  Goal: Improved Behavioral Control  Outcome: Progressing  Intervention: Prevent and Manage Agitation  Recent Flowsheet Documentation  Taken 5/13/2024 0900 by Anayeli Ulloa RN  Environment Familiarity/Consistency: daily routine followed  Intervention: Minimize Safety Risk  Recent Flowsheet Documentation  Taken 5/13/2024 0900 by Anayeli Ulloa RN  Communication Enhancement Strategies: call light answered in person  Enhanced Safety Measures: family to remain at bedside  Trust Relationship/Rapport:   thoughts/feelings acknowledged   emotional support provided   Goal Outcome Evaluation:       Prt is A/O mostly X4 today, denies pain, has been up in the recliner all shift, has been voiding and eating very fine, family at bedside, VSS, on RA, PT evaluation done and pt is about to discharge to TCU probably today or tomorrow, TCU referral pending.

## 2024-05-13 NOTE — PROGRESS NOTES
[facility] Progress Note       SUBJECTIVE:  Patient denies difficulty with word finding or confusion. Thinks he had 2-3 bowel movements yesterday, none yet today. Denies melena/hematochezia, or abdominal pain. Abdomen a little distended. Tolerating level 5 diet.        OBJECTIVE:  /63 (BP Location: Left arm)   Pulse 84   Temp 97.8  F (36.6  C) (Oral)   Resp 20   Wt 60.3 kg (132 lb 15 oz)   SpO2 100%   BMI 20.21 kg/m    Temp (24hrs), Av.4  F (36.9  C), Min:97.8  F (36.6  C), Max:99.3  F (37.4  C)    No data found.    Intake/Output Summary (Last 24 hours) at 2024 1351  Last data filed at 2024  Gross per 24 hour   Intake 240 ml   Output --   Net 240 ml        PHYSICAL EXAM  GEN: NAD, male appears stated age sitting up in chair eating pudding  HRT: no LE edema  RESP: unlabored  ABD: nondistended  SKIN: No rash or jaundice      Additional Data:  I have reviewed the patient's new clinical lab results:     Recent Labs   Lab Test 24  0726 24  0554 24  2134 24  0750 24  0645 24  0544 24  0635   WBC 7.7 5.1 4.9   < > 4.2   < >  --    HGB 9.8* 10.0* 10.0*   < > 9.6*   < >  --    MCV 89 85 87   < > 89   < >  --    * 106* 120*   < > 115*   < >  --    INR  --   --  1.43*  --  1.47*  --  1.51*    < > = values in this interval not displayed.     Recent Labs   Lab Test 24  0726 24  0643 24  0554   POTASSIUM 4.6 3.6 4.4   CHLORIDE 111* 116* 107   CO2 18* 16* 17*   BUN 28.6* 25.3* 21.3   ANIONGAP 11 13 16*     Recent Labs   Lab Test 24  0643 24  0554 05/10/24  0547 24  2259 24  2134 24  0737 24  0336 24  0634 24  1411   ALBUMIN 3.4* 3.2* 3.0*  --  3.4*  --   --    < >  --    BILITOTAL 1.1 1.4* 1.2  --  1.5*  --   --    < >  --    ALT 23 21 22  --  26  --   --    < >  --    AST 33 33 36  --  45  --   --    < >  --    PROTEIN  --   --   --  Negative  --   --  Negative  --  10*    LIPASE  --   --  158*  --  173* 164*  --    < >  --     < > = values in this interval not displayed.         Imaging results:  CT abdomen/pelvis w/o 5/11/24:  FINDINGS:   LOWER CHEST: Normal.  HEPATOBILIARY: Cirrhosis, with moderate volume of ascites right upper quadrant as seen previously. TIPS unchanged in position. Numerous stones in the gallbladder.  PANCREAS: Normal.  SPLEEN: Mild splenomegaly, unchanged. Small amount of ascites left upper quadrant.  ADRENAL GLANDS: Normal.  KIDNEYS/BLADDER: Multiple nonobstructing stones both kidneys. Right renal cyst. Moderate bladder distention.  BOWEL: Fluid within numerous slightly distended small bowel loops. There is also a moderate amount of fluid and formed stool throughout the colon to the level of the rectum.  LYMPH NODES: Normal.  VASCULATURE: Normal.  PELVIC ORGANS: Prostatic hypertrophy. Minimal pelvic ascites.   MUSCULOSKELETAL: Hypertrophic changes thoracolumbar spine.                                                                   IMPRESSION:   1.  Moderate amount of formed stool and fluid throughout the colon to the level of the rectum will likely result in diarrhea. There are also several fluid distended loops of small bowel. No definite findings for obstruction. The study is limited by the lack of oral and IV contrast.  2.  Cirrhosis with portal venous hypertension.  3.  Moderate ascites, minimally changed.  4.  Gallstones.  5.  Nonobstructing stones both kidneys.    Abdominal xray 5/11/24:  IMPRESSION: Dilated loops of small bowel measuring up to 3.8 cm with gaseous distention of the colon noted, query ileus versus partial versus developing obstruction. No free air. TIPS. Cholelithiasis.          IMPRESSION:  Cirrhosis with ascites   Hepatic encephalopathy  This is a 72 y/o male with PMH decompensated cirrhosis due to metabolic liver disase and alcohol-related liver disease with portal hypertension and ascites with SBP, s/p TIPS and TIPS revision,  hepatic encephalopathy, diabetes, CKD, anemia admitted 5/9 for altered mental status secondary to HE. CT head negative and he was noncompliant with lactulose at home due to the taste. He is also on Xifaxan for HE. He had multiple BMs overnight 5/11 into 5/12 and no longer has confusion. Per chart his lactulose enemas and oral form held and none given yet today. Will re-instate and I discussed with RN. He denies GI bleeding, hemoglobin stable in 8-10 range. He denies abdominal pain, tolerating diet, but abdomen is distended and imaging shows moderate ascites for which I will order paracentesis. Creatinine elevated so diuretics being held.     PLAN:  - Restart lactulose with 30 g po TID, goal of 2-3 stools per day  - Continue Xifaxan 550 mg twice daily  - Paracentesis before discharge, today or tomorrow okay  - Supportive cares per medicine      (Dr. Stover)  Majo Booker PA-C  [facility]  5/13/2024 1:51 PM  [phone] (office)    40 minutes of total time was spent providing patient care, including patient evaluation, reviewing documentation/test results, , and documentation.  ________________________________________________________________________

## 2024-05-14 NOTE — PLAN OF CARE
Problem: Liver Failure  Goal: Optimize Renal Function  Outcome: Not Progressing     Problem: Liver Failure  Goal: Optimal Neurologic Function  Outcome: Progressing     Problem: Liver Failure  Goal: Optimal Pain Control, Comfort and Function  Outcome: Progressing   Goal Outcome Evaluation:       Pt presented to the hospital on 5/09 with hepatic encephalopathy in the setting of cirrhosis. Pt has been noncompliant at home with his Lactulose. Was recently hospitalized with the same and discharged to home only a few days prior to this admission. GI consulting. Ammonia levels have now returned to normal limits and cognition is per baseline. Lactulose placed on hold for watery stools however, resumed today tid as patient has not had a bowel movement. Pt is on prophylactic abx in the form of Cipro for sbp. Routine paracentesis to be performed tomorrow. SBA with transfers/ambulation. Family at bedside. Received prn Tylenol 325 mg at 2050 for reports of pain in the right elbow. Pt is tolerating a minced and moist diet with thin liquids. Appetite is adequate. Blood sugar was 147 before meal and 133 at hs. Receives s/s insulin for management. Creatinine is trending up, currently 1.6. IV fluids ran at 50 ml/hr of NS 6 hours. Encourage fluid intake. Anticipate discharge to home tomorrow after paracentesis.

## 2024-05-14 NOTE — PLAN OF CARE
"  Problem: Adult Inpatient Plan of Care  Description: The Care Plan Review/Shift Note, Individualized Goals, Hospital-Acquired Illness or Injury, Comfort and Wellbeing, and Transition Planning are the \"Overarching Goals\" and should be updated throughout the hospitalization.  Please hover over the (i) for specific information on each goal topic.  Goal: Plan of Care Review  Description: The Plan of Care Review/Shift note should be completed every shift.  The Outcome Evaluation is a brief statement about your assessment that the patient is improving, declining, or no change.  This information will be displayed automatically on your shift  note.  Outcome: Progressing  Flowsheets (Taken 5/14/2024 8012)  Outcome Evaluation: Pt intake is good and eating 100% of meals. Wife reports she was only sent 1 Glucerna so far and pt did take it. She would like to continue to receive this. Wife states pt is eating well  Plan of Care Reviewed With:   patient   spouse  Overall Patient Progress: improving     Problem: Malnutrition  Goal: Improved Nutritional Intake  Outcome: Progressing   Goal Outcome Evaluation:      Plan of Care Reviewed With: patient, spouse    Overall Patient Progress: improvingOverall Patient Progress: improving    Outcome Evaluation: Pt intake is good and eating 100% of meals. Wife reports she was only sent 1 Glucerna so far and pt did take it. She would like to continue to receive this. Wife states pt is eating well      "

## 2024-05-14 NOTE — PLAN OF CARE
Problem: Adult Inpatient Plan of Care  Goal: Absence of Hospital-Acquired Illness or Injury  Intervention: Identify and Manage Fall Risk  Recent Flowsheet Documentation  Taken 5/14/2024 0835 by Awa Ma RN  Safety Promotion/Fall Prevention: activity supervised     Problem: Adult Inpatient Plan of Care  Goal: Absence of Hospital-Acquired Illness or Injury  Intervention: Prevent Skin Injury  Recent Flowsheet Documentation  Taken 5/14/2024 0835 by Awa Ma RN  Body Position: position changed independently     Problem: Adult Inpatient Plan of Care  Goal: Absence of Hospital-Acquired Illness or Injury  Intervention: Prevent Infection  Recent Flowsheet Documentation  Taken 5/14/2024 0835 by Awa Ma RN  Infection Prevention:   hand hygiene promoted   rest/sleep promoted     Problem: Adult Inpatient Plan of Care  Goal: Optimal Comfort and Wellbeing  Intervention: Provide Person-Centered Care  Recent Flowsheet Documentation  Taken 5/14/2024 0835 by Awa Ma RN  Trust Relationship/Rapport:   care explained   choices provided   Goal Outcome Evaluation:       Patient alert and oriented, denied pain. He was up in the chair for most of shift. BS this shift 119 and 113. Had x 1 bowel movement. Patient now down

## 2024-05-14 NOTE — PLAN OF CARE
Problem: Adult Inpatient Plan of Care  Goal: Plan of Care Review  Description: The Plan of Care Review/Shift note should be completed every shift.  The Outcome Evaluation is a brief statement about your assessment that the patient is improving, declining, or no change.  This information will be displayed automatically on your shift  note.  Outcome: Progressing  Goal: Absence of Hospital-Acquired Illness or Injury  Intervention: Identify and Manage Fall Risk  Recent Flowsheet Documentation  Taken 5/14/2024 1600 by Lenora Kelly RN  Safety Promotion/Fall Prevention:   activity supervised   clutter free environment maintained   increased rounding and observation   lighting adjusted   mobility aid in reach   nonskid shoes/slippers when out of bed  Intervention: Prevent Infection  Recent Flowsheet Documentation  Taken 5/14/2024 1600 by Lenora Kelly RN  Infection Prevention: hand hygiene promoted     Problem: Risk for Delirium  Goal: Improved Behavioral Control  Intervention: Minimize Safety Risk  Recent Flowsheet Documentation  Taken 5/14/2024 1600 by Lenora Kelly RN  Communication Enhancement Strategies:   extra time allowed for response   family involved in communication plan   family/caregiver assisted with communication  Enhanced Safety Measures: pain management  Goal: Improved Attention and Thought Clarity  Intervention: Maximize Cognitive Function  Recent Flowsheet Documentation  Taken 5/14/2024 1600 by Lenora Kelly RN  Reorientation Measures: clock in view  Goal: Improved Sleep  Outcome: Progressing     Problem: Fall Injury Risk  Goal: Absence of Fall and Fall-Related Injury  Intervention: Identify and Manage Contributors  Recent Flowsheet Documentation  Taken 5/14/2024 1600 by Lenora Kelly RN  Medication Review/Management: medications reviewed  Intervention: Promote Injury-Free Environment  Recent Flowsheet Documentation  Taken 5/14/2024 1600 by Lenora Kelly RN  Safety  Promotion/Fall Prevention:   activity supervised   clutter free environment maintained   increased rounding and observation   lighting adjusted   mobility aid in reach   nonskid shoes/slippers when out of bed     Problem: Liver Failure  Goal: Optimal Coping with Liver Failure  Outcome: Progressing  Goal: Improved Oral Intake  Outcome: Progressing  Goal: Optimal Pain Control, Comfort and Function  Outcome: Progressing     Problem: Comorbidity Management  Goal: Blood Glucose Levels Within Targeted Range  Intervention: Monitor and Manage Glycemia  Recent Flowsheet Documentation  Taken 5/14/2024 1600 by Lenora Kelly, RN  Medication Review/Management: medications reviewed  Goal: Blood Pressure in Desired Range  Intervention: Maintain Blood Pressure Management  Recent Flowsheet Documentation  Taken 5/14/2024 1600 by Lenora Kelly, RN  Medication Review/Management: medications reviewed     Goal Outcome Evaluation:  Patient is alert x 3, disoriented to time. Patient up to the bathroom with assist x 1 with fww and gait belt. Patient denies pain. Two loose bowel movement on this writers shift.

## 2024-05-14 NOTE — PLAN OF CARE
Physical Therapy Discharge Summary    Reason for therapy discharge:    Patient/family request discontinuation of services.    Progress towards therapy goal(s). See goals on Care Plan in Epic electronic health record for goal details.  Goals partially met.  Barriers to achieving goals:   patient ambulating with spouse frequently throughout the  day.    Therapy recommendation(s):    No further therapy is recommended.    Shraddha Yen, PT  5/14/2024

## 2024-05-14 NOTE — PROGRESS NOTES
[facility]    Medicine Progress Note - Hospitalist Service    Date of Admission:  5/9/2024    Assessment & Plan   Vito Sy is a 73 year old male admitted on 5/9/2024 with AMS. He has a recurrent history of hepatic encephalopathy with recent admission here, discharged only a few days ago.  Patient's recurrent hepatic encephalopathy is likely due to noncompliance with lactulose as patient does not like to taste and family confirmed patient was not taking lactulose regularly over the last few days prior to admission.    Acute metabolic encephalopathy 2/2 hepatic encephalopathy-improving  -- Pt was not consistently taking lactulose at home per family.  -- defer CT head, no focal deficits on exam  -- no infectious s/s, has been on SBP ppx   -- no s/s to suggest GIB or additional etiology exacerbating encephalopathy  -- titrate lactulose accordingly  -- c/w PTA rifaximin, PTA Cipro for SBP prophylaxis  -- 05/11: no Bowel movement on increased dose of po lactulose. Pt remained lethargic with elevated ammonia. Lactulose enema ordered.   -- 05/12: pt had multiple bowel movements overnight and mentation improved.  Titrate lactulose dose with goal of 2-3 bowel movements per day.  --Scheduled for US-Paracentesis with albumin later today  -- Appreciated GI input    Elevated ALKP, lipase  Hyperbilirubinemia  Elevated INR  -- elevation in alkp and lipase is chronic  -- stable    Cirrhosis of liver with portal hypertension, varices, splenomegaly   -- h/o TIPS and recurrent SBP  -- PTA furosemide, spironolactone on hold due to NANO  --SBP ppx with ciprofloxacin   -- PTA midodrine     NANO on CKD-IIIa  Non anion gap Metabolic acidosis  -- per chart review baseline creatinine about 1.2-1.5  -- Avoid nephrotoxins  -- c/w PTA Sodium bicarb  -- 05/12: Cr: 1.6. lasix and spironolactone held. Gentle IVF NS at 50 ml for 6 hours. Encourage po intake.   -- 05/13: Cr: 1.67. Discussed with pt and family to increase  po intake. Will give NS at 50 ml for 6 hours. C/w to hold diuretics.   -- On 5/14, creatinine fairly stable, likely able to resume diuretics on discharge    Prediabetes  -- accu-checks,  low dose sliding scale for now  -- monitor for hypoglycemia while on cipro    Anemia, chronic  Thrombocytopenia, chronic  -- labs actually appear improved from baseline     Generalized weakness  -- PT/OT, anticipate home with home care            Diet: Minced & Moist Diet (level 5) Thin Liquids (level 0)  Snacks/Supplements Adult: Glucerna; With Meals    DVT Prophylaxis: Pneumatic Compression Devices and Ambulate every shift  Cronin Catheter: Not present  Lines: None     Cardiac Monitoring: None  Code Status: Full Code      Clinically Significant Risk Factors              # Hypoalbuminemia: Lowest albumin = 3 g/dL at 5/10/2024  5:47 AM, will monitor as appropriate   # Thrombocytopenia: Lowest platelets = 91 in last 2 days, will monitor for bleeding   # Hypertension: Noted on problem list         # Severe Malnutrition: based on nutrition assessment    # Financial/Environmental Concerns:           Disposition Plan     Medically Ready for Discharge: Anticipated Tomorrow             Glenn Mccormick MD  Hospitalist Service  [facility]  Securely message with [employer] (more info)  Text page via [employer] Paging/Directory   ______________________________________________________________________    Interval History   Patient is new to me.  Patient seen and examined.  Notes, labs, imaging report personally reviewed.  Overnight episode of low blood pressure but no reported symptoms, received IV albumin.  Patient sitting in a chair during my visit this morning.  Denied feeling short of breath or chest pain or dizziness.  Denied abdomen pain, nausea, vomiting.  They declined professional .  Discussed with nursing staffs.  Discussed with patient's son in detail about ongoing medical issues, management and disposition plan  possibly tomorrow.  Patient's son was concerned with recurrent hospitalization and wanting to make sure good plan in place prior to discharge.  He also going to make sure that patient will be compliant with lactulose after discharge    Physical Exam   Vital Signs: Temp: 98.6  F (37  C) Temp src: Oral BP: 110/68 Pulse: 70   Resp: 17 SpO2: 100 % O2 Device: None (Room air)    Weight: 132 lbs 15 oz      General: Not in obvious distress.  HEENT: Normal cephalic, supple neck  Chest: Clear to auscultation bilateral anteriorly, no wheezing  Heart: S1S2 normal, regular  Abdomen: Soft.  Distended, nontender. Bowel sounds- active.  Extremities: No legs swelling  Neuro: alert and awake, grossly non-focal        Medical Decision Making             Data     I have personally reviewed the following data over the past 24 hrs:    5.1  \   8.0 (L)   / 91 (L)     137 106 29.5 (H) /  113 (H)   4.5 21 (L) 1.62 (H) \       Imaging results reviewed over the past 24 hrs:   No results found for this or any previous visit (from the past 24 hour(s)).

## 2024-05-14 NOTE — PLAN OF CARE
"  Problem: Adult Inpatient Plan of Care  Goal: Plan of Care Review  Description: The Plan of Care Review/Shift note should be completed every shift.  The Outcome Evaluation is a brief statement about your assessment that the patient is improving, declining, or no change.  This information will be displayed automatically on your shift  note.  Outcome: Progressing     Problem: Adult Inpatient Plan of Care  Goal: Patient-Specific Goal (Individualized)  Description: You can add care plan individualizations to a care plan. Examples of Individualization might be:  \"Parent requests to be called daily at 9am for status\", \"I have a hard time hearing out of my right ear\", or \"Do not touch me to wake me up as it startles  me\".  Outcome: Progressing     Problem: Risk for Delirium  Goal: Optimal Coping  Outcome: Progressing     Problem: Liver Failure  Goal: Optimal Pain Control, Comfort and Function  Outcome: Progressing     Problem: Comorbidity Management  Goal: Blood Glucose Levels Within Targeted Range  Outcome: Progressing   Goal Outcome Evaluation:  Pt is alert and oriented. Denies pain. Afebrile, BP 88/51, rechecked 89/51, House officer notified for his low BP ,ordered Albumin 25% 50 ml, 200 ml, given. BP rechecked 95/54 at 0440 AM. Pt has productive cough at times. Comfortable and sleeping between cares. Repositioning done. Wife stayed at bedside. Sat  in his reclining chair at 0530AM.                      "

## 2024-05-14 NOTE — PROGRESS NOTES
[facility] Progress Note       SUBJECTIVE:  Patient is sleepy but no confusion. Per his wife he had one stool so far today. He has not had paracentesis yet.        OBJECTIVE:  /52 (BP Location: Left arm)   Pulse 70   Temp 98.3  F (36.8  C) (Oral)   Resp 17   Wt 60.3 kg (132 lb 15 oz)   SpO2 100%   BMI 20.21 kg/m    Temp (24hrs), Av.4  F (36.9  C), Min:97.8  F (36.6  C), Max:99.3  F (37.4  C)    No data found.    Intake/Output Summary (Last 24 hours) at 2024 135  Last data filed at 2024  Gross per 24 hour   Intake 240 ml   Output --   Net 240 ml        PHYSICAL EXAM  GEN: NAD, male appears stated age sleeping in bed  HRT: no LE edema  RESP: unlabored  ABD: distended, + ascites, mild diffuse tenderness  SKIN: No rash or jaundice      Additional Data:  I have reviewed the patient's new clinical lab results:     Recent Labs   Lab Test 24  0539 24  0726 24  0554 24  2134 24  0750 24  0645 24  0544 24  0635   WBC 5.1 7.7 5.1 4.9   < > 4.2   < >  --    HGB 8.0* 9.8* 10.0* 10.0*   < > 9.6*   < >  --    MCV 91 89 85 87   < > 89   < >  --    PLT 91* 120* 106* 120*   < > 115*   < >  --    INR  --   --   --  1.43*  --  1.47*  --  1.51*    < > = values in this interval not displayed.     Recent Labs   Lab Test 24  0539 24  0726 24  0643   POTASSIUM 4.5 4.6 3.6   CHLORIDE 106 111* 116*   CO2 21* 18* 16*   BUN 29.5* 28.6* 25.3*   ANIONGAP 10 11 13     Recent Labs   Lab Test 24  0643 24  0554 05/10/24  0547 24  2259 24  2134 24  0737 24  0336 24  0634 24  1411   ALBUMIN 3.4* 3.2* 3.0*  --  3.4*  --   --    < >  --    BILITOTAL 1.1 1.4* 1.2  --  1.5*  --   --    < >  --    ALT 23 21 22  --  26  --   --    < >  --    AST 33 33 36  --  45  --   --    < >  --    PROTEIN  --   --   --  Negative  --   --  Negative  --  10*   LIPASE  --   --  158*  --  173* 164*  --    < >  --     < >  = values in this interval not displayed.         Imaging results:  CT abdomen/pelvis w/o 5/11/24:  FINDINGS:   LOWER CHEST: Normal.  HEPATOBILIARY: Cirrhosis, with moderate volume of ascites right upper quadrant as seen previously. TIPS unchanged in position. Numerous stones in the gallbladder.  PANCREAS: Normal.  SPLEEN: Mild splenomegaly, unchanged. Small amount of ascites left upper quadrant.  ADRENAL GLANDS: Normal.  KIDNEYS/BLADDER: Multiple nonobstructing stones both kidneys. Right renal cyst. Moderate bladder distention.  BOWEL: Fluid within numerous slightly distended small bowel loops. There is also a moderate amount of fluid and formed stool throughout the colon to the level of the rectum.  LYMPH NODES: Normal.  VASCULATURE: Normal.  PELVIC ORGANS: Prostatic hypertrophy. Minimal pelvic ascites.   MUSCULOSKELETAL: Hypertrophic changes thoracolumbar spine.                                                                   IMPRESSION:   1.  Moderate amount of formed stool and fluid throughout the colon to the level of the rectum will likely result in diarrhea. There are also several fluid distended loops of small bowel. No definite findings for obstruction. The study is limited by the lack of oral and IV contrast.  2.  Cirrhosis with portal venous hypertension.  3.  Moderate ascites, minimally changed.  4.  Gallstones.  5.  Nonobstructing stones both kidneys.    Abdominal xray 5/11/24:  IMPRESSION: Dilated loops of small bowel measuring up to 3.8 cm with gaseous distention of the colon noted, query ileus versus partial versus developing obstruction. No free air. TIPS. Cholelithiasis.          IMPRESSION:  Cirrhosis with ascites   Hepatic encephalopathy  This is a 72 y/o male with PMH decompensated cirrhosis due to metabolic liver disase and alcohol-related liver disease with portal hypertension and ascites with SBP, s/p TIPS and TIPS revision, hepatic encephalopathy, diabetes, CKD, anemia admitted 5/9 for  altered mental status secondary to HE. CT head negative and he was noncompliant with lactulose at home due to the taste. He is also on Xifaxan for HE. He had multiple BMs overnight 5/11 into 5/12 and no longer has confusion. He denies GI bleeding, hemoglobin has been stable in 8-10 range. Paracentesis planned for today given ascites on exam with some tenderness. Creatinine elevated so diuretics being held. Hypotensive today with albumin given.     PLAN:  - Continue lactulose with 30 g po TID, goal of 2-3 stools per day  - Continue Xifaxan 550 mg twice daily  - Paracentesis today as planned  - Supportive cares per medicine      (Dr. Stover)  Majo Booker PA-C  [facility]  5/14/2024 1:46 PM  [phone] (office)    30 minutes of total time was spent providing patient care, including patient evaluation, reviewing documentation/test results, , and documentation.  ________________________________________________________________________

## 2024-05-14 NOTE — PROGRESS NOTES
Notified that BP 89/51, patient stable and comfortable.   Here with AMS 2/2 hepatic encephalopathy (resolved) in context of cirrhosis with  portal hypertension, varices and splenomegaly.   IV albumin ordered.       Onesimo Howell MD

## 2024-05-15 NOTE — PLAN OF CARE
Problem: Adult Inpatient Plan of Care  Goal: Absence of Hospital-Acquired Illness or Injury  Intervention: Identify and Manage Fall Risk  Recent Flowsheet Documentation  Taken 5/15/2024 1040 by Awa Ma RN  Safety Promotion/Fall Prevention: activity supervised     Problem: Adult Inpatient Plan of Care  Goal: Absence of Hospital-Acquired Illness or Injury  Intervention: Prevent Skin Injury  Recent Flowsheet Documentation  Taken 5/15/2024 1040 by Awa Ma RN  Body Position: position changed independently     Problem: Adult Inpatient Plan of Care  Goal: Optimal Comfort and Wellbeing  Intervention: Provide Person-Centered Care  Recent Flowsheet Documentation  Taken 5/15/2024 1040 by Awa Ma RN  Trust Relationship/Rapport:   care explained   choices provided   Goal Outcome Evaluation:       Patient alert and oriented. Denied pain, denied abdominal pain. No complain of nausea.   IV access removed. Patient in no apparent distress.      Patient dressed up, discharge paper work given. No all questions were answered. He was accompanied by his spouse and son.

## 2024-05-15 NOTE — DISCHARGE SUMMARY
[facility] MEDICINE  DISCHARGE SUMMARY     Primary Care Physician: Amrik Mejia  Admission Date: 5/9/2024   Discharge Provider: Glenn Mccormick MD Discharge Date: 5/15/2024   Diet:   Active Diet and Nourishment Order   Procedures    Snacks/Supplements Adult: Glucerna; With Meals    Minced & Moist Diet (level 5) Thin Liquids (level 0)    Diet       Code Status: Full Code   Activity: DCACTIVITY: Activity as tolerated        Condition at Discharge: Good     REASON FOR PRESENTATION(See Admission Note for Details)   Confusion    PRINCIPAL & ACTIVE DISCHARGE DIAGNOSES     Active Problems:    Hepatic encephalopathy (H)    Altered mental status, unspecified altered mental status type  Acute kidney injury on CKD  Non-anion gap metabolic acidosis  Chronic anemia and thrombocytopenia due to liver cirrhosis.  Cirrhosis of liver with portal hypertension, varices and splenomegaly.  Physical deconditioning due to medical illness    PENDING LABS     Unresulted Labs Ordered in the Past 30 Days of this Admission       No orders found from 4/9/2024 to 5/10/2024.              PROCEDURES ( this hospitalization only)          RECOMMENDATIONS TO OUTPATIENT PROVIDER FOR F/U VISIT     Follow-up Appointments     Follow-up and recommended labs and tests       Follow up with primary care provider, Amrik Mejia, within 4 to 6 days,   to evaluate medication change, for hospital follow- up, and medication   refills. The following labs/tests are recommended: CBC, BMP.                DISPOSITION     Home with home care    SUMMARY OF HOSPITAL COURSE:      Vito Sy is a 73 year old male admitted on 5/9/2024 with AMS. He has a recurrent history of hepatic encephalopathy with recent admission here, discharged only a few days ago.  Patient's recurrent hepatic encephalopathy is likely due to noncompliance with lactulose as patient does not like to taste and family confirmed patient was not taking lactulose  regularly over the last few days prior to admission.     Acute metabolic encephalopathy 2/2 hepatic encephalopathy-improved  -- Pt was not consistently taking lactulose at home per family.  -- defer CT head, no focal deficits on exam  -- no infectious s/s, has been on SBP ppx   -- no s/s to suggest GIB or additional etiology exacerbating encephalopathy  -- titrate lactulose accordingly  -- c/w PTA rifaximin, PTA Cipro for SBP prophylaxis  -- 05/11: no Bowel movement on increased dose of po lactulose. Pt remained lethargic with elevated ammonia. Lactulose enema ordered.   -- 05/12: pt had multiple bowel movements overnight and mentation improved.  Continue lactulose dose with goal of 2-3 bowel movements per day.  -- Appreciated GI input and follow-up with them as recommended     Elevated ALKP, lipase  Hyperbilirubinemia  Elevated INR  -- elevation in alkp and lipase is chronic.  Follow-up with GI as recommended     Cirrhosis of liver with portal hypertension, varices, splenomegaly   -- h/o TIPS and recurrent SBP  -- PTA furosemide resumed on day of discharge.  PTA Aldactone advised to hold until follow-up with PCP and kidney function checked as serum potassium 5.0 on day of discharge  --SBP ppx with ciprofloxacin   -- PTA midodrine      NANO on CKD-IIIa  Non anion gap Metabolic acidosis  -- per chart review baseline creatinine about 1.2-1.5  -- Avoid nephrotoxins  -- c/w PTA Sodium bicarb  -- Creatinine improving with IV fluid, now has good appetite  --Monitor kidney function closely through PCP as an outpatient    Anemia, chronic  Thrombocytopenia, chronic  -- labs actually appear improved from baseline      Generalized weakness  -- PT/OT, discharging home with resumption of home care       Discharge Medications with Med changes:     Current Discharge Medication List        CONTINUE these medications which have CHANGED    Details   lactulose (CHRONULAC) 10 GM/15ML solution Take 30 mLs (20 g) by mouth 3 times daily  Goal to have 2-3 good bowel movements/day.    Associated Diagnoses: Other cirrhosis of liver (H)      spironolactone (ALDACTONE) 50 MG tablet Take 1 tablet (50 mg) by mouth daily  Qty: 60 tablet, Refills: 6    Associated Diagnoses: Other cirrhosis of liver (H)           CONTINUE these medications which have NOT CHANGED    Details   bismuth subsalicylate (PEPTO BISMOL) 262 MG chewable tablet Take 1 tablet by mouth every 6 hours as needed for diarrhea      ciprofloxacin (CIPRO) 500 MG tablet Take 1 tablet by mouth once daily  Qty: 60 tablet, Refills: 0    Associated Diagnoses: SBP (spontaneous bacterial peritonitis) (H)      fish oil-omega-3 fatty acids 1000 MG capsule Take 1 g by mouth daily      furosemide (LASIX) 20 MG tablet Take 1 tablet (20 mg) by mouth daily  Qty: 60 tablet, Refills: 6    Comments: BMP scheduled for 5/1/24. Resume spirolactone if renal function is not worsening after lab test on 5/1/24.  Associated Diagnoses: Other cirrhosis of liver (H)      midodrine (PROAMATINE) 2.5 MG tablet Take 1 tablet (2.5 mg) by mouth 3 times daily (with meals)  Qty: 180 tablet, Refills: 3    Associated Diagnoses: Other cirrhosis of liver (H)      multivitamin, therapeutic (THERA-VIT) TABS tablet Take 1 tablet by mouth daily      omeprazole (PRILOSEC) 20 MG DR capsule Take 1 capsule (20 mg) by mouth 2 times daily  Qty: 180 capsule, Refills: 3    Associated Diagnoses: Gastrointestinal hemorrhage associated with acute gastritis      rifaximin (XIFAXAN) 550 MG TABS tablet Take 1 tablet (550 mg) by mouth 2 times daily  Qty: 60 tablet, Refills: 4    Associated Diagnoses: Alcoholic cirrhosis, unspecified whether ascites present (H)      sodium bicarbonate 650 MG tablet Take 1 tablet (650 mg) by mouth 2 times daily  Qty: 60 tablet, Refills: 0    Associated Diagnoses: Other cirrhosis of liver (H)      Zinc Sulfate 220 (50 Zn) MG TABS Take 220 mg by mouth daily      blood glucose (NO BRAND SPECIFIED) lancets standard Use to  test blood sugar one time daily or as directed.  Qty: 100 each, Refills: 5    Comments: Please dispense what is covered by insurance      blood glucose (NO BRAND SPECIFIED) test strip Use to test blood sugar one time daily or as directed.  Qty: 100 strip, Refills: 5      blood glucose monitoring (NO BRAND SPECIFIED) meter device kit Use to test blood sugar one time daily or as directed.  Qty: 1 kit, Refills: 0    Comments: Please dispense what is covered by insurance                   Rationale for medication changes:      Please refer to hospital course        Consults       PHYSICAL THERAPY ADULT IP CONSULT  OCCUPATIONAL THERAPY ADULT IP CONSULT  CARE MANAGEMENT / SOCIAL WORK IP CONSULT  GASTROENTEROLOGY IP CONSULT    Immunizations given this encounter     Most Recent Immunizations   Administered Date(s) Administered    COVID-19 12+ (2023-24) (Pfizer) 12/27/2023    COVID-19 Bivalent 12+ (Pfizer) 07/17/2023    COVID-19 MONOVALENT 12+ (Pfizer) 10/24/2021    COVID-19 Monovalent 12+ (Pfizer 2022) 04/03/2022    Flu 65+ Years 09/07/2022    Flu, Unspecified 09/24/2018    Hepatitis A (ADULT 19+) 09/18/2023    Influenza (High Dose) 3 valent vaccine 09/18/2019    Influenza (IIV3) PF 10/18/2014    Influenza Vaccine 65+ (FLUAD) 10/18/2022    Influenza Vaccine 65+ (Fluzone HD) 09/18/2023    Influenza Vaccine >6 months,quad, PF 09/25/2015    Influenza Vaccine, 6+MO IM (QUADRIVALENT W/PRESERVATIVES) 10/24/2021    Pneumo Conj 13-V (2010&after) 11/14/2016    Pneumococcal 20 valent Conjugate (Prevnar 20) 01/31/2023    Pneumococcal 23 valent 03/17/2014    TDAP (Adacel,Boostrix) 03/17/2014    Td (Adult), Adsorbed 1950    Typhoid IM 11/14/2016    Varicella Immunity: Titer/MD Dx 03/30/2007    Zoster recombinant adjuvanted (SHINGRIX) 07/06/2022           Anticoagulation Information      Recent INR results:   Recent Labs   Lab 05/09/24  2134   INR 1.43*       SIGNIFICANT IMAGING FINDINGS     Results for orders placed or performed  during the hospital encounter of 05/09/24   XR Abdomen Port 1 View    Impression    IMPRESSION: Dilated loops of small bowel measuring up to 3.8 cm with gaseous distention of the colon noted, query ileus versus partial versus developing obstruction. No free air. TIPS. Cholelithiasis.   CT Abdomen Pelvis w/o Contrast    Impression    IMPRESSION:   1.  Moderate amount of formed stool and fluid throughout the colon to the level of the rectum will likely result in diarrhea. There are also several fluid distended loops of small bowel. No definite findings for obstruction. The study is limited by the   lack of oral and IV contrast.  2.  Cirrhosis with portal venous hypertension.  3.  Moderate ascites, minimally changed.  4.  Gallstones.  5.  Nonobstructing stones both kidneys.     US Abdomen Limited    Impression    IMPRESSION:  Targeted four-quadrant ultrasound was performed to evaluate for ascites, which as on recent CT demonstrated only trace amount of ascites in the right upper quadrant. After discussion with patient (who reported minimal distention/bloating), decision was   made to defer paracentesis at this time.       SIGNIFICANT LABORATORY FINDINGS     Most Recent 3 CBC's:  Recent Labs   Lab Test 05/14/24  0539 05/13/24  0726 05/11/24  0554   WBC 5.1 7.7 5.1   HGB 8.0* 9.8* 10.0*   MCV 91 89 85   PLT 91* 120* 106*     Most Recent 3 BMP's:  Recent Labs   Lab Test 05/15/24  0723 05/15/24  0559 05/14/24 2037 05/14/24  0722 05/14/24  0539 05/13/24  1212 05/13/24  0726   NA  --  131*  --   --  137  --  140   POTASSIUM  --  5.0  --   --  4.5  --  4.6   CHLORIDE  --  102  --   --  106  --  111*   CO2  --  20*  --   --  21*  --  18*   BUN  --  29.9*  --   --  29.5*  --  28.6*   CR  --  1.35*  --   --  1.62*  --  1.67*   ANIONGAP  --  9  --   --  10  --  11   SILVIA  --  8.6*  --   --  8.4*  --  8.6*   * 199* 137*   < > 90   < > 112*    < > = values in this interval not displayed.     Most Recent 2 LFT's:  Recent Labs    Lab Test 05/12/24  0643 05/11/24  0554   AST 33 33   ALT 23 21   ALKPHOS 157* 151*   BILITOTAL 1.1 1.4*       Discharge Orders        Medication Therapy Management Referral      Home Care Referral      Reason for your hospital stay    Patient admitted for hepatic encephalopathy     Follow-up and recommended labs and tests     Follow up with primary care provider, Amrik Mejia, within 4 to 6 days, to evaluate medication change, for hospital follow- up, and medication refills. The following labs/tests are recommended: CBC, BMP.     Activity    Your activity upon discharge: activity as tolerated     Discharge Instructions    1.  Patient advised to hold home spironolactone until follow-up with PCP and labs checked.  2.  Patient advised to compliance with lactulose, goal to have 2-3 good bowel movements per day     Resume Home Care Services     Diet    Follow this diet upon discharge: Orders Placed This Encounter      Snacks/Supplements Adult: Glucerna; With Meals      Minced & Moist Diet (level 5) Thin Liquids (level 0)       Examination   Physical Exam   Temp:  [97.6  F (36.4  C)-99.4  F (37.4  C)] 97.6  F (36.4  C)  Pulse:  [58-70] 58  Resp:  [17-18] 18  BP: (104-116)/(44-68) 104/44  SpO2:  [99 %-100 %] 100 %  Wt Readings from Last 1 Encounters:   05/10/24 60.3 kg (132 lb 15 oz)       Patient seen and examined.  Notes, labs, imaging report personally reviewed.  Patient sleeping, easily arousable, follows simple commands appropriately, denied abdomen pain, nausea, vomiting.  Had good bowel movements overnight.  Discussed with nursing staff.  Discussed with patient's wife at bedside, reported he did not sleep good sleep last night and sleeping this morning.  Discussed with patient's son over the phone in detail about plan of care after discharge.      General: Not in obvious distress.  HEENT: Normal cephalic, supple neck  Chest: Clear to auscultation bilateral anteriorly, no wheezing  Heart: S1S2 normal,  regular  Abdomen: Soft. NT, ND. Bowel sounds- active.  Extremities: No legs swelling  Neuro: Sleeping, easily arousable, follows simple commands appropriately, moves all extremities        Please see EMR for more detailed significant labs, imaging, consultant notes etc.    IGlenn MD, personally saw the patient today and spent greater than 30 minutes discharging this patient.    Glenn Mccormick MD  [facility]    CC:Amrik Mejia

## 2024-05-15 NOTE — PLAN OF CARE
Problem: Risk for Delirium  Goal: Improved Sleep  Intervention: Promote Sleep  Recent Flowsheet Documentation  Taken 5/15/2024 0200 by Majo Carrasquillo RN  Sleep/Rest Enhancement: family presence promoted     Problem: Adult Inpatient Plan of Care  Goal: Optimal Comfort and Wellbeing  Outcome: Progressing   Goal Outcome Evaluation:     Had c/o abdominal discomfort. Had XL bowel movement x 2. PRN Tums given; helpful. Wife present in room; supportive.

## 2024-05-15 NOTE — PROGRESS NOTES
[facility] Progress Note       SUBJECTIVE:  Patient denies confusion or abdominal pain. Had multiple BMs overnight, charted brown.        OBJECTIVE:  /44 (BP Location: Left arm)   Pulse 58   Temp 97.6  F (36.4  C) (Oral)   Resp 18   Wt 60.3 kg (132 lb 15 oz)   SpO2 100%   BMI 20.21 kg/m    Temp (24hrs), Av.4  F (36.9  C), Min:97.8  F (36.6  C), Max:99.3  F (37.4  C)    No data found.    Intake/Output Summary (Last 24 hours) at 2024 1351  Last data filed at 2024  Gross per 24 hour   Intake 240 ml   Output --   Net 240 ml        PHYSICAL EXAM  GEN: NAD, male appears stated age lying in bed  HRT: no LE edema  RESP: unlabored  ABD: nondistended, soft, nontender  SKIN: No rash or jaundice      Additional Data:  I have reviewed the patient's new clinical lab results:     Recent Labs   Lab Test 24  0539 24  0726 24  0554 24  0750 24  0645 24  0544 24  0635   WBC 5.1 7.7 5.1 4.9   < > 4.2   < >  --    HGB 8.0* 9.8* 10.0* 10.0*   < > 9.6*   < >  --    MCV 91 89 85 87   < > 89   < >  --    PLT 91* 120* 106* 120*   < > 115*   < >  --    INR  --   --   --  1.43*  --  1.47*  --  1.51*    < > = values in this interval not displayed.     Recent Labs   Lab Test 05/15/24  0559 24  0539 24  0726   POTASSIUM 5.0 4.5 4.6   CHLORIDE 102 106 111*   CO2 20* 21* 18*   BUN 29.9* 29.5* 28.6*   ANIONGAP 9 10 11     Recent Labs   Lab Test 24  0643 24  0554 05/10/24  0547 24  2259 24  0737 24  0336 24  0634 24  1411   ALBUMIN 3.4* 3.2* 3.0*  --  3.4*  --   --    < >  --    BILITOTAL 1.1 1.4* 1.2  --  1.5*  --   --    < >  --    ALT 23 21 22  --  26  --   --    < >  --    AST 33 33 36  --  45  --   --    < >  --    PROTEIN  --   --   --  Negative  --   --  Negative  --  10*   LIPASE  --   --  158*  --  173* 164*  --    < >  --     < > = values in this interval not displayed.          Imaging results:  US limited 5/14/24:  IMPRESSION:  Targeted four-quadrant ultrasound was performed to evaluate for ascites, which as on recent CT demonstrated only trace amount of ascites in the right upper quadrant. After discussion with patient (who reported minimal distention/bloating), decision was   made to defer paracentesis at this time.    CT abdomen/pelvis w/o 5/11/24:  FINDINGS:   LOWER CHEST: Normal.  HEPATOBILIARY: Cirrhosis, with moderate volume of ascites right upper quadrant as seen previously. TIPS unchanged in position. Numerous stones in the gallbladder.  PANCREAS: Normal.  SPLEEN: Mild splenomegaly, unchanged. Small amount of ascites left upper quadrant.  ADRENAL GLANDS: Normal.  KIDNEYS/BLADDER: Multiple nonobstructing stones both kidneys. Right renal cyst. Moderate bladder distention.  BOWEL: Fluid within numerous slightly distended small bowel loops. There is also a moderate amount of fluid and formed stool throughout the colon to the level of the rectum.  LYMPH NODES: Normal.  VASCULATURE: Normal.  PELVIC ORGANS: Prostatic hypertrophy. Minimal pelvic ascites.   MUSCULOSKELETAL: Hypertrophic changes thoracolumbar spine.                                                                   IMPRESSION:   1.  Moderate amount of formed stool and fluid throughout the colon to the level of the rectum will likely result in diarrhea. There are also several fluid distended loops of small bowel. No definite findings for obstruction. The study is limited by the lack of oral and IV contrast.  2.  Cirrhosis with portal venous hypertension.  3.  Moderate ascites, minimally changed.  4.  Gallstones.  5.  Nonobstructing stones both kidneys.    Abdominal xray 5/11/24:  IMPRESSION: Dilated loops of small bowel measuring up to 3.8 cm with gaseous distention of the colon noted, query ileus versus partial versus developing obstruction. No free air. TIPS. Cholelithiasis.          IMPRESSION:  Cirrhosis with  ascites   Hepatic encephalopathy  This is a 72 y/o male with PMH decompensated cirrhosis due to metabolic liver disase and alcohol-related liver disease with portal hypertension and ascites with SBP, s/p TIPS and TIPS revision, hepatic encephalopathy, diabetes, CKD, anemia admitted 5/9 for altered mental status secondary to HE. CT head negative and he was noncompliant with lactulose at home due to the taste. He is also on Xifaxan for HE. He had multiple BMs overnight 5/11 into 5/12 and has not had confusion since. No GI bleeding, hemoglobin stable in 8-10 range. Insufficient ascites for paracentesis yesterday. Creatinine around baseline at 1.35 today, diuretics have been held during admission for creatinine >1.60. He was hypotensive yesterday but this has improved.       PLAN:  - Continue lactulose with 30 g po TID, goal of 2-3 stools per day  - Continue Xifaxan 550 mg twice daily  - Okay to restart diuretics per GI  - Continue midodrine and SBP ppx with Cipro  - Okay to discharge today per GI    We will no longer actively follow this patient in-house. Please call if questions arise or patient's status changes.       (Dr. Stover)  Majo Booker PA-C  [facility]  5/15/2024 9:36 AM  [phone] (office)    20 minutes of total time was spent providing patient care, including patient evaluation, reviewing documentation/test results, , and documentation.  ________________________________________________________________________

## 2024-05-15 NOTE — PLAN OF CARE
Occupational Therapy Discharge Summary    Reason for therapy discharge:    Discharged to home.    Progress towards therapy goal(s). See goals on Care Plan in [facility] electronic health record for goal details.  Goals met    Therapy recommendation(s):    No further therapy is recommended.

## 2024-05-15 NOTE — PROGRESS NOTES
Care Management Discharge Note    Discharge Date: 05/15/2024       Discharge Disposition: Home with Home Care    Discharge Services: Home Care    Discharge DME: None    Discharge Transportation: family or friend will provide    Private pay costs discussed: Not applicable    Does the patient's insurance plan have a 3 day qualifying hospital stay waiver?  Yes     Which insurance plan 3 day waiver is available? Alternative insurance waiver    Will the waiver be used for post-acute placement? No    PAS Confirmation Code: N/A  Patient/family educated on Medicare website which has current facility and service quality ratings: yes    Education Provided on the Discharge Plan: Yes  Persons Notified of Discharge Plans: family  Patient/Family in Agreement with the Plan: yes    Handoff Referral Completed: Yes    Additional Information:  Plan is for Pt to discharge home with family and home care services when medically ready. Home care RN/PT/OT services will be provided through [facility]. Discharge orders sent to [facility]. Family to transport at discharge. No further CM needs requested or anticipated.     CM will sign off. Please contact CM if any additional needs arise prior to discharge.      TASHIA Diego

## 2024-05-16 NOTE — PROGRESS NOTES
Clinic Care Coordination Contact  [facility]/Voicemail      Pt dicharged from hospital recommendtions for follow up with PCP, no visit scheduled   RN CC outreach and left message to   Clinical Data: Care Coordinator Outreach        Left message on patient's voicemail with call back information and requested return call.    Plan: RN CC will attempt 3-5 days

## 2024-05-20 NOTE — PROGRESS NOTES
Clinic Care Coordination Contact  [facility]/Voicemail    Clinical Data: Care Coordinator Outreach        Left message on patient's voicemail with call back information and requested return call.    Plan:  RN CC will review chart in 30 days, available sooner if call back

## 2024-05-21 NOTE — TELEPHONE ENCOUNTER
MTM referral from: Transitions of Care (recent hospital discharge or ED visit)    MTM referral outreach attempt #2 on May 21, 2024 at 9:07 AM      Outcome: Patient not reachable after several attempts, sent [employer] message    Use [facility] part d map (greg) for the carrier/Plan on the flowsheet      [employer] Message Sent    Birdie England CPhT  MTM

## 2024-05-29 NOTE — TELEPHONE ENCOUNTER
Spoke to Olivia and read PCP's msg below.   Olivia states the initial visit/consult was completed yesterday and Naomi and Ramírez were present. Family took the night to think and discuss things over. Family called [facility] to move forward with Hospice care this morning.

## 2024-05-29 NOTE — TELEPHONE ENCOUNTER
Home Health Care    Reason for call:  Verbal Orders    Orders are needed for this patient.  PT: 2x a week for 4 weeks and then 1x a week for 2 weeks  OT: 2x a week for 2 weeks and 1x a week for 4 weeks  Skilled Nursing: declined    Pt Provider: Dr. Mejia    Phone Number Homecare Nurse can be reached at: [phone]

## 2024-05-29 NOTE — TELEPHONE ENCOUNTER
General Call    Contacts         Type Contact Phone/Fax    05/29/2024 01:45 PM CDT Phone (Incoming) Olivia [facility] (Other) [phone]          Reason for Call:  Received referral for Hospice Care  Patient will start Hospice Care today 05/29 at 5 pm with [facility].   No return call needed.

## 2024-05-29 NOTE — TELEPHONE ENCOUNTER
Phone Number Homecare Nurse can be reached at: [phone]     Author called and notified provider per MD approval as listed below via voicemail or verbally. Task complete.

## 2024-05-30 NOTE — TELEPHONE ENCOUNTER
[facility] calling back asking for verbal orders to go ahead with hospice care    Gave verbal orders based on Dr. Mejia messages below and acknowledgement of moving forward with hospice care:    Amrik Mejia MD   to Yesenia Barker       5/29/24  2:16 PM   Okay, thank you.    RAYMOND DonnellyN RN  [facility]

## 2024-05-31 NOTE — LETTER
Vito Sy  [address] [address]          Dear Vito,    Thank you for your interest in the Transplant Center at [facility]. We look forward to being a part of your care team and assisting you through the transplant process.    As we discussed, your transplant coordinator is Giancarlo Nuñez Jr. (Liver).  You may call your coordinator at any time with questions or concerns.  Your first scheduled call will be on 6/12/2024 between 1-2pm.  If this needs to change, call [phone].    Please complete the following.    Fill out and return the enclosed forms  Authorization for Electronic Communication  Authorization to Discuss Protected Health Information  Authorization for Release of Protected Health Information    Sign up for:  [employer], access to your electronic medical record (see enclosed pamphlet)  [employer], a transplant education website                                                  My Transplant Place     You can use these tools to learn more about your transplant, communicate with your care team, and track your medical details      Sincerely,      Solid Organ Transplant  [facility]    cc: Referring Physician PCP

## 2024-06-06 NOTE — TELEPHONE ENCOUNTER
June 6, 2024 1:43 PM - Elana Cano LPN:   Patient was asked the following questions during liver intake call.     SOT LIVER INTAKE      Referring Provider: Barber Montenegro MD  Referring Diagnosis: Liver Cirrhosis secondary to JENNINGS, unspecified cirrhosis type   PCP:  Amrik Mejia MD      1)Do you know why you have liver disease: Use of un-sterile needles during blood donation in previous country of residence resulted in contraction of hepatitis.        If Alcoholic Cirrhosis is present when was your last drink:  NA      Have you ever been through treatment for alcohol: NA  2) Presence of Ascites: no Paracentesis: no  3) Presence of Hepatic Encephalopathy: yes  Medications: lactulose  4) History of GI Bleeding: No  5) Oxygen Use: No  6) EGD: 10/2023  7) Colonoscopy: 10/2023  8) MELD Score: 15 (5/11/2024)  9)  Labs available for review from PCP/GI:  CE  10)HCC Diagnosis: No                                   11)Insurance information: [facility] ID: [id number] Grp: [id number]          Referral intake process completed.  Patient is aware that after financial approval is received, medical records will be requested.   Patient confirmed for a callback from transplant coordinator on 6/12/2024.  Tentative evaluation date TBD.     Confirmed coordinator will discuss evaluation process in more detail at the time of their call.   Patient is aware of the need to arrange age appropriate cancer screening, vaccinations, and dental care.  Reminded patient to complete questionnaire, complete medical records release, and review packet prior to evaluation visit .  Assessed patient for special needs (ie--wheelchair, assistance, guardian, and ):  Mobility impaired - uses walker, handle bars, raised seat, adjustable bed.  Patient instructed to call [phone] with questions.      Patient gave verbal consent during intake call to obtain medical records and documents outside of [facility]:   Yes

## 2024-06-07 NOTE — ED NOTES
Called hospice per provider to check if ct would be in conflict of hospice care. Spoke with catalina at [phone], attempting transfer to Stephanie director of program.  Pt nurse at [phone] believes conflict with hospice program.

## 2024-06-07 NOTE — ED PROVIDER NOTES
EMERGENCY DEPARTMENT ENCOUNTER      NAME: Vito Sy  AGE: 73 year old male  YOB: 1950  MRN: [id number]  EVALUATION DATE & TIME: 2024  7:47 AM    PCP: Amrik Mejia    ED PROVIDER: Amrik Gomez D.O.      Chief Complaint   Patient presents with    Head Injury    Head Laceration       FINAL IMPRESSION:  1. Injury of head, initial encounter    2. Scalp laceration, initial encounter        ED COURSE & MEDICAL DECISION MAKIN:57 AM I met with the patient to gather history and to perform my initial exam. I discussed the plan for care while in the Emergency Department.  8:30 AM I talked to patient's wife and son and they said If we were to find something on the CT, they would want intervention done if necessary.   10:02 AM I talked to patient about plan for laceration.   10:11 AM I performed a laceration procedure on patients lacerated head. I used LET for topical anesthetic. Total of 9 stitches. 5 stitches in one site and 4 stitches in another.         Pertinent Labs & Imaging studies reviewed. (See chart for details)  73 year old male presents to the Emergency Department for evaluation of 2 head laceration status post mechanical fall off of his commode today at home.  Wife reports that he never lost consciousness.  He is on hospice, though after discussion with family about his hospice care, they did request that I perform a CT scan of his head.  CT scan did not show any evidence of intracranial hemorrhage, skull fracture, cervical spine fracture, or other acute process.  I was able to easily closed the 2 lacerations with a total of 9 staples.  Patient tolerated the procedure well and I instructed the family on signs of infection and when to have the staples removed.  At this time I believe that he can be discharged back home with outpatient follow-up with his hospice team and his primary care provider.  Return precautions were discussed.    Medical Decision Making  Obtained supplemental  history:Supplemental history obtained?: Documented in chart  Reviewed external records: External records reviewed?: Documented in chart and Inpatient Record: Patient was admitted to [facility] from 05/09/24 to 05/15/24.   Care impacted by chronic illness:Chronic Kidney Disease, Diabetes, Hypertension, and Other: Cirrhosis of liver and hypercholesterolemia  Care significantly affected by social determinants of health:N/A  Did you consider but not order tests?: Work up considered but not performed and documented in chart, if applicable  Did you interpret images independently?: Independent interpretation of ECG and images noted in documentation, when applicable.  Consultation discussion with other provider:Did you involve another provider (consultant, , pharmacy, etc.)?: No  Discharge. No recommendations on prescription strength medication(s). See documentation for any additional details.    At the conclusion of the encounter I discussed the results of all of the tests and the disposition. The questions were answered. The patient or family acknowledged understanding and was agreeable with the care plan.        HPI    Patient information was obtained from: Patient's family    Use of : N/A       Vito Sy is a 73 year old male who presents to the ED for evaluation of a fall and head injury.     Per Chart Review: Patient was admitted to [facility] ED from 05/09/24 to 05/15/24 for Hepatic encephalopathy.     Per family, the patient fell backward onto a commode around 7am this morning and hit his head on the TV stand. No LOC. Patient was alert, awake and talking this morning. He is also on hospice for liver cirrhosis. No other complaints at this time.      REVIEW OF SYSTEMS  Constitutional:  Denies fever, chills, weight loss or weakness  Eyes:  No pain, discharge, redness  HENT:  Denies sore throat, ear pain, congestion  Respiratory: No SOB, wheeze or cough  Cardiovascular:  No CP,  palpitations  GI:  Denies abdominal pain, nausea, vomiting, diarrhea  : Denies dysuria, hematuria  Musculoskeletal:  Denies any new muscle/joint pain, swelling or loss of function.  Skin:  Denies rash, pallor      All other systems negative unless noted in HPI.    PAST MEDICAL HISTORY:  Past Medical History:   Diagnosis Date    Alcoholic cirrhosis of liver with ascites (H)     Cirrhosis of liver (H)     Diabetes mellitus (H)     Gout     Hypertension     Kidney stone        PAST SURGICAL HISTORY:  Past Surgical History:   Procedure Laterality Date    COLONOSCOPY      ESOPHAGOSCOPY, GASTROSCOPY, DUODENOSCOPY (EGD), COMBINED N/A 10/10/2023    Procedure: ESOPHAGOGASTRODUODENOSCOPY with biopsy;  Surgeon: Puneet Plunkett MD, MD;  Location: [facility] OR    ESOPHAGOSCOPY, GASTROSCOPY, DUODENOSCOPY (EGD), COMBINED N/A 2/27/2024    Procedure: ESOPHAGOGASTRODUODENOSCOPY;  Surgeon: Beatrice Christie MD;  Location: [facility] OR    IR PARACENTESIS  11/6/2021    IR TRANSVEN INTRAHEPATIC PORTOSYST REV  1/26/2023    IR TRANSVEN INTRAHEPATIC PORTOSYST SHUNT  11/16/2022    IR TRANSVEN INTRAHEPATIC PORTOSYST SHUNT  12/9/2022    CT ESOPHAGOGASTRODUODENOSCOPY TRANSORAL DIAGNOSTIC N/A 11/16/2020    Procedure: ESOPHAGOGASTRODUODENOSCOPY (EGD);  Surgeon: Juan Garnett MD;  Location: [facility];  Service: Gastroenterology    CT ESOPHAGOGASTRODUODENOSCOPY TRANSORAL DIAGNOSTIC N/A 11/18/2020    Procedure: ESOPHAGOGASTRODUODENOSCOPY (EGD) WITH BANDING;  Surgeon: Juan Garnett MD;  Location: [facility];  Service: Gastroenterology    URETEROSCOPY           CURRENT MEDICATIONS:    Current Facility-Administered Medications   Medication Dose Route Frequency Provider Last Rate Last Admin    bacitracin ointment   Topical Once Amrik Gomez, DO         Current Outpatient Medications   Medication Sig Dispense Refill    bismuth subsalicylate (PEPTO BISMOL) 262 MG chewable tablet Take 1 tablet by mouth every 6 hours as needed  "for diarrhea      blood glucose (NO BRAND SPECIFIED) lancets standard Use to test blood sugar one time daily or as directed. 100 each 5    blood glucose (NO BRAND SPECIFIED) test strip Use to test blood sugar one time daily or as directed. 100 strip 5    blood glucose monitoring (NO BRAND SPECIFIED) meter device kit Use to test blood sugar one time daily or as directed. 1 kit 0    ciprofloxacin (CIPRO) 500 MG tablet Take 1 tablet by mouth once daily 60 tablet 0    fish oil-omega-3 fatty acids 1000 MG capsule Take 1 g by mouth daily      furosemide (LASIX) 20 MG tablet Take 1 tablet (20 mg) by mouth daily 60 tablet 6    lactulose (CHRONULAC) 10 GM/15ML solution Take 30 mLs (20 g) by mouth 3 times daily Goal to have 2-3 good bowel movements/day.      midodrine (PROAMATINE) 2.5 MG tablet Take 1 tablet (2.5 mg) by mouth 3 times daily (with meals) 180 tablet 3    multivitamin, therapeutic (THERA-VIT) TABS tablet Take 1 tablet by mouth daily      omeprazole (PRILOSEC) 20 MG DR capsule Take 1 capsule (20 mg) by mouth 2 times daily 180 capsule 3    rifaximin (XIFAXAN) 550 MG TABS tablet Take 1 tablet (550 mg) by mouth 2 times daily 60 tablet 4    sodium bicarbonate 650 MG tablet Take 1 tablet (650 mg) by mouth 2 times daily 60 tablet 0    spironolactone (ALDACTONE) 50 MG tablet Take 1 tablet (50 mg) by mouth daily 60 tablet 6    Zinc Sulfate 220 (50 Zn) MG TABS Take 220 mg by mouth daily           ALLERGIES:  Allergies   Allergen Reactions    Penicillins Unknown, Itching and Shortness Of Breath     Annotation: discussed with patient, he reports he had \"itching\" with penicillin many years ago in [facility] but no hives or throat or respiratory symptoms.  he also reports having tolerated amoxicillin since coming to .    Annotation: discussed with patient, he reports he had \"itching\" with penicillin many years ago in [facility] but no hives or throat or respiratory symptoms. he also reports having tolerated amoxicillin since coming to " [place].    Sulfa Antibiotics Itching and Shortness Of Breath    Beef-Derived Products      Other Reaction(s): Rash-itching    Misc. Sulfonamide Containing Compounds Other (See Comments)    Shrimp      Other Reaction(s): Rash-itching       FAMILY HISTORY:  Family History   Problem Relation Age of Onset    Heart Disease Brother     Hypertension Mother     Hypertension Father        SOCIAL HISTORY:  Social History     Socioeconomic History    Marital status:    Tobacco Use    Smoking status: Never     Passive exposure: Never    Smokeless tobacco: Never   Vaping Use    Vaping status: Never Used   Substance and Sexual Activity    Alcohol use: Not Currently     Comment: none for the past 2 years    Drug use: No   Other Topics Concern    Parent/sibling w/ CABG, MI or angioplasty before 65F 55M? No   Social History Narrative    Lives with wife - has worked as  in the past     Social Determinants of Health     Financial Resource Strain: Low Risk  (12/27/2023)    Financial Resource Strain     Within the past 12 months, have you or your family members you live with been unable to get utilities (heat, electricity) when it was really needed?: No   Food Insecurity: Low Risk  (12/27/2023)    Food Insecurity     Within the past 12 months, did you worry that your food would run out before you got money to buy more?: No     Within the past 12 months, did the food you bought just not last and you didn t have money to get more?: Patient declined   Transportation Needs: Low Risk  (12/27/2023)    Transportation Needs     Within the past 12 months, has lack of transportation kept you from medical appointments, getting your medicines, non-medical meetings or appointments, work, or from getting things that you need?: No   Interpersonal Safety: Unknown (3/24/2024)    [facility]    Humiliation, Afraid, Rape, and Kick questionnaire     Physically Abused: Patient unable to answer     Sexually  Abused: Patient unable to answer   Housing Stability: Low Risk  (3/24/2024)    [facility]    Housing Stability Vital Sign     Unable to Pay for Housing in the Last Year: No     Number of Places Lived in the Last Year: 1     In the last 12 months, was there a time when you did not have a steady place to sleep or slept in a shelter (including now)?: No       VITALS:  Patient Vitals for the past 24 hrs:   BP Temp Temp src Pulse Resp SpO2 Weight   06/07/24 0740 105/60 98.3  F (36.8  C) Oral 68 16 100 % 63.5 kg (140 lb)       PHYSICAL EXAM    VITAL SIGNS: /60   Pulse 68   Temp 98.3  F (36.8  C) (Oral)   Resp 16   Wt 63.5 kg (140 lb)   SpO2 100%   BMI 21.29 kg/m      General Appearance: Well-appearing, well-nourished, no acute distress   Head:  Normocephalic, without obvious abnormality, two 4 cm lacerations posterior left scalp  Eyes:  PERRL, conjunctiva/corneas clear, EOM's intact,  ENT:  Lips, mucosa, and tongue normal, membranes are moist without pallor  Neck:  Normal ROM, symmetrical, trachea midline    Cardio:  Regular rate and rhythm, no murmur, rub or gallop, 2+ pulses symmetric in all extremities  Pulm:  Clear to auscultation bilaterally, respirations unlabored,  Integument:  Warm, Dry, No erythema, No rash.    Neurologic:  Alert & oriented.  No focal deficits appreciated.      LABS  Results for orders placed or performed during the hospital encounter of 06/07/24 (from the past 24 hour(s))   Head CT w/o contrast    Narrative    EXAM: CT HEAD W/O CONTRAST  LOCATION: [facility]  DATE: 6/7/2024    INDICATION: Trauma, head injury.  COMPARISON: Head CT 4/27/2024.  TECHNIQUE: Routine CT Head without IV contrast. Multiplanar reformats. Dose reduction techniques were used.    FINDINGS:  No evidence of intracranial hemorrhage, mass, or hydrocephalus. Volume loss with background of nonspecific white matter hypoattenuation likely representing chronic  small vessel ischemic change. No acute osseous abnormality. Left superior orbital   calcification, probably benign, unchanged.      Impression    IMPRESSION:  1.  No acute intracranial abnormality.         RADIOLOGY  Head CT w/o contrast   Preliminary Result   IMPRESSION:   1.  No acute intracranial abnormality.          PROCEDURES:  PROCEDURE: Laceration Repair   INDICATIONS: Laceration   PROCEDURE PROVIDER: Dr Amrik Gomez   SITE: Left posterior scalp   TYPE/SIZE: simple, clean, and no foreign body visualized 2 lacerations total length 8 cm   FUNCTIONAL ASSESSMENT: Distal sensation and circulation intact   MEDICATION: LET   PREPARATION: irrigation with Normal saline   DEBRIDEMENT: no debridement   CLOSURE:  Superficial layer closed with 9 staples  (5 staples laceration #1 and 4 staples laceration #2)    Total number of sutures/staples placed: 9              MEDICATIONS GIVEN IN THE EMERGENCY:  Medications   bacitracin ointment (has no administration in time range)   lido-EPINEPHrine-tetracaine (LET) topical gel GEL (3 mLs Topical $Given 6/7/24 0809)   methylcellulose powder ( Topical $Given 6/7/24 0811)       NEW PRESCRIPTIONS STARTED AT TODAY'S ER VISIT  New Prescriptions    No medications on file        I, Dede Aguayo, am serving as a scribe to document services personally performed by Amrik Gomez D.O., based on my observations and the provider's statements to me.  I, Amrik Gomez D.O., attest that Dede Aguayo is acting in a scribe capacity, has observed my performance of the services and has documented them in accordance with my direction.     Amrik Gomez D.O.  Emergency Medicine  [facility] EMERGENCY DEPARTMENT  [address] [place] [id number] [phone]  Dept: [phone]          Amrik Gomez,   06/07/24 6760

## 2024-06-07 NOTE — TELEPHONE ENCOUNTER
FYI - Status Update    Who is Calling: Olivia Adria  Hospice Care Consultant    Update: Olivia came to MD office today (06/07/2024) to update Dr. Mejia regarding this patient.     Olivia states that the patient's care services have been terminated due to the patient having a significant fall resulting in bone fracture. The patient has been hospitalized at [facility] and will recover/seek care there.     Olivia indicates that care can be re-established with [facility] once the patient is discharged. Olivia welcomes call from provider if there are further questions/concerns.     Direct: [phone] (Okay to leave her detailed message, personal cell line)  Office: [phone]  Fax: [phone]    [address] [place] [address]    Does caller want a call/response back: No

## 2024-06-07 NOTE — ED TRIAGE NOTES
Patient presents here for evaluation of a fall in which he fell backwards from a standing position that occurred at about 7 am. He struck the back of his head incurring two lacerations to his scalp. He denies LOC, headache, neck pain or other injuries. No pain with palpation to the neck. No blood thinners noted on medication list.      Triage Assessment (Adult)       Row Name 06/07/24 0742          Triage Assessment    Airway WDL WDL        Respiratory WDL    Respiratory WDL WDL        Skin Circulation/Temperature WDL    Skin Circulation/Temperature WDL WDL        Cardiac WDL    Cardiac WDL WDL        Peripheral/Neurovascular WDL    Peripheral Neurovascular WDL WDL        Cognitive/Neuro/Behavioral WDL    Cognitive/Neuro/Behavioral WDL WDL

## 2024-06-07 NOTE — ED NOTES
Patient and family member reviewed discharge.  Discussed printed discharge information.  Follow-up appts reviewed.   What s/s warrant return to the ER.    Importance of social distancing, good hand hygiene, and proper primary care follow-up to maintain health.     Hospice arrived in er and udated on plan.

## 2024-06-07 NOTE — ED NOTES
Pt denies back or neck pain. Pt reports was on commode and fell back on commode hitting head on tv stand behind the commode. Denies loc.

## 2024-06-07 NOTE — ED NOTES
Attempting to contact hospice to make aware pt going to ct.  Spoke with Stephanie duron and  per hospice policy okay to proceed with ct without having to revoke hospice services.. Hospice may need to be revoked pending any interventions related to finds on ct.

## 2024-06-07 NOTE — DISCHARGE INSTRUCTIONS
Patient had her staples removed in the next 10 to 14 days.  Return to the emergency department for swelling, redness around the wound, fever, increasing pain, pus draining from the wound, or any other concern

## 2024-06-10 NOTE — TELEPHONE ENCOUNTER
I reviewed the chart and it appears that the patient was not hospitalized.  The patient was seen at [facility] emergency room and then discharged home to continue hospice care.

## 2024-06-11 NOTE — TELEPHONE ENCOUNTER
Amrik Mejia MD Physician SignedYesterday        I reviewed the chart and it appears that the patient was not hospitalized.  The patient was seen at [facility] emergency room and then discharged home to continue hospice care.        Olivia indicates that care can be re-established with [facility] once the patient is discharged. Olivia welcomes call from provider if there are further questions/concerns.      Direct: [phone] (Okay to leave her detailed message, personal cell line)  Office: [phone]  Fax: [phone]    Spoke to Olivia on the phone to relay provider inquiry regarding hospitalization.  Olivia to discuss patient case with the team.  If patient had been discharged and would like to re-enroll, [facility] will re-enroll patient. Clinic request an update of status.  Clinic number provided.     Marvel Solorzano RN  [facility]

## 2024-06-11 NOTE — TELEPHONE ENCOUNTER
FYI - Status Update    Who is Calling: Olivia Covington with [facility]    Update: Olivia with [facility] calling back to confirm that patient did not revoke care with [facility]. Patient is still currently in the enrolled with [facility].         Parish Mccracken, MSN, RN   [facility]

## 2024-06-12 NOTE — TELEPHONE ENCOUNTER
Phone call made to son (POC) Ramírez and LVM about referral.    Left direct number to call me back at.    Per chart review, had ED visit on 6/7/24 and mentioned that he enrolled in hospice.     Will close referral due to hospice, patient also has MELD of 15, so good chance he would be consult only as this time.    From ED visit in chart:       Attempting to contact hospice to make aware pt going to ct.  Spoke with Stephanie duron and  per hospice policy okay to proceed with ct without having to revoke hospice services.. Hospice may need to be revoked pending any interventions related to finds on ct.

## 2024-06-19 NOTE — PROGRESS NOTES
Clinic Care Coordination Contact    Situation: Patient chart reviewed by care coordinator.    Background: RN CC review chart for possible outreach to support plan of care     Assessment: Family has been engaged with care team and decision to move forward with hospice cares     Plan/Recommendations: will close CCC program, available in future if needed

## 2024-06-19 NOTE — Clinical Note
Pt was due for chart review/ outreach , Looks like he is now enrolled in hospice cares, so I closed program Please let me know it anything comes up and it would be beneficial for my support in the future Thanks Kavita

## 2024-06-27 NOTE — PROGRESS NOTES
Zaire is a 73 year old who is being evaluated via a billable telephone visit.    What phone number would you like to be contacted at? [phone]  How would you like to obtain your AVS? Jorge  Originating Location (pt. Location): Home    Distant Location (provider location):  On-site    Assessment & Plan     Other cirrhosis of liver (H), JENNINGS (nonalcoholic steatohepatitis)  Patient recently started on hospice care for end-stage liver disease.  However, he shown some improvement over this last couple of weeks with improved cognition, improved strength, improved appetite.  The family has been in discussions with his hepatology team about the possibility of liver transplant.  Counseling done with the patient and family today that seems unlikely but it is something that could be pursued further if the patient continues to improve we could consider discontinuation of hospice services and pursuit of a liver transplant if that is with the patient and his liver specialist think would be best.  I will check back in with the patient and his family in about 2 weeks with a phone follow-up.    Hepatic encephalopathy (H)  Patient is still taking lactulose as this does improve quality of life.  - lactulose (CHRONULAC) 10 GM/15ML solution; Take 30 mLs (20 g) by mouth 3 times daily Goal to have 2-3 good bowel movements/day.    Type 2 diabetes mellitus with microalbuminuria, without long-term current use of insulin (H)  Discontinue aggressive treatment and monitoring for now while he is on hospice care.    Okay to use over-the-counter cough suppressants for the cough.          Subjective   Zaire is a 73 year old, presenting for the following health issues:  Diabetes      6/27/2024     9:14 AM   Additional Questions   Roomed by Indu GARCIA   Patient has some confusion.  His wife does most of the talking over the telephone today.  He has had some cough.  His strength seems to be improving.  Since he was in the ER with head  laceration secondary to fall he has not had any recurrence of falls.  He seems to be eating better and his cognition has cleared some compared to last week.  He has not had any significant reaccumulation of ascites fluid in the abdomen that is giving him discomfort.  His wife is wondering about whether he might continue to improve and might be able to move into a path towards liver transplantation.  The patient's son has been having this discussion with the patient and of the liver specialist.    Objective           Vitals:  No vitals were obtained today due to virtual visit.    Physical Exam   General: Alert and no distress //Respiratory: No audible wheeze, cough, or shortness of breath // Psychiatric:  Appropriate affect, tone, and pace of words            Phone call duration: 16 minutes  Signed Electronically by: Amrik Mejia MD

## 2024-07-09 NOTE — LETTER
July 9, 2024    JAC LOWERY  [address] [address]    Dear  Jac,    Welcome to [facility] s MSC+ health program. My name is TASHIA Houston. I am your MSC+ care coordinator. You are eligible for Care Coordination through [facility] MSC+ plan.    As your care coordinator, we ll:  Meet to go over your care coordination benefits  Talk about your physical and mental health care needs   Review your preventative care needs  Create a plan that meets your needs with the services you choose    What happens next?  I ll call you soon to introduce myself and tell you more about my role. We ll then plan time to go over your health and safety needs. Our goal is to keep you as healthy and independent as possible.    Soon, you will receive a new MSC+ member identification (ID) card from [facility]. When you receive it, please use this card along with your [place] [facility] card and Prescription Drug Coverage Program card. When you receive, it please use this card where you get your health services. If you have Medicare, you will need to show your Medicare card when you get health services.    The [facility] care coordination program is voluntary and offered to you at no cost. If you wish to stop being in the care coordination program or have questions, call me at [phone]. If you reach my voicemail, leave a message and your phone number. TTY users, call the [place] Relay at 904 or 1-303.739.6418 (uhrtnr-yr-yuzcku relay service).    Sincerely,      TASHIA Houston  [phone]  [email or web address]          [id number] accepted   [id number]       [id number] (07/2022)

## 2024-07-09 NOTE — PROGRESS NOTES
[facility] Care Coordination Contact    Member became effective with  [facility] on 07/01/24 with [facility] MSC+.  Previous Health Plan: [place]   Previous Care System: [facility]  Previous care coordinators name and number: ESTELITA LANDIN  Warajendra Type: EW  Last MMIS Entry: Date 01/16/24, and Type 07 Admin Act  MMIS visit date (and type) if different from above: none  Services Listed in MMIS: Chore Service, Pers, PCA, HMK, Supplies  UTF received: No: Requested on 07/09/24 from Kike Osman.  Mailed welcome letter and [facility] When to Contact Your Care Coordinator  Address/Phone discrepancy: [place] and [employer] sites unlike from Epic. Informed CC.  [employer]: MARIANN Vasquez  Care Management Specialist  [facility]  [phone]

## 2024-07-31 NOTE — PROGRESS NOTES
[facility] Care Coordination Contact    [facility]:  Emailed required PCA documents to [facility].  Faxed copy of PCA assessment to PCA Agency and mailed copy to member.  Faxed MD Communication to PCP. PCA Comm Form.    Ifeanyi Vasquez  Care Management Specialist  [facility]  [phone]

## 2024-08-22 ENCOUNTER — PATIENT OUTREACH (OUTPATIENT)
Dept: GERIATRIC MEDICINE | Facility: CLINIC | Age: 74
End: 2024-08-22
Payer: COMMERCIAL

## 2024-08-22 NOTE — PROGRESS NOTES
Notified by Ramírez that member passed away on 08/09/24 at Home.    PCP notified. Called all providers to cancel services.    Optional: Called family member/caregiver to offer condolences.   For [facility]:  Death Notification form completed and faxed to [facility].    Chart reviewed and this CC's encounter closed. Chart handed off to CMS to process disenrollment tasks.     Shania Red [facility]  [phone]

## 2024-09-06 ENCOUNTER — PATIENT OUTREACH (OUTPATIENT)
Dept: GERIATRIC MEDICINE | Facility: CLINIC | Age: 74
End: 2024-09-06

## 2024-09-06 NOTE — PROGRESS NOTES
[facility] Care Coordination Contact  Task received from CC 09/03/24.  Received after visit chart from care coordinator.  Completed following tasks: Mailed copy of support plan to member, Mailed [place] Choices signature sheet pages 3-4, and Mailed Safe Medication Disposal . Member passed.      Ifeanyi Vasquez  Care Management Specialist  [facility]  [phone]

## 2024-09-06 NOTE — LETTER
September 6, 2024       Vito Fidel Yossi  [address] [address]      Dear Vito,    At [facility], we re dedicated to improving your health and wellness. Enclosed is the Support Plan developed with you on 07/17/2024. Please review the Support Plan carefully.    As a reminder, during your visit we talked about:   Ways to manage your physical and mental health   Using health care to maintain and improve your health    Your preventive care needs      Remember to contact your care coordinator if you:   Are hospitalized or plan to be hospitalized    Have a fall     Have a change in your physical or mental health   Need help finding support or services    If you have questions or don t agree with your Support Plan, call me at [phone]. You can also call me if your needs change. TTY users call the [place] [employer]ay at 089 or [phone] (qjtblq-uh-bqfgbc relay service).    Sincerely,       TASHIA Houston  [phone]  [email or web address]                      [id number] accepted     (06/2024)                [address] [place] [address]  [phone]  fax [phone]  [facility]

## 2025-02-07 NOTE — TELEPHONE ENCOUNTER
Care Transitions Program Week 4 Follow-up Call    Current Issues/Problems reviewed on call: none    Patient reports he has been feeling well. He says he has been going to outpatient physical therapy and has noticed improvement in his strength. Patient has an upcoming f/u appointment with PCP on 2/17/25.     Details of Interventions/Education completed on call: systems review, keep care team physicians updated if new or worsening symptoms occur    Review of Systems:   Fever: is not present   Pain:   0  Pain Location: n/a  General Symptoms: WDL (absence of symptoms)  Neurologic/Neuromuscular Symptoms: Weakness (strength improving--uses cane)  ENT Symptoms: WDL (absence of symptoms)  Respiratory Symptoms: WDL (absence of symptoms)  Cardiovascular Symptoms: WDL (absence of symptoms)  GI Symptoms: WDL (absence of symptoms)   Symptoms: WDL (absence of symptoms)  Skin Symptoms: WDL (absence of symptoms)  Sleeping Behaviors:WDL (absence of symptoms)    The patient is taking all medications as prescribed. The patient did not have questions or concerns regarding the medications prescribed.     RN cannot approve Refill Request    RN can NOT refill this medication medication not on med list. Last office visit: 12/23/2019 Amrik Mejia MD Last Physical: Visit date not found Last MTM visit: Visit date not found Last visit same specialty: 12/23/2019 Amrik Mejia MD.  Next visit within 3 mo: Visit date not found  Next physical within 3 mo: Visit date not found      Elizabeth Anderson, [facility] Connection Triage/Med Refill 2/4/2020    Requested Prescriptions   Pending Prescriptions Disp Refills     SITagliptin (JANUVIA) 100 MG tablet 90 tablet 3     Sig: Take 1 tablet (100 mg total) by mouth daily. With or without food       Oral Hypoglycemics Refill Protocol Passed - 2/4/2020  3:51 PM        Passed - Visit with PCP or prescribing provider visit in last 6 months       Last office visit with prescriber/PCP: 12/23/2019 OR same dept: 12/23/2019 Amrik Mejia MD OR same specialty: 12/23/2019 Amrik Mejia MD Last physical: Visit date not found Last MTM visit: Visit date not found         Next appt within 3 mo: Visit date not found  Next physical within 3 mo: Visit date not found  Prescriber OR PCP: Amrik Mejia MD  Last diagnosis associated with med order: There are no diagnoses linked to this encounter.   If protocol passes may refill for 12 months if within 3 months of last provider visit (or a total of 15 months).           Passed - A1C in last 6 months     Hemoglobin A1c   Date Value Ref Range Status   12/23/2019 6.3 (H) 3.5 - 6.0 % Final               Passed - Microalbumin in last year      Microalbumin, Random Urine   Date Value Ref Range Status   05/30/2019 6.08 (H) 0.00 - 1.99 mg/dL Final                  Passed - Blood pressure in last year     BP Readings from Last 1 Encounters:   12/23/19 100/60             Passed - Serum creatinine in last year     Creatinine   Date Value Ref Range Status   12/23/2019 1.34 (H) 0.70 - 1.30 mg/dL Final

## (undated) DEVICE — SOL WATER IRRIG 1000ML BOTTLE 2F7114

## (undated) DEVICE — TUBING SUCTION MEDI-VAC 1/4"X20' N620A

## (undated) DEVICE — SUCTION MANIFOLD NEPTUNE 2 SYS 1 PORT 702-025-000

## (undated) DEVICE — FORCEP BIOPSY 2.3MM DISP COATED 000388

## (undated) RX ORDER — ALBUMIN (HUMAN) 12.5 G/50ML
SOLUTION INTRAVENOUS
Status: DISPENSED
Start: 2022-03-15

## (undated) RX ORDER — KETAMINE HYDROCHLORIDE 10 MG/ML
INJECTION INTRAMUSCULAR; INTRAVENOUS
Status: DISPENSED
Start: 2022-12-09

## (undated) RX ORDER — LIDOCAINE HYDROCHLORIDE 10 MG/ML
INJECTION, SOLUTION INFILTRATION; PERINEURAL
Status: DISPENSED
Start: 2022-11-16

## (undated) RX ORDER — GLYCOPYRROLATE 0.2 MG/ML
INJECTION, SOLUTION INTRAMUSCULAR; INTRAVENOUS
Status: DISPENSED
Start: 2024-01-01

## (undated) RX ORDER — FENTANYL CITRATE 50 UG/ML
INJECTION, SOLUTION INTRAMUSCULAR; INTRAVENOUS
Status: DISPENSED
Start: 2022-11-16

## (undated) RX ORDER — ALBUMIN (HUMAN) 12.5 G/50ML
SOLUTION INTRAVENOUS
Status: DISPENSED
Start: 2023-01-01

## (undated) RX ORDER — ALBUMIN (HUMAN) 12.5 G/50ML
SOLUTION INTRAVENOUS
Status: DISPENSED
Start: 2022-12-09

## (undated) RX ORDER — PROPOFOL 10 MG/ML
INJECTION, EMULSION INTRAVENOUS
Status: DISPENSED
Start: 2024-01-01

## (undated) RX ORDER — FENTANYL CITRATE 50 UG/ML
INJECTION, SOLUTION INTRAMUSCULAR; INTRAVENOUS
Status: DISPENSED
Start: 2023-01-26

## (undated) RX ORDER — LIDOCAINE HYDROCHLORIDE 10 MG/ML
INJECTION, SOLUTION EPIDURAL; INFILTRATION; INTRACAUDAL; PERINEURAL
Status: DISPENSED
Start: 2022-10-24

## (undated) RX ORDER — FENTANYL CITRATE-0.9 % NACL/PF 10 MCG/ML
PLASTIC BAG, INJECTION (ML) INTRAVENOUS
Status: DISPENSED
Start: 2022-11-16

## (undated) RX ORDER — LIDOCAINE HYDROCHLORIDE 10 MG/ML
INJECTION, SOLUTION EPIDURAL; INFILTRATION; INTRACAUDAL; PERINEURAL
Status: DISPENSED
Start: 2022-11-16

## (undated) RX ORDER — ONDANSETRON 2 MG/ML
INJECTION INTRAMUSCULAR; INTRAVENOUS
Status: DISPENSED
Start: 2022-11-16

## (undated) RX ORDER — ALBUMIN (HUMAN) 12.5 G/50ML
SOLUTION INTRAVENOUS
Status: DISPENSED
Start: 2022-04-28

## (undated) RX ORDER — LIDOCAINE HYDROCHLORIDE 10 MG/ML
INJECTION, SOLUTION EPIDURAL; INFILTRATION; INTRACAUDAL; PERINEURAL
Status: DISPENSED
Start: 2024-01-01

## (undated) RX ORDER — EPHEDRINE SULFATE 50 MG/ML
INJECTION, SOLUTION INTRAMUSCULAR; INTRAVENOUS; SUBCUTANEOUS
Status: DISPENSED
Start: 2022-11-16

## (undated) RX ORDER — DEXAMETHASONE SODIUM PHOSPHATE 10 MG/ML
INJECTION, SOLUTION INTRAMUSCULAR; INTRAVENOUS
Status: DISPENSED
Start: 2022-11-16

## (undated) RX ORDER — PHENYLEPHRINE HYDROCHLORIDE 10 MG/ML
INJECTION INTRAVENOUS
Status: DISPENSED
Start: 2022-12-09

## (undated) RX ORDER — VASOPRESSIN IN 0.9 % NACL 2 UNIT/2ML
SYRINGE (ML) INTRAVENOUS
Status: DISPENSED
Start: 2022-12-09

## (undated) RX ORDER — CALCIUM CHLORIDE 100 MG/ML
INJECTION INTRAVENOUS; INTRAVENTRICULAR
Status: DISPENSED
Start: 2022-11-16

## (undated) RX ORDER — ALBUMIN (HUMAN) 12.5 G/50ML
SOLUTION INTRAVENOUS
Status: DISPENSED
Start: 2022-06-08

## (undated) RX ORDER — PROPOFOL 10 MG/ML
INJECTION, EMULSION INTRAVENOUS
Status: DISPENSED
Start: 2022-11-16

## (undated) RX ORDER — ALBUMIN (HUMAN) 12.5 G/50ML
SOLUTION INTRAVENOUS
Status: DISPENSED
Start: 2024-01-01

## (undated) RX ORDER — LIDOCAINE HYDROCHLORIDE 10 MG/ML
INJECTION, SOLUTION INFILTRATION; PERINEURAL
Status: DISPENSED
Start: 2022-12-09

## (undated) RX ORDER — ALBUMIN (HUMAN) 12.5 G/50ML
SOLUTION INTRAVENOUS
Status: DISPENSED
Start: 2022-05-25

## (undated) RX ORDER — LIDOCAINE HYDROCHLORIDE 10 MG/ML
INJECTION, SOLUTION INFILTRATION; PERINEURAL
Status: DISPENSED
Start: 2023-01-26

## (undated) RX ORDER — CALCIUM CHLORIDE 100 MG/ML
INJECTION INTRAVENOUS; INTRAVENTRICULAR
Status: DISPENSED
Start: 2022-12-09

## (undated) RX ORDER — LIDOCAINE HYDROCHLORIDE 10 MG/ML
INJECTION, SOLUTION EPIDURAL; INFILTRATION; INTRACAUDAL; PERINEURAL
Status: DISPENSED
Start: 2022-06-08

## (undated) RX ORDER — ALBUMIN (HUMAN) 12.5 G/50ML
SOLUTION INTRAVENOUS
Status: DISPENSED
Start: 2022-08-18

## (undated) RX ORDER — ALBUMIN (HUMAN) 12.5 G/50ML
SOLUTION INTRAVENOUS
Status: DISPENSED
Start: 2022-03-30

## (undated) RX ORDER — ONDANSETRON 2 MG/ML
INJECTION INTRAMUSCULAR; INTRAVENOUS
Status: DISPENSED
Start: 2024-01-01

## (undated) RX ORDER — LIDOCAINE HYDROCHLORIDE 10 MG/ML
INJECTION, SOLUTION INFILTRATION; PERINEURAL
Status: DISPENSED
Start: 2022-08-18

## (undated) RX ORDER — ALBUMIN (HUMAN) 12.5 G/50ML
SOLUTION INTRAVENOUS
Status: DISPENSED
Start: 2022-10-24